# Patient Record
Sex: MALE | Race: WHITE | Employment: OTHER | URBAN - METROPOLITAN AREA
[De-identification: names, ages, dates, MRNs, and addresses within clinical notes are randomized per-mention and may not be internally consistent; named-entity substitution may affect disease eponyms.]

---

## 2018-04-03 ENCOUNTER — OFFICE VISIT (OUTPATIENT)
Dept: FAMILY MEDICINE CLINIC | Age: 63
End: 2018-04-03
Payer: MEDICARE

## 2018-04-03 VITALS
SYSTOLIC BLOOD PRESSURE: 130 MMHG | TEMPERATURE: 98.6 F | HEIGHT: 69 IN | BODY MASS INDEX: 35.98 KG/M2 | DIASTOLIC BLOOD PRESSURE: 80 MMHG | RESPIRATION RATE: 12 BRPM | HEART RATE: 76 BPM | WEIGHT: 242.9 LBS | OXYGEN SATURATION: 94 %

## 2018-04-03 DIAGNOSIS — E03.8 OTHER SPECIFIED HYPOTHYROIDISM: Primary | ICD-10-CM

## 2018-04-03 DIAGNOSIS — R97.20 ELEVATED PSA: ICD-10-CM

## 2018-04-03 DIAGNOSIS — E78.00 PURE HYPERCHOLESTEROLEMIA: ICD-10-CM

## 2018-04-03 PROCEDURE — G0444 DEPRESSION SCREEN ANNUAL: HCPCS | Performed by: FAMILY MEDICINE

## 2018-04-03 PROCEDURE — 99202 OFFICE O/P NEW SF 15 MIN: CPT | Performed by: FAMILY MEDICINE

## 2018-04-03 ASSESSMENT — PATIENT HEALTH QUESTIONNAIRE - PHQ9
2. FEELING DOWN, DEPRESSED OR HOPELESS: 0
1. LITTLE INTEREST OR PLEASURE IN DOING THINGS: 2
5. POOR APPETITE OR OVEREATING: 1
8. MOVING OR SPEAKING SO SLOWLY THAT OTHER PEOPLE COULD HAVE NOTICED. OR THE OPPOSITE, BEING SO FIGETY OR RESTLESS THAT YOU HAVE BEEN MOVING AROUND A LOT MORE THAN USUAL: 0
10. IF YOU CHECKED OFF ANY PROBLEMS, HOW DIFFICULT HAVE THESE PROBLEMS MADE IT FOR YOU TO DO YOUR WORK, TAKE CARE OF THINGS AT HOME, OR GET ALONG WITH OTHER PEOPLE: 0
7. TROUBLE CONCENTRATING ON THINGS, SUCH AS READING THE NEWSPAPER OR WATCHING TELEVISION: 0
9. THOUGHTS THAT YOU WOULD BE BETTER OFF DEAD, OR OF HURTING YOURSELF: 0
3. TROUBLE FALLING OR STAYING ASLEEP: 0
6. FEELING BAD ABOUT YOURSELF - OR THAT YOU ARE A FAILURE OR HAVE LET YOURSELF OR YOUR FAMILY DOWN: 0
SUM OF ALL RESPONSES TO PHQ QUESTIONS 1-9: 5
SUM OF ALL RESPONSES TO PHQ9 QUESTIONS 1 & 2: 2
4. FEELING TIRED OR HAVING LITTLE ENERGY: 2

## 2018-04-03 ASSESSMENT — ENCOUNTER SYMPTOMS
ABDOMINAL PAIN: 0
CHOKING: 1

## 2018-04-24 DIAGNOSIS — E03.8 OTHER SPECIFIED HYPOTHYROIDISM: ICD-10-CM

## 2018-04-24 DIAGNOSIS — E78.00 PURE HYPERCHOLESTEROLEMIA: ICD-10-CM

## 2018-04-24 LAB
ALBUMIN SERPL-MCNC: 4.2 G/DL (ref 3.9–4.9)
ALP BLD-CCNC: 100 U/L (ref 35–104)
ALT SERPL-CCNC: 20 U/L (ref 0–41)
AST SERPL-CCNC: 20 U/L (ref 0–40)
BILIRUB SERPL-MCNC: 0.4 MG/DL (ref 0–1.2)
BILIRUBIN DIRECT: 0.2 MG/DL (ref 0–0.3)
BILIRUBIN, INDIRECT: 0.2 MG/DL (ref 0–0.6)
CHOLESTEROL, TOTAL: 193 MG/DL (ref 0–199)
HDLC SERPL-MCNC: 40 MG/DL (ref 40–59)
LDL CHOLESTEROL CALCULATED: 125 MG/DL (ref 0–129)
TOTAL PROTEIN: 6.7 G/DL (ref 6.4–8.1)
TRIGL SERPL-MCNC: 140 MG/DL (ref 0–200)
TSH SERPL DL<=0.05 MIU/L-ACNC: 3.96 UIU/ML (ref 0.27–4.2)

## 2018-05-01 ENCOUNTER — OFFICE VISIT (OUTPATIENT)
Dept: FAMILY MEDICINE CLINIC | Age: 63
End: 2018-05-01
Payer: MEDICARE

## 2018-05-01 VITALS
BODY MASS INDEX: 35.1 KG/M2 | RESPIRATION RATE: 16 BRPM | HEART RATE: 80 BPM | SYSTOLIC BLOOD PRESSURE: 118 MMHG | TEMPERATURE: 97.6 F | WEIGHT: 237 LBS | DIASTOLIC BLOOD PRESSURE: 80 MMHG | HEIGHT: 69 IN

## 2018-05-01 DIAGNOSIS — E78.00 PURE HYPERCHOLESTEROLEMIA: Primary | ICD-10-CM

## 2018-05-01 DIAGNOSIS — E03.8 OTHER SPECIFIED HYPOTHYROIDISM: ICD-10-CM

## 2018-05-01 DIAGNOSIS — Z13.31 POSITIVE DEPRESSION SCREENING: ICD-10-CM

## 2018-05-01 PROCEDURE — G8431 POS CLIN DEPRES SCRN F/U DOC: HCPCS | Performed by: FAMILY MEDICINE

## 2018-05-01 PROCEDURE — 99213 OFFICE O/P EST LOW 20 MIN: CPT | Performed by: FAMILY MEDICINE

## 2018-05-01 RX ORDER — L.ACIDOPH,PLANT/B.ANIMAL,LONG 2B CELL
CAPSULE ORAL
COMMUNITY
End: 2018-08-01

## 2018-05-01 RX ORDER — ASCORBIC ACID 500 MG
1000 TABLET ORAL DAILY
COMMUNITY
End: 2018-08-01

## 2018-05-01 ASSESSMENT — ENCOUNTER SYMPTOMS
ABDOMINAL PAIN: 0
SHORTNESS OF BREATH: 0

## 2018-05-07 ENCOUNTER — OFFICE VISIT (OUTPATIENT)
Dept: UROLOGY | Age: 63
End: 2018-05-07
Payer: MEDICARE

## 2018-05-07 VITALS
DIASTOLIC BLOOD PRESSURE: 80 MMHG | SYSTOLIC BLOOD PRESSURE: 122 MMHG | HEART RATE: 80 BPM | WEIGHT: 237 LBS | BODY MASS INDEX: 35.1 KG/M2 | HEIGHT: 69 IN

## 2018-05-07 DIAGNOSIS — R97.20 ELEVATED PSA: Primary | ICD-10-CM

## 2018-05-07 DIAGNOSIS — R31.29 MICROHEMATURIA: ICD-10-CM

## 2018-05-07 DIAGNOSIS — N13.8 BPH WITH OBSTRUCTION/LOWER URINARY TRACT SYMPTOMS: ICD-10-CM

## 2018-05-07 DIAGNOSIS — N40.1 BPH WITH OBSTRUCTION/LOWER URINARY TRACT SYMPTOMS: ICD-10-CM

## 2018-05-07 LAB
BILIRUBIN, POC: ABNORMAL
BLOOD URINE, POC: ABNORMAL
CLARITY, POC: CLEAR
COLOR, POC: YELLOW
GLUCOSE URINE, POC: ABNORMAL
KETONES, POC: ABNORMAL
LEUKOCYTE EST, POC: ABNORMAL
NITRITE, POC: ABNORMAL
PH, POC: 5.5
PROTEIN, POC: ABNORMAL
SPECIFIC GRAVITY, POC: >1.03
UROBILINOGEN, POC: 0.2

## 2018-05-07 PROCEDURE — 99204 OFFICE O/P NEW MOD 45 MIN: CPT | Performed by: UROLOGY

## 2018-05-07 PROCEDURE — 81003 URINALYSIS AUTO W/O SCOPE: CPT | Performed by: UROLOGY

## 2018-05-07 ASSESSMENT — ENCOUNTER SYMPTOMS
BACK PAIN: 1
ABDOMINAL DISTENTION: 0
VOMITING: 0
GASTROINTESTINAL NEGATIVE: 1
SHORTNESS OF BREATH: 0
NAUSEA: 0
RESPIRATORY NEGATIVE: 1
ABDOMINAL PAIN: 0
ALLERGIC/IMMUNOLOGIC NEGATIVE: 1

## 2018-05-08 LAB
BILIRUBIN URINE: NEGATIVE
BLOOD, URINE: ABNORMAL
CLARITY: ABNORMAL
COLOR: ABNORMAL
GLUCOSE URINE: NEGATIVE MG/DL
KETONES, URINE: NEGATIVE MG/DL
LEUKOCYTE ESTERASE, URINE: ABNORMAL
NITRITE, URINE: NEGATIVE
PH UA: 5.5 (ref 5–9)
PROTEIN UA: NEGATIVE MG/DL
SPECIFIC GRAVITY UA: 1.03 (ref 1–1.03)
UROBILINOGEN, URINE: 0.2 E.U./DL

## 2018-05-09 LAB
AMORPHOUS: ABNORMAL
CRYSTALS, UA: ABNORMAL
RBC UA: ABNORMAL /HPF (ref 0–2)
WBC UA: ABNORMAL /HPF (ref 0–5)

## 2018-05-24 DIAGNOSIS — R97.20 ELEVATED PSA: ICD-10-CM

## 2018-05-24 LAB — PROSTATE SPECIFIC ANTIGEN: 4.34 NG/ML (ref 0–5.4)

## 2018-05-29 ENCOUNTER — TELEPHONE (OUTPATIENT)
Dept: FAMILY MEDICINE CLINIC | Age: 63
End: 2018-05-29

## 2018-05-29 ENCOUNTER — OFFICE VISIT (OUTPATIENT)
Dept: UROLOGY | Age: 63
End: 2018-05-29
Payer: MEDICARE

## 2018-05-29 VITALS
DIASTOLIC BLOOD PRESSURE: 70 MMHG | HEIGHT: 70 IN | WEIGHT: 237 LBS | HEART RATE: 80 BPM | SYSTOLIC BLOOD PRESSURE: 114 MMHG | BODY MASS INDEX: 33.93 KG/M2

## 2018-05-29 DIAGNOSIS — R97.20 ELEVATED PSA: Primary | ICD-10-CM

## 2018-05-29 DIAGNOSIS — R31.29 MICROHEMATURIA: ICD-10-CM

## 2018-05-29 DIAGNOSIS — F41.9 ANXIETY: Primary | ICD-10-CM

## 2018-05-29 LAB
BILIRUBIN, POC: ABNORMAL
BLOOD URINE, POC: ABNORMAL
CLARITY, POC: CLEAR
COLOR, POC: YELLOW
GLUCOSE URINE, POC: ABNORMAL
KETONES, POC: ABNORMAL
LEUKOCYTE EST, POC: ABNORMAL
NITRITE, POC: ABNORMAL
PH, POC: 6
PROTEIN, POC: ABNORMAL
SPECIFIC GRAVITY, POC: 1.02
UROBILINOGEN, POC: 0.2

## 2018-05-29 PROCEDURE — 99214 OFFICE O/P EST MOD 30 MIN: CPT | Performed by: UROLOGY

## 2018-05-29 PROCEDURE — 81003 URINALYSIS AUTO W/O SCOPE: CPT | Performed by: UROLOGY

## 2018-05-29 ASSESSMENT — ENCOUNTER SYMPTOMS
SHORTNESS OF BREATH: 0
ABDOMINAL PAIN: 0
ABDOMINAL DISTENTION: 0

## 2018-05-31 LAB — URINE CULTURE, ROUTINE: NORMAL

## 2018-06-05 RX ORDER — LORAZEPAM 1 MG/1
TABLET ORAL
Qty: 4 TABLET | Refills: 0 | Status: SHIPPED | OUTPATIENT
Start: 2018-06-05 | End: 2018-07-03

## 2018-06-08 ENCOUNTER — HOSPITAL ENCOUNTER (OUTPATIENT)
Dept: GENERAL RADIOLOGY | Age: 63
Discharge: HOME | End: 2018-06-10
Payer: MEDICARE

## 2018-06-08 ENCOUNTER — HOSPITAL ENCOUNTER (OUTPATIENT)
Dept: CT IMAGING | Age: 63
Discharge: HOME | End: 2018-06-10
Payer: MEDICARE

## 2018-06-08 VITALS
HEIGHT: 70 IN | RESPIRATION RATE: 16 BRPM | WEIGHT: 237 LBS | DIASTOLIC BLOOD PRESSURE: 78 MMHG | SYSTOLIC BLOOD PRESSURE: 118 MMHG | BODY MASS INDEX: 33.93 KG/M2 | HEART RATE: 74 BPM

## 2018-06-08 DIAGNOSIS — R31.29 MICROHEMATURIA: ICD-10-CM

## 2018-06-08 DIAGNOSIS — R31.29 MICROSCOPIC HEMATURIA: ICD-10-CM

## 2018-06-08 LAB
GFR AFRICAN AMERICAN: >60
GFR NON-AFRICAN AMERICAN: >60
PERFORMED ON: NORMAL
POC CREATININE: 0.9 MG/DL (ref 0.8–1.3)
POC SAMPLE TYPE: NORMAL

## 2018-06-08 PROCEDURE — 6360000004 HC RX CONTRAST MEDICATION: Performed by: UROLOGY

## 2018-06-08 PROCEDURE — 2580000003 HC RX 258: Performed by: UROLOGY

## 2018-06-08 PROCEDURE — 74178 CT ABD&PLV WO CNTR FLWD CNTR: CPT

## 2018-06-08 PROCEDURE — 74018 RADEX ABDOMEN 1 VIEW: CPT

## 2018-06-08 RX ORDER — SODIUM CHLORIDE 0.9 % (FLUSH) 0.9 %
10 SYRINGE (ML) INJECTION PRN
Status: DISCONTINUED | OUTPATIENT
Start: 2018-06-08 | End: 2018-06-11 | Stop reason: HOSPADM

## 2018-06-08 RX ADMIN — IOPAMIDOL 100 ML: 755 INJECTION, SOLUTION INTRAVENOUS at 10:48

## 2018-06-08 RX ADMIN — Medication 10 ML: at 10:48

## 2018-06-14 DIAGNOSIS — R97.20 ELEVATED PSA: ICD-10-CM

## 2018-06-14 LAB — PROSTATE SPECIFIC ANTIGEN: 4.42 NG/ML (ref 0–5.4)

## 2018-06-19 ENCOUNTER — OFFICE VISIT (OUTPATIENT)
Dept: UROLOGY | Age: 63
End: 2018-06-19
Payer: MEDICARE

## 2018-06-19 VITALS
BODY MASS INDEX: 34.96 KG/M2 | HEIGHT: 69 IN | WEIGHT: 236 LBS | HEART RATE: 74 BPM | SYSTOLIC BLOOD PRESSURE: 132 MMHG | DIASTOLIC BLOOD PRESSURE: 80 MMHG

## 2018-06-19 DIAGNOSIS — R97.20 ELEVATED PSA: Primary | ICD-10-CM

## 2018-06-19 DIAGNOSIS — R31.29 MICROHEMATURIA: ICD-10-CM

## 2018-06-19 DIAGNOSIS — N20.0 KIDNEY STONE: ICD-10-CM

## 2018-06-19 PROCEDURE — 99214 OFFICE O/P EST MOD 30 MIN: CPT | Performed by: UROLOGY

## 2018-06-19 RX ORDER — CIPROFLOXACIN 500 MG/1
500 TABLET, FILM COATED ORAL 2 TIMES DAILY
Qty: 6 TABLET | Refills: 0 | Status: SHIPPED | OUTPATIENT
Start: 2018-06-19 | End: 2018-07-31 | Stop reason: ALTCHOICE

## 2018-06-19 ASSESSMENT — ENCOUNTER SYMPTOMS
ABDOMINAL PAIN: 0
SHORTNESS OF BREATH: 0
ABDOMINAL DISTENTION: 0

## 2018-07-11 ENCOUNTER — TELEPHONE (OUTPATIENT)
Dept: FAMILY MEDICINE CLINIC | Age: 63
End: 2018-07-11

## 2018-07-12 NOTE — TELEPHONE ENCOUNTER
Patient returned call, states that he was told he could take the ativan the night before to help him relax  and one the morning of  the procedure as well please advise

## 2018-07-16 ENCOUNTER — TELEPHONE (OUTPATIENT)
Dept: UROLOGY | Age: 63
End: 2018-07-16

## 2018-07-31 ENCOUNTER — OFFICE VISIT (OUTPATIENT)
Dept: UROLOGY | Age: 63
End: 2018-07-31
Payer: MEDICARE

## 2018-07-31 VITALS
DIASTOLIC BLOOD PRESSURE: 72 MMHG | HEIGHT: 70 IN | WEIGHT: 235 LBS | SYSTOLIC BLOOD PRESSURE: 132 MMHG | BODY MASS INDEX: 33.64 KG/M2 | HEART RATE: 81 BPM

## 2018-07-31 DIAGNOSIS — C61 PROSTATE CANCER (HCC): Primary | ICD-10-CM

## 2018-07-31 PROCEDURE — 99214 OFFICE O/P EST MOD 30 MIN: CPT | Performed by: UROLOGY

## 2018-07-31 ASSESSMENT — ENCOUNTER SYMPTOMS
ABDOMINAL PAIN: 0
ABDOMINAL DISTENTION: 0

## 2018-07-31 NOTE — PROGRESS NOTES
PERINEURAL INVASION. C.  LEFT LATERAL APEX-  FOCAL AREA OF HIGH-GRADE PROSTATIC INTRAEPITHELIAL NEOPLASIA, SUPPORTED  BY PIN 4 IMMUNOSTAINING.  (CONTROL IS SATISFACTORY) . D.  LEFT BASE-  BENIGN PROSTATE TISSUE WITH FOCAL CHRONIC INFLAMMATION . E.  LEFT MID-  BENIGN PROSTATE TISSUE WITH FOCAL CHRONIC INFLAMMATION. F.  LEFT APEX-  BENIGN PROSTATE TISSUE WITH FOCAL CHRONIC INFLAMMATION. G.  RIGHT BASE-  BENIGN PROSTATE TISSUE. Javed Galloway MID-  BENIGN PROSTATE TISSUE. I.  RIGHT APEX-  BENIGN PROSTATE TISSUE WITH AREAS OF ATROPHIC CHANGES . J.   RIGHT LATERAL BASE-  FOCAL HIGH-GRADE PIN.  (PIN 4 STAINING SUPPORTS THE DIAGNOSIS)    K.  RIGHT LATERAL MID-  HIGH GRADE PIN, FOCAL. PIN 4 IMMUNOSTAINING SUPPORTIVE OF ABOVE DIAGNOSIS. L.  RIGHT LATERAL APEX-  BENIGN PROSTATE TISSUE WITH FOCAL CHRONIC INFLAMMATION. ANALYTE SPECIFIC REAGENT (ASR) DISCLAIMER    THE USE OF ONE OR MORE REAGENTS IN THE ABOVE MENTIONED TESTS IS REGULATED  AS AN ANALYTE SPECIFIC REAGENT (ASR). THESE TESTS WERE DEVELOPED AND  THEIR PERFORMANCE CHARACTERISTICS DETERMINED BY THE CLINICAL LABORATORIES  OF [facility]. THEY HAVE NOT BEEN CLEARED OR APPROVED BY THE  [place] FOOD AND DRUG ADMINISTRATION (FDA). THE FDA HAS DETERMINED THAT SUCH  CLEARANCE OR APPROVAL IS NOT NECESSARY.   MEENAES/MEENAES      CLINICAL INFORMATION:  Elevated PSA. SPECIMEN:  A.  LLB Left Lateral Base  B.  LLM Left Lateral Mid  C.  LLA Left Lateral [place]  D.  LB Left Base  E.  LM Left Mid  F.  LA Left [place]  G.  RB Right Base  H.  RM Right Mid  I.  RA Right [place]  J.  RLB Right Lateral Base  K.  RLM Right Lateral Mid  L.  RLA Right Lateral [place]    GROSS DESCRIPTION:  A.  Specimen container is labeled with the patient's name and designated  \"LLB\". In Prefer is a needle biopsy fragment measuring 1.6 cm in length. Submitted in toto, one cassette. Shelli Conquest container is labeled with the patient's name and designated  \"LLM\".  In Prefer is a by: Dr. Shawn Holland M.D., who concurs with the above diagnosis. Manuelito Casas M.D.  07/27/2018   Electronically signed out by                                                                     Page 1 of 1         PSA   Date Value Ref Range Status   06/14/2018 4.42 0.00 - 5.40 ng/mL Final   05/24/2018 4.34 0.00 - 5.40 ng/mL Final   03/05/2018 4.95 0.00 - 5.40 ng/mL Final     Comment:     When the Total PSA is between 3.00 and 10.00 ng/mL, consider  requesting a Free PSA to aid in diagnosis. 02/17/2016 3.96 0.00 - 5.40 ng/mL Final     Comment:     When the Total PSA is between 3.00 and 10.00 ng/mL, consider  requesting a Free PSA to aid in diagnosis. Assessment: This is a 57 yo male with h/o DDD, Anxiety, Kidney stones and with BPH by exam and mild LUTs and negative microhematuria evaluation and new diagnosis of [place] 7 prostate cancer (moderate risk). I reviewed the results in detail. The risks and benefits of the available treatment options for his likely clinically localized prostate cancer including active surveillance, ADT, cryotherapy, HIFU, radical prostatectomy (open/lap/robotic), seed implant, IMRT + ADT were discussed and the pt is most interested in RALP. Given patient's age and health with bothersome BPH/ LUTs this is a reasonable choice. I spent 25 minutes of which > 50% of the visit was spent counseling and coordinating care wrt the treatment options for prostate cancer. Plan:      1. Refer to Dr Pb Acuña for possible RALP (Urologic Oncology)  2.  F/U 6 mo for PSA

## 2018-08-01 ENCOUNTER — OFFICE VISIT (OUTPATIENT)
Dept: FAMILY MEDICINE CLINIC | Age: 63
End: 2018-08-01
Payer: MEDICARE

## 2018-08-01 ENCOUNTER — TELEPHONE (OUTPATIENT)
Dept: FAMILY MEDICINE CLINIC | Age: 63
End: 2018-08-01

## 2018-08-01 ENCOUNTER — TELEPHONE (OUTPATIENT)
Dept: UROLOGY | Age: 63
End: 2018-08-01

## 2018-08-01 VITALS
HEART RATE: 84 BPM | WEIGHT: 230.6 LBS | RESPIRATION RATE: 14 BRPM | OXYGEN SATURATION: 99 % | HEIGHT: 69 IN | SYSTOLIC BLOOD PRESSURE: 136 MMHG | DIASTOLIC BLOOD PRESSURE: 86 MMHG | BODY MASS INDEX: 34.16 KG/M2 | TEMPERATURE: 97.6 F

## 2018-08-01 DIAGNOSIS — C61 PROSTATE CANCER (HCC): ICD-10-CM

## 2018-08-01 DIAGNOSIS — F41.8 SITUATIONAL ANXIETY: Primary | ICD-10-CM

## 2018-08-01 DIAGNOSIS — C61 PROSTATE CANCER (HCC): Primary | ICD-10-CM

## 2018-08-01 PROCEDURE — 99213 OFFICE O/P EST LOW 20 MIN: CPT | Performed by: FAMILY MEDICINE

## 2018-08-01 RX ORDER — LEVOTHYROXINE SODIUM 0.05 MG/1
50 TABLET ORAL DAILY
COMMUNITY
End: 2018-10-01 | Stop reason: SDUPTHER

## 2018-08-01 RX ORDER — LOVASTATIN 40 MG/1
40 TABLET ORAL NIGHTLY
COMMUNITY
End: 2018-10-01 | Stop reason: SDUPTHER

## 2018-08-01 RX ORDER — LORAZEPAM 1 MG/1
TABLET ORAL
Qty: 20 TABLET | Refills: 0 | Status: SHIPPED | OUTPATIENT
Start: 2018-08-01 | End: 2018-08-22 | Stop reason: SDUPTHER

## 2018-08-22 ENCOUNTER — OFFICE VISIT (OUTPATIENT)
Dept: FAMILY MEDICINE CLINIC | Age: 63
End: 2018-08-22
Payer: MEDICARE

## 2018-08-22 VITALS
HEART RATE: 69 BPM | OXYGEN SATURATION: 95 % | WEIGHT: 227 LBS | SYSTOLIC BLOOD PRESSURE: 116 MMHG | BODY MASS INDEX: 33.62 KG/M2 | DIASTOLIC BLOOD PRESSURE: 72 MMHG | RESPIRATION RATE: 16 BRPM | HEIGHT: 69 IN | TEMPERATURE: 97 F

## 2018-08-22 DIAGNOSIS — F41.8 SITUATIONAL ANXIETY: Primary | ICD-10-CM

## 2018-08-22 DIAGNOSIS — C61 PROSTATE CANCER (HCC): ICD-10-CM

## 2018-08-22 PROCEDURE — 99213 OFFICE O/P EST LOW 20 MIN: CPT | Performed by: FAMILY MEDICINE

## 2018-08-22 RX ORDER — LORAZEPAM 1 MG/1
1 TABLET ORAL DAILY PRN
Qty: 30 TABLET | Refills: 1 | Status: SHIPPED | OUTPATIENT
Start: 2018-08-22 | End: 2018-11-08 | Stop reason: SDUPTHER

## 2018-10-01 RX ORDER — LEVOTHYROXINE SODIUM 0.05 MG/1
50 TABLET ORAL DAILY
Qty: 30 TABLET | Refills: 0 | Status: SHIPPED | OUTPATIENT
Start: 2018-10-01 | End: 2019-01-22

## 2018-10-01 RX ORDER — LOVASTATIN 40 MG/1
40 TABLET ORAL NIGHTLY
Qty: 30 TABLET | Refills: 0 | Status: SHIPPED | OUTPATIENT
Start: 2018-10-01 | End: 2018-11-08 | Stop reason: SDUPTHER

## 2018-11-01 DIAGNOSIS — E03.8 OTHER SPECIFIED HYPOTHYROIDISM: ICD-10-CM

## 2018-11-01 LAB — TSH SERPL DL<=0.05 MIU/L-ACNC: 5.18 UIU/ML (ref 0.27–4.2)

## 2018-11-08 ENCOUNTER — OFFICE VISIT (OUTPATIENT)
Dept: FAMILY MEDICINE CLINIC | Age: 63
End: 2018-11-08
Payer: MEDICARE

## 2018-11-08 VITALS
TEMPERATURE: 97.4 F | HEIGHT: 69 IN | RESPIRATION RATE: 14 BRPM | BODY MASS INDEX: 34.8 KG/M2 | DIASTOLIC BLOOD PRESSURE: 88 MMHG | SYSTOLIC BLOOD PRESSURE: 118 MMHG | OXYGEN SATURATION: 98 % | WEIGHT: 235 LBS | HEART RATE: 84 BPM

## 2018-11-08 DIAGNOSIS — E03.8 OTHER SPECIFIED HYPOTHYROIDISM: Primary | ICD-10-CM

## 2018-11-08 DIAGNOSIS — F41.8 SITUATIONAL ANXIETY: ICD-10-CM

## 2018-11-08 DIAGNOSIS — C61 PROSTATE CANCER (HCC): ICD-10-CM

## 2018-11-08 PROCEDURE — 99213 OFFICE O/P EST LOW 20 MIN: CPT | Performed by: FAMILY MEDICINE

## 2018-11-08 RX ORDER — LEVOTHYROXINE SODIUM 0.07 MG/1
75 TABLET ORAL DAILY
Qty: 90 TABLET | Refills: 0 | Status: SHIPPED | OUTPATIENT
Start: 2018-11-08 | End: 2019-01-22 | Stop reason: SDUPTHER

## 2018-11-08 RX ORDER — LOVASTATIN 40 MG/1
40 TABLET ORAL NIGHTLY
Qty: 90 TABLET | Refills: 1 | Status: SHIPPED | OUTPATIENT
Start: 2018-11-08 | End: 2019-04-26 | Stop reason: SDUPTHER

## 2018-11-08 RX ORDER — LORAZEPAM 1 MG/1
1 TABLET ORAL DAILY PRN
Qty: 20 TABLET | Refills: 0 | Status: SHIPPED | OUTPATIENT
Start: 2018-11-08 | End: 2019-02-28 | Stop reason: SDUPTHER

## 2018-11-08 RX ORDER — LORAZEPAM 1 MG/1
1 TABLET ORAL DAILY PRN
COMMUNITY
Start: 2018-10-02 | End: 2019-04-05 | Stop reason: SDUPTHER

## 2018-11-08 ASSESSMENT — ENCOUNTER SYMPTOMS: ABDOMINAL PAIN: 0

## 2018-11-08 NOTE — PROGRESS NOTES
Subjective:      Patient ID: José Gorman is a 61 y.o. male    HPI  Here in follow up for anxiety and hypothyroidism. Using ativan daily for most part since last time. No missed doses of synthroid. Getting prostate surgery later this month   Review of Systems   Constitutional: Negative for unexpected weight change. Gastrointestinal: Negative for abdominal pain. Skin: Negative for rash. Neurological: Negative for dizziness and weakness. Reviewed allergy, medical, social, surgical, family and med list changes and updated   Files--reviewed blood work with slightly elevated tsh  Social History     Social History    Marital status:      Spouse name: N/A    Number of children: N/A    Years of education: N/A     Social History Main Topics    Smoking status: Current Every Day Smoker     Packs/day: 1.50     Years: 40.00    Smokeless tobacco: Never Used    Alcohol use Yes    Drug use: No    Sexual activity: Not Asked     Other Topics Concern    None     Social History Narrative    None     Current Outpatient Prescriptions   Medication Sig Dispense Refill    LORazepam (ATIVAN) 1 MG tablet 1 mg daily as needed. Shimon Johnson levothyroxine (SYNTHROID) 50 MCG tablet Take 1 tablet by mouth Daily 30 tablet 0    lovastatin (MEVACOR) 40 MG tablet Take 1 tablet by mouth nightly 30 tablet 0    HYDROcodone-acetaminophen (NORCO) 5-325 MG per tablet Take 1 tablet by mouth       No current facility-administered medications for this visit. Family History   Problem Relation Age of Onset    Other Mother     Other Father      Past Medical History:   Diagnosis Date    Chicken pox     Chronic back pain     Hyperlipidemia     Measles     Mumps     Pneumonia    Controlled substances monitoring: no signs of potential drug abuse or diversion identified and [place] report reviewed today- activity consistent with treatment plan.   Objective:   /88   Pulse 84   Temp 97.4 °F (36.3 °C)   Resp 14   Ht 5' 9\"

## 2019-01-16 DIAGNOSIS — E03.8 OTHER SPECIFIED HYPOTHYROIDISM: ICD-10-CM

## 2019-01-16 LAB — TSH SERPL DL<=0.05 MIU/L-ACNC: 3.62 UIU/ML (ref 0.27–4.2)

## 2019-01-22 ENCOUNTER — OFFICE VISIT (OUTPATIENT)
Dept: FAMILY MEDICINE CLINIC | Age: 64
End: 2019-01-22
Payer: MEDICARE

## 2019-01-22 VITALS
HEIGHT: 69 IN | RESPIRATION RATE: 14 BRPM | TEMPERATURE: 98 F | HEART RATE: 68 BPM | WEIGHT: 244.2 LBS | BODY MASS INDEX: 36.17 KG/M2 | DIASTOLIC BLOOD PRESSURE: 84 MMHG | OXYGEN SATURATION: 99 % | SYSTOLIC BLOOD PRESSURE: 122 MMHG

## 2019-01-22 DIAGNOSIS — E78.00 PURE HYPERCHOLESTEROLEMIA: ICD-10-CM

## 2019-01-22 DIAGNOSIS — E03.8 OTHER SPECIFIED HYPOTHYROIDISM: Primary | ICD-10-CM

## 2019-01-22 DIAGNOSIS — F41.8 SITUATIONAL ANXIETY: ICD-10-CM

## 2019-01-22 DIAGNOSIS — C61 PROSTATE CANCER (HCC): ICD-10-CM

## 2019-01-22 LAB — PROSTATE SPECIFIC ANTIGEN: <0.01 NG/ML (ref 0–5.4)

## 2019-01-22 PROCEDURE — 99213 OFFICE O/P EST LOW 20 MIN: CPT | Performed by: FAMILY MEDICINE

## 2019-01-22 RX ORDER — LEVOTHYROXINE SODIUM 0.07 MG/1
75 TABLET ORAL DAILY
Qty: 90 TABLET | Refills: 1 | Status: SHIPPED | OUTPATIENT
Start: 2019-01-22 | End: 2019-07-23 | Stop reason: SDUPTHER

## 2019-01-22 ASSESSMENT — ENCOUNTER SYMPTOMS: ABDOMINAL DISTENTION: 0

## 2019-01-31 ENCOUNTER — OFFICE VISIT (OUTPATIENT)
Dept: UROLOGY | Age: 64
End: 2019-01-31
Payer: MEDICARE

## 2019-01-31 VITALS
WEIGHT: 244 LBS | SYSTOLIC BLOOD PRESSURE: 120 MMHG | BODY MASS INDEX: 34.93 KG/M2 | HEART RATE: 82 BPM | HEIGHT: 70 IN | DIASTOLIC BLOOD PRESSURE: 70 MMHG

## 2019-01-31 DIAGNOSIS — C61 PROSTATE CANCER (HCC): Primary | ICD-10-CM

## 2019-01-31 PROCEDURE — 99214 OFFICE O/P EST MOD 30 MIN: CPT | Performed by: UROLOGY

## 2019-01-31 ASSESSMENT — ENCOUNTER SYMPTOMS: ABDOMINAL PAIN: 0

## 2019-02-28 DIAGNOSIS — F41.8 SITUATIONAL ANXIETY: ICD-10-CM

## 2019-02-28 RX ORDER — LORAZEPAM 1 MG/1
1 TABLET ORAL DAILY PRN
Status: CANCELLED | OUTPATIENT
Start: 2019-02-28

## 2019-02-28 RX ORDER — LORAZEPAM 1 MG/1
1 TABLET ORAL DAILY PRN
Qty: 30 TABLET | Refills: 0 | Status: SHIPPED | OUTPATIENT
Start: 2019-02-28 | End: 2019-03-30

## 2019-04-05 DIAGNOSIS — F41.9 ANXIETY: Primary | ICD-10-CM

## 2019-04-05 RX ORDER — LORAZEPAM 1 MG/1
1 TABLET ORAL DAILY PRN
OUTPATIENT
Start: 2019-04-05

## 2019-04-05 RX ORDER — LORAZEPAM 1 MG/1
1 TABLET ORAL DAILY PRN
Qty: 30 TABLET | Refills: 0 | Status: SHIPPED | OUTPATIENT
Start: 2019-04-05 | End: 2019-05-06 | Stop reason: SDUPTHER

## 2019-04-22 DIAGNOSIS — E03.8 OTHER SPECIFIED HYPOTHYROIDISM: ICD-10-CM

## 2019-04-22 DIAGNOSIS — E78.00 PURE HYPERCHOLESTEROLEMIA: ICD-10-CM

## 2019-04-22 LAB
ALBUMIN SERPL-MCNC: 4.1 G/DL (ref 3.5–4.6)
ALP BLD-CCNC: 91 U/L (ref 35–104)
ALT SERPL-CCNC: 26 U/L (ref 0–41)
ANION GAP SERPL CALCULATED.3IONS-SCNC: 14 MEQ/L (ref 9–15)
AST SERPL-CCNC: 21 U/L (ref 0–40)
BASOPHILS ABSOLUTE: 0.1 K/UL (ref 0–0.2)
BASOPHILS RELATIVE PERCENT: 1 %
BILIRUB SERPL-MCNC: <0.2 MG/DL (ref 0.2–0.7)
BUN BLDV-MCNC: 13 MG/DL (ref 8–23)
CALCIUM SERPL-MCNC: 8.9 MG/DL (ref 8.5–9.9)
CHLORIDE BLD-SCNC: 103 MEQ/L (ref 95–107)
CHOLESTEROL, TOTAL: 186 MG/DL (ref 0–199)
CO2: 26 MEQ/L (ref 20–31)
CREAT SERPL-MCNC: 0.81 MG/DL (ref 0.7–1.2)
EOSINOPHILS ABSOLUTE: 0.4 K/UL (ref 0–0.7)
EOSINOPHILS RELATIVE PERCENT: 5.6 %
GFR AFRICAN AMERICAN: >60
GFR NON-AFRICAN AMERICAN: >60
GLOBULIN: 2.9 G/DL (ref 2.3–3.5)
GLUCOSE BLD-MCNC: 121 MG/DL (ref 70–99)
HCT VFR BLD CALC: 40.6 % (ref 42–52)
HDLC SERPL-MCNC: 43 MG/DL (ref 40–59)
HEMOGLOBIN: 14 G/DL (ref 14–18)
LDL CHOLESTEROL CALCULATED: 115 MG/DL (ref 0–129)
LYMPHOCYTES ABSOLUTE: 2.6 K/UL (ref 1–4.8)
LYMPHOCYTES RELATIVE PERCENT: 34.1 %
MCH RBC QN AUTO: 31.2 PG (ref 27–31.3)
MCHC RBC AUTO-ENTMCNC: 34.4 % (ref 33–37)
MCV RBC AUTO: 90.7 FL (ref 80–100)
MONOCYTES ABSOLUTE: 1 K/UL (ref 0.2–0.8)
MONOCYTES RELATIVE PERCENT: 12.4 %
NEUTROPHILS ABSOLUTE: 3.6 K/UL (ref 1.4–6.5)
NEUTROPHILS RELATIVE PERCENT: 46.9 %
PDW BLD-RTO: 13.8 % (ref 11.5–14.5)
PLATELET # BLD: 239 K/UL (ref 130–400)
POTASSIUM SERPL-SCNC: 4.6 MEQ/L (ref 3.4–4.9)
RBC # BLD: 4.48 M/UL (ref 4.7–6.1)
SODIUM BLD-SCNC: 143 MEQ/L (ref 135–144)
TOTAL PROTEIN: 7 G/DL (ref 6.3–8)
TRIGL SERPL-MCNC: 138 MG/DL (ref 0–150)
TSH SERPL DL<=0.05 MIU/L-ACNC: 3.49 UIU/ML (ref 0.44–3.86)
WBC # BLD: 7.7 K/UL (ref 4.8–10.8)

## 2019-04-26 ENCOUNTER — OFFICE VISIT (OUTPATIENT)
Dept: FAMILY MEDICINE CLINIC | Age: 64
End: 2019-04-26
Payer: MEDICARE

## 2019-04-26 VITALS
SYSTOLIC BLOOD PRESSURE: 100 MMHG | TEMPERATURE: 96.7 F | HEART RATE: 69 BPM | DIASTOLIC BLOOD PRESSURE: 62 MMHG | RESPIRATION RATE: 10 BRPM | BODY MASS INDEX: 35.9 KG/M2 | OXYGEN SATURATION: 98 % | WEIGHT: 250.8 LBS | HEIGHT: 70 IN

## 2019-04-26 DIAGNOSIS — F41.9 ANXIETY: ICD-10-CM

## 2019-04-26 DIAGNOSIS — C61 PROSTATE CANCER (HCC): ICD-10-CM

## 2019-04-26 DIAGNOSIS — E78.00 PURE HYPERCHOLESTEROLEMIA: Primary | ICD-10-CM

## 2019-04-26 DIAGNOSIS — E03.8 OTHER SPECIFIED HYPOTHYROIDISM: ICD-10-CM

## 2019-04-26 DIAGNOSIS — R06.09 EXERTIONAL DYSPNEA: ICD-10-CM

## 2019-04-26 DIAGNOSIS — R73.03 PRE-DIABETES: ICD-10-CM

## 2019-04-26 LAB — PROSTATE SPECIFIC ANTIGEN: <0.01 NG/ML (ref 0–5.4)

## 2019-04-26 PROCEDURE — 93000 ELECTROCARDIOGRAM COMPLETE: CPT | Performed by: FAMILY MEDICINE

## 2019-04-26 PROCEDURE — 99214 OFFICE O/P EST MOD 30 MIN: CPT | Performed by: FAMILY MEDICINE

## 2019-04-26 RX ORDER — ALBUTEROL SULFATE 90 UG/1
2 AEROSOL, METERED RESPIRATORY (INHALATION) EVERY 6 HOURS PRN
Qty: 1 INHALER | Refills: 0 | Status: SHIPPED | OUTPATIENT
Start: 2019-04-26 | End: 2019-05-02 | Stop reason: ALTCHOICE

## 2019-04-26 RX ORDER — LOVASTATIN 40 MG/1
40 TABLET ORAL NIGHTLY
Qty: 90 TABLET | Refills: 1 | Status: SHIPPED | OUTPATIENT
Start: 2019-04-26 | End: 2019-10-24 | Stop reason: ALTCHOICE

## 2019-04-26 ASSESSMENT — PATIENT HEALTH QUESTIONNAIRE - PHQ9
1. LITTLE INTEREST OR PLEASURE IN DOING THINGS: 0
SUM OF ALL RESPONSES TO PHQ QUESTIONS 1-9: 0
SUM OF ALL RESPONSES TO PHQ QUESTIONS 1-9: 0
2. FEELING DOWN, DEPRESSED OR HOPELESS: 0
SUM OF ALL RESPONSES TO PHQ9 QUESTIONS 1 & 2: 0

## 2019-04-26 ASSESSMENT — ENCOUNTER SYMPTOMS
ABDOMINAL PAIN: 0
SHORTNESS OF BREATH: 1

## 2019-04-26 NOTE — PROGRESS NOTES
Subjective:      Patient ID: Reggie Velasquez is a 61 y.o. male    Hyperlipidemia   This is a chronic problem. The current episode started more than 1 year ago. The problem is controlled. Exacerbating diseases include obesity. Associated symptoms include shortness of breath. Current antihyperlipidemic treatment includes statins. The current treatment provides mild improvement of lipids. Compliance problems include adherence to exercise, adherence to diet and psychosocial issues. Risk factors for coronary artery disease include stress, male sex, obesity and dyslipidemia. Other   Pertinent negatives include no abdominal pain, rash or weakness. Shortness of Breath   Associated symptoms include leg swelling. Pertinent negatives include no abdominal pain or rash. Here in follow up for anxiety and lipids and hypothyroidism. Using ativan daily as still going thru stresses of prostate cancer tx  Has noted some exertional sob over the last few months. No chest pain. Review of Systems   Constitutional: Positive for appetite change. Respiratory: Positive for shortness of breath. Cardiovascular: Positive for leg swelling. Negative for palpitations. Gastrointestinal: Negative for abdominal pain. Skin: Negative for rash. Neurological: Negative for dizziness and weakness.      Reviewed allergy, medical, social, surgical, family and med list changes and updated   Files-reviewed blood work with high normal glucose      Social History     Socioeconomic History    Marital status:      Spouse name: None    Number of children: None    Years of education: None    Highest education level: None   Occupational History    None   Social Needs    Financial resource strain: None    Food insecurity:     Worry: None     Inability: None    Transportation needs:     Medical: None     Non-medical: None   Tobacco Use    Smoking status: Former Smoker     Packs/day: 1.50     Years: 40.00     Pack years: 60.00 carotid bruits. No masses. No adenopathy. No thyroid asymmetry. Lungs:clear and equal breath sounds. No wheezes or rales. Heart:rate reg. No murmur. No gallops   Pulses:Radials 2+ equal               Poster tib 1+ equal  Extremities:no edema in either leg  Gen: In no acute distress  Abdomen; B.S present. Soft  Non tender. No hepatosplenomegaly. No masses   Patient with appropriate affect. Alert    Thought content appropriate  Good eye contact      Assessment:       Diagnosis Orders   1. Pure hypercholesterolemia     2. Anxiety     3. Exertional dyspnea  EKG 12 Lead    XR CHEST STANDARD (2 VW)   4. Other specified hypothyroidism     5.  Pre-diabetes           Plan:    continue current medications   Will work on diet and weight loss   Orders Placed This Encounter   Medications    albuterol sulfate HFA (PROVENTIL HFA) 108 (90 Base) MCG/ACT inhaler     Sig: Inhale 2 puffs into the lungs every 6 hours as needed for Wheezing     Dispense:  1 Inhaler     Refill:  0    lovastatin (MEVACOR) 40 MG tablet     Sig: Take 1 tablet by mouth nightly     Dispense:  90 tablet     Refill:  1     Orders Placed This Encounter   Procedures    XR CHEST STANDARD (2 VW)     Standing Status:   Future     Number of Occurrences:   1     Standing Expiration Date:   4/26/2020    EKG 12 Lead     Order Specific Question:   Reason for Exam?     Answer:   Shortness of Breath    ECHO Complete 2D W Doppler W Color     Standing Status:   Future     Standing Expiration Date:   4/26/2020     Order Specific Question:   Reason for exam:     Answer:   exertional dyspnea    ECHO Pharmacological Stress Test     Standing Status:   Future     Standing Expiration Date:   4/26/2020     Order Specific Question:   Reason for exam:     Answer:   9   f/u after above

## 2019-05-02 ENCOUNTER — OFFICE VISIT (OUTPATIENT)
Dept: UROLOGY | Age: 64
End: 2019-05-02
Payer: MEDICARE

## 2019-05-02 VITALS
DIASTOLIC BLOOD PRESSURE: 78 MMHG | BODY MASS INDEX: 35.4 KG/M2 | WEIGHT: 239 LBS | SYSTOLIC BLOOD PRESSURE: 118 MMHG | HEIGHT: 69 IN | HEART RATE: 84 BPM

## 2019-05-02 DIAGNOSIS — N39.3 SUI (STRESS URINARY INCONTINENCE), MALE: ICD-10-CM

## 2019-05-02 DIAGNOSIS — Z85.46 H/O PROSTATE CANCER: Primary | ICD-10-CM

## 2019-05-02 PROCEDURE — 99213 OFFICE O/P EST LOW 20 MIN: CPT | Performed by: UROLOGY

## 2019-05-02 ASSESSMENT — ENCOUNTER SYMPTOMS
ABDOMINAL PAIN: 0
SHORTNESS OF BREATH: 1
ABDOMINAL DISTENTION: 0

## 2019-05-02 NOTE — PROGRESS NOTES
Subjective:      Patient ID: Charmaine Oliver is a 61 y.o. male. HPI  This is a 62 yo male with h/o DDD, Anxiety, Kidney stones and s/p RALP on 18 with positive margin back in follow-up. Since last seen on 19, he did not see Dr Jong Dickinson for unclear reasons. He is still having ABDIAS and requires a pad daily and at night but he feels this has continued to improve with Kegel's. He has a good flow and feels the bladder empties. He has no hematuria or dysuria or pain. He has no wt loss or abnl bone pains. He is being evaluated for SOB by his PCP and is to get a stress test soon. I reviewed the interval PSA today.      PATH: Aaliy[place] 4 + 3 = 7 adenocarcinoma of prostate, 11 Ln neg, EPE neg, Margin pos (Rt anterior), SV neg. [id number].        Past Medical History:   Diagnosis Date    Chicken pox     Chronic back pain     Hyperlipidemia     Measles     Mumps     Pneumonia      Past Surgical History:   Procedure Laterality Date    CYST REMOVAL      back of neck    CYST REMOVAL Left 3/4/16    Dr Logan Flanagan  2018    PROSTATE SURGERY  2018    Biopsy    PROSTATECTOMY      UMBILICAL HERNIA REPAIR  3/4/16    Dr Mcdowell Butt History     Socioeconomic History    Marital status:      Spouse name: None    Number of children: None    Years of education: None    Highest education level: None   Occupational History    None   Social Needs    Financial resource strain: None    Food insecurity:     Worry: None     Inability: None    Transportation needs:     Medical: None     Non-medical: None   Tobacco Use    Smoking status: Former Smoker     Packs/day: 1.50     Years: 40.00     Pack years: 60.00     Last attempt to quit: 2018     Years since quittin.4    Smokeless tobacco: Never Used   Substance and Sexual Activity    Alcohol use:  Yes    Drug use: No    Sexual activity: None   Lifestyle    Physical activity:     Days per week: None     Minutes per session: None    Stress: None   Relationships    Social connections:     Talks on phone: None     Gets together: None     Attends Jainism service: None     Active member of club or organization: None     Attends meetings of clubs or organizations: None     Relationship status: None    Intimate partner violence:     Fear of current or ex partner: None     Emotionally abused: None     Physically abused: None     Forced sexual activity: None   Other Topics Concern    None   Social History Narrative    None     Family History   Problem Relation Age of Onset    Other Mother     Other Father      Current Outpatient Medications   Medication Sig Dispense Refill    lovastatin (MEVACOR) 40 MG tablet Take 1 tablet by mouth nightly 90 tablet 1    LORazepam (ATIVAN) 1 MG tablet Take 1 tablet by mouth daily as needed for Anxiety for up to 30 days. 30 tablet 0    levothyroxine (SYNTHROID) 75 MCG tablet Take 1 tablet by mouth daily 90 tablet 1    HYDROcodone-acetaminophen (NORCO) 5-325 MG per tablet Take 1 tablet by mouth       No current facility-administered medications for this visit. Demerol hcl [meperidine] and Valium [diazepam]  reviewed    Review of Systems   Constitutional: Negative for fever and unexpected weight change. Respiratory: Positive for shortness of breath. Gastrointestinal: Negative for abdominal distention and abdominal pain. Genitourinary: Positive for enuresis. Negative for dysuria, flank pain and hematuria. Objective:   Physical Exam   Constitutional: He appears well-developed and well-nourished. Abdominal: Soft. He exhibits distension. There is no tenderness. There is no guarding. Neurological: He is alert. Psychiatric: He has a normal mood and affect.  His behavior is normal.         PSA   Date Value Ref Range Status   04/26/2019 <0.01 0.00 - 5.40 ng/mL Final   01/22/2019 <0.01 0.00 - 5.40 ng/mL Final   06/14/2018 4.42 0.00 - 5.40 ng/mL Final   05/24/2018 4.34 0.00 - 5.40 ng/mL Final   03/05/2018 4.95 0.00 - 5.40 ng/mL Final     Comment:     When the Total PSA is between 3.00 and 10.00 ng/mL, consider  requesting a Free PSA to aid in diagnosis. Assessment: This is a 62 yo male with h/o DDD, Anxiety, Kidney stones and with [place]yah 7 prostate cancer s/p RALP in 11/18 with a positive margin. He has an undetectable PSA currently and has improved but still significant ABDIAS. I again discussed the options of adjuvant vs salvage IMRT given positive margin but he would likely continue to wait given ABDIAS and excellent current PSA. He is also undergoing cardiac evaluation for SOB. Will refer to Dr Jane Calvin to discuss these options and continue with closer PSA follow-up. I spent 15 minutes of which > 50% of the visit was spent counseling and coordinating care wrt the pathology findings and ABDIAS. Plan:      1. Refer to Dr Jane Calvin  2.  F/U 3 mo for PSA        Viktoriya Kaplan MD

## 2019-05-06 DIAGNOSIS — F41.9 ANXIETY: ICD-10-CM

## 2019-05-06 RX ORDER — LORAZEPAM 1 MG/1
1 TABLET ORAL DAILY PRN
Qty: 30 TABLET | Refills: 0 | Status: SHIPPED | OUTPATIENT
Start: 2019-05-06 | End: 2019-06-07 | Stop reason: SDUPTHER

## 2019-05-06 NOTE — TELEPHONE ENCOUNTER
Olvin Harvey is requesting medication refill. Rx requested:  Requested Prescriptions     Pending Prescriptions Disp Refills    LORazepam (ATIVAN) 1 MG tablet 30 tablet 0     Sig: Take 1 tablet by mouth daily as needed for Anxiety for up to 30 days.        Last Office Visit:   4/26/2019    Last Tox screen:    Needed    Last Medication contract:    Needed    Next Visit Date:  Future Appointments   Date Time Provider Grey Stewart   5/13/2019 10:00 AM SCHEDULE, [facility] None   8/14/2019 10:00 AM Terrell Ray MD [facility]

## 2019-05-13 ENCOUNTER — HOSPITAL ENCOUNTER (OUTPATIENT)
Dept: RADIATION ONCOLOGY | Age: 64
Discharge: HOME | End: 2019-05-13
Payer: MEDICARE

## 2019-05-13 VITALS
HEART RATE: 89 BPM | SYSTOLIC BLOOD PRESSURE: 153 MMHG | OXYGEN SATURATION: 93 % | BODY MASS INDEX: 36.06 KG/M2 | TEMPERATURE: 97 F | WEIGHT: 244.2 LBS | RESPIRATION RATE: 14 BRPM | DIASTOLIC BLOOD PRESSURE: 89 MMHG

## 2019-05-13 DIAGNOSIS — C61 PROSTATE CANCER (HCC): ICD-10-CM

## 2019-05-13 PROCEDURE — 99212 OFFICE O/P EST SF 10 MIN: CPT | Performed by: RADIOLOGY

## 2019-05-13 RX ORDER — ASCORBIC ACID 500 MG
500 TABLET ORAL DAILY
COMMUNITY

## 2019-05-13 RX ORDER — THIAMINE MONONITRATE (VIT B1) 100 MG
100 TABLET ORAL DAILY
COMMUNITY
End: 2019-08-19

## 2019-05-13 NOTE — H&P
Consult Note      Requesting Physician:  Jameson Garcia MD              [facility]    CURRENT COMPLAINT: Intermediate risk prostate cancer status post prostatectomy, positive margin    HPI: I was asked by Dr. Nayeli Vincent to see this 61 y.o. male who initially presented with a rising PSA in the absence of other symptoms. An ultrasound-guided prostate biopsy took place on 7/27/18, yielding [place] 4+3=7 adenocarcinoma involving 75% of the material from the left lateral mid gland, one of 12 cores positive, without perineural invasion. The patient was not seen by radiation oncology prior treatment. He was insistent on surgical resection and saw Dr. Pollie Prader, and went to the operating room in November 2018. This yielded a 1.5 cm primary consisting of [place] 4+3=7 adenocarcinoma without perineural invasion. There was no seminal vesicle invasion or extraprostatic extension. Margins were positive over a 2 mm section. There was no bladder neck invasion. The patient was node negative, with a total of 11 nodes taken. Pathological stage was T2N0. Since that time the patient has not recovered sexual function, with no difficulty with erections prior to surgery. He also has now recovered urinary continence, and is dissatisfied with his urinary symptoms. His IPSS score is 9 with nocturia ×1, and severe daytime frequency. He experiences incontinence if he waits too long to get to the restroom, and these leakage events typically result in a complete emptying of the bladder at that time. To prevent this he tends to urinate on a schedule of every 2-3 hours. Because of the patient's positive margin I am seeing him today for consideration of adjuvant radiotherapy. PSA has been undetectable since surgery.       Past Medical History:   Diagnosis Date    Cancer ([facility]     Chicken pox     Chronic back pain     Emphysema of lung ([facility]     Hyperlipidemia     Hypothyroidism     Measles     Mumps     Negative  RESPIRATORY:  Cough, dyspnea on exertion, no hemoptysis. Patient quit smoking with his cancer diagnosis. COPD. CARDIOVASCULAR: Pedal edema right worse in left, cardiac workup underway. Recent diagnosis of COPD. GASTROINTESTINAL: Negative  GENITOURINARY: Negative  MUSCULOSKELETAL: Urinary dissatisfaction score of 6/6. Nocturia ×1, daytime frequency. Minimal improvement in urinary incontinence since surgery. The patient is doing Kegel exercises. INTEGUMENT: Negative  HEMATOLOGIC/LYMPHATIC: Negative  NEUROLOGICAL: Negative  A 10-point review of systems has been conducted and pertinent positives have been   recorded. All other review of systems are negative. Physical Exam:  Vitals:    05/13/19 1305   BP: (!) 153/89   Pulse: 89   Resp: 14   Temp: 97 °F (36.1 °C)   SpO2: 93%   Weight: 244 lb 3.2 oz (110.8 kg)     ECOG PS: 1  GENERAL: NAD, appears stated age. Alert and oriented. Seen with wife. HEENT: PERRLA, EOMI. Oral cavity WNL, no visible lesion, normal palate elevation. NECK: No palpable cervical or supraclavicular adenopathy. RESPIRATORY/LUNGS: Clear to auscultation. No rib or spine tenderness. No CVA tenderness  CARDIOVASCULAR/HEART: Mostly regular rate and rhythm, occasional ectopy. No audible murmur. ABDOMEN/GASTROINTESTINAL: Soft, nontender, nondistended, normal bowel sounds. No organomegaly. Periumbilical hernia, small robotic prostatectomy scars. EXTREMETIES: No cyanosis, clubbing, with +1 ankle edema bilaterally and trace pretibial edema. NEUROLOGICAL: No focal deficit. RECTAL: Deferred    Assessment/Plan:  22-year-old male with intermediate risk prostate cancer status post robotic prostatectomy, with a positive margin. Pathological stage was T2N0M0 with a [place] score of 4+3=7 without perineural invasion. Presurgical PSA was 4.42, and the PSA has been undetectable at less than 0.01 since surgery.   The patient has not made substantial recovery of urinary continence since surgery, and he continues to work on Kegel exercises and use diapers. I encouraged the Kegel exercises. With a positive margin, we would recommend salvage irradiation, and based upon recent published data would recommend the inclusion of elective pelvic olivier irradiation and 6 months of concurrent androgen ablation. Prior to initiating this I would prefer the patient recovered urinary continence to the maximum degree possible. I encouraged him to work with his Kegel exercises. He is having his PSA checked every 3 months. I will see him back in 4 months. Risks and benefits of radiotherapy were discussed in detail and informed consent was signed. The patient may be a candidate for ongoing RTOG study, randomizing between conventional fractionation and a hypo-fractionated course in the prostatectomy setting. The patient expressed imaged in this study, and in the future I will have him meet with our protocol nurse if he decides to proceed with radiotherapy. Thank you for this consult and allowing us to participate in the care of this patient.      Electronically signed by Anatoly Grant MD on 5/13/19 at 2:29 PM

## 2019-06-07 DIAGNOSIS — F41.9 ANXIETY: ICD-10-CM

## 2019-06-07 RX ORDER — LORAZEPAM 1 MG/1
1 TABLET ORAL DAILY PRN
Qty: 30 TABLET | Refills: 0 | Status: SHIPPED | OUTPATIENT
Start: 2019-06-07 | End: 2019-07-08 | Stop reason: SDUPTHER

## 2019-07-08 ENCOUNTER — TELEPHONE (OUTPATIENT)
Dept: FAMILY MEDICINE CLINIC | Age: 64
End: 2019-07-08

## 2019-07-08 DIAGNOSIS — F41.9 ANXIETY: ICD-10-CM

## 2019-07-08 DIAGNOSIS — R73.9 HYPERGLYCEMIA: Primary | ICD-10-CM

## 2019-07-08 RX ORDER — LORAZEPAM 1 MG/1
1 TABLET ORAL DAILY PRN
Qty: 30 TABLET | Refills: 0 | Status: SHIPPED | OUTPATIENT
Start: 2019-07-08 | End: 2019-08-05 | Stop reason: SDUPTHER

## 2019-07-09 ENCOUNTER — TELEPHONE (OUTPATIENT)
Dept: FAMILY MEDICINE CLINIC | Age: 64
End: 2019-07-09

## 2019-07-16 ENCOUNTER — HOSPITAL ENCOUNTER (OUTPATIENT)
Dept: NON INVASIVE DIAGNOSTICS | Age: 64
Discharge: HOME | End: 2019-07-16
Payer: MEDICARE

## 2019-07-16 VITALS — BODY MASS INDEX: 36.03 KG/M2 | WEIGHT: 244 LBS

## 2019-07-16 DIAGNOSIS — R06.09 EXERTIONAL DYSPNEA: ICD-10-CM

## 2019-07-16 LAB
LEFT VENTRICULAR EJECTION FRACTION MODE: NORMAL
LV EF: 50 %
LV EF: 60 %
LV EF: 60 %
LVEF MODALITY: NORMAL
LVEF MODALITY: NORMAL

## 2019-07-16 PROCEDURE — 93017 CV STRESS TEST TRACING ONLY: CPT

## 2019-07-16 PROCEDURE — 93306 TTE W/DOPPLER COMPLETE: CPT

## 2019-07-16 PROCEDURE — 2580000003 HC RX 258: Performed by: FAMILY MEDICINE

## 2019-07-16 PROCEDURE — 96374 THER/PROPH/DIAG INJ IV PUSH: CPT

## 2019-07-16 PROCEDURE — 6360000002 HC RX W HCPCS: Performed by: FAMILY MEDICINE

## 2019-07-16 PROCEDURE — 93350 STRESS TTE ONLY: CPT

## 2019-07-16 RX ORDER — ATROPINE SULFATE 0.1 MG/ML
1 INJECTION INTRAVENOUS ONCE
Status: COMPLETED | OUTPATIENT
Start: 2019-07-16 | End: 2019-07-16

## 2019-07-16 RX ORDER — SODIUM CHLORIDE 9 MG/ML
INJECTION, SOLUTION INTRAVENOUS CONTINUOUS
Status: DISCONTINUED | OUTPATIENT
Start: 2019-07-16 | End: 2019-07-17 | Stop reason: HOSPADM

## 2019-07-16 RX ORDER — DOBUTAMINE HYDROCHLORIDE 200 MG/100ML
5 INJECTION INTRAVENOUS CONTINUOUS
Status: DISCONTINUED | OUTPATIENT
Start: 2019-07-16 | End: 2019-07-17 | Stop reason: HOSPADM

## 2019-07-16 RX ADMIN — ATROPINE SULFATE 1 MG: 0.1 INJECTION PARENTERAL at 08:27

## 2019-07-16 RX ADMIN — SODIUM CHLORIDE: 9 INJECTION, SOLUTION INTRAVENOUS at 08:05

## 2019-07-16 RX ADMIN — DOBUTAMINE HYDROCHLORIDE 5 MCG/KG/MIN: 200 INJECTION INTRAVENOUS at 08:05

## 2019-07-18 DIAGNOSIS — R73.9 HYPERGLYCEMIA: ICD-10-CM

## 2019-07-18 LAB
GLUCOSE BLD-MCNC: 129 MG/DL (ref 70–99)
HBA1C MFR BLD: 6 % (ref 4.8–5.9)

## 2019-07-23 ENCOUNTER — OFFICE VISIT (OUTPATIENT)
Dept: FAMILY MEDICINE CLINIC | Age: 64
End: 2019-07-23
Payer: MEDICARE

## 2019-07-23 VITALS
HEART RATE: 71 BPM | BODY MASS INDEX: 35.7 KG/M2 | OXYGEN SATURATION: 96 % | HEIGHT: 69 IN | TEMPERATURE: 96.6 F | RESPIRATION RATE: 10 BRPM | SYSTOLIC BLOOD PRESSURE: 130 MMHG | WEIGHT: 241 LBS | DIASTOLIC BLOOD PRESSURE: 72 MMHG

## 2019-07-23 DIAGNOSIS — R73.03 PRE-DIABETES: ICD-10-CM

## 2019-07-23 DIAGNOSIS — R06.09 EXERTIONAL DYSPNEA: Primary | ICD-10-CM

## 2019-07-23 DIAGNOSIS — F41.9 ANXIETY: ICD-10-CM

## 2019-07-23 DIAGNOSIS — E03.8 OTHER SPECIFIED HYPOTHYROIDISM: ICD-10-CM

## 2019-07-23 DIAGNOSIS — K42.9 PERIUMBILICAL HERNIA: ICD-10-CM

## 2019-07-23 PROCEDURE — 99214 OFFICE O/P EST MOD 30 MIN: CPT | Performed by: FAMILY MEDICINE

## 2019-07-23 RX ORDER — LEVOTHYROXINE SODIUM 0.07 MG/1
75 TABLET ORAL DAILY
Qty: 90 TABLET | Refills: 0 | Status: SHIPPED | OUTPATIENT
Start: 2019-07-23 | End: 2019-10-22 | Stop reason: SDUPTHER

## 2019-07-23 ASSESSMENT — ENCOUNTER SYMPTOMS
VOMITING: 0
SHORTNESS OF BREATH: 1
NAUSEA: 0
ABDOMINAL PAIN: 0

## 2019-07-29 ENCOUNTER — OFFICE VISIT (OUTPATIENT)
Dept: SURGERY | Age: 64
End: 2019-07-29
Payer: MEDICARE

## 2019-07-29 VITALS
DIASTOLIC BLOOD PRESSURE: 74 MMHG | TEMPERATURE: 96 F | SYSTOLIC BLOOD PRESSURE: 128 MMHG | HEIGHT: 69 IN | BODY MASS INDEX: 35.71 KG/M2 | WEIGHT: 241.1 LBS

## 2019-07-29 DIAGNOSIS — K43.2 RECURRENT VENTRAL HERNIA: Primary | ICD-10-CM

## 2019-07-29 PROCEDURE — 99214 OFFICE O/P EST MOD 30 MIN: CPT | Performed by: SURGERY

## 2019-07-29 ASSESSMENT — ENCOUNTER SYMPTOMS
ALLERGIC/IMMUNOLOGIC NEGATIVE: 1
ABDOMINAL DISTENTION: 0
CHEST TIGHTNESS: 0
EYES NEGATIVE: 1
ABDOMINAL PAIN: 0
BLOOD IN STOOL: 0
RHINORRHEA: 0
SHORTNESS OF BREATH: 0
CONSTIPATION: 0
COLOR CHANGE: 0

## 2019-07-29 NOTE — PROGRESS NOTES
deviation present. No thyromegaly present. Cardiovascular: Normal rate, regular rhythm and normal heart sounds. Pulmonary/Chest: Effort normal and breath sounds normal. No respiratory distress. Abdominal: There is no hepatosplenomegaly. There is tenderness in the periumbilical area. A hernia is present. Musculoskeletal:   Normal gait   Neurological: He is alert and oriented to person, place, and time. Skin: No bruising, no lesion and no rash noted. Psychiatric: He has a normal mood and affect. Judgment normal.     /74   Temp 96 °F (35.6 °C) (Temporal)   Ht 5' 9\" (1.753 m)   Wt 241 lb 1.6 oz (109.4 kg)   BMI 35.60 kg/m²   Assessment:      Recurrent ventral hernia      Plan:      Recurrent ventral hernia repair with umbilectomy    The options of therapy were discussed with the patient. The procedure of the repair with the probable use of mesh was discussed. The potential risks and complications including but not exclusive to infection, blood loss, damage to surrounding structures and chronic pain. If any of these occur it could require another surgery. The expected recovery was discussed. The patient's questions were answered and has decided to proceed with hernia repair. It will be scheduled at their convenience. The above procedure will be performed after cardiac and pulmonary clearance  He will return to see me after the above clearances.           Jony Temple MD

## 2019-08-05 DIAGNOSIS — F41.9 ANXIETY: ICD-10-CM

## 2019-08-05 RX ORDER — LORAZEPAM 1 MG/1
1 TABLET ORAL DAILY PRN
Qty: 30 TABLET | Refills: 0 | Status: SHIPPED | OUTPATIENT
Start: 2019-08-05 | End: 2019-09-04 | Stop reason: SDUPTHER

## 2019-08-06 LAB — PROSTATE SPECIFIC ANTIGEN: <0.01 NG/ML (ref 0–5.4)

## 2019-08-12 ENCOUNTER — OFFICE VISIT (OUTPATIENT)
Dept: CARDIOLOGY CLINIC | Age: 64
End: 2019-08-12
Payer: MEDICARE

## 2019-08-12 ENCOUNTER — PREP FOR PROCEDURE (OUTPATIENT)
Dept: CARDIOLOGY CLINIC | Age: 64
End: 2019-08-12

## 2019-08-12 VITALS
BODY MASS INDEX: 36.5 KG/M2 | HEART RATE: 67 BPM | RESPIRATION RATE: 14 BRPM | OXYGEN SATURATION: 95 % | HEIGHT: 69 IN | DIASTOLIC BLOOD PRESSURE: 72 MMHG | WEIGHT: 246.4 LBS | SYSTOLIC BLOOD PRESSURE: 112 MMHG

## 2019-08-12 DIAGNOSIS — R06.02 SHORTNESS OF BREATH: ICD-10-CM

## 2019-08-12 DIAGNOSIS — I50.33 ACUTE ON CHRONIC DIASTOLIC HEART FAILURE (HCC): ICD-10-CM

## 2019-08-12 DIAGNOSIS — E78.5 DYSLIPIDEMIA: Primary | ICD-10-CM

## 2019-08-12 DIAGNOSIS — I10 ESSENTIAL HYPERTENSION: ICD-10-CM

## 2019-08-12 LAB
ANION GAP SERPL CALCULATED.3IONS-SCNC: 11 MEQ/L (ref 9–15)
BUN BLDV-MCNC: 13 MG/DL (ref 8–23)
CALCIUM SERPL-MCNC: 9.5 MG/DL (ref 8.5–9.9)
CHLORIDE BLD-SCNC: 102 MEQ/L (ref 95–107)
CO2: 28 MEQ/L (ref 20–31)
CREAT SERPL-MCNC: 0.84 MG/DL (ref 0.7–1.2)
GFR AFRICAN AMERICAN: >60
GFR NON-AFRICAN AMERICAN: >60
GLUCOSE BLD-MCNC: 88 MG/DL (ref 70–99)
HCT VFR BLD CALC: 40.5 % (ref 42–52)
HEMOGLOBIN: 13.8 G/DL (ref 14–18)
MCH RBC QN AUTO: 29.9 PG (ref 27–31.3)
MCHC RBC AUTO-ENTMCNC: 34 % (ref 33–37)
MCV RBC AUTO: 87.9 FL (ref 80–100)
PDW BLD-RTO: 15.1 % (ref 11.5–14.5)
PLATELET # BLD: 250 K/UL (ref 130–400)
POTASSIUM SERPL-SCNC: 4.4 MEQ/L (ref 3.4–4.9)
RBC # BLD: 4.6 M/UL (ref 4.7–6.1)
SODIUM BLD-SCNC: 141 MEQ/L (ref 135–144)
WBC # BLD: 10 K/UL (ref 4.8–10.8)

## 2019-08-12 PROCEDURE — 99205 OFFICE O/P NEW HI 60 MIN: CPT | Performed by: INTERNAL MEDICINE

## 2019-08-12 RX ORDER — NITROGLYCERIN 0.4 MG/1
0.4 TABLET SUBLINGUAL EVERY 5 MIN PRN
Status: CANCELLED | OUTPATIENT
Start: 2019-08-12

## 2019-08-12 RX ORDER — SODIUM CHLORIDE 0.9 % (FLUSH) 0.9 %
10 SYRINGE (ML) INJECTION PRN
Status: CANCELLED | OUTPATIENT
Start: 2019-08-12

## 2019-08-12 RX ORDER — SODIUM CHLORIDE 0.9 % (FLUSH) 0.9 %
10 SYRINGE (ML) INJECTION EVERY 12 HOURS SCHEDULED
Status: CANCELLED | OUTPATIENT
Start: 2019-08-12

## 2019-08-12 RX ORDER — SODIUM CHLORIDE 450 MG/100ML
75 INJECTION, SOLUTION INTRAVENOUS CONTINUOUS
Status: CANCELLED | OUTPATIENT
Start: 2019-08-12

## 2019-08-12 RX ORDER — DIPHENHYDRAMINE HYDROCHLORIDE 50 MG/ML
50 INJECTION INTRAMUSCULAR; INTRAVENOUS ONCE
Status: CANCELLED | OUTPATIENT
Start: 2019-08-12 | End: 2019-08-12

## 2019-08-12 RX ORDER — ONDANSETRON 2 MG/ML
4 INJECTION INTRAMUSCULAR; INTRAVENOUS EVERY 6 HOURS PRN
Status: CANCELLED | OUTPATIENT
Start: 2019-08-12

## 2019-08-12 ASSESSMENT — ENCOUNTER SYMPTOMS
BACK PAIN: 1
CHEST TIGHTNESS: 0
WHEEZING: 0
STRIDOR: 0
NAUSEA: 0
EYES NEGATIVE: 1
SHORTNESS OF BREATH: 1
COUGH: 0
GASTROINTESTINAL NEGATIVE: 1
BLOOD IN STOOL: 0

## 2019-08-12 NOTE — PROGRESS NOTES
Substance and Sexual Activity    Alcohol use: Yes    Drug use: No    Sexual activity: None   Lifestyle    Physical activity:     Days per week: None     Minutes per session: None    Stress: None   Relationships    Social connections:     Talks on phone: None     Gets together: None     Attends Sabianism service: None     Active member of club or organization: None     Attends meetings of clubs or organizations: None     Relationship status: None    Intimate partner violence:     Fear of current or ex partner: None     Emotionally abused: None     Physically abused: None     Forced sexual activity: None   Other Topics Concern    None   Social History Narrative    None       Allergies   Allergen Reactions    Demerol Hcl [Meperidine] Nausea Only    Valium [Diazepam] Other (See Comments)     MIGRAINES       Current Outpatient Medications   Medication Sig Dispense Refill    aspirin 81 MG tablet Take 1 tablet by mouth daily With Food 30 tablet 3    LORazepam (ATIVAN) 1 MG tablet Take 1 tablet by mouth daily as needed for Anxiety for up to 30 days. 30 tablet 0    levothyroxine (SYNTHROID) 75 MCG tablet Take 1 tablet by mouth daily 90 tablet 0    vitamin B-1 (THIAMINE) 100 MG tablet Take 100 mg by mouth daily      vitamin C (ASCORBIC ACID) 500 MG tablet Take 500 mg by mouth daily      lovastatin (MEVACOR) 40 MG tablet Take 1 tablet by mouth nightly 90 tablet 1    HYDROcodone-acetaminophen (NORCO) 5-325 MG per tablet Take 1 tablet by mouth       No current facility-administered medications for this visit. Review of Systems:   Review of Systems   Constitutional: Negative. Negative for diaphoresis and fatigue. HENT: Negative. Eyes: Negative. Respiratory: Positive for shortness of breath. Negative for cough, chest tightness, wheezing and stridor. Cardiovascular: Positive for leg swelling. Negative for chest pain and palpitations. Gastrointestinal: Negative.   Negative for blood in stool

## 2019-08-14 ENCOUNTER — OFFICE VISIT (OUTPATIENT)
Dept: UROLOGY | Age: 64
End: 2019-08-14
Payer: MEDICARE

## 2019-08-14 VITALS
WEIGHT: 246 LBS | SYSTOLIC BLOOD PRESSURE: 122 MMHG | DIASTOLIC BLOOD PRESSURE: 84 MMHG | HEART RATE: 76 BPM | BODY MASS INDEX: 35.22 KG/M2 | HEIGHT: 70 IN

## 2019-08-14 DIAGNOSIS — Z85.46 H/O PROSTATE CANCER: Primary | ICD-10-CM

## 2019-08-14 PROCEDURE — 99213 OFFICE O/P EST LOW 20 MIN: CPT | Performed by: UROLOGY

## 2019-08-14 ASSESSMENT — ENCOUNTER SYMPTOMS
SHORTNESS OF BREATH: 1
ABDOMINAL PAIN: 0

## 2019-08-14 NOTE — PROGRESS NOTES
2018     Years since quittin.7    Smokeless tobacco: Never Used   Substance and Sexual Activity    Alcohol use: Yes    Drug use: No    Sexual activity: None   Lifestyle    Physical activity:     Days per week: None     Minutes per session: None    Stress: None   Relationships    Social connections:     Talks on phone: None     Gets together: None     Attends Mormonism service: None     Active member of club or organization: None     Attends meetings of clubs or organizations: None     Relationship status: None    Intimate partner violence:     Fear of current or ex partner: None     Emotionally abused: None     Physically abused: None     Forced sexual activity: None   Other Topics Concern    None   Social History Narrative    None     Family History   Problem Relation Age of Onset    Other Mother     Other Father     Cancer Father     Cancer Sister     Cancer Brother     Lung Cancer Brother     Cancer Brother      Current Outpatient Medications   Medication Sig Dispense Refill    aspirin 81 MG tablet Take 1 tablet by mouth daily With Food 30 tablet 3    LORazepam (ATIVAN) 1 MG tablet Take 1 tablet by mouth daily as needed for Anxiety for up to 30 days. 30 tablet 0    levothyroxine (SYNTHROID) 75 MCG tablet Take 1 tablet by mouth daily 90 tablet 0    vitamin B-1 (THIAMINE) 100 MG tablet Take 100 mg by mouth daily      vitamin C (ASCORBIC ACID) 500 MG tablet Take 500 mg by mouth daily      lovastatin (MEVACOR) 40 MG tablet Take 1 tablet by mouth nightly 90 tablet 1    HYDROcodone-acetaminophen (NORCO) 5-325 MG per tablet Take 1 tablet by mouth       No current facility-administered medications for this visit. Demerol hcl [meperidine] and Valium [diazepam]  reviewed    Review of Systems   Constitutional: Negative for unexpected weight change. Respiratory: Positive for shortness of breath. Gastrointestinal: Negative for abdominal pain.    Genitourinary: Negative for dysuria, enuresis, flank pain and hematuria. Objective:   Physical Exam   Constitutional: He appears well-developed and well-nourished. Abdominal: Soft. He exhibits distension. There is no tenderness. There is no guarding. Neurological: He is alert. PSA   Date Value Ref Range Status   08/06/2019 <0.01 0.00 - 5.40 ng/mL Final   04/26/2019 <0.01 0.00 - 5.40 ng/mL Final   01/22/2019 <0.01 0.00 - 5.40 ng/mL Final   06/14/2018 4.42 0.00 - 5.40 ng/mL Final   05/24/2018 4.34 0.00 - 5.40 ng/mL Final       Assessment: This is a 59 yo male with h/o DDD, Anxiety, Kidney stones and with [place] 7 prostate cancer s/p RALP in 11/18 with a positive margin. He has still has an undetectable PSA and has continued improvement in the ABDIAS. I again discussed the options of adjuvant vs salvage IMRT given positive margin but he wants to continue to wait given his other medical concerns. This is reasonable given the current PSA. I spent 15 minutes of which > 50% of the visit was spent counseling and coordinating care wrt the pathology findings and ADBIAS.          Plan:      1.  F/U 3-4 mo for PSA        David Ferrell MD

## 2019-08-19 ENCOUNTER — OFFICE VISIT (OUTPATIENT)
Dept: PULMONOLOGY | Age: 64
End: 2019-08-19
Payer: MEDICARE

## 2019-08-19 VITALS
RESPIRATION RATE: 17 BRPM | BODY MASS INDEX: 35.73 KG/M2 | HEART RATE: 68 BPM | DIASTOLIC BLOOD PRESSURE: 76 MMHG | SYSTOLIC BLOOD PRESSURE: 128 MMHG | HEIGHT: 69 IN | OXYGEN SATURATION: 98 % | TEMPERATURE: 97.4 F | WEIGHT: 241.2 LBS

## 2019-08-19 DIAGNOSIS — E66.09 CLASS 2 OBESITY DUE TO EXCESS CALORIES WITHOUT SERIOUS COMORBIDITY WITH BODY MASS INDEX (BMI) OF 35.0 TO 35.9 IN ADULT: ICD-10-CM

## 2019-08-19 DIAGNOSIS — G47.30 SLEEP APNEA, UNSPECIFIED TYPE: ICD-10-CM

## 2019-08-19 DIAGNOSIS — Z87.891 PERSONAL HISTORY OF TOBACCO USE: ICD-10-CM

## 2019-08-19 DIAGNOSIS — R09.81 NASAL CONGESTION: ICD-10-CM

## 2019-08-19 DIAGNOSIS — R06.09 DOE (DYSPNEA ON EXERTION): Primary | ICD-10-CM

## 2019-08-19 DIAGNOSIS — K21.9 GASTROESOPHAGEAL REFLUX DISEASE WITHOUT ESOPHAGITIS: ICD-10-CM

## 2019-08-19 PROCEDURE — G0296 VISIT TO DETERM LDCT ELIG: HCPCS | Performed by: INTERNAL MEDICINE

## 2019-08-19 PROCEDURE — 99204 OFFICE O/P NEW MOD 45 MIN: CPT | Performed by: INTERNAL MEDICINE

## 2019-08-19 RX ORDER — FLUTICASONE PROPIONATE 50 MCG
1 SPRAY, SUSPENSION (ML) NASAL DAILY
Qty: 1 BOTTLE | Refills: 3 | Status: SHIPPED | OUTPATIENT
Start: 2019-08-19 | End: 2019-10-16

## 2019-08-19 NOTE — PROGRESS NOTES
Subjective:     Jonathan Martinez is a 61 y.o. male who complains today of:     Chief Complaint   Patient presents with   Jerrica Kind Patient     [place]- Exertional Dypnea       HPI  Patient presents for SOB MmRC 2-3   Quit smoking 9 months ago, since then gradually got SOB and worse last 2 months with RAMOS , dry cough, no chest pain,  Had  lower ext swelling and improved with salt free diet did not use direct   no fever ,   lost 7 lb recently with salt free diet (in 2 days )   No hemoptysis   + snoring and + witnessed apnea, + day time sleepiness   + nasal congestion and post nasal drip   Rare GERD   Smoked 2 PPD x 40 years   Sister and brother had lung ca   Has a ventral hernia and he supposed to have surgery done when pulmonary and cardiology issues clarified and cleared       Allergies:  Demerol hcl [meperidine] and Valium [diazepam]  Past Medical History:   Diagnosis Date    [facility] [address]     Chicken pox     Chronic back pain     Emphysema of lung ([facility] [address]     Hyperlipidemia     Hypothyroidism     Measles     Mumps     Osteoarthritis     Pneumonia     Type 2 diabetes mellitus without complication ([facility] [address]     prediabetes     Past Surgical History:   Procedure Laterality Date    CYST REMOVAL      back of neck    CYST REMOVAL Left 3/4/16    Dr Isaac  07/2018    PROSTATE SURGERY  07/2018    Biopsy    PROSTATECTOMY  55/21/2225    UMBILICAL HERNIA REPAIR  3/4/16    Dr Stuart Amaral       Family History   Problem Relation Age of Onset    Other Mother     Other Father     Cancer Father     Cancer Sister     Cancer Brother     Lung Cancer Brother     Cancer Brother      Social History     Socioeconomic History    Marital status:      Spouse name: Not on file    Number of children: Not on file    Years of education: Not on file    Highest education level: Not on file   Occupational History    Not on file   Social Needs    Financial resource strain: Not on file   [place] (NORCO) 5-325 MG per tablet Take 1 tablet by mouth       No current facility-administered medications for this visit. Objective:     Vitals:    08/19/19 1318   BP: 128/76   Site: Right Upper Arm   Position: Sitting   Pulse: 68   Resp: 17   Temp: 97.4 °F (36.3 °C)   TempSrc: Tympanic   SpO2: 98%   Weight: 241 lb 3.2 oz (109.4 kg)   Height: 5' 9\" (1.753 m)         Physical Exam   Constitutional: He is oriented to person, place, and time. He appears well-developed and well-nourished. HENT:   Head: Normocephalic and atraumatic. Eyes: Pupils are equal, round, and reactive to light. Conjunctivae are normal. Left eye exhibits no discharge. Neck: Normal range of motion. Neck supple. No JVD present. Cardiovascular: Normal rate, regular rhythm and normal heart sounds. Exam reveals no gallop and no friction rub. No murmur heard. Pulmonary/Chest: Effort normal and breath sounds normal. No respiratory distress. He has no wheezes. He has no rales. He exhibits no tenderness. Abdominal: Soft. Bowel sounds are normal.   Ventral hernia      Musculoskeletal: He exhibits edema (1+ ). He exhibits no deformity. Neurological: He is alert and oriented to person, place, and time. Skin: Skin is warm and dry. Psychiatric: He has a normal mood and affect. Judgment normal.     Imaging studies reviewed by me chest x-ray clear, no infiltrate  Lab results reviewed in chart  PFT done  ECHO: Ejection fraction 60%    Assessment and Plan       Diagnosis Orders   1. RAMOS (dyspnea on exertion)  Full PFT Study With Bronchodilator   2. Nasal congestion  fluticasone (FLONASE) 50 MCG/ACT nasal spray   3. Gastroesophageal reflux disease without esophagitis     4. Sleep apnea, unspecified type  Sleep Study with PAP Titration   5. Class 2 obesity due to excess calories without serious comorbidity with body mass index (BMI) of 35.0 to 35.9 in adult     6.  Personal history of tobacco use  MA VISIT TO DISCUSS LUNG CA SCREEN W LDCT    CT associated with radiation from annual LDCT- Radiation exposure is about the same as for a mammogram, which is about 1/3 of the annual background radiation exposure from everyday life. Starting screening at age 54 is not likely to increase cancer risk from radiation exposure. Patients with comorbidities resulting in life expectancy of < 10 years, or that would preclude treatment of an abnormality identified on CT, should not be screened due to lack of benefit.     To obtain maximal benefit from this screening, smoking cessation and long-term abstinence from smoking is critical

## 2019-08-28 ENCOUNTER — TELEPHONE (OUTPATIENT)
Dept: CASE MANAGEMENT | Age: 64
End: 2019-08-28

## 2019-09-03 ENCOUNTER — HOSPITAL ENCOUNTER (OUTPATIENT)
Dept: SLEEP CENTER | Age: 64
Discharge: HOME | End: 2019-09-05
Payer: MEDICARE

## 2019-09-03 PROCEDURE — 95810 POLYSOM 6/> YRS 4/> PARAM: CPT

## 2019-09-04 DIAGNOSIS — G47.30 SLEEP APNEA, UNSPECIFIED TYPE: Primary | ICD-10-CM

## 2019-09-04 DIAGNOSIS — F41.9 ANXIETY: ICD-10-CM

## 2019-09-04 DIAGNOSIS — G47.30 SLEEP APNEA, UNSPECIFIED TYPE: ICD-10-CM

## 2019-09-04 PROCEDURE — 95810 POLYSOM 6/> YRS 4/> PARAM: CPT | Performed by: INTERNAL MEDICINE

## 2019-09-04 RX ORDER — LORAZEPAM 1 MG/1
1 TABLET ORAL DAILY PRN
Qty: 30 TABLET | Refills: 0 | Status: SHIPPED | OUTPATIENT
Start: 2019-09-04 | End: 2019-10-03 | Stop reason: SDUPTHER

## 2019-09-09 ENCOUNTER — HOSPITAL ENCOUNTER (OUTPATIENT)
Dept: PULMONOLOGY | Age: 64
Discharge: HOME | End: 2019-09-09
Payer: MEDICARE

## 2019-09-09 ENCOUNTER — HOSPITAL ENCOUNTER (OUTPATIENT)
Dept: CT IMAGING | Age: 64
Discharge: HOME | End: 2019-09-11
Payer: MEDICARE

## 2019-09-09 DIAGNOSIS — R06.09 DOE (DYSPNEA ON EXERTION): ICD-10-CM

## 2019-09-09 DIAGNOSIS — Z87.891 PERSONAL HISTORY OF TOBACCO USE: ICD-10-CM

## 2019-09-09 PROCEDURE — 94060 EVALUATION OF WHEEZING: CPT

## 2019-09-09 PROCEDURE — 94729 DIFFUSING CAPACITY: CPT

## 2019-09-09 PROCEDURE — G0297 LDCT FOR LUNG CA SCREEN: HCPCS

## 2019-09-09 PROCEDURE — 94726 PLETHYSMOGRAPHY LUNG VOLUMES: CPT | Performed by: INTERNAL MEDICINE

## 2019-09-09 PROCEDURE — 94729 DIFFUSING CAPACITY: CPT | Performed by: INTERNAL MEDICINE

## 2019-09-09 PROCEDURE — 94060 EVALUATION OF WHEEZING: CPT | Performed by: INTERNAL MEDICINE

## 2019-09-09 PROCEDURE — 94726 PLETHYSMOGRAPHY LUNG VOLUMES: CPT

## 2019-09-09 PROCEDURE — 6360000002 HC RX W HCPCS: Performed by: INTERNAL MEDICINE

## 2019-09-09 RX ORDER — ALBUTEROL SULFATE 2.5 MG/3ML
2.5 SOLUTION RESPIRATORY (INHALATION) ONCE
Status: COMPLETED | OUTPATIENT
Start: 2019-09-09 | End: 2019-09-09

## 2019-09-09 RX ADMIN — ALBUTEROL SULFATE 2.5 MG: 2.5 SOLUTION RESPIRATORY (INHALATION) at 10:28

## 2019-09-23 ENCOUNTER — HOSPITAL ENCOUNTER (OUTPATIENT)
Dept: CARDIAC CATH/INVASIVE PROCEDURES | Age: 64
Discharge: HOME | End: 2019-09-23
Attending: INTERNAL MEDICINE | Admitting: INTERNAL MEDICINE
Payer: MEDICARE

## 2019-09-23 VITALS
DIASTOLIC BLOOD PRESSURE: 92 MMHG | SYSTOLIC BLOOD PRESSURE: 149 MMHG | OXYGEN SATURATION: 99 % | TEMPERATURE: 97.5 F | BODY MASS INDEX: 35.55 KG/M2 | HEIGHT: 69 IN | WEIGHT: 240 LBS | RESPIRATION RATE: 16 BRPM | HEART RATE: 84 BPM

## 2019-09-23 LAB
ANION GAP SERPL CALCULATED.3IONS-SCNC: 9 MEQ/L (ref 9–15)
BUN BLDV-MCNC: 14 MG/DL (ref 8–23)
CALCIUM SERPL-MCNC: 9.3 MG/DL (ref 8.5–9.9)
CHLORIDE BLD-SCNC: 102 MEQ/L (ref 95–107)
CO2: 27 MEQ/L (ref 20–31)
CREAT SERPL-MCNC: 0.85 MG/DL (ref 0.7–1.2)
GFR AFRICAN AMERICAN: >60
GFR NON-AFRICAN AMERICAN: >60
GLUCOSE BLD-MCNC: 123 MG/DL (ref 70–99)
HCT VFR BLD CALC: 40.4 % (ref 42–52)
HEMOGLOBIN: 13.4 G/DL (ref 14–18)
MCH RBC QN AUTO: 29.6 PG (ref 27–31.3)
MCHC RBC AUTO-ENTMCNC: 33.2 % (ref 33–37)
MCV RBC AUTO: 88.9 FL (ref 80–100)
PDW BLD-RTO: 14.5 % (ref 11.5–14.5)
PLATELET # BLD: 254 K/UL (ref 130–400)
POTASSIUM SERPL-SCNC: 4.6 MEQ/L (ref 3.4–4.9)
RBC # BLD: 4.55 M/UL (ref 4.7–6.1)
SODIUM BLD-SCNC: 138 MEQ/L (ref 135–144)
WBC # BLD: 7.8 K/UL (ref 4.8–10.8)

## 2019-09-23 PROCEDURE — 85027 COMPLETE CBC AUTOMATED: CPT

## 2019-09-23 PROCEDURE — 80048 BASIC METABOLIC PNL TOTAL CA: CPT

## 2019-09-23 PROCEDURE — 2580000003 HC RX 258: Performed by: INTERNAL MEDICINE

## 2019-09-23 RX ORDER — SODIUM CHLORIDE 450 MG/100ML
75 INJECTION, SOLUTION INTRAVENOUS CONTINUOUS
Status: DISCONTINUED | OUTPATIENT
Start: 2019-09-23 | End: 2019-09-23 | Stop reason: HOSPADM

## 2019-09-23 RX ORDER — ONDANSETRON 2 MG/ML
4 INJECTION INTRAMUSCULAR; INTRAVENOUS EVERY 6 HOURS PRN
Status: DISCONTINUED | OUTPATIENT
Start: 2019-09-23 | End: 2019-09-23 | Stop reason: HOSPADM

## 2019-09-23 RX ORDER — DIPHENHYDRAMINE HYDROCHLORIDE 50 MG/ML
50 INJECTION INTRAMUSCULAR; INTRAVENOUS ONCE
Status: DISCONTINUED | OUTPATIENT
Start: 2019-09-23 | End: 2019-09-23 | Stop reason: HOSPADM

## 2019-09-23 RX ORDER — SODIUM CHLORIDE 0.9 % (FLUSH) 0.9 %
10 SYRINGE (ML) INJECTION EVERY 12 HOURS SCHEDULED
Status: DISCONTINUED | OUTPATIENT
Start: 2019-09-23 | End: 2019-09-23 | Stop reason: HOSPADM

## 2019-09-23 RX ORDER — SODIUM CHLORIDE 0.9 % (FLUSH) 0.9 %
10 SYRINGE (ML) INJECTION PRN
Status: DISCONTINUED | OUTPATIENT
Start: 2019-09-23 | End: 2019-09-23 | Stop reason: HOSPADM

## 2019-09-23 RX ORDER — NITROGLYCERIN 0.4 MG/1
0.4 TABLET SUBLINGUAL EVERY 5 MIN PRN
Status: DISCONTINUED | OUTPATIENT
Start: 2019-09-23 | End: 2019-09-23 | Stop reason: HOSPADM

## 2019-09-23 RX ADMIN — SODIUM CHLORIDE 75 ML/HR: 4.5 INJECTION, SOLUTION INTRAVENOUS at 10:29

## 2019-09-23 NOTE — PROGRESS NOTES
Dr. Juliocesar Syed at bedside talking with patient and family. Procedure for today will be cancelled and rescheduled for next Tuesday October 1st at 8 am.   [facility] in scheduling called and notified.   Discharge instructions given

## 2019-09-30 ENCOUNTER — OFFICE VISIT (OUTPATIENT)
Dept: PULMONOLOGY | Age: 64
End: 2019-09-30
Payer: MEDICARE

## 2019-09-30 VITALS
DIASTOLIC BLOOD PRESSURE: 82 MMHG | HEART RATE: 75 BPM | BODY MASS INDEX: 37.03 KG/M2 | TEMPERATURE: 97 F | OXYGEN SATURATION: 95 % | RESPIRATION RATE: 16 BRPM | HEIGHT: 69 IN | WEIGHT: 250 LBS | SYSTOLIC BLOOD PRESSURE: 134 MMHG

## 2019-09-30 DIAGNOSIS — R09.81 NASAL CONGESTION: ICD-10-CM

## 2019-09-30 DIAGNOSIS — K21.9 GASTROESOPHAGEAL REFLUX DISEASE WITHOUT ESOPHAGITIS: ICD-10-CM

## 2019-09-30 DIAGNOSIS — E66.09 CLASS 2 OBESITY DUE TO EXCESS CALORIES WITHOUT SERIOUS COMORBIDITY WITH BODY MASS INDEX (BMI) OF 35.0 TO 35.9 IN ADULT: ICD-10-CM

## 2019-09-30 DIAGNOSIS — R06.09 DOE (DYSPNEA ON EXERTION): ICD-10-CM

## 2019-09-30 DIAGNOSIS — G47.30 SLEEP APNEA, UNSPECIFIED TYPE: Primary | ICD-10-CM

## 2019-09-30 PROCEDURE — 99214 OFFICE O/P EST MOD 30 MIN: CPT | Performed by: INTERNAL MEDICINE

## 2019-09-30 NOTE — PROGRESS NOTES
left heart catheterization tomorrow if no coronary artery disease or if patient symptoms persisted after reperfusion treatment then will consider CPAP. Also PFT shows some obstructive physiology, will hold on treatment for now until left heart cath is done and evaluate symptomatic response. · Nasal congestion currently controlled with Flonase  · GERD symptoms are minimal  · Lose weight      No orders of the defined types were placed in this encounter. No orders of the defined types were placed in this encounter. Discussed with patient the importance of exercise and weight control and  overall health and well-being.     Reviewed with the patient: current clinical status, medications, activities and diet.      Side effects, adverse effects of the medication prescribed today, as well as treatment plan and result expectations have been discussed with the patient who expresses understanding and desires to proceed.          Return in about 3 weeks (around 10/21/2019).       Lamont Hess MD

## 2019-10-01 ENCOUNTER — HOSPITAL ENCOUNTER (OUTPATIENT)
Dept: CARDIAC CATH/INVASIVE PROCEDURES | Age: 64
Discharge: HOME | End: 2019-10-01
Attending: INTERNAL MEDICINE | Admitting: INTERNAL MEDICINE
Payer: MEDICARE

## 2019-10-01 ENCOUNTER — APPOINTMENT (OUTPATIENT)
Dept: SLEEP CENTER | Age: 64
End: 2019-10-01
Payer: MEDICARE

## 2019-10-01 VITALS
HEIGHT: 69 IN | OXYGEN SATURATION: 96 % | HEART RATE: 72 BPM | DIASTOLIC BLOOD PRESSURE: 84 MMHG | RESPIRATION RATE: 10 BRPM | SYSTOLIC BLOOD PRESSURE: 126 MMHG | BODY MASS INDEX: 37.03 KG/M2 | WEIGHT: 250 LBS

## 2019-10-01 LAB
EKG ATRIAL RATE: 83 BPM
EKG P AXIS: 17 DEGREES
EKG P-R INTERVAL: 130 MS
EKG Q-T INTERVAL: 386 MS
EKG QRS DURATION: 96 MS
EKG QTC CALCULATION (BAZETT): 453 MS
EKG R AXIS: -3 DEGREES
EKG T AXIS: 14 DEGREES
EKG VENTRICULAR RATE: 83 BPM

## 2019-10-01 PROCEDURE — 2709999900 HC NON-CHARGEABLE SUPPLY

## 2019-10-01 PROCEDURE — 93010 ELECTROCARDIOGRAM REPORT: CPT | Performed by: INTERNAL MEDICINE

## 2019-10-01 PROCEDURE — 93460 R&L HRT ART/VENTRICLE ANGIO: CPT | Performed by: INTERNAL MEDICINE

## 2019-10-01 PROCEDURE — 6360000004 HC RX CONTRAST MEDICATION: Performed by: INTERNAL MEDICINE

## 2019-10-01 PROCEDURE — C1751 CATH, INF, PER/CENT/MIDLINE: HCPCS

## 2019-10-01 PROCEDURE — 2500000003 HC RX 250 WO HCPCS

## 2019-10-01 PROCEDURE — 2580000003 HC RX 258

## 2019-10-01 PROCEDURE — 6360000002 HC RX W HCPCS

## 2019-10-01 PROCEDURE — 2580000003 HC RX 258: Performed by: INTERNAL MEDICINE

## 2019-10-01 PROCEDURE — C1894 INTRO/SHEATH, NON-LASER: HCPCS

## 2019-10-01 PROCEDURE — C1769 GUIDE WIRE: HCPCS

## 2019-10-01 PROCEDURE — 93005 ELECTROCARDIOGRAM TRACING: CPT | Performed by: INTERNAL MEDICINE

## 2019-10-01 RX ORDER — NITROGLYCERIN 0.4 MG/1
0.4 TABLET SUBLINGUAL 3 TIMES DAILY PRN
Qty: 25 TABLET | Refills: 1 | Status: SHIPPED | OUTPATIENT
Start: 2019-10-01 | End: 2020-10-20

## 2019-10-01 RX ORDER — COVID-19 ANTIGEN TEST
1 KIT MISCELLANEOUS DAILY
COMMUNITY

## 2019-10-01 RX ORDER — CARVEDILOL 6.25 MG/1
6.25 TABLET ORAL 2 TIMES DAILY
Qty: 60 TABLET | Refills: 3 | Status: SHIPPED | OUTPATIENT
Start: 2019-10-01 | End: 2019-12-03 | Stop reason: SDUPTHER

## 2019-10-01 RX ORDER — ISOSORBIDE MONONITRATE 30 MG/1
30 TABLET, EXTENDED RELEASE ORAL DAILY
Qty: 30 TABLET | Refills: 3 | Status: SHIPPED | OUTPATIENT
Start: 2019-10-01 | End: 2020-01-17 | Stop reason: SDUPTHER

## 2019-10-01 RX ORDER — SODIUM CHLORIDE 0.9 % (FLUSH) 0.9 %
10 SYRINGE (ML) INJECTION PRN
Status: DISCONTINUED | OUTPATIENT
Start: 2019-10-01 | End: 2019-10-01 | Stop reason: HOSPADM

## 2019-10-01 RX ORDER — ACETAMINOPHEN 325 MG/1
650 TABLET ORAL EVERY 4 HOURS PRN
Status: CANCELLED | OUTPATIENT
Start: 2019-10-01

## 2019-10-01 RX ORDER — ONDANSETRON 2 MG/ML
4 INJECTION INTRAMUSCULAR; INTRAVENOUS EVERY 6 HOURS PRN
Status: CANCELLED | OUTPATIENT
Start: 2019-10-01

## 2019-10-01 RX ORDER — SODIUM CHLORIDE 0.9 % (FLUSH) 0.9 %
10 SYRINGE (ML) INJECTION EVERY 12 HOURS SCHEDULED
Status: DISCONTINUED | OUTPATIENT
Start: 2019-10-01 | End: 2019-10-01 | Stop reason: HOSPADM

## 2019-10-01 RX ORDER — SODIUM CHLORIDE 450 MG/100ML
INJECTION, SOLUTION INTRAVENOUS CONTINUOUS
Status: DISCONTINUED | OUTPATIENT
Start: 2019-10-01 | End: 2019-10-01 | Stop reason: HOSPADM

## 2019-10-01 RX ORDER — NITROGLYCERIN 0.4 MG/1
0.4 TABLET SUBLINGUAL 3 TIMES DAILY PRN
COMMUNITY
Start: 2019-09-23 | End: 2019-10-01 | Stop reason: SDUPTHER

## 2019-10-01 RX ORDER — SODIUM CHLORIDE 9 MG/ML
INJECTION, SOLUTION INTRAVENOUS CONTINUOUS
Status: DISCONTINUED | OUTPATIENT
Start: 2019-10-01 | End: 2019-10-01 | Stop reason: HOSPADM

## 2019-10-01 RX ORDER — ASPIRIN 81 MG/1
81 TABLET, CHEWABLE ORAL ONCE
Status: DISCONTINUED | OUTPATIENT
Start: 2019-10-01 | End: 2019-10-01 | Stop reason: HOSPADM

## 2019-10-01 RX ORDER — ACETAMINOPHEN 500 MG
1000 TABLET ORAL DAILY
COMMUNITY

## 2019-10-01 RX ADMIN — SODIUM CHLORIDE: 9 INJECTION, SOLUTION INTRAVENOUS at 06:58

## 2019-10-01 RX ADMIN — IOVERSOL 70 ML: 678 INJECTION INTRA-ARTERIAL; INTRAVENOUS at 09:51

## 2019-10-03 DIAGNOSIS — F41.9 ANXIETY: ICD-10-CM

## 2019-10-04 RX ORDER — LORAZEPAM 1 MG/1
1 TABLET ORAL DAILY PRN
Qty: 30 TABLET | Refills: 0 | Status: SHIPPED | OUTPATIENT
Start: 2019-10-04 | End: 2019-11-04 | Stop reason: SDUPTHER

## 2019-10-07 ENCOUNTER — TELEPHONE (OUTPATIENT)
Dept: FAMILY MEDICINE CLINIC | Age: 64
End: 2019-10-07

## 2019-10-16 ENCOUNTER — OFFICE VISIT (OUTPATIENT)
Dept: CARDIOLOGY CLINIC | Age: 64
End: 2019-10-16
Payer: MEDICARE

## 2019-10-16 VITALS
OXYGEN SATURATION: 96 % | DIASTOLIC BLOOD PRESSURE: 71 MMHG | HEART RATE: 68 BPM | WEIGHT: 246.2 LBS | BODY MASS INDEX: 36.36 KG/M2 | SYSTOLIC BLOOD PRESSURE: 117 MMHG | RESPIRATION RATE: 18 BRPM

## 2019-10-16 DIAGNOSIS — I10 ESSENTIAL HYPERTENSION: Primary | ICD-10-CM

## 2019-10-16 DIAGNOSIS — I25.10 CORONARY ARTERY DISEASE INVOLVING NATIVE CORONARY ARTERY OF NATIVE HEART WITHOUT ANGINA PECTORIS: ICD-10-CM

## 2019-10-16 DIAGNOSIS — R06.09 DOE (DYSPNEA ON EXERTION): ICD-10-CM

## 2019-10-16 DIAGNOSIS — R73.03 PRE-DIABETES: ICD-10-CM

## 2019-10-16 DIAGNOSIS — E78.5 DYSLIPIDEMIA: ICD-10-CM

## 2019-10-16 DIAGNOSIS — E03.8 OTHER SPECIFIED HYPOTHYROIDISM: ICD-10-CM

## 2019-10-16 DIAGNOSIS — R09.89 BILATERAL CAROTID BRUITS: ICD-10-CM

## 2019-10-16 DIAGNOSIS — I50.33 ACUTE ON CHRONIC DIASTOLIC HEART FAILURE (HCC): ICD-10-CM

## 2019-10-16 LAB
GLUCOSE BLD-MCNC: 106 MG/DL (ref 70–99)
HBA1C MFR BLD: 6 % (ref 4.8–5.9)
TSH SERPL DL<=0.05 MIU/L-ACNC: 2.71 UIU/ML (ref 0.44–3.86)

## 2019-10-16 PROCEDURE — 99214 OFFICE O/P EST MOD 30 MIN: CPT | Performed by: INTERNAL MEDICINE

## 2019-10-16 ASSESSMENT — ENCOUNTER SYMPTOMS
GASTROINTESTINAL NEGATIVE: 1
SHORTNESS OF BREATH: 1
BACK PAIN: 1
STRIDOR: 0
BLOOD IN STOOL: 0
NAUSEA: 0
WHEEZING: 0
COUGH: 0
EYES NEGATIVE: 1
CHEST TIGHTNESS: 0

## 2019-10-17 DIAGNOSIS — I10 ESSENTIAL HYPERTENSION: Primary | ICD-10-CM

## 2019-10-17 DIAGNOSIS — R06.09 DOE (DYSPNEA ON EXERTION): ICD-10-CM

## 2019-10-17 DIAGNOSIS — I50.33 ACUTE ON CHRONIC DIASTOLIC HEART FAILURE (HCC): ICD-10-CM

## 2019-10-17 DIAGNOSIS — I25.10 CORONARY ARTERY DISEASE INVOLVING NATIVE CORONARY ARTERY OF NATIVE HEART WITHOUT ANGINA PECTORIS: ICD-10-CM

## 2019-10-22 ENCOUNTER — OFFICE VISIT (OUTPATIENT)
Dept: FAMILY MEDICINE CLINIC | Age: 64
End: 2019-10-22
Payer: MEDICARE

## 2019-10-22 VITALS
BODY MASS INDEX: 34.79 KG/M2 | HEART RATE: 64 BPM | WEIGHT: 243 LBS | SYSTOLIC BLOOD PRESSURE: 110 MMHG | OXYGEN SATURATION: 96 % | RESPIRATION RATE: 14 BRPM | DIASTOLIC BLOOD PRESSURE: 70 MMHG | TEMPERATURE: 97.6 F | HEIGHT: 70 IN

## 2019-10-22 DIAGNOSIS — E78.00 PURE HYPERCHOLESTEROLEMIA: Primary | ICD-10-CM

## 2019-10-22 DIAGNOSIS — R73.03 PRE-DIABETES: ICD-10-CM

## 2019-10-22 DIAGNOSIS — E78.00 PURE HYPERCHOLESTEROLEMIA: ICD-10-CM

## 2019-10-22 DIAGNOSIS — E03.8 OTHER SPECIFIED HYPOTHYROIDISM: ICD-10-CM

## 2019-10-22 DIAGNOSIS — F41.9 ANXIETY: ICD-10-CM

## 2019-10-22 LAB
CHOLESTEROL, TOTAL: 172 MG/DL (ref 0–199)
HDLC SERPL-MCNC: 38 MG/DL (ref 40–59)
LDL CHOLESTEROL CALCULATED: 104 MG/DL (ref 0–129)
TRIGL SERPL-MCNC: 152 MG/DL (ref 0–150)

## 2019-10-22 PROCEDURE — 99214 OFFICE O/P EST MOD 30 MIN: CPT | Performed by: FAMILY MEDICINE

## 2019-10-22 RX ORDER — ISOSORBIDE MONONITRATE 30 MG/1
30 TABLET, EXTENDED RELEASE ORAL DAILY
Qty: 30 TABLET | Refills: 3 | Status: CANCELLED | OUTPATIENT
Start: 2019-10-22

## 2019-10-22 RX ORDER — LOVASTATIN 40 MG/1
40 TABLET ORAL NIGHTLY
Qty: 90 TABLET | Refills: 1 | Status: CANCELLED | OUTPATIENT
Start: 2019-10-22

## 2019-10-22 RX ORDER — LEVOTHYROXINE SODIUM 0.07 MG/1
75 TABLET ORAL DAILY
Qty: 90 TABLET | Refills: 1 | Status: SHIPPED | OUTPATIENT
Start: 2019-10-22 | End: 2020-03-03 | Stop reason: SDUPTHER

## 2019-10-22 RX ORDER — CARVEDILOL 6.25 MG/1
6.25 TABLET ORAL 2 TIMES DAILY
Qty: 60 TABLET | Refills: 3 | Status: CANCELLED | OUTPATIENT
Start: 2019-10-22

## 2019-10-22 ASSESSMENT — ENCOUNTER SYMPTOMS: ABDOMINAL PAIN: 0

## 2019-10-23 DIAGNOSIS — E78.2 MIXED HYPERLIPIDEMIA: Primary | ICD-10-CM

## 2019-10-24 ENCOUNTER — TELEPHONE (OUTPATIENT)
Dept: FAMILY MEDICINE CLINIC | Age: 64
End: 2019-10-24

## 2019-10-24 RX ORDER — ATORVASTATIN CALCIUM 10 MG/1
10 TABLET, FILM COATED ORAL DAILY
Qty: 30 TABLET | Refills: 1 | Status: SHIPPED | OUTPATIENT
Start: 2019-10-24 | End: 2019-12-03

## 2019-10-29 ENCOUNTER — OFFICE VISIT (OUTPATIENT)
Dept: PULMONOLOGY | Age: 64
End: 2019-10-29
Payer: MEDICARE

## 2019-10-29 VITALS
HEART RATE: 64 BPM | OXYGEN SATURATION: 95 % | SYSTOLIC BLOOD PRESSURE: 120 MMHG | RESPIRATION RATE: 16 BRPM | HEIGHT: 70 IN | DIASTOLIC BLOOD PRESSURE: 72 MMHG | BODY MASS INDEX: 34.62 KG/M2 | WEIGHT: 241.8 LBS | TEMPERATURE: 97 F

## 2019-10-29 DIAGNOSIS — E66.09 CLASS 2 OBESITY DUE TO EXCESS CALORIES WITHOUT SERIOUS COMORBIDITY WITH BODY MASS INDEX (BMI) OF 35.0 TO 35.9 IN ADULT: ICD-10-CM

## 2019-10-29 DIAGNOSIS — J44.9 CHRONIC OBSTRUCTIVE PULMONARY DISEASE, UNSPECIFIED COPD TYPE (HCC): Primary | ICD-10-CM

## 2019-10-29 DIAGNOSIS — G47.30 SLEEP APNEA, UNSPECIFIED TYPE: ICD-10-CM

## 2019-10-29 PROCEDURE — 99214 OFFICE O/P EST MOD 30 MIN: CPT | Performed by: INTERNAL MEDICINE

## 2019-10-30 ENCOUNTER — HOSPITAL ENCOUNTER (OUTPATIENT)
Dept: ULTRASOUND IMAGING | Age: 64
Discharge: HOME | End: 2019-11-01
Payer: MEDICARE

## 2019-10-30 ENCOUNTER — TELEPHONE (OUTPATIENT)
Dept: PULMONOLOGY | Age: 64
End: 2019-10-30

## 2019-10-30 DIAGNOSIS — R09.89 BILATERAL CAROTID BRUITS: ICD-10-CM

## 2019-10-30 DIAGNOSIS — I10 ESSENTIAL HYPERTENSION: ICD-10-CM

## 2019-10-30 PROCEDURE — 93880 EXTRACRANIAL BILAT STUDY: CPT | Performed by: INTERNAL MEDICINE

## 2019-10-30 PROCEDURE — 93978 VASCULAR STUDY: CPT | Performed by: INTERNAL MEDICINE

## 2019-10-30 PROCEDURE — 93978 VASCULAR STUDY: CPT

## 2019-10-30 PROCEDURE — 93880 EXTRACRANIAL BILAT STUDY: CPT

## 2019-11-04 ENCOUNTER — OFFICE VISIT (OUTPATIENT)
Dept: SURGERY | Age: 64
End: 2019-11-04
Payer: MEDICARE

## 2019-11-04 VITALS
WEIGHT: 242.8 LBS | BODY MASS INDEX: 35.96 KG/M2 | TEMPERATURE: 97.9 F | HEIGHT: 69 IN | DIASTOLIC BLOOD PRESSURE: 84 MMHG | SYSTOLIC BLOOD PRESSURE: 120 MMHG

## 2019-11-04 DIAGNOSIS — K43.2 RECURRENT VENTRAL HERNIA: Primary | ICD-10-CM

## 2019-11-04 DIAGNOSIS — F41.9 ANXIETY: Primary | ICD-10-CM

## 2019-11-04 DIAGNOSIS — F41.9 ANXIETY: ICD-10-CM

## 2019-11-04 PROCEDURE — 99213 OFFICE O/P EST LOW 20 MIN: CPT | Performed by: SURGERY

## 2019-11-04 RX ORDER — LORAZEPAM 1 MG/1
1 TABLET ORAL EVERY 8 HOURS PRN
Qty: 30 TABLET | Refills: 0 | Status: SHIPPED | OUTPATIENT
Start: 2019-11-04 | End: 2019-12-03 | Stop reason: SDUPTHER

## 2019-11-04 RX ORDER — LORAZEPAM 1 MG/1
1 TABLET ORAL DAILY PRN
Qty: 30 TABLET | Refills: 0 | Status: SHIPPED | OUTPATIENT
Start: 2019-11-04 | End: 2019-11-14 | Stop reason: SDUPTHER

## 2019-11-04 ASSESSMENT — ENCOUNTER SYMPTOMS
ALLERGIC/IMMUNOLOGIC NEGATIVE: 1
ABDOMINAL DISTENTION: 0
SHORTNESS OF BREATH: 0
ABDOMINAL PAIN: 0
BLOOD IN STOOL: 0
CHEST TIGHTNESS: 0
CONSTIPATION: 0
RHINORRHEA: 0
EYES NEGATIVE: 1
COLOR CHANGE: 0

## 2019-11-05 ENCOUNTER — PREP FOR PROCEDURE (OUTPATIENT)
Dept: SURGERY | Age: 64
End: 2019-11-05

## 2019-11-05 ENCOUNTER — TELEPHONE (OUTPATIENT)
Dept: FAMILY MEDICINE CLINIC | Age: 64
End: 2019-11-05

## 2019-11-05 DIAGNOSIS — Z85.46 H/O PROSTATE CANCER: ICD-10-CM

## 2019-11-05 DIAGNOSIS — Z79.899 LONG-TERM USE OF HIGH-RISK MEDICATION: Primary | ICD-10-CM

## 2019-11-05 DIAGNOSIS — Z79.899 LONG-TERM USE OF HIGH-RISK MEDICATION: ICD-10-CM

## 2019-11-05 LAB — PROSTATE SPECIFIC ANTIGEN: <0.01 NG/ML (ref 0–5.4)

## 2019-11-08 LAB
6-ACETYLMORPHINE: NOT DETECTED
7-AMINOCLONAZEPAM: NOT DETECTED
ALPHA-OH-ALPRAZOLAM: NOT DETECTED
ALPRAZOLAM: NOT DETECTED
AMPHETAMINE: NOT DETECTED
BARBITURATES: NOT DETECTED
BENZOYLECGONINE: NOT DETECTED
BUPRENORPHINE: NOT DETECTED
CARISOPRODOL: NOT DETECTED
CLONAZEPAM: NOT DETECTED
CODEINE: NOT DETECTED
CREATININE URINE: 236.9 MG/DL (ref 20–400)
DIAZEPAM: NOT DETECTED
EER PAIN MGT DRUG PANEL, HIGH RES/EMIT U: NORMAL
ETHYL GLUCURONIDE: NOT DETECTED
FENTANYL: NOT DETECTED
HYDROCODONE: NOT DETECTED
HYDROMORPHONE: NOT DETECTED
LORAZEPAM: PRESENT
MARIJUANA METABOLITE: NOT DETECTED
MDA: NOT DETECTED
MDEA: NOT DETECTED
MDMA URINE: NOT DETECTED
MEPERIDINE: NOT DETECTED
METHADONE: NOT DETECTED
METHAMPHETAMINE: NOT DETECTED
METHYLPHENIDATE: NOT DETECTED
MIDAZOLAM: NOT DETECTED
MORPHINE: NOT DETECTED
NORBUPRENORPHINE, FREE: NOT DETECTED
NORDIAZEPAM: NOT DETECTED
NORFENTANYL: NOT DETECTED
NORHYDROCODONE, URINE: NOT DETECTED
NOROXYCODONE: NOT DETECTED
NOROXYMORPHONE, URINE: NOT DETECTED
OXAZEPAM: NOT DETECTED
OXYCODONE: NOT DETECTED
OXYMORPHONE: NOT DETECTED
PAIN MANAGEMENT DRUG PANEL: NORMAL
PCP: NOT DETECTED
PHENTERMINE: NOT DETECTED
PROPOXYPHENE: NOT DETECTED
TAPENTADOL, URINE: NOT DETECTED
TAPENTADOL-O-SULFATE, URINE: NOT DETECTED
TEMAZEPAM: NOT DETECTED
TRAMADOL: NOT DETECTED
ZOLPIDEM: NOT DETECTED

## 2019-11-12 ENCOUNTER — HOSPITAL ENCOUNTER (OUTPATIENT)
Dept: PREADMISSION TESTING | Age: 64
Discharge: HOME | End: 2019-11-16
Payer: MEDICARE

## 2019-11-12 VITALS
OXYGEN SATURATION: 98 % | RESPIRATION RATE: 16 BRPM | TEMPERATURE: 96.8 F | BODY MASS INDEX: 35.76 KG/M2 | HEIGHT: 69 IN | DIASTOLIC BLOOD PRESSURE: 71 MMHG | WEIGHT: 241.4 LBS | SYSTOLIC BLOOD PRESSURE: 107 MMHG | HEART RATE: 72 BPM

## 2019-11-12 LAB
ANION GAP SERPL CALCULATED.3IONS-SCNC: 10 MEQ/L (ref 9–15)
BUN BLDV-MCNC: 20 MG/DL (ref 8–23)
CALCIUM SERPL-MCNC: 9.1 MG/DL (ref 8.5–9.9)
CHLORIDE BLD-SCNC: 104 MEQ/L (ref 95–107)
CO2: 25 MEQ/L (ref 20–31)
CREAT SERPL-MCNC: 1.04 MG/DL (ref 0.7–1.2)
GFR AFRICAN AMERICAN: >60
GFR NON-AFRICAN AMERICAN: >60
GLUCOSE BLD-MCNC: 136 MG/DL (ref 70–99)
HCT VFR BLD CALC: 41.4 % (ref 42–52)
HEMOGLOBIN: 13.6 G/DL (ref 14–18)
MCH RBC QN AUTO: 29.1 PG (ref 27–31.3)
MCHC RBC AUTO-ENTMCNC: 32.9 % (ref 33–37)
MCV RBC AUTO: 88.3 FL (ref 80–100)
PDW BLD-RTO: 13.9 % (ref 11.5–14.5)
PLATELET # BLD: 233 K/UL (ref 130–400)
POTASSIUM SERPL-SCNC: 3.8 MEQ/L (ref 3.4–4.9)
RBC # BLD: 4.68 M/UL (ref 4.7–6.1)
SODIUM BLD-SCNC: 139 MEQ/L (ref 135–144)
WBC # BLD: 8.1 K/UL (ref 4.8–10.8)

## 2019-11-12 PROCEDURE — 80048 BASIC METABOLIC PNL TOTAL CA: CPT

## 2019-11-12 PROCEDURE — 85027 COMPLETE CBC AUTOMATED: CPT

## 2019-11-12 RX ORDER — SODIUM CHLORIDE 0.9 % (FLUSH) 0.9 %
10 SYRINGE (ML) INJECTION PRN
Status: CANCELLED | OUTPATIENT
Start: 2019-11-18

## 2019-11-12 RX ORDER — SODIUM CHLORIDE, SODIUM LACTATE, POTASSIUM CHLORIDE, CALCIUM CHLORIDE 600; 310; 30; 20 MG/100ML; MG/100ML; MG/100ML; MG/100ML
INJECTION, SOLUTION INTRAVENOUS CONTINUOUS
Status: CANCELLED | OUTPATIENT
Start: 2019-11-18

## 2019-11-12 RX ORDER — KETOROLAC TROMETHAMINE 30 MG/ML
30 INJECTION, SOLUTION INTRAMUSCULAR; INTRAVENOUS ONCE
Status: CANCELLED | OUTPATIENT
Start: 2019-11-18 | End: 2019-11-22

## 2019-11-12 RX ORDER — LIDOCAINE HYDROCHLORIDE 10 MG/ML
1 INJECTION, SOLUTION EPIDURAL; INFILTRATION; INTRACAUDAL; PERINEURAL
Status: CANCELLED | OUTPATIENT
Start: 2019-11-18 | End: 2019-11-18

## 2019-11-12 RX ORDER — SODIUM CHLORIDE 0.9 % (FLUSH) 0.9 %
10 SYRINGE (ML) INJECTION EVERY 12 HOURS SCHEDULED
Status: CANCELLED | OUTPATIENT
Start: 2019-11-18

## 2019-11-14 ENCOUNTER — OFFICE VISIT (OUTPATIENT)
Dept: UROLOGY | Age: 64
End: 2019-11-14
Payer: MEDICARE

## 2019-11-14 VITALS
WEIGHT: 238 LBS | BODY MASS INDEX: 35.25 KG/M2 | SYSTOLIC BLOOD PRESSURE: 124 MMHG | HEIGHT: 69 IN | HEART RATE: 67 BPM | DIASTOLIC BLOOD PRESSURE: 80 MMHG

## 2019-11-14 DIAGNOSIS — C61 PROSTATE CANCER (HCC): Primary | ICD-10-CM

## 2019-11-14 PROCEDURE — 99213 OFFICE O/P EST LOW 20 MIN: CPT | Performed by: UROLOGY

## 2019-11-14 ASSESSMENT — ENCOUNTER SYMPTOMS
SHORTNESS OF BREATH: 0
ABDOMINAL PAIN: 0

## 2019-11-18 ENCOUNTER — HOSPITAL ENCOUNTER (OUTPATIENT)
Age: 64
Setting detail: OUTPATIENT SURGERY
Discharge: HOME | End: 2019-11-18
Attending: SURGERY | Admitting: SURGERY
Payer: MEDICARE

## 2019-11-18 ENCOUNTER — ANESTHESIA EVENT (OUTPATIENT)
Dept: OPERATING ROOM | Age: 64
End: 2019-11-18
Payer: MEDICARE

## 2019-11-18 ENCOUNTER — ANESTHESIA (OUTPATIENT)
Dept: OPERATING ROOM | Age: 64
End: 2019-11-18
Payer: MEDICARE

## 2019-11-18 VITALS — OXYGEN SATURATION: 100 % | DIASTOLIC BLOOD PRESSURE: 53 MMHG | SYSTOLIC BLOOD PRESSURE: 96 MMHG | TEMPERATURE: 96.4 F

## 2019-11-18 VITALS
TEMPERATURE: 97.4 F | WEIGHT: 240 LBS | RESPIRATION RATE: 16 BRPM | BODY MASS INDEX: 36.37 KG/M2 | OXYGEN SATURATION: 94 % | HEIGHT: 68 IN | HEART RATE: 70 BPM | SYSTOLIC BLOOD PRESSURE: 123 MMHG | DIASTOLIC BLOOD PRESSURE: 70 MMHG

## 2019-11-18 DIAGNOSIS — K43.2 RECURRENT VENTRAL HERNIA: Primary | ICD-10-CM

## 2019-11-18 LAB
GLUCOSE BLD-MCNC: 116 MG/DL (ref 60–115)
PERFORMED ON: ABNORMAL

## 2019-11-18 PROCEDURE — 2709999900 HC NON-CHARGEABLE SUPPLY: Performed by: SURGERY

## 2019-11-18 PROCEDURE — 49568 PR IMPLANT MESH HERNIA REPAIR/DEBRIDEMENT CLOSURE: CPT | Performed by: SURGERY

## 2019-11-18 PROCEDURE — 6360000002 HC RX W HCPCS: Performed by: NURSE ANESTHETIST, CERTIFIED REGISTERED

## 2019-11-18 PROCEDURE — 7100000010 HC PHASE II RECOVERY - FIRST 15 MIN: Performed by: SURGERY

## 2019-11-18 PROCEDURE — 3600000012 HC SURGERY LEVEL 2 ADDTL 15MIN: Performed by: SURGERY

## 2019-11-18 PROCEDURE — 6360000002 HC RX W HCPCS: Performed by: SURGERY

## 2019-11-18 PROCEDURE — 3700000000 HC ANESTHESIA ATTENDED CARE: Performed by: SURGERY

## 2019-11-18 PROCEDURE — 3700000001 HC ADD 15 MINUTES (ANESTHESIA): Performed by: SURGERY

## 2019-11-18 PROCEDURE — 2580000003 HC RX 258: Performed by: NURSE PRACTITIONER

## 2019-11-18 PROCEDURE — 2580000003 HC RX 258: Performed by: SURGERY

## 2019-11-18 PROCEDURE — 6370000000 HC RX 637 (ALT 250 FOR IP): Performed by: SURGERY

## 2019-11-18 PROCEDURE — 2500000003 HC RX 250 WO HCPCS: Performed by: NURSE ANESTHETIST, CERTIFIED REGISTERED

## 2019-11-18 PROCEDURE — 7100000011 HC PHASE II RECOVERY - ADDTL 15 MIN: Performed by: SURGERY

## 2019-11-18 PROCEDURE — 2500000003 HC RX 250 WO HCPCS: Performed by: NURSE PRACTITIONER

## 2019-11-18 PROCEDURE — 7100000000 HC PACU RECOVERY - FIRST 15 MIN: Performed by: SURGERY

## 2019-11-18 PROCEDURE — 3600000002 HC SURGERY LEVEL 2 BASE: Performed by: SURGERY

## 2019-11-18 PROCEDURE — 64488 TAP BLOCK BI INJECTION: CPT | Performed by: ANESTHESIOLOGY

## 2019-11-18 PROCEDURE — 7100000001 HC PACU RECOVERY - ADDTL 15 MIN: Performed by: SURGERY

## 2019-11-18 PROCEDURE — 49565 PR REPAIR RECURR INCIS HERNIA,REDUC: CPT | Performed by: SURGERY

## 2019-11-18 PROCEDURE — 6370000000 HC RX 637 (ALT 250 FOR IP): Performed by: NURSE ANESTHETIST, CERTIFIED REGISTERED

## 2019-11-18 PROCEDURE — C1781 MESH (IMPLANTABLE): HCPCS | Performed by: SURGERY

## 2019-11-18 PROCEDURE — 6360000002 HC RX W HCPCS: Performed by: ANESTHESIOLOGY

## 2019-11-18 DEVICE — PATCH HERN M DIA2.5IN CIR W/ STRP SEPRA TECHNOLOGY ABSRB: Type: IMPLANTABLE DEVICE | Site: ABDOMEN | Status: FUNCTIONAL

## 2019-11-18 RX ORDER — DEXAMETHASONE SODIUM PHOSPHATE 10 MG/ML
INJECTION INTRAMUSCULAR; INTRAVENOUS PRN
Status: DISCONTINUED | OUTPATIENT
Start: 2019-11-18 | End: 2019-11-18 | Stop reason: SDUPTHER

## 2019-11-18 RX ORDER — LIDOCAINE HYDROCHLORIDE 10 MG/ML
1 INJECTION, SOLUTION EPIDURAL; INFILTRATION; INTRACAUDAL; PERINEURAL
Status: COMPLETED | OUTPATIENT
Start: 2019-11-18 | End: 2019-11-18

## 2019-11-18 RX ORDER — HYDROCODONE BITARTRATE AND ACETAMINOPHEN 5; 325 MG/1; MG/1
1 TABLET ORAL EVERY 6 HOURS PRN
Qty: 25 TABLET | Refills: 0 | Status: SHIPPED | OUTPATIENT
Start: 2019-11-18 | End: 2019-11-25

## 2019-11-18 RX ORDER — LIDOCAINE HYDROCHLORIDE 20 MG/ML
JELLY TOPICAL PRN
Status: DISCONTINUED | OUTPATIENT
Start: 2019-11-18 | End: 2019-11-18 | Stop reason: SDUPTHER

## 2019-11-18 RX ORDER — FENTANYL CITRATE 50 UG/ML
INJECTION, SOLUTION INTRAMUSCULAR; INTRAVENOUS PRN
Status: DISCONTINUED | OUTPATIENT
Start: 2019-11-18 | End: 2019-11-18 | Stop reason: SDUPTHER

## 2019-11-18 RX ORDER — ROCURONIUM BROMIDE 10 MG/ML
INJECTION, SOLUTION INTRAVENOUS PRN
Status: DISCONTINUED | OUTPATIENT
Start: 2019-11-18 | End: 2019-11-18 | Stop reason: SDUPTHER

## 2019-11-18 RX ORDER — PROPOFOL 10 MG/ML
INJECTION, EMULSION INTRAVENOUS PRN
Status: DISCONTINUED | OUTPATIENT
Start: 2019-11-18 | End: 2019-11-18 | Stop reason: SDUPTHER

## 2019-11-18 RX ORDER — MIDAZOLAM HYDROCHLORIDE 1 MG/ML
INJECTION INTRAMUSCULAR; INTRAVENOUS PRN
Status: DISCONTINUED | OUTPATIENT
Start: 2019-11-18 | End: 2019-11-18 | Stop reason: SDUPTHER

## 2019-11-18 RX ORDER — ONDANSETRON 2 MG/ML
INJECTION INTRAMUSCULAR; INTRAVENOUS PRN
Status: DISCONTINUED | OUTPATIENT
Start: 2019-11-18 | End: 2019-11-18 | Stop reason: SDUPTHER

## 2019-11-18 RX ORDER — SODIUM CHLORIDE 0.9 % (FLUSH) 0.9 %
10 SYRINGE (ML) INJECTION PRN
Status: DISCONTINUED | OUTPATIENT
Start: 2019-11-18 | End: 2019-11-18 | Stop reason: HOSPADM

## 2019-11-18 RX ORDER — SODIUM CHLORIDE, SODIUM LACTATE, POTASSIUM CHLORIDE, CALCIUM CHLORIDE 600; 310; 30; 20 MG/100ML; MG/100ML; MG/100ML; MG/100ML
INJECTION, SOLUTION INTRAVENOUS CONTINUOUS
Status: DISCONTINUED | OUTPATIENT
Start: 2019-11-18 | End: 2019-11-18 | Stop reason: HOSPADM

## 2019-11-18 RX ORDER — SODIUM CHLORIDE 0.9 % (FLUSH) 0.9 %
10 SYRINGE (ML) INJECTION EVERY 12 HOURS SCHEDULED
Status: DISCONTINUED | OUTPATIENT
Start: 2019-11-18 | End: 2019-11-18 | Stop reason: HOSPADM

## 2019-11-18 RX ORDER — FENTANYL CITRATE 50 UG/ML
50 INJECTION, SOLUTION INTRAMUSCULAR; INTRAVENOUS EVERY 5 MIN PRN
Status: DISCONTINUED | OUTPATIENT
Start: 2019-11-18 | End: 2019-11-18 | Stop reason: HOSPADM

## 2019-11-18 RX ORDER — ONDANSETRON 2 MG/ML
4 INJECTION INTRAMUSCULAR; INTRAVENOUS EVERY 6 HOURS PRN
Status: DISCONTINUED | OUTPATIENT
Start: 2019-11-18 | End: 2019-11-18 | Stop reason: HOSPADM

## 2019-11-18 RX ORDER — ONDANSETRON 2 MG/ML
4 INJECTION INTRAMUSCULAR; INTRAVENOUS
Status: DISCONTINUED | OUTPATIENT
Start: 2019-11-18 | End: 2019-11-18 | Stop reason: HOSPADM

## 2019-11-18 RX ORDER — MORPHINE SULFATE 2 MG/ML
1 INJECTION, SOLUTION INTRAMUSCULAR; INTRAVENOUS
Status: DISCONTINUED | OUTPATIENT
Start: 2019-11-18 | End: 2019-11-18 | Stop reason: HOSPADM

## 2019-11-18 RX ORDER — TRAMADOL HYDROCHLORIDE 50 MG/1
50 TABLET ORAL EVERY 6 HOURS PRN
Status: DISCONTINUED | OUTPATIENT
Start: 2019-11-18 | End: 2019-11-18 | Stop reason: HOSPADM

## 2019-11-18 RX ORDER — MAGNESIUM HYDROXIDE 1200 MG/15ML
LIQUID ORAL CONTINUOUS PRN
Status: COMPLETED | OUTPATIENT
Start: 2019-11-18 | End: 2019-11-18

## 2019-11-18 RX ORDER — KETOROLAC TROMETHAMINE 30 MG/ML
30 INJECTION, SOLUTION INTRAMUSCULAR; INTRAVENOUS ONCE
Status: COMPLETED | OUTPATIENT
Start: 2019-11-18 | End: 2019-11-18

## 2019-11-18 RX ADMIN — DEXTROSE MONOHYDRATE 2 G: 50 INJECTION, SOLUTION INTRAVENOUS at 07:45

## 2019-11-18 RX ADMIN — MIDAZOLAM HYDROCHLORIDE 2 MG: 2 INJECTION, SOLUTION INTRAMUSCULAR; INTRAVENOUS at 07:31

## 2019-11-18 RX ADMIN — SODIUM CHLORIDE, POTASSIUM CHLORIDE, SODIUM LACTATE AND CALCIUM CHLORIDE: 600; 310; 30; 20 INJECTION, SOLUTION INTRAVENOUS at 07:01

## 2019-11-18 RX ADMIN — ROCURONIUM BROMIDE 80 MG: 10 INJECTION INTRAVENOUS at 07:40

## 2019-11-18 RX ADMIN — LIDOCAINE HYDROCHLORIDE 80 MG: 20 JELLY TOPICAL at 07:40

## 2019-11-18 RX ADMIN — FENTANYL CITRATE 50 MCG: 50 INJECTION, SOLUTION INTRAMUSCULAR; INTRAVENOUS at 09:10

## 2019-11-18 RX ADMIN — FENTANYL CITRATE 50 MCG: 50 INJECTION, SOLUTION INTRAMUSCULAR; INTRAVENOUS at 09:20

## 2019-11-18 RX ADMIN — FENTANYL CITRATE 50 MCG: 50 INJECTION, SOLUTION INTRAMUSCULAR; INTRAVENOUS at 09:39

## 2019-11-18 RX ADMIN — FENTANYL CITRATE 50 MCG: 50 INJECTION, SOLUTION INTRAMUSCULAR; INTRAVENOUS at 07:39

## 2019-11-18 RX ADMIN — PROPOFOL 200 MG: 10 INJECTION, EMULSION INTRAVENOUS at 07:40

## 2019-11-18 RX ADMIN — LIDOCAINE HYDROCHLORIDE 1 ML: 10 INJECTION, SOLUTION EPIDURAL; INFILTRATION; INTRACAUDAL; PERINEURAL at 07:01

## 2019-11-18 RX ADMIN — SUGAMMADEX 200 MG: 100 INJECTION, SOLUTION INTRAVENOUS at 08:52

## 2019-11-18 RX ADMIN — KETOROLAC TROMETHAMINE 30 MG: 30 INJECTION, SOLUTION INTRAMUSCULAR; INTRAVENOUS at 07:02

## 2019-11-18 RX ADMIN — ONDANSETRON 4 MG: 2 INJECTION INTRAMUSCULAR; INTRAVENOUS at 08:21

## 2019-11-18 RX ADMIN — FENTANYL CITRATE 50 MCG: 50 INJECTION, SOLUTION INTRAMUSCULAR; INTRAVENOUS at 07:58

## 2019-11-18 RX ADMIN — DEXAMETHASONE SODIUM PHOSPHATE 10 MG: 10 INJECTION INTRAMUSCULAR; INTRAVENOUS at 07:49

## 2019-11-18 RX ADMIN — TRAMADOL HYDROCHLORIDE 50 MG: 50 TABLET ORAL at 10:15

## 2019-11-18 ASSESSMENT — PULMONARY FUNCTION TESTS
PIF_VALUE: 20
PIF_VALUE: 20
PIF_VALUE: 15
PIF_VALUE: 20
PIF_VALUE: 19
PIF_VALUE: 1
PIF_VALUE: 18
PIF_VALUE: 19
PIF_VALUE: 19
PIF_VALUE: 20
PIF_VALUE: 26
PIF_VALUE: 20
PIF_VALUE: 1
PIF_VALUE: 20
PIF_VALUE: 1
PIF_VALUE: 19
PIF_VALUE: 20
PIF_VALUE: 19
PIF_VALUE: 19
PIF_VALUE: 38
PIF_VALUE: 7
PIF_VALUE: 19
PIF_VALUE: 20
PIF_VALUE: 1
PIF_VALUE: 19
PIF_VALUE: 17
PIF_VALUE: 20
PIF_VALUE: 19
PIF_VALUE: 20
PIF_VALUE: 19
PIF_VALUE: 0
PIF_VALUE: 20
PIF_VALUE: 20
PIF_VALUE: 19
PIF_VALUE: 0
PIF_VALUE: 20
PIF_VALUE: 18
PIF_VALUE: 20
PIF_VALUE: 22
PIF_VALUE: 20
PIF_VALUE: 19
PIF_VALUE: 19
PIF_VALUE: 13
PIF_VALUE: 2
PIF_VALUE: 1
PIF_VALUE: 24
PIF_VALUE: 20
PIF_VALUE: 20
PIF_VALUE: 19
PIF_VALUE: 20
PIF_VALUE: 19
PIF_VALUE: 21
PIF_VALUE: 19
PIF_VALUE: 19
PIF_VALUE: 20
PIF_VALUE: 19
PIF_VALUE: 19
PIF_VALUE: 20
PIF_VALUE: 19
PIF_VALUE: 19
PIF_VALUE: 0
PIF_VALUE: 20
PIF_VALUE: 18
PIF_VALUE: 19
PIF_VALUE: 20
PIF_VALUE: 18
PIF_VALUE: 19
PIF_VALUE: 19

## 2019-11-18 ASSESSMENT — PAIN SCALES - GENERAL
PAINLEVEL_OUTOF10: 0
PAINLEVEL_OUTOF10: 7
PAINLEVEL_OUTOF10: 0
PAINLEVEL_OUTOF10: 5
PAINLEVEL_OUTOF10: 7
PAINLEVEL_OUTOF10: 5

## 2019-11-18 ASSESSMENT — PAIN - FUNCTIONAL ASSESSMENT: PAIN_FUNCTIONAL_ASSESSMENT: 0-10

## 2019-11-18 ASSESSMENT — ENCOUNTER SYMPTOMS: SHORTNESS OF BREATH: 1

## 2019-11-25 ENCOUNTER — OFFICE VISIT (OUTPATIENT)
Dept: SURGERY | Age: 64
End: 2019-11-25

## 2019-11-25 VITALS
DIASTOLIC BLOOD PRESSURE: 74 MMHG | WEIGHT: 237.6 LBS | HEIGHT: 69 IN | SYSTOLIC BLOOD PRESSURE: 112 MMHG | TEMPERATURE: 98.2 F | BODY MASS INDEX: 35.19 KG/M2

## 2019-11-25 DIAGNOSIS — K43.2 RECURRENT VENTRAL HERNIA: Primary | ICD-10-CM

## 2019-11-25 PROCEDURE — 99024 POSTOP FOLLOW-UP VISIT: CPT | Performed by: SURGERY

## 2019-11-27 ENCOUNTER — OFFICE VISIT (OUTPATIENT)
Dept: PULMONOLOGY | Age: 64
End: 2019-11-27
Payer: MEDICARE

## 2019-11-27 VITALS
SYSTOLIC BLOOD PRESSURE: 118 MMHG | HEART RATE: 60 BPM | DIASTOLIC BLOOD PRESSURE: 70 MMHG | BODY MASS INDEX: 35.52 KG/M2 | OXYGEN SATURATION: 97 % | HEIGHT: 69 IN | WEIGHT: 239.8 LBS | RESPIRATION RATE: 15 BRPM | TEMPERATURE: 97.2 F

## 2019-11-27 DIAGNOSIS — J44.9 CHRONIC OBSTRUCTIVE PULMONARY DISEASE, UNSPECIFIED COPD TYPE (HCC): ICD-10-CM

## 2019-11-27 DIAGNOSIS — E78.2 MIXED HYPERLIPIDEMIA: ICD-10-CM

## 2019-11-27 DIAGNOSIS — Z87.891 PERSONAL HISTORY OF TOBACCO USE: Primary | ICD-10-CM

## 2019-11-27 DIAGNOSIS — G47.30 SLEEP APNEA, UNSPECIFIED TYPE: ICD-10-CM

## 2019-11-27 DIAGNOSIS — E66.09 CLASS 2 OBESITY DUE TO EXCESS CALORIES WITHOUT SERIOUS COMORBIDITY WITH BODY MASS INDEX (BMI) OF 35.0 TO 35.9 IN ADULT: ICD-10-CM

## 2019-11-27 LAB
ALBUMIN SERPL-MCNC: 4.1 G/DL (ref 3.5–4.6)
ALP BLD-CCNC: 104 U/L (ref 35–104)
ALT SERPL-CCNC: 20 U/L (ref 0–41)
AST SERPL-CCNC: 17 U/L (ref 0–40)
BILIRUB SERPL-MCNC: 0.3 MG/DL (ref 0.2–0.7)
BILIRUBIN DIRECT: <0.2 MG/DL (ref 0–0.4)
BILIRUBIN, INDIRECT: NORMAL MG/DL (ref 0–0.6)
CHOLESTEROL, TOTAL: 155 MG/DL (ref 0–199)
HDLC SERPL-MCNC: 37 MG/DL (ref 40–59)
LDL CHOLESTEROL CALCULATED: 101 MG/DL (ref 0–129)
TOTAL PROTEIN: 7.4 G/DL (ref 6.3–8)
TRIGL SERPL-MCNC: 85 MG/DL (ref 0–150)

## 2019-11-27 PROCEDURE — G0296 VISIT TO DETERM LDCT ELIG: HCPCS | Performed by: INTERNAL MEDICINE

## 2019-11-27 PROCEDURE — 99213 OFFICE O/P EST LOW 20 MIN: CPT | Performed by: INTERNAL MEDICINE

## 2019-12-03 ENCOUNTER — OFFICE VISIT (OUTPATIENT)
Dept: FAMILY MEDICINE CLINIC | Age: 64
End: 2019-12-03
Payer: MEDICARE

## 2019-12-03 VITALS
WEIGHT: 238 LBS | HEIGHT: 70 IN | BODY MASS INDEX: 34.07 KG/M2 | SYSTOLIC BLOOD PRESSURE: 116 MMHG | RESPIRATION RATE: 13 BRPM | OXYGEN SATURATION: 94 % | DIASTOLIC BLOOD PRESSURE: 70 MMHG | TEMPERATURE: 98.1 F | HEART RATE: 63 BPM

## 2019-12-03 DIAGNOSIS — E78.2 MIXED HYPERLIPIDEMIA: ICD-10-CM

## 2019-12-03 DIAGNOSIS — E03.8 OTHER SPECIFIED HYPOTHYROIDISM: ICD-10-CM

## 2019-12-03 DIAGNOSIS — F41.9 ANXIETY: Primary | ICD-10-CM

## 2019-12-03 PROCEDURE — 99213 OFFICE O/P EST LOW 20 MIN: CPT | Performed by: FAMILY MEDICINE

## 2019-12-03 RX ORDER — CARVEDILOL 6.25 MG/1
6.25 TABLET ORAL 2 TIMES DAILY
Qty: 60 TABLET | Refills: 5 | Status: SHIPPED | OUTPATIENT
Start: 2019-12-03 | End: 2020-01-17 | Stop reason: SDUPTHER

## 2019-12-03 RX ORDER — ATORVASTATIN CALCIUM 20 MG/1
20 TABLET, FILM COATED ORAL DAILY
Qty: 30 TABLET | Refills: 2 | Status: SHIPPED | OUTPATIENT
Start: 2019-12-03 | End: 2020-02-14

## 2019-12-03 RX ORDER — LORAZEPAM 1 MG/1
1 TABLET ORAL NIGHTLY PRN
Qty: 30 TABLET | Refills: 2 | Status: SHIPPED | OUTPATIENT
Start: 2019-12-03 | End: 2020-03-03 | Stop reason: SDUPTHER

## 2019-12-03 RX ORDER — ATORVASTATIN CALCIUM 10 MG/1
10 TABLET, FILM COATED ORAL DAILY
Qty: 30 TABLET | Refills: 5 | Status: CANCELLED | OUTPATIENT
Start: 2019-12-03

## 2019-12-23 ENCOUNTER — OFFICE VISIT (OUTPATIENT)
Dept: SURGERY | Age: 64
End: 2019-12-23

## 2019-12-23 VITALS
WEIGHT: 241.1 LBS | SYSTOLIC BLOOD PRESSURE: 122 MMHG | DIASTOLIC BLOOD PRESSURE: 80 MMHG | HEIGHT: 69 IN | TEMPERATURE: 97 F | BODY MASS INDEX: 35.71 KG/M2

## 2019-12-23 DIAGNOSIS — K43.2 RECURRENT VENTRAL HERNIA: Primary | ICD-10-CM

## 2019-12-23 PROCEDURE — 99024 POSTOP FOLLOW-UP VISIT: CPT | Performed by: SURGERY

## 2020-01-17 ENCOUNTER — OFFICE VISIT (OUTPATIENT)
Dept: CARDIOLOGY CLINIC | Age: 65
End: 2020-01-17
Payer: MEDICARE

## 2020-01-17 VITALS
BODY MASS INDEX: 35 KG/M2 | SYSTOLIC BLOOD PRESSURE: 110 MMHG | HEART RATE: 65 BPM | OXYGEN SATURATION: 98 % | WEIGHT: 237 LBS | DIASTOLIC BLOOD PRESSURE: 74 MMHG | RESPIRATION RATE: 18 BRPM

## 2020-01-17 PROCEDURE — 99214 OFFICE O/P EST MOD 30 MIN: CPT | Performed by: INTERNAL MEDICINE

## 2020-01-17 RX ORDER — CARVEDILOL 6.25 MG/1
6.25 TABLET ORAL 2 TIMES DAILY
Qty: 60 TABLET | Refills: 11 | Status: SHIPPED | OUTPATIENT
Start: 2020-01-17 | End: 2020-10-28 | Stop reason: SDUPTHER

## 2020-01-17 RX ORDER — ISOSORBIDE MONONITRATE 30 MG/1
30 TABLET, EXTENDED RELEASE ORAL DAILY
Qty: 30 TABLET | Refills: 11 | Status: SHIPPED | OUTPATIENT
Start: 2020-01-17 | End: 2020-10-28 | Stop reason: SDUPTHER

## 2020-01-17 ASSESSMENT — ENCOUNTER SYMPTOMS
CHEST TIGHTNESS: 0
COUGH: 0
STRIDOR: 0
GASTROINTESTINAL NEGATIVE: 1
SHORTNESS OF BREATH: 1
EYES NEGATIVE: 1
WHEEZING: 0
BLOOD IN STOOL: 0
BACK PAIN: 1
NAUSEA: 0

## 2020-01-17 NOTE — PROGRESS NOTES
Occupational History    None   Social Needs    Financial resource strain: None    Food insecurity:     Worry: None     Inability: None    Transportation needs:     Medical: None     Non-medical: None   Tobacco Use    Smoking status: Former Smoker     Packs/day: 1.50     Years: 40.00     Pack years: 60.00     Types: Cigarettes     Last attempt to quit: 2018     Years since quittin.1    Smokeless tobacco: Never Used   Substance and Sexual Activity    Alcohol use:  Yes    Drug use: No    Sexual activity: None   Lifestyle    Physical activity:     Days per week: None     Minutes per session: None    Stress: None   Relationships    Social connections:     Talks on phone: None     Gets together: None     Attends [employer] service: None     Active member of club or organization: None     Attends meetings of clubs or organizations: None     Relationship status: None    Intimate partner violence:     Fear of current or ex partner: None     Emotionally abused: None     Physically abused: None     Forced sexual activity: None   Other Topics Concern    None   Social History Narrative    None       Allergies   Allergen Reactions    Demerol Hcl [Meperidine] Nausea Only     migraines    Valium [Diazepam] Other (See Comments)     MIGRAINES       Current Outpatient Medications   Medication Sig Dispense Refill    carvedilol (COREG) 6.25 MG tablet Take 1 tablet by mouth 2 times daily 60 tablet 5    atorvastatin (LIPITOR) 20 MG tablet Take 1 tablet by mouth daily 30 tablet 2    tiotropium (SPIRIVA RESPIMAT) 2.5 MCG/ACT AERS inhaler Inhale 2 puffs into the lungs daily 1 Inhaler 5    Glucosamine-Chondroitin--300-250 MG TABS Take 1 tablet by mouth Daily      levothyroxine (SYNTHROID) 75 MCG tablet Take 1 tablet by mouth daily 90 tablet 1    acetaminophen (TYLENOL) 500 MG tablet Take 1,000 mg by mouth daily At night      Naproxen Sodium (ALEVE) 220 MG CAPS Take 1 capsule by mouth daily      General: Edema (1+) present. No tenderness. Neurological: He is alert and oriented to person, place, and time. Skin: Skin is warm. No cyanosis. Nails show no clubbing.        LABS:  CBC:   Lab Results   Component Value Date    WBC 8.1 11/12/2019    RBC 4.68 11/12/2019    HGB 13.6 11/12/2019    HCT 41.4 11/12/2019    MCV 88.3 11/12/2019    MCH 29.1 11/12/2019    MCHC 32.9 11/12/2019    RDW 13.9 11/12/2019     11/12/2019     Lipids:  Lab Results   Component Value Date    CHOL 155 11/27/2019    CHOL 172 10/22/2019    CHOL 186 04/22/2019     Lab Results   Component Value Date    TRIG 85 11/27/2019    TRIG 152 (H) 10/22/2019    TRIG 138 04/22/2019     Lab Results   Component Value Date    HDL 37 (L) 11/27/2019    HDL 38 (L) 10/22/2019    HDL 43 04/22/2019     Lab Results   Component Value Date    LDLCALC 101 11/27/2019    LDLCALC 104 10/22/2019    LDLCALC 115 04/22/2019     No results found for: LABVLDL, VLDL  No results found for: CHOLHDLRATIO  CMP:    Lab Results   Component Value Date     11/12/2019    K 3.8 11/12/2019     11/12/2019    CO2 25 11/12/2019    BUN 20 11/12/2019    CREATININE 1.04 11/12/2019    GFRAA >60.0 11/12/2019    LABGLOM >60.0 11/12/2019    GLUCOSE 136 11/12/2019    PROT 7.4 11/27/2019    LABALBU 4.1 11/27/2019    CALCIUM 9.1 11/12/2019    BILITOT 0.3 11/27/2019    ALKPHOS 104 11/27/2019    AST 17 11/27/2019    ALT 20 11/27/2019     BMP:    Lab Results   Component Value Date     11/12/2019    K 3.8 11/12/2019     11/12/2019    CO2 25 11/12/2019    BUN 20 11/12/2019    LABALBU 4.1 11/27/2019    CREATININE 1.04 11/12/2019    CALCIUM 9.1 11/12/2019    GFRAA >60.0 11/12/2019    LABGLOM >60.0 11/12/2019    GLUCOSE 136 11/12/2019     Magnesium:  No results found for: MG  TSH:  Lab Results   Component Value Date    TSH 2.710 10/16/2019             Patient Active Problem List   Diagnosis    Lumbosacral spondylosis without myelopathy    Umbilical hernia without obstruction and without gangrene    Diastasis recti    Other bursal cyst of elbow    Prostate cancer ([facility] [address]    Recurrent ventral hernia    Dyslipidemia    Essential hypertension    Acute on chronic diastolic heart failure (HCC)    Shortness of breath    DEO (obstructive sleep apnea)    RAMOS (dyspnea on exertion)    Coronary artery disease involving native coronary artery of native heart without angina pectoris       There are no discontinued medications. Modified Medications    No medications on file       No orders of the defined types were placed in this encounter. Assessment/Plan:    1. Dyslipidemia   LDL Elevated- recently changed to Atorvastatin     2. Essential hypertension  Stable     3. Shortness of breath - stable     4. Acute on chronic diastolic heart failure ([facility] [address]    5. Did well w Hernia surgery    Counseling:  Heart Healthy Lifestyle, Improve BMI, Low Salt Diet, Volume Restriction 1500cc per day, Take Precautions to Prevent Falls and Walk Daily    Return in about 3 months (around 4/17/2020) for Cardiovascular care. .    Electronically signed by Scottie Patel MD on 1/17/2020 at 2:43 PM

## 2020-01-29 ENCOUNTER — HOSPITAL ENCOUNTER (OUTPATIENT)
Dept: NUTRITION | Age: 65
Discharge: HOME | End: 2020-01-29
Payer: MEDICARE

## 2020-01-29 PROCEDURE — 97802 MEDICAL NUTRITION INDIV IN: CPT

## 2020-01-29 NOTE — PROGRESS NOTES
OUTPATIENT NUTRITION COUNSELING       Anderson Aponte is a 59 y.o.  male     Reason for Counseling: heart failure, dyslipidemia    Subjective/Current Data:  Pt here today wanting to lower cholesterol and lose weight. He also has heart failure. Pt reports recent weight fluctuations. He also recently stopped exercising, but plans to re start. He is drinking only water, c/o dry mouth. Pt typically eats 1-2 meals per day, sometimes snacks on pork rinds. Meals are typically unbalanced, ie: salad and hamburger sunny. He drinks only decaf coffee, but adds 3-4 Tbsp sugar as well as cream. He is using some heart healthy fats, ie: olive oil, as well as stick butter. Pt also states he has greatly reduced his salt intake. Objective Data:    Past Medical History:  Past Medical History:   Diagnosis Date    Cancer ([facility] [address]     Chicken pox     Chronic back pain     Emphysema of lung ([facility] [address]     Hyperlipidemia     Hypothyroidism     Measles     Mumps     Osteoarthritis     Pneumonia     Type 2 diabetes mellitus without complication (HCC)     prediabetes     Past Surgical History:   Procedure Laterality Date    CARDIAC CATHETERIZATION      2019    CYST REMOVAL      back of neck    CYST REMOVAL Left 3/4/16    Dr Erick Bangura  07/2018    PROSTATE SURGERY  07/2018    Biopsy    PROSTATECTOMY  94/77/9679    UMBILICAL HERNIA REPAIR  3/4/16    Dr Raehl Leiva N/A 11/18/2019    REPAIR OF RECURRENT VENTRAL HERNIA performed by Jessica Francis MD at [address] [place] [address] St:    Chemistry CBC/Coags Misc. No results for input(s): NA, K, CL, CO2, BUN, CREATININE, GLUCOSE in the last 72 hours. Invalid input(s): CA  No results for input(s): PHOS in the last 72 hours. No results for input(s): WBC, RBC, HGB, HCT, MCV, MCH, MCHC, RDW, PLT, MPV in the last 72 hours. No results for input(s): LABALBU in the last 72 hours.     Invalid input(s):  PREALBUMIN  Lab Results   Component Value Date

## 2020-02-10 DIAGNOSIS — C61 PROSTATE CANCER (HCC): ICD-10-CM

## 2020-02-10 LAB — PROSTATE SPECIFIC ANTIGEN: <0.01 NG/ML (ref 0–5.4)

## 2020-02-17 ENCOUNTER — OFFICE VISIT (OUTPATIENT)
Dept: UROLOGY | Age: 65
End: 2020-02-17
Payer: MEDICARE

## 2020-02-17 VITALS
SYSTOLIC BLOOD PRESSURE: 126 MMHG | HEIGHT: 69 IN | HEART RATE: 67 BPM | DIASTOLIC BLOOD PRESSURE: 84 MMHG | BODY MASS INDEX: 34.66 KG/M2 | WEIGHT: 234 LBS

## 2020-02-17 PROCEDURE — 99213 OFFICE O/P EST LOW 20 MIN: CPT | Performed by: UROLOGY

## 2020-02-17 ASSESSMENT — ENCOUNTER SYMPTOMS
ABDOMINAL DISTENTION: 0
ABDOMINAL PAIN: 0

## 2020-02-17 NOTE — PROGRESS NOTES
Tobacco Use    Smoking status: Former Smoker     Packs/day: 1.50     Years: 40.00     Pack years: 60.00     Types: Cigarettes     Last attempt to quit: 2018     Years since quittin.2    Smokeless tobacco: Never Used   Substance and Sexual Activity    Alcohol use:  Yes    Drug use: No    Sexual activity: None   Lifestyle    Physical activity:     Days per week: None     Minutes per session: None    Stress: None   Relationships    Social connections:     Talks on phone: None     Gets together: None     Attends Jew service: None     Active member of club or organization: None     Attends meetings of clubs or organizations: None     Relationship status: None    Intimate partner violence:     Fear of current or ex partner: None     Emotionally abused: None     Physically abused: None     Forced sexual activity: None   Other Topics Concern    None   Social History Narrative    None     Family History   Problem Relation Age of Onset    Other Mother     Other Father     Cancer Father     Cancer Sister     Cancer Brother     Lung Cancer Brother     Cancer Brother      Current Outpatient Medications   Medication Sig Dispense Refill    atorvastatin (LIPITOR) 20 MG tablet TAKE ONE TABLET BY MOUTH EVERY DAY 30 tablet 2    carvedilol (COREG) 6.25 MG tablet Take 1 tablet by mouth 2 times daily 60 tablet 11    isosorbide mononitrate (IMDUR) 30 MG extended release tablet Take 1 tablet by mouth daily 30 tablet 11    tiotropium (SPIRIVA RESPIMAT) 2.5 MCG/ACT AERS inhaler Inhale 2 puffs into the lungs daily 1 Inhaler 5    Glucosamine-Chondroitin--300-250 MG TABS Take 1 tablet by mouth Daily      levothyroxine (SYNTHROID) 75 MCG tablet Take 1 tablet by mouth daily 90 tablet 1    acetaminophen (TYLENOL) 500 MG tablet Take 1,000 mg by mouth daily At night      Naproxen Sodium (ALEVE) 220 MG CAPS Take 1 capsule by mouth daily      nitroGLYCERIN (NITROSTAT) 0.4 MG SL tablet Place 1 tablet under the tongue 3 times daily as needed for Chest pain 25 tablet 1    aspirin 81 MG tablet Take 1 tablet by mouth daily With Food 30 tablet 3    vitamin C (ASCORBIC ACID) 500 MG tablet Take 500 mg by mouth daily       No current facility-administered medications for this visit. Demerol hcl [meperidine] and Valium [diazepam]  reviewed    Review of Systems   Constitutional: Negative for unexpected weight change. Gastrointestinal: Negative for abdominal distention and abdominal pain. Genitourinary: Negative for dysuria, flank pain and hematuria. Objective:   Physical Exam  Abdominal:      Palpations: Abdomen is soft. There is no mass. Tenderness: There is no abdominal tenderness. There is no guarding or rebound. Neurological:      Mental Status: He is alert. Psychiatric:         Mood and Affect: Mood normal.         Behavior: Behavior normal.           PSA   Date Value Ref Range Status   02/10/2020 <0.01 0.00 - 5.40 ng/mL Final   11/05/2019 <0.01 0.00 - 5.40 ng/mL Final   08/06/2019 <0.01 0.00 - 5.40 ng/mL Final   04/26/2019 <0.01 0.00 - 5.40 ng/mL Final   01/22/2019 <0.01 0.00 - 5.40 ng/mL Final       Assessment: This is a 55 yo male with h/o DDD, Anxiety, Kidney stones and with [place] 7 prostate cancer s/p RALP in 11/18 with a positive margin. He has still has an undetectable PSA and continues to have improved and mild ABDIAS. I again discussed the options of adjuvant vs salvage IMRT given positive margin but he wants to continue to wait given his other medical concerns. I explained given the daily use of Imdur he is not a candidate for ED medication and discussed EVA, Injections and IPP options. This is reasonable given the current PSA. I spent 15 minutes of which > 50% of the visit was spent counseling and coordinating care wrt the prostate cancer and ED.       Plan:      1. F/U 3-4 mo for PSA  2.  Info on EVA given        Marek Thorpe MD

## 2020-02-25 DIAGNOSIS — E78.2 MIXED HYPERLIPIDEMIA: ICD-10-CM

## 2020-02-25 DIAGNOSIS — E03.8 OTHER SPECIFIED HYPOTHYROIDISM: ICD-10-CM

## 2020-02-25 LAB
ALBUMIN SERPL-MCNC: 4.1 G/DL (ref 3.5–4.6)
ALP BLD-CCNC: 101 U/L (ref 35–104)
ALT SERPL-CCNC: 24 U/L (ref 0–41)
ANION GAP SERPL CALCULATED.3IONS-SCNC: 11 MEQ/L (ref 9–15)
AST SERPL-CCNC: 21 U/L (ref 0–40)
BASOPHILS ABSOLUTE: 0.1 K/UL (ref 0–0.2)
BASOPHILS RELATIVE PERCENT: 1 %
BILIRUB SERPL-MCNC: 0.5 MG/DL (ref 0.2–0.7)
BUN BLDV-MCNC: 18 MG/DL (ref 8–23)
CALCIUM SERPL-MCNC: 9.5 MG/DL (ref 8.5–9.9)
CHLORIDE BLD-SCNC: 102 MEQ/L (ref 95–107)
CHOLESTEROL, TOTAL: 151 MG/DL (ref 0–199)
CO2: 27 MEQ/L (ref 20–31)
CREAT SERPL-MCNC: 1 MG/DL (ref 0.7–1.2)
EOSINOPHILS ABSOLUTE: 0.5 K/UL (ref 0–0.7)
EOSINOPHILS RELATIVE PERCENT: 5.9 %
GFR AFRICAN AMERICAN: >60
GFR NON-AFRICAN AMERICAN: >60
GLOBULIN: 3.4 G/DL (ref 2.3–3.5)
GLUCOSE BLD-MCNC: 114 MG/DL (ref 70–99)
HCT VFR BLD CALC: 43.6 % (ref 42–52)
HDLC SERPL-MCNC: 37 MG/DL (ref 40–59)
HEMOGLOBIN: 14.2 G/DL (ref 14–18)
LDL CHOLESTEROL CALCULATED: 89 MG/DL (ref 0–129)
LYMPHOCYTES ABSOLUTE: 2.4 K/UL (ref 1–4.8)
LYMPHOCYTES RELATIVE PERCENT: 30.9 %
MCH RBC QN AUTO: 28.8 PG (ref 27–31.3)
MCHC RBC AUTO-ENTMCNC: 32.6 % (ref 33–37)
MCV RBC AUTO: 88.1 FL (ref 80–100)
MONOCYTES ABSOLUTE: 1 K/UL (ref 0.2–0.8)
MONOCYTES RELATIVE PERCENT: 12.3 %
NEUTROPHILS ABSOLUTE: 3.9 K/UL (ref 1.4–6.5)
NEUTROPHILS RELATIVE PERCENT: 49.9 %
PDW BLD-RTO: 15.6 % (ref 11.5–14.5)
PLATELET # BLD: 255 K/UL (ref 130–400)
POTASSIUM SERPL-SCNC: 5.1 MEQ/L (ref 3.4–4.9)
RBC # BLD: 4.95 M/UL (ref 4.7–6.1)
SODIUM BLD-SCNC: 140 MEQ/L (ref 135–144)
TOTAL PROTEIN: 7.5 G/DL (ref 6.3–8)
TRIGL SERPL-MCNC: 123 MG/DL (ref 0–150)
TSH SERPL DL<=0.05 MIU/L-ACNC: 2.94 UIU/ML (ref 0.44–3.86)
WBC # BLD: 7.8 K/UL (ref 4.8–10.8)

## 2020-03-03 ENCOUNTER — OFFICE VISIT (OUTPATIENT)
Dept: FAMILY MEDICINE CLINIC | Age: 65
End: 2020-03-03
Payer: MEDICARE

## 2020-03-03 VITALS
DIASTOLIC BLOOD PRESSURE: 70 MMHG | WEIGHT: 234 LBS | HEART RATE: 70 BPM | OXYGEN SATURATION: 95 % | TEMPERATURE: 97.8 F | BODY MASS INDEX: 34.66 KG/M2 | HEIGHT: 69 IN | SYSTOLIC BLOOD PRESSURE: 110 MMHG

## 2020-03-03 VITALS
TEMPERATURE: 97.8 F | DIASTOLIC BLOOD PRESSURE: 70 MMHG | BODY MASS INDEX: 34.96 KG/M2 | SYSTOLIC BLOOD PRESSURE: 110 MMHG | WEIGHT: 236 LBS | OXYGEN SATURATION: 95 % | HEIGHT: 69 IN | RESPIRATION RATE: 16 BRPM | HEART RATE: 70 BPM

## 2020-03-03 PROBLEM — J44.9 CHRONIC OBSTRUCTIVE PULMONARY DISEASE (HCC): Status: ACTIVE | Noted: 2020-03-03

## 2020-03-03 PROCEDURE — G8510 SCR DEP NEG, NO PLAN REQD: HCPCS | Performed by: FAMILY MEDICINE

## 2020-03-03 PROCEDURE — G0438 PPPS, INITIAL VISIT: HCPCS | Performed by: NURSE PRACTITIONER

## 2020-03-03 PROCEDURE — 99214 OFFICE O/P EST MOD 30 MIN: CPT | Performed by: FAMILY MEDICINE

## 2020-03-03 RX ORDER — ATORVASTATIN CALCIUM 20 MG/1
TABLET, FILM COATED ORAL
Qty: 90 TABLET | Refills: 1 | Status: SHIPPED | OUTPATIENT
Start: 2020-03-03 | End: 2020-06-03 | Stop reason: SDUPTHER

## 2020-03-03 RX ORDER — LEVOTHYROXINE SODIUM 0.07 MG/1
75 TABLET ORAL DAILY
Qty: 90 TABLET | Refills: 1 | Status: SHIPPED | OUTPATIENT
Start: 2020-03-03 | End: 2020-06-03 | Stop reason: SDUPTHER

## 2020-03-03 RX ORDER — LORAZEPAM 1 MG/1
1 TABLET ORAL NIGHTLY PRN
Qty: 30 TABLET | Refills: 2 | Status: SHIPPED | OUTPATIENT
Start: 2020-03-03 | End: 2020-06-03 | Stop reason: SDUPTHER

## 2020-03-03 ASSESSMENT — PATIENT HEALTH QUESTIONNAIRE - PHQ9
SUM OF ALL RESPONSES TO PHQ9 QUESTIONS 1 & 2: 0
1. LITTLE INTEREST OR PLEASURE IN DOING THINGS: 0
2. FEELING DOWN, DEPRESSED OR HOPELESS: 0
SUM OF ALL RESPONSES TO PHQ QUESTIONS 1-9: 0
SUM OF ALL RESPONSES TO PHQ QUESTIONS 1-9: 0

## 2020-03-03 ASSESSMENT — ENCOUNTER SYMPTOMS: ABDOMINAL PAIN: 0

## 2020-03-03 ASSESSMENT — LIFESTYLE VARIABLES: HOW OFTEN DO YOU HAVE A DRINK CONTAINING ALCOHOL: 0

## 2020-03-03 NOTE — PROGRESS NOTES
Highest education level: None   Occupational History    None   Social Needs    Financial resource strain: None    Food insecurity:     Worry: None     Inability: None    Transportation needs:     Medical: None     Non-medical: None   Tobacco Use    Smoking status: Former Smoker     Packs/day: 1.50     Years: 40.00     Pack years: 60.00     Types: Cigarettes     Last attempt to quit: 2018     Years since quittin.2    Smokeless tobacco: Never Used   Substance and Sexual Activity    Alcohol use:  Yes    Drug use: No    Sexual activity: None   Lifestyle    Physical activity:     Days per week: None     Minutes per session: None    Stress: None   Relationships    Social connections:     Talks on phone: None     Gets together: None     Attends Orthodox service: None     Active member of club or organization: None     Attends meetings of clubs or organizations: None     Relationship status: None    Intimate partner violence:     Fear of current or ex partner: None     Emotionally abused: None     Physically abused: None     Forced sexual activity: None   Other Topics Concern    None   Social History Narrative    None     Current Outpatient Medications   Medication Sig Dispense Refill    atorvastatin (LIPITOR) 20 MG tablet TAKE ONE TABLET BY MOUTH EVERY DAY 30 tablet 2    carvedilol (COREG) 6.25 MG tablet Take 1 tablet by mouth 2 times daily 60 tablet 11    isosorbide mononitrate (IMDUR) 30 MG extended release tablet Take 1 tablet by mouth daily 30 tablet 11    tiotropium (SPIRIVA RESPIMAT) 2.5 MCG/ACT AERS inhaler Inhale 2 puffs into the lungs daily 1 Inhaler 5    Glucosamine-Chondroitin--300-250 MG TABS Take 1 tablet by mouth Daily      levothyroxine (SYNTHROID) 75 MCG tablet Take 1 tablet by mouth daily 90 tablet 1    acetaminophen (TYLENOL) 500 MG tablet Take 1,000 mg by mouth daily At night      Naproxen Sodium (ALEVE) 220 MG CAPS Take 1 capsule by mouth daily      nitroGLYCERIN (NITROSTAT) 0.4 MG SL tablet Place 1 tablet under the tongue 3 times daily as needed for Chest pain 25 tablet 1    aspirin 81 MG tablet Take 1 tablet by mouth daily With Food 30 tablet 3    vitamin C (ASCORBIC ACID) 500 MG tablet Take 500 mg by mouth daily       No current facility-administered medications for this visit. Family History   Problem Relation Age of Onset    Other Mother     Other Father     Cancer Father     Cancer Sister     Cancer Brother     Lung Cancer Brother     Cancer Brother      Past Medical History:   Diagnosis Date    Cancer ([place] [address]     Chicken pox     Chronic back pain     Em[facility]ma [facility] lung ([facility]     Hyperlipidemia     Hypothyroidism     Measles     Mumps     Osteoarthritis     Pneumonia     Type 2 diabetes mellitus without complication ([place] [address]     prediabetes     Controlled substances monitoring: no signs of potential drug abuse or diversion identified and OARRS report reviewed today- activity consistent with treatment plan. Objective:   Physical Exam  Neck:no carotid bruits. No masses. No adenopathy. No thyroid asymmetry. Lungs:clear and equal breath sounds. No wheezes or rales. Heart:rate reg. No murmur. No gallops   Pulses:Radials 2+ equal              D.P 1+ equal  Extremities:no edema in either leg  Gen: In no acute distress  Abdomen; B.S present. Soft  Non tender. No hepatosplenomegaly. No masses   Patient with appropriate affect. Thought content appropriate  Good eye contact    Assessment / Plan:       Diagnosis Orders   1. Anxiety  LORazepam (ATIVAN) 1 MG tablet   2. Other specified hypothyroidism     3. Pure hypercholesterolemia     4. Mixed hyperlipidemia     5. Pre-diabetes     6.  Chronic obstructive pulmonary disease, unspecified COPD type ([facility]        Copd stable and followed by pulmonary-continue current tx   Orders Placed This Encounter   Medications    LORazepam (ATIVAN) 1 MG tablet     Sig: Take 1 tablet by mouth nightly as needed for Anxiety for up to 30 days.      Dispense:  30 tablet     Refill:  2    atorvastatin (LIPITOR) 20 MG tablet     Sig: TAKE ONE TABLET BY MOUTH EVERY DAY     Dispense:  90 tablet     Refill:  1    levothyroxine (SYNTHROID) 75 MCG tablet     Sig: Take 1 tablet by mouth daily     Dispense:  90 tablet     Refill:  1   patient would like to work on diet and exercise before increasing lipitor dose for now   As he just started exercising  F/u after fasting blood work in 3 months

## 2020-03-03 NOTE — PROGRESS NOTES
Preventive Plan   Current Health Maintenance Status    There is no immunization history on file for this patient. Health Maintenance   Topic Date Due    Hepatitis C screen  1955    Pneumococcal 0-64 years Vaccine (1 of 1 - PPSV23) 10/31/1961    DTaP/Tdap/Td vaccine (1 - Tdap) 10/31/1966    HIV screen  10/31/1970    Shingles Vaccine (1 of 2) 10/31/2005    Annual Wellness Visit (AWV)  05/29/2019    Flu vaccine (1) 09/01/2019    Low dose CT lung screening  09/09/2020    A1C test (Diabetic or Prediabetic)  10/16/2020    PSA counseling  02/10/2021    Lipid screen  02/25/2021    Potassium monitoring  02/25/2021    Creatinine monitoring  02/25/2021    Colon cancer screen colonoscopy  03/29/2026    Hepatitis A vaccine  Aged Out    Hepatitis B vaccine  Aged Out    Hib vaccine  Aged Out    Meningococcal (ACWY) vaccine  Aged Out     Recommendations for [employer] Due: see orders and patient instructions/AVS.  . Recommended screening schedule for the next 5-10 years is provided to the patient in written form: see Patient Instructions/AVS.    There are no diagnoses linked to this encounter. Return for Medicare Annual Wellness Visit in 1 year. Reviewed with the patient: current clinicalstatus, medications, activities and diet. Side effects, adverse effects of the medication prescribedtoday, as well as treatment plan/ rationale and result expectations have been discussedwith the patient who expresses understanding and desires to proceed. Close follow upto evaluate treatment results and for coordination of care. I have reviewedthe patient's medical history in detail and updated the computerized patient record.     Bethany Ann, APRN - CNP

## 2020-04-28 ENCOUNTER — TELEPHONE (OUTPATIENT)
Dept: OTHER | Facility: CLINIC | Age: 65
End: 2020-04-28

## 2020-04-28 NOTE — TELEPHONE ENCOUNTER
Deshaun Aquino was contacted to set up a video visit with Shanae Easley MD.     Camryn Foy with: Family Member    Patient encouraged to sign up for MyChart in order to complete Virtual Visits, if MyChart status was not already active. Patient agreeableto sign up for a Virtual Visit with PCP within the next week.        Ke Sotelo

## 2020-05-27 DIAGNOSIS — E78.00 PURE HYPERCHOLESTEROLEMIA: ICD-10-CM

## 2020-05-27 DIAGNOSIS — R73.03 PRE-DIABETES: ICD-10-CM

## 2020-05-27 LAB
CHOLESTEROL, TOTAL: 127 MG/DL (ref 0–199)
GLUCOSE BLD-MCNC: 113 MG/DL (ref 70–99)
HBA1C MFR BLD: 5.8 % (ref 4.8–5.9)
HDLC SERPL-MCNC: 47 MG/DL (ref 40–59)
LDL CHOLESTEROL CALCULATED: 65 MG/DL (ref 0–129)
TRIGL SERPL-MCNC: 73 MG/DL (ref 0–150)

## 2020-06-02 ENCOUNTER — TELEPHONE (OUTPATIENT)
Dept: FAMILY MEDICINE CLINIC | Age: 65
End: 2020-06-02

## 2020-06-03 ENCOUNTER — OFFICE VISIT (OUTPATIENT)
Dept: FAMILY MEDICINE CLINIC | Age: 65
End: 2020-06-03
Payer: MEDICARE

## 2020-06-03 VITALS
WEIGHT: 216 LBS | OXYGEN SATURATION: 97 % | BODY MASS INDEX: 31.99 KG/M2 | HEIGHT: 69 IN | SYSTOLIC BLOOD PRESSURE: 110 MMHG | RESPIRATION RATE: 14 BRPM | HEART RATE: 64 BPM | DIASTOLIC BLOOD PRESSURE: 70 MMHG

## 2020-06-03 PROCEDURE — 99213 OFFICE O/P EST LOW 20 MIN: CPT | Performed by: FAMILY MEDICINE

## 2020-06-03 RX ORDER — LEVOTHYROXINE SODIUM 0.07 MG/1
75 TABLET ORAL DAILY
Qty: 90 TABLET | Refills: 0 | Status: SHIPPED | OUTPATIENT
Start: 2020-06-03 | End: 2020-09-15 | Stop reason: SDUPTHER

## 2020-06-03 RX ORDER — LORAZEPAM 1 MG/1
TABLET ORAL
Status: CANCELLED | OUTPATIENT
Start: 2020-06-03

## 2020-06-03 RX ORDER — ATORVASTATIN CALCIUM 20 MG/1
TABLET, FILM COATED ORAL
Qty: 90 TABLET | Refills: 1 | Status: SHIPPED | OUTPATIENT
Start: 2020-06-03 | End: 2020-09-15 | Stop reason: SDUPTHER

## 2020-06-03 RX ORDER — LORAZEPAM 1 MG/1
TABLET ORAL
COMMUNITY
Start: 2020-04-30 | End: 2020-09-10 | Stop reason: SDUPTHER

## 2020-06-03 RX ORDER — LORAZEPAM 1 MG/1
1 TABLET ORAL NIGHTLY PRN
Qty: 30 TABLET | Refills: 2 | Status: SHIPPED | OUTPATIENT
Start: 2020-06-03 | End: 2020-09-15 | Stop reason: SDUPTHER

## 2020-06-03 ASSESSMENT — ENCOUNTER SYMPTOMS
DIARRHEA: 0
VOMITING: 0
COUGH: 0

## 2020-06-03 NOTE — PROGRESS NOTES
Subjective:      Patient ID: Pita Jeronimo is a 59 y.o. male    HPI  Here in follow up for lipids and pre diabetes and anxiety. Using ativan nightly which has been working well. Brother passed away recently from cancer. Lost about 20 pounds since last time with diet and exercise    Review of Systems   Constitutional: Negative for chills and fever. Respiratory: Negative for cough. Gastrointestinal: Negative for diarrhea and vomiting. Skin: Negative for rash. Neurological: Negative for weakness. Psychiatric/Behavioral: Negative for sleep disturbance. Reviewed allergy, medical, social, surgical, family and med list changes and updated   Files-reviewed blood work with improved lipids and glucose    Controlled substances monitoring: no signs of potential drug abuse or diversion identified and OARRS report reviewed today- activity consistent with treatment plan. Social History     Socioeconomic History    Marital status:      Spouse name: None    Number of children: None    Years of education: None    Highest education level: None   Occupational History    None   Social Needs    Financial resource strain: None    Food insecurity     Worry: None     Inability: None    Transportation needs     Medical: None     Non-medical: None   Tobacco Use    Smoking status: Former Smoker     Packs/day: 1.50     Years: 40.00     Pack years: 60.00     Types: Cigarettes     Last attempt to quit: 2018     Years since quittin.5    Smokeless tobacco: Never Used   Substance and Sexual Activity    Alcohol use:  Yes    Drug use: No    Sexual activity: None   Lifestyle    Physical activity     Days per week: None     Minutes per session: None    Stress: None   Relationships    Social connections     Talks on phone: None     Gets together: None     Attends Church service: None     Active member of club or organization: None     Attends meetings of clubs or organizations: None     Relationship status: None    Intimate partner violence     Fear of current or ex partner: None     Emotionally abused: None     Physically abused: None     Forced sexual activity: None   Other Topics Concern    None   Social History Narrative    None     Current Outpatient Medications   Medication Sig Dispense Refill    LORazepam (ATIVAN) 1 MG tablet       atorvastatin (LIPITOR) 20 MG tablet TAKE ONE TABLET BY MOUTH EVERY DAY 90 tablet 1    levothyroxine (SYNTHROID) 75 MCG tablet Take 1 tablet by mouth daily 90 tablet 1    carvedilol (COREG) 6.25 MG tablet Take 1 tablet by mouth 2 times daily 60 tablet 11    isosorbide mononitrate (IMDUR) 30 MG extended release tablet Take 1 tablet by mouth daily 30 tablet 11    tiotropium (SPIRIVA RESPIMAT) 2.5 MCG/ACT AERS inhaler Inhale 2 puffs into the lungs daily 1 Inhaler 5    Glucosamine-Chondroitin--300-250 MG TABS Take 1 tablet by mouth Daily      acetaminophen (TYLENOL) 500 MG tablet Take 1,000 mg by mouth daily At night      Naproxen Sodium (ALEVE) 220 MG CAPS Take 1 capsule by mouth daily      nitroGLYCERIN (NITROSTAT) 0.4 MG SL tablet Place 1 tablet under the tongue 3 times daily as needed for Chest pain 25 tablet 1    aspirin 81 MG tablet Take 1 tablet by mouth daily With Food 30 tablet 3    vitamin C (ASCORBIC ACID) 500 MG tablet Take 500 mg by mouth daily       No current facility-administered medications for this visit.       Family History   Problem Relation Age of Onset    Other Mother     Other Father     Cancer Father     Cancer Sister     Cancer Brother     Lung Cancer Brother     Cancer Brother      Past Medical History:   Diagnosis Date    Cancer [facility])     Chicken pox     Chronic back pain     Emphysema of lung ([address]     Hyperlipidemia     Hypothyroidism     Measles     Mumps     Osteoarthritis     Pneumonia     Type 2 diabetes mellitus without complication (HCC)     prediabetes     Objective:   /70   Pulse 64   Resp 14   Ht

## 2020-06-09 DIAGNOSIS — C61 PROSTATE CANCER (HCC): ICD-10-CM

## 2020-06-09 LAB — PROSTATE SPECIFIC ANTIGEN: <0.01 NG/ML (ref 0–5.4)

## 2020-06-18 ENCOUNTER — OFFICE VISIT (OUTPATIENT)
Dept: UROLOGY | Age: 65
End: 2020-06-18
Payer: MEDICARE

## 2020-06-18 VITALS
SYSTOLIC BLOOD PRESSURE: 104 MMHG | HEIGHT: 70 IN | WEIGHT: 216 LBS | HEART RATE: 62 BPM | BODY MASS INDEX: 30.92 KG/M2 | DIASTOLIC BLOOD PRESSURE: 68 MMHG

## 2020-06-18 PROCEDURE — 99213 OFFICE O/P EST LOW 20 MIN: CPT | Performed by: UROLOGY

## 2020-06-18 ASSESSMENT — ENCOUNTER SYMPTOMS
ABDOMINAL DISTENTION: 0
ABDOMINAL PAIN: 0
SHORTNESS OF BREATH: 0

## 2020-06-19 ENCOUNTER — VIRTUAL VISIT (OUTPATIENT)
Dept: CARDIOLOGY CLINIC | Age: 65
End: 2020-06-19
Payer: MEDICARE

## 2020-06-19 PROBLEM — R09.89 BILATERAL CAROTID BRUITS: Status: ACTIVE | Noted: 2020-06-19

## 2020-06-19 PROCEDURE — 99214 OFFICE O/P EST MOD 30 MIN: CPT | Performed by: INTERNAL MEDICINE

## 2020-06-19 ASSESSMENT — ENCOUNTER SYMPTOMS
BLOOD IN STOOL: 0
BACK PAIN: 1
NAUSEA: 0
CHEST TIGHTNESS: 0
GASTROINTESTINAL NEGATIVE: 1
STRIDOR: 0
EYES NEGATIVE: 1
COUGH: 0
WHEEZING: 0
SHORTNESS OF BREATH: 1

## 2020-06-19 NOTE — PROGRESS NOTES
Father     Cancer Sister     Cancer Brother     Lung Cancer Brother     Cancer Brother        Social History     Socioeconomic History    Marital status:      Spouse name: Not on file    Number of children: Not on file    Years of education: Not on file    Highest education level: Not on file   Occupational History    Not on file   Social Needs    Financial resource strain: Not on file    Food insecurity     Worry: Not on file     Inability: Not on file   [employer] needs     Medical: Not on file     Non-medical: Not on file   Tobacco Use    Smoking status: Former Smoker     Packs/day: 1.50     Years: 40.00     Pack years: 60.00     Types: Cigarettes     Last attempt to quit: 2018     Years since quittin.5    Smokeless tobacco: Never Used   Substance and Sexual Activity    Alcohol use:  Yes    Drug use: No    Sexual activity: Not on file   Lifestyle    Physical activity     Days per week: Not on file     Minutes per session: Not on file    Stress: Not on file   Relationships    Social connections     Talks on phone: Not on file     Gets together: Not on file     Attends Mu-ism service: Not on file     Active member of club or organization: Not on file     Attends meetings of clubs or organizations: Not on file     Relationship status: Not on file    Intimate partner violence     Fear of current or ex partner: Not on file     Emotionally abused: Not on file     Physically abused: Not on file     Forced sexual activity: Not on file   Other Topics Concern    Not on file   Social History Narrative    Not on file       Allergies   Allergen Reactions    Demerol Hcl [Meperidine] Nausea Only     migraines    Valium [Diazepam] Other (See Comments)     MIGRAINES       Current Outpatient Medications   Medication Sig Dispense Refill    LORazepam (ATIVAN) 1 MG tablet       atorvastatin (LIPITOR) 20 MG tablet TAKE ONE TABLET BY MOUTH EVERY DAY 90 tablet 1    levothyroxine

## 2020-07-27 ENCOUNTER — OFFICE VISIT (OUTPATIENT)
Dept: CARDIOLOGY CLINIC | Age: 65
End: 2020-07-27
Payer: MEDICARE

## 2020-07-27 VITALS
OXYGEN SATURATION: 96 % | WEIGHT: 217 LBS | BODY MASS INDEX: 31.59 KG/M2 | DIASTOLIC BLOOD PRESSURE: 64 MMHG | HEART RATE: 60 BPM | RESPIRATION RATE: 16 BRPM | SYSTOLIC BLOOD PRESSURE: 114 MMHG

## 2020-07-27 PROCEDURE — 99214 OFFICE O/P EST MOD 30 MIN: CPT | Performed by: INTERNAL MEDICINE

## 2020-07-27 ASSESSMENT — ENCOUNTER SYMPTOMS
CHEST TIGHTNESS: 0
SHORTNESS OF BREATH: 1
BLOOD IN STOOL: 0
GASTROINTESTINAL NEGATIVE: 1
COUGH: 0
WHEEZING: 0
EYES NEGATIVE: 1
STRIDOR: 0
BACK PAIN: 1
NAUSEA: 0

## 2020-07-27 NOTE — PROGRESS NOTES
Office Visit. Patient: Lissette Mondragon  YOB: 1955  MRN: [id number]    Chief Complaint: COLE LE edema Orthopnea  Chief Complaint   Patient presents with    1 Month Follow-Up    Coronary Artery Disease    Hypertension    Congestive Heart Failure       CV Data:  7/2019 echo EF 60  7/2019 Dobut stress echo negative   LAD 40-50% sequential, RCA  mid. LVEF 55, EDP 14, PCWP 14 CO 7.2 L/min  10/2019 CUS- mild  10/2010 Abd US negative. Subjective/HPI COLE is getting worse. Can't do much due to knee and back pain. Can't lie flat due to sob    10/16/2019: still sob but better since BB and Imdur. No cp. No bleed no falls. 1/17/2020 no cp occ cole no falls occ nose bleed no falls     6/19/2020 Patient and/or health care decision maker is aware that that he/she may receive a bill for this telephone service, depending on his insurance coverage, and has provided verbal consent to proceed. This visit was completed via telephone. Total time 13 minutes. Still has some cole but better. No cp no falls no bleed little edema. 7/27/2020 still COLE. Stopped smoking 11/2018 with prior 2 ppd  Disabled  from knee and back.      EKG: SR    Past Medical History:   Diagnosis Date    Cancer [facility])     CHF (congestive heart failure) (HCC)     Chicken pox     Chronic back pain     [facility]     Hyperlipidemia     Hypertension     Hypothyroidism     Measles     Mumps     Osteoarthritis     Pneumonia     Type 2 diabetes mellitus without complication ([facility]     prediabetes       Past Surgical History:   Procedure Laterality Date    CARDIAC CATHETERIZATION      2019    CYST REMOVAL      back of neck    CYST REMOVAL Left 3/4/16    Dr Billie Forman  07/2018    PROSTATE SURGERY  07/2018    Biopsy    PROSTATECTOMY  25/57/3620    UMBILICAL HERNIA REPAIR  3/4/16    Dr Jose L Martinez N/A 11/18/2019    REPAIR OF RECURRENT VENTRAL HERNIA performed by Blas De Santiago MD at [address] [place] [address] History   Problem Relation Age of Onset    Other Mother     Other Father     Cancer Father     Cancer Sister     Cancer Brother     Lung Cancer Brother     Cancer Brother        Social History     Socioeconomic History    Marital status:      Spouse name: None    Number of children: None    Years of education: None    Highest education level: None   Occupational History    None   Social Needs    Financial resource strain: None    Food insecurity     Worry: None     Inability: None    Transportation needs     Medical: None     Non-medical: None   Tobacco Use    Smoking status: Former Smoker     Packs/day: 1.50     Years: 40.00     Pack years: 60.00     Types: Cigarettes     Last attempt to quit: 2018     Years since quittin.6    Smokeless tobacco: Never Used   Substance and Sexual Activity    Alcohol use:  Yes    Drug use: No    Sexual activity: None   Lifestyle    Physical activity     Days per week: None     Minutes per session: None    Stress: None   Relationships    Social connections     Talks on phone: None     Gets together: None     Attends Druze service: None     Active member of club or organization: None     Attends meetings of clubs or organizations: None     Relationship status: None    Intimate partner violence     Fear of current or ex partner: None     Emotionally abused: None     Physically abused: None     Forced sexual activity: None   Other Topics Concern    None   Social History Narrative    None       Allergies   Allergen Reactions    Demerol Hcl [Meperidine] Nausea Only     migraines    Valium [Diazepam] Other (See Comments)     MIGRAINES       Current Outpatient Medications   Medication Sig Dispense Refill    LORazepam (ATIVAN) 1 MG tablet       atorvastatin (LIPITOR) 20 MG tablet TAKE ONE TABLET BY MOUTH EVERY DAY 90 tablet 1    levothyroxine (SYNTHROID) 75 MCG tablet Take 1 tablet by mouth daily 90 tablet 0    carvedilol (COREG) 6.25 MG tablet Take 1 tablet by mouth 2 times daily 60 tablet 11    isosorbide mononitrate (IMDUR) 30 MG extended release tablet Take 1 tablet by mouth daily 30 tablet 11    Glucosamine-Chondroitin--300-250 MG TABS Take 1 tablet by mouth Daily      acetaminophen (TYLENOL) 500 MG tablet Take 1,000 mg by mouth daily At night      Naproxen Sodium (ALEVE) 220 MG CAPS Take 1 capsule by mouth daily      nitroGLYCERIN (NITROSTAT) 0.4 MG SL tablet Place 1 tablet under the tongue 3 times daily as needed for Chest pain 25 tablet 1    aspirin 81 MG tablet Take 1 tablet by mouth daily With Food 30 tablet 3    vitamin C (ASCORBIC ACID) 500 MG tablet Take 500 mg by mouth daily      tiotropium (SPIRIVA RESPIMAT) 2.5 MCG/ACT AERS inhaler Inhale 2 puffs into the lungs daily (Patient not taking: Reported on 7/27/2020) 1 Inhaler 5     No current facility-administered medications for this visit. Review of Systems:   Review of Systems   Constitutional: Negative. Negative for diaphoresis and fatigue. HENT: Negative. Eyes: Negative. Respiratory: Positive for shortness of breath. Negative for cough, chest tightness, wheezing and stridor. Cardiovascular: Positive for leg swelling. Negative for chest pain and palpitations. Gastrointestinal: Negative. Negative for blood in stool and nausea. Genitourinary: Negative. Musculoskeletal: Positive for arthralgias, back pain and gait problem. Skin: Negative. Neurological: Negative for dizziness, syncope, weakness and light-headedness. Hematological: Negative. Psychiatric/Behavioral: Negative. Physical Examination:    /64 (Site: Left Upper Arm, Position: Sitting, Cuff Size: Medium Adult)   Pulse 60   Resp 16   Wt 217 lb (98.4 kg)   SpO2 96%   BMI 31.59 kg/m²    Physical Exam   Constitutional: He appears healthy. No distress.    HENT:   Normal cephalic and Atraumatic   Eyes:  02/25/2020    K 5.1 02/25/2020     02/25/2020    CO2 27 02/25/2020    BUN 18 02/25/2020    LABALBU 4.1 02/25/2020    CREATININE 1.00 02/25/2020    CALCIUM 9.5 02/25/2020    GFRAA >60.0 02/25/2020    LABGLOM >60.0 02/25/2020    GLUCOSE 113 05/27/2020     Magnesium:  No results found for: MG  TSH:  Lab Results   Component Value Date    TSH 2.940 02/25/2020             Patient Active Problem List   Diagnosis    Lumbosacral spondylosis without myelopathy    Umbilical hernia without obstruction and without gangrene    Diastasis recti    Other bursal cyst of elbow    [facility]    Recurrent ventral hernia    Dyslipidemia    Essential hypertension    Acute on chronic diastolic heart failure (HCC)    Shortness of breath    DEO (obstructive sleep apnea)    RAMOS (dyspnea on exertion)    Coronary artery disease involving native coronary artery of native heart without angina pectoris    Chronic obstructive pulmonary disease (HCC)    Bilateral carotid bruits       There are no discontinued medications. Modified Medications    No medications on file       No orders of the defined types were placed in this encounter. Assessment/Plan:    1. Dyslipidemia   LDL Elevated- on Atorva. Future recheck - much better LDL 65 HDL 47     2. Essential hypertension  Stable     3. Shortness of breath - stable     4. Acute on chronic diastolic heart failure ([facility]    5. Did well w Hernia surgery    Counseling:  Heart Healthy Lifestyle, Improve BMI, Low Salt Diet, Volume Restriction 1500cc per day, Take Precautions to Prevent Falls and Walk Daily    Return in about 3 months (around 10/27/2020) for Cardiovascular care. .    Electronically signed by Aron Contreras MD on 7/27/2020 at 3:55 PM

## 2020-08-31 ENCOUNTER — TELEPHONE (OUTPATIENT)
Dept: CASE MANAGEMENT | Age: 65
End: 2020-08-31

## 2020-08-31 NOTE — TELEPHONE ENCOUNTER
Physician documentation on smoking history and CT Lung Screening reviewed. All required documentation complete. Patient is a former smoker (quit 2018- 2 yrs) with a 60 pack year history (1.5 ppd x 40 years) per physician documentation. Carolynn Larson left voicemail requesting return call if she has any questions regarding her upcoming CT Lung Screening exam and informed patient of entrance to facility as well as requirement to wear a mask due to COVID precautions at this time.

## 2020-09-01 RX ORDER — LORAZEPAM 1 MG/1
TABLET ORAL
OUTPATIENT
Start: 2020-09-01

## 2020-09-01 NOTE — TELEPHONE ENCOUNTER
Patient requesting medication refill.  Please approve or deny this request.    Rx requested:  Requested Prescriptions     Pending Prescriptions Disp Refills    LORazepam (ATIVAN) 1 MG tablet            Last Office Visit:   6/3/2020      Next Visit Date:  Future Appointments   Date Time Provider Grey Stewart   9/10/2020 10:30 AM [facility] ROOM 1 [facility] [address] [place]   9/15/2020 10:30 AM Nestor Ryder MD [facility]   10/14/2020 10:00 AM Nelda Patel MD  [address]   10/20/2020 11:00 AM Diogo Miramontes MD [facility]   10/28/2020  1:15 PM Ajay Tipton MD [facility]

## 2020-09-03 NOTE — TELEPHONE ENCOUNTER
Radha Diamond is calling in requesting a refill on medication(s):    Requested Prescriptions     Refused Prescriptions Disp Refills    LORazepam (ATIVAN) 1 MG tablet       Refused By: Robert York     Reason for Refusal: Patient needs an appointment          Patient's Last Office Visit:  6/3/2020     Patient's Next Visit:  9/15/2020     Patient's Medication Contract:  Medication Contract and Consent for Opioid Use Documents Filed     Patient Documents       Type of Document Status Date Received Received By Description     Medication Contract Received 10/28/2019  9:49 AM Kaye MARIN MEDICATION AGREEMENT 10/22/19                 Tox Screen:  Urine Drug Screenings (1 yr)     Pain Management Drug Screen  Collected: 11/5/2019  1:28 PM (Final result)    Complete Results                 Pharmacy:  Please send the medication to the pharmacy listed. Other Comments:  Patient has an appointment coming up on 9/15. Patient is asking if the medication can be sent. He stated that he usually is seen every 3 months but somehow this appointment has ran over. Please advise.

## 2020-09-09 DIAGNOSIS — R73.03 PRE-DIABETES: ICD-10-CM

## 2020-09-09 LAB
GLUCOSE BLD-MCNC: 94 MG/DL (ref 70–99)
HBA1C MFR BLD: 5.8 % (ref 4.8–5.9)

## 2020-09-10 ENCOUNTER — HOSPITAL ENCOUNTER (OUTPATIENT)
Dept: CT IMAGING | Age: 65
Discharge: HOME | End: 2020-09-12
Payer: MEDICARE

## 2020-09-10 ENCOUNTER — TELEPHONE (OUTPATIENT)
Dept: PULMONOLOGY | Age: 65
End: 2020-09-10

## 2020-09-10 PROCEDURE — G0297 LDCT FOR LUNG CA SCREEN: HCPCS

## 2020-09-10 RX ORDER — LORAZEPAM 1 MG/1
1 TABLET ORAL NIGHTLY PRN
Qty: 7 TABLET | Refills: 0 | Status: SHIPPED | OUTPATIENT
Start: 2020-09-10 | End: 2020-09-17

## 2020-09-10 NOTE — TELEPHONE ENCOUNTER
Anesthesia Evaluation     Patient summary reviewed and Nursing notes reviewed   NPO Solid Status: > 8 hours  NPO Liquid Status: > 2 hours           Airway   Mallampati: II  no difficulty expected  Dental - normal exam   (+) implants    Pulmonary     breath sounds clear to auscultation  Cardiovascular     ECG reviewed  Rhythm: regular  Rate: normal    (+) hypertension, valvular problems/murmurs MR, dysrhythmias Atrial Fib, CHF, PVD, hyperlipidemia,  carotid artery disease      Neuro/Psych  (+) psychiatric history,     GI/Hepatic/Renal/Endo    (+)  GERD,      Musculoskeletal     Abdominal    Substance History      OB/GYN          Other   (+) arthritis                     Anesthesia Plan    ASA 3     general     inhalational and intravenous induction   Anesthetic plan and risks discussed with patient.       Pt returned call to office, changed ov to vv and tried to r/s vv to a sooner appt than 9/15/2020. At time of call, no sooner vv appt left. Pt states he is out of Ativan for a week and has gained about 10-15 lbs. Rx requested:  Requested Prescriptions     Pending Prescriptions Disp Refills    LORazepam (ATIVAN) 1 MG tablet       Sig: Take 1 tablet by mouth.        Last Office Visit:   6/3/2020    Last Tox screen:  11/5/2019    Last Medication contract:  10/28/2019    Next Visit Date:  Future Appointments   Date Time Provider Grey Stewart   9/10/2020 10:30 AM [facility] ROOM 1 [facility]   9/15/2020 10:30 AM Gloria Raya MD [facility]   10/14/2020 10:00 AM Jayson Hollis MD [facility]   10/20/2020 11:00 AM Issa De Leon MD [facility]   10/28/2020  1:15 PM José Miguel Pelayo MD [facility]

## 2020-09-15 ENCOUNTER — VIRTUAL VISIT (OUTPATIENT)
Dept: FAMILY MEDICINE CLINIC | Age: 65
End: 2020-09-15
Payer: MEDICARE

## 2020-09-15 PROCEDURE — 99443 PR PHYS/QHP TELEPHONE EVALUATION 21-30 MIN: CPT | Performed by: FAMILY MEDICINE

## 2020-09-15 RX ORDER — LORAZEPAM 1 MG/1
1 TABLET ORAL NIGHTLY PRN
Qty: 30 TABLET | Refills: 2 | Status: SHIPPED | OUTPATIENT
Start: 2020-09-15 | End: 2020-11-16 | Stop reason: SDUPTHER

## 2020-09-15 RX ORDER — LEVOTHYROXINE SODIUM 0.07 MG/1
75 TABLET ORAL DAILY
Qty: 90 TABLET | Refills: 0 | Status: SHIPPED | OUTPATIENT
Start: 2020-09-15 | End: 2020-09-21

## 2020-09-15 RX ORDER — ATORVASTATIN CALCIUM 20 MG/1
TABLET, FILM COATED ORAL
Qty: 90 TABLET | Refills: 1 | Status: SHIPPED | OUTPATIENT
Start: 2020-09-15 | End: 2021-03-24 | Stop reason: SDUPTHER

## 2020-09-15 ASSESSMENT — PATIENT HEALTH QUESTIONNAIRE - PHQ9
2. FEELING DOWN, DEPRESSED OR HOPELESS: 0
SUM OF ALL RESPONSES TO PHQ QUESTIONS 1-9: 0
1. LITTLE INTEREST OR PLEASURE IN DOING THINGS: 0
SUM OF ALL RESPONSES TO PHQ QUESTIONS 1-9: 0
SUM OF ALL RESPONSES TO PHQ9 QUESTIONS 1 & 2: 0

## 2020-09-15 ASSESSMENT — ENCOUNTER SYMPTOMS
DIARRHEA: 0
VOMITING: 0

## 2020-09-15 NOTE — PROGRESS NOTES
Subjective:      Patient ID: Lorraine Galvan is a 59 y.o. male    Other   This is a new problem. The current episode started more than 1 month ago. The problem has been gradually improving. Pertinent negatives include no chest pain, chills, fever, rash, vomiting or weakness. Mental Health Problem   The primary symptoms do not include dysphoric mood. Here in follow up for chronic insomnia and pre diabetes and hypothyroidism. Weight down few pounds since last time thru diet. Using ativan most every night. Review of Systems   Constitutional: Negative for chills and fever. Cardiovascular: Negative for chest pain. Gastrointestinal: Negative for diarrhea and vomiting. Skin: Negative for rash. Neurological: Negative for weakness. Psychiatric/Behavioral: Negative for decreased concentration, dysphoric mood and self-injury. Controlled substances monitoring: no signs of potential drug abuse or diversion identified and OARRS report reviewed today- activity consistent with treatment plan. Reviewed allergy, medical, social, surgical, family and med list changes and updated   Files   --reviewed blood work which is acceptable   Social History     Socioeconomic History    Marital status:      Spouse name: Not on file    Number of children: Not on file    Years of education: Not on file    Highest education level: Not on file   Occupational History    Not on file   Social Needs    Financial resource strain: Not on file    Food insecurity     Worry: Not on file     Inability: Not on file    Transportation needs     Medical: Not on file     Non-medical: Not on file   Tobacco Use    Smoking status: Former Smoker     Packs/day: 1.50     Years: 40.00     Pack years: 60.00     Types: Cigarettes     Last attempt to quit: 2018     Years since quittin.8    Smokeless tobacco: Never Used   Substance and Sexual Activity    Alcohol use:  Yes    Drug use: No    Sexual activity: Not on file Lifestyle    Physical activity     Days per week: Not on file     Minutes per session: Not on file    Stress: Not on file   Relationships    Social connections     Talks on phone: Not on file     Gets together: Not on file     Attends Restorationism service: Not on file     Active member of club or organization: Not on file     Attends meetings of clubs or organizations: Not on file     Relationship status: Not on file    Intimate partner violence     Fear of current or ex partner: Not on file     Emotionally abused: Not on file     Physically abused: Not on file     Forced sexual activity: Not on file   Other Topics Concern    Not on file   Social History Narrative    Not on file     Current Outpatient Medications   Medication Sig Dispense Refill    LORazepam (ATIVAN) 1 MG tablet Take 1 tablet by mouth nightly as needed for Anxiety for up to 7 days.  7 tablet 0    atorvastatin (LIPITOR) 20 MG tablet TAKE ONE TABLET BY MOUTH EVERY DAY 90 tablet 1    levothyroxine (SYNTHROID) 75 MCG tablet Take 1 tablet by mouth daily 90 tablet 0    carvedilol (COREG) 6.25 MG tablet Take 1 tablet by mouth 2 times daily 60 tablet 11    isosorbide mononitrate (IMDUR) 30 MG extended release tablet Take 1 tablet by mouth daily 30 tablet 11    tiotropium (SPIRIVA RESPIMAT) 2.5 MCG/ACT AERS inhaler Inhale 2 puffs into the lungs daily (Patient not taking: Reported on 7/27/2020) 1 Inhaler 5    Glucosamine-Chondroitin--300-250 MG TABS Take 1 tablet by mouth Daily      acetaminophen (TYLENOL) 500 MG tablet Take 1,000 mg by mouth daily At night      Naproxen Sodium (ALEVE) 220 MG CAPS Take 1 capsule by mouth daily      nitroGLYCERIN (NITROSTAT) 0.4 MG SL tablet Place 1 tablet under the tongue 3 times daily as needed for Chest pain 25 tablet 1    aspirin 81 MG tablet Take 1 tablet by mouth daily With Food 30 tablet 3    vitamin C (ASCORBIC ACID) 500 MG tablet Take 500 mg by mouth daily       No current facility-administered medications for this visit. Family History   Problem Relation Age of Onset    Other Mother     Other Father     Cancer Father     Cancer Sister     Cancer Brother     Lung Cancer Brother     Cancer Brother      Past Medical History:   Diagnosis Date    Cancer [facility])     [facility]     Chicken pox     Chronic back pain     Emphysema of lung [facility]     Hyperlipidemia     Hypertension     Hypothyroidism     Measles     Mumps     Osteoarthritis     Pneumonia     Type 2 diabetes mellitus without complication ([facility] [address]     prediabetes   Caroline Barney is a 59 y.o. male evaluated via telephone on 9/15/2020. Consent:  He and/or health care decision maker is aware that that he may receive a bill for this telephone service, depending on his insurance coverage, and has provided verbal consent to proceed: Yes      Documentation:  I communicated with the patient and/or health care decision maker. Details of this discussion including any medical advice provided: This visit was a telephone encounter. Patient was located at their home. Provider was located at their _x__ home or        __ office. Patient Accepts tele health phone visit and associated charges   Visit about 25 mkin    I affirm this is a Patient Initiated Episode with an Established Patient who has not had a related appointment within my department in the past 7 days or scheduled within the next 24 hours. Total Time: 22 min    Note: not billable if this call serves to triage the patient into an appointment for the relevant concern      Anjel Olivares   Objective: There were no vitals taken for this visit. Physical Exam  No exam done  Assessment:       Diagnosis Orders   1. Anxiety     2. Pre-diabetes     3.  Other specified hypothyroidism           Plan:      Orders Placed This Encounter   Medications    LORazepam (ATIVAN) 1 MG tablet     Sig: Take 1 tablet by mouth nightly as needed for Anxiety for up to 30 days.      Dispense:  30 tablet     Refill:  2    atorvastatin (LIPITOR) 20 MG tablet     Sig: TAKE ONE TABLET BY MOUTH EVERY DAY     Dispense:  90 tablet     Refill:  1    levothyroxine (SYNTHROID) 75 MCG tablet     Sig: Take 1 tablet by mouth daily     Dispense:  90 tablet     Refill:  0     Continue current medications  Non fasting blood work in 3 months and f/u after done

## 2020-10-02 ENCOUNTER — HOSPITAL ENCOUNTER (OUTPATIENT)
Dept: CT IMAGING | Age: 65
Discharge: HOME | End: 2020-10-04
Payer: MEDICARE

## 2020-10-02 PROCEDURE — 3430000000 HC RX DIAGNOSTIC RADIOPHARMACEUTICAL: Performed by: INTERNAL MEDICINE

## 2020-10-02 PROCEDURE — 78815 PET IMAGE W/CT SKULL-THIGH: CPT

## 2020-10-02 PROCEDURE — A9552 F18 FDG: HCPCS | Performed by: INTERNAL MEDICINE

## 2020-10-02 RX ORDER — FLUDEOXYGLUCOSE F 18 200 MCI/ML
17.87 INJECTION, SOLUTION INTRAVENOUS
Status: COMPLETED | OUTPATIENT
Start: 2020-10-02 | End: 2020-10-02

## 2020-10-02 RX ADMIN — FLUDEOXYGLUCOSE F 18 17.87 MILLICURIE: 200 INJECTION, SOLUTION INTRAVENOUS at 11:13

## 2020-10-12 DIAGNOSIS — Z85.46 H/O PROSTATE CANCER: ICD-10-CM

## 2020-10-12 LAB — PROSTATE SPECIFIC ANTIGEN: <0.01 NG/ML (ref 0–5.4)

## 2020-10-14 ENCOUNTER — OFFICE VISIT (OUTPATIENT)
Dept: PULMONOLOGY | Age: 65
End: 2020-10-14
Payer: MEDICARE

## 2020-10-14 VITALS
BODY MASS INDEX: 31.75 KG/M2 | WEIGHT: 221.8 LBS | TEMPERATURE: 97.1 F | DIASTOLIC BLOOD PRESSURE: 62 MMHG | HEIGHT: 70 IN | SYSTOLIC BLOOD PRESSURE: 118 MMHG | HEART RATE: 72 BPM | RESPIRATION RATE: 16 BRPM | OXYGEN SATURATION: 99 %

## 2020-10-14 PROCEDURE — 99213 OFFICE O/P EST LOW 20 MIN: CPT | Performed by: INTERNAL MEDICINE

## 2020-10-14 NOTE — PROGRESS NOTES
Subjective:     Pancho Hernández is a 59 y.o. male who complains today of:     Chief Complaint   Patient presents with    1 Year Follow Up     CT scan completed. HPI  Patient presents for patient presents for follow-up    CT lung cancer screening in September showed new lung nodule right middle lobe, PET scan was done which was negative. Patient is relieved. He denies chest pain, no shortness of breath, no lower extremity edema, he walks every day 1-1/2 to 2 miles, no fever or chills, sleeps well, no heartburn, no nasal congestion postnasal drip, and he does not smoke currently.   His weight is stable            Allergies:  Demerol hcl [meperidine] and Valium [diazepam]  Past Medical History:   Diagnosis Date    Cancer [facility] [address] [facility]     CHF (congestive heart failure) (HCC)     Chicken pox     Chronic back pain     Emphysema of lung [facility] [facility]     Hyperlipidemia     Hypertension     Hypothyroidism     Measles     Mumps     Osteoarthritis     Pneumonia     [facility]     prediabetes     Past Surgical History:   Procedure Laterality Date    CARDIAC CATHETERIZATION      2019    CYST REMOVAL      back of neck    CYST REMOVAL Left 3/4/16    Dr Greg Eric  07/2018    PROSTATE SURGERY  07/2018    Biopsy    PROSTATECTOMY  71/03/6347    UMBILICAL HERNIA REPAIR  3/4/16    Dr Jair Rose N/A 11/18/2019    REPAIR OF RECURRENT VENTRAL HERNIA performed by Dennis Garcia MD at [address] History   Problem Relation Age of Onset    Other Mother     Other Father     Cancer Father     Cancer Sister     Cancer Brother     Lung Cancer Brother     Cancer Brother      Social History     Socioeconomic History    Marital status:      Spouse name: Not on file    Number of children: Not on file    Years of education: Not on file    Highest education level: Not on file   Occupational History    Not on file Social Needs    Financial resource strain: Not on file    Food insecurity     Worry: Not on file     Inability: Not on file    Transportation needs     Medical: Not on file     Non-medical: Not on file   Tobacco Use    Smoking status: Former Smoker     Packs/day: 1.50     Years: 40.00     Pack years: 60.00     Types: Cigarettes     Last attempt to quit: 2018     Years since quittin.8    Smokeless tobacco: Never Used   Substance and Sexual Activity    Alcohol use: Yes    Drug use: No    Sexual activity: Not on file   Lifestyle    Physical activity     Days per week: Not on file     Minutes per session: Not on file    Stress: Not on file   Relationships    Social connections     Talks on phone: Not on file     Gets together: Not on file     Attends Yarsanism service: Not on file     Active member of club or organization: Not on file     Attends meetings of clubs or organizations: Not on file     Relationship status: Not on file    Intimate partner violence     Fear of current or ex partner: Not on file     Emotionally abused: Not on file     Physically abused: Not on file     Forced sexual activity: Not on file   Other Topics Concern    Not on file   Social History Narrative    Not on file         Review of Systems      ROS: 10 organs review of system is done including general, psychological, ENT, hematological, endocrine, respiratory, cardiovascular, gastrointestinal,musculoskeletal, neurological,  allergy and Immunology is done and is otherwise negative. Current Outpatient Medications   Medication Sig Dispense Refill    tiotropium (SPIRIVA RESPIMAT) 2.5 MCG/ACT AERS inhaler Inhale 2 puffs into the lungs daily 1 Inhaler 5    levothyroxine (SYNTHROID) 75 MCG tablet TAKE ONE TABLET BY MOUTH EVERY DAY 90 tablet 0    LORazepam (ATIVAN) 1 MG tablet Take 1 tablet by mouth nightly as needed for Anxiety for up to 30 days.  30 tablet 2    atorvastatin (LIPITOR) 20 MG tablet TAKE ONE TABLET BY MOUTH EVERY DAY 90 tablet 1    carvedilol (COREG) 6.25 MG tablet Take 1 tablet by mouth 2 times daily 60 tablet 11    isosorbide mononitrate (IMDUR) 30 MG extended release tablet Take 1 tablet by mouth daily 30 tablet 11    Glucosamine-Chondroitin--300-250 MG TABS Take 1 tablet by mouth Daily      acetaminophen (TYLENOL) 500 MG tablet Take 1,000 mg by mouth daily At night      Naproxen Sodium (ALEVE) 220 MG CAPS Take 1 capsule by mouth daily      nitroGLYCERIN (NITROSTAT) 0.4 MG SL tablet Place 1 tablet under the tongue 3 times daily as needed for Chest pain 25 tablet 1    aspirin 81 MG tablet Take 1 tablet by mouth daily With Food 30 tablet 3    vitamin C (ASCORBIC ACID) 500 MG tablet Take 500 mg by mouth daily       No current facility-administered medications for this visit. Objective:     Vitals:    10/14/20 0956   BP: 118/62   Site: Left Upper Arm   Position: Sitting   Cuff Size: Large Adult   Pulse: 72   Resp: 16   Temp: 97.1 °F (36.2 °C)   TempSrc: Infrared   SpO2: 99%   Weight: 221 lb 12.8 oz (100.6 kg)   Height: 5' 9.5\" (1.765 m)         Physical Exam  Constitutional:       Appearance: He is well-developed. HENT:      Head: Normocephalic and atraumatic. Eyes:      General:         Left eye: No discharge. Conjunctiva/sclera: Conjunctivae normal.      Pupils: Pupils are equal, round, and reactive to light. Neck:      Musculoskeletal: Normal range of motion and neck supple. Vascular: No JVD. Cardiovascular:      Rate and Rhythm: Normal rate and regular rhythm. Heart sounds: Normal heart sounds. No murmur. No friction rub. No gallop. Pulmonary:      Effort: Pulmonary effort is normal. No respiratory distress. Breath sounds: Normal breath sounds. No wheezing or rales. Chest:      Chest wall: No tenderness. Abdominal:      General: Bowel sounds are normal.      Palpations: Abdomen is soft. Musculoskeletal:         General: No tenderness or deformity. Right lower leg: No edema. Left lower leg: No edema. Skin:     General: Skin is warm and dry. Neurological:      Mental Status: He is alert and oriented to person, place, and time. Psychiatric:         Judgment: Judgment normal.         Imaging studies reviewed by me PET/CT shows no PET active nodule, the nodule itself looks to be smaller and getting more hazy  Lab results reviewed in chart  PFT 2019, FEV1 76%, FEV1/FVC 0.70  ECHO: 2019, normal, EF 60%  Sleep study 2019 [facility] 5  Assessment and Plan       Diagnosis Orders   1. Lung nodule  CT CHEST WO CONTRAST   2. Chronic obstructive pulmonary disease, unspecified COPD type (HCC)  tiotropium (SPIRIVA RESPIMAT) 2.5 MCG/ACT AERS inhaler   3. Class 2 obesity due to excess calories without serious comorbidity with body mass index (BMI) of 35.0 to 35.9 in adult       · Lung nodule is PET negative, slightly decreased in size based on PET however technique is different  · We will repeat CT chest in December, confirm resolution,  · Follow-up with me after CT chest  · Continue Spiriva, symptoms currently controlled  · Lose weight  · Yearly flu shot      Orders Placed This Encounter   Procedures    CT CHEST WO CONTRAST     Standing Status:   Future     Standing Expiration Date:   10/14/2021     Order Specific Question:   Reason for exam:     Answer:   lung nodule follwo up     Orders Placed This Encounter   Medications    tiotropium (SPIRIVA RESPIMAT) 2.5 MCG/ACT AERS inhaler     Sig: Inhale 2 puffs into the lungs daily     Dispense:  1 Inhaler     Refill:  5            Discussed with patient the importance of exercise and weight control and  overall health and well-being. Reviewed with the patient: current clinical status, medications, activities and diet.       Side effects, adverse effects of the medication prescribed today, as well as treatment plan and result expectations have been discussed with the patient who expresses understanding and desires to proceed. Return in about 2 months (around 12/14/2020).       Adrianna Lees MD

## 2020-10-20 ENCOUNTER — OFFICE VISIT (OUTPATIENT)
Dept: UROLOGY | Age: 65
End: 2020-10-20
Payer: MEDICARE

## 2020-10-20 VITALS
SYSTOLIC BLOOD PRESSURE: 124 MMHG | BODY MASS INDEX: 31.84 KG/M2 | HEIGHT: 69 IN | WEIGHT: 215 LBS | HEART RATE: 68 BPM | DIASTOLIC BLOOD PRESSURE: 88 MMHG

## 2020-10-20 PROCEDURE — 99213 OFFICE O/P EST LOW 20 MIN: CPT | Performed by: UROLOGY

## 2020-10-20 RX ORDER — NITROGLYCERIN 0.4 MG/1
TABLET SUBLINGUAL
Qty: 25 TABLET | Refills: 5 | Status: SHIPPED | OUTPATIENT
Start: 2020-10-20 | End: 2020-10-28 | Stop reason: SDUPTHER

## 2020-10-20 ASSESSMENT — ENCOUNTER SYMPTOMS
ABDOMINAL DISTENTION: 0
ABDOMINAL PAIN: 0

## 2020-10-20 NOTE — PROGRESS NOTES
tongue 3 times daily as needed for Chest pain 25 tablet 1    aspirin 81 MG tablet Take 1 tablet by mouth daily With Food 30 tablet 3    vitamin C (ASCORBIC ACID) 500 MG tablet Take 500 mg by mouth daily       No current facility-administered medications for this visit. Demerol hcl [meperidine] and Valium [diazepam]  reviewed      Review of Systems   Constitutional: Negative for unexpected weight change. Gastrointestinal: Negative for abdominal distention and abdominal pain. Genitourinary: Negative for dysuria, flank pain and hematuria. Objective:   Physical Exam  Constitutional:       Appearance: Normal appearance. Abdominal:      General: There is no distension. Neurological:      Mental Status: He is alert. Psychiatric:         Mood and Affect: Mood normal.         Behavior: Behavior normal.           PSA   Date Value Ref Range Status   10/12/2020 <0.01 0.00 - 5.40 ng/mL Final   06/09/2020 <0.01 0.00 - 5.40 ng/mL Final   02/10/2020 <0.01 0.00 - 5.40 ng/mL Final   11/05/2019 <0.01 0.00 - 5.40 ng/mL Final   08/06/2019 <0.01 0.00 - 5.40 ng/mL Final       Assessment: This is a 57 yo male with h/o DDD, Anxiety, Kidney stones and with [place] 7 prostate cancer s/p RALP in 11/18 with a positive margin. He has still has an undetectable PSA and continues to have mild but stable ABDIAS. I again discussed the options of adjuvant vs salvage IMRT given positive margin but he wants to continue to wait and monitor PSA's. Plan:      1.  F/U 3-4 mo for PSA        Beatriz Sood MD

## 2020-10-28 ENCOUNTER — OFFICE VISIT (OUTPATIENT)
Dept: CARDIOLOGY CLINIC | Age: 65
End: 2020-10-28
Payer: MEDICARE

## 2020-10-28 VITALS
BODY MASS INDEX: 32.93 KG/M2 | RESPIRATION RATE: 16 BRPM | OXYGEN SATURATION: 97 % | DIASTOLIC BLOOD PRESSURE: 62 MMHG | WEIGHT: 223 LBS | SYSTOLIC BLOOD PRESSURE: 118 MMHG | HEART RATE: 60 BPM

## 2020-10-28 PROCEDURE — 93000 ELECTROCARDIOGRAM COMPLETE: CPT | Performed by: INTERNAL MEDICINE

## 2020-10-28 PROCEDURE — 99214 OFFICE O/P EST MOD 30 MIN: CPT | Performed by: INTERNAL MEDICINE

## 2020-10-28 RX ORDER — ISOSORBIDE MONONITRATE 30 MG/1
30 TABLET, EXTENDED RELEASE ORAL DAILY
Qty: 90 TABLET | Refills: 3 | Status: SHIPPED | OUTPATIENT
Start: 2020-10-28 | End: 2021-08-31 | Stop reason: SDUPTHER

## 2020-10-28 RX ORDER — CARVEDILOL 6.25 MG/1
6.25 TABLET ORAL 2 TIMES DAILY
Qty: 60 TABLET | Refills: 11 | Status: SHIPPED | OUTPATIENT
Start: 2020-10-28 | End: 2020-10-28

## 2020-10-28 RX ORDER — CARVEDILOL 6.25 MG/1
6.25 TABLET ORAL 2 TIMES DAILY
Qty: 180 TABLET | Refills: 3 | Status: SHIPPED | OUTPATIENT
Start: 2020-10-28 | End: 2021-08-31 | Stop reason: SDUPTHER

## 2020-10-28 RX ORDER — ISOSORBIDE MONONITRATE 30 MG/1
30 TABLET, EXTENDED RELEASE ORAL DAILY
Qty: 30 TABLET | Refills: 11 | Status: SHIPPED | OUTPATIENT
Start: 2020-10-28 | End: 2020-10-28 | Stop reason: SDUPTHER

## 2020-10-28 RX ORDER — NITROGLYCERIN 0.4 MG/1
TABLET SUBLINGUAL
Qty: 25 TABLET | Refills: 5 | Status: SHIPPED | OUTPATIENT
Start: 2020-10-28 | End: 2021-08-09

## 2020-10-28 ASSESSMENT — ENCOUNTER SYMPTOMS
NAUSEA: 0
WHEEZING: 0
BACK PAIN: 1
CHEST TIGHTNESS: 0
COUGH: 0
BLOOD IN STOOL: 0
GASTROINTESTINAL NEGATIVE: 1
STRIDOR: 0
EYES NEGATIVE: 1
SHORTNESS OF BREATH: 1

## 2020-10-28 NOTE — PROGRESS NOTES
Office Visit. Patient: Emory Durham  YOB: 1955  MRN: [id number]    Chief Complaint: COLE LE edema Orthopnea  Chief Complaint   Patient presents with    3 Month Follow-Up    Coronary Artery Disease    Hypertension    Congestive Heart Failure       CV Data:  7/2019 echo EF 60  7/2019 Dobut stress echo negative   LAD 40-50% sequential, RCA  mid. LVEF 55, EDP 14, PCWP 14 CO 7.2 L/min  10/2019 CUS- mild  10/2010 Abd US negative. Subjective/HPI COLE is getting worse. Can't do much due to knee and back pain. Can't lie flat due to sob    10/16/2019: still sob but better since BB and Imdur. No cp. No bleed no falls. 1/17/2020 no cp occ cole no falls occ nose bleed no falls     6/19/2020 Patient and/or health care decision maker is aware that that he/she may receive a bill for this telephone service, depending on his insurance coverage, and has provided verbal consent to proceed. This visit was completed via telephone. Total time 13 minutes. Still has some cole but better. No cp no falls no bleed little edema. 7/27/2020 still COLE. 10/28/2020 no COLE No CP active at home. No falls no bleed. Stopped smoking 11/2018 with prior 2 ppd  Disabled  from knee and back.      EKG: SR 61    Past Medical History:   Diagnosis Date    Cancer [facility])     CHF (congestive heart failure) (HCC)     Chicken pox     Chronic back pain     [facility]     Hyperlipidemia     Hypertension     Hypothyroidism     Measles     Mumps     Osteoarthritis     Pneumonia     Type 2 diabetes mellitus without complication ([facility]     prediabetes       Past Surgical History:   Procedure Laterality Date    CARDIAC CATHETERIZATION      2019    CYST REMOVAL      back of neck    CYST REMOVAL Left 3/4/16    Dr Rhonda Holley  07/2018    PROSTATE SURGERY  07/2018    Biopsy    PROSTATECTOMY  01/40/5484    UMBILICAL HERNIA REPAIR  3/4/16    Dr Arben Stewart VENTRAL HERNIA REPAIR N/A 2019    REPAIR OF RECURRENT VENTRAL HERNIA performed by Judson Valdez MD at [address] [place] [address] History   Problem Relation Age of Onset    Other Mother     Other Father     Cancer Father     Cancer Sister     Cancer Brother     Lung Cancer Brother     Cancer Brother        Social History     Socioeconomic History    Marital status:      Spouse name: None    Number of children: None    Years of education: None    Highest education level: None   Occupational History    None   Social Needs    Financial resource strain: None    Food insecurity     Worry: None     Inability: None    Transportation needs     Medical: None     Non-medical: None   Tobacco Use    Smoking status: Former Smoker     Packs/day: 1.50     Years: 40.00     Pack years: 60.00     Types: Cigarettes     Last attempt to quit: 2018     Years since quittin.9    Smokeless tobacco: Never Used   Substance and Sexual Activity    Alcohol use:  Yes    Drug use: No    Sexual activity: None   Lifestyle    Physical activity     Days per week: None     Minutes per session: None    Stress: None   Relationships    Social connections     Talks on phone: None     Gets together: None     Attends Latter-day service: None     Active member of club or organization: None     Attends meetings of clubs or organizations: None     Relationship status: None    Intimate partner violence     Fear of current or ex partner: None     Emotionally abused: None     Physically abused: None     Forced sexual activity: None   Other Topics Concern    None   Social History Narrative    None       Allergies   Allergen Reactions    Demerol Hcl [Meperidine] Nausea Only     migraines    Valium [Diazepam] Other (See Comments)     MIGRAINES       Current Outpatient Medications   Medication Sig Dispense Refill    Potassium 99 MG TABS Take by mouth OTC potassium      isosorbide mononitrate (IMDUR) 30 MG extended release distress. HENT:   Normal cephalic and Atraumatic   Eyes: Pupils are equal, round, and reactive to light. Neck: Normal range of motion and thyroid normal. Neck supple. No JVD present. No neck adenopathy. No thyromegaly present. Cardiovascular: Normal rate, regular rhythm, intact distal pulses and normal pulses. Murmur heard. Pulmonary/Chest: Effort normal and breath sounds normal. He has no wheezes. He has no rales. He exhibits no tenderness. Abdominal: Soft. Bowel sounds are normal. There is no abdominal tenderness. Musculoskeletal: Normal range of motion. General: No tenderness or edema. Neurological: He is alert and oriented to person, place, and time. Skin: Skin is warm. No cyanosis. Nails show no clubbing.        LABS:  CBC:   Lab Results   Component Value Date    WBC 7.8 02/25/2020    RBC 4.95 02/25/2020    HGB 14.2 02/25/2020    HCT 43.6 02/25/2020    MCV 88.1 02/25/2020    MCH 28.8 02/25/2020    MCHC 32.6 02/25/2020    RDW 15.6 02/25/2020     02/25/2020     Lipids:  Lab Results   Component Value Date    CHOL 127 05/27/2020    CHOL 151 02/25/2020    CHOL 155 11/27/2019     Lab Results   Component Value Date    TRIG 73 05/27/2020    TRIG 123 02/25/2020    TRIG 85 11/27/2019     Lab Results   Component Value Date    HDL 47 05/27/2020    HDL 37 (L) 02/25/2020    HDL 37 (L) 11/27/2019     Lab Results   Component Value Date    LDLCALC 65 05/27/2020    LDLCALC 89 02/25/2020    LDLCALC 101 11/27/2019     No results found for: LABVLDL, VLDL  No results found for: CHOLHDLRATIO  CMP:    Lab Results   Component Value Date     02/25/2020    K 5.1 02/25/2020     02/25/2020    CO2 27 02/25/2020    BUN 18 02/25/2020    CREATININE 1.00 02/25/2020    GFRAA >60.0 02/25/2020    LABGLOM >60.0 02/25/2020    GLUCOSE 94 09/09/2020    PROT 7.5 02/25/2020    LABALBU 4.1 02/25/2020    CALCIUM 9.5 02/25/2020    BILITOT 0.5 02/25/2020    ALKPHOS 101 02/25/2020    AST 21 02/25/2020    ALT 24 02/25/2020     BMP:    Lab Results   Component Value Date     02/25/2020    K 5.1 02/25/2020     02/25/2020    CO2 27 02/25/2020    BUN 18 02/25/2020    LABALBU 4.1 02/25/2020    CREATININE 1.00 02/25/2020    CALCIUM 9.5 02/25/2020    GFRAA >60.0 02/25/2020    LABGLOM >60.0 02/25/2020    GLUCOSE 94 09/09/2020     Magnesium:  No results found for: MG  TSH:  Lab Results   Component Value Date    TSH 2.940 02/25/2020             Patient Active Problem List   Diagnosis    Lumbosacral spondylosis without myelopathy    Umbilical hernia without obstruction and without gangrene    Diastasis recti    Other bursal cyst of elbow    [facility]    Recurrent ventral hernia    Dyslipidemia    Essential hypertension    Acute on chronic diastolic heart failure (HCC)    Shortness of breath    RAMOS (dyspnea on exertion)    Coronary artery disease involving native coronary artery of native heart without angina pectoris    Chronic obstructive pulmonary disease (HCC)    Bilateral carotid bruits       Medications Discontinued During This Encounter   Medication Reason    isosorbide mononitrate (IMDUR) 30 MG extended release tablet REORDER    carvedilol (COREG) 6.25 MG tablet REORDER       Modified Medications    Modified Medication Previous Medication    CARVEDILOL (COREG) 6.25 MG TABLET carvedilol (COREG) 6.25 MG tablet       Take 1 tablet by mouth 2 times daily    Take 1 tablet by mouth 2 times daily    ISOSORBIDE MONONITRATE (IMDUR) 30 MG EXTENDED RELEASE TABLET isosorbide mononitrate (IMDUR) 30 MG extended release tablet       Take 1 tablet by mouth daily    Take 1 tablet by mouth daily       Orders Placed This Encounter   Medications    isosorbide mononitrate (IMDUR) 30 MG extended release tablet     Sig: Take 1 tablet by mouth daily     Dispense:  30 tablet     Refill:  11    carvedilol (COREG) 6.25 MG tablet     Sig: Take 1 tablet by mouth 2 times daily     Dispense:  60 tablet     Refill:  11 Assessment/Plan:    1. Dyslipidemia   LDL Elevated- on Atorva. Future recheck - much better LDL 65 HDL 47     2. Essential hypertension  Stable     3. Shortness of breath - stable     4. Acute on chronic diastolic heart failure (HCC)    5. Carotid Bruits- CUS     6. Lung Nodule- f/u CT ordered. F/u Dr. Ryan Elise.     Counseling:  Heart Healthy Lifestyle, Improve BMI, Low Salt Diet, Volume Restriction 1500cc per day, Take Precautions to Prevent Falls and Walk Daily    Return in about 3 months (around 1/28/2021).     Electronically signed by Kim Sigala MD on 10/28/2020 at 1:28 PM

## 2020-11-07 ENCOUNTER — HOSPITAL ENCOUNTER (OUTPATIENT)
Dept: ULTRASOUND IMAGING | Age: 65
Discharge: HOME | End: 2020-11-09
Payer: MEDICARE

## 2020-11-07 PROCEDURE — 93880 EXTRACRANIAL BILAT STUDY: CPT | Performed by: INTERNAL MEDICINE

## 2020-11-07 PROCEDURE — 93880 EXTRACRANIAL BILAT STUDY: CPT

## 2020-12-08 DIAGNOSIS — E03.8 OTHER SPECIFIED HYPOTHYROIDISM: ICD-10-CM

## 2020-12-08 LAB — TSH SERPL DL<=0.05 MIU/L-ACNC: 0.76 UIU/ML (ref 0.44–3.86)

## 2020-12-09 ENCOUNTER — TELEPHONE (OUTPATIENT)
Dept: PULMONOLOGY | Age: 65
End: 2020-12-09

## 2020-12-09 NOTE — TELEPHONE ENCOUNTER
PATIENT STATES THAT RADIOLOGY CALLED HIM AND HIS CT SCAN WAS NOT APPROVED DUE TO HIM HAVING A RECENT PET SCAN. HE WILL BE ABLE TO HAVE IT AFTER MARCH 2021. DO YOU WANT HIM TO KEEP APPT IN January? PLEASE ADVISE.

## 2020-12-16 ENCOUNTER — OFFICE VISIT (OUTPATIENT)
Dept: FAMILY MEDICINE CLINIC | Age: 65
End: 2020-12-16
Payer: MEDICARE

## 2020-12-16 VITALS
DIASTOLIC BLOOD PRESSURE: 70 MMHG | WEIGHT: 219 LBS | HEART RATE: 72 BPM | OXYGEN SATURATION: 95 % | BODY MASS INDEX: 32.44 KG/M2 | TEMPERATURE: 97.9 F | RESPIRATION RATE: 16 BRPM | SYSTOLIC BLOOD PRESSURE: 120 MMHG | HEIGHT: 69 IN

## 2020-12-16 DIAGNOSIS — F41.9 ANXIETY: ICD-10-CM

## 2020-12-16 PROCEDURE — 99213 OFFICE O/P EST LOW 20 MIN: CPT | Performed by: FAMILY MEDICINE

## 2020-12-16 RX ORDER — LEVOTHYROXINE SODIUM 0.07 MG/1
TABLET ORAL
Qty: 90 TABLET | Refills: 1 | Status: SHIPPED | OUTPATIENT
Start: 2020-12-16 | End: 2021-01-14

## 2020-12-16 RX ORDER — LORAZEPAM 1 MG/1
1 TABLET ORAL NIGHTLY PRN
Qty: 30 TABLET | Refills: 2 | Status: SHIPPED | OUTPATIENT
Start: 2020-12-16 | End: 2021-03-24 | Stop reason: SDUPTHER

## 2020-12-16 RX ORDER — LORAZEPAM 1 MG/1
1 TABLET ORAL DAILY
Qty: 4 TABLET | Refills: 0 | Status: CANCELLED | OUTPATIENT
Start: 2020-12-16 | End: 2020-12-20

## 2020-12-16 ASSESSMENT — ENCOUNTER SYMPTOMS
BACK PAIN: 1
VOMITING: 0
COUGH: 0
DIARRHEA: 0

## 2020-12-16 NOTE — PROGRESS NOTES
Subjective:      Patient ID: Nikky Leger is a 72 y.o. male    HPI  Here in follow up for hypothyroidism and anxiety. Still using ativan nightly for most part. No missed dose of medications. Review of Systems   Constitutional: Negative for chills and fever. Respiratory: Negative for cough. Gastrointestinal: Negative for diarrhea and vomiting. Musculoskeletal: Positive for back pain. Skin: Negative for rash. Reviewed allergy, medical, social, surgical, family and med list changes and updated   Files--reviewed blood work which was acceptable      Social History     Socioeconomic History    Marital status:      Spouse name: None    Number of children: None    Years of education: None    Highest education level: None   Occupational History    None   Social Needs    Financial resource strain: None    Food insecurity     Worry: None     Inability: None    Transportation needs     Medical: None     Non-medical: None   Tobacco Use    Smoking status: Former Smoker     Packs/day: 1.50     Years: 40.00     Pack years: 60.00     Types: Cigarettes     Quit date: 2018     Years since quittin.0    Smokeless tobacco: Never Used   Substance and Sexual Activity    Alcohol use:  Yes    Drug use: No    Sexual activity: None   Lifestyle    Physical activity     Days per week: None     Minutes per session: None    Stress: None   Relationships    Social connections     Talks on phone: None     Gets together: None     Attends Congregation service: None     Active member of club or organization: None     Attends meetings of clubs or organizations: None     Relationship status: None    Intimate partner violence     Fear of current or ex partner: None     Emotionally abused: None     Physically abused: None     Forced sexual activity: None   Other Topics Concern    None   Social History Narrative    None     Current Outpatient Medications   Medication Sig Dispense Refill    Potassium 99 MG TABS Take by mouth OTC potassium      isosorbide mononitrate (IMDUR) 30 MG extended release tablet Take 1 tablet by mouth daily 90 tablet 3    carvedilol (COREG) 6.25 MG tablet Take 1 tablet by mouth 2 times daily 180 tablet 3    nitroGLYCERIN (NITROSTAT) 0.4 MG SL tablet DISSOLVE 1 TABLET UNDER THE TONGUE EVERY 5 MINUTES AS NEED FOR CHEST PAIN UP TO 3 DOSES 25 tablet 5    tiotropium (SPIRIVA RESPIMAT) 2.5 MCG/ACT AERS inhaler Inhale 2 puffs into the lungs daily 1 Inhaler 5    levothyroxine (SYNTHROID) 75 MCG tablet TAKE ONE TABLET BY MOUTH EVERY DAY 90 tablet 0    atorvastatin (LIPITOR) 20 MG tablet TAKE ONE TABLET BY MOUTH EVERY DAY 90 tablet 1    Glucosamine-Chondroitin--300-250 MG TABS Take 1 tablet by mouth Daily      acetaminophen (TYLENOL) 500 MG tablet Take 1,000 mg by mouth daily At night      Naproxen Sodium (ALEVE) 220 MG CAPS Take 1 capsule by mouth daily      aspirin 81 MG tablet Take 1 tablet by mouth daily With Food 30 tablet 3    vitamin C (ASCORBIC ACID) 500 MG tablet Take 500 mg by mouth daily       No current facility-administered medications for this visit. Family History   Problem Relation Age of Onset    Other Mother     Other Father     Cancer Father     Cancer Sister     Cancer Brother     Lung Cancer Brother     Cancer Brother      Past Medical History:   Diagnosis Date    Cancer [facility])     [facility]) [facility]     Chicken pox     Chronic back pain     Emphysema of lung ([facility]     Hyperlipidemia     Hypertension     Hypothyroidism     Measles     Mumps     Osteoarthritis     Pneumonia     Type [facility]     prediabetes   Controlled substances monitoring: no signs of potential drug abuse or diversion identified, [place]AR[place] report reviewed today- activity consistent with treatment plan and medication contract signed today.   Objective:   /70   Pulse 72   Temp 97.9 °F (36.6 °C)   Resp 16   Ht 5' 9\" (1.753 m)   Wt 219 lb (99.3 kg)   SpO2 95%   BMI 32.34 kg/m²     Physical Exam    Lungs:clear and equal breath sounds. No wheezes or rales. Heart:rate reg. No murmur. No gallops     Extremities:no edema in either leg  Gen: In no acute distress  Patient with appropriate affect. Alert   Thought content appropriate  Good eye contact    Assessment:       Diagnosis Orders   1. Anxiety     2. Other specified hypothyroidism     3. Pre-diabetes         Plan:     Orders Placed This Encounter   Procedures    Pain Management Drug Screen     Standing Status:   Future     Standing Expiration Date:   12/16/2021     Orders Placed This Encounter   Medications    levothyroxine (SYNTHROID) 75 MCG tablet     Sig: TAKE ONE TABLET BY MOUTH EVERY DAY     Dispense:  90 tablet     Refill:  1    LORazepam (ATIVAN) 1 MG tablet     Sig: Take 1 tablet by mouth nightly as needed for Anxiety for up to 30 days.      Dispense:  30 tablet     Refill:  2      Continue current meds  Fasting blood work in 3 months and f/u after done

## 2020-12-18 LAB
6-ACETYLMORPHINE: NOT DETECTED
7-AMINOCLONAZEPAM: NOT DETECTED
ALPHA-OH-ALPRAZOLAM: NOT DETECTED
ALPRAZOLAM: NOT DETECTED
AMPHETAMINE: NOT DETECTED
BARBITURATES: NOT DETECTED
BENZOYLECGONINE: NOT DETECTED
BUPRENORPHINE: NOT DETECTED
CARISOPRODOL: NOT DETECTED
CLONAZEPAM: NOT DETECTED
CODEINE: NOT DETECTED
CREATININE URINE: 201.7 MG/DL (ref 20–400)
DIAZEPAM: NOT DETECTED
EER PAIN MGT DRUG PANEL, HIGH RES/EMIT U: NORMAL
ETHYL GLUCURONIDE: NOT DETECTED
FENTANYL: NOT DETECTED
HYDROCODONE: NOT DETECTED
HYDROMORPHONE: NOT DETECTED
LORAZEPAM: PRESENT
MARIJUANA METABOLITE: NOT DETECTED
MDA: NOT DETECTED
MDEA: NOT DETECTED
MDMA URINE: NOT DETECTED
MEPERIDINE: NOT DETECTED
METHADONE: NOT DETECTED
METHAMPHETAMINE: NOT DETECTED
METHYLPHENIDATE: NOT DETECTED
MIDAZOLAM: NOT DETECTED
MORPHINE: NOT DETECTED
NORBUPRENORPHINE, FREE: NOT DETECTED
NORDIAZEPAM: NOT DETECTED
NORFENTANYL: NOT DETECTED
NORHYDROCODONE, URINE: NOT DETECTED
NOROXYCODONE: NOT DETECTED
NOROXYMORPHONE, URINE: NOT DETECTED
OXAZEPAM: NOT DETECTED
OXYCODONE: NOT DETECTED
OXYMORPHONE: NOT DETECTED
PAIN MANAGEMENT DRUG PANEL: NORMAL
PCP: NOT DETECTED
PHENTERMINE: NOT DETECTED
PROPOXYPHENE: NOT DETECTED
TAPENTADOL, URINE: NOT DETECTED
TAPENTADOL-O-SULFATE, URINE: NOT DETECTED
TEMAZEPAM: NOT DETECTED
TRAMADOL: NOT DETECTED
ZOLPIDEM: NOT DETECTED

## 2021-01-14 RX ORDER — LEVOTHYROXINE SODIUM 0.07 MG/1
TABLET ORAL
Qty: 90 TABLET | Refills: 1 | Status: SHIPPED | OUTPATIENT
Start: 2021-01-14 | End: 2021-06-24 | Stop reason: SDUPTHER

## 2021-01-19 DIAGNOSIS — Z85.46 H/O PROSTATE CANCER: ICD-10-CM

## 2021-01-19 LAB — PROSTATE SPECIFIC ANTIGEN: <0.01 NG/ML (ref 0–5.4)

## 2021-01-26 ENCOUNTER — OFFICE VISIT (OUTPATIENT)
Dept: UROLOGY | Age: 66
End: 2021-01-26
Payer: MEDICARE

## 2021-01-26 VITALS
DIASTOLIC BLOOD PRESSURE: 82 MMHG | BODY MASS INDEX: 32.58 KG/M2 | SYSTOLIC BLOOD PRESSURE: 120 MMHG | HEIGHT: 69 IN | WEIGHT: 220 LBS | HEART RATE: 76 BPM

## 2021-01-26 DIAGNOSIS — Z85.46 H/O PROSTATE CANCER: Primary | ICD-10-CM

## 2021-01-26 PROCEDURE — 99213 OFFICE O/P EST LOW 20 MIN: CPT | Performed by: UROLOGY

## 2021-01-26 ASSESSMENT — ENCOUNTER SYMPTOMS
ABDOMINAL PAIN: 0
ABDOMINAL DISTENTION: 0

## 2021-01-26 NOTE — PROGRESS NOTES
Subjective:      Patient ID: Sivakumar Soto is a 72 y.o. male. HPI This is a 57 yo male with h/o DDD, Anxiety, CAD on Imdur, Kidney stones and s/p RALP on 11/16/18 with positive margin back in follow-up. Since last seen on 10/20/20, he has no new  complaints. He still has mild and stable ABDIAS that happens with a cough or sneeze. He did see Dr Katy Fink he recommended IMRT with 6 mo of ADT once his ABDIAS resolves but the patient and wife have been reluctant to comply with this option given stable undetectable PSA's and due to his other medical concerns. I reviewed the interval PSA today. He takes Imdur. He has no new medical or surgical problems.      PATH: [place] 4 + 3 = 7 adenocarcinoma of prostate, 11 L[address] neg, EPE neg, Margin pos (Rt anterior), SV neg.  [id number].     Past Medical History:   Diagnosis Date    Cancer [facility])     CHF (congestive heart failure) (HCC)     Chicken pox     Chronic back pain     [facility]     Hyperlipidemia     Hypertension     Hypothyroidism     Measles     Mumps     Osteoarthritis     Pneumonia     Type 2 diabetes mellitus without complication ([facility]     prediabetes     Past Surgical History:   Procedure Laterality Date    CARDIAC CATHETERIZATION      2019    CYST REMOVAL      back of neck    CYST REMOVAL Left 3/4/16    Dr Rehan Alvarado  07/2018    PROSTATE SURGERY  07/2018    Biopsy    PROSTATECTOMY  47/81/6964    UMBILICAL HERNIA REPAIR  3/4/16    Dr Stephany Cotton N/A 11/18/2019    REPAIR OF RECURRENT VENTRAL HERNIA performed by Griselda Oswald MD at [address] [place] [address] History     Socioeconomic History    Marital status:      Spouse name: None    Number of children: None    Years of education: None    Highest education level: None   Occupational History    None   Social Needs    Financial resource strain: None    Food insecurity     Worry: None     Inability: None    Transportation needs Medical: None     Non-medical: None   Tobacco Use    Smoking status: Former Smoker     Packs/day: 1.50     Years: 40.00     Pack years: 60.00     Types: Cigarettes     Quit date: 2018     Years since quittin.1    Smokeless tobacco: Never Used   Substance and Sexual Activity    Alcohol use:  Yes    Drug use: No    Sexual activity: None   Lifestyle    Physical activity     Days per week: None     Minutes per session: None    Stress: None   Relationships    Social connections     Talks on phone: None     Gets together: None     Attends Yazidi service: None     Active member of club or organization: None     Attends meetings of clubs or organizations: None     Relationship status: None    Intimate partner violence     Fear of current or ex partner: None     Emotionally abused: None     Physically abused: None     Forced sexual activity: None   Other Topics Concern    None   Social History Narrative    None     Family History   Problem Relation Age of Onset    Other Mother     Other Father     Cancer Father     Cancer Sister     Cancer Brother     Lung Cancer Brother     Cancer Brother      Current Outpatient Medications   Medication Sig Dispense Refill    levothyroxine (SYNTHROID) 75 MCG tablet TAKE ONE TABLET BY MOUTH EVERY DAY 90 tablet 1    Potassium 99 MG TABS Take by mouth OTC potassium      isosorbide mononitrate (IMDUR) 30 MG extended release tablet Take 1 tablet by mouth daily 90 tablet 3    carvedilol (COREG) 6.25 MG tablet Take 1 tablet by mouth 2 times daily 180 tablet 3    nitroGLYCERIN (NITROSTAT) 0.4 MG SL tablet DISSOLVE 1 TABLET UNDER THE TONGUE EVERY 5 MINUTES AS NEED FOR CHEST PAIN UP TO 3 DOSES 25 tablet 5    tiotropium (SPIRIVA RESPIMAT) 2.5 MCG/ACT AERS inhaler Inhale 2 puffs into the lungs daily 1 Inhaler 5    atorvastatin (LIPITOR) 20 MG tablet TAKE ONE TABLET BY MOUTH EVERY DAY 90 tablet 1    Glucosamine-Chondroitin--300-250 MG TABS Take 1 tablet by mouth Daily      acetaminophen (TYLENOL) 500 MG tablet Take 1,000 mg by mouth daily At night      Naproxen Sodium (ALEVE) 220 MG CAPS Take 1 capsule by mouth daily      aspirin 81 MG tablet Take 1 tablet by mouth daily With Food 30 tablet 3    vitamin C (ASCORBIC ACID) 500 MG tablet Take 500 mg by mouth daily       No current facility-administered medications for this visit. Demerol hcl [meperidine] and Valium [diazepam]  reviewed      Review of Systems   Constitutional: Negative for unexpected weight change. Gastrointestinal: Negative for abdominal distention and abdominal pain. Genitourinary: Negative for dysuria, flank pain and hematuria. Objective:   Physical Exam  Constitutional:       Appearance: Normal appearance. Abdominal:      General: There is no distension. Neurological:      Mental Status: He is alert. Psychiatric:         Mood and Affect: Mood normal.           PSA   Date Value Ref Range Status   01/19/2021 <0.01 0.00 - 5.40 ng/mL Final   10/12/2020 <0.01 0.00 - 5.40 ng/mL Final   06/09/2020 <0.01 0.00 - 5.40 ng/mL Final   02/10/2020 <0.01 0.00 - 5.40 ng/mL Final   11/05/2019 <0.01 0.00 - 5.40 ng/mL Final       Assessment: This is a 74 yo male with h/o DDD, Anxiety, Kidney stones and with Aaliyah 7 prostate cancer s/p RALP in 11/18 with a positive margin. He has still has an undetectable PSA and continues to have mild but stable ABDIAS. I again discussed the options of adjuvant vs salvage IMRT given positive margin but he wants to continue to closely monitor PSA's. Plan:      1.  F/U 3-4 mo for PSA        Jose Ramirez MD

## 2021-01-28 ENCOUNTER — OFFICE VISIT (OUTPATIENT)
Dept: CARDIOLOGY CLINIC | Age: 66
End: 2021-01-28
Payer: MEDICARE

## 2021-01-28 VITALS
HEIGHT: 69 IN | SYSTOLIC BLOOD PRESSURE: 115 MMHG | WEIGHT: 230 LBS | OXYGEN SATURATION: 99 % | HEART RATE: 59 BPM | RESPIRATION RATE: 18 BRPM | BODY MASS INDEX: 34.07 KG/M2 | DIASTOLIC BLOOD PRESSURE: 69 MMHG

## 2021-01-28 DIAGNOSIS — I10 ESSENTIAL HYPERTENSION: ICD-10-CM

## 2021-01-28 DIAGNOSIS — I50.33 ACUTE ON CHRONIC DIASTOLIC HEART FAILURE (HCC): ICD-10-CM

## 2021-01-28 DIAGNOSIS — I25.10 CORONARY ARTERY DISEASE INVOLVING NATIVE CORONARY ARTERY OF NATIVE HEART WITHOUT ANGINA PECTORIS: Primary | ICD-10-CM

## 2021-01-28 DIAGNOSIS — E78.5 DYSLIPIDEMIA: ICD-10-CM

## 2021-01-28 PROCEDURE — 99214 OFFICE O/P EST MOD 30 MIN: CPT | Performed by: INTERNAL MEDICINE

## 2021-01-28 ASSESSMENT — ENCOUNTER SYMPTOMS
NAUSEA: 0
WHEEZING: 0
BACK PAIN: 1
STRIDOR: 0
BLOOD IN STOOL: 0
GASTROINTESTINAL NEGATIVE: 1
CHEST TIGHTNESS: 0
COUGH: 0
EYES NEGATIVE: 1
SHORTNESS OF BREATH: 1

## 2021-01-28 NOTE — PROGRESS NOTES
Office Visit. Patient: Hugh Sharp  YOB: 1955  MRN: [id number]    Chief Complaint: RAMOS LE edema Orthopnea  Chief Complaint   Patient presents with    3 Month Follow-Up    Coronary Artery Disease    Congestive Heart Failure    Hypertension    Shortness of Breath       CV Data:  7/2019 echo EF 60  7/2019 Dobut stress echo negative   LAD 40-50% sequential, RCA  mid. LVEF 55, EDP 14, PCWP 14 CO 7.2 L/min  10/2019 CUS- mild  10/2010 Abd US negative. 11/2020 CUS mild     Subjective/HPI RAMOS is getting worse. Can't do much due to knee and back pain. Can't lie flat due to sob    10/16/2019: still sob but better since BB and Imdur. No cp. No bleed no falls. 1/17/2020 no cp occ ramos no falls occ nose bleed no falls     6/19/2020 Patient and/or health care decision maker is aware that that he/she may receive a bill for this telephone service, depending on his insurance coverage, and has provided verbal consent to proceed. This visit was completed via telephone. Total time 13 minutes. Still has some ramos but better. No cp no falls no bleed little edema. 7/27/2020 still RAMOS. 10/28/2020 no RAMOS No CP active at home. No falls no bleed. 1/28/21 still sob occ cp but not bad. No NTG needed. No bleed no falls     Stopped smoking 11/2018 with prior 2 ppd  Disabled  from knee and back.      EKG: SR 61    Past Medical History:   Diagnosis Date    Cancer [facility])     CHF (congestive heart failure) (HCC)     Chicken pox     Chronic back pain     Emphysema of [facility]     Hyperlipidemia     Hypertension     Hypothyroidism     Measles     Mumps     Osteoarthritis     Pneumonia     Type 2 diabetes mellitus without complication ([facility]     prediabetes       Past Surgical History:   Procedure Laterality Date    CARDIAC CATHETERIZATION      2019    CYST REMOVAL      back of neck    CYST REMOVAL Left 3/4/16    Dr Veronica Garcia  07/2018    PROSTATE SURGERY 2018    Biopsy    PROSTATECTOMY      UMBILICAL HERNIA REPAIR  3/4/16    Dr Karlos Byrd N/A 2019    REPAIR OF RECURRENT VENTRAL HERNIA performed by Sugar Haynes MD at [address] [place] [address] History   Problem Relation Age of Onset    Other Mother     Other Father     Cancer Father     Cancer Sister     Cancer Brother     Lung Cancer Brother     Cancer Brother        Social History     Socioeconomic History    Marital status:      Spouse name: None    Number of children: None    Years of education: None    Highest education level: None   Occupational History    None   Social Needs    Financial resource strain: None    Food insecurity     Worry: None     Inability: None    Transportation needs     Medical: None     Non-medical: None   Tobacco Use    Smoking status: Former Smoker     Packs/day: 1.50     Years: 40.00     Pack years: 60.00     Types: Cigarettes     Quit date: 2018     Years since quittin.1    Smokeless tobacco: Never Used   Substance and Sexual Activity    Alcohol use:  Yes    Drug use: No    Sexual activity: None   Lifestyle    Physical activity     Days per week: None     Minutes per session: None    Stress: None   Relationships    Social connections     Talks on phone: None     Gets together: None     Attends Islam service: None     Active member of club or organization: None     Attends meetings of clubs or organizations: None     Relationship status: None    Intimate partner violence     Fear of current or ex partner: None     Emotionally abused: None     Physically abused: None     Forced sexual activity: None   Other Topics Concern    None   Social History Narrative    None       Allergies   Allergen Reactions    Demerol Hcl [Meperidine] Nausea Only     migraines    Valium [Diazepam] Other (See Comments)     MIGRAINES       Current Outpatient Medications   Medication Sig Dispense Refill    5' 9\" (1.753 m)   Wt 230 lb (104.3 kg)   SpO2 99%   BMI 33.97 kg/m²    Physical Exam   Constitutional: He appears healthy. No distress. HENT:   Normal cephalic and Atraumatic   Eyes: Pupils are equal, round, and reactive to light. Neck: Normal range of motion and thyroid normal. Neck supple. No JVD present. No neck adenopathy. No thyromegaly present. Cardiovascular: Normal rate, regular rhythm, intact distal pulses and normal pulses. Murmur heard. Pulmonary/Chest: Effort normal and breath sounds normal. He has no wheezes. He has no rales. He exhibits no tenderness. Abdominal: Soft. Bowel sounds are normal. There is no abdominal tenderness. Musculoskeletal: Normal range of motion. General: No tenderness or edema. Neurological: He is alert and oriented to person, place, and time. Skin: Skin is warm. No cyanosis. Nails show no clubbing.        LABS:  CBC:   Lab Results   Component Value Date    WBC 7.8 02/25/2020    RBC 4.95 02/25/2020    HGB 14.2 02/25/2020    HCT 43.6 02/25/2020    MCV 88.1 02/25/2020    MCH 28.8 02/25/2020    MCHC 32.6 02/25/2020    RDW 15.6 02/25/2020     02/25/2020     Lipids:  Lab Results   Component Value Date    CHOL 127 05/27/2020    CHOL 151 02/25/2020    CHOL 155 11/27/2019     Lab Results   Component Value Date    TRIG 73 05/27/2020    TRIG 123 02/25/2020    TRIG 85 11/27/2019     Lab Results   Component Value Date    HDL 47 05/27/2020    HDL 37 (L) 02/25/2020    HDL 37 (L) 11/27/2019     Lab Results   Component Value Date    LDLCALC 65 05/27/2020    LDLCALC 89 02/25/2020    LDLCALC 101 11/27/2019     No results found for: LABVLDL, VLDL  No results found for: CHOLHDLRATIO  CMP:    Lab Results   Component Value Date     02/25/2020    K 5.1 02/25/2020     02/25/2020    CO2 27 02/25/2020    BUN 18 02/25/2020    CREATININE 1.00 02/25/2020    GFRAA >60.0 02/25/2020    LABGLOM >60.0 02/25/2020    GLUCOSE 94 09/09/2020    PROT 7.5 02/25/2020    LABALBU 4.1 02/25/2020    CALCIUM 9.5 02/25/2020    BILITOT 0.5 02/25/2020    ALKPHOS 101 02/25/2020    AST 21 02/25/2020    ALT 24 02/25/2020     BMP:    Lab Results   Component Value Date     02/25/2020    K 5.1 02/25/2020     02/25/2020    CO2 27 02/25/2020    BUN 18 02/25/2020    LABALBU 4.1 02/25/2020    CREATININE 1.00 02/25/2020    CALCIUM 9.5 02/25/2020    GFRAA >60.0 02/25/2020    LABGLOM >60.0 02/25/2020    GLUCOSE 94 09/09/2020     Magnesium:  No results found for: MG  TSH:  Lab Results   Component Value Date    TSH 0.761 12/08/2020             Patient Active Problem List   Diagnosis    Lumbosacral spondylosis without myelopathy    Umbilical hernia without obstruction and without gangrene    Diastasis recti    Other bursal cyst of elbow    Prostate cancer ([facility] [address]    Recurrent ventral hernia    Dyslipidemia    Essential hypertension    Acute on chronic diastolic heart failure (HCC)    Shortness of breath    RAMOS (dyspnea on exertion)    Coronary artery disease involving native coronary artery of native heart without angina pectoris    Chronic obstructive pulmonary disease (HCC)    Bilateral carotid bruits       There are no discontinued medications. Modified Medications    No medications on file       No orders of the defined types were placed in this encounter. Assessment/Plan:    1. Dyslipidemia   LDL Elevated- on Atorva. Future recheck - much better LDL 65 HDL 47     2. Essential hypertension  Stable     3. Shortness of breath - stable     4. Acute on chronic diastolic heart failure (HCC) - stable     5. Carotid Bruits- CUS     6. Lung Nodule- f/u CT ordered. F/u Dr. Rolando Colunga.     7. ED- wants to try Viagra- He currently is on Imdur and can not take Viagra. Counseling:  Heart Healthy Lifestyle, Improve BMI, Low Salt Diet, Volume Restriction 1500cc per day, Take Precautions to Prevent Falls and Walk Daily    Return in about 3 months (around 4/28/2021).     Electronically signed by Ayo Cote MD on 1/28/2021 at 12:20 PM

## 2021-03-09 ENCOUNTER — HOSPITAL ENCOUNTER (OUTPATIENT)
Dept: CT IMAGING | Age: 66
Discharge: HOME | End: 2021-03-11
Payer: MEDICARE

## 2021-03-09 DIAGNOSIS — R91.1 LUNG NODULE: ICD-10-CM

## 2021-03-09 PROCEDURE — 71250 CT THORAX DX C-: CPT

## 2021-03-15 DIAGNOSIS — R73.03 PRE-DIABETES: ICD-10-CM

## 2021-03-15 DIAGNOSIS — E03.8 OTHER SPECIFIED HYPOTHYROIDISM: ICD-10-CM

## 2021-03-15 LAB
ALBUMIN SERPL-MCNC: 3.8 G/DL (ref 3.5–4.6)
ALP BLD-CCNC: 89 U/L (ref 35–104)
ALT SERPL-CCNC: 17 U/L (ref 0–41)
ANION GAP SERPL CALCULATED.3IONS-SCNC: 11 MEQ/L (ref 9–15)
AST SERPL-CCNC: 22 U/L (ref 0–40)
BASOPHILS ABSOLUTE: 0.1 K/UL (ref 0–0.2)
BASOPHILS RELATIVE PERCENT: 0.7 %
BILIRUB SERPL-MCNC: 0.5 MG/DL (ref 0.2–0.7)
BUN BLDV-MCNC: 14 MG/DL (ref 8–23)
CALCIUM SERPL-MCNC: 9.2 MG/DL (ref 8.5–9.9)
CHLORIDE BLD-SCNC: 104 MEQ/L (ref 95–107)
CHOLESTEROL, TOTAL: 154 MG/DL (ref 0–199)
CO2: 27 MEQ/L (ref 20–31)
CREAT SERPL-MCNC: 0.9 MG/DL (ref 0.7–1.2)
EOSINOPHILS ABSOLUTE: 0.5 K/UL (ref 0–0.7)
EOSINOPHILS RELATIVE PERCENT: 6.6 %
GFR AFRICAN AMERICAN: >60
GFR NON-AFRICAN AMERICAN: >60
GLOBULIN: 3 G/DL (ref 2.3–3.5)
GLUCOSE BLD-MCNC: 108 MG/DL (ref 70–99)
HBA1C MFR BLD: 6 % (ref 4.8–5.9)
HCT VFR BLD CALC: 42.7 % (ref 42–52)
HDLC SERPL-MCNC: 43 MG/DL (ref 40–59)
HEMOGLOBIN: 14.2 G/DL (ref 14–18)
LDL CHOLESTEROL CALCULATED: 85 MG/DL (ref 0–129)
LYMPHOCYTES ABSOLUTE: 2.3 K/UL (ref 1–4.8)
LYMPHOCYTES RELATIVE PERCENT: 31.3 %
MCH RBC QN AUTO: 29.7 PG (ref 27–31.3)
MCHC RBC AUTO-ENTMCNC: 33.2 % (ref 33–37)
MCV RBC AUTO: 89.5 FL (ref 80–100)
MONOCYTES ABSOLUTE: 0.9 K/UL (ref 0.2–0.8)
MONOCYTES RELATIVE PERCENT: 12.1 %
NEUTROPHILS ABSOLUTE: 3.7 K/UL (ref 1.4–6.5)
NEUTROPHILS RELATIVE PERCENT: 49.3 %
PDW BLD-RTO: 13.8 % (ref 11.5–14.5)
PLATELET # BLD: 242 K/UL (ref 130–400)
POTASSIUM SERPL-SCNC: 4.3 MEQ/L (ref 3.4–4.9)
RBC # BLD: 4.77 M/UL (ref 4.7–6.1)
SODIUM BLD-SCNC: 142 MEQ/L (ref 135–144)
TOTAL PROTEIN: 6.8 G/DL (ref 6.3–8)
TRIGL SERPL-MCNC: 129 MG/DL (ref 0–150)
WBC # BLD: 7.4 K/UL (ref 4.8–10.8)

## 2021-03-22 ENCOUNTER — OFFICE VISIT (OUTPATIENT)
Dept: PULMONOLOGY | Age: 66
End: 2021-03-22
Payer: MEDICARE

## 2021-03-22 VITALS
BODY MASS INDEX: 34.8 KG/M2 | SYSTOLIC BLOOD PRESSURE: 120 MMHG | OXYGEN SATURATION: 98 % | HEART RATE: 65 BPM | TEMPERATURE: 97.5 F | HEIGHT: 69 IN | DIASTOLIC BLOOD PRESSURE: 80 MMHG | WEIGHT: 235 LBS

## 2021-03-22 DIAGNOSIS — J44.9 CHRONIC OBSTRUCTIVE PULMONARY DISEASE, UNSPECIFIED COPD TYPE (HCC): ICD-10-CM

## 2021-03-22 DIAGNOSIS — E66.09 CLASS 2 OBESITY DUE TO EXCESS CALORIES WITHOUT SERIOUS COMORBIDITY WITH BODY MASS INDEX (BMI) OF 35.0 TO 35.9 IN ADULT: ICD-10-CM

## 2021-03-22 DIAGNOSIS — R91.8 LUNG NODULES: Primary | ICD-10-CM

## 2021-03-22 PROCEDURE — 99213 OFFICE O/P EST LOW 20 MIN: CPT | Performed by: INTERNAL MEDICINE

## 2021-03-22 NOTE — PROGRESS NOTES
Subjective:     Tom Santillan is a 72 y.o. male who complains today of:     Chief Complaint   Patient presents with    Follow-up     2 month f/u and relts for ct scan  uses inhaler in the morning       HPI  Patient presents for lung nodule and COPD follow-up    He is doing good, mild dyspnea exertion, he does have occasional thick phlegm during the day, he has chronic chest pain, and he follows up with cardiology. Minimal lower extremity edema, no fever no chills, he did gain weight recently due to decreased activity during wintertime, no heartburn.             Allergies:  Demerol hcl [meperidine] and Valium [diazepam]  Past Medical History:   Diagnosis Date    Cancer ([facility] [address]     CHF (congestive heart failure) (HCC)     Chicken pox     Chronic back pain     Emphysema of lung ([facility] [address]     Hyperlipidemia     Hypertension     Hypothyroidism     Measles     Mumps     Osteoarthritis     Pneumonia     Type 2 diabetes mellitus without complication ([facility] [address]     prediabetes     Past Surgical History:   Procedure Laterality Date    CARDIAC CATHETERIZATION      2019    CYST REMOVAL      back of neck    CYST REMOVAL Left 3/4/16    Dr Miguel Angel Mace  07/2018    PROSTATE SURGERY  07/2018    Biopsy    PROSTATECTOMY  80/95/2103    UMBILICAL HERNIA REPAIR  3/4/16    Dr Karlos Byrd N/A 11/18/2019    REPAIR OF RECURRENT VENTRAL HERNIA performed by Sugar Haynes MD at [address] History   Problem Relation Age of Onset    Other Mother     Other Father     Cancer Father     Cancer Sister     Cancer Brother     Lung Cancer Brother     Cancer Brother      Social History     Socioeconomic History    Marital status:      Spouse name: Not on file    Number of children: Not on file    Years of education: Not on file    Highest education level: Not on file   Occupational History    Not on file   Social Needs    Financial resource strain: Not on file   Monika Collado Food insecurity     Worry: Not on file     Inability: Not on file    Transportation needs     Medical: Not on file     Non-medical: Not on file   Tobacco Use    Smoking status: Former Smoker     Packs/day: 1.50     Years: 40.00     Pack years: 60.00     Types: Cigarettes     Quit date: 2018     Years since quittin.3    Smokeless tobacco: Never Used   Substance and Sexual Activity    Alcohol use: Yes    Drug use: No    Sexual activity: Not on file   Lifestyle    Physical activity     Days per week: Not on file     Minutes per session: Not on file    Stress: Not on file   Relationships    Social connections     Talks on phone: Not on file     Gets together: Not on file     Attends Denominational service: Not on file     Active member of club or organization: Not on file     Attends meetings of clubs or organizations: Not on file     Relationship status: Not on file    Intimate partner violence     Fear of current or ex partner: Not on file     Emotionally abused: Not on file     Physically abused: Not on file     Forced sexual activity: Not on file   Other Topics Concern    Not on file   Social History Narrative    Not on file         Review of Systems      ROS: 10 organs review of system is done including general, psychological, ENT, hematological, endocrine, respiratory, cardiovascular, gastrointestinal,musculoskeletal, neurological,  allergy and Immunology is done and is otherwise negative.     Current Outpatient Medications   Medication Sig Dispense Refill    levothyroxine (SYNTHROID) 75 MCG tablet TAKE ONE TABLET BY MOUTH EVERY DAY 90 tablet 1    Potassium 99 MG TABS Take by mouth OTC potassium      isosorbide mononitrate (IMDUR) 30 MG extended release tablet Take 1 tablet by mouth daily 90 tablet 3    carvedilol (COREG) 6.25 MG tablet Take 1 tablet by mouth 2 times daily 180 tablet 3    nitroGLYCERIN (NITROSTAT) 0.4 MG SL tablet DISSOLVE 1 TABLET UNDER THE TONGUE EVERY 5 MINUTES AS NEED FOR CHEST PAIN UP TO 3 DOSES 25 tablet 5    tiotropium (SPIRIVA RESPIMAT) 2.5 MCG/ACT AERS inhaler Inhale 2 puffs into the lungs daily 1 Inhaler 5    atorvastatin (LIPITOR) 20 MG tablet TAKE ONE TABLET BY MOUTH EVERY DAY 90 tablet 1    Glucosamine-Chondroitin--300-250 MG TABS Take 1 tablet by mouth Daily      acetaminophen (TYLENOL) 500 MG tablet Take 1,000 mg by mouth daily At night      Naproxen Sodium (ALEVE) 220 MG CAPS Take 1 capsule by mouth daily      aspirin 81 MG tablet Take 1 tablet by mouth daily With Food 30 tablet 3    vitamin C (ASCORBIC ACID) 500 MG tablet Take 500 mg by mouth daily       No current facility-administered medications for this visit. Objective:     Vitals:    03/22/21 1321   BP: 120/80   Pulse: 65   Temp: 97.5 °F (36.4 °C)   SpO2: 98%   Weight: 235 lb (106.6 kg)   Height: 5' 9\" (1.753 m)         Physical Exam  Constitutional:       Appearance: He is well-developed. HENT:      Head: Normocephalic and atraumatic. Eyes:      General:         Left eye: No discharge. Conjunctiva/sclera: Conjunctivae normal.      Pupils: Pupils are equal, round, and reactive to light. Neck:      Musculoskeletal: Normal range of motion and neck supple. Vascular: No JVD. Cardiovascular:      Rate and Rhythm: Normal rate and regular rhythm. Heart sounds: Normal heart sounds. No murmur. No friction rub. No gallop. Pulmonary:      Effort: Pulmonary effort is normal. No respiratory distress. Breath sounds: Normal breath sounds. No wheezing or rales. Chest:      Chest wall: No tenderness. Abdominal:      General: Bowel sounds are normal.      Palpations: Abdomen is soft. Musculoskeletal:         General: No tenderness or deformity. Right lower leg: No edema. Left lower leg: No edema. Skin:     General: Skin is warm and dry. Neurological:      Mental Status: He is alert and oriented to person, place, and time.    Psychiatric:         Judgment: Judgment normal.         Imaging studies reviewed by me CT chest reviewed, prior right middle lobe lung nodule has resolved, otherwise multiple small nodules are stable  Lab results reviewed in chart  PFT FEV1 76%  ECHO: EF 60%    Assessment and Plan       Diagnosis Orders   1. Lung nodules  CT CHEST WO CONTRAST   2. Chronic obstructive pulmonary disease, unspecified COPD type ([facility] [address]     3. Class 2 obesity due to excess calories without serious comorbidity with body mass index (BMI) of 35.0 to 35.9 in adult       · Multiple small lung nodules, stable, will continue monitoring repeat CT scan in 1 year. Prior larger lung nodule PET negative and resolved. · COPD, symptoms relatively controlled, continue Spiriva and will reevaluate on follow-up. He has thick phlegm could be due to Spiriva I asked him to use it at night hopefully symptoms can improve during daytime. · Weight loss is recommended      Orders Placed This Encounter   Procedures    CT CHEST WO CONTRAST     Standing Status:   Future     Standing Expiration Date:   3/22/2022     Order Specific Question:   Reason for exam:     Answer:   lung nodules follow up     No orders of the defined types were placed in this encounter. Discussed with patient the importance of exercise and weight control and  overall health and well-being. Reviewed with the patient: current clinical status, medications, activities and diet. Side effects, adverse effects of the medication prescribed today, as well as treatment plan and result expectations have been discussed with the patient who expresses understanding and desires to proceed. Return in about 6 months (around 9/22/2021).       [facility] MD [place]

## 2021-03-24 ENCOUNTER — OFFICE VISIT (OUTPATIENT)
Dept: FAMILY MEDICINE CLINIC | Age: 66
End: 2021-03-24
Payer: MEDICARE

## 2021-03-24 VITALS
HEIGHT: 66 IN | RESPIRATION RATE: 14 BRPM | DIASTOLIC BLOOD PRESSURE: 78 MMHG | BODY MASS INDEX: 37.45 KG/M2 | HEART RATE: 74 BPM | OXYGEN SATURATION: 96 % | TEMPERATURE: 98 F | SYSTOLIC BLOOD PRESSURE: 120 MMHG | WEIGHT: 233 LBS

## 2021-03-24 DIAGNOSIS — E78.00 PURE HYPERCHOLESTEROLEMIA: ICD-10-CM

## 2021-03-24 DIAGNOSIS — R73.03 PRE-DIABETES: ICD-10-CM

## 2021-03-24 DIAGNOSIS — R13.19 ESOPHAGEAL DYSPHAGIA: ICD-10-CM

## 2021-03-24 DIAGNOSIS — C61 PROSTATE CANCER (HCC): ICD-10-CM

## 2021-03-24 DIAGNOSIS — F41.9 ANXIETY: Primary | ICD-10-CM

## 2021-03-24 DIAGNOSIS — E03.8 OTHER SPECIFIED HYPOTHYROIDISM: ICD-10-CM

## 2021-03-24 PROCEDURE — 99214 OFFICE O/P EST MOD 30 MIN: CPT | Performed by: FAMILY MEDICINE

## 2021-03-24 RX ORDER — ATORVASTATIN CALCIUM 40 MG/1
TABLET, FILM COATED ORAL
Qty: 90 TABLET | Refills: 0 | Status: SHIPPED | OUTPATIENT
Start: 2021-03-24 | End: 2021-06-24 | Stop reason: SDUPTHER

## 2021-03-24 RX ORDER — LORAZEPAM 1 MG/1
1 TABLET ORAL NIGHTLY PRN
Qty: 30 TABLET | Refills: 2 | Status: SHIPPED | OUTPATIENT
Start: 2021-03-24 | End: 2021-06-24 | Stop reason: SDUPTHER

## 2021-03-24 ASSESSMENT — ENCOUNTER SYMPTOMS
COUGH: 0
DIARRHEA: 0

## 2021-03-24 NOTE — PROGRESS NOTES
Subjective:      Patient ID: Ashlye Mata is a 72 y.o. male. HPI  Here in follow up for anxiety and hypothyroidism and lipids and pre diabetes. Anxiety well controlled with ativan . Has had some dysphagia over the last few months with feeling like food get stuck in esophagus on occasion with occasional emesis. Weight up since last time. Review of Systems   Constitutional: Negative for chills and fever. Respiratory: Negative for cough. Gastrointestinal: Negative for diarrhea. Skin: Negative for rash. Neurological: Negative for weakness. Treatment Adherence:   Medication compliance:  compliant most of the time  Diet compliance:  compliant most of the time  Weight trend: increasing  Current exercise: no regular exercise  Barriers: none    Hypertension:  Home blood pressure monitoring: No.  He is adherent to a low sodium diet. Patient denies headache. Antihypertensive medication side effects: no medication side effects noted. Use of agents associated with hypertension: none. Sodium (mEq/L)   Date Value   03/15/2021 142    BUN (mg/dL)   Date Value   03/15/2021 14    Glucose (mg/dL)   Date Value   03/15/2021 108 (H)      Potassium (mEq/L)   Date Value   03/15/2021 4.3    CREATININE (mg/dL)   Date Value   03/15/2021 0.90         Hyperlipidemia:  No new myalgias or GI upset on atorvastatin (Lipitor).      Lab Results   Component Value Date    CHOL 154 03/15/2021    TRIG 129 03/15/2021    HDL 43 03/15/2021    LDLCALC 85 03/15/2021     Lab Results   Component Value Date    ALT 17 03/15/2021    AST 22 03/15/2021      Reviewed allergy, medical, social, surgical, family and med list changes and updated   Files  -reviewed blood work with pre diabetes and elevated lipids   Social History     Socioeconomic History    Marital status:      Spouse name: None    Number of children: None    Years of education: None    Highest education level: None   Occupational History    None   Social Needs    Financial resource strain: None    Food insecurity     Worry: None     Inability: None    Transportation needs     Medical: None     Non-medical: None   Tobacco Use    Smoking status: Former Smoker     Packs/day: 1.50     Years: 40.00     Pack years: 60.00     Types: Cigarettes     Quit date: 2018     Years since quittin.3    Smokeless tobacco: Never Used   Substance and Sexual Activity    Alcohol use:  Yes    Drug use: No    Sexual activity: None   Lifestyle    Physical activity     Days per week: None     Minutes per session: None    Stress: None   Relationships    Social connections     Talks on phone: None     Gets together: None     Attends Denominational service: None     Active member of club or organization: None     Attends meetings of clubs or organizations: None     Relationship status: None    Intimate partner violence     Fear of current or ex partner: None     Emotionally abused: None     Physically abused: None     Forced sexual activity: None   Other Topics Concern    None   Social History Narrative    None     Current Outpatient Medications   Medication Sig Dispense Refill    levothyroxine (SYNTHROID) 75 MCG tablet TAKE ONE TABLET BY MOUTH EVERY DAY 90 tablet 1    Potassium 99 MG TABS Take by mouth OTC potassium      isosorbide mononitrate (IMDUR) 30 MG extended release tablet Take 1 tablet by mouth daily 90 tablet 3    carvedilol (COREG) 6.25 MG tablet Take 1 tablet by mouth 2 times daily 180 tablet 3    nitroGLYCERIN (NITROSTAT) 0.4 MG SL tablet DISSOLVE 1 TABLET UNDER THE TONGUE EVERY 5 MINUTES AS NEED FOR CHEST PAIN UP TO 3 DOSES 25 tablet 5    tiotropium (SPIRIVA RESPIMAT) 2.5 MCG/ACT AERS inhaler Inhale 2 puffs into the lungs daily 1 Inhaler 5    atorvastatin (LIPITOR) 20 MG tablet TAKE ONE TABLET BY MOUTH EVERY DAY 90 tablet 1    Glucosamine-Chondroitin--300-250 MG TABS Take 1 tablet by mouth Daily      acetaminophen (TYLENOL) 500 MG tablet Take 1,000 mg by mouth daily At night      Naproxen Sodium (ALEVE) 220 MG CAPS Take 1 capsule by mouth daily      aspirin 81 MG tablet Take 1 tablet by mouth daily With Food 30 tablet 3    vitamin C (ASCORBIC ACID) 500 MG tablet Take 500 mg by mouth daily       No current facility-administered medications for this visit. Family History   Problem Relation Age of Onset    Other Mother     Other Father     Cancer Father     Cancer Sister     Cancer Brother     Lung Cancer Brother     Cancer Brother      Past Medical History:   Diagnosis Date    Cancer [facility])     [facility]     Chicken pox     Chronic back pain     Emphysema of lung ([facility]     Hyperlipidemia     Hypertension     Hypothyroidism     Measles     Mumps     Osteoarthritis     Pneumonia     Type 2 diabetes mellitus without complication ([facility]     prediabetes   Controlled substances monitoring: no signs of potential drug abuse or diversion identified and [place] report reviewed today- activity consistent with treatment plan. Objective:   Physical Exam  Neck:no carotid bruits. No masses. No adenopathy. No thyroid asymmetry. Lungs:clear and equal breath sounds. No wheezes or rales. Heart:rate reg. No murmur. No gallops   Pulses:Radials 2+ equal               Poster tib 1+ equal  Extremities:no edema in either leg  Gen: In no acute distress  Abdomen; B.S present. Soft  Non tender. No hepatosplenomegaly. No masses   Patient with appropriate affect. Alert     Thought content appropriate  Good eye contact    Assessment / Plan:       Diagnosis Orders   1. Anxiety  LORazepam (ATIVAN) 1 MG tablet   2. Other specified hypothyroidism     3. Pure hypercholesterolemia     4. Pre-diabetes     5. Esophageal dysphagia     6.  Prostate cancer ([facility]         Orders Placed This Encounter   Medications    atorvastatin (LIPITOR) 40 MG tablet     Sig: TAKE ONE TABLET BY MOUTH EVERY DAY

## 2021-03-25 ENCOUNTER — OFFICE VISIT (OUTPATIENT)
Dept: GASTROENTEROLOGY | Age: 66
End: 2021-03-25
Payer: MEDICARE

## 2021-03-25 VITALS
HEIGHT: 69 IN | OXYGEN SATURATION: 98 % | SYSTOLIC BLOOD PRESSURE: 102 MMHG | HEART RATE: 68 BPM | WEIGHT: 233 LBS | DIASTOLIC BLOOD PRESSURE: 62 MMHG | RESPIRATION RATE: 16 BRPM | BODY MASS INDEX: 34.51 KG/M2

## 2021-03-25 DIAGNOSIS — Z01.818 PRE-OP TESTING: ICD-10-CM

## 2021-03-25 DIAGNOSIS — R13.19 ESOPHAGEAL DYSPHAGIA: Primary | ICD-10-CM

## 2021-03-25 PROCEDURE — 99203 OFFICE O/P NEW LOW 30 MIN: CPT | Performed by: SPECIALIST

## 2021-03-25 RX ORDER — OMEPRAZOLE 20 MG/1
20 CAPSULE, DELAYED RELEASE ORAL DAILY
Qty: 30 CAPSULE | Refills: 2 | Status: SHIPPED | OUTPATIENT
Start: 2021-03-25

## 2021-03-25 ASSESSMENT — ENCOUNTER SYMPTOMS
RESPIRATORY NEGATIVE: 1
ANAL BLEEDING: 0
BLOOD IN STOOL: 0
RECTAL PAIN: 0
DIARRHEA: 0
ABDOMINAL DISTENTION: 0
CONSTIPATION: 0
GASTROINTESTINAL NEGATIVE: 1
ABDOMINAL PAIN: 0
NAUSEA: 0
VOMITING: 0
EYES NEGATIVE: 1

## 2021-03-25 NOTE — PROGRESS NOTES
Socioeconomic History    Marital status:      Spouse name: None    Number of children: None    Years of education: None    Highest education level: None   Occupational History    None   Social Needs    Financial resource strain: None    Food insecurity     Worry: None     Inability: None    Transportation needs     Medical: None     Non-medical: None   Tobacco Use    Smoking status: Former Smoker     Packs/day: 1.50     Years: 40.00     Pack years: 60.00     Types: Cigarettes     Quit date: 2018     Years since quittin.3    Smokeless tobacco: Never Used   Substance and Sexual Activity    Alcohol use: Yes    Drug use: No    Sexual activity: None   Lifestyle    Physical activity     Days per week: None     Minutes per session: None    Stress: None   Relationships    Social connections     Talks on phone: None     Gets together: None     Attends Latter day service: None     Active member of club or organization: None     Attends meetings of clubs or organizations: None     Relationship status: None    Intimate partner violence     Fear of current or ex partner: None     Emotionally abused: None     Physically abused: None     Forced sexual activity: None   Other Topics Concern    None   Social History Narrative    None       Blood pressure 102/62, pulse 68, resp. rate 16, height 5' 9\" (1.753 m), weight 233 lb (105.7 kg), SpO2 98 %. Physical Exam  Constitutional:       Appearance: He is well-developed. HENT:      Head: Normocephalic. Eyes:      Pupils: Pupils are equal, round, and reactive to light. Cardiovascular:      Rate and Rhythm: Normal rate and regular rhythm. Heart sounds: Normal heart sounds. Pulmonary:      Effort: Pulmonary effort is normal.      Breath sounds: Normal breath sounds. Abdominal:      General: Bowel sounds are normal.      Palpations: Abdomen is soft.       Comments: Soft nontender surgical scar seen no palpable mass   Skin:     General: small anterior osteophytes lower thoracic spine. Emphysema. Nodule described in the right minor fissure is no longer identified. The remainder of the bilateral nodules, measuring up to 2.6 mm, remain unchanged. No new nodules identified. Follow-up screening CT of the chest may be obtained 12 months if clinical concern warrants. All CT scans at this facility use dose modulation, iterative reconstruction, and/or weight based dosing when appropriate to reduce radiation dose to as low as reasonably achievable. Endoscopic investigations:     Assessmentand Plan:  72 y.o. male with history of intermittent solid foods dysphagia and had an episode of hematemesis. Rule out esophageal stricture/esophagitis or possible eosinophilic . Will schedule EGD, will treat with PPI, omeprazole 20 g once a day. Diagnosis Orders   1. Esophageal dysphagia  EGD     Return in about 4 weeks (around 4/22/2021). Ronda Rubalcava MD   Staff Gastroenterologist  [facility]    Please note this report has been partially produced using speech recognition software and may cause contain errors related to thatsystem including grammar, punctuation and spelling as well as words and phrases that may seem inappropriate. If there are questions or concerns please feel free to contact me to clarify.

## 2021-03-30 ENCOUNTER — NURSE ONLY (OUTPATIENT)
Dept: PRIMARY CARE CLINIC | Age: 66
End: 2021-03-30

## 2021-03-30 DIAGNOSIS — Z01.818 PRE-OP TESTING: ICD-10-CM

## 2021-03-31 LAB
SARS-COV-2: NOT DETECTED
SOURCE: NORMAL

## 2021-04-05 ENCOUNTER — ANCILLARY PROCEDURE (OUTPATIENT)
Dept: ENDOSCOPY | Age: 66
End: 2021-04-05
Attending: SPECIALIST
Payer: MEDICARE

## 2021-04-05 ENCOUNTER — ANESTHESIA (OUTPATIENT)
Dept: ENDOSCOPY | Age: 66
End: 2021-04-05
Payer: MEDICARE

## 2021-04-05 ENCOUNTER — ANESTHESIA EVENT (OUTPATIENT)
Dept: ENDOSCOPY | Age: 66
End: 2021-04-05
Payer: MEDICARE

## 2021-04-05 ENCOUNTER — HOSPITAL ENCOUNTER (OUTPATIENT)
Age: 66
Setting detail: OUTPATIENT SURGERY
Discharge: HOME | End: 2021-04-05
Attending: SPECIALIST | Admitting: SPECIALIST
Payer: MEDICARE

## 2021-04-05 VITALS
DIASTOLIC BLOOD PRESSURE: 76 MMHG | WEIGHT: 230 LBS | SYSTOLIC BLOOD PRESSURE: 132 MMHG | RESPIRATION RATE: 20 BRPM | TEMPERATURE: 97.9 F | HEIGHT: 69 IN | BODY MASS INDEX: 34.07 KG/M2 | HEART RATE: 74 BPM | OXYGEN SATURATION: 95 %

## 2021-04-05 VITALS
OXYGEN SATURATION: 95 % | RESPIRATION RATE: 13 BRPM | DIASTOLIC BLOOD PRESSURE: 65 MMHG | SYSTOLIC BLOOD PRESSURE: 109 MMHG

## 2021-04-05 PROCEDURE — 43239 EGD BIOPSY SINGLE/MULTIPLE: CPT | Performed by: SPECIALIST

## 2021-04-05 PROCEDURE — 2709999900 HC NON-CHARGEABLE SUPPLY: Performed by: SPECIALIST

## 2021-04-05 PROCEDURE — 2580000003 HC RX 258: Performed by: SPECIALIST

## 2021-04-05 PROCEDURE — 7100000011 HC PHASE II RECOVERY - ADDTL 15 MIN: Performed by: SPECIALIST

## 2021-04-05 PROCEDURE — 6370000000 HC RX 637 (ALT 250 FOR IP): Performed by: SPECIALIST

## 2021-04-05 PROCEDURE — 2500000003 HC RX 250 WO HCPCS: Performed by: NURSE ANESTHETIST, CERTIFIED REGISTERED

## 2021-04-05 PROCEDURE — C1769 GUIDE WIRE: HCPCS | Performed by: SPECIALIST

## 2021-04-05 PROCEDURE — 3609017100 HC EGD: Performed by: SPECIALIST

## 2021-04-05 PROCEDURE — 6360000002 HC RX W HCPCS: Performed by: NURSE ANESTHETIST, CERTIFIED REGISTERED

## 2021-04-05 PROCEDURE — 7100000010 HC PHASE II RECOVERY - FIRST 15 MIN: Performed by: SPECIALIST

## 2021-04-05 PROCEDURE — 88342 IMHCHEM/IMCYTCHM 1ST ANTB: CPT

## 2021-04-05 PROCEDURE — 3700000000 HC ANESTHESIA ATTENDED CARE: Performed by: SPECIALIST

## 2021-04-05 PROCEDURE — 88305 TISSUE EXAM BY PATHOLOGIST: CPT

## 2021-04-05 PROCEDURE — 43248 EGD GUIDE WIRE INSERTION: CPT | Performed by: SPECIALIST

## 2021-04-05 RX ORDER — LIDOCAINE HYDROCHLORIDE 20 MG/ML
INJECTION, SOLUTION INFILTRATION; PERINEURAL PRN
Status: DISCONTINUED | OUTPATIENT
Start: 2021-04-05 | End: 2021-04-05 | Stop reason: SDUPTHER

## 2021-04-05 RX ORDER — PROPOFOL 10 MG/ML
INJECTION, EMULSION INTRAVENOUS PRN
Status: DISCONTINUED | OUTPATIENT
Start: 2021-04-05 | End: 2021-04-05 | Stop reason: SDUPTHER

## 2021-04-05 RX ORDER — MAGNESIUM HYDROXIDE 1200 MG/15ML
LIQUID ORAL PRN
Status: DISCONTINUED | OUTPATIENT
Start: 2021-04-05 | End: 2021-04-05 | Stop reason: ALTCHOICE

## 2021-04-05 RX ORDER — SIMETHICONE 20 MG/.3ML
EMULSION ORAL PRN
Status: DISCONTINUED | OUTPATIENT
Start: 2021-04-05 | End: 2021-04-05 | Stop reason: ALTCHOICE

## 2021-04-05 RX ORDER — 0.9 % SODIUM CHLORIDE 0.9 %
500 INTRAVENOUS SOLUTION INTRAVENOUS ONCE
Status: COMPLETED | OUTPATIENT
Start: 2021-04-05 | End: 2021-04-05

## 2021-04-05 RX ADMIN — LIDOCAINE HYDROCHLORIDE 60 MG: 20 INJECTION, SOLUTION INFILTRATION; PERINEURAL at 08:16

## 2021-04-05 RX ADMIN — SODIUM CHLORIDE 500 ML: 9 INJECTION, SOLUTION INTRAVENOUS at 07:58

## 2021-04-05 RX ADMIN — PROPOFOL 300 MG: 10 INJECTION, EMULSION INTRAVENOUS at 08:16

## 2021-04-05 ASSESSMENT — ENCOUNTER SYMPTOMS: SHORTNESS OF BREATH: 1

## 2021-04-05 NOTE — ANESTHESIA PRE PROCEDURE
Department of Anesthesiology  Preprocedure Note       Name:  Leslie Aaron   Age:  72 y.o.  :  1955                                          MRN:  [id number]         Date:  2021      Surgeon: Tashi Barnard):  Ade Baca MD    Procedure: Procedure(s):  EGD ESOPHAGOGASTRODUODENOSCOPY    Medications prior to admission:   Prior to Admission medications    Medication Sig Start Date End Date Taking? Authorizing Provider   omeprazole (PRILOSEC) 20 MG delayed release capsule Take 1 capsule by mouth Daily 3/25/21   Ade Baac MD   atorvastatin (LIPITOR) 40 MG tablet TAKE ONE TABLET BY MOUTH EVERY DAY 3/24/21   Yun Oswald MD   LORazepam (ATIVAN) 1 MG tablet Take 1 tablet by mouth nightly as needed for Anxiety for up to 30 days.  3/24/21 4/23/21  Yun Oswald MD   levothyroxine (SYNTHROID) 75 MCG tablet TAKE ONE TABLET BY MOUTH EVERY DAY 21   Yun Oswald MD   Potassium 99 MG TABS Take by mouth OTC potassium    Historical Provider, MD   isosorbide mononitrate (IMDUR) 30 MG extended release tablet Take 1 tablet by mouth daily 10/28/20   Navya Starks MD   carvedilol (COREG) 6.25 MG tablet Take 1 tablet by mouth 2 times daily 10/28/20   Navya Starks MD   nitroGLYCERIN (NITROSTAT) 0.4 MG SL tablet DISSOLVE 1 TABLET UNDER THE TONGUE EVERY 5 MINUTES AS NEED FOR CHEST PAIN UP TO 3 DOSES 10/28/20   Navya Starks MD   tiotropium (SPIRIVA RESPIMAT) 2.5 MCG/ACT AERS inhaler Inhale 2 puffs into the lungs daily 10/14/20   Lindsay Raya MD   Glucosamine-Chondroitin--300-250 MG TABS Take 1 tablet by mouth Daily    Historical Provider, MD   acetaminophen (TYLENOL) 500 MG tablet Take 1,000 mg by mouth daily At night    Historical Provider, MD   Naproxen Sodium (ALEVE) 220 MG CAPS Take 1 capsule by mouth daily    Historical Provider, MD   aspirin 81 MG tablet Take 1 tablet by mouth daily With Food 19   Navya Starks MD   vitamin C (ASCORBIC ACID) 500 MG tablet Take 500 mg by mouth daily Historical Provider, MD       Current medications:    No current facility-administered medications for this encounter. Current Outpatient Medications   Medication Sig Dispense Refill    omeprazole (PRILOSEC) 20 MG delayed release capsule Take 1 capsule by mouth Daily 30 capsule 2    atorvastatin (LIPITOR) 40 MG tablet TAKE ONE TABLET BY MOUTH EVERY DAY 90 tablet 0    LORazepam (ATIVAN) 1 MG tablet Take 1 tablet by mouth nightly as needed for Anxiety for up to 30 days. 30 tablet 2    levothyroxine (SYNTHROID) 75 MCG tablet TAKE ONE TABLET BY MOUTH EVERY DAY 90 tablet 1    Potassium 99 MG TABS Take by mouth OTC potassium      isosorbide mononitrate (IMDUR) 30 MG extended release tablet Take 1 tablet by mouth daily 90 tablet 3    carvedilol (COREG) 6.25 MG tablet Take 1 tablet by mouth 2 times daily 180 tablet 3    nitroGLYCERIN (NITROSTAT) 0.4 MG SL tablet DISSOLVE 1 TABLET UNDER THE TONGUE EVERY 5 MINUTES AS NEED FOR CHEST PAIN UP TO 3 DOSES 25 tablet 5    tiotropium (SPIRIVA RESPIMAT) 2.5 MCG/ACT AERS inhaler Inhale 2 puffs into the lungs daily 1 Inhaler 5    Glucosamine-Chondroitin--300-250 MG TABS Take 1 tablet by mouth Daily      acetaminophen (TYLENOL) 500 MG tablet Take 1,000 mg by mouth daily At night      Naproxen Sodium (ALEVE) 220 MG CAPS Take 1 capsule by mouth daily      aspirin 81 MG tablet Take 1 tablet by mouth daily With Food 30 tablet 3    vitamin C (ASCORBIC ACID) 500 MG tablet Take 500 mg by mouth daily         Allergies:     Allergies   Allergen Reactions    Demerol Hcl [Meperidine] Nausea Only     migraines    Valium [Diazepam] Other (See Comments)     MIGRAINES       Problem List:    Patient Active Problem List   Diagnosis Code    Lumbosacral spondylosis without myelopathy S14.542    Umbilical hernia without obstruction and without gangrene K42.9    Diastasis recti M62.08    Other bursal cyst of elbow M71.329    Prostate cancer ([facility] [address] C61    Recurrent ventral (105.7 kg)   03/24/21 233 lb (105.7 kg)   03/22/21 235 lb (106.6 kg)     There is no height or weight on file to calculate BMI.    CBC:   Lab Results   Component Value Date    WBC 7.4 03/15/2021    RBC 4.77 03/15/2021    HGB 14.2 03/15/2021    HCT 42.7 03/15/2021    MCV 89.5 03/15/2021    RDW 13.8 03/15/2021     03/15/2021       CMP:   Lab Results   Component Value Date     03/15/2021    K 4.3 03/15/2021     03/15/2021    CO2 27 03/15/2021    BUN 14 03/15/2021    CREATININE 0.90 03/15/2021    GFRAA >60.0 03/15/2021    LABGLOM >60.0 03/15/2021    GLUCOSE 108 03/15/2021    PROT 6.8 03/15/2021    CALCIUM 9.2 03/15/2021    BILITOT 0.5 03/15/2021    ALKPHOS 89 03/15/2021    AST 22 03/15/2021    ALT 17 03/15/2021       POC Tests: No results for input(s): POCGLU, POCNA, POCK, POCCL, POCBUN, POCHEMO, POCHCT in the last 72 hours.     Coags: No results found for: PROTIME, INR, APTT    HCG (If Applicable): No results found for: PREGTESTUR, PREGSERUM, HCG, HCGQUANT     ABGs: No results found for: PHART, PO2ART, NKV8MPK, PIA4WPA, BEART, G7HQFPNU     Type & Screen (If Applicable):  No results found for: LABABO, LABRH    Drug/Infectious Status (If Applicable):  No results found for: HIV, HEPCAB    COVID-19 Screening (If Applicable):   Lab Results   Component Value Date    COVID19 Not Detected 03/30/2021           Anesthesia Evaluation    Airway: Mallampati: II  TM distance: >3 FB   Neck ROM: full  Mouth opening: > = 3 FB Dental:      Comment: Multiple missing teeth, extremely poor dentition    Pulmonary:normal exam    (+) COPD:  shortness of breath:                             Cardiovascular:    (+) hypertension:, CAD:, CHF:, RAMOS:, hyperlipidemia      ECG reviewed  Rhythm: regular  Rate: normal    Stress test reviewed                Neuro/Psych:   Negative Neuro/Psych ROS              GI/Hepatic/Renal: Neg GI/Hepatic/Renal ROS            Endo/Other:    (+) Diabetes, hypothyroidism: arthritis: OA., .                 Abdominal:           Vascular: negative vascular ROS. Anesthesia Plan      MAC     ASA 3       Induction: intravenous. Anesthetic plan and risks discussed with patient. Plan discussed with attending.                   YANDEL Shankar - CRNA   4/5/2021

## 2021-04-29 ENCOUNTER — OFFICE VISIT (OUTPATIENT)
Dept: CARDIOLOGY CLINIC | Age: 66
End: 2021-04-29
Payer: MEDICARE

## 2021-04-29 VITALS
HEIGHT: 69 IN | SYSTOLIC BLOOD PRESSURE: 109 MMHG | BODY MASS INDEX: 35.1 KG/M2 | WEIGHT: 237 LBS | DIASTOLIC BLOOD PRESSURE: 70 MMHG | RESPIRATION RATE: 18 BRPM | OXYGEN SATURATION: 96 % | HEART RATE: 69 BPM

## 2021-04-29 DIAGNOSIS — I50.33 ACUTE ON CHRONIC DIASTOLIC HEART FAILURE (HCC): ICD-10-CM

## 2021-04-29 DIAGNOSIS — I10 ESSENTIAL HYPERTENSION: ICD-10-CM

## 2021-04-29 DIAGNOSIS — E78.5 DYSLIPIDEMIA: ICD-10-CM

## 2021-04-29 DIAGNOSIS — I25.10 CORONARY ARTERY DISEASE INVOLVING NATIVE CORONARY ARTERY OF NATIVE HEART WITHOUT ANGINA PECTORIS: Primary | ICD-10-CM

## 2021-04-29 PROCEDURE — 99214 OFFICE O/P EST MOD 30 MIN: CPT | Performed by: INTERNAL MEDICINE

## 2021-04-29 ASSESSMENT — ENCOUNTER SYMPTOMS
COUGH: 0
BACK PAIN: 1
GASTROINTESTINAL NEGATIVE: 1
WHEEZING: 0
STRIDOR: 0
CHEST TIGHTNESS: 0
BLOOD IN STOOL: 0
NAUSEA: 0
EYES NEGATIVE: 1
SHORTNESS OF BREATH: 1

## 2021-04-29 NOTE — PROGRESS NOTES
Office Visit. Patient: Lindsay Lake  YOB: 1955  MRN: [id number]    Chief Complaint: RAMOS LE edema Orthopnea  Chief Complaint   Patient presents with    3 Month Follow-Up    Coronary Artery Disease    Hypertension    Congestive Heart Failure    Shortness of Breath     slightly worse-RAMOS       CV Data:  7/2019 echo EF 60  7/2019 Dobut stress echo negative   LAD 40-50% sequential, RCA  mid. LVEF 55, EDP 14, PCWP 14 CO 7.2 L/min  10/2019 CUS- mild  10/2010 Abd US negative. 11/2020 CUS mild     Subjective/HPI RAMOS is getting worse. Can't do much due to knee and back pain. Can't lie flat due to sob    10/16/2019: still sob but better since BB and Imdur. No cp. No bleed no falls. 1/17/2020 no cp occ ramos no falls occ nose bleed no falls     6/19/2020 Patient and/or health care decision maker is aware that that he/she may receive a bill for this telephone service, depending on his insurance coverage, and has provided verbal consent to proceed. This visit was completed via telephone. Total time 13 minutes. Still has some ramos but better. No cp no falls no bleed little edema. 7/27/2020 still RAMOS. 10/28/2020 no RAMOS No CP active at home. No falls no bleed. 1/28/21 still sob occ cp but not bad. No NTG needed. No bleed no falls     4/29/21 lately occ LE edema no cp no sob no bleed. Takes mes. Recent EGD     Stopped smoking 11/2018 with prior 2 ppd  Disabled  from knee and back.      EKG: SR 61    Past Medical History:   Diagnosis Date    Cancer [facility])     CHF (congestive heart failure) (HCC)     Chicken pox     Chronic back pain     Emphysema of lung ([facility] [address]     Hyperlipidemia     Hypertension     Hypothyroidism     Measles     Mumps     Osteoarthritis     Pneumonia     Type 2 diabetes mellitus without complication ([facility] [address]     prediabetes       Past Surgical History:   Procedure Laterality Date    CARDIAC CATHETERIZATION      2019    CYST REMOVAL      back of neck    CYST REMOVAL Left 3/4/16    Dr Salas Half  2018    HERNIA REPAIR      PROSTATE SURGERY  2018    Biopsy    PROSTATECTOMY      UMBILICAL HERNIA REPAIR  3/4/16    Dr Cha Santiago GASTROINTESTINAL ENDOSCOPY N/A 2021    EGD ESOPHAGOGASTRODUODENOSCOPY WITH BIOPSY AND DILATION performed by Johnny Mckenna MD at [facility] N/A 2019    REPAIR OF RECURRENT VENTRAL HERNIA performed by Monica French MD at [address] [place] [address] History   Problem Relation Age of Onset    Other Mother     Other Father     Cancer Father     Cancer Sister     Cancer Brother     Lung Cancer Brother     Cancer Brother     Colon Cancer Neg Hx        Social History     Socioeconomic History    Marital status:      Spouse name: None    Number of children: None    Years of education: None    Highest education level: None   Occupational History    None   Social Needs    Financial resource strain: None    Food insecurity     Worry: None     Inability: None    Transportation needs     Medical: None     Non-medical: None   Tobacco Use    Smoking status: Former Smoker     Packs/day: 1.50     Years: 40.00     Pack years: 60.00     Types: Cigarettes     Quit date: 2018     Years since quittin.4    Smokeless tobacco: Never Used   Substance and Sexual Activity    Alcohol use: Not Currently    Drug use: No    Sexual activity: None   Lifestyle    Physical activity     Days per week: None     Minutes per session: None    Stress: None   Relationships    Social connections     Talks on phone: None     Gets together: None     Attends Adventism service: None     Active member of club or organization: None     Attends meetings of clubs or organizations: None     Relationship status: None    Intimate partner violence     Fear of current or ex partner: None     Emotionally abused: None     Physically abused: None Forced sexual activity: None   Other Topics Concern    None   Social History Narrative    None       Allergies   Allergen Reactions    Demerol Hcl [Meperidine] Nausea Only     migraines    Valium [Diazepam] Other (See Comments)     MIGRAINES       Current Outpatient Medications   Medication Sig Dispense Refill    omeprazole (PRILOSEC) 20 MG delayed release capsule Take 1 capsule by mouth Daily 30 capsule 2    atorvastatin (LIPITOR) 40 MG tablet TAKE ONE TABLET BY MOUTH EVERY DAY 90 tablet 0    levothyroxine (SYNTHROID) 75 MCG tablet TAKE ONE TABLET BY MOUTH EVERY DAY 90 tablet 1    Potassium 99 MG TABS Take by mouth OTC potassium      isosorbide mononitrate (IMDUR) 30 MG extended release tablet Take 1 tablet by mouth daily 90 tablet 3    carvedilol (COREG) 6.25 MG tablet Take 1 tablet by mouth 2 times daily 180 tablet 3    nitroGLYCERIN (NITROSTAT) 0.4 MG SL tablet DISSOLVE 1 TABLET UNDER THE TONGUE EVERY 5 MINUTES AS NEED FOR CHEST PAIN UP TO 3 DOSES 25 tablet 5    tiotropium (SPIRIVA RESPIMAT) 2.5 MCG/ACT AERS inhaler Inhale 2 puffs into the lungs daily 1 Inhaler 5    Glucosamine-Chondroitin--300-250 MG TABS Take 1 tablet by mouth Daily      acetaminophen (TYLENOL) 500 MG tablet Take 1,000 mg by mouth daily At night      Naproxen Sodium (ALEVE) 220 MG CAPS Take 1 capsule by mouth daily      aspirin 81 MG tablet Take 1 tablet by mouth daily With Food 30 tablet 3    vitamin C (ASCORBIC ACID) 500 MG tablet Take 500 mg by mouth daily       No current facility-administered medications for this visit. Review of Systems:   Review of Systems   Constitutional: Negative. Negative for diaphoresis and fatigue. HENT: Negative. Eyes: Negative. Respiratory: Positive for shortness of breath. Negative for cough, chest tightness, wheezing and stridor. Cardiovascular: Positive for leg swelling. Negative for chest pain and palpitations. Gastrointestinal: Negative.   Negative for blood in stool and nausea. Genitourinary: Negative. Musculoskeletal: Positive for arthralgias, back pain and gait problem. Skin: Negative. Neurological: Negative for dizziness, syncope, weakness and light-headedness. Hematological: Negative. Psychiatric/Behavioral: Negative. Physical Examination:    /70 (Site: Left Upper Arm, Position: Sitting, Cuff Size: Large Adult)   Pulse 69   Resp 18   Ht 5' 9\" (1.753 m)   Wt 237 lb (107.5 kg)   SpO2 96%   BMI 35.00 kg/m²    Physical Exam   Constitutional: He appears healthy. No distress. HENT:   Normal cephalic and Atraumatic   Eyes: Pupils are equal, round, and reactive to light. Neck: Normal range of motion and thyroid normal. Neck supple. No JVD present. No neck adenopathy. No thyromegaly present. Cardiovascular: Normal rate, regular rhythm, intact distal pulses and normal pulses. Murmur heard. Pulmonary/Chest: Effort normal and breath sounds normal. He has no wheezes. He has no rales. He exhibits no tenderness. Abdominal: Soft. Bowel sounds are normal. There is no abdominal tenderness. Musculoskeletal: Normal range of motion. General: No tenderness or edema. Neurological: He is alert and oriented to person, place, and time. Skin: Skin is warm. No cyanosis. Nails show no clubbing.        LABS:  CBC:   Lab Results   Component Value Date    WBC 7.4 03/15/2021    RBC 4.77 03/15/2021    HGB 14.2 03/15/2021    HCT 42.7 03/15/2021    MCV 89.5 03/15/2021    MCH 29.7 03/15/2021    MCHC 33.2 03/15/2021    RDW 13.8 03/15/2021     03/15/2021     Lipids:  Lab Results   Component Value Date    CHOL 154 03/15/2021    CHOL 127 05/27/2020    CHOL 151 02/25/2020     Lab Results   Component Value Date    TRIG 129 03/15/2021    TRIG 73 05/27/2020    TRIG 123 02/25/2020     Lab Results   Component Value Date    HDL 43 03/15/2021    HDL 47 05/27/2020    HDL 37 (L) 02/25/2020     Lab Results   Component Value Date    LDLCALC 85 03/15/2021 [address] 65 05/27/2020    LDLCALC 89 02/25/2020     No results found for: LABVLDL, VLDL  No results found for: CHOLHDLRATIO  CMP:    Lab Results   Component Value Date     03/15/2021    K 4.3 03/15/2021     03/15/2021    CO2 27 03/15/2021    BUN 14 03/15/2021    CREATININE 0.90 03/15/2021    GFRAA >60.0 03/15/2021    LABGLOM >60.0 03/15/2021    GLUCOSE 108 03/15/2021    PROT 6.8 03/15/2021    LABALBU 3.8 03/15/2021    CALCIUM 9.2 03/15/2021    BILITOT 0.5 03/15/2021    ALKPHOS 89 03/15/2021    AST 22 03/15/2021    ALT 17 03/15/2021     BMP:    Lab Results   Component Value Date     03/15/2021    K 4.3 03/15/2021     03/15/2021    CO2 27 03/15/2021    BUN 14 03/15/2021    LABALBU 3.8 03/15/2021    CREATININE 0.90 03/15/2021    CALCIUM 9.2 03/15/2021    GFRAA >60.0 03/15/2021    LABGLOM >60.0 03/15/2021    GLUCOSE 108 03/15/2021     Magnesium:  No results found for: MG  TSH:  Lab Results   Component Value Date    TSH 0.761 12/08/2020             Patient Active Problem List   Diagnosis    Lumbosacral spondylosis without myelopathy    Umbilical hernia without obstruction and without gangrene    Diastasis recti    Other bursal cyst of elbow    Prostate cancer ([facility] [address]    Recurrent ventral hernia    Dyslipidemia    Essential hypertension    Acute on chronic diastolic heart failure (HCC)    Shortness of breath    RAMOS (dyspnea on exertion)    Coronary artery disease involving native coronary artery of native heart without angina pectoris    Chronic obstructive pulmonary disease (HCC)    Bilateral carotid bruits    Esophageal dysphagia    Esophageal stricture    Esophagitis    Chronic gastritis without bleeding       There are no discontinued medications. Modified Medications    No medications on file       No orders of the defined types were placed in this encounter. Assessment/Plan:    1. Dyslipidemia   LDL Elevated- on Atorva.   Future recheck - much better LDL 65 HDL 47 2. Essential hypertension  Stable     3. Shortness of breath - stable     4. Acute on chronic diastolic heart failure (HCC) - stable     5. Carotid Bruits- CUS     6. Lung Nodule- f/u CT ordered. F/u Dr. Ruthie Ponce.     7. ED- wants to try Viagra- He currently is on Imdur and can not take Viagra. Counseling:  Heart Healthy Lifestyle, Improve BMI, Low Salt Diet, Volume Restriction 1500cc per day, Take Precautions to Prevent Falls and Walk Daily    Return in about 4 months (around 8/29/2021).     Electronically signed by Johnny Kehr, MD on 4/29/2021 at 12:57 PM

## 2021-05-04 ENCOUNTER — OFFICE VISIT (OUTPATIENT)
Dept: GASTROENTEROLOGY | Age: 66
End: 2021-05-04
Payer: MEDICARE

## 2021-05-04 VITALS
SYSTOLIC BLOOD PRESSURE: 108 MMHG | DIASTOLIC BLOOD PRESSURE: 70 MMHG | WEIGHT: 238 LBS | HEART RATE: 66 BPM | RESPIRATION RATE: 16 BRPM | HEIGHT: 69 IN | OXYGEN SATURATION: 96 % | BODY MASS INDEX: 35.25 KG/M2

## 2021-05-04 DIAGNOSIS — K20.90 ESOPHAGITIS: Primary | ICD-10-CM

## 2021-05-04 PROCEDURE — 99213 OFFICE O/P EST LOW 20 MIN: CPT | Performed by: SPECIALIST

## 2021-05-04 RX ORDER — OMEPRAZOLE 20 MG/1
20 CAPSULE, DELAYED RELEASE ORAL DAILY
Qty: 90 CAPSULE | Refills: 3 | Status: SHIPPED | OUTPATIENT
Start: 2021-05-04 | End: 2022-05-04 | Stop reason: SDUPTHER

## 2021-05-04 ASSESSMENT — ENCOUNTER SYMPTOMS
EYES NEGATIVE: 1
ABDOMINAL PAIN: 0
DIARRHEA: 0
CONSTIPATION: 0
NAUSEA: 0
ANAL BLEEDING: 0
VOMITING: 0
RECTAL PAIN: 0
ABDOMINAL DISTENTION: 0
GASTROINTESTINAL NEGATIVE: 1
RESPIRATORY NEGATIVE: 1
BLOOD IN STOOL: 0

## 2021-05-04 NOTE — PROGRESS NOTES
Gastroenterology Clinic Follow up Visit    Jonathan Turner  [id number]  Chief Complaint   Patient presents with    Follow-up       HPI and A/P at last visit summarized below:  Patient is here for follow-up, EGD showed esophageal erosions with mild stricture which was dilated and also had antral gastritis biopsies from this area showed inflammation with no H. Pylori, been on omeprazole 20 mg once a day with improvement in his symptoms, no heartburn or dysphagia    Review of Systems   Constitutional: Negative. HENT: Negative. Eyes: Negative. Respiratory: Negative. Gastrointestinal: Negative. Negative for abdominal distention, abdominal pain, anal bleeding, blood in stool, constipation, diarrhea, nausea, rectal pain and vomiting. GERD symptoms has improved   Genitourinary: Negative. Musculoskeletal: Negative. Neurological: Negative. Psychiatric/Behavioral: Negative. Past medical history, past surgical history, medication list, social and familyhistory reviewed    Blood pressure 108/70, pulse 66, resp. rate 16, height 5' 9\" (1.753 m), weight 238 lb (108 kg), SpO2 96 %. Physical Exam    Laboratory, Pathology, Radiology reviewed in detail with relevantimportant investigations summarized below:    No results for input(s): WBC, HGB, HCT, MCV, PLT in the last 720 hours. Lab Results   Component Value Date    ALT 17 03/15/2021    AST 22 03/15/2021    ALKPHOS 89 03/15/2021    BILITOT 0.5 03/15/2021     No results found. Endoscopic investigations:     Assessment and Plan:  Jonathan Turner 72 y.o. male for follow up. Erosive esophagitis with mild stricture status post dilation and antral gastritis. ,  Continue omeprazole 20 g once a day. Return after 4 months   Diagnosis Orders   1. Esophagitis         Return in about 4 months (around 9/4/2021).     Meet Mendoza MD   StaffGastroenterologist  [facility]    Please note this report has been partially produced using speech recognitionsoftware  and may cause contain errors related to that system including grammar, punctuation and spelling as well as words andphrases that may seem inappropriate. If there are questions or concerns please feel free to contact me to clarify.

## 2021-05-17 DIAGNOSIS — Z85.46 H/O PROSTATE CANCER: ICD-10-CM

## 2021-05-17 LAB — PROSTATE SPECIFIC ANTIGEN: <0.01 NG/ML (ref 0–5.4)

## 2021-05-20 ENCOUNTER — TELEPHONE (OUTPATIENT)
Dept: PRIMARY CARE CLINIC | Age: 66
End: 2021-05-20

## 2021-05-26 ENCOUNTER — OFFICE VISIT (OUTPATIENT)
Dept: UROLOGY | Age: 66
End: 2021-05-26
Payer: MEDICARE

## 2021-05-26 VITALS
SYSTOLIC BLOOD PRESSURE: 138 MMHG | DIASTOLIC BLOOD PRESSURE: 86 MMHG | HEART RATE: 77 BPM | HEIGHT: 69 IN | BODY MASS INDEX: 34.21 KG/M2 | WEIGHT: 231 LBS

## 2021-05-26 DIAGNOSIS — C61 PROSTATE CANCER (HCC): Primary | ICD-10-CM

## 2021-05-26 PROCEDURE — 99213 OFFICE O/P EST LOW 20 MIN: CPT | Performed by: UROLOGY

## 2021-05-26 ASSESSMENT — ENCOUNTER SYMPTOMS
ABDOMINAL DISTENTION: 0
ABDOMINAL PAIN: 0

## 2021-05-26 NOTE — PROGRESS NOTES
Subjective:      Patient ID: Waynetta Romberg is a 72 y.o. male. HPI This is a 74 yo male with h/o DDD, Anxiety, CAD on Imdur, Kidney stones and s/p RALP on 11/16/18 with positive margin back in follow-up. Since last seen on 1/26/21, he has no new  complaints. He still has mild and stable ABDIAS that happens with a cough or sneeze but is not bothersome. He did see Dr Rosie Parada he recommended IMRT with 6 mo of ADT once his ABDIAS resolves but the patient and wife have been reluctant to comply with this option given stable undetectable PSA's and due to his other medical concerns. I reviewed the interval PSA today. He takes Imdur. He has no new medical or surgical problems.      PATH: [place] 4 + 3 = 7 adenocarcinoma of prostate, 11 Ln neg, EPE neg, Margin pos (Rt anterior), SV neg.  [id number].     Past Medical History:   Diagnosis Date    Cancer [facility])     CHF (congestive heart failure) (HCC)     Chicken pox     Chronic back pain     [facility]     Hyperlipidemia     Hypertension     Hypothyroidism     Measles     Mumps     Osteoarthritis     Pneumonia     Type 2 diabetes mellitus without complication ([facility]     prediabetes     Past Surgical History:   Procedure Laterality Date    CARDIAC CATHETERIZATION      2019    CYST REMOVAL      back of neck    CYST REMOVAL Left 3/4/16    Dr Noelle Caraballo  07/2018    HERNIA REPAIR      PROSTATE SURGERY  07/2018    Biopsy    PROSTATECTOMY  15/17/6560    UMBILICAL HERNIA REPAIR  3/4/16    Dr Ivonne Hamman ENDOSCOPY N/A 4/5/2021    EGD ESOPHAGOGASTRODUODENOSCOPY WITH BIOPSY AND DILATION performed by Sid Cantrell MD at [facility] N/A 11/18/2019    REPAIR OF RECURRENT VENTRAL HERNIA performed by Michele Muir MD at [address] [place] [address] History     Socioeconomic History    Marital status:      Spouse name: None    Number of children: None    Years of education: None  Highest education level: None   Occupational History    None   Tobacco Use    Smoking status: Former Smoker     Packs/day: 1.50     Years: 40.00     Pack years: 60.00     Types: Cigarettes     Quit date: 2018     Years since quittin.5    Smokeless tobacco: Never Used   Vaping Use    Vaping Use: Never used   Substance and Sexual Activity    Alcohol use: Not Currently    Drug use: No    Sexual activity: None   Other Topics Concern    None   Social History Narrative    None     Social Determinants of Health     Financial Resource Strain:     Difficulty of Paying Living Expenses:    Food Insecurity:     Worried About Running Out of Food in the Last Year:     Ran Out of Food in the Last Year:    Transportation Needs:     Lack of Transportation (Medical):      Lack of Transportation (Non-Medical):    Physical Activity:     Days of Exercise per Week:     Minutes of Exercise per Session:    Stress:     Feeling of Stress :    Social Connections:     Frequency of Communication with Friends and Family:     Frequency of Social Gatherings with Friends and Family:     Attends Bahai Services:     Active Member of Clubs or Organizations:     Attends Club or Organization Meetings:     Marital Status:    Intimate Partner Violence:     Fear of Current or Ex-Partner:     Emotionally Abused:     Physically Abused:     Sexually Abused:      Family History   Problem Relation Age of Onset    Other Mother     Other Father     Cancer Father     Cancer Sister     Cancer Brother     Lung Cancer Brother     Cancer Brother     Colon Cancer Neg Hx      Current Outpatient Medications   Medication Sig Dispense Refill    omeprazole (PRILOSEC) 20 MG delayed release capsule Take 1 capsule by mouth Daily 90 capsule 3    omeprazole (PRILOSEC) 20 MG delayed release capsule Take 1 capsule by mouth Daily 30 capsule 2    atorvastatin (LIPITOR) 40 MG tablet TAKE ONE TABLET BY MOUTH EVERY DAY 90 tablet 0 Assessment: This is a 72 yo male with h/o DDD, Anxiety, Kidney stones and with [place] 7 prostate cancer s/p RALP in 11/18 with a positive margin. He has still has an undetectable PSA and continues to have mild but stable ABDIAS. I again discussed the options of adjuvant vs salvage IMRT given positive margin but he wants to continue to closely monitor PSA's.       Plan:      1.  F/U 4-6 mo for PSA        Dior Pradhan MD

## 2021-06-15 DIAGNOSIS — E03.8 OTHER SPECIFIED HYPOTHYROIDISM: ICD-10-CM

## 2021-06-15 DIAGNOSIS — R73.03 PRE-DIABETES: ICD-10-CM

## 2021-06-15 DIAGNOSIS — E78.00 PURE HYPERCHOLESTEROLEMIA: ICD-10-CM

## 2021-06-15 LAB
ALBUMIN SERPL-MCNC: 4.2 G/DL (ref 3.5–4.6)
ALP BLD-CCNC: 100 U/L (ref 35–104)
ALT SERPL-CCNC: 20 U/L (ref 0–41)
AST SERPL-CCNC: 27 U/L (ref 0–40)
BILIRUB SERPL-MCNC: 0.6 MG/DL (ref 0.2–0.7)
BILIRUBIN DIRECT: <0.2 MG/DL (ref 0–0.4)
BILIRUBIN, INDIRECT: NORMAL MG/DL (ref 0–0.6)
CHOLESTEROL, TOTAL: 130 MG/DL (ref 0–199)
GLUCOSE BLD-MCNC: 113 MG/DL (ref 70–99)
HBA1C MFR BLD: 6 % (ref 4.8–5.9)
HDLC SERPL-MCNC: 43 MG/DL (ref 40–59)
LDL CHOLESTEROL CALCULATED: 66 MG/DL (ref 0–129)
TOTAL PROTEIN: 6.8 G/DL (ref 6.3–8)
TRIGL SERPL-MCNC: 105 MG/DL (ref 0–150)
TSH SERPL DL<=0.05 MIU/L-ACNC: 2.08 UIU/ML (ref 0.44–3.86)

## 2021-06-24 ENCOUNTER — HOSPITAL ENCOUNTER (OUTPATIENT)
Dept: CT IMAGING | Age: 66
Discharge: HOME | End: 2021-06-26
Payer: MEDICARE

## 2021-06-24 ENCOUNTER — OFFICE VISIT (OUTPATIENT)
Dept: FAMILY MEDICINE CLINIC | Age: 66
End: 2021-06-24
Payer: MEDICARE

## 2021-06-24 ENCOUNTER — HOSPITAL ENCOUNTER (OUTPATIENT)
Dept: LAB | Age: 66
Discharge: HOME | End: 2021-06-24
Payer: MEDICARE

## 2021-06-24 VITALS
BODY MASS INDEX: 33.18 KG/M2 | HEART RATE: 71 BPM | OXYGEN SATURATION: 97 % | WEIGHT: 224 LBS | DIASTOLIC BLOOD PRESSURE: 70 MMHG | HEIGHT: 69 IN | SYSTOLIC BLOOD PRESSURE: 120 MMHG | RESPIRATION RATE: 14 BRPM | TEMPERATURE: 98.2 F

## 2021-06-24 DIAGNOSIS — J02.9 ACUTE PHARYNGITIS, UNSPECIFIED ETIOLOGY: ICD-10-CM

## 2021-06-24 DIAGNOSIS — R22.1 NECK SWELLING: ICD-10-CM

## 2021-06-24 DIAGNOSIS — M25.561 CHRONIC PAIN OF RIGHT KNEE: ICD-10-CM

## 2021-06-24 DIAGNOSIS — J02.9 ACUTE PHARYNGITIS, UNSPECIFIED ETIOLOGY: Primary | ICD-10-CM

## 2021-06-24 DIAGNOSIS — E03.8 OTHER SPECIFIED HYPOTHYROIDISM: ICD-10-CM

## 2021-06-24 DIAGNOSIS — F41.9 ANXIETY: ICD-10-CM

## 2021-06-24 DIAGNOSIS — R73.03 PRE-DIABETES: ICD-10-CM

## 2021-06-24 DIAGNOSIS — K08.9 POOR DENTITION: ICD-10-CM

## 2021-06-24 DIAGNOSIS — R22.1 NECK SWELLING: Primary | ICD-10-CM

## 2021-06-24 DIAGNOSIS — G89.29 CHRONIC PAIN OF RIGHT KNEE: ICD-10-CM

## 2021-06-24 PROBLEM — F13.20 SEDATIVE, HYPNOTIC OR ANXIOLYTIC DEPENDENCE, UNCOMPLICATED (HCC): Status: ACTIVE | Noted: 2021-06-24

## 2021-06-24 PROBLEM — F13.29 SEDATIVE, HYPNOTIC OR ANXIOLYTIC DEPENDENCE WITH UNSPECIFIED SEDATIVE, HYPNOTIC OR ANXIOLYTIC-INDUCED DISORDER (HCC): Status: ACTIVE | Noted: 2021-06-24

## 2021-06-24 PROBLEM — F13.99 SEDATIVE, HYPNOTIC OR ANXIOLYTIC USE, UNSPECIFIED WITH UNSPECIFIED SEDATIVE, HYPNOTIC OR ANXIOLYTIC-INDUCED DISORDER (HCC): Status: ACTIVE | Noted: 2021-06-24

## 2021-06-24 LAB
BUN BLDV-MCNC: 13 MG/DL (ref 8–23)
CREAT SERPL-MCNC: 1.06 MG/DL (ref 0.7–1.2)
GFR AFRICAN AMERICAN: >60
GFR NON-AFRICAN AMERICAN: >60
S PYO AG THROAT QL: NORMAL

## 2021-06-24 PROCEDURE — 82565 ASSAY OF CREATININE: CPT

## 2021-06-24 PROCEDURE — 6360000004 HC RX CONTRAST MEDICATION: Performed by: FAMILY MEDICINE

## 2021-06-24 PROCEDURE — 84520 ASSAY OF UREA NITROGEN: CPT

## 2021-06-24 PROCEDURE — 99214 OFFICE O/P EST MOD 30 MIN: CPT | Performed by: FAMILY MEDICINE

## 2021-06-24 PROCEDURE — 36415 COLL VENOUS BLD VENIPUNCTURE: CPT

## 2021-06-24 PROCEDURE — 3288F FALL RISK ASSESSMENT DOCD: CPT | Performed by: FAMILY MEDICINE

## 2021-06-24 PROCEDURE — 70491 CT SOFT TISSUE NECK W/DYE: CPT

## 2021-06-24 PROCEDURE — 87880 STREP A ASSAY W/OPTIC: CPT | Performed by: FAMILY MEDICINE

## 2021-06-24 RX ORDER — LEVOTHYROXINE SODIUM 0.07 MG/1
TABLET ORAL
Qty: 90 TABLET | Refills: 1 | Status: SHIPPED | OUTPATIENT
Start: 2021-06-24 | End: 2021-10-01 | Stop reason: SDUPTHER

## 2021-06-24 RX ORDER — AMOXICILLIN AND CLAVULANATE POTASSIUM 500; 125 MG/1; MG/1
1 TABLET, FILM COATED ORAL 3 TIMES DAILY
Qty: 30 TABLET | Refills: 0 | Status: SHIPPED | OUTPATIENT
Start: 2021-06-24 | End: 2021-07-01 | Stop reason: SDUPTHER

## 2021-06-24 RX ORDER — ATORVASTATIN CALCIUM 40 MG/1
TABLET, FILM COATED ORAL
Qty: 90 TABLET | Refills: 0 | Status: SHIPPED | OUTPATIENT
Start: 2021-06-24 | End: 2021-10-01 | Stop reason: SDUPTHER

## 2021-06-24 RX ORDER — LORAZEPAM 1 MG/1
1 TABLET ORAL NIGHTLY PRN
Qty: 30 TABLET | Refills: 2 | Status: SHIPPED | OUTPATIENT
Start: 2021-06-24 | End: 2021-10-01 | Stop reason: SDUPTHER

## 2021-06-24 RX ADMIN — IOPAMIDOL 100 ML: 755 INJECTION, SOLUTION INTRAVENOUS at 15:18

## 2021-06-24 SDOH — ECONOMIC STABILITY: FOOD INSECURITY: WITHIN THE PAST 12 MONTHS, THE FOOD YOU BOUGHT JUST DIDN'T LAST AND YOU DIDN'T HAVE MONEY TO GET MORE.: NEVER TRUE

## 2021-06-24 SDOH — ECONOMIC STABILITY: FOOD INSECURITY: WITHIN THE PAST 12 MONTHS, YOU WORRIED THAT YOUR FOOD WOULD RUN OUT BEFORE YOU GOT MONEY TO BUY MORE.: NEVER TRUE

## 2021-06-24 ASSESSMENT — ENCOUNTER SYMPTOMS: ABDOMINAL PAIN: 0

## 2021-06-24 ASSESSMENT — SOCIAL DETERMINANTS OF HEALTH (SDOH): HOW HARD IS IT FOR YOU TO PAY FOR THE VERY BASICS LIKE FOOD, HOUSING, MEDICAL CARE, AND HEATING?: NOT HARD AT ALL

## 2021-06-24 ASSESSMENT — PATIENT HEALTH QUESTIONNAIRE - PHQ9
SUM OF ALL RESPONSES TO PHQ QUESTIONS 1-9: 0
2. FEELING DOWN, DEPRESSED OR HOPELESS: 0
SUM OF ALL RESPONSES TO PHQ QUESTIONS 1-9: 0
SUM OF ALL RESPONSES TO PHQ9 QUESTIONS 1 & 2: 0
SUM OF ALL RESPONSES TO PHQ QUESTIONS 1-9: 0
1. LITTLE INTEREST OR PLEASURE IN DOING THINGS: 0

## 2021-06-24 NOTE — PROGRESS NOTES
RESPIMAT) 2.5 MCG/ACT AERS inhaler Inhale 2 puffs into the lungs daily 1 Inhaler 5    Glucosamine-Chondroitin--300-250 MG TABS Take 1 tablet by mouth Daily      acetaminophen (TYLENOL) 500 MG tablet Take 1,000 mg by mouth daily At night      Naproxen Sodium (ALEVE) 220 MG CAPS Take 1 capsule by mouth daily      aspirin 81 MG tablet Take 1 tablet by mouth daily With Food 30 tablet 3    vitamin C (ASCORBIC ACID) 500 MG tablet Take 500 mg by mouth daily       No current facility-administered medications for this visit. Family History   Problem Relation Age of Onset    Other Mother     Other Father     Cancer Father     Cancer Sister     Cancer Brother     Lung Cancer Brother     Cancer Brother     Colon Cancer Neg Hx      Past Medical History:   Diagnosis Date    Cancer [facility])     [facility]) [facility]     Chicken pox     Chronic back pain     Emphy[facility]ma [facility] lung ([facility]     Hyperlipidemia     Hypertension     Hypothyroidism     Measles     Mumps     Osteoarthritis     Pneumonia     Type 2 diabetes mellitus without complication (HCC)     prediabetes     Objective:   /70   Pulse 71   Temp 98.2 °F (36.8 °C)   Resp 14   Ht 5' 9\" (1.753 m)   Wt 224 lb (101.6 kg)   SpO2 97%   BMI 33.08 kg/m²     Physical Exam  Heent: T.M normal bilat Mild pharyngeal injection. No exudate. Dentition poor. No obvious abscess                 Nares patent but swollen mucosa noted  Neck: moderate anter cervical adenopathy on right side. .  No masses. No thyroid asymmetry. Lungs: Clear equal breath sounds. No wheezes or rales. Heart: Rate reg No murmur  Gen: In no acute distress  Patient with appropriate affect. Alert   Thought content appropriate  Good eye contact    Assessment:       Diagnosis Orders   1. Acute pharyngitis, unspecified etiology  Culture, Throat    POCT rapid strep A    CT SOFT TISSUE NECK W WO CONTRAST   2.  Neck swelling  Culture, Throat    POCT rapid

## 2021-06-27 LAB — THROAT CULTURE: NORMAL

## 2021-07-01 ENCOUNTER — OFFICE VISIT (OUTPATIENT)
Dept: FAMILY MEDICINE CLINIC | Age: 66
End: 2021-07-01
Payer: MEDICARE

## 2021-07-01 VITALS
WEIGHT: 221 LBS | OXYGEN SATURATION: 96 % | TEMPERATURE: 97.9 F | RESPIRATION RATE: 14 BRPM | HEART RATE: 63 BPM | DIASTOLIC BLOOD PRESSURE: 70 MMHG | SYSTOLIC BLOOD PRESSURE: 120 MMHG | BODY MASS INDEX: 32.73 KG/M2 | HEIGHT: 69 IN

## 2021-07-01 DIAGNOSIS — J36 TONSILLAR ABSCESS: Primary | ICD-10-CM

## 2021-07-01 DIAGNOSIS — F13.99 SEDATIVE, HYPNOTIC OR ANXIOLYTIC USE, UNSPECIFIED WITH UNSPECIFIED SEDATIVE, HYPNOTIC OR ANXIOLYTIC-INDUCED DISORDER (HCC): ICD-10-CM

## 2021-07-01 PROCEDURE — 99213 OFFICE O/P EST LOW 20 MIN: CPT | Performed by: FAMILY MEDICINE

## 2021-07-01 RX ORDER — AMOXICILLIN AND CLAVULANATE POTASSIUM 500; 125 MG/1; MG/1
1 TABLET, FILM COATED ORAL 3 TIMES DAILY
Qty: 12 TABLET | Refills: 0 | Status: SHIPPED | OUTPATIENT
Start: 2021-07-01 | End: 2021-07-07 | Stop reason: SDUPTHER

## 2021-07-01 ASSESSMENT — ENCOUNTER SYMPTOMS
VOMITING: 0
ABDOMINAL PAIN: 0

## 2021-07-01 NOTE — PROGRESS NOTES
Subjective:      Patient ID: Efe Lucio is a 72 y.o. male    HPI  Here in follow up for throat infection. Feeling about 60 % better overall since last visit. No fever last few days. No emesis. Still taking antibiotics    Review of Systems   Constitutional: Positive for appetite change. Negative for chills. Gastrointestinal: Negative for abdominal pain and vomiting. Skin: Negative for rash. Reviewed allergy, medical, social, surgical, family and med list changes and updated   Files--reviewed throat culture neg and ct neck showing tonsillar abscess      Social History     Socioeconomic History    Marital status:      Spouse name: None    Number of children: None    Years of education: None    Highest education level: None   Occupational History    None   Tobacco Use    Smoking status: Former Smoker     Packs/day: 1.50     Years: 40.00     Pack years: 60.00     Types: Cigarettes     Quit date: 2018     Years since quittin.6    Smokeless tobacco: Never Used   Vaping Use    Vaping Use: Never used   Substance and Sexual Activity    Alcohol use: Not Currently    Drug use: No    Sexual activity: None   Other Topics Concern    None   Social History Narrative    None     Social Determinants of Health     Financial Resource Strain: Low Risk     Difficulty of Paying Living Expenses: Not hard at all   Food Insecurity: No Food Insecurity    Worried About Running Out of Food in the Last Year: Never true    Ervin of Food in the Last Year: Never true   Transportation Needs:     Lack of Transportation (Medical):      Lack of Transportation (Non-Medical):    Physical Activity:     Days of Exercise per Week:     Minutes of Exercise per Session:    Stress:     Feeling of Stress :    Social Connections:     Frequency of Communication with Friends and Family:     Frequency of Social Gatherings with Friends and Family:     Attends Sabianist Services:     Active Member of Clubs or Organizations:     Attends Club or Organization Meetings:     Marital Status:    Intimate Partner Violence:     Fear of Current or Ex-Partner:     Emotionally Abused:     Physically Abused:     Sexually Abused:      Current Outpatient Medications   Medication Sig Dispense Refill    LORazepam (ATIVAN) 1 MG tablet Take 1 tablet by mouth nightly as needed for Anxiety for up to 30 days. 30 tablet 2    atorvastatin (LIPITOR) 40 MG tablet TAKE ONE TABLET BY MOUTH EVERY DAY 90 tablet 0    levothyroxine (SYNTHROID) 75 MCG tablet 1 po q day 90 tablet 1    amoxicillin-clavulanate (AUGMENTIN) 500-125 MG per tablet Take 1 tablet by mouth 3 times daily for 10 days 30 tablet 0    omeprazole (PRILOSEC) 20 MG delayed release capsule Take 1 capsule by mouth Daily 90 capsule 3    omeprazole (PRILOSEC) 20 MG delayed release capsule Take 1 capsule by mouth Daily 30 capsule 2    Potassium 99 MG TABS Take by mouth OTC potassium      isosorbide mononitrate (IMDUR) 30 MG extended release tablet Take 1 tablet by mouth daily 90 tablet 3    carvedilol (COREG) 6.25 MG tablet Take 1 tablet by mouth 2 times daily 180 tablet 3    nitroGLYCERIN (NITROSTAT) 0.4 MG SL tablet DISSOLVE 1 TABLET UNDER THE TONGUE EVERY 5 MINUTES AS NEED FOR CHEST PAIN UP TO 3 DOSES 25 tablet 5    tiotropium (SPIRIVA RESPIMAT) 2.5 MCG/ACT AERS inhaler Inhale 2 puffs into the lungs daily 1 Inhaler 5    Glucosamine-Chondroitin--300-250 MG TABS Take 1 tablet by mouth Daily      acetaminophen (TYLENOL) 500 MG tablet Take 1,000 mg by mouth daily At night      Naproxen Sodium (ALEVE) 220 MG CAPS Take 1 capsule by mouth daily      aspirin 81 MG tablet Take 1 tablet by mouth daily With Food 30 tablet 3    vitamin C (ASCORBIC ACID) 500 MG tablet Take 500 mg by mouth daily       No current facility-administered medications for this visit.      Family History   Problem Relation Age of Onset    Other Mother     Other Father     Cancer Father    Tequila Donaldson Cancer Sister     Cancer Brother     Lung Cancer Brother     Cancer Brother     Colon Cancer Neg Hx      Past Medical History:   Diagnosis Date    Cancer [facility])     [facility]     Chicken pox     Chronic back pain     [facility]     Hyperlipidemia     Hypertension     Hypothyroidism     Measles     Mumps     Osteoarthritis     Pneumonia     Type 2 diabetes mellitus without complication (HCC)     prediabetes     Objective:   /70   Pulse 63   Temp 97.9 °F (36.6 °C)   Resp 14   Ht 5' 9\" (1.753 m)   Wt 221 lb (100.2 kg)   SpO2 96%   BMI 32.64 kg/m²     Physical Exam    Assessment:       Diagnosis Orders   1. Tonsillar abscess  Vale Santana MD, OtolaryngologyMaria Del Rosario   2.  Sedative, hypnotic or anxiolytic use, unspecified with unspecified sedative, hypnotic or anxiolytic-induced disorder ([facility]           Plan:   Anxiety stable -continue current meds and tx   Orders Placed This Encounter   Medications    amoxicillin-clavulanate (AUGMENTIN) 500-125 MG per tablet     Sig: Take 1 tablet by mouth 3 times daily for 4 days     Dispense:  12 tablet     Refill:  0     Orders Placed This Encounter   Procedures   Vale Santana MD, Otolaryngology, [place]     Referral Priority:   Urgent     Referral Type:   Eval and Treat     Referral Reason:   Specialty Services Required     Referred to Provider:   Mahogany Martinez MD     Requested Specialty:   Otolaryngology     Number of Visits Requested:   1     Continue with current antibiotics   F/u with ent  To er for any worsening   F/u as needed or in 3 months

## 2021-07-07 RX ORDER — AMOXICILLIN AND CLAVULANATE POTASSIUM 500; 125 MG/1; MG/1
1 TABLET, FILM COATED ORAL 3 TIMES DAILY
Qty: 12 TABLET | Refills: 0 | Status: SHIPPED | OUTPATIENT
Start: 2021-07-07 | End: 2021-07-11

## 2021-07-07 NOTE — TELEPHONE ENCOUNTER
Patient has not received a call from Dr. Willow Babb office yet to schedule. He said the antibiotic is helping. He doesn't want to run out before he sees the specialist. He is asking if you can please fill this script.     Please approve or deny this request.    Rx requested:  Requested Prescriptions     Pending Prescriptions Disp Refills    amoxicillin-clavulanate (AUGMENTIN) 500-125 MG per tablet 12 tablet 0     Sig: Take 1 tablet by mouth 3 times daily for 4 days         Last Office Visit:   7/1/2021      Next Visit Date:  Future Appointments   Date Time Provider Grey Stewart   8/31/2021  2:30 PM Tony Ramos  [facility]   9/1/2021  8:45 AM Iveth Navarro MD [facility] [facility]   9/10/2021 11:00 AM Derrick Decker MD [address]   9/21/2021  1:30 PM Darrell Gordon MD [address]   10/1/2021 10:00 AM Aaron Guajardo MD [facility]   11/30/2021  [address], MD [facility]

## 2021-07-12 ENCOUNTER — TELEPHONE (OUTPATIENT)
Dept: FAMILY MEDICINE CLINIC | Age: 66
End: 2021-07-12

## 2021-07-12 NOTE — TELEPHONE ENCOUNTER
Incoming Call    Caller:  Dylan Hylton    Communication (HIPPA):  Thuan Sensor not applicable    Notes:  Patient is scheduled on 7/30/21 to see the specialist for his tonsils. Ken Carrel is asking if Dr. Betina Arellano wanted to/would send more antibiotics to hold him over until the 30th.      Please Advise

## 2021-07-12 NOTE — TELEPHONE ENCOUNTER
Pt has appt on 7-20-21 with specialist for tonsils. Would you be willing to send in more antibiotics until then.

## 2021-07-14 NOTE — TELEPHONE ENCOUNTER
Elia Fisher saw you on 6/24/21 and 7/1/21 regarding this. He is scheduled to see Dr Warden Lugo on 7/30/21. His throat is swelling again and making it hard to swallow. Can you please send anther antibiotic to hold him until he is see by ENT.

## 2021-07-30 ENCOUNTER — TELEPHONE (OUTPATIENT)
Dept: CARDIOLOGY CLINIC | Age: 66
End: 2021-07-30

## 2021-07-30 ENCOUNTER — INITIAL CONSULT (OUTPATIENT)
Dept: ENT CLINIC | Age: 66
End: 2021-07-30
Payer: MEDICARE

## 2021-07-30 VITALS
RESPIRATION RATE: 16 BRPM | HEIGHT: 69 IN | DIASTOLIC BLOOD PRESSURE: 78 MMHG | SYSTOLIC BLOOD PRESSURE: 131 MMHG | WEIGHT: 215 LBS | HEART RATE: 61 BPM | TEMPERATURE: 96.9 F | BODY MASS INDEX: 31.84 KG/M2

## 2021-07-30 DIAGNOSIS — J36 ABSCESS OF TONSIL: ICD-10-CM

## 2021-07-30 DIAGNOSIS — J34.2 ACQUIRED DEVIATED NASAL SEPTUM: ICD-10-CM

## 2021-07-30 DIAGNOSIS — J35.01 TONSILLITIS, CHRONIC: Primary | ICD-10-CM

## 2021-07-30 PROCEDURE — 99203 OFFICE O/P NEW LOW 30 MIN: CPT | Performed by: OTOLARYNGOLOGY

## 2021-07-30 RX ORDER — LORAZEPAM 1 MG/1
TABLET ORAL NIGHTLY
COMMUNITY
Start: 2021-07-24

## 2021-07-30 ASSESSMENT — ENCOUNTER SYMPTOMS
RHINORRHEA: 0
VOICE CHANGE: 0
SINUS PAIN: 0
FACIAL SWELLING: 0
TROUBLE SWALLOWING: 1
SORE THROAT: 1
SINUS PRESSURE: 0
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
## 2021-07-30 NOTE — TELEPHONE ENCOUNTER
PATIENT REQUESTING CARDIAC CLEARANCE FOR SURGERY BEING PERFORMED BY DR. Dari Camilo ON AUGUST 12, 2021. PLEASE FAX CLEARANCE TO DR. Valle Range OFFICE. THANK YOU.

## 2021-08-02 RX ORDER — CLINDAMYCIN HYDROCHLORIDE 300 MG/1
300 CAPSULE ORAL 3 TIMES DAILY
Qty: 21 CAPSULE | Refills: 0 | Status: SHIPPED | OUTPATIENT
Start: 2021-08-02 | End: 2021-08-09 | Stop reason: ALTCHOICE

## 2021-08-09 ENCOUNTER — OFFICE VISIT (OUTPATIENT)
Dept: FAMILY MEDICINE CLINIC | Age: 66
End: 2021-08-09
Payer: MEDICARE

## 2021-08-09 VITALS
WEIGHT: 215.8 LBS | BODY MASS INDEX: 31.87 KG/M2 | DIASTOLIC BLOOD PRESSURE: 64 MMHG | HEART RATE: 60 BPM | OXYGEN SATURATION: 98 % | TEMPERATURE: 96.6 F | RESPIRATION RATE: 18 BRPM | SYSTOLIC BLOOD PRESSURE: 118 MMHG

## 2021-08-09 DIAGNOSIS — Z01.818 PRE-OP EXAMINATION: Primary | ICD-10-CM

## 2021-08-09 PROCEDURE — 99203 OFFICE O/P NEW LOW 30 MIN: CPT | Performed by: PHYSICIAN ASSISTANT

## 2021-08-09 PROCEDURE — 93000 ELECTROCARDIOGRAM COMPLETE: CPT | Performed by: PHYSICIAN ASSISTANT

## 2021-08-09 NOTE — PROGRESS NOTES
Preoperative Consultation      Lucero Deras  YOB: 1955    Date of Service:  8/9/2021    Vitals:    08/09/21 1459   BP: 118/64   Site: Right Upper Arm   Position: Sitting   Cuff Size: Medium Adult   Pulse: 60   Resp: 18   Temp: 96.6 °F (35.9 °C)   TempSrc: Temporal   SpO2: 98%   Weight: 215 lb 12.8 oz (97.9 kg)      Wt Readings from Last 2 Encounters:   08/09/21 215 lb 12.8 oz (97.9 kg)   07/30/21 215 lb (97.5 kg)     BP Readings from Last 3 Encounters:   08/09/21 118/64   07/30/21 131/78   07/01/21 120/70        Chief Complaint   Patient presents with    Pre-op Exam     Tonsilectomy on 8/12/2021 w/Dr. Kaylie May     Allergies   Allergen Reactions    Demerol Hcl [Meperidine] Nausea Only     migraines    Valium [Diazepam] Other (See Comments)     MIGRAINES     Outpatient Medications Marked as Taking for the 8/9/21 encounter (Office Visit) with ANDRIA Christian   Medication Sig Dispense Refill    LORazepam (ATIVAN) 1 MG tablet       atorvastatin (LIPITOR) 40 MG tablet TAKE ONE TABLET BY MOUTH EVERY DAY 90 tablet 0    levothyroxine (SYNTHROID) 75 MCG tablet 1 po q day 90 tablet 1    omeprazole (PRILOSEC) 20 MG delayed release capsule Take 1 capsule by mouth Daily 30 capsule 2    Potassium 99 MG TABS Take by mouth OTC potassium      isosorbide mononitrate (IMDUR) 30 MG extended release tablet Take 1 tablet by mouth daily 90 tablet 3    carvedilol (COREG) 6.25 MG tablet Take 1 tablet by mouth 2 times daily 180 tablet 3    tiotropium (SPIRIVA RESPIMAT) 2.5 MCG/ACT AERS inhaler Inhale 2 puffs into the lungs daily 1 Inhaler 5    Glucosamine-Chondroitin--300-250 MG TABS Take 1 tablet by mouth Daily      acetaminophen (TYLENOL) 500 MG tablet Take 1,000 mg by mouth daily At night      Naproxen Sodium (ALEVE) 220 MG CAPS Take 1 capsule by mouth daily      vitamin C (ASCORBIC ACID) 500 MG tablet Take 500 mg by mouth daily         This patient presents to the office today for a preoperative consultation at the request of surgeon, Dr. Molly Steele, who plans on performing tonsillectomy on August 12 at [facility]. The current problem began 6 weeks ago, and symptoms have been unchanged with time. Conservative therapy: N/A.     Planned anesthesia: General   Known anesthesia problems: None   Bleeding risk: No recent or remote history of abnormal bleeding  Personal or FH of DVT/PE: No    Patient objection to receiving blood products: No    Patient Active Problem List   Diagnosis    Lumbosacral spondylosis without myelopathy    Umbilical hernia without obstruction and without gangrene    Diastasis recti    Other bursal cyst of elbow    Prostate cancer ([address]    Recurrent ventral hernia    Dyslipidemia    Essential hypertension    Acute on chronic diastolic heart failure (HCC)    Shortness of breath    RAMOS (dyspnea on exertion)    Coronary artery disease involving native coronary artery of native heart without angina pectoris    Chronic obstructive pulmonary disease (HCC)    Bilateral carotid bruits    Esophageal dysphagia    Esophageal stricture    Esophagitis    Chronic gastritis without bleeding    Sedative, hypnotic or anxiolytic use, unspecified with unspecified sedative, hypnotic or anxiolytic-induced disorder    Sedative, hypnotic or anxiolytic dependence with unspecified sedative, hypnotic or anxiolytic-induced disorder    Sedative, hypnotic or anxiolytic dependence, uncomplicated    Tonsillitis, chronic    Abscess of tonsil    Acquired deviated nasal septum       Past Medical History:   Diagnosis Date    Cancer ([address]     CHF (congestive heart failure) ([address]     Chicken pox     Chronic back pain     Emphysema of lung ([address]     Hyperlipidemia     Hypertension     Hypothyroidism     Measles     Mumps     Osteoarthritis     Pneumonia     Type 2 diabetes mellitus without complication ([address]     prediabetes     Past Surgical History:   Procedure Laterality Date    CARDIAC CATHETERIZATION      2019    CYST REMOVAL      back of neck    CYST REMOVAL Left 3/4/16    Dr Platt People  2018    HERNIA REPAIR      PROSTATE SURGERY  2018    Biopsy    PROSTATECTOMY      UMBILICAL HERNIA REPAIR  3/4/16    Dr Ana Luisa Askew GASTROINTESTINAL ENDOSCOPY N/A 2021    EGD ESOPHAGOGASTRODUODENOSCOPY WITH BIOPSY AND DILATION performed by [facility] N/A 2019    REPAIR OF RECURRENT VENTRAL HERNIA performed by Dolly Gtz MD at [address] History   Problem Relation Age of Onset    Other Mother     Other Father     Cancer Father     Cancer Sister     Cancer Brother     Lung Cancer Brother     Cancer Brother     Colon Cancer Neg Hx      Social History     Socioeconomic History    Marital status:      Spouse name: Not on file    Number of children: Not on file    Years of education: Not on file    Highest education level: Not on file   Occupational History    Not on file   Tobacco Use    Smoking status: Former Smoker     Packs/day: 1.50     Years: 40.00     Pack years: 60.00     Types: Cigarettes     Quit date: 2018     Years since quittin.7    Smokeless tobacco: Never Used   Vaping Use    Vaping Use: Never used   Substance and Sexual Activity    Alcohol use: Not Currently    Drug use: No    Sexual activity: Not on file   Other Topics Concern    Not on file   Social History Narrative    Not on file     Social Determinants of Health     Financial Resource Strain: Low Risk     Difficulty of Paying Living Expenses: Not hard at all   Food Insecurity: No Food Insecurity    Worried About [address] [employer] in the Last Year: Never true    [facility] in the Last Year: Never true   Transportation Needs:     Lack of Transportation (Medical):      Lack of Transportation (Non-Medical):    Physical Activity:     Days of Exercise per Week:     Minutes of Exercise per Session:    Stress:     Feeling of Stress :    Social Connections:     Frequency of Communication with Friends and Family:     Frequency of Social Gatherings with Friends and Family:     Attends Evangelical Services:     Active Member of Clubs or Organizations:     Attends Club or Organization Meetings:     Marital Status:    Intimate Partner Violence:     Fear of Current or Ex-Partner:     Emotionally Abused:     Physically Abused:     Sexually Abused:        Review of Systems  A comprehensive review of systems was negative except for what was noted in the HPI. Physical Exam   Constitutional: He is oriented to person, place, and time. He appears well-developed and well-nourished. No distress. HENT:   Head: Normocephalic and atraumatic. Mouth/Throat: Uvula is midline, oropharynx is clear and moist and mucous membranes are normal.   Eyes: Conjunctivae and EOM are normal. Pupils are equal, round, and reactive to light. Neck: Trachea normal and normal range of motion. Neck supple. No JVD present. Carotid bruit is not present. No mass and no thyromegaly present. Cardiovascular: Normal rate, regular rhythm, normal heart sounds and intact distal pulses. Exam reveals no gallop and no friction rub. No murmur heard. Pulmonary/Chest: Effort normal and breath sounds normal. No respiratory distress. He has no wheezes. He has no rales. Abdominal: Soft. Normal aorta and bowel sounds are normal. He exhibits no distension and no mass. There is no hepatosplenomegaly. No tenderness. Musculoskeletal: He exhibits no edema and no tenderness. Neurological: He is alert and oriented to person, place, and time. He has normal strength. No cranial nerve deficit or sensory deficit. Coordination and gait normal.   Skin: Skin is warm and dry. No rash noted. No erythema. Psychiatric: He has a normal mood and affect.  His behavior is normal.     EKG Interpretation: Cardiac clearance approved by Dr. Yola Westfall on 08/2/21    Lab [facility] Outpatient Visit on 06/24/2021   Component Date Value    BUN 06/24/2021 13     CREATININE 06/24/2021 1.06     GFR Non- 06/24/2021 >60.0     GFR  06/24/2021 >60.0    Orders Only on 06/24/2021   Component Date Value    Throat Culture 06/24/2021                      Value:Usual respiratory alis in 48 hours  No Beta Streptococcus isolated     Orders Only on 06/15/2021   Component Date Value    TSH 06/15/2021 2.080     Hemoglobin A1C 06/15/2021 6.0*    Glucose 06/15/2021 113*    Total Protein 06/15/2021 6.8     Albumin 06/15/2021 4.2     Alkaline Phosphatase 06/15/2021 100     ALT 06/15/2021 20     AST 06/15/2021 27     Total Bilirubin 06/15/2021 0.6     Bilirubin, Direct 06/15/2021 <0.2     Bilirubin, Indirect 06/15/2021 see below     Cholesterol, Total 06/15/2021 130     Triglycerides 06/15/2021 105     HDL 06/15/2021 43     LDL Calculated 06/15/2021 66    Orders Only on 05/17/2021   Component Date Value    PSA 05/17/2021 <0.01    Orders Only on 03/30/2021   Component Date Value    SARS-CoV-2 03/30/2021 Not Detected     Source 03/30/2021 Anterior nares    Orders Only on 03/15/2021   Component Date Value    Hemoglobin A1C 03/15/2021 6.0*    WBC 03/15/2021 7.4     RBC 03/15/2021 4.77     Hemoglobin 03/15/2021 14.2     Hematocrit 03/15/2021 42.7     MCV 03/15/2021 89.5     MCH 03/15/2021 29.7     MCHC 03/15/2021 33.2     RDW 03/15/2021 13.8     Platelets 75/81/9765 242     Neutrophils % 03/15/2021 49.3     Lymphocytes % 03/15/2021 31.3     Monocytes % 03/15/2021 12.1     Eosinophils % 03/15/2021 6.6     Basophils % 03/15/2021 0.7     Neutrophils Absolute 03/15/2021 3.7     Lymphocytes Absolute 03/15/2021 2.3     Monocytes Absolute 03/15/2021 0.9*    Eosinophils Absolute 03/15/2021 0.5     Basophils Absolute 03/15/2021 0.1     Sodium 03/15/2021 142     Potassium 03/15/2021 4.3     Chloride complications: Coronary artery disease, COPD, Hypertension  Current medications which may produce withdrawal symptoms if withheld perioperatively: none      Plan:     1. Preoperative workup as follows: hemoglobin, hematocrit, electrolytes, creatinine  2. Change in medication regimen before surgery: Take carvedilol on morning of surgery with sip of water, and hold all other medications until after surgery  3. Prophylaxis for cardiac events with perioperative beta-blockers: Currently taking  carvedilol  ACC/AHA indications for pre-operative beta-blocker use:    · Vascular surgery with history of postitive stress test  · Intermediate or high risk surgery with history of CAD   · Intermediate or high risk surgery with multiple clinical predictors of CAD- 2 of the following: history of compensated or prior heart failure, history of cerebrovascular disease, DM, or renal insufficiency    Routine administration of higher-dose, long-acting metoprolol in beta-blocker-naïve patients on the day of surgery, and in the absence of dose titration is associated with an overall increase in mortality. Beta-blockers should be started days to weeks prior to surgery and titrated to pulse < 70.  4. Deep vein thrombosis prophylaxis: regimen to be chosen by surgical team  5.  No contraindications to planned surgery

## 2021-08-12 ENCOUNTER — ANESTHESIA (OUTPATIENT)
Dept: OPERATING ROOM | Age: 66
End: 2021-08-12
Payer: MEDICARE

## 2021-08-12 ENCOUNTER — HOSPITAL ENCOUNTER (OUTPATIENT)
Age: 66
Setting detail: OUTPATIENT SURGERY
Discharge: HOME | End: 2021-08-12
Attending: OTOLARYNGOLOGY | Admitting: OTOLARYNGOLOGY
Payer: MEDICARE

## 2021-08-12 ENCOUNTER — ANESTHESIA EVENT (OUTPATIENT)
Dept: OPERATING ROOM | Age: 66
End: 2021-08-12
Payer: MEDICARE

## 2021-08-12 VITALS — DIASTOLIC BLOOD PRESSURE: 71 MMHG | TEMPERATURE: 97.7 F | OXYGEN SATURATION: 96 % | SYSTOLIC BLOOD PRESSURE: 112 MMHG

## 2021-08-12 VITALS
SYSTOLIC BLOOD PRESSURE: 140 MMHG | TEMPERATURE: 97 F | WEIGHT: 215 LBS | HEART RATE: 60 BPM | HEIGHT: 69 IN | RESPIRATION RATE: 16 BRPM | DIASTOLIC BLOOD PRESSURE: 70 MMHG | BODY MASS INDEX: 31.84 KG/M2 | OXYGEN SATURATION: 100 %

## 2021-08-12 DIAGNOSIS — J36 ABSCESS OF TONSIL: ICD-10-CM

## 2021-08-12 DIAGNOSIS — J35.01 TONSILLITIS, CHRONIC: Primary | ICD-10-CM

## 2021-08-12 PROCEDURE — 88300 SURGICAL PATH GROSS: CPT

## 2021-08-12 PROCEDURE — 3700000000 HC ANESTHESIA ATTENDED CARE: Performed by: OTOLARYNGOLOGY

## 2021-08-12 PROCEDURE — 7100000011 HC PHASE II RECOVERY - ADDTL 15 MIN: Performed by: OTOLARYNGOLOGY

## 2021-08-12 PROCEDURE — 2580000003 HC RX 258: Performed by: ANESTHESIOLOGY

## 2021-08-12 PROCEDURE — 6360000002 HC RX W HCPCS: Performed by: OTOLARYNGOLOGY

## 2021-08-12 PROCEDURE — 3600000012 HC SURGERY LEVEL 2 ADDTL 15MIN: Performed by: OTOLARYNGOLOGY

## 2021-08-12 PROCEDURE — 3700000001 HC ADD 15 MINUTES (ANESTHESIA): Performed by: OTOLARYNGOLOGY

## 2021-08-12 PROCEDURE — 7100000010 HC PHASE II RECOVERY - FIRST 15 MIN: Performed by: OTOLARYNGOLOGY

## 2021-08-12 PROCEDURE — 2500000003 HC RX 250 WO HCPCS: Performed by: NURSE ANESTHETIST, CERTIFIED REGISTERED

## 2021-08-12 PROCEDURE — 3600000002 HC SURGERY LEVEL 2 BASE: Performed by: OTOLARYNGOLOGY

## 2021-08-12 PROCEDURE — 7100000000 HC PACU RECOVERY - FIRST 15 MIN: Performed by: OTOLARYNGOLOGY

## 2021-08-12 PROCEDURE — 6360000002 HC RX W HCPCS: Performed by: NURSE ANESTHETIST, CERTIFIED REGISTERED

## 2021-08-12 PROCEDURE — 88304 TISSUE EXAM BY PATHOLOGIST: CPT

## 2021-08-12 PROCEDURE — 2580000003 HC RX 258: Performed by: OTOLARYNGOLOGY

## 2021-08-12 PROCEDURE — 6360000002 HC RX W HCPCS: Performed by: ANESTHESIOLOGY

## 2021-08-12 PROCEDURE — 2709999900 HC NON-CHARGEABLE SUPPLY: Performed by: OTOLARYNGOLOGY

## 2021-08-12 PROCEDURE — 7100000001 HC PACU RECOVERY - ADDTL 15 MIN: Performed by: OTOLARYNGOLOGY

## 2021-08-12 RX ORDER — HYDROCODONE BITARTRATE AND ACETAMINOPHEN 5; 325 MG/1; MG/1
1 TABLET ORAL PRN
Status: DISCONTINUED | OUTPATIENT
Start: 2021-08-12 | End: 2021-08-12 | Stop reason: HOSPADM

## 2021-08-12 RX ORDER — DIPHENHYDRAMINE HYDROCHLORIDE 50 MG/ML
12.5 INJECTION INTRAMUSCULAR; INTRAVENOUS
Status: DISCONTINUED | OUTPATIENT
Start: 2021-08-12 | End: 2021-08-12 | Stop reason: HOSPADM

## 2021-08-12 RX ORDER — MAGNESIUM HYDROXIDE 1200 MG/15ML
LIQUID ORAL CONTINUOUS PRN
Status: COMPLETED | OUTPATIENT
Start: 2021-08-12 | End: 2021-08-12

## 2021-08-12 RX ORDER — LABETALOL HYDROCHLORIDE 5 MG/ML
5 INJECTION, SOLUTION INTRAVENOUS EVERY 10 MIN PRN
Status: DISCONTINUED | OUTPATIENT
Start: 2021-08-12 | End: 2021-08-12 | Stop reason: HOSPADM

## 2021-08-12 RX ORDER — DEXAMETHASONE SODIUM PHOSPHATE 10 MG/ML
INJECTION INTRAMUSCULAR; INTRAVENOUS PRN
Status: DISCONTINUED | OUTPATIENT
Start: 2021-08-12 | End: 2021-08-12 | Stop reason: SDUPTHER

## 2021-08-12 RX ORDER — LIDOCAINE HYDROCHLORIDE 20 MG/ML
INJECTION, SOLUTION INFILTRATION; PERINEURAL PRN
Status: DISCONTINUED | OUTPATIENT
Start: 2021-08-12 | End: 2021-08-12 | Stop reason: SDUPTHER

## 2021-08-12 RX ORDER — OXYCODONE HCL 5 MG/5 ML
5 SOLUTION, ORAL ORAL EVERY 4 HOURS PRN
Qty: 200 ML | Refills: 0 | Status: SHIPPED | OUTPATIENT
Start: 2021-08-12 | End: 2021-08-19

## 2021-08-12 RX ORDER — 0.9 % SODIUM CHLORIDE 0.9 %
500 INTRAVENOUS SOLUTION INTRAVENOUS
Status: DISCONTINUED | OUTPATIENT
Start: 2021-08-12 | End: 2021-08-12 | Stop reason: HOSPADM

## 2021-08-12 RX ORDER — DIPHENHYDRAMINE HYDROCHLORIDE 50 MG/ML
INJECTION INTRAMUSCULAR; INTRAVENOUS PRN
Status: DISCONTINUED | OUTPATIENT
Start: 2021-08-12 | End: 2021-08-12 | Stop reason: SDUPTHER

## 2021-08-12 RX ORDER — CEPHALEXIN 250 MG/5ML
250 POWDER, FOR SUSPENSION ORAL 4 TIMES DAILY
Qty: 200 ML | Refills: 0 | Status: SHIPPED | OUTPATIENT
Start: 2021-08-12 | End: 2021-08-22

## 2021-08-12 RX ORDER — FENTANYL CITRATE 50 UG/ML
INJECTION, SOLUTION INTRAMUSCULAR; INTRAVENOUS PRN
Status: DISCONTINUED | OUTPATIENT
Start: 2021-08-12 | End: 2021-08-12 | Stop reason: SDUPTHER

## 2021-08-12 RX ORDER — ONDANSETRON 2 MG/ML
4 INJECTION INTRAMUSCULAR; INTRAVENOUS
Status: DISCONTINUED | OUTPATIENT
Start: 2021-08-12 | End: 2021-08-12 | Stop reason: HOSPADM

## 2021-08-12 RX ORDER — FENTANYL CITRATE 50 UG/ML
25 INJECTION, SOLUTION INTRAMUSCULAR; INTRAVENOUS EVERY 5 MIN PRN
Status: DISCONTINUED | OUTPATIENT
Start: 2021-08-12 | End: 2021-08-12 | Stop reason: HOSPADM

## 2021-08-12 RX ORDER — ASPIRIN 81 MG/1
81 TABLET ORAL DAILY
COMMUNITY

## 2021-08-12 RX ORDER — METOCLOPRAMIDE HYDROCHLORIDE 5 MG/ML
10 INJECTION INTRAMUSCULAR; INTRAVENOUS
Status: DISCONTINUED | OUTPATIENT
Start: 2021-08-12 | End: 2021-08-12 | Stop reason: HOSPADM

## 2021-08-12 RX ORDER — SODIUM CHLORIDE 9 MG/ML
25 INJECTION, SOLUTION INTRAVENOUS PRN
Status: DISCONTINUED | OUTPATIENT
Start: 2021-08-12 | End: 2021-08-12 | Stop reason: HOSPADM

## 2021-08-12 RX ORDER — PROPOFOL 10 MG/ML
INJECTION, EMULSION INTRAVENOUS PRN
Status: DISCONTINUED | OUTPATIENT
Start: 2021-08-12 | End: 2021-08-12 | Stop reason: SDUPTHER

## 2021-08-12 RX ORDER — MIDAZOLAM HYDROCHLORIDE 1 MG/ML
INJECTION INTRAMUSCULAR; INTRAVENOUS PRN
Status: DISCONTINUED | OUTPATIENT
Start: 2021-08-12 | End: 2021-08-12 | Stop reason: SDUPTHER

## 2021-08-12 RX ORDER — MEPERIDINE HYDROCHLORIDE 25 MG/ML
12.5 INJECTION INTRAMUSCULAR; INTRAVENOUS; SUBCUTANEOUS EVERY 5 MIN PRN
Status: DISCONTINUED | OUTPATIENT
Start: 2021-08-12 | End: 2021-08-12 | Stop reason: HOSPADM

## 2021-08-12 RX ORDER — HYDROCODONE BITARTRATE AND ACETAMINOPHEN 5; 325 MG/1; MG/1
2 TABLET ORAL PRN
Status: DISCONTINUED | OUTPATIENT
Start: 2021-08-12 | End: 2021-08-12 | Stop reason: HOSPADM

## 2021-08-12 RX ORDER — SODIUM CHLORIDE 0.9 % (FLUSH) 0.9 %
5-40 SYRINGE (ML) INJECTION PRN
Status: DISCONTINUED | OUTPATIENT
Start: 2021-08-12 | End: 2021-08-12 | Stop reason: HOSPADM

## 2021-08-12 RX ORDER — ROCURONIUM BROMIDE 10 MG/ML
INJECTION, SOLUTION INTRAVENOUS PRN
Status: DISCONTINUED | OUTPATIENT
Start: 2021-08-12 | End: 2021-08-12 | Stop reason: SDUPTHER

## 2021-08-12 RX ORDER — SODIUM CHLORIDE 0.9 % (FLUSH) 0.9 %
5-40 SYRINGE (ML) INJECTION EVERY 12 HOURS SCHEDULED
Status: DISCONTINUED | OUTPATIENT
Start: 2021-08-12 | End: 2021-08-12 | Stop reason: HOSPADM

## 2021-08-12 RX ORDER — SODIUM CHLORIDE, SODIUM LACTATE, POTASSIUM CHLORIDE, CALCIUM CHLORIDE 600; 310; 30; 20 MG/100ML; MG/100ML; MG/100ML; MG/100ML
INJECTION, SOLUTION INTRAVENOUS CONTINUOUS
Status: DISCONTINUED | OUTPATIENT
Start: 2021-08-12 | End: 2021-08-12 | Stop reason: HOSPADM

## 2021-08-12 RX ORDER — ONDANSETRON 2 MG/ML
INJECTION INTRAMUSCULAR; INTRAVENOUS PRN
Status: DISCONTINUED | OUTPATIENT
Start: 2021-08-12 | End: 2021-08-12 | Stop reason: SDUPTHER

## 2021-08-12 RX ADMIN — ROCURONIUM BROMIDE 40 MG: 10 INJECTION INTRAVENOUS at 11:59

## 2021-08-12 RX ADMIN — DIPHENHYDRAMINE HYDROCHLORIDE 12.5 MG: 50 INJECTION INTRAMUSCULAR; INTRAVENOUS at 12:16

## 2021-08-12 RX ADMIN — PROPOFOL 200 MG: 10 INJECTION, EMULSION INTRAVENOUS at 11:58

## 2021-08-12 RX ADMIN — LIDOCAINE HYDROCHLORIDE 100 MG: 20 INJECTION, SOLUTION INFILTRATION; PERINEURAL at 11:58

## 2021-08-12 RX ADMIN — FENTANYL CITRATE 25 MCG: 50 INJECTION, SOLUTION INTRAMUSCULAR; INTRAVENOUS at 13:51

## 2021-08-12 RX ADMIN — ROCURONIUM BROMIDE 30 MG: 10 INJECTION INTRAVENOUS at 12:11

## 2021-08-12 RX ADMIN — CEFAZOLIN 2000 MG: 10 INJECTION, POWDER, FOR SOLUTION INTRAVENOUS at 12:03

## 2021-08-12 RX ADMIN — FENTANYL CITRATE 50 MCG: 50 INJECTION, SOLUTION INTRAMUSCULAR; INTRAVENOUS at 11:56

## 2021-08-12 RX ADMIN — DEXAMETHASONE SODIUM PHOSPHATE 10 MG: 10 INJECTION INTRAMUSCULAR; INTRAVENOUS at 12:04

## 2021-08-12 RX ADMIN — SODIUM CHLORIDE, POTASSIUM CHLORIDE, SODIUM LACTATE AND CALCIUM CHLORIDE: 600; 310; 30; 20 INJECTION, SOLUTION INTRAVENOUS at 10:31

## 2021-08-12 RX ADMIN — SUGAMMADEX 200 MG: 100 INJECTION, SOLUTION INTRAVENOUS at 12:38

## 2021-08-12 RX ADMIN — ONDANSETRON 4 MG: 2 INJECTION INTRAMUSCULAR; INTRAVENOUS at 12:31

## 2021-08-12 RX ADMIN — FENTANYL CITRATE 25 MCG: 50 INJECTION, SOLUTION INTRAMUSCULAR; INTRAVENOUS at 14:04

## 2021-08-12 RX ADMIN — MIDAZOLAM HYDROCHLORIDE 2 MG: 2 INJECTION, SOLUTION INTRAMUSCULAR; INTRAVENOUS at 11:51

## 2021-08-12 RX ADMIN — FENTANYL CITRATE 50 MCG: 50 INJECTION, SOLUTION INTRAMUSCULAR; INTRAVENOUS at 11:53

## 2021-08-12 ASSESSMENT — PULMONARY FUNCTION TESTS
PIF_VALUE: 0
PIF_VALUE: 22
PIF_VALUE: 23
PIF_VALUE: 14
PIF_VALUE: 0
PIF_VALUE: 22
PIF_VALUE: 21
PIF_VALUE: 22
PIF_VALUE: 13
PIF_VALUE: 14
PIF_VALUE: 22
PIF_VALUE: 21
PIF_VALUE: 13
PIF_VALUE: 23
PIF_VALUE: 1
PIF_VALUE: 22
PIF_VALUE: 13
PIF_VALUE: 22
PIF_VALUE: 2
PIF_VALUE: 21
PIF_VALUE: 2
PIF_VALUE: 13
PIF_VALUE: 13
PIF_VALUE: 21
PIF_VALUE: 0
PIF_VALUE: 20
PIF_VALUE: 21
PIF_VALUE: 22
PIF_VALUE: 2
PIF_VALUE: 21
PIF_VALUE: 23
PIF_VALUE: 22
PIF_VALUE: 4
PIF_VALUE: 26
PIF_VALUE: 21
PIF_VALUE: 21
PIF_VALUE: 0
PIF_VALUE: 21
PIF_VALUE: 21
PIF_VALUE: 22
PIF_VALUE: 21
PIF_VALUE: 14
PIF_VALUE: 13
PIF_VALUE: 21
PIF_VALUE: 13
PIF_VALUE: 2
PIF_VALUE: 13
PIF_VALUE: 21
PIF_VALUE: 22
PIF_VALUE: 22
PIF_VALUE: 42
PIF_VALUE: 12
PIF_VALUE: 13
PIF_VALUE: 0

## 2021-08-12 ASSESSMENT — PAIN SCALES - GENERAL
PAINLEVEL_OUTOF10: 6
PAINLEVEL_OUTOF10: 6
PAINLEVEL_OUTOF10: 4
PAINLEVEL_OUTOF10: 3

## 2021-08-12 ASSESSMENT — PAIN - FUNCTIONAL ASSESSMENT: PAIN_FUNCTIONAL_ASSESSMENT: 0-10

## 2021-08-12 ASSESSMENT — PAIN DESCRIPTION - PAIN TYPE: TYPE: SURGICAL PAIN

## 2021-08-12 ASSESSMENT — PAIN DESCRIPTION - LOCATION
LOCATION: THROAT
LOCATION: THROAT

## 2021-08-12 ASSESSMENT — ENCOUNTER SYMPTOMS: SHORTNESS OF BREATH: 1

## 2021-08-12 NOTE — ANESTHESIA PRE PROCEDURE
No current outpatient medications on file. Facility-Administered Medications Ordered in Other Visits   Medication Dose Route Frequency Provider Last Rate Last Admin    ceFAZolin (ANCEF) 2000 mg in dextrose 5 % 100 mL IVPB  2,000 mg Intravenous On Call to [address] Linnea Hernandez MD        lactated ringers infusion   Intravenous Continuous Jayde Alfonso MD           Allergies:     Allergies   Allergen Reactions    Demerol Hcl [Meperidine] Nausea Only     migraines    Valium [Diazepam] Other (See Comments)     MIGRAINES       Problem List:    Patient Active Problem List   Diagnosis Code    Lumbosacral spondylosis without myelopathy B77.542    Umbilical hernia without obstruction and without gangrene K42.9    Diastasis recti M62.08    Other bursal cyst of elbow M71.329    Prostate cancer ([facility] C61    Recurrent ventral hernia K43.2    Dyslipidemia E78.5    Essential hypertension I10    Acute on chronic diastolic heart failure (HCC) I50.33    Shortness of breath R06.02    RAMOS (dyspnea on exertion) R06.00    Coronary artery disease involving native coronary artery of native heart without angina pectoris I25.10    Chronic obstructive pulmonary disease (HCC) J44.9    Bilateral carotid bruits R09.89    Esophageal dysphagia R13.10    Esophageal stricture K22.2    Esophagitis K20.90    Chronic gastritis without bleeding K29.50    Sedative, hypnotic or anxiolytic use, unspecified with unspecified sedative, hypnotic or anxiolytic-induced disorder F13.99    Sedative, hypnotic or anxiolytic dependence with unspecified sedative, hypnotic or anxiolytic-induced disorder F13.29    Sedative, hypnotic or anxiolytic dependence, uncomplicated Q85.04    Tonsillitis, chronic J35.01    Abscess of tonsil J36    Acquired deviated nasal septum J34.2       Past Medical History:        Diagnosis Date    Cancer ([facility]     CHF (congestive heart failure) (HCC)     Chicken pox     Chronic back pain     [facility]     Hyperlipidemia     Hypertension     Hypothyroidism     Measles     Mumps     Osteoarthritis     Pneumonia     Type 2 diabetes mellitus without complication (HCC)     prediabetes       Past Surgical History:        Procedure Laterality Date    CARDIAC CATHETERIZATION          CYST REMOVAL      back of neck    CYST REMOVAL Left 3/4/16    Dr Rafaela Linares  2018    HERNIA REPAIR      PROSTATE SURGERY  2018    Biopsy    PROSTATECTOMY      UMBILICAL HERNIA REPAIR  3/4/16    Dr Alin Morocho GASTROINTESTINAL ENDOSCOPY N/A 2021    EGD ESOPHAGOGASTRODUODENOSCOPY WITH BIOPSY AND DILATION performed by Danyel Dawson MD at [facility] N/A 2019    REPAIR OF RECURRENT VENTRAL HERNIA performed by Lisa Pacheco MD at [place] [address] History:    Social History     Tobacco Use    Smoking status: Former Smoker     Packs/day: 1.50     Years: 40.00     Pack years: 60.00     Types: Cigarettes     Quit date: 2018     Years since quittin.7    Smokeless tobacco: Never Used   Substance Use Topics    Alcohol use: Not Currently                                Counseling given: Not Answered      Vital Signs (Current): There were no vitals filed for this visit.                                            BP Readings from Last 3 Encounters:   21 127/71   21 118/64   21 131/78       NPO Status:                                                                                 BMI:   Wt Readings from Last 3 Encounters:   21 215 lb (97.5 kg)   21 215 lb 12.8 oz (97.9 kg)   21 215 lb (97.5 kg)     There is no height or weight on file to calculate BMI.    CBC:   Lab Results   Component Value Date    WBC 6.6 2021    RBC 4.55 2021    HGB 12.8 2021    HCT 39.7 2021    MCV 87.3 2021    RDW 14.5 2021     2021       CMP:   Lab Results   Component Value Date     08/09/2021    K 4.4 08/09/2021     08/09/2021    CO2 25 08/09/2021    BUN 20 08/09/2021    CREATININE 0.91 08/09/2021    GFRAA >60.0 08/09/2021    LABGLOM >60.0 08/09/2021    GLUCOSE 81 08/09/2021    PROT 6.8 06/15/2021    CALCIUM 9.6 08/09/2021    BILITOT 0.6 06/15/2021    ALKPHOS 100 06/15/2021    AST 27 06/15/2021    ALT 20 06/15/2021       POC Tests: No results for input(s): POCGLU, POCNA, POCK, POCCL, POCBUN, POCHEMO, POCHCT in the last 72 hours. Coags: No results found for: PROTIME, INR, APTT    HCG (If Applicable): No results found for: PREGTESTUR, PREGSERUM, HCG, HCGQUANT     ABGs: No results found for: PHART, PO2ART, UJR0CLG, OKY6SKS, BEART, R0VRCEAR     Type & Screen (If Applicable):  No results found for: LABABO, LABRH    Anesthesia Evaluation    Airway: Mallampati: II  TM distance: >3 FB   Neck ROM: full  Mouth opening: > = 3 FB Dental:          Pulmonary:   (+) COPD:  shortness of breath:  sleep apnea:                             Cardiovascular:    (+) hypertension:, CAD:, RAMOS:,       ECG reviewed      Echocardiogram reviewed  Stress test reviewed       Beta Blocker:  Dose within 24 Hrs         Neuro/Psych:               GI/Hepatic/Renal:             Endo/Other:    (+) hypothyroidism::., .                 Abdominal:             Vascular: Other Findings:               Anesthesia Plan      general     ASA 3       Induction: intravenous. MIPS: Postoperative opioids intended and Prophylactic antiemetics administered. Anesthetic plan and risks discussed with patient. Plan discussed with CRNA.     Attending anesthesiologist reviewed and agrees with Pre Eval content              Juan Berger MD   8/12/2021

## 2021-08-12 NOTE — BRIEF OP NOTE
Brief Postoperative Note      Patient: Efe Lucio  YOB: 1955  MRN: [id number]    Date of Procedure: 8/12/2021    Pre-Op Diagnosis: ABSCESS OF TONSILS, TONSILLITIS    Post-Op Diagnosis: same       Procedure(s):  TONSILLECTOMY    Surgeon(s):  Roman Cogan, MD    Assistant:  First Assistant: Prema Pedraza    Anesthesia: General    Estimated Blood Loss (mL): 10 cc}    Complications: tooth chips ( patient has multiple cariuos teeth    Specimens:   ID Type Source Tests Collected by Time Destination   A : right tonsil Tissue Tonsil SURGICAL PATHOLOGY Roman Cogan, MD 8/12/2021 1216    B : LEFT TONSIL Tissue Tonsil SURGICAL PATHOLOGY Roman Cogan, MD 8/12/2021 1217    C : [address] Tissue Tooth SURGICAL PATHOLOGY Roman Cogan, MD 8/12/2021 1237        Implants:  none    Drains: none    Findings: small tosils very adherent difficult to dissect; piece of chipeed tooth came off    Electronically signed by Roman Cogan, MD on 8/12/2021 at 12:40 PM

## 2021-08-12 NOTE — ANESTHESIA POSTPROCEDURE EVALUATION
Department of Anesthesiology  Postprocedure Note    Patient: Arlene Smallwood  MRN: [id number]  YOB: 1955  Date of evaluation: 8/12/2021  Time:  12:56 PM     Procedure Summary     Date: 08/12/21 Room / Location: [facility] OR 09 / [facility]    Anesthesia Start: 7220 Anesthesia Stop: 2054    Procedure: TONSILLECTOMY (N/A ) Diagnosis: (ABSCESS OF TONSILS, TONSILLITIS)    Surgeons: Temi Santos MD Responsible Provider: Tisha Georges MD    Anesthesia Type: general ASA Status: 3          Anesthesia Type: general    Joann Phase I:      Joann Phase II:      Last vitals: Reviewed and per EMR flowsheets.        Anesthesia Post Evaluation    Patient location during evaluation: PACU  Patient participation: waiting for patient participation  Level of consciousness: awake and confused  Pain score: 0  Airway patency: patent  Nausea & Vomiting: no vomiting and no nausea  Complications: no  Cardiovascular status: hemodynamically stable  Respiratory status: face mask and acceptable  Hydration status: stable

## 2021-08-12 NOTE — PROGRESS NOTES
DURING PROCEDURE, DR. Марина An KNOCKED OUT A BICUSPID TOOTH ON THE LEFT. DR. Марина An ORDERED FOR TOOTH  TO BE SENT FOR IDENTIFICATION.

## 2021-08-12 NOTE — OP NOTE
[address]                      [address], [address] [facility]                                OPERATIVE REPORT    PATIENT NAME: Rancho Wong                     :        1955  MED REC NO:   [id number]                            ROOM:  ACCOUNT NO:   [de-identified]                           ADMIT DATE: 2021  PROVIDER:     Mario Carvalho MD    DATE OF PROCEDURE:  2021    PREOPERATIVE DIAGNOSES:  Status post peritonsillar abscess with chronic  tonsillitis and history of chronic odynophagia. POSTOPERATIVE DIAGNOSES:  Status post peritonsillar abscess with chronic  tonsillitis and history of chronic odynophagia. PROCEDURE:  Tonsillectomy. SURGEON:  Mario Carvalho MD    SCRUB NURSE:  Shelia Pike. ANESTHESIA:  General.    ANESTHESIOLOGIST:  Dr. Srikanth Lyman. CLINICAL INDICATION:  This is a 80-year-old white male who was initially  referred by Dr. Aaron Guajardo due to longstanding history of  odynophagia and frequent sore throat. Apparently, he had been diagnosed  to have peritonsillar abscess lately and was treated and hence then had  been having difficulty in swallowing because of the severe pain around  the tonsillar area. He was referred to me for definitive diagnosis and  treatment. During the initial visit, the tonsils were really enlarged  on initial exam, but they were cryptic with few tonsilliths noted and it  was very firm. Due to the chronic symptom of odynophagia and frequent  tonsillar pain and tenderness and plus the fact that he had a past  history of peritonsillar abscess, it was decided that the tonsils were  removed. It was further informed to the patient that the tonsillectomy  in this area due to the previous infection will be a little bit more  difficult than usual and the patient understood that.     OPERATIVE PROCEDURE:  The patient was placed in supine position and  general endotracheal intubation anesthesia was satisfactorily inducted. Then the McIvor mouth gag was put in place with subsequent visualization  of the oropharyngeal cavity. The tonsils on the right side was noted to  be very small, but very cryptic and few tonsilliths noted in the crypts  and was firm by palpation. The superior portion was then held with the  tenaculum forceps and this was dissected with the use of a Bovie cautery  dissection and being careful in not going down deeper. The tonsillar  tissue was removed in toto. Hemostasis was obtained with Bovie  cauterization. The left tonsil was removed in similar fashion. During  the process of evaluation, it was noted that the chip of his tooth was  noted in the _____ bulbar area and this was removed with the use of a  forceps. Further evaluation did not show any further finding of any  chip in the mouth. The surgery was terminated. The patient tolerated  the procedure well and he was wheeled to the postanesthesia care unit in  satisfactory condition.         Kamari Mayorga MD    D: 08/12/2021 12:56:59       T: 08/12/2021 13:02:13     [id number]  Job#: [id number]     Doc#: [id number]    CC:  Roya Astudillo MD

## 2021-08-17 ENCOUNTER — OFFICE VISIT (OUTPATIENT)
Dept: ENT CLINIC | Age: 66
End: 2021-08-17

## 2021-08-17 VITALS
OXYGEN SATURATION: 97 % | HEART RATE: 77 BPM | DIASTOLIC BLOOD PRESSURE: 80 MMHG | WEIGHT: 201 LBS | SYSTOLIC BLOOD PRESSURE: 130 MMHG | BODY MASS INDEX: 29.68 KG/M2 | RESPIRATION RATE: 16 BRPM | TEMPERATURE: 96.5 F

## 2021-08-17 DIAGNOSIS — J36 ABSCESS OF TONSIL: Primary | ICD-10-CM

## 2021-08-17 PROCEDURE — 99024 POSTOP FOLLOW-UP VISIT: CPT | Performed by: OTOLARYNGOLOGY

## 2021-08-17 ASSESSMENT — ENCOUNTER SYMPTOMS
SINUS PRESSURE: 0
VOICE CHANGE: 0
FACIAL SWELLING: 0
RHINORRHEA: 0
TROUBLE SWALLOWING: 1
SINUS PAIN: 0
SORE THROAT: 1

## 2021-08-17 NOTE — PROGRESS NOTES
Subjective:      Patient ID: Gennaro Mckeon is a 72 y.o. male who presents today for:  Chief Complaint   Patient presents with    Post-Op Check     PERITONSILLAR ABCESS 8/12/21    Dysphagia       HPI: Status post tonsillectomy intraoperatively patient has multiple carious teeth that needs to be taken cared of intraoperatively in the process of putting the McIvor mouth retractor a carious tooth was chipped the chipped tooth part was sent to the lab for identification purposes    Past Medical History:   Diagnosis Date    Cancer [facility])     [facility]est[facility]     Chicken pox     Chronic back pain     Emphysema of lung ([facility]     Hyperlipidemia     Hypertension     Hypothyroidism     Measles     Mumps     Osteoarthritis     Pneumonia     Type 2 diabetes mellitus without complication ([facility]     prediabetes     Past Surgical History:   Procedure Laterality Date    CARDIAC CATHETERIZATION      2019    CYST REMOVAL      back of neck    CYST REMOVAL Left 3/4/16    Dr Carol Hernandez  07/2018    HERNIA REPAIR      PROSTATE SURGERY  07/2018    Biopsy    PROSTATECTOMY  11/16/2018    TONSILLECTOMY N/A 8/12/2021    TONSILLECTOMY performed by Johan Márquez MD at [address] [place] [address]  3/4/16    Dr Albertina Lundy N/A 4/5/2021    EGD ESOPHAGOGASTRODUODENOSCOPY WITH BIOPSY AND DILATION performed by Eunice Holbrook MD at [facility] N/A 11/18/2019    REPAIR OF RECURRENT VENTRAL HERNIA performed by Vinod Hutton MD at [address] [place] [address] History     Socioeconomic History    Marital status:      Spouse name: Not on file    Number of children: Not on file    Years of education: Not on file    Highest education level: Not on file   Occupational History    Not on file   Tobacco Use    Smoking status: Former Smoker     Packs/day: 1.50     Years: 40.00     Pack years: 60.00     Types: rhinorrhea, sinus pressure, sinus pain, sneezing, tinnitus and voice change. All other systems reviewed and are negative. Objective:   /80 (Site: Left Upper Arm, Position: Sitting, Cuff Size: Medium Adult)   Pulse 77   Temp 96.5 °F (35.8 °C) (Temporal)   Resp 16   Wt 201 lb (91.2 kg)   SpO2 97%   BMI 29.68 kg/m²     Physical Exam     Head and neck: Normocephalic no nuchal rigidity no lymphadenopathy no venous engorgement    Assessment:       Diagnosis Orders   1.  Abscess of tonsil       Post tonsillectomy   Plan:        Continue taking all medications prescribed  Advised to see a dentist to pull his carious teeth  Swish but not gargle normal saline solution  Return to office in 1 week    Navya Branch MD

## 2021-08-31 ENCOUNTER — OFFICE VISIT (OUTPATIENT)
Dept: CARDIOLOGY CLINIC | Age: 66
End: 2021-08-31
Payer: MEDICARE

## 2021-08-31 VITALS
HEIGHT: 69 IN | WEIGHT: 207 LBS | SYSTOLIC BLOOD PRESSURE: 113 MMHG | DIASTOLIC BLOOD PRESSURE: 65 MMHG | OXYGEN SATURATION: 97 % | HEART RATE: 62 BPM | BODY MASS INDEX: 30.66 KG/M2 | RESPIRATION RATE: 16 BRPM

## 2021-08-31 DIAGNOSIS — I20.9 ANGINA PECTORIS, UNSPECIFIED (HCC): ICD-10-CM

## 2021-08-31 DIAGNOSIS — I25.119 ATHEROSCLEROSIS OF NATIVE CORONARY ARTERY OF NATIVE HEART WITH ANGINA PECTORIS (HCC): ICD-10-CM

## 2021-08-31 DIAGNOSIS — I25.10 CORONARY ARTERY DISEASE INVOLVING NATIVE CORONARY ARTERY OF NATIVE HEART WITHOUT ANGINA PECTORIS: ICD-10-CM

## 2021-08-31 DIAGNOSIS — E78.5 DYSLIPIDEMIA: ICD-10-CM

## 2021-08-31 DIAGNOSIS — I10 ESSENTIAL HYPERTENSION: ICD-10-CM

## 2021-08-31 DIAGNOSIS — I50.33 ACUTE ON CHRONIC DIASTOLIC HEART FAILURE (HCC): Primary | ICD-10-CM

## 2021-08-31 PROCEDURE — 99214 OFFICE O/P EST MOD 30 MIN: CPT | Performed by: INTERNAL MEDICINE

## 2021-08-31 RX ORDER — CARVEDILOL 6.25 MG/1
6.25 TABLET ORAL 2 TIMES DAILY
Qty: 180 TABLET | Refills: 3 | Status: SHIPPED | OUTPATIENT
Start: 2021-08-31 | End: 2021-11-16

## 2021-08-31 RX ORDER — SILDENAFIL 50 MG/1
50 TABLET, FILM COATED ORAL PRN
Qty: 10 TABLET | Refills: 5 | Status: SHIPPED | OUTPATIENT
Start: 2021-08-31 | End: 2021-12-20

## 2021-08-31 RX ORDER — ISOSORBIDE MONONITRATE 30 MG/1
30 TABLET, EXTENDED RELEASE ORAL DAILY
Qty: 90 TABLET | Refills: 3 | Status: SHIPPED | OUTPATIENT
Start: 2021-08-31 | End: 2021-08-31

## 2021-08-31 ASSESSMENT — ENCOUNTER SYMPTOMS
CHEST TIGHTNESS: 0
BACK PAIN: 1
BLOOD IN STOOL: 0
WHEEZING: 0
EYES NEGATIVE: 1
SHORTNESS OF BREATH: 1
COUGH: 0
GASTROINTESTINAL NEGATIVE: 1
NAUSEA: 0
STRIDOR: 0

## 2021-08-31 NOTE — PROGRESS NOTES
Office Visit. Patient: Armaan Jason  YOB: 1955  MRN: [id number]    Chief Complaint: RAMOS LE edema Orthopnea  Chief Complaint   Patient presents with    Follow-up     4 month    Coronary Artery Disease    Hypertension    Congestive Heart Failure       CV Data:  7/2019 echo EF 60  7/2019 Dobut stress echo negative   LAD 40-50% sequential, RCA  mid. LVEF 55, EDP 14, PCWP 14 CO 7.2 L/min  10/2019 CUS- mild  10/2010 Abd US negative. 11/2020 CUS mild     Subjective/HPI RAMOS is getting worse. Can't do much due to knee and back pain. Can't lie flat due to sob    10/16/2019: still sob but better since BB and Imdur. No cp. No bleed no falls. 1/17/2020 no cp occ ramos no falls occ nose bleed no falls     6/19/2020 Patient and/or health care decision maker is aware that that he/she may receive a bill for this telephone service, depending on his insurance coverage, and has provided verbal consent to proceed. This visit was completed via telephone. Total time 13 minutes. Still has some ramos but better. No cp no falls no bleed little edema. 7/27/2020 still RAMOS. 10/28/2020 no RAMOS No CP active at home. No falls no bleed. 1/28/21 still sob occ cp but not bad. No NTG needed. No bleed no falls     4/29/21 lately occ LE edema no cp no sob no bleed. Takes mes. Recent EGD     8/31/21 No cp no sob no falls no bleed. Lost near 30LBS by deiting. Takes med    Stopped smoking 11/2018 with prior 2 ppd  Disabled  from knee and back.      EKG: SR 61    Past Medical History:   Diagnosis Date    Cancer ([address]     CHF (congestive heart failure) (HCC)     Chicken pox     Chronic back pain     Emphysema of lung ([address]     Hyperlipidemia     Hypertension     Hypothyroidism     Measles     Mumps     Osteoarthritis     Pneumonia     Type 2 diabetes mellitus without complication ([facility]     prediabetes       Past Surgical History:   Procedure Laterality Date    CARDIAC  Days of Exercise per Week:     Minutes of Exercise per Session:    Stress:     Feeling of Stress :    Social Connections:     Frequency of Communication with Friends and Family:     Frequency of Social Gatherings with Friends and Family:     Attends Yazidism Services:     Active Member of Clubs or Organizations:     Attends Club or Organization Meetings:     Marital Status:    Intimate Partner Violence:     Fear of Current or Ex-Partner:     Emotionally Abused:     Physically Abused:     Sexually Abused: Allergies   Allergen Reactions    Demerol Hcl [Meperidine] Nausea Only     migraines    Valium [Diazepam] Other (See Comments)     MIGRAINES       Current Outpatient Medications   Medication Sig Dispense Refill    carvedilol (COREG) 6.25 MG tablet Take 1 tablet by mouth 2 times daily 180 tablet 3    sildenafil (VIAGRA) 50 MG tablet Take 1 tablet by mouth as needed for Erectile Dysfunction 10 tablet 5    aspirin 81 MG EC tablet Take 81 mg by mouth daily      LORazepam (ATIVAN) 1 MG tablet       atorvastatin (LIPITOR) 40 MG tablet TAKE ONE TABLET BY MOUTH EVERY DAY 90 tablet 0    levothyroxine (SYNTHROID) 75 MCG tablet 1 po q day 90 tablet 1    omeprazole (PRILOSEC) 20 MG delayed release capsule Take 1 capsule by mouth Daily 90 capsule 3    omeprazole (PRILOSEC) 20 MG delayed release capsule Take 1 capsule by mouth Daily 30 capsule 2    Potassium 99 MG TABS Take by mouth OTC potassium      tiotropium (SPIRIVA RESPIMAT) 2.5 MCG/ACT AERS inhaler Inhale 2 puffs into the lungs daily 1 Inhaler 5    Glucosamine-Chondroitin--300-250 MG TABS Take 1 tablet by mouth Daily      acetaminophen (TYLENOL) 500 MG tablet Take 1,000 mg by mouth daily At night      Naproxen Sodium (ALEVE) 220 MG CAPS Take 1 capsule by mouth daily      vitamin C (ASCORBIC ACID) 500 MG tablet Take 500 mg by mouth daily       No current facility-administered medications for this visit.        Review of Systems:   Review of Systems   Constitutional: Negative. Negative for diaphoresis and fatigue. HENT: Negative. Eyes: Negative. Respiratory: Positive for shortness of breath. Negative for cough, chest tightness, wheezing and stridor. Cardiovascular: Positive for leg swelling. Negative for chest pain and palpitations. Gastrointestinal: Negative. Negative for blood in stool and nausea. Genitourinary: Negative. Musculoskeletal: Positive for arthralgias, back pain and gait problem. Skin: Negative. Neurological: Negative for dizziness, syncope, weakness and light-headedness. Hematological: Negative. Psychiatric/Behavioral: Negative. Physical Examination:    /65 (Site: Right Upper Arm, Position: Sitting, Cuff Size: Medium Adult)   Pulse 62   Resp 16   Ht 5' 9\" (1.753 m)   Wt 207 lb (93.9 kg)   SpO2 97%   BMI 30.57 kg/m²    Physical Exam   Constitutional: He appears healthy. No distress. HENT:   Normal cephalic and Atraumatic   Eyes: Pupils are equal, round, and reactive to light. Neck: Thyroid normal. No JVD present. No neck adenopathy. No thyromegaly present. Cardiovascular: Normal rate, regular rhythm, intact distal pulses and normal pulses. Murmur heard. Pulmonary/Chest: Effort normal and breath sounds normal. He has no wheezes. He has no rales. He exhibits no tenderness. Abdominal: Soft. Bowel sounds are normal. There is no abdominal tenderness. Musculoskeletal:         General: No tenderness or edema. Normal range of motion. Cervical back: Normal range of motion and neck supple. Neurological: He is alert and oriented to person, place, and time. Skin: Skin is warm. No cyanosis. Nails show no clubbing.        LABS:  CBC:   Lab Results   Component Value Date    WBC 6.6 08/09/2021    RBC 4.55 08/09/2021    HGB 12.8 08/09/2021    HCT 39.7 08/09/2021    MCV 87.3 08/09/2021    MCH 28.2 08/09/2021    MCHC 32.3 08/09/2021    RDW 14.5 08/09/2021     08/09/2021     Lipids:  Lab Results   Component Value Date    CHOL 130 06/15/2021    CHOL 154 03/15/2021    CHOL 127 05/27/2020     Lab Results   Component Value Date    TRIG 105 06/15/2021    TRIG 129 03/15/2021    TRIG 73 05/27/2020     Lab Results   Component Value Date    HDL 43 06/15/2021    HDL 43 03/15/2021    HDL 47 05/27/2020     Lab Results   Component Value Date    LDLCALC 66 06/15/2021    [address] 03/15/2021    LDLCALC 65 05/27/2020     No results found for: LABVLDL, VLDL  No results found for: CHOLHDLRATIO  CMP:    Lab Results   Component Value Date     08/09/2021    K 4.4 08/09/2021     08/09/2021    CO2 25 08/09/2021    BUN 20 08/09/2021    CREATININE 0.91 08/09/2021    GFRAA >60.0 08/09/2021    LABGLOM >60.0 08/09/2021    GLUCOSE 81 08/09/2021    PROT 6.8 06/15/2021    LABALBU 4.2 06/15/2021    CALCIUM 9.6 08/09/2021    BILITOT 0.6 06/15/2021    ALKPHOS 100 06/15/2021    AST 27 06/15/2021    ALT 20 06/15/2021     BMP:    Lab Results   Component Value Date     08/09/2021    K 4.4 08/09/2021     08/09/2021    CO2 25 08/09/2021    BUN 20 08/09/2021    LABALBU 4.2 06/15/2021    CREATININE 0.91 08/09/2021    CALCIUM 9.6 08/09/2021    GFRAA >60.0 08/09/2021    LABGLOM >60.0 08/09/2021    GLUCOSE 81 08/09/2021     Magnesium:  No results found for: MG  TSH:  Lab Results   Component Value Date    TSH 2.080 06/15/2021             Patient Active Problem List   Diagnosis    Lumbosacral spondylosis without myelopathy    Umbilical hernia without obstruction and without gangrene    Diastasis recti    Other bursal cyst of elbow    Prostate cancer ([facility] [address] [facility]    Recurrent ventral hernia    Dyslipidemia    Essential hypertension    Acute on chronic diastolic heart failure (HCC)    Shortness of breath    RAMOS (dyspnea on exertion)    Coronary artery disease involving native coronary artery of native heart without angina pectoris    Chronic obstructive pulmonary disease ([facility] [address] [facility]    Bilateral carotid bruits    Esophageal dysphagia    Esophageal stricture    Esophagitis    Chronic gastritis without bleeding    Sedative, hypnotic or anxiolytic use, unspecified with unspecified sedative, hypnotic or anxiolytic-induced disorder    Sedative, hypnotic or anxiolytic dependence with unspecified sedative, hypnotic or anxiolytic-induced disorder    Sedative, hypnotic or anxiolytic dependence, uncomplicated    Tonsillitis, chronic    Abscess of tonsil    Acquired deviated nasal septum    Angina pectoris, unspecified       Medications Discontinued During This Encounter   Medication Reason    isosorbide mononitrate (IMDUR) 30 MG extended release tablet REORDER    carvedilol (COREG) 6.25 MG tablet REORDER    isosorbide mononitrate (IMDUR) 30 MG extended release tablet        Modified Medications    Modified Medication Previous Medication    CARVEDILOL (COREG) 6.25 MG TABLET carvedilol (COREG) 6.25 MG tablet       Take 1 tablet by mouth 2 times daily    Take 1 tablet by mouth 2 times daily       Orders Placed This Encounter   Medications    DISCONTD: isosorbide mononitrate (IMDUR) 30 MG extended release tablet     Sig: Take 1 tablet by mouth daily     Dispense:  90 tablet     Refill:  3    carvedilol (COREG) 6.25 MG tablet     Sig: Take 1 tablet by mouth 2 times daily     Dispense:  180 tablet     Refill:  3    sildenafil (VIAGRA) 50 MG tablet     Sig: Take 1 tablet by mouth as needed for Erectile Dysfunction     Dispense:  10 tablet     Refill:  5       Assessment/Plan:    1. Dyslipidemia   LDL Elevated- on Atorva. Repeat labs reviewed. Much better   2. Essential hypertension  Stable     3. Shortness of breath - stable     4. Acute on chronic diastolic heart failure (HCC) - stable     5. Carotid Bruits- CUS - will need conontued surveillance. 6. Lung Nodule- f/u CT ordered. F/u Dr. Mayank Lazaro.     7. ED- wants to try Viagra- no angina. BP on low side. Will dc Imdur and Rx Viagra. Counseling:  Heart Healthy Lifestyle, Improve BMI, Low Salt Diet, Volume Restriction 1500cc per day, Take Precautions to Prevent Falls and Walk Daily    Return in about 4 months (around 12/31/2021).     Electronically signed by Matti Rodriguez MD on 8/31/2021 at 2:46 PM

## 2021-09-02 ENCOUNTER — TELEPHONE (OUTPATIENT)
Dept: CARDIOLOGY CLINIC | Age: 66
End: 2021-09-02

## 2021-09-02 NOTE — TELEPHONE ENCOUNTER
Patient calling. He has viagra and also has a supplement that contains nitric oxide and he is wondering if he can take that if he takes a viagra? It appears that this supplement dilates and constricts like nitrates.

## 2021-09-03 ENCOUNTER — OFFICE VISIT (OUTPATIENT)
Dept: ENT CLINIC | Age: 66
End: 2021-09-03

## 2021-09-03 VITALS
WEIGHT: 207 LBS | TEMPERATURE: 96.4 F | HEIGHT: 69 IN | DIASTOLIC BLOOD PRESSURE: 71 MMHG | OXYGEN SATURATION: 98 % | SYSTOLIC BLOOD PRESSURE: 115 MMHG | BODY MASS INDEX: 30.66 KG/M2 | HEART RATE: 67 BPM

## 2021-09-03 DIAGNOSIS — Z09 POSTOPERATIVE FOLLOW-UP: Primary | ICD-10-CM

## 2021-09-03 PROCEDURE — 99024 POSTOP FOLLOW-UP VISIT: CPT | Performed by: OTOLARYNGOLOGY

## 2021-09-03 ASSESSMENT — ENCOUNTER SYMPTOMS
SINUS PAIN: 0
SINUS PRESSURE: 0
TROUBLE SWALLOWING: 0
FACIAL SWELLING: 0
SORE THROAT: 1
VOICE CHANGE: 0
RHINORRHEA: 0

## 2021-09-03 NOTE — PROGRESS NOTES
Subjective:      Patient ID: Aditya Alonzo is a 72 y.o. male who presents today for:  Chief Complaint   Patient presents with    Other       HPI: Status post tonsillectomy healing very well patient continue to have the discomfort in his ears and his throat he claims that this happened even before the surgery allegedly had another procedure prior to to seeing me.   Past Medical History:   Diagnosis Date    Cancer [facility])     CHF (congestive heart failure) (HCC)     Chicken pox     Chronic back pain     Emphysema of lung ([address]     Hyperlipidemia     Hypertension     Hypothyroidism     Measles     Mumps     Osteoarthritis     Pneumonia     Type 2 diabetes mellitus without complication (HCC)     prediabetes     Past Surgical History:   Procedure Laterality Date    CARDIAC CATHETERIZATION          CYST REMOVAL      back of neck    CYST REMOVAL Left 3/4/16    Dr Joe Juárez  2018    HERNIA REPAIR      PROSTATE SURGERY  2018    Biopsy    PROSTATECTOMY  2018    TONSILLECTOMY N/A 2021    TONSILLECTOMY performed by Subha Wood MD at [address]  3/4/16    Dr Anastasia Key N/A 2021    EGD ESOPHAGOGASTRODUODENOSCOPY WITH BIOPSY AND DILATION performed by Porsche Corrales MD at [facility] N/A 2019    REPAIR OF RECURRENT VENTRAL HERNIA performed by Basilio Hooks MD at [address] [place] [address] History     Socioeconomic History    Marital status:      Spouse name: Not on file    Number of children: Not on file    Years of education: Not on file    Highest education level: Not on file   Occupational History    Not on file   Tobacco Use    Smoking status: Former Smoker     Packs/day: 1.50     Years: 40.00     Pack years: 60.00     Types: Cigarettes     Quit date: 2018     Years since quittin.8    Smokeless tobacco: Never Used   Vaping Use    pressure, sinus pain, sneezing, tinnitus, trouble swallowing and voice change. All other systems reviewed and are negative.       Objective:   /71 (Site: Right Upper Arm, Position: Sitting, Cuff Size: Large Adult)   Pulse 67   Temp 96.4 °F (35.8 °C) (Infrared)   Ht 5' 9\" (1.753 m)   Wt 207 lb (93.9 kg)   SpO2 98%   BMI 30.57 kg/m²     Physical Exam    Assessment:      Tonsillectomy follow-up  Multiple carious teeth   Plan:      Advised to see dentist to take care of his multiple carious teeth  Charge from post tonsillectomy services return to office as needed          Emili Ann MD

## 2021-09-10 ENCOUNTER — OFFICE VISIT (OUTPATIENT)
Dept: GASTROENTEROLOGY | Age: 66
End: 2021-09-10
Payer: MEDICARE

## 2021-09-10 VITALS
RESPIRATION RATE: 13 BRPM | HEART RATE: 51 BPM | BODY MASS INDEX: 30.35 KG/M2 | WEIGHT: 212 LBS | DIASTOLIC BLOOD PRESSURE: 72 MMHG | TEMPERATURE: 97.5 F | SYSTOLIC BLOOD PRESSURE: 114 MMHG | OXYGEN SATURATION: 98 % | HEIGHT: 70 IN

## 2021-09-10 DIAGNOSIS — R13.11 ORAL PHASE DYSPHAGIA: Primary | ICD-10-CM

## 2021-09-10 PROCEDURE — 99213 OFFICE O/P EST LOW 20 MIN: CPT | Performed by: SPECIALIST

## 2021-09-10 ASSESSMENT — ENCOUNTER SYMPTOMS
VOMITING: 0
NAUSEA: 0
ANAL BLEEDING: 0
EYES NEGATIVE: 1
RECTAL PAIN: 0
DIARRHEA: 0
ABDOMINAL DISTENTION: 0
CONSTIPATION: 0
GASTROINTESTINAL NEGATIVE: 1
RESPIRATORY NEGATIVE: 1
BLOOD IN STOOL: 0
ABDOMINAL PAIN: 0

## 2021-09-10 NOTE — PROGRESS NOTES
Gastroenterology Clinic Follow up Visit    Sanjeev Rodriguez  [id number]  Chief Complaint   Patient presents with    Gastroesophageal Reflux       HPI and A/P at last visit summarized below:  Patient is here for follow-up.,  Patient had tonsillectomy recently. He still feels excess amount of secretions in the throat that makes him difficult to swallow, he has no esophageal symptoms and once the food bolus passes throat it is going down without any issues. No heartburn. Patient had esophageal stricture dilated in the past, has been on Protonix. Review of Systems   Constitutional: Negative. HENT: Negative. Eyes: Negative. Respiratory: Negative. Gastrointestinal: Negative. Negative for abdominal distention, abdominal pain, anal bleeding, blood in stool, constipation, diarrhea, nausea, rectal pain and vomiting. No symptoms of esophageal dysphagia   Genitourinary: Negative. Musculoskeletal: Negative. Neurological: Negative. Psychiatric/Behavioral: Negative. Past medical history, past surgical history, medication list, social and familyhistory reviewed    Blood pressure 114/72, pulse 51, temperature 97.5 °F (36.4 °C), temperature source Temporal, resp. rate 13, height 5' 10\" (1.778 m), weight 212 lb (96.2 kg), SpO2 98 %. Physical Exam  Constitutional:       Appearance: He is well-developed. HENT:      Head: Normocephalic. Eyes:      Pupils: Pupils are equal, round, and reactive to light. Cardiovascular:      Rate and Rhythm: Normal rate and regular rhythm. Heart sounds: Normal heart sounds. Pulmonary:      Effort: Pulmonary effort is normal.      Breath sounds: Normal breath sounds. Abdominal:      General: Bowel sounds are normal.      Palpations: Abdomen is soft. Skin:     General: Skin is warm and dry. Neurological:      Mental Status: He is alert.          Laboratory, Pathology, Radiology reviewed in detail with relevantimportant investigations summarized below:    No results for input(s): WBC, HGB, HCT, MCV, PLT in the last 720 hours. Lab Results   Component Value Date    ALT 20 06/15/2021    AST 27 06/15/2021    ALKPHOS 100 06/15/2021    BILITOT 0.6 06/15/2021     No results found. Endoscopic investigations:     Assessment and Plan:  Dylan Hylton 72 y.o. male for follow up. Symptoms are suggestive of oropharyngeal issue rather than esophageal dysphagia. Patient had recent tonsillectomy. Would recommend follow-up with ENT, continue Protonix   Diagnosis Orders   1. Oral phase dysphagia         Return in about 3 months (around 12/10/2021). Shahram Holley MD   StaffGastroenterologist  [facility]    Please note this report has been partially produced using speech recognitionsoftware  and may cause contain errors related to that system including grammar, punctuation and spelling as well as words andphrases that may seem inappropriate. If there are questions or concerns please feel free to contact me to clarify.

## 2021-09-21 ENCOUNTER — OFFICE VISIT (OUTPATIENT)
Dept: PULMONOLOGY | Age: 66
End: 2021-09-21
Payer: MEDICARE

## 2021-09-21 VITALS
SYSTOLIC BLOOD PRESSURE: 118 MMHG | RESPIRATION RATE: 16 BRPM | TEMPERATURE: 96.5 F | HEIGHT: 69 IN | WEIGHT: 213.2 LBS | DIASTOLIC BLOOD PRESSURE: 72 MMHG | BODY MASS INDEX: 31.58 KG/M2 | OXYGEN SATURATION: 98 % | HEART RATE: 57 BPM

## 2021-09-21 DIAGNOSIS — E66.09 CLASS 2 OBESITY DUE TO EXCESS CALORIES WITHOUT SERIOUS COMORBIDITY WITH BODY MASS INDEX (BMI) OF 35.0 TO 35.9 IN ADULT: ICD-10-CM

## 2021-09-21 DIAGNOSIS — C61 PROSTATE CANCER (HCC): Primary | ICD-10-CM

## 2021-09-21 DIAGNOSIS — E78.00 PURE HYPERCHOLESTEROLEMIA: ICD-10-CM

## 2021-09-21 DIAGNOSIS — I10 ESSENTIAL HYPERTENSION: ICD-10-CM

## 2021-09-21 DIAGNOSIS — R73.03 PRE-DIABETES: ICD-10-CM

## 2021-09-21 DIAGNOSIS — J44.9 CHRONIC OBSTRUCTIVE PULMONARY DISEASE, UNSPECIFIED COPD TYPE (HCC): Primary | ICD-10-CM

## 2021-09-21 DIAGNOSIS — R91.1 LUNG NODULE: ICD-10-CM

## 2021-09-21 PROCEDURE — 99213 OFFICE O/P EST LOW 20 MIN: CPT | Performed by: INTERNAL MEDICINE

## 2021-09-21 NOTE — PROGRESS NOTES
Subjective:     Charlette Smiley is a 72 y.o. male who complains today of:     Chief Complaint   Patient presents with    Follow-up     6 Month F/U for Lung Nodules       HPI  Patient presents for COPD      Patient is doing good, is currently on Spiriva, he is compliant with treatment, no shortness of breath, no chest pain, he is active, no coughing, no nasal congestion or postnasal drip and no lower extremity edema. His weight is stable, no heartburn, no skin rash.   He is doing well in general.            Allergies:  Demerol hcl [meperidine] and Valium [diazepam]  Past Medical History:   Diagnosis Date    [facility]     CHF (congestive heart failure) (HCC)     Chicken pox     Chronic back pain     [facility]phy[facility] lung [facility]     Hyperlipidemia     Hypertension     Hypothyroidism     Measles     Mumps     Osteoarthritis     Pneumonia     [facility]     prediabetes     Past Surgical History:   Procedure Laterality Date    CARDIAC CATHETERIZATION      2019    CYST REMOVAL      back of neck    CYST REMOVAL Left 3/4/16    Dr Nikky Clark  07/2018    HERNIA REPAIR      PROSTATE SURGERY  07/2018    Biopsy    PROSTATECTOMY  11/16/2018    TONSILLECTOMY N/A 8/12/2021    TONSILLECTOMY performed by Emil Ogden MD at [address]  3/4/16    Dr Cagle Shows N/A 4/5/2021    EGD ESOPHAGOGASTRODUODENOSCOPY WITH BIOPSY AND DILATION performed by Matteo Singh MD at [facility] N/A 11/18/2019    REPAIR OF RECURRENT VENTRAL HERNIA performed by Laz Kim MD at [address] History   Problem Relation Age of Onset    Other Mother     Other Father     Cancer Father     Cancer Sister     Cancer Brother     Lung Cancer Brother     Cancer Brother     Colon Cancer Neg Hx      Social History     Socioeconomic History    Marital status:  Spouse name: Not on file    Number of children: Not on file    Years of education: Not on file    Highest education level: Not on file   Occupational History    Not on file   Tobacco Use    Smoking status: Former Smoker     Packs/day: 1.50     Years: 40.00     Pack years: 60.00     Types: Cigarettes     Quit date: 2018     Years since quittin.8    Smokeless tobacco: Never Used   Vaping Use    Vaping Use: Never used   Substance and Sexual Activity    Alcohol use: Not Currently    Drug use: No    Sexual activity: Not on file   Other Topics Concern    Not on file   Social History Narrative    Not on file     Social Determinants of Health     Financial Resource Strain: Low Risk     Difficulty of Paying Living Expenses: Not hard at all   Food Insecurity: No Food Insecurity    Worried About [employer] [employer] in the Last Year: Never true    [employer] in the Last Year: Never true   Transportation Needs:     Lack of Transportation (Medical):  Lack of Transportation (Non-Medical):    Physical Activity:     Days of Exercise per Week:     Minutes of Exercise per Session:    Stress:     Feeling of Stress :    Social Connections:     Frequency of Communication with Friends and Family:     Frequency of Social Gatherings with Friends and Family:     Attends Sabianism Services:     Active Member of Clubs or Organizations:     Attends Club or Organization Meetings:     Marital Status:    Intimate Partner Violence:     Fear of Current or Ex-Partner:     Emotionally Abused:     Physically Abused:     Sexually Abused:          Review of Systems      ROS: 10 organs review of system is done including general, psychological, ENT, hematological, endocrine, respiratory, cardiovascular, gastrointestinal,musculoskeletal, neurological,  allergy and Immunology is done and is otherwise negative.     Current Outpatient Medications   Medication Sig Dispense Refill    tiotropium (SPIRIVA RESPIMAT) 2.5 MCG/ACT AERS inhaler Inhale 2 puffs into the lungs daily 1 each 5    carvedilol (COREG) 6.25 MG tablet Take 1 tablet by mouth 2 times daily 180 tablet 3    sildenafil (VIAGRA) 50 MG tablet Take 1 tablet by mouth as needed for Erectile Dysfunction 10 tablet 5    aspirin 81 MG EC tablet Take 81 mg by mouth daily      LORazepam (ATIVAN) 1 MG tablet       atorvastatin (LIPITOR) 40 MG tablet TAKE ONE TABLET BY MOUTH EVERY DAY 90 tablet 0    levothyroxine (SYNTHROID) 75 MCG tablet 1 po q day 90 tablet 1    omeprazole (PRILOSEC) 20 MG delayed release capsule Take 1 capsule by mouth Daily 90 capsule 3    omeprazole (PRILOSEC) 20 MG delayed release capsule Take 1 capsule by mouth Daily 30 capsule 2    Potassium 99 MG TABS Take by mouth OTC potassium      Glucosamine-Chondroitin--300-250 MG TABS Take 1 tablet by mouth Daily      acetaminophen (TYLENOL) 500 MG tablet Take 1,000 mg by mouth daily At night      Naproxen Sodium (ALEVE) 220 MG CAPS Take 1 capsule by mouth daily      vitamin C (ASCORBIC ACID) 500 MG tablet Take 500 mg by mouth daily       No current facility-administered medications for this visit. Objective:     Vitals:    09/21/21 1339   BP: 118/72   Site: Right Upper Arm   Position: Sitting   Cuff Size: Large Adult   Pulse: 57   Resp: 16   Temp: 96.5 °F (35.8 °C)   TempSrc: Tympanic   SpO2: 98%   Weight: 213 lb 3.2 oz (96.7 kg)   Height: 5' 9\" (1.753 m)         Physical Exam  Constitutional:       Appearance: He is well-developed. HENT:      Head: Normocephalic and atraumatic. Eyes:      General:         Left eye: No discharge. Conjunctiva/sclera: Conjunctivae normal.      Pupils: Pupils are equal, round, and reactive to light. Neck:      Vascular: No JVD. Cardiovascular:      Rate and Rhythm: Normal rate and regular rhythm. Heart sounds: Normal heart sounds. No murmur heard. No friction rub. No gallop.     Pulmonary:      Effort: Pulmonary effort is normal. No respiratory distress. Breath sounds: Normal breath sounds. No wheezing or rales. Chest:      Chest wall: No tenderness. Abdominal:      General: Bowel sounds are normal.      Palpations: Abdomen is soft. Musculoskeletal:         General: No tenderness or deformity. Cervical back: Normal range of motion and neck supple. Right lower leg: No edema. Left lower leg: No edema. Skin:     General: Skin is warm and dry. Neurological:      Mental Status: He is alert and oriented to person, place, and time. Psychiatric:         Judgment: Judgment normal.         Imaging studies reviewed by me CT chest March 2021, shows small lung nodules, up to 3 mm, and emphysema. Lab results reviewed in chart  PFT 2019 FEV1 76%, indicating mild obstructive airway disease  ECHO: EF 60%    Assessment and Plan       Diagnosis Orders   1. Chronic obstructive pulmonary disease, unspecified COPD type (HCC)  tiotropium (SPIRIVA RESPIMAT) 2.5 MCG/ACT AERS inhaler   2. Lung nodule     3. Class 2 obesity due to excess calories without serious comorbidity with body mass index (BMI) of 35.0 to 35.9 in adult       · Continue Spiriva  · Follow-up CT scan is ordered for March of next year  · Weight loss is recommended  · Yearly flu shot      No orders of the defined types were placed in this encounter. Orders Placed This Encounter   Medications    tiotropium (SPIRIVA RESPIMAT) 2.5 MCG/ACT AERS inhaler     Sig: Inhale 2 puffs into the lungs daily     Dispense:  1 each     Refill:  5            Discussed with patient the importance of exercise and weight control and  overall health and well-being. Reviewed with the patient: current clinical status, medications, activities and diet. Side effects, adverse effects of the medication prescribed today, as well as treatment plan and result expectations have been discussed with the patient who expresses understanding and desires to proceed.        Return in

## 2021-10-01 ENCOUNTER — OFFICE VISIT (OUTPATIENT)
Dept: FAMILY MEDICINE CLINIC | Age: 66
End: 2021-10-01
Payer: MEDICARE

## 2021-10-01 VITALS
DIASTOLIC BLOOD PRESSURE: 70 MMHG | TEMPERATURE: 98.1 F | SYSTOLIC BLOOD PRESSURE: 120 MMHG | WEIGHT: 205 LBS | BODY MASS INDEX: 30.36 KG/M2 | HEIGHT: 69 IN | RESPIRATION RATE: 16 BRPM | HEART RATE: 59 BPM | OXYGEN SATURATION: 95 %

## 2021-10-01 DIAGNOSIS — I25.10 CORONARY ARTERY DISEASE INVOLVING NATIVE CORONARY ARTERY OF NATIVE HEART WITHOUT ANGINA PECTORIS: ICD-10-CM

## 2021-10-01 DIAGNOSIS — F41.9 ANXIETY: ICD-10-CM

## 2021-10-01 DIAGNOSIS — E78.00 PURE HYPERCHOLESTEROLEMIA: ICD-10-CM

## 2021-10-01 DIAGNOSIS — R73.03 PRE-DIABETES: Primary | ICD-10-CM

## 2021-10-01 PROCEDURE — 99214 OFFICE O/P EST MOD 30 MIN: CPT | Performed by: FAMILY MEDICINE

## 2021-10-01 RX ORDER — LEVOTHYROXINE SODIUM 0.07 MG/1
TABLET ORAL
Qty: 90 TABLET | Refills: 1 | Status: SHIPPED | OUTPATIENT
Start: 2021-10-01 | End: 2021-12-21

## 2021-10-01 RX ORDER — LORAZEPAM 1 MG/1
1 TABLET ORAL NIGHTLY PRN
Qty: 30 TABLET | Refills: 2 | Status: SHIPPED | OUTPATIENT
Start: 2021-10-01 | End: 2022-01-03 | Stop reason: SDUPTHER

## 2021-10-01 RX ORDER — ATORVASTATIN CALCIUM 80 MG/1
TABLET, FILM COATED ORAL
Qty: 90 TABLET | Refills: 0 | Status: SHIPPED | OUTPATIENT
Start: 2021-10-01 | End: 2021-12-21

## 2021-10-01 RX ORDER — LORAZEPAM 1 MG/1
TABLET ORAL
Status: CANCELLED | OUTPATIENT
Start: 2021-10-01

## 2021-10-01 ASSESSMENT — ENCOUNTER SYMPTOMS
DIARRHEA: 0
VOMITING: 0

## 2021-10-01 NOTE — PROGRESS NOTES
Subjective:      Patient ID: Yamil Fuentes is a 72 y.o. male. HPI    Review of Systems  Treatment Adherence:   Medication compliance:  {Desc; compliance:5303::\"compliant most of the time\"}  Diet compliance:  {Desc; compliance:5303::\"compliant most of the time\"}  Weight trend: {INCREASING/DECREASING/STABLE:16909}  Current exercise: {EXERCISE YRXZ:646263421}  Barriers: {Barriers to success:96350}    Hypertension:  Home blood pressure monitoring: {NO/YES:4449805397::\"No\"}. He {is/is not:9024} adherent to a low sodium diet. Patient {denies/complains:64507} {Symptoms of Hypertension, Denies:28503}. Antihypertensive medication side effects: {Hypertension med side effects:5728::\"no medication side effects noted\"}. Use of agents associated with hypertension: {bp agents assoc with hypertension:511::\"none\"}. Sodium (mEq/L)   Date Value   09/21/2021 141    BUN (mg/dL)   Date Value   09/21/2021 21    Glucose (mg/dL)   Date Value   09/21/2021 90      Potassium (mEq/L)   Date Value   09/21/2021 4.0    CREATININE (mg/dL)   Date Value   09/21/2021 0.82         Hyperlipidemia:  No new myalgias or GI upset on {RP HYPERLIPIDEMIA MEDS:91520}.      Lab Results   Component Value Date    CHOL 157 09/21/2021    TRIG 151 (H) 09/21/2021    HDL 48 09/21/2021    LDLCALC 79 09/21/2021     Lab Results   Component Value Date    ALT 14 09/21/2021    AST 16 09/21/2021          Objective:   Physical Exam    Assessment / Plan:

## 2021-10-01 NOTE — PROGRESS NOTES
Subjective:      Patient ID: Krista Sanchez is a 72 y.o. male    Hyperlipidemia  This is a chronic problem. The current episode started more than 1 year ago. The problem is controlled. Current antihyperlipidemic treatment includes statins. Mental Health Problem  This is a chronic problem. The onset of the illness is precipitated by emotional stress. Here in follow up for anxiety and pre diabetes and lipids. Anxiety under good control with ativan which is used daily for most part    Review of Systems   Constitutional: Negative for chills and fever. Gastrointestinal: Negative for diarrhea and vomiting. Neurological: Negative for weakness. Reviewed allergy, medical, social, surgical, family and med list changes and updated   Files   Controlled substances monitoring: no signs of potential drug abuse or diversion identified, [place]AR[place] report reviewed today- activity consistent with treatment plan and medication contract signed today.   Social History     Socioeconomic History    Marital status:      Spouse name: None    Number of children: None    Years of education: None    Highest education level: None   Occupational History    None   Tobacco Use    Smoking status: Former Smoker     Packs/day: 1.50     Years: 40.00     Pack years: 60.00     Types: Cigarettes     Quit date: 2018     Years since quittin.8    Smokeless tobacco: Never Used   Vaping Use    Vaping Use: Never used   Substance and Sexual Activity    Alcohol use: Not Currently    Drug use: No    Sexual activity: None   Other Topics Concern    None   Social History Narrative    None     Social Determinants of Health     Financial Resource Strain: Low Risk     Difficulty of Paying Living Expenses: Not hard at all   Food Insecurity: No Food Insecurity    Worried About Running Out of Food in the Last Year: Never true    Ervin of Food in the Last Year: Never true   Transportation Needs:     Lack of Transportation (Medical):  Lack of Transportation (Non-Medical):    Physical Activity:     Days of Exercise per Week:     Minutes of Exercise per Session:    Stress:     Feeling of Stress :    Social Connections:     Frequency of Communication with Friends and Family:     Frequency of Social Gatherings with Friends and Family:     Attends Baptism Services:     Active Member of Clubs or Organizations:     Attends Club or Organization Meetings:     Marital Status:    Intimate Partner Violence:     Fear of Current or Ex-Partner:     Emotionally Abused:     Physically Abused:     Sexually Abused:      Current Outpatient Medications   Medication Sig Dispense Refill    tiotropium (SPIRIVA RESPIMAT) 2.5 MCG/ACT AERS inhaler Inhale 2 puffs into the lungs daily 1 each 5    carvedilol (COREG) 6.25 MG tablet Take 1 tablet by mouth 2 times daily 180 tablet 3    sildenafil (VIAGRA) 50 MG tablet Take 1 tablet by mouth as needed for Erectile Dysfunction 10 tablet 5    aspirin 81 MG EC tablet Take 81 mg by mouth daily      LORazepam (ATIVAN) 1 MG tablet       atorvastatin (LIPITOR) 40 MG tablet TAKE ONE TABLET BY MOUTH EVERY DAY 90 tablet 0    levothyroxine (SYNTHROID) 75 MCG tablet 1 po q day 90 tablet 1    omeprazole (PRILOSEC) 20 MG delayed release capsule Take 1 capsule by mouth Daily 90 capsule 3    omeprazole (PRILOSEC) 20 MG delayed release capsule Take 1 capsule by mouth Daily 30 capsule 2    Potassium 99 MG TABS Take by mouth OTC potassium      Glucosamine-Chondroitin--300-250 MG TABS Take 1 tablet by mouth Daily      acetaminophen (TYLENOL) 500 MG tablet Take 1,000 mg by mouth daily At night      Naproxen Sodium (ALEVE) 220 MG CAPS Take 1 capsule by mouth daily      vitamin C (ASCORBIC ACID) 500 MG tablet Take 500 mg by mouth daily       No current facility-administered medications for this visit.      Family History   Problem Relation Age of Onset    Other Mother     Other Father     Cancer Father     Cancer Sister     Cancer Brother     Lung Cancer Brother     Cancer Brother     Colon Cancer Neg Hx      Past Medical History:   Diagnosis Date    Cancer [facility])     [facility]     Chicken pox     Chronic back pain     [facility]     Hyperlipidemia     Hypertension     Hypothyroidism     Measles     Mumps     Osteoarthritis     Pneumonia     Type 2 diabetes mellitus without complication (HCC)     prediabetes     Objective:   /70   Pulse 59   Temp 98.1 °F (36.7 °C)   Resp 16   Ht 5' 9\" (1.753 m)   Wt 205 lb (93 kg)   SpO2 95%   BMI 30.27 kg/m²     Physical Exam  Patient with appropriate affect. Alert   Thought content appropriate  Good eye contact  Heent; oral cavity normal.    Gen:   NAD  Lungs: clear and equal breath sounds  Heart:   Rate reg. No murmur  Assessment:       Diagnosis Orders   1. Pre-diabetes     2. Anxiety     3. Pure hypercholesterolemia     4. Coronary artery disease involving native coronary artery of native heart without angina pectoris           Plan:    continue current medications and increase lipitor   Orders Placed This Encounter   Medications    atorvastatin (LIPITOR) 80 MG tablet     Sig: TAKE ONE TABLET BY MOUTH EVERY DAY     Dispense:  90 tablet     Refill:  0    levothyroxine (SYNTHROID) 75 MCG tablet     Si po q day     Dispense:  90 tablet     Refill:  1    LORazepam (ATIVAN) 1 MG tablet     Sig: Take 1 tablet by mouth nightly as needed for Anxiety for up to 30 days.      Dispense:  30 tablet     Refill:  2     Orders Placed This Encounter   Procedures    Lipid Panel     Standing Status:   Future     Standing Expiration Date:   10/1/2022     Order Specific Question:   Is Patient Fasting?/# of Hours     Answer:   9    TSH without Reflex     Standing Status:   Future     Standing Expiration Date:   10/1/2022    Comprehensive Metabolic Panel     Standing Status:   Future     Standing Expiration Date: 10/1/2022     Fasting blood work in [address] [place] [address] and f/u after above

## 2021-10-06 ENCOUNTER — HOSPITAL ENCOUNTER (OUTPATIENT)
Dept: ORTHOPEDIC SURGERY | Age: 66
Discharge: HOME | End: 2021-10-08
Payer: MEDICARE

## 2021-10-06 ENCOUNTER — OFFICE VISIT (OUTPATIENT)
Dept: ORTHOPEDIC SURGERY | Age: 66
End: 2021-10-06
Payer: MEDICARE

## 2021-10-06 VITALS
BODY MASS INDEX: 30.36 KG/M2 | HEIGHT: 69 IN | HEART RATE: 59 BPM | WEIGHT: 205 LBS | OXYGEN SATURATION: 96 % | TEMPERATURE: 96 F

## 2021-10-06 DIAGNOSIS — R52 PAIN: ICD-10-CM

## 2021-10-06 DIAGNOSIS — M17.10 ARTHRITIS OF KNEE: ICD-10-CM

## 2021-10-06 DIAGNOSIS — R52 PAIN: Primary | ICD-10-CM

## 2021-10-06 PROCEDURE — 73562 X-RAY EXAM OF KNEE 3: CPT

## 2021-10-06 PROCEDURE — 99204 OFFICE O/P NEW MOD 45 MIN: CPT | Performed by: ORTHOPAEDIC SURGERY

## 2021-10-06 PROCEDURE — 73562 X-RAY EXAM OF KNEE 3: CPT | Performed by: ORTHOPAEDIC SURGERY

## 2021-10-06 NOTE — PROGRESS NOTES
Subjective:      Patient ID: Yamil Fuentes is a 72 y.o. male who presents today for:  Chief Complaint   Patient presents with    Knee Pain     right knee pain. He states he injured it in 2003. No recent xray. Pain is 1-2 now. If he is on it a lot it will swell. HPI  Comes in for evaluation of right knee pain. States is been going on for some time now. Pain increases with activity. Occasionally swells up form. Had original injury back in 2003 and now it flares up every now and then. Has history of back issues.     Past Medical History:   Diagnosis Date    Cancer [facility])     CHF (congestive heart failure) (HCC)     Chicken pox     Chronic back pain     Emphysema of lung ([address]     Hyperlipidemia     Hypertension     Hypothyroidism     Measles     Mumps     Osteoarthritis     Pneumonia     Type 2 diabetes mellitus without complication (HCC)     prediabetes      Past Surgical History:   Procedure Laterality Date    CARDIAC CATHETERIZATION      2019    CYST REMOVAL      back of neck    CYST REMOVAL Left 3/4/16    Dr Chasity Bolivar  07/2018    HERNIA REPAIR      PROSTATE SURGERY  07/2018    Biopsy    PROSTATECTOMY  11/16/2018    TONSILLECTOMY N/A 8/12/2021    TONSILLECTOMY performed by Evette Andersen MD at [facility]  3/4/16    Dr Alessandro Estevez N/A 4/5/2021    EGD ESOPHAGOGASTRODUODENOSCOPY WITH BIOPSY AND DILATION performed by David Nunez MD at [facility] N/A 11/18/2019    REPAIR OF RECURRENT VENTRAL HERNIA performed by Shayna Steven MD at [address] [place] [address] Marital status:      Spouse name: Not on file    Number of children: Not on file    Years of education: Not on file    Highest education level: Not on file   Occupational History    Not on file   Tobacco Use    Smoking status: Former Smoker     Packs/day: 1.50 Years: 40.00     Pack years: 60.00     Types: Cigarettes     Quit date: 2018     Years since quittin.8    Smokeless tobacco: Never Used   Vaping Use    Vaping Use: Never used   Substance and Sexual Activity    Alcohol use: Not Currently    Drug use: No    Sexual activity: Not on file   Other Topics Concern    Not on file   Social History Narrative    Not on file     Social Determinants of Health     Financial Resource Strain: Low Risk     Difficulty of Paying Living Expenses: Not hard at all   Food Insecurity: No Food Insecurity    Worried About Running Out of Food in the Last Year: Never true    Ervin of Food in the Last Year: Never true   Transportation Needs:     Lack of Transportation (Medical):      Lack of Transportation (Non-Medical):    Physical Activity:     Days of Exercise per Week:     Minutes of Exercise per Session:    Stress:     Feeling of Stress :    Social Connections:     Frequency of Communication with Friends and Family:     Frequency of Social Gatherings with Friends and Family:     Attends [employer] Services:     Active Member of Clubs or Organizations:     Attends Club or Organization Meetings:     Marital Status:    Intimate Partner Violence:     Fear of Current or Ex-Partner:     Emotionally Abused:     Physically Abused:     Sexually Abused:      Family History   Problem Relation Age of Onset    Other Mother     Other Father     Cancer Father     Cancer Sister     Cancer Brother     Lung Cancer Brother     Cancer Brother     Colon Cancer Neg Hx      Allergies   Allergen Reactions    Demerol Hcl [Meperidine] Nausea Only     migraines    Valium [Diazepam] Other (See Comments)     MIGRAINES     Current Outpatient Medications on File Prior to Visit   Medication Sig Dispense Refill    atorvastatin (LIPITOR) 80 MG tablet TAKE ONE TABLET BY MOUTH EVERY DAY 90 tablet 0    levothyroxine (SYNTHROID) 75 MCG tablet 1 po q day 90 tablet 1    LORazepam (ATIVAN) 1 MG tablet Take 1 tablet by mouth nightly as needed for Anxiety for up to 30 days. 30 tablet 2    tiotropium (SPIRIVA RESPIMAT) 2.5 MCG/ACT AERS inhaler Inhale 2 puffs into the lungs daily 1 each 5    carvedilol (COREG) 6.25 MG tablet Take 1 tablet by mouth 2 times daily 180 tablet 3    sildenafil (VIAGRA) 50 MG tablet Take 1 tablet by mouth as needed for Erectile Dysfunction 10 tablet 5    aspirin 81 MG EC tablet Take 81 mg by mouth daily      LORazepam (ATIVAN) 1 MG tablet       omeprazole (PRILOSEC) 20 MG delayed release capsule Take 1 capsule by mouth Daily 90 capsule 3    omeprazole (PRILOSEC) 20 MG delayed release capsule Take 1 capsule by mouth Daily 30 capsule 2    Potassium 99 MG TABS Take by mouth OTC potassium      Glucosamine-Chondroitin--300-250 MG TABS Take 1 tablet by mouth Daily      acetaminophen (TYLENOL) 500 MG tablet Take 1,000 mg by mouth daily At night      Naproxen Sodium (ALEVE) 220 MG CAPS Take 1 capsule by mouth daily      vitamin C (ASCORBIC ACID) 500 MG tablet Take 500 mg by mouth daily       No current facility-administered medications on file prior to visit. Review of Systems  Planes of swelling difficulty with pain in the right knee. Denies any current locking although occasionally feels like it is going to lock denies any giving way. Denies any groin pain. Denies numbness and tingling the right lower extremity. Objective:   Pulse 59   Temp 96 °F (35.6 °C)   Ht 5' 9\" (1.753 m)   Wt 205 lb (93 kg)   SpO2 96%   BMI 30.27 kg/m²     Ortho Exam  Pleasant 24-year-old male ambulating without any assistive devices. Right knee shows slight varus deformity. No ligament instability. Minimal pain on the medial joint line no pain on lateral joint line. No pain to patellar compression. No palpable cords posteriorly. Range of motion from 0 degrees to 120 degrees of flexion. Negative roll test right hip.  +2 posterior tibialis pulse.   Sensation light touch is intact in right lower extremity. Radiographs and Laboratory Studies:     Diagnostic Imaging Studies:    XR KNEE RIGHT (3 VIEWS)    Result Date: 10/6/2021  Is consisting AP weightbearing lateral and sunrise view of the right knee that we obtained to evaluate the area of pain shows significant degenerative joint disease of the right knee and also some arthritis noted on the left knee on the AP weightbearing view. Spurs noted in all 3 compartments on the right knee. Loss of joint space medially in the right knee. No acute fractures or dislocations. Findings compatible with DJD of the right knee with slight varus deformity. Laboratory Studies:   Lab Results   Component Value Date    WBC 7.8 09/21/2021    HGB 13.1 (L) 09/21/2021    HCT 39.4 (L) 09/21/2021    MCV 86.9 09/21/2021     09/21/2021     No results found for: SEDRATE  No results found for: CRP    Assessment:      Diagnosis Orders   1. Pain  XR KNEE RIGHT (3 VIEWS)   2. Arthritis of knee            Plan: At this time I discussed the problem with the patient options would be anti-inflammatory medications either p.o. or topical cream versus steroid injection. The problem we have is that he does have heart disease my concern is to put him on any anti-inflammatory may not be beneficial and he would have to have that okay with his medical doctor. He is currently taking Aleve. We discussed also steroid injection to see if we can alleviate some of discomfort and the swelling that he occasionally gets. Ultimately he is going to require total knee arthroplasty. At this point after discussing pros and cons he wants to try topical cream so we will place him on Voltaren cream to apply to the knee twice daily. We will see him back 1 month recheck. Any problems or difficulties prior to that he is notify us.     Orders Placed This Encounter   Procedures    XR KNEE RIGHT (3 VIEWS)     Standing Status:   Future     Number of Occurrences:   1     Standing Expiration Date:   10/6/2022     Scheduling Instructions:      Weightbearing AP both knees, right lateral, right merchant     Order Specific Question:   Reason for exam:     Answer:   knee pain     No orders of the defined types were placed in this encounter. Return in about 4 weeks (around 11/3/2021).       Kike Gorman MD

## 2021-10-19 ENCOUNTER — OFFICE VISIT (OUTPATIENT)
Dept: PAIN MANAGEMENT | Age: 66
End: 2021-10-19
Payer: COMMERCIAL

## 2021-10-19 VITALS
DIASTOLIC BLOOD PRESSURE: 72 MMHG | TEMPERATURE: 97.9 F | SYSTOLIC BLOOD PRESSURE: 110 MMHG | WEIGHT: 205 LBS | HEIGHT: 69 IN | BODY MASS INDEX: 30.36 KG/M2

## 2021-10-19 DIAGNOSIS — M47.817 LUMBOSACRAL SPONDYLOSIS WITHOUT MYELOPATHY: Primary | ICD-10-CM

## 2021-10-19 PROCEDURE — 99213 OFFICE O/P EST LOW 20 MIN: CPT | Performed by: PAIN MEDICINE

## 2021-10-19 ASSESSMENT — ENCOUNTER SYMPTOMS
BACK PAIN: 0
DIARRHEA: 0
CONSTIPATION: 0
NAUSEA: 0
SHORTNESS OF BREATH: 0

## 2021-10-19 NOTE — PROGRESS NOTES
[facility] ([facility]) Physicians  Neurosurgery and Pain [facility]  [address] [facility] Alem Navamaadam Jase Glass [address]: [phone]  F: [phone]        Rosalind Casas  ([id number])    10/19/2021    Subjective:     Rosalind Casas is 72 y.o. male who complains today of:     Chief Complaint   Patient presents with    Back Pain     Patient is here today for follow-up. Over the last several months his low back pain has returned on both sides worse on the right. Worse with prolonged activity and bending. Sitting is the best.  Has tried Tylenol and anti-inflammatories and had therapy in the past.  He has a work-related injury. Previous radiofrequency ablation done over 6 months ago helped over 70%. Gets spasms as achy pain. Plan: We discussed options. We will put a C9 in for right and left L3-4-5 radiofrequency ablation. He has failed conservative treatment. Questions answered, chart reviewed.     Allergies:  Demerol hcl [meperidine] and Valium [diazepam]    Past Medical History:   Diagnosis Date    [facility]     CHF (congestive heart failure) (HCC)     Chicken pox     Chronic back pain     [facility]ma of lung ([facility]     Hyperlipidemia     Hypertension     Hypothyroidism     Measles     Mumps     Osteoarthritis     Pneumonia     Type 2 diabetes mellitus without complication (HCC)     prediabetes     Past Surgical History:   Procedure Laterality Date    CARDIAC CATHETERIZATION      2019    CYST REMOVAL      back of neck    CYST REMOVAL Left 3/4/16    Dr Lexa Sharpe  07/2018    HERNIA REPAIR      PROSTATE SURGERY  07/2018    Biopsy    PROSTATECTOMY  11/16/2018    TONSILLECTOMY N/A 8/12/2021    TONSILLECTOMY performed by Manuel Valles MD at [address] [facility]  3/4/16    Dr Rhonda Fernandez N/A 4/5/2021    EGD ESOPHAGOGASTRODUODENOSCOPY WITH BIOPSY AND DILATION performed by Amparo Pierson MD at [facility]    VASECTOMY      VENTRAL HERNIA REPAIR N/A 2019    REPAIR OF RECURRENT VENTRAL HERNIA performed by Celina Cruz MD at [address] History   Problem Relation Age of Onset    Other Mother     Other Father     Cancer Father     Cancer Sister     Cancer Brother     Lung Cancer Brother     Cancer Brother     Colon Cancer Neg Hx      Social History     Socioeconomic History    Marital status:      Spouse name: Not on file    Number of children: Not on file    Years of education: Not on file    Highest education level: Not on file   Occupational History    Not on file   Tobacco Use    Smoking status: Former Smoker     Packs/day: 1.50     Years: 40.00     Pack years: 60.00     Types: Cigarettes     Quit date: 2018     Years since quittin.9    Smokeless tobacco: Never Used   Vaping Use    Vaping Use: Never used   Substance and Sexual Activity    Alcohol use: Not Currently    Drug use: No    Sexual activity: Not on file   Other Topics Concern    Not on file   Social History Narrative    Not on file     Social Determinants of Health     Financial Resource Strain: Low Risk     Difficulty of Paying Living Expenses: Not hard at all   Food Insecurity: No Food Insecurity    Worried About [facility] in the Last Year: Never true    [facility] in the Last Year: Never true   Transportation Needs:     Lack of Transportation (Medical):      Lack of Transportation (Non-Medical):    Physical Activity:     Days of Exercise per Week:     Minutes of Exercise per Session:    Stress:     Feeling of Stress :    Social Connections:     Frequency of Communication with Friends and Family:     Frequency of Social Gatherings with Friends and Family:     Attends Voodoo Services:     Active Member of Clubs or Organizations:     Attends Club or Organization Meetings:     Marital Status:    Intimate Partner Violence:     Fear of Current or Ex-Partner:     Emotionally Abused:     Physically Abused:     Sexually Abused:        Current Outpatient Medications on File Prior to Visit   Medication Sig Dispense Refill    atorvastatin (LIPITOR) 80 MG tablet TAKE ONE TABLET BY MOUTH EVERY DAY 90 tablet 0    levothyroxine (SYNTHROID) 75 MCG tablet 1 po q day 90 tablet 1    LORazepam (ATIVAN) 1 MG tablet Take 1 tablet by mouth nightly as needed for Anxiety for up to 30 days. 30 tablet 2    tiotropium (SPIRIVA RESPIMAT) 2.5 MCG/ACT AERS inhaler Inhale 2 puffs into the lungs daily 1 each 5    carvedilol (COREG) 6.25 MG tablet Take 1 tablet by mouth 2 times daily 180 tablet 3    sildenafil (VIAGRA) 50 MG tablet Take 1 tablet by mouth as needed for Erectile Dysfunction 10 tablet 5    aspirin 81 MG EC tablet Take 81 mg by mouth daily      LORazepam (ATIVAN) 1 MG tablet       omeprazole (PRILOSEC) 20 MG delayed release capsule Take 1 capsule by mouth Daily 90 capsule 3    omeprazole (PRILOSEC) 20 MG delayed release capsule Take 1 capsule by mouth Daily 30 capsule 2    Potassium 99 MG TABS Take by mouth OTC potassium      Glucosamine-Chondroitin--300-250 MG TABS Take 1 tablet by mouth Daily      acetaminophen (TYLENOL) 500 MG tablet Take 1,000 mg by mouth daily At night      Naproxen Sodium (ALEVE) 220 MG CAPS Take 1 capsule by mouth daily      vitamin C (ASCORBIC ACID) 500 MG tablet Take 500 mg by mouth daily       No current facility-administered medications on file prior to visit. HPI    Review of Systems   Constitutional: Negative for fever. HENT: Negative for hearing loss. Respiratory: Negative for shortness of breath. Gastrointestinal: Negative for constipation, diarrhea and nausea. Genitourinary: Negative for difficulty urinating. Musculoskeletal: Negative for back pain and neck pain. Skin: Negative for rash. Neurological: Negative for headaches. Hematological: Does not bruise/bleed easily.    Psychiatric/Behavioral: Negative for sleep disturbance. Objective:     Vitals:  /72   Temp 97.9 °F (36.6 °C)   Ht 5' 9\" (1.753 m)   Wt 205 lb (93 kg)   BMI 30.27 kg/m² Pain Score:   4      Physical Exam  Patient alert and oriented times three, recent and remote memory intact, mood and affect normal, judgement and insight normal. Strength functional for ambulation. Balance and coordinational functional. Visualized skin intact. No visualized cyanosis, pulses intact. Cranial nerves 2-12 grossly intact. No obvious upper motor neuron signs seen. Sensation intact to light touch. SLR negative. Tender along lumber facet joints with pain and decreased ROM. Extension and rotation with muscle spasms. Assessment:      Diagnosis Orders   1.  Lumbosacral spondylosis without myelopathy  MN RADIOFREQUENCY NEUROTOMY LUMBAR OR SACRAL, W IMAGE GUIDANCE, SINGLE    MN RADIOFREQ NEUROTOMY LUMBAR OR SACRAL, W IMAGE GUIDE,EA ADDL LEVEL       Plan:

## 2021-10-20 ENCOUNTER — TELEPHONE (OUTPATIENT)
Dept: PAIN MANAGEMENT | Age: 66
End: 2021-10-20

## 2021-10-20 NOTE — TELEPHONE ENCOUNTER
ORDER PLACED:    Date: 10/19/21  Description: RIGHT THEN LEFT L3,4,5 RFA  Order Number: [id number]  Ordering Provider: MARY  Performing Provider: [facility]  CPT Codes: 69141, 94571  ICD10 Codes: M47.817    [facility], GIVEN TO NISHANT

## 2021-10-22 NOTE — TELEPHONE ENCOUNTER
RIGHT THEN LEFT L3,4,5 RFA      C-9 REQ: BL L3, L4, L5 RFA-DENIED BY [facility]     WILL REVIEW FOR POSSIBLE ADDITIONAL ALLOWANCE

## 2021-10-25 NOTE — TELEPHONE ENCOUNTER
RIGHT THEN LEFT L3,4,5 RFA-    C-9 for RIGHT THEN LEFT L3,4,5 RFA-denied by [facility],     Called Hyacinth HERMAN at [phone] and left VM about denial and needing clarification of the denial.  Denial# [id number]

## 2021-10-27 NOTE — TELEPHONE ENCOUNTER
RIGHT THEN LEFT L3,4, RFA    C-9 CORRECTED TO SHOW REQUESTING ONLY 2 LEVELS AT L3, L4 RFA- RESUBMITTED TO [facility] FOR REVIEW     FAXED [phone]

## 2021-10-27 NOTE — TELEPHONE ENCOUNTER
RIGHT THEN LEFT L3,4,5 RFA    C-9 DENIAL FOR RIGHT THEN LEFT L3,4,5 RFA    KEITH HERMAN RN FROM [place] RETURNED MY CALL. SHE INDICATED THAT THE DENIAL IS BASED ON 3 LEVELS ARE BEING REQUESTED WHEN [facility] WILL ONLY ALLOW 2 LEVELS DONE AT ONE TIME. RESUBMITTING C-9 FOR RT THEN LEFT RFA AT LEVELS L3, L4.

## 2021-11-01 NOTE — TELEPHONE ENCOUNTER
RIGHT THEN LEFT L3,4,5 RFA-DENIED     Phone call from . I have resubmitted C-9 for 2 levels as the previous C-9 was denied for it being 3 levels and O will not pay for all 3 levels.  said they have an appeal from [address] [place] [address]  for the C-9 req for all 3 levels. C-9 req I sent for 2 levels is dismissed and the C-9 appeal filed by 's  will be processed. C-9 req DATED 10/20/21 for RIGHT THEN LEFT L3,4,5 RFA and DENIED by [facility] is appealed by [facility] - JHONATAN CRUZ .      REQ'D COPY OF APPEAL-NOT  [facility]  # [phone]  [facility]

## 2021-11-05 NOTE — TELEPHONE ENCOUNTER
RIGHT THEN LEFT L3,4,5 RFA      FILED AN APPEAL TO THE C-9 DENIAL BY THE [facility].      SCANNED APPEAL FROM [facility]

## 2021-11-16 DIAGNOSIS — I10 ESSENTIAL HYPERTENSION: ICD-10-CM

## 2021-11-16 DIAGNOSIS — I50.33 ACUTE ON CHRONIC DIASTOLIC HEART FAILURE (HCC): ICD-10-CM

## 2021-11-16 RX ORDER — CARVEDILOL 6.25 MG/1
TABLET ORAL
Qty: 180 TABLET | Refills: 3 | Status: SHIPPED | OUTPATIENT
Start: 2021-11-16

## 2021-11-16 NOTE — TELEPHONE ENCOUNTER
Please approve or deny this refill request. The order is pended. Thank you.     LOV 8/31/2021    Next Visit Date:  Future Appointments   Date Time Provider Grey Stewart   11/30/2021  9:15 AM Juan Manuel Bui MD [facility]   12/10/2021  8:45 AM Williams Wallace MD [address]   12/20/2021 12:45 PM Morgan Segura  [facility]   1/3/2022 10:00 AM Winnie Duff MD [facility] [place]   2/11/2022  1:00 PM Misbah Bolivar PA-C [address] [facility]   3/22/2022  1:15 PM Kim Obrien MD [facility]

## 2021-11-30 ENCOUNTER — OFFICE VISIT (OUTPATIENT)
Dept: UROLOGY | Age: 66
End: 2021-11-30
Payer: MEDICARE

## 2021-11-30 VITALS
WEIGHT: 209 LBS | SYSTOLIC BLOOD PRESSURE: 116 MMHG | OXYGEN SATURATION: 99 % | BODY MASS INDEX: 30.96 KG/M2 | HEIGHT: 69 IN | DIASTOLIC BLOOD PRESSURE: 72 MMHG

## 2021-11-30 DIAGNOSIS — C61 PROSTATE CANCER (HCC): Primary | ICD-10-CM

## 2021-11-30 PROCEDURE — 99213 OFFICE O/P EST LOW 20 MIN: CPT | Performed by: UROLOGY

## 2021-11-30 ASSESSMENT — ENCOUNTER SYMPTOMS
ABDOMINAL DISTENTION: 0
ABDOMINAL PAIN: 0

## 2021-11-30 NOTE — PROGRESS NOTES
Subjective:      Patient ID: Selena Estrada is a 77 y.o. male    HPI  This is a 68 yo male with h/o DDD, Anxiety, CAD on Imdur, Kidney stones and s/p RALP on 11/16/18 with positive margin back in follow-up. Since last seen on 5/26/21, he has no new  complaints. He still has Kevin Jeong did see Dr Evans Servant he recommended IMRT with 6 mo of ADT once his ABDIAS resolves but the patient and wife have been reluctant to comply with this option given stable undetectable PSA's and due to his other medical concerns. I reviewed the interval PSA today. He no longer takes Imdur and was given Viagra by his Cardiologist. He has no new medical or surgical problems.      PATH: Aaliyah 4 + 3 = 7 adenocarcinoma of prostate, [address] neg, EPE neg, Margin pos (Rt anterior), SV neg.  [id number].     Past Medical History:   Diagnosis Date    Cancer [facility])     CHF (congestive heart failure) (HCC)     Chicken pox     Chronic back pain     [facility] ([facility]     Hyperlipidemia     Hypertension     Hypothyroidism     Measles     Mumps     Osteoarthritis     Pneumonia     Type 2 diabetes mellitus without complication (HCC)     prediabetes     Past Surgical History:   Procedure Laterality Date    CARDIAC CATHETERIZATION      2019    CYST REMOVAL      back of neck    CYST REMOVAL Left 3/4/16    Dr Mykel Jarrett  07/2018    HERNIA REPAIR      PROSTATE SURGERY  07/2018    Biopsy    PROSTATECTOMY  11/16/2018    TONSILLECTOMY N/A 8/12/2021    TONSILLECTOMY performed by Padma Casey MD at [address] [facility]  3/4/16    Dr Yvonne Elder N/A 4/5/2021    EGD ESOPHAGOGASTRODUODENOSCOPY WITH BIOPSY AND DILATION performed by Liliane Isaacs MD at [facility] N/A 11/18/2019    REPAIR OF RECURRENT VENTRAL HERNIA performed by Ayanna Burciaga MD at [address] [facility] Marital status:      Spouse name: Not on file    Number of children: Not on file    Years of education: Not on file    Highest education level: Not on file   Occupational History    Not on file   Tobacco Use    Smoking status: Former Smoker     Packs/day: 1.50     Years: 40.00     Pack years: 60.00     Types: Cigarettes     Quit date: 11/16/2018     Years since quitting: 3.0    Smokeless tobacco: Never Used   Vaping Use    Vaping Use: Never used   Substance and Sexual Activity    Alcohol use: Not Currently    Drug use: No    Sexual activity: Not on file   Other Topics Concern    Not on file   Social History Narrative    Not on file     Social Determinants of Health     Financial Resource Strain: Low Risk     Difficulty of Paying Living Expenses: Not hard at all   Food Insecurity: No Food Insecurity    Worried About [address] [employer] in the Last Year: Never true    [facility] in the Last Year: Never true   Transportation Needs:     Lack of Transportation (Medical): Not on file    Lack of Transportation (Non-Medical):  Not on file   Physical Activity:     Days of Exercise per Week: Not on file    Minutes of Exercise per Session: Not on file   Stress:     Feeling of Stress : Not on file   Social Connections:     Frequency of Communication with Friends and Family: Not on file    Frequency of Social Gatherings with Friends and Family: Not on file    Attends Jehovah's witness Services: Not on file    Active Member of [address] [place] [address] or Organizations: Not on file    Attends Club or Organization Meetings: Not on file    Marital Status: Not on file   Intimate Partner Violence:     Fear of Current or Ex-Partner: Not on file    Emotionally Abused: Not on file    Physically Abused: Not on file    Sexually Abused: Not on file   Housing Stability:     Unable to Pay for Housing in the Last Year: Not on file    Number of [place] in the Last Year: Not on file    Unstable Housing in the Last Year: Not on file Family History   Problem Relation Age of Onset    Other Mother     Other Father     Cancer Father     Cancer Sister     Cancer Brother     Lung Cancer Brother     Cancer Brother     Colon Cancer Neg Hx      Current Outpatient Medications   Medication Sig Dispense Refill    carvedilol (COREG) 6.25 MG tablet TAKE ONE TABLET BY MOUTH TWICE A  tablet 3    atorvastatin (LIPITOR) 80 MG tablet TAKE ONE TABLET BY MOUTH EVERY DAY 90 tablet 0    levothyroxine (SYNTHROID) 75 MCG tablet 1 po q day 90 tablet 1    tiotropium (SPIRIVA RESPIMAT) 2.5 MCG/ACT AERS inhaler Inhale 2 puffs into the lungs daily 1 each 5    sildenafil (VIAGRA) 50 MG tablet Take 1 tablet by mouth as needed for Erectile Dysfunction 10 tablet 5    aspirin 81 MG EC tablet Take 81 mg by mouth daily      LORazepam (ATIVAN) 1 MG tablet       omeprazole (PRILOSEC) 20 MG delayed release capsule Take 1 capsule by mouth Daily 90 capsule 3    omeprazole (PRILOSEC) 20 MG delayed release capsule Take 1 capsule by mouth Daily 30 capsule 2    Potassium 99 MG TABS Take by mouth OTC potassium      Glucosamine-Chondroitin--300-250 MG TABS Take 1 tablet by mouth Daily      acetaminophen (TYLENOL) 500 MG tablet Take 1,000 mg by mouth daily At night      Naproxen Sodium (ALEVE) 220 MG CAPS Take 1 capsule by mouth daily      vitamin C (ASCORBIC ACID) 500 MG tablet Take 500 mg by mouth daily       No current facility-administered medications for this visit. Demerol hcl [meperidine] and Valium [diazepam]  reviewed      Review of Systems   Constitutional: Negative for unexpected weight change. Gastrointestinal: Negative for abdominal distention and abdominal pain. Genitourinary: Negative for dysuria, flank pain and hematuria. Objective:   Physical Exam  Constitutional:       Appearance: Normal appearance. Abdominal:      General: There is no distension. Neurological:      Mental Status: He is alert.    Psychiatric: Mood and Affect: Mood normal.            PSA   Date Value Ref Range Status   09/21/2021 0.01 0.00 - 4.00 ng/mL Corrected     Comment:     When the Total PSA is between 3.00 and 10.00 ng/mL, consider  requesting a Free PSA to aid in diagnosis. Corrected result; previously reported as <0.01 on 09/22/2021 at 01:32 by [facility]     05/17/2021 <0.01 0.00 - 5.40 ng/mL Final   01/19/2021 <0.01 0.00 - 5.40 ng/mL Final   10/12/2020 <0.01 0.00 - 5.40 ng/mL Final   06/09/2020 <0.01 0.00 - 5.40 ng/mL Final       Assessment: This is a 66 yo male with h/o DDD, Anxiety, Kidney stones and with Aaliyah 7 prostate cancer s/p RALP in 11/18 with a positive margin. He has still has a very low PSA but no longer is undetectable. This may be of concern given positive margin and for now he wants to continue to follow the PSA's and if rises again he will then consider IMRT and 6 mo ADT as was suggested by prior consult with Radiation Oncology at [facility]. Plan:      1.  F/U 6 mo for PSA (pt choice)        Kaylyn Guerra MD

## 2021-12-07 NOTE — TELEPHONE ENCOUNTER
RIGHT THEN LEFT L3,4,5 RFA-DENIED RIGHT THEN LEFT L3,4,5 RFA-DENIED     PATIENT'S  HAS FILED APPEAL AND IS REQUESTING A REBUTTAL TO MEDICAL REPORT OF DR MOREIRA DATED 11/8/21. SCANNED IN SYSTEM    PATIENT'S  RESENT REQUEST. NEEDS REPORT BY HEARING. HEARING DATE IS 12/16/21. SENT EMAIL TO VICKI CEDILLO.

## 2021-12-10 ENCOUNTER — OFFICE VISIT (OUTPATIENT)
Dept: GASTROENTEROLOGY | Age: 66
End: 2021-12-10
Payer: MEDICARE

## 2021-12-10 VITALS — OXYGEN SATURATION: 97 % | BODY MASS INDEX: 32.58 KG/M2 | HEART RATE: 67 BPM | WEIGHT: 220 LBS | HEIGHT: 69 IN

## 2021-12-10 DIAGNOSIS — K20.90 ESOPHAGITIS: Primary | ICD-10-CM

## 2021-12-10 DIAGNOSIS — K22.2 ESOPHAGEAL STRICTURE: ICD-10-CM

## 2021-12-10 PROCEDURE — 99213 OFFICE O/P EST LOW 20 MIN: CPT | Performed by: SPECIALIST

## 2021-12-10 ASSESSMENT — ENCOUNTER SYMPTOMS
RESPIRATORY NEGATIVE: 1
ABDOMINAL PAIN: 0
ANAL BLEEDING: 0
VOMITING: 0
GASTROINTESTINAL NEGATIVE: 1
RECTAL PAIN: 0
CONSTIPATION: 0
ABDOMINAL DISTENTION: 0
BLOOD IN STOOL: 0
NAUSEA: 0
DIARRHEA: 0
EYES NEGATIVE: 1

## 2021-12-20 ENCOUNTER — OFFICE VISIT (OUTPATIENT)
Dept: CARDIOLOGY CLINIC | Age: 66
End: 2021-12-20
Payer: MEDICARE

## 2021-12-20 VITALS
OXYGEN SATURATION: 95 % | RESPIRATION RATE: 16 BRPM | WEIGHT: 220 LBS | HEART RATE: 52 BPM | BODY MASS INDEX: 32.58 KG/M2 | SYSTOLIC BLOOD PRESSURE: 118 MMHG | DIASTOLIC BLOOD PRESSURE: 62 MMHG | HEIGHT: 69 IN

## 2021-12-20 DIAGNOSIS — I50.33 ACUTE ON CHRONIC DIASTOLIC HEART FAILURE (HCC): Primary | ICD-10-CM

## 2021-12-20 DIAGNOSIS — E78.00 PURE HYPERCHOLESTEROLEMIA: ICD-10-CM

## 2021-12-20 DIAGNOSIS — E78.5 DYSLIPIDEMIA: ICD-10-CM

## 2021-12-20 DIAGNOSIS — I25.10 CORONARY ARTERY DISEASE INVOLVING NATIVE CORONARY ARTERY OF NATIVE HEART WITHOUT ANGINA PECTORIS: ICD-10-CM

## 2021-12-20 DIAGNOSIS — I10 ESSENTIAL HYPERTENSION: ICD-10-CM

## 2021-12-20 DIAGNOSIS — N52.9 VASCULOGENIC ERECTILE DYSFUNCTION, UNSPECIFIED VASCULOGENIC ERECTILE DYSFUNCTION TYPE: ICD-10-CM

## 2021-12-20 DIAGNOSIS — R73.03 PRE-DIABETES: ICD-10-CM

## 2021-12-20 LAB
ALBUMIN SERPL-MCNC: 4.1 G/DL (ref 3.5–4.6)
ALP BLD-CCNC: 95 U/L (ref 35–104)
ALT SERPL-CCNC: 17 U/L (ref 0–41)
ANION GAP SERPL CALCULATED.3IONS-SCNC: 8 MEQ/L (ref 9–15)
AST SERPL-CCNC: 27 U/L (ref 0–40)
BILIRUB SERPL-MCNC: 0.5 MG/DL (ref 0.2–0.7)
BUN BLDV-MCNC: 22 MG/DL (ref 8–23)
CALCIUM SERPL-MCNC: 9.2 MG/DL (ref 8.5–9.9)
CHLORIDE BLD-SCNC: 102 MEQ/L (ref 95–107)
CHOLESTEROL, TOTAL: 115 MG/DL (ref 0–199)
CO2: 27 MEQ/L (ref 20–31)
CREAT SERPL-MCNC: 0.92 MG/DL (ref 0.7–1.2)
GFR AFRICAN AMERICAN: >60
GFR NON-AFRICAN AMERICAN: >60
GLOBULIN: 2.9 G/DL (ref 2.3–3.5)
GLUCOSE BLD-MCNC: 100 MG/DL (ref 70–99)
HDLC SERPL-MCNC: 44 MG/DL (ref 40–59)
LDL CHOLESTEROL CALCULATED: 59 MG/DL (ref 0–129)
POTASSIUM SERPL-SCNC: 4.3 MEQ/L (ref 3.4–4.9)
SODIUM BLD-SCNC: 137 MEQ/L (ref 135–144)
TOTAL PROTEIN: 7 G/DL (ref 6.3–8)
TRIGL SERPL-MCNC: 58 MG/DL (ref 0–150)
TSH SERPL DL<=0.05 MIU/L-ACNC: 1.74 UIU/ML (ref 0.44–3.86)

## 2021-12-20 PROCEDURE — 99214 OFFICE O/P EST MOD 30 MIN: CPT | Performed by: INTERNAL MEDICINE

## 2021-12-20 RX ORDER — SILDENAFIL 100 MG/1
100 TABLET, FILM COATED ORAL DAILY PRN
Qty: 10 TABLET | Refills: 5 | Status: SHIPPED | OUTPATIENT
Start: 2021-12-20

## 2021-12-20 ASSESSMENT — ENCOUNTER SYMPTOMS
BLOOD IN STOOL: 0
NAUSEA: 0
CHEST TIGHTNESS: 0
BACK PAIN: 1
GASTROINTESTINAL NEGATIVE: 1
COUGH: 0
EYES NEGATIVE: 1
STRIDOR: 0
SHORTNESS OF BREATH: 1
WHEEZING: 0

## 2021-12-20 NOTE — PROGRESS NOTES
Office Visit. Patient: Blanca Lugo  YOB: 1955  MRN: [id number]    Chief Complaint: RAMOS LE edema Orthopnea  Chief Complaint   Patient presents with    Follow-up     4 month    Congestive Heart Failure    Coronary Artery Disease    Hypertension       CV Data:  7/2019 echo EF 60  7/2019 Dobut stress echo negative   LAD 40-50% sequential, RCA  mid. LVEF 55, EDP 14, PCWP 14 CO 7.2 L/min  10/2019 CUS- mild  10/2010 Abd US negative. 11/2020 CUS mild     Subjective/HPI RAMOS is getting worse. Can't do much due to knee and back pain. Can't lie flat due to sob    10/16/2019: still sob but better since BB and Imdur. No cp. No bleed no falls. 1/17/2020 no cp occ ramos no falls occ nose bleed no falls     6/19/2020 Patient and/or health care decision maker is aware that that he/she may receive a bill for this telephone service, depending on his insurance coverage, and has provided verbal consent to proceed. This visit was completed via telephone. Total time 13 minutes. Still has some ramos but better. No cp no falls no bleed little edema. 7/27/2020 still RAMOS. 10/28/2020 no RAMOS No CP active at home. No falls no bleed. 1/28/21 still sob occ cp but not bad. No NTG needed. No bleed no falls     4/29/21 lately occ LE edema no cp no sob no bleed. Takes mes. Recent EGD     8/31/21 No cp no sob no falls no bleed. Lost near 30LBS by deiting. Takes med    Stopped smoking 11/2018 with prior 2 ppd  Disabled  from knee and back.      EKG: SR 61    Past Medical History:   Diagnosis Date    Cancer ([address]     CHF (congestive heart failure) (HCC)     Chicken pox     Chronic back pain     Emphysema of lung ([address]     Hyperlipidemia     Hypertension     Hypothyroidism     Measles     Mumps     Osteoarthritis     Pneumonia     Type 2 diabetes mellitus without complication ([facility]     prediabetes       Past Surgical History:   Procedure Laterality Date    CARDIAC CATHETERIZATION      2019    CYST REMOVAL      back of neck    CYST REMOVAL Left 3/4/16    Dr Braden Gamble  07/2018    HERNIA REPAIR      PROSTATE SURGERY  07/2018    Biopsy    PROSTATECTOMY  11/16/2018    TONSILLECTOMY N/A 8/12/2021    TONSILLECTOMY performed by Clive Gonzales MD at [address]  3/4/16    Dr Gina Manning N/A 4/5/2021    EGD ESOPHAGOGASTRODUODENOSCOPY WITH BIOPSY AND DILATION performed by Ria Hinton MD at [facility] N/A 11/18/2019    REPAIR OF RECURRENT VENTRAL HERNIA performed by Brodie Lopez MD at [address] [place] [address] History   Problem Relation Age of Onset    Other Mother     Other Father     Cancer Father     Cancer Sister     Cancer Brother     Lung Cancer Brother     Cancer Brother     Colon Cancer Neg Hx        Social History     Socioeconomic History    Marital status:      Spouse name: None    Number of children: None    Years of education: None    Highest education level: None   Occupational History    None   Tobacco Use    Smoking status: Former Smoker     Packs/day: 1.50     Years: 40.00     Pack years: 60.00     Types: Cigarettes     Quit date: 11/16/2018     Years since quitting: 3.0    Smokeless tobacco: Never Used   Vaping Use    Vaping Use: Never used   Substance and Sexual Activity    Alcohol use: Not Currently    Drug use: No    Sexual activity: None   Other Topics Concern    None   Social History Narrative    None     Social Determinants of Health     Financial Resource Strain: Low Risk     Difficulty of Paying Living Expenses: Not hard at all   Food Insecurity: No Food Insecurity    Worried About Running Out of Food in the Last Year: Never true    Ervin of Food in the Last Year: Never true   Transportation Needs:     Lack of Transportation (Medical): Not on file    Lack of Transportation (Non-Medical):  Not on file   Physical Activity:     Days of Exercise per Week: Not on file    Minutes of Exercise per Session: Not on file   Stress:     Feeling of Stress : Not on file   Social Connections:     Frequency of Communication with Friends and Family: Not on file    Frequency of Social Gatherings with Friends and Family: Not on file    Attends [employer] Services: Not on file    Active Member of [address] [place] [address] or Organizations: Not on file    Attends Club or Organization Meetings: Not on file    Marital Status: Not on file   Intimate Partner Violence:     Fear of Current or Ex-Partner: Not on file    Emotionally Abused: Not on file    Physically Abused: Not on file    Sexually Abused: Not on file   Housing Stability:     Unable to Pay for Housing in the Last Year: Not on file    Number of [place] in the Last Year: Not on file    Unstable Housing in the Last Year: Not on file       Allergies   Allergen Reactions    Demerol Hcl [Meperidine] Nausea Only     migraines    Valium [Diazepam] Other (See Comments)     MIGRAINES       Current Outpatient Medications   Medication Sig Dispense Refill    sildenafil (VIAGRA) 100 MG tablet Take 1 tablet by mouth daily as needed for Erectile Dysfunction 10 tablet 5    carvedilol (COREG) 6.25 MG tablet TAKE ONE TABLET BY MOUTH TWICE A  tablet 3    atorvastatin (LIPITOR) 80 MG tablet TAKE ONE TABLET BY MOUTH EVERY DAY 90 tablet 0    levothyroxine (SYNTHROID) 75 MCG tablet 1 po q day 90 tablet 1    tiotropium (SPIRIVA RESPIMAT) 2.5 MCG/ACT AERS inhaler Inhale 2 puffs into the lungs daily 1 each 5    aspirin 81 MG EC tablet Take 81 mg by mouth daily      LORazepam (ATIVAN) 1 MG tablet       omeprazole (PRILOSEC) 20 MG delayed release capsule Take 1 capsule by mouth Daily 90 capsule 3    omeprazole (PRILOSEC) 20 MG delayed release capsule Take 1 capsule by mouth Daily 30 capsule 2    Potassium 99 MG TABS Take by mouth OTC potassium      Glucosamine-Chondroitin-MSM 375-300-250 MG TABS Take 1 tablet by mouth Daily      acetaminophen (TYLENOL) 500 MG tablet Take 1,000 mg by mouth daily At night      Naproxen Sodium (ALEVE) 220 MG CAPS Take 1 capsule by mouth daily      vitamin C (ASCORBIC ACID) 500 MG tablet Take 500 mg by mouth daily       No current facility-administered medications for this visit. Review of Systems:   Review of Systems   Constitutional: Negative. Negative for diaphoresis and fatigue. HENT: Negative. Eyes: Negative. Respiratory: Positive for shortness of breath. Negative for cough, chest tightness, wheezing and stridor. Cardiovascular: Positive for leg swelling. Negative for chest pain and palpitations. Gastrointestinal: Negative. Negative for blood in stool and nausea. Genitourinary: Negative. Musculoskeletal: Positive for arthralgias, back pain and gait problem. Skin: Negative. Neurological: Negative for dizziness, syncope, weakness and light-headedness. Hematological: Negative. Psychiatric/Behavioral: Negative. Physical Examination:    /62 (Site: Right Upper Arm, Position: Sitting, Cuff Size: Large Adult)   Pulse 52   Resp 16   Ht 5' 9\" (1.753 m)   Wt 220 lb (99.8 kg)   SpO2 95%   BMI 32.49 kg/m²    Physical Exam   Constitutional: He appears healthy. No distress. HENT:   Normal cephalic and Atraumatic   Eyes: Pupils are equal, round, and reactive to light. Neck: Thyroid normal. No JVD present. No neck adenopathy. No thyromegaly present. Cardiovascular: Normal rate, regular rhythm, intact distal pulses and normal pulses. Murmur heard. Pulmonary/Chest: Effort normal and breath sounds normal. He has no wheezes. He has no rales. He exhibits no tenderness. Abdominal: Soft. Bowel sounds are normal. There is no abdominal tenderness. Musculoskeletal:         General: No tenderness or edema. Normal range of motion. Cervical back: Normal range of motion and neck supple.    Neurological: He is alert and oriented to person, place, and time. Skin: Skin is warm. No cyanosis. Nails show no clubbing.        LABS:  CBC:   Lab Results   Component Value Date    WBC 7.8 09/21/2021    RBC 4.53 09/21/2021    HGB 13.1 09/21/2021    HCT 39.4 09/21/2021    MCV 86.9 09/21/2021    MCH 28.9 09/21/2021    MCHC 33.3 09/21/2021    RDW 15.2 09/21/2021     09/21/2021     Lipids:  Lab Results   Component Value Date    CHOL 157 09/21/2021    CHOL 130 06/15/2021    CHOL 154 03/15/2021     Lab Results   Component Value Date    TRIG 151 (H) 09/21/2021    TRIG 105 06/15/2021    TRIG 129 03/15/2021     Lab Results   Component Value Date    HDL 48 09/21/2021    HDL 43 06/15/2021    HDL 43 03/15/2021     Lab Results   Component Value Date    LDLCALC 79 09/21/2021    LDLCALC 66 06/15/2021    LDLCALC 85 03/15/2021     No results found for: LABVLDL, VLDL  No results found for: CHOLHDLRATIO  CMP:    Lab Results   Component Value Date     09/21/2021    K 4.0 09/21/2021     09/21/2021    CO2 27 09/21/2021    BUN 21 09/21/2021    CREATININE 0.82 09/21/2021    GFRAA >60.0 09/21/2021    LABGLOM >60.0 09/21/2021    GLUCOSE 90 09/21/2021    PROT 6.3 09/21/2021    LABALBU 4.0 09/21/2021    CALCIUM 9.3 09/21/2021    BILITOT 0.4 09/21/2021    ALKPHOS 100 09/21/2021    AST 16 09/21/2021    ALT 14 09/21/2021     BMP:    Lab Results   Component Value Date     09/21/2021    K 4.0 09/21/2021     09/21/2021    CO2 27 09/21/2021    BUN 21 09/21/2021    LABALBU 4.0 09/21/2021    CREATININE 0.82 09/21/2021    CALCIUM 9.3 09/21/2021    GFRAA >60.0 09/21/2021    LABGLOM >60.0 09/21/2021    GLUCOSE 90 09/21/2021     Magnesium:  No results found for: MG  TSH:  Lab Results   Component Value Date    TSH 2.080 06/15/2021             Patient Active Problem List   Diagnosis    Lumbosacral spondylosis without myelopathy    Umbilical hernia without obstruction and without gangrene    Diastasis recti    Other bursal cyst of elbow  Prostate cancer [facility]    Recurrent ventral hernia    Dyslipidemia    Essential hypertension    Acute on chronic diastolic heart failure (HCC)    Shortness of breath    RAMOS (dyspnea on exertion)    Coronary artery disease involving native coronary artery of native heart without angina pectoris    Chronic obstructive pulmonary disease (HCC)    Bilateral carotid bruits    Esophageal dysphagia    Esophageal stricture    Esophagitis    Chronic gastritis without bleeding    Sedative, hypnotic or anxiolytic use, unspecified with unspecified sedative, hypnotic or anxiolytic-induced disorder    Sedative, hypnotic or anxiolytic dependence with unspecified sedative, hypnotic or anxiolytic-induced disorder    Sedative, hypnotic or anxiolytic dependence, uncomplicated    Tonsillitis, chronic    Abscess of tonsil    Acquired deviated nasal septum    Angina pectoris, unspecified       Medications Discontinued During This Encounter   Medication Reason    sildenafil (VIAGRA) 50 MG tablet        Modified Medications    No medications on file       Orders Placed This Encounter   Medications    sildenafil (VIAGRA) 100 MG tablet     Sig: Take 1 tablet by mouth daily as needed for Erectile Dysfunction     Dispense:  10 tablet     Refill:  5       Assessment/Plan:    1. Dyslipidemia   LDL Elevated- on Atorva. Repeat labs reviewed. Much better   2. Essential hypertension  Stable     3. Shortness of breath - stable     4. Acute on chronic diastolic heart failure (HCC) - stable     5. Carotid Bruits- CUS - will need conontued surveillance. 6. Lung Nodule- f/u CT ordered. F/u Dr. Lv Fuchs.     7. ED- wants to try Viagra- no angina. BP on low side. Advance Viagra to 100 mg PRN. No nitrates. Counseling:  Heart Healthy Lifestyle, Improve BMI, Low Salt Diet, Volume Restriction 1500cc per day, Take Precautions to Prevent Falls and Walk Daily    Return in about 4 months (around 4/20/2022).     Electronically signed by Rex Dixon MD on 12/20/2021 at 12:56 PM

## 2021-12-21 RX ORDER — ATORVASTATIN CALCIUM 80 MG/1
TABLET, FILM COATED ORAL
Qty: 90 TABLET | Refills: 0 | Status: SHIPPED | OUTPATIENT
Start: 2021-12-21 | End: 2022-01-18 | Stop reason: SDUPTHER

## 2021-12-21 RX ORDER — LEVOTHYROXINE SODIUM 0.07 MG/1
TABLET ORAL
Qty: 90 TABLET | Refills: 1 | Status: SHIPPED | OUTPATIENT
Start: 2021-12-21 | End: 2022-01-18 | Stop reason: SDUPTHER

## 2021-12-21 NOTE — TELEPHONE ENCOUNTER
Requesting medication refill.  Please approve or deny this request.    Rx requested:  Requested Prescriptions     Pending Prescriptions Disp Refills    levothyroxine (SYNTHROID) 75 MCG tablet [Pharmacy Med Name: LEVOTHYROXINE SODIUM 75MCG TABS] 90 tablet 1     Sig: TAKE ONE TABLET BY MOUTH EVERY DAY    atorvastatin (LIPITOR) 80 MG tablet [Pharmacy Med Name: ATORVASTATIN CALCIUM 80MG TABS] 90 tablet 0     Sig: TAKE ONE TABLET BY MOUTH EVERY DAY       Last Office Visit:   10/1/2021    Last Filled:      Last Labs:      Next Visit Date:  Future Appointments   Date Time Provider Grey Stewart   1/3/2022 10:00 AM Felecia Rehman  [place], [place] [address]   2/11/2022  1:00 PM Nolan Maynard PA-C [address] [place] [address]   3/22/2022  1:15 PM Lia Whitehead MD [facility]   4/18/2022 12:30 PM Candis Gould MD [address] [place] [address]   6/1/2022 10:15 AM Sirisha Goodwin MD [facility]   6/10/2022  9:30 AM Lin Romero MD [facility]

## 2022-01-01 ENCOUNTER — TELEPHONE (OUTPATIENT)
Dept: FAMILY MEDICINE CLINIC | Age: 67
End: 2022-01-01

## 2022-01-01 NOTE — TELEPHONE ENCOUNTER
----- Message from Kiara Andrade sent at 12/30/2021  3:38 PM EST -----  Subject: Message to Provider    QUESTIONS  Information for Provider? Patient stating that office called him to   reschedule his 01/03 appt due to  being out. He is trying to reach [facility]   to reschedule. Please call to advise. Would also like info about how to be   tested for COVID.  ---------------------------------------------------------------------------  --------------  CALL BACK INFO  What is the best way for the office to contact you? OK to leave message on   voicemail  Preferred Call Back Phone Number?  [phone]  ---------------------------------------------------------------------------  --------------  SCRIPT ANSWERS  undefined

## 2022-01-03 DIAGNOSIS — F41.9 ANXIETY: ICD-10-CM

## 2022-01-03 RX ORDER — LORAZEPAM 1 MG/1
1 TABLET ORAL NIGHTLY PRN
Qty: 30 TABLET | Refills: 0 | Status: SHIPPED | OUTPATIENT
Start: 2022-01-03 | End: 2022-01-31 | Stop reason: SDUPTHER

## 2022-01-04 NOTE — TELEPHONE ENCOUNTER
Right then Left L3, L4. L5 RFA     Called and spoke with patient on the phone, let him know that the approval is not on the [facility] website and requested that he either fax in a copy of the approval or have his  fax us a copy of it.   Gave him the fax#, he said he will call his 's office and have them fax over the authorization for the RFA approval.

## 2022-01-04 NOTE — TELEPHONE ENCOUNTER
Lvm pt call back to schedule appointment with . Also informed him he can come to our walkin to be seen and covid tested.

## 2022-01-04 NOTE — TELEPHONE ENCOUNTER
Patient tested through [facility] on Friday. He is Covid positive.

## 2022-01-05 DIAGNOSIS — F41.9 ANXIETY: ICD-10-CM

## 2022-01-05 NOTE — TELEPHONE ENCOUNTER
Requested Prescriptions     Pending Prescriptions Disp Refills    LORazepam (ATIVAN) 1 MG tablet 4 tablet 0     Sig: Take 1 tablet by mouth daily for 4 days.  1 hour prior procedure       Patient last seen on: 10/19  Date of last surgery:  n/a  Date of last refill:  11/16  Pain level:  n/a  Patient complaining of:  PT is asking for prescription for RFA on 1/10 and 1/24  Future appts: 1/10, 1//24

## 2022-01-05 NOTE — TELEPHONE ENCOUNTER
-RIGHT THEN LEFT L3,4,5 RFA    AUTHORIZATION:    INSURANCE: [employer] Orlando Lees HEARING   AUTH [address] VIA: Dorcas Pedraza #: NO NUMBER, Orlando Lees HEARING 12/16/21 ON [place]    DATE RANGE: NO DATE RANGE-ALLOWED    TELEPHONE CALL ROUTED TO MA TO SCHEDULE.     TO BE SCHEDULED WITH DR Edgar Rodriguez    SCANNED

## 2022-01-05 NOTE — TELEPHONE ENCOUNTER
Left voicemail for the patient to schedule procedure    SK-RIGHT THEN LEFT L3,4,5 RFA-APPROVED AT HEARING 12/16/21)

## 2022-01-06 RX ORDER — LORAZEPAM 1 MG/1
1 TABLET ORAL DAILY
Qty: 4 TABLET | Refills: 0 | Status: SHIPPED | OUTPATIENT
Start: 2022-01-06 | End: 2022-01-10

## 2022-01-10 ENCOUNTER — OFFICE VISIT (OUTPATIENT)
Dept: PAIN MANAGEMENT | Age: 67
End: 2022-01-10
Payer: COMMERCIAL

## 2022-01-10 DIAGNOSIS — M47.817 LUMBOSACRAL SPONDYLOSIS WITHOUT MYELOPATHY: ICD-10-CM

## 2022-01-10 PROCEDURE — 64636 DESTROY L/S FACET JNT ADDL: CPT | Performed by: PAIN MEDICINE

## 2022-01-10 PROCEDURE — 64635 DESTROY LUMB/SAC FACET JNT: CPT | Performed by: PAIN MEDICINE

## 2022-01-10 RX ORDER — LIDOCAINE HYDROCHLORIDE 10 MG/ML
10 INJECTION, SOLUTION EPIDURAL; INFILTRATION; INTRACAUDAL; PERINEURAL ONCE
Status: COMPLETED | OUTPATIENT
Start: 2022-01-10 | End: 2022-01-10

## 2022-01-10 RX ORDER — BETAMETHASONE SODIUM PHOSPHATE AND BETAMETHASONE ACETATE 3; 3 MG/ML; MG/ML
6 INJECTION, SUSPENSION INTRA-ARTICULAR; INTRALESIONAL; INTRAMUSCULAR; SOFT TISSUE ONCE
Status: COMPLETED | OUTPATIENT
Start: 2022-01-10 | End: 2022-01-10

## 2022-01-10 RX ADMIN — BETAMETHASONE SODIUM PHOSPHATE AND BETAMETHASONE ACETATE 6 MG: 3; 3 INJECTION, SUSPENSION INTRA-ARTICULAR; INTRALESIONAL; INTRAMUSCULAR; SOFT TISSUE at 14:39

## 2022-01-10 RX ADMIN — LIDOCAINE HYDROCHLORIDE 10 MG: 10 INJECTION, SOLUTION EPIDURAL; INFILTRATION; INTRACAUDAL; PERINEURAL at 14:39

## 2022-01-10 NOTE — PROGRESS NOTES
[facility]) Physicians  Neurosurgery and Pain [address] [facility] [address]: [phone]  F: [phone]      Lumbar Radio Frequency Ablation     Provider: Andrea Luu DO          Patient Name: Matthew Steele : 1955        Date: 1/10/2022      Arbutus Balloon is here today for interventional pain management. Standard ASIPP guidelines were followed and sterile technique used. Area was cleaned with Betadine x3. Informed consent was obtained. Fluoroscopic guidance was used for this procedure. Multiple views of fluoroscopy were used during procedure to assist with needle placement. Appropriate sized RF 10mm active tip needle was used and advance to appropriate anatomic location. There was appropriate multifidus contraction noted with motor stimulation at 2 Hz between 0.5-1.5 volts. No limb or gluteal contraction was noted taking it up to 3.5 volts. Prior to lesioning at 80 degrees Celsius for 90 seconds, approximately 0.75mg/1mg of Celestone and ½ cc of 1% preservative free Lidocaine was injected. Impedance was between 200-500 ohms during the procedure. Patient tolerated the procedure well, no obvious complications occurred during the procedure. Patient was appropriately monitored and discharged home in stable condition with their usual motor strength. Post Op instructions were given to patient.           [] Bilateral [] T11 [] L1 [] S1     [] T12 [] L2 [] S2    [x] Right  [x] L3 [] S3      [x] L4 [] S4    [] Left  [x] L5                              Andrea Luu DO

## 2022-01-12 ENCOUNTER — TELEPHONE (OUTPATIENT)
Dept: PULMONOLOGY | Age: 67
End: 2022-01-12

## 2022-01-12 NOTE — TELEPHONE ENCOUNTER
Pt called stating he has new insurance, therefore, he needs a new order, for his CT scan, to be faxed to [facility]. I gave him scheduling's number to update it with them.  Thanks

## 2022-01-13 NOTE — TELEPHONE ENCOUNTER
Patient needs to call his DME company and give them his new insurance and they will take care of the rest

## 2022-01-18 ENCOUNTER — OFFICE VISIT (OUTPATIENT)
Dept: FAMILY MEDICINE CLINIC | Age: 67
End: 2022-01-18
Payer: COMMERCIAL

## 2022-01-18 VITALS
SYSTOLIC BLOOD PRESSURE: 130 MMHG | HEART RATE: 57 BPM | HEIGHT: 69 IN | DIASTOLIC BLOOD PRESSURE: 70 MMHG | OXYGEN SATURATION: 95 % | RESPIRATION RATE: 16 BRPM | BODY MASS INDEX: 32.85 KG/M2 | WEIGHT: 221.8 LBS | TEMPERATURE: 97.3 F

## 2022-01-18 DIAGNOSIS — L85.3 XEROSIS OF SKIN: ICD-10-CM

## 2022-01-18 DIAGNOSIS — E78.00 PURE HYPERCHOLESTEROLEMIA: ICD-10-CM

## 2022-01-18 DIAGNOSIS — R73.03 PRE-DIABETES: ICD-10-CM

## 2022-01-18 DIAGNOSIS — F41.9 ANXIETY: Primary | ICD-10-CM

## 2022-01-18 DIAGNOSIS — D64.9 ANEMIA, UNSPECIFIED TYPE: ICD-10-CM

## 2022-01-18 DIAGNOSIS — E03.8 OTHER SPECIFIED HYPOTHYROIDISM: ICD-10-CM

## 2022-01-18 DIAGNOSIS — I10 ESSENTIAL HYPERTENSION: ICD-10-CM

## 2022-01-18 PROCEDURE — 99214 OFFICE O/P EST MOD 30 MIN: CPT | Performed by: FAMILY MEDICINE

## 2022-01-18 RX ORDER — LORAZEPAM 1 MG/1
1 TABLET ORAL NIGHTLY PRN
Qty: 30 TABLET | Refills: 0 | Status: CANCELLED | OUTPATIENT
Start: 2022-01-18 | End: 2022-02-17

## 2022-01-18 RX ORDER — LEVOTHYROXINE SODIUM 0.07 MG/1
TABLET ORAL
Qty: 90 TABLET | Refills: 1 | Status: SHIPPED | OUTPATIENT
Start: 2022-01-18 | End: 2022-06-22

## 2022-01-18 RX ORDER — ATORVASTATIN CALCIUM 80 MG/1
TABLET, FILM COATED ORAL
Qty: 90 TABLET | Refills: 1 | Status: SHIPPED | OUTPATIENT
Start: 2022-01-18 | End: 2022-07-20 | Stop reason: SDUPTHER

## 2022-01-18 RX ORDER — CARVEDILOL 6.25 MG/1
TABLET ORAL
Qty: 180 TABLET | Refills: 3 | Status: CANCELLED | OUTPATIENT
Start: 2022-01-18

## 2022-01-18 RX ORDER — SILDENAFIL 100 MG/1
100 TABLET, FILM COATED ORAL DAILY PRN
Qty: 10 TABLET | Refills: 5 | Status: CANCELLED | OUTPATIENT
Start: 2022-01-18

## 2022-01-18 ASSESSMENT — ENCOUNTER SYMPTOMS
VOMITING: 0
DIARRHEA: 0

## 2022-01-18 ASSESSMENT — PATIENT HEALTH QUESTIONNAIRE - PHQ9
SUM OF ALL RESPONSES TO PHQ QUESTIONS 1-9: 0
2. FEELING DOWN, DEPRESSED OR HOPELESS: 0
SUM OF ALL RESPONSES TO PHQ9 QUESTIONS 1 & 2: 0
1. LITTLE INTEREST OR PLEASURE IN DOING THINGS: 0

## 2022-01-18 NOTE — PROGRESS NOTES
Subjective:      Patient ID: Jose Rafael Gramajo is a 77 y.o. male    Hypertension  This is a chronic problem. The current episode started more than 1 year ago. Pertinent negatives include no chest pain. Hyperlipidemia  Pertinent negatives include no chest pain. Mental Health Problem  The primary symptoms do not include dysphoric mood. Here in follow up for lipids and anxiety and htn. Using ativan nightly for most part. Tolerating increase dose of lipitor from last time. Did have covid end of dec. Feeling well now     Review of Systems   Constitutional: Negative for chills and fever. Cardiovascular: Negative for chest pain. Gastrointestinal: Negative for diarrhea and vomiting. Psychiatric/Behavioral: Negative for dysphoric mood. Reviewed allergy, medical, social, surgical, family and med list changes and updated   Files--reviewed blood work which is acceptable    Controlled substances monitoring: no signs of potential drug abuse or diversion identified and [place] report reviewed today- activity consistent with treatment plan.   Social History     Socioeconomic History    Marital status:      Spouse name: None    Number of children: None    Years of education: None    Highest education level: None   Occupational History    None   Tobacco Use    Smoking status: Former Smoker     Packs/day: 1.50     Years: 40.00     Pack years: 60.00     Types: Cigarettes     Quit date: 11/16/2018     Years since quitting: 3.1    Smokeless tobacco: Never Used   Vaping Use    Vaping Use: Never used   Substance and Sexual Activity    Alcohol use: Not Currently    Drug use: No    Sexual activity: None   Other Topics Concern    None   Social History Narrative    None     Social Determinants of Health     Financial Resource Strain: Low Risk     Difficulty of Paying Living Expenses: Not hard at all   Food Insecurity: No Food Insecurity    Worried About Running Out of Food in the Last Year: Never true   [facility] Ran Out of Food in the Last Year: Never true   Transportation Needs:     Lack of Transportation (Medical): Not on file    Lack of Transportation (Non-Medical): Not on file   Physical Activity:     Days of Exercise per Week: Not on file    Minutes of Exercise per Session: Not on file   Stress:     Feeling of Stress : Not on file   Social Connections:     Frequency of Communication with Friends and Family: Not on file    Frequency of Social Gatherings with Friends and Family: Not on file    Attends [employer] Services: Not on file    Active Member of [address] [place] [address] or Organizations: Not on file    Attends Club or Organization Meetings: Not on file    Marital Status: Not on file   Intimate Partner Violence:     Fear of Current or Ex-Partner: Not on file    Emotionally Abused: Not on file    Physically Abused: Not on file    Sexually Abused: Not on file   Housing Stability:     Unable to Pay for Housing in the Last Year: Not on file    Number of [place] in the Last Year: Not on file    Unstable Housing in the Last Year: Not on file     Current Outpatient Medications   Medication Sig Dispense Refill    LORazepam (ATIVAN) 1 MG tablet Take 1 tablet by mouth nightly as needed for Anxiety for up to 30 days.  30 tablet 0    levothyroxine (SYNTHROID) 75 MCG tablet TAKE ONE TABLET BY MOUTH EVERY DAY 90 tablet 1    atorvastatin (LIPITOR) 80 MG tablet TAKE ONE TABLET BY MOUTH EVERY DAY 90 tablet 0    sildenafil (VIAGRA) 100 MG tablet Take 1 tablet by mouth daily as needed for Erectile Dysfunction 10 tablet 5    carvedilol (COREG) 6.25 MG tablet TAKE ONE TABLET BY MOUTH TWICE A  tablet 3    tiotropium (SPIRIVA RESPIMAT) 2.5 MCG/ACT AERS inhaler Inhale 2 puffs into the lungs daily 1 each 5    aspirin 81 MG EC tablet Take 81 mg by mouth daily      LORazepam (ATIVAN) 1 MG tablet       omeprazole (PRILOSEC) 20 MG delayed release capsule Take 1 capsule by mouth Daily 90 capsule 3    omeprazole (PRILOSEC) 20 MG delayed release capsule Take 1 capsule by mouth Daily 30 capsule 2    Potassium 99 MG TABS Take by mouth OTC potassium      Glucosamine-Chondroitin--300-250 MG TABS Take 1 tablet by mouth Daily      acetaminophen (TYLENOL) 500 MG tablet Take 1,000 mg by mouth daily At night      Naproxen Sodium (ALEVE) 220 MG CAPS Take 1 capsule by mouth daily      vitamin C (ASCORBIC ACID) 500 MG tablet Take 500 mg by mouth daily       No current facility-administered medications for this visit. Family History   Problem Relation Age of Onset    Other Mother     Other Father     Cancer Father     Cancer Sister     Cancer Brother     Lung Cancer Brother     Cancer Brother     Colon Cancer Neg Hx      Past Medical History:   Diagnosis Date    Cancer [facility])     [facility]     Chicken pox     Chronic back pain     Emphysema of lung ([facility]     Hyperlipidemia     Hypertension     Hypothyroidism     Measles     Mumps     Osteoarthritis     Pneumonia     Type 2 diabetes mellitus without complication (HCC)     prediabetes     Objective:   /70   Pulse 57   Temp 97.3 °F (36.3 °C)   Resp 16   Ht 5' 9\" (1.753 m)   Wt 221 lb 12.8 oz (100.6 kg)   SpO2 95%   BMI 32.75 kg/m²     Physical Exam  Neck:no carotid bruits. No masses. No adenopathy. No thyroid asymmetry. Lungs:clear and equal breath sounds. No wheezes or rales. Heart:rate reg. No murmur. No gallops   Pulses:Radials 2+ equal               Poster tib 1+ equal  Extremities:no edema in either leg-diffuse xerosis with faint areas of erythema along lateral aspect of right lower leg   Gen: In no acute distress  Abdomen; B.S present. Soft  Non tender. No hepatosplenomegaly. No masses   Patient with appropriate affect. Alert    Thought content appropriate  Good eye contact    Assessment:       Diagnosis Orders   1. Anxiety     2. Essential hypertension     3. Pure hypercholesterolemia     4.  Other specified hypothyroidism     5. Pre-diabetes     6. Xerosis of skin     7.  Anemia, unspecified type           Plan:      Can start eucerin lotion daily for next few months and use hydrocortisone cream 1% bid x 2 weeks   Orders Placed This Encounter   Medications    levothyroxine (SYNTHROID) 75 MCG tablet     Sig: TAKE ONE TABLET BY MOUTH EVERY DAY     Dispense:  90 tablet     Refill:  1    atorvastatin (LIPITOR) 80 MG tablet     Sig: TAKE ONE TABLET BY MOUTH EVERY DAY     Dispense:  90 tablet     Refill:  1   continue ativan  F/u in 3 months after non fasting blood work

## 2022-01-24 ENCOUNTER — OFFICE VISIT (OUTPATIENT)
Dept: PAIN MANAGEMENT | Age: 67
End: 2022-01-24
Payer: COMMERCIAL

## 2022-01-24 DIAGNOSIS — M47.817 LUMBOSACRAL SPONDYLOSIS WITHOUT MYELOPATHY: Primary | ICD-10-CM

## 2022-01-24 PROCEDURE — 64636 DESTROY L/S FACET JNT ADDL: CPT | Performed by: PAIN MEDICINE

## 2022-01-24 PROCEDURE — 64635 DESTROY LUMB/SAC FACET JNT: CPT | Performed by: PAIN MEDICINE

## 2022-01-24 RX ORDER — BETAMETHASONE SODIUM PHOSPHATE AND BETAMETHASONE ACETATE 3; 3 MG/ML; MG/ML
6 INJECTION, SUSPENSION INTRA-ARTICULAR; INTRALESIONAL; INTRAMUSCULAR; SOFT TISSUE ONCE
Status: COMPLETED | OUTPATIENT
Start: 2022-01-24 | End: 2022-01-24

## 2022-01-24 RX ORDER — LIDOCAINE HYDROCHLORIDE 10 MG/ML
10 INJECTION, SOLUTION EPIDURAL; INFILTRATION; INTRACAUDAL; PERINEURAL ONCE
Status: COMPLETED | OUTPATIENT
Start: 2022-01-24 | End: 2022-01-24

## 2022-01-24 RX ADMIN — BETAMETHASONE SODIUM PHOSPHATE AND BETAMETHASONE ACETATE 6 MG: 3; 3 INJECTION, SUSPENSION INTRA-ARTICULAR; INTRALESIONAL; INTRAMUSCULAR; SOFT TISSUE at 13:16

## 2022-01-24 RX ADMIN — LIDOCAINE HYDROCHLORIDE 10 MG: 10 INJECTION, SOLUTION EPIDURAL; INFILTRATION; INTRACAUDAL; PERINEURAL at 13:16

## 2022-01-24 NOTE — PROGRESS NOTES
[facility]) Physicians  Neurosurgery and Pain [address] [facility] [address]: [phone]  F: [phone]      Lumbar Radio Frequency Ablation     Provider: Amanda Lundy DO          Patient Name: Lynne Ibrahim : 1955        Date: 2022      Lynne Ibrahim is here today for interventional pain management. Standard ASIPP guidelines were followed and sterile technique used. Area was cleaned with Betadine x3. Informed consent was obtained. Fluoroscopic guidance was used for this procedure. Multiple views of fluoroscopy were used during procedure to assist with needle placement. Appropriate sized RF 10mm active tip needle was used and advance to appropriate anatomic location. There was appropriate multifidus contraction noted with motor stimulation at 2 Hz between 0.5-1.5 volts. No limb or gluteal contraction was noted taking it up to 3.5 volts. Prior to lesioning at 80 degrees Celsius for 90 seconds, approximately 0.75mg/1mg of Celestone and ½ cc of 1% preservative free Lidocaine was injected. Impedance was between 200-500 ohms during the procedure. Patient tolerated the procedure well, no obvious complications occurred during the procedure. Patient was appropriately monitored and discharged home in stable condition with their usual motor strength. Post Op instructions were given to patient.           [] Bilateral [] T11 [] L1 [] S1     [] T12 [] L2 [] S2    [] Right  [x] L3 [] S3      [x] L4 [] S4    [x] Left  [x] L5                              Amanda Lundy DO

## 2022-01-31 DIAGNOSIS — F41.9 ANXIETY: ICD-10-CM

## 2022-01-31 RX ORDER — LORAZEPAM 1 MG/1
1 TABLET ORAL NIGHTLY PRN
Qty: 30 TABLET | Refills: 0 | Status: SHIPPED | OUTPATIENT
Start: 2022-01-31 | End: 2022-03-01 | Stop reason: SDUPTHER

## 2022-01-31 NOTE — TELEPHONE ENCOUNTER
Call back for additional refill when needed
Delmis Patel he needs 2 refills to make it until April at next appt
 used

## 2022-02-28 ENCOUNTER — HOSPITAL ENCOUNTER (OUTPATIENT)
Dept: CT IMAGING | Age: 67
Discharge: HOME | End: 2022-03-02
Payer: COMMERCIAL

## 2022-02-28 DIAGNOSIS — R91.8 LUNG NODULES: ICD-10-CM

## 2022-02-28 PROCEDURE — 71250 CT THORAX DX C-: CPT

## 2022-03-01 DIAGNOSIS — F41.9 ANXIETY: ICD-10-CM

## 2022-03-01 RX ORDER — LORAZEPAM 1 MG/1
1 TABLET ORAL NIGHTLY PRN
Qty: 30 TABLET | Refills: 0 | Status: SHIPPED | OUTPATIENT
Start: 2022-03-01 | End: 2022-03-31

## 2022-03-01 NOTE — TELEPHONE ENCOUNTER
Patient requesting medication refill. Rx requested:  Requested Prescriptions     Pending Prescriptions Disp Refills    LORazepam (ATIVAN) 1 MG tablet 30 tablet 0     Sig: Take 1 tablet by mouth nightly as needed for Anxiety for up to 30 days.        Last Office Visit:   1/18/2022        Next Visit Date:  Future Appointments   Date Time Provider Grey Stewart   3/22/2022  1:15 PM Jessica Hu MD  [address]   4/18/2022 12:30 PM Yaya Leger MD [facility]   4/20/2022  9:45 AM Leela Griggs MD [facility]   6/1/2022 10:15 AM Martin Mendoza MD [facility]   6/10/2022  9:30 AM Irma Willis MD [facility]

## 2022-03-08 RX ORDER — LORAZEPAM 1 MG/1
TABLET ORAL
Qty: 30 TABLET | Refills: 0 | OUTPATIENT
Start: 2022-03-08

## 2022-03-22 ENCOUNTER — TELEPHONE (OUTPATIENT)
Dept: PULMONOLOGY | Age: 67
End: 2022-03-22

## 2022-03-22 ENCOUNTER — OFFICE VISIT (OUTPATIENT)
Dept: PULMONOLOGY | Age: 67
End: 2022-03-22
Payer: COMMERCIAL

## 2022-03-22 VITALS
WEIGHT: 223.4 LBS | TEMPERATURE: 97.5 F | OXYGEN SATURATION: 96 % | HEIGHT: 69 IN | RESPIRATION RATE: 16 BRPM | DIASTOLIC BLOOD PRESSURE: 70 MMHG | BODY MASS INDEX: 33.09 KG/M2 | SYSTOLIC BLOOD PRESSURE: 112 MMHG | HEART RATE: 82 BPM

## 2022-03-22 DIAGNOSIS — J44.9 CHRONIC OBSTRUCTIVE PULMONARY DISEASE, UNSPECIFIED COPD TYPE (HCC): ICD-10-CM

## 2022-03-22 DIAGNOSIS — E66.09 CLASS 2 OBESITY DUE TO EXCESS CALORIES WITHOUT SERIOUS COMORBIDITY WITH BODY MASS INDEX (BMI) OF 35.0 TO 35.9 IN ADULT: ICD-10-CM

## 2022-03-22 DIAGNOSIS — R91.8 LUNG NODULES: ICD-10-CM

## 2022-03-22 DIAGNOSIS — Z87.891 PERSONAL HISTORY OF TOBACCO USE: Primary | ICD-10-CM

## 2022-03-22 PROCEDURE — G0296 VISIT TO DETERM LDCT ELIG: HCPCS | Performed by: INTERNAL MEDICINE

## 2022-03-22 PROCEDURE — 99213 OFFICE O/P EST LOW 20 MIN: CPT | Performed by: INTERNAL MEDICINE

## 2022-03-22 NOTE — PROGRESS NOTES
Subjective:     Alicia Duran is a 77 y.o. male who complains today of:     Chief Complaint   Patient presents with    Follow-up     6 Month F/U for COPD and Lung Nodule    Results     CT scan of Chest       HPI  Patient presents for COPD      Doing good, has no complaint, no coughing, no chest pain, no shortness of breath, weight is stable, no nasal congestion or postnasal drip, no lower extremity edema and no heartburn. He is doing good and active currently on Spiriva and compliant with treatment.             Allergies:  Demerol hcl [meperidine] and Valium [diazepam]  Past Medical History:   Diagnosis Date    Cancer ([facility]     CHF (congestive heart failure) (HCC)     Chicken pox     Chronic back pain     Emphysema of lung ([facility]     Hyperlipidemia     Hypertension     Hypothyroidism     Measles     Mumps     Osteoarthritis     Pneumonia     Type [facility] mellitus without [facility]     prediabetes     Past Surgical History:   Procedure Laterality Date    CARDIAC CATHETERIZATION      2019    CYST REMOVAL      back of neck    CYST REMOVAL Left 3/4/16    Dr Jess Roe  07/2018    HERNIA REPAIR      PROSTATE SURGERY  07/2018    Biopsy    PROSTATECTOMY  11/16/2018    TONSILLECTOMY N/A 8/12/2021    TONSILLECTOMY performed by Shirlene Zepeda MD at [address]  3/4/16    Dr Brandon Nava N/A 4/5/2021    EGD ESOPHAGOGASTRODUODENOSCOPY WITH BIOPSY AND DILATION performed by Mario Singh MD at [facility] N/A 11/18/2019    REPAIR OF RECURRENT VENTRAL HERNIA performed by Dennie Capes, MD at [address] History   Problem Relation Age of Onset    Other Mother     Other Father     Cancer Father     Cancer Sister     Cancer Brother     Lung Cancer Brother     Cancer Brother     Colon Cancer Neg Hx      Social History     Socioeconomic History    Marital status:      Spouse name: Not on file    Number of children: Not on file    Years of education: Not on file    Highest education level: Not on file   Occupational History    Not on file   Tobacco Use    Smoking status: Former Smoker     Packs/day: 1.50     Years: 40.00     Pack years: 60.00     Types: Cigarettes     Quit date: 11/16/2018     Years since quitting: 3.3    Smokeless tobacco: Never Used   Vaping Use    Vaping Use: Never used   Substance and Sexual Activity    Alcohol use: Not Currently    Drug use: No    Sexual activity: Not on file   Other Topics Concern    Not on file   Social History Narrative    Not on file     Social Determinants of Health     Financial Resource Strain: Low Risk     Difficulty of Paying Living Expenses: Not hard at all   Food Insecurity: No Food Insecurity    Worried About [address] in the Last Year: Never true    [facility] [employer] in the Last Year: Never true   Transportation Needs:     Lack of Transportation (Medical): Not on file    Lack of Transportation (Non-Medical):  Not on file   Physical Activity:     Days of Exercise per Week: Not on file    Minutes of Exercise per Session: Not on file   Stress:     Feeling of Stress : Not on file   Social Connections:     Frequency of Communication with Friends and Family: Not on file    Frequency of Social Gatherings with Friends and Family: Not on file    Attends [employer] Services: Not on file    Active Member of [address] [place] [address] or Organizations: Not on file    Attends Club or Organization Meetings: Not on file    Marital Status: Not on file   Intimate Partner Violence:     Fear of Current or Ex-Partner: Not on file    Emotionally Abused: Not on file    Physically Abused: Not on file    Sexually Abused: Not on file   Housing Stability:     Unable to Pay for Housing in the Last Year: Not on file    Number of [place] in the Last Year: Not on file    Unstable Housing in the Last Year: Not on file Review of Systems      ROS: 10 organs review of system is done including general, psychological, ENT, hematological, endocrine, respiratory, cardiovascular, gastrointestinal,musculoskeletal, neurological,  allergy and Immunology is done and is otherwise negative. Current Outpatient Medications   Medication Sig Dispense Refill    levothyroxine (SYNTHROID) 75 MCG tablet TAKE ONE TABLET BY MOUTH EVERY DAY 90 tablet 1    atorvastatin (LIPITOR) 80 MG tablet TAKE ONE TABLET BY MOUTH EVERY DAY 90 tablet 1    sildenafil (VIAGRA) 100 MG tablet Take 1 tablet by mouth daily as needed for Erectile Dysfunction 10 tablet 5    carvedilol (COREG) 6.25 MG tablet TAKE ONE TABLET BY MOUTH TWICE A  tablet 3    tiotropium (SPIRIVA RESPIMAT) 2.5 MCG/ACT AERS inhaler Inhale 2 puffs into the lungs daily 1 each 5    aspirin 81 MG EC tablet Take 81 mg by mouth daily      LORazepam (ATIVAN) 1 MG tablet       omeprazole (PRILOSEC) 20 MG delayed release capsule Take 1 capsule by mouth Daily 90 capsule 3    omeprazole (PRILOSEC) 20 MG delayed release capsule Take 1 capsule by mouth Daily 30 capsule 2    Potassium 99 MG TABS Take by mouth OTC potassium      Glucosamine-Chondroitin--300-250 MG TABS Take 1 tablet by mouth Daily      acetaminophen (TYLENOL) 500 MG tablet Take 1,000 mg by mouth daily At night      Naproxen Sodium (ALEVE) 220 MG CAPS Take 1 capsule by mouth daily      vitamin C (ASCORBIC ACID) 500 MG tablet Take 500 mg by mouth daily      LORazepam (ATIVAN) 1 MG tablet Take 1 tablet by mouth nightly as needed for Anxiety for up to 30 days. (Patient not taking: Reported on 3/22/2022) 30 tablet 0     No current facility-administered medications for this visit.        Objective:     Vitals:    03/22/22 1308   BP: 112/70   Site: Right Upper Arm   Position: Sitting   Cuff Size: Large Adult   Pulse: 82   Resp: 16   Temp: 97.5 °F (36.4 °C)   TempSrc: Infrared   SpO2: 96%   Weight: 223 lb 6.4 oz (101.3 Lung Screen (Annual)     Age: Patient is 77 y.o. Smoking History: Social History    Tobacco Use      Smoking status: Former Smoker        Packs/day: 1.50        Years: 40.00        Pack years: 61        Types: Cigarettes        Quit date: 2018        Years since quitting: 3.3      Smokeless tobacco: Never Used    Vaping Use      Vaping Use: Never used    Alcohol use: Not Currently    Drug use: No   Pack years: 61    Date of last lung cancer screenin/10/2020     Standing Status:   Future     Standing Expiration Date:   2023     Order Specific Question:   Is there documentation of shared decision making? Answer:   Yes     Order Specific Question:   Is this a low dose CT or a routine CT? Answer:   Low Dose CT [1]     Order Specific Question:   Is this the first (baseline) CT or an annual exam?     Answer: Annual [2]     Order Specific Question:   Does the patient show any signs or symptoms of lung cancer? Answer:   No     Order Specific Question:   Smoking Status? Answer: Former Smoker [4]     Order Specific Question:   Date quit smoking? (must be within 15 years)     Answer:   2018     Order Specific Question:   Smoking packs per day? Answer:   1.5     Order Specific Question:   Years smoking? Answer:   40    DC VISIT TO DISCUSS LUNG CA SCREEN W LDCT     No orders of the defined types were placed in this encounter. Discussed with patient the importance of exercise and weight control and  overall health and well-being. Reviewed with the patient: current clinical status, medications, activities and diet. Side effects, adverse effects of the medication prescribed today, as well as treatment plan and result expectations have been discussed with the patient who expresses understanding and desires to proceed. Return in about 1 year (around 3/22/2023).       Yazmin Jacobo MD    Low Dose CT (LDCT) Lung Screening criteria met:     Age 50-77(Medicare) or 50-80 (USPSTF)   Pack year smoking >20   Still smoking or less than 15 year since quit   No sign or symptoms of lung cancer   > 11 months since last LDCT     Risks and benefits of lung cancer screening with LDCT scans discussed:    Significance of positive screen - False-positive LDCT results often occur. 95% of all positive results do not lead to a diagnosis of cancer. Usually further imaging can resolve most false-positive results; however, some patients may require invasive procedures. Over diagnosis risk - 10% to 12% of screen-detected lung cancer cases are over diagnosed--that is, the cancer would not have been detected in the patient's lifetime without the screening. Need for follow up screens annually to continue lung cancer screening effectiveness     Risks associated with radiation from annual LDCT- Radiation exposure is about the same as for a mammogram, which is about 1/3 of the annual background radiation exposure from everyday life. Starting screening at age 54 is not likely to increase cancer risk from radiation exposure. Patients with comorbidities resulting in life expectancy of < 10 years, or that would preclude treatment of an abnormality identified on CT, should not be screened due to lack of benefit.     To obtain maximal benefit from this screening, smoking cessation and long-term abstinence from smoking is critical

## 2022-03-22 NOTE — TELEPHONE ENCOUNTER
PATIENT WAS SEEN TODAY. HE THOUGHT THAT YOU TOLD HIM LAST YEAR THAT HE ONLY HAD ONE NEW LUNG NODULE. TODAY HE STATES YOU SAID HE HAS TWO BUT IS UNCHANGED. HE IS CONFUSED ABOUT HOW MANY NODULES HE ACTUALLY HAS. CAN YOU PLEASE CALL HIM TO EXPLAIN.

## 2022-03-25 DIAGNOSIS — F41.9 ANXIETY: Primary | ICD-10-CM

## 2022-03-25 RX ORDER — LORAZEPAM 1 MG/1
TABLET ORAL
Qty: 30 TABLET | Refills: 0 | Status: SHIPPED | OUTPATIENT
Start: 2022-03-25 | End: 2022-04-20 | Stop reason: SDUPTHER

## 2022-04-11 DIAGNOSIS — D64.9 ANEMIA, UNSPECIFIED TYPE: ICD-10-CM

## 2022-04-11 LAB
BASOPHILS ABSOLUTE: 0 K/UL (ref 0–0.2)
BASOPHILS RELATIVE PERCENT: 0.6 %
EOSINOPHILS ABSOLUTE: 0.3 K/UL (ref 0–0.7)
EOSINOPHILS RELATIVE PERCENT: 4.3 %
HCT VFR BLD CALC: 39.7 % (ref 42–52)
HEMOGLOBIN: 12.9 G/DL (ref 14–18)
LYMPHOCYTES ABSOLUTE: 2.4 K/UL (ref 1–4.8)
LYMPHOCYTES RELATIVE PERCENT: 32.8 %
MCH RBC QN AUTO: 27.8 PG (ref 27–31.3)
MCHC RBC AUTO-ENTMCNC: 32.5 % (ref 33–37)
MCV RBC AUTO: 85.8 FL (ref 80–100)
MONOCYTES ABSOLUTE: 0.9 K/UL (ref 0.2–0.8)
MONOCYTES RELATIVE PERCENT: 12.5 %
NEUTROPHILS ABSOLUTE: 3.6 K/UL (ref 1.4–6.5)
NEUTROPHILS RELATIVE PERCENT: 49.8 %
PDW BLD-RTO: 14.7 % (ref 11.5–14.5)
PLATELET # BLD: 204 K/UL (ref 130–400)
RBC # BLD: 4.62 M/UL (ref 4.7–6.1)
WBC # BLD: 7.2 K/UL (ref 4.8–10.8)

## 2022-04-18 ENCOUNTER — OFFICE VISIT (OUTPATIENT)
Dept: CARDIOLOGY CLINIC | Age: 67
End: 2022-04-18
Payer: COMMERCIAL

## 2022-04-18 VITALS
OXYGEN SATURATION: 98 % | HEIGHT: 69 IN | BODY MASS INDEX: 33.18 KG/M2 | HEART RATE: 57 BPM | WEIGHT: 224 LBS | RESPIRATION RATE: 16 BRPM | DIASTOLIC BLOOD PRESSURE: 72 MMHG | SYSTOLIC BLOOD PRESSURE: 116 MMHG

## 2022-04-18 DIAGNOSIS — R09.89 BILATERAL CAROTID BRUITS: ICD-10-CM

## 2022-04-18 DIAGNOSIS — I20.9 ANGINA PECTORIS, UNSPECIFIED (HCC): ICD-10-CM

## 2022-04-18 DIAGNOSIS — R06.09 DOE (DYSPNEA ON EXERTION): ICD-10-CM

## 2022-04-18 DIAGNOSIS — I50.33 ACUTE ON CHRONIC DIASTOLIC HEART FAILURE (HCC): Primary | ICD-10-CM

## 2022-04-18 DIAGNOSIS — I25.10 CORONARY ARTERY DISEASE INVOLVING NATIVE CORONARY ARTERY OF NATIVE HEART WITHOUT ANGINA PECTORIS: ICD-10-CM

## 2022-04-18 DIAGNOSIS — I25.119 ATHEROSCLEROSIS OF NATIVE CORONARY ARTERY OF NATIVE HEART WITH ANGINA PECTORIS (HCC): ICD-10-CM

## 2022-04-18 DIAGNOSIS — I10 ESSENTIAL HYPERTENSION: ICD-10-CM

## 2022-04-18 DIAGNOSIS — N52.9 VASCULOGENIC ERECTILE DYSFUNCTION, UNSPECIFIED VASCULOGENIC ERECTILE DYSFUNCTION TYPE: ICD-10-CM

## 2022-04-18 DIAGNOSIS — E78.5 DYSLIPIDEMIA: ICD-10-CM

## 2022-04-18 PROCEDURE — 99214 OFFICE O/P EST MOD 30 MIN: CPT | Performed by: INTERNAL MEDICINE

## 2022-04-18 ASSESSMENT — ENCOUNTER SYMPTOMS
SHORTNESS OF BREATH: 1
COUGH: 0
STRIDOR: 0
WHEEZING: 0
EYES NEGATIVE: 1
BACK PAIN: 1
CHEST TIGHTNESS: 0
GASTROINTESTINAL NEGATIVE: 1
BLOOD IN STOOL: 0
NAUSEA: 0

## 2022-04-18 NOTE — PROGRESS NOTES
Office Visit. Patient: Angel Buitrago  YOB: 1955  MRN: [id number]    Chief Complaint: RAMOS LE edema Orthopnea  Chief Complaint   Patient presents with    Follow-up     4 month    Congestive Heart Failure    Coronary Artery Disease    Hypertension       CV Data:  7/2019 echo EF 60  7/2019 Dobut stress echo negative   LAD 40-50% sequential, RCA  mid. LVEF 55, EDP 14, PCWP 14 CO 7.2 L/min  10/2019 CUS- mild  10/2010 Abd US negative. 11/2020 CUS mild     Subjective/HPI RAMOS is getting worse. Can't do much due to knee and back pain. Can't lie flat due to sob    10/16/2019: still sob but better since BB and Imdur. No cp. No bleed no falls. 1/17/2020 no cp occ ramos no falls occ nose bleed no falls     6/19/2020 Patient and/or health care decision maker is aware that that he/she may receive a bill for this telephone service, depending on his insurance coverage, and has provided verbal consent to proceed. This visit was completed via telephone. Total time 13 minutes. Still has some ramos but better. No cp no falls no bleed little edema. 7/27/2020 still RAMOS. 10/28/2020 no RAMOS No CP active at home. No falls no bleed. 1/28/21 still sob occ cp but not bad. No NTG needed. No bleed no falls     4/29/21 lately occ LE edema no cp no sob no bleed. Takes mes. Recent EGD     8/31/21 No cp no sob no falls no bleed. Lost near 30LBS by deiting. Takes med    4/18/22 doing welll no cp no sob no falls no bleed no edema. Stopped smoking 11/2018 with prior 2 ppd  Disabled  from knee and back.      EKG: SR 61    Past Medical History:   Diagnosis Date    Cancer [facility])     CHF (congestive heart failure) (HCC)     Chicken pox     Chronic back pain     Emphysema of lung (HCC)     Hyperlipidemia     Hypertension     Hypothyroidism     Measles     Mumps     Osteoarthritis     Pneumonia     Type 2 diabetes mellitus without complication (HCC)     prediabetes       Past Surgical History:   Procedure Laterality Date    CARDIAC CATHETERIZATION      2019    CYST REMOVAL      back of neck    CYST REMOVAL Left 3/4/16    Dr Terence Amezquita  07/2018    HERNIA REPAIR      PROSTATE SURGERY  07/2018    Biopsy    PROSTATECTOMY  11/16/2018    TONSILLECTOMY N/A 8/12/2021    TONSILLECTOMY performed by Gen Sanchez MD at [address]  3/4/16    Dr Sonya Michelle N/A 4/5/2021    EGD ESOPHAGOGASTRODUODENOSCOPY WITH BIOPSY AND DILATION performed by Sheryl Doe MD at [facility] N/A 11/18/2019    REPAIR OF RECURRENT VENTRAL HERNIA performed by Jacquelyn Bumpers, MD at [address] [place] [address] History   Problem Relation Age of Onset    Other Mother     Other Father     Cancer Father     Cancer Sister     Cancer Brother     Lung Cancer Brother     Cancer Brother     Colon Cancer Neg Hx        Social History     Socioeconomic History    Marital status:      Spouse name: None    Number of children: None    Years of education: None    Highest education level: None   Occupational History    None   Tobacco Use    Smoking status: Former Smoker     Packs/day: 1.50     Years: 40.00     Pack years: 60.00     Types: Cigarettes     Quit date: 11/16/2018     Years since quitting: 3.4    Smokeless tobacco: Never Used   Vaping Use    Vaping Use: Never used   Substance and Sexual Activity    Alcohol use: Not Currently    Drug use: No    Sexual activity: None   Other Topics Concern    None   Social History Narrative    None     Social Determinants of Health     Financial Resource Strain: Low Risk     Difficulty of Paying Living Expenses: Not hard at all   Food Insecurity: No Food Insecurity    Worried About Running Out of Food in the Last Year: Never true    Ervin of Food in the Last Year: Never true   Transportation Needs:     Lack of Transportation (Medical):  Not on file    Lack of Transportation (Non-Medical):  Not on file   Physical Activity:     Days of Exercise per Week: Not on file    Minutes of Exercise per Session: Not on file   Stress:     Feeling of Stress : Not on file   Social Connections:     Frequency of Communication with Friends and Family: Not on file    Frequency of Social Gatherings with Friends and Family: Not on file    Attends [employer] Services: Not on file    Active Member of Clubs or Organizations: Not on file    Attends Club or Organization Meetings: Not on file    Marital Status: Not on file   Intimate Partner Violence:     Fear of Current or Ex-Partner: Not on file    Emotionally Abused: Not on file    Physically Abused: Not on file    Sexually Abused: Not on file   Housing Stability:     Unable to Pay for Housing in the Last Year: Not on file    Number of Places Lived in the Last Year: Not on file    Unstable Housing in the Last Year: Not on file       Allergies   Allergen Reactions    Demerol Hcl [Meperidine] Nausea Only     migraines    Valium [Diazepam] Other (See Comments)     MIGRAINES       Current Outpatient Medications   Medication Sig Dispense Refill    LORazepam (ATIVAN) 1 MG tablet TAKE ONE TABLET BY MOUTH EVERY DAY NIGHTLY AS NEEDED FOR ANXIETY 30 tablet 0    levothyroxine (SYNTHROID) 75 MCG tablet TAKE ONE TABLET BY MOUTH EVERY DAY 90 tablet 1    atorvastatin (LIPITOR) 80 MG tablet TAKE ONE TABLET BY MOUTH EVERY DAY 90 tablet 1    sildenafil (VIAGRA) 100 MG tablet Take 1 tablet by mouth daily as needed for Erectile Dysfunction 10 tablet 5    carvedilol (COREG) 6.25 MG tablet TAKE ONE TABLET BY MOUTH TWICE A  tablet 3    tiotropium (SPIRIVA RESPIMAT) 2.5 MCG/ACT AERS inhaler Inhale 2 puffs into the lungs daily 1 each 5    aspirin 81 MG EC tablet Take 81 mg by mouth daily      LORazepam (ATIVAN) 1 MG tablet       omeprazole (PRILOSEC) 20 MG delayed release capsule Take 1 capsule by mouth Daily 90 capsule 3 no abdominal tenderness. Musculoskeletal:         General: No tenderness or edema. Normal range of motion. Cervical back: Normal range of motion and neck supple. Neurological: He is alert and oriented to person, place, and time. Skin: Skin is warm. No cyanosis. Nails show no clubbing.        LABS:  CBC:   Lab Results   Component Value Date    WBC 7.2 04/11/2022    RBC 4.62 04/11/2022    HGB 12.9 04/11/2022    HCT 39.7 04/11/2022    MCV 85.8 04/11/2022    MCH 27.8 04/11/2022    MCHC 32.5 04/11/2022    RDW 14.7 04/11/2022     04/11/2022     Lipids:  Lab Results   Component Value Date    CHOL 115 12/20/2021    CHOL 157 09/21/2021    CHOL 130 06/15/2021     Lab Results   Component Value Date    TRIG 58 12/20/2021    TRIG 151 (H) 09/21/2021    TRIG 105 06/15/2021     Lab Results   Component Value Date    HDL 44 12/20/2021    HDL 48 09/21/2021    HDL 43 06/15/2021     Lab Results   Component Value Date    LDLCALC 59 12/20/2021    LDLCALC 79 09/21/2021    LDLCALC 66 06/15/2021     No results found for: LABVLDL, VLDL  No results found for: CHOLHDLRATIO  CMP:    Lab Results   Component Value Date     12/20/2021    K 4.3 12/20/2021     12/20/2021    CO2 27 12/20/2021    BUN 22 12/20/2021    CREATININE 0.92 12/20/2021    GFRAA >60.0 12/20/2021    LABGLOM >60.0 12/20/2021    GLUCOSE 100 12/20/2021    PROT 7.0 12/20/2021    LABALBU 4.1 12/20/2021    CALCIUM 9.2 12/20/2021    BILITOT 0.5 12/20/2021    ALKPHOS 95 12/20/2021    AST 27 12/20/2021    ALT 17 12/20/2021     BMP:    Lab Results   Component Value Date     12/20/2021    K 4.3 12/20/2021     12/20/2021    CO2 27 12/20/2021    BUN 22 12/20/2021    LABALBU 4.1 12/20/2021    CREATININE 0.92 12/20/2021    CALCIUM 9.2 12/20/2021    GFRAA >60.0 12/20/2021    LABGLOM >60.0 12/20/2021    GLUCOSE 100 12/20/2021     Magnesium:  No results found for: MG  TSH:  Lab Results   Component Value Date    TSH 1.740 12/20/2021             Patient Active Problem List   Diagnosis    Lumbosacral spondylosis without myelopathy    Umbilical hernia without obstruction and without gangrene    Diastasis recti    Other bursal cyst of elbow    [facility] ([facility]    Recurrent ventral hernia    Dyslipidemia    Essential hypertension    Acute on chronic diastolic heart failure (HCC)    Shortness of breath    RAMOS (dyspnea on exertion)    Coronary artery disease involving native coronary artery of native heart without angina pectoris    Chronic obstructive pulmonary disease (HCC)    Bilateral carotid bruits    Esophageal dysphagia    Esophageal stricture    Esophagitis    Chronic gastritis without bleeding    Sedative, hypnotic or anxiolytic use, unspecified with unspecified sedative, hypnotic or anxiolytic-induced disorder    Sedative, hypnotic or anxiolytic dependence with unspecified sedative, hypnotic or anxiolytic-induced disorder    Sedative, hypnotic or anxiolytic dependence, uncomplicated    Tonsillitis, chronic    Abscess of tonsil    Acquired deviated nasal septum    Angina pectoris, unspecified       There are no discontinued medications. Modified Medications    No medications on file       No orders of the defined types were placed in this encounter. Assessment/Plan:    1. Dyslipidemia   LDL Elevated- on Atorva. Repeat labs reviewed. Much better   2. Essential hypertension  Stable - continue meds. Low salt diet    3. Shortness of breath - stable     4. Acute on chronic diastolic heart failure (HCC) - stable     5. Carotid Bruits- CUS - will need conontued surveillance. 6. Lung Nodule- f/u CT ordered. F/u Dr. Gilmar Hardin.     7. ED- wants to try Viagra- no angina. BP on low side. Advance Viagra to 100 mg PRN. No nitrates.       Counseling:  Heart Healthy Lifestyle, Improve BMI, Low Salt Diet, Volume Restriction 1500cc per day, Take Precautions to Prevent Falls and Walk Daily    Return in about 4 months (around

## 2022-04-20 ENCOUNTER — OFFICE VISIT (OUTPATIENT)
Dept: FAMILY MEDICINE CLINIC | Age: 67
End: 2022-04-20
Payer: COMMERCIAL

## 2022-04-20 VITALS
SYSTOLIC BLOOD PRESSURE: 120 MMHG | DIASTOLIC BLOOD PRESSURE: 82 MMHG | RESPIRATION RATE: 16 BRPM | WEIGHT: 228 LBS | OXYGEN SATURATION: 96 % | HEIGHT: 69 IN | HEART RATE: 64 BPM | BODY MASS INDEX: 33.77 KG/M2 | TEMPERATURE: 97 F

## 2022-04-20 DIAGNOSIS — F41.9 ANXIETY: ICD-10-CM

## 2022-04-20 DIAGNOSIS — D64.9 ANEMIA, UNSPECIFIED TYPE: ICD-10-CM

## 2022-04-20 DIAGNOSIS — E03.8 OTHER SPECIFIED HYPOTHYROIDISM: ICD-10-CM

## 2022-04-20 DIAGNOSIS — F41.9 ANXIETY: Primary | ICD-10-CM

## 2022-04-20 PROCEDURE — 99213 OFFICE O/P EST LOW 20 MIN: CPT | Performed by: FAMILY MEDICINE

## 2022-04-20 RX ORDER — LORAZEPAM 1 MG/1
TABLET ORAL
Qty: 30 TABLET | Refills: 2 | Status: SHIPPED | OUTPATIENT
Start: 2022-04-20 | End: 2022-07-20 | Stop reason: SDUPTHER

## 2022-04-20 ASSESSMENT — PATIENT HEALTH QUESTIONNAIRE - PHQ9
1. LITTLE INTEREST OR PLEASURE IN DOING THINGS: 0
SUM OF ALL RESPONSES TO PHQ9 QUESTIONS 1 & 2: 0
SUM OF ALL RESPONSES TO PHQ QUESTIONS 1-9: 0
2. FEELING DOWN, DEPRESSED OR HOPELESS: 0
SUM OF ALL RESPONSES TO PHQ QUESTIONS 1-9: 0

## 2022-04-20 ASSESSMENT — ENCOUNTER SYMPTOMS
VOMITING: 0
BLOOD IN STOOL: 0
DIARRHEA: 0

## 2022-04-20 NOTE — PROGRESS NOTES
Subjective:      Patient ID: Pat Wallace is a 77 y.o. male. HPI    Review of Systems  Treatment Adherence:   Medication compliance:  {Desc; compliance:5303::\"compliant most of the time\"}  Diet compliance:  {Desc; compliance:5303::\"compliant most of the time\"}  Weight trend: {INCREASING/DECREASING/STABLE:20462}  Current exercise: {EXERCISE SMLI:363857235}  Barriers: {Barriers to success:61776}    Hypertension:  Home blood pressure monitoring: {NO/YES:8411470935::\"No\"}. He {is/is not:9024} adherent to a low sodium diet. Patient {denies/complains:88448} {Symptoms of Hypertension, Denies:48466}. Antihypertensive medication side effects: {Hypertension med side effects:5728::\"no medication side effects noted\"}. Use of agents associated with hypertension: {bp agents assoc with hypertension:511::\"none\"}. Sodium (mEq/L)   Date Value   12/20/2021 137    BUN (mg/dL)   Date Value   12/20/2021 22    Glucose (mg/dL)   Date Value   12/20/2021 100 (H)      Potassium (mEq/L)   Date Value   12/20/2021 4.3    CREATININE (mg/dL)   Date Value   12/20/2021 0.92         Hyperlipidemia:  No new myalgias or GI upset on {RP HYPERLIPIDEMIA MEDS:60386}.      Lab Results   Component Value Date    CHOL 115 12/20/2021    TRIG 58 12/20/2021    HDL 44 12/20/2021    LDLCALC 59 12/20/2021     Lab Results   Component Value Date    ALT 17 12/20/2021    AST 27 12/20/2021          Objective:   Physical Exam    Assessment / Plan:

## 2022-04-20 NOTE — PROGRESS NOTES
Subjective:      Patient ID: Sonu Yepez is a 77 y.o. male    HPI  Here in follow up for anemia and anxiety and blood work. Still using ativan nightly which has been helpful. Last dose of ativan taken last night around 9 pm    Weight up few pounds since last time    Review of Systems   Constitutional: Negative for chills and fever. Gastrointestinal: Negative for blood in stool, diarrhea and vomiting. Neurological: Negative for weakness. Psychiatric/Behavioral: Negative for sleep disturbance. Reviewed allergy, medical, social, surgical, family and med list changes and updated   Files--reviewed blood work with worsening anemia    Controlled substances monitoring: no signs of potential drug abuse or diversion identified, OARRS report reviewed today- activity consistent with treatment plan and random urine drug screen sent today. Social History     Socioeconomic History    Marital status:      Spouse name: None    Number of children: None    Years of education: None    Highest education level: None   Occupational History    None   Tobacco Use    Smoking status: Former Smoker     Packs/day: 1.50     Years: 40.00     Pack years: 60.00     Types: Cigarettes     Quit date: 11/16/2018     Years since quitting: 3.4    Smokeless tobacco: Never Used   Vaping Use    Vaping Use: Never used   Substance and Sexual Activity    Alcohol use: Not Currently    Drug use: No    Sexual activity: None   Other Topics Concern    None   Social History Narrative    None     Social Determinants of Health     Financial Resource Strain: Low Risk     Difficulty of Paying Living Expenses: Not hard at all   Food Insecurity: No Food Insecurity    Worried About Running Out of Food in the Last Year: Never true    Ervin of Food in the Last Year: Never true   Transportation Needs:     Lack of Transportation (Medical): Not on file    Lack of Transportation (Non-Medical):  Not on file   Physical Activity:     Days of Exercise per Week: Not on file    Minutes of Exercise per Session: Not on file   Stress:     Feeling of Stress : Not on file   Social Connections:     Frequency of Communication with Friends and Family: Not on file    Frequency of Social Gatherings with Friends and Family: Not on file    Attends Holiness Services: Not on file    Active Member of Clubs or Organizations: Not on file    Attends Club or Organization Meetings: Not on file    Marital Status: Not on file   Intimate Partner Violence:     Fear of Current or Ex-Partner: Not on file    Emotionally Abused: Not on file    Physically Abused: Not on file    Sexually Abused: Not on file   Housing Stability:     Unable to Pay for Housing in the Last Year: Not on file    Number of Places Lived in the Last Year: Not on file    Unstable Housing in the Last Year: Not on file     Current Outpatient Medications   Medication Sig Dispense Refill    LORazepam (ATIVAN) 1 MG tablet TAKE ONE TABLET BY MOUTH EVERY DAY NIGHTLY AS NEEDED FOR ANXIETY 30 tablet 0    levothyroxine (SYNTHROID) 75 MCG tablet TAKE ONE TABLET BY MOUTH EVERY DAY 90 tablet 1    atorvastatin (LIPITOR) 80 MG tablet TAKE ONE TABLET BY MOUTH EVERY DAY 90 tablet 1    sildenafil (VIAGRA) 100 MG tablet Take 1 tablet by mouth daily as needed for Erectile Dysfunction 10 tablet 5    carvedilol (COREG) 6.25 MG tablet TAKE ONE TABLET BY MOUTH TWICE A  tablet 3    tiotropium (SPIRIVA RESPIMAT) 2.5 MCG/ACT AERS inhaler Inhale 2 puffs into the lungs daily 1 each 5    aspirin 81 MG EC tablet Take 81 mg by mouth daily      LORazepam (ATIVAN) 1 MG tablet       omeprazole (PRILOSEC) 20 MG delayed release capsule Take 1 capsule by mouth Daily 90 capsule 3    omeprazole (PRILOSEC) 20 MG delayed release capsule Take 1 capsule by mouth Daily 30 capsule 2    Potassium 99 MG TABS Take by mouth OTC potassium      Glucosamine-Chondroitin--300-250 MG TABS Take 1 tablet by mouth Daily      acetaminophen (TYLENOL) 500 MG tablet Take 1,000 mg by mouth daily At night      Naproxen Sodium (ALEVE) 220 MG CAPS Take 1 capsule by mouth daily      vitamin C (ASCORBIC ACID) 500 MG tablet Take 500 mg by mouth daily       No current facility-administered medications for this visit. Family History   Problem Relation Age of Onset    Other Mother     Other Father     Cancer Father     Cancer Sister     Cancer Brother     Lung Cancer Brother     Cancer Brother     Colon Cancer Neg Hx        Objective:   /82   Pulse 64   Temp 97 °F (36.1 °C)   Resp 16   Ht 5' 9\" (1.753 m)   Wt 228 lb (103.4 kg)   SpO2 96%   BMI 33.67 kg/m²     Physical Exam  Lungs:Clear  Equal b.s bilaterally  Heart:  Rate reg     No murmur  Back:       No  CVA tenderness  Abdomen: B.S present   Soft           No  Tenderness         No  Rebound                    No hepatosplenomegaly. No masses. Gen:      No acute distress  Skin:      No rashes   Assessment:       Diagnosis Orders   1. Anxiety  LORazepam (ATIVAN) 1 MG tablet    Pain Management Drug Screen   2. Anemia, unspecified type  Graciela Sage MD, GastroenterologyRafael   3.  Other specified hypothyroidism           Plan:      Continue current meds   Orders Placed This Encounter   Procedures    Pain Management Drug Screen     Standing Status:   Future     Standing Expiration Date:   4/20/2023    TSH     Standing Status:   Future     Standing Expiration Date:   10/20/2022   Graciela Sage MD, [facility]     Referral Priority:   Routine     Referral Type:   Eval and Treat     Referral Reason:   Specialty Services Required     Referred to Provider:   Monisha Leroy MD     Requested Specialty:   Gastroenterology     Number of Visits Requested:   1     Orders Placed This Encounter   Medications    LORazepam (ATIVAN) 1 MG tablet     Sig: TAKE ONE TABLET BY MOUTH EVERY DAY NIGHTLY AS NEEDED FOR ANXIETY     Dispense:  30 tablet Refill:  2   f/u after non fasting blood work in 3 months

## 2022-04-22 LAB
6-ACETYLMORPHINE: NOT DETECTED
7-AMINOCLONAZEPAM: NOT DETECTED
ALPHA-OH-ALPRAZOLAM: NOT DETECTED
ALPHA-OH-MIDAZOLAM, URINE: NOT DETECTED
ALPRAZOLAM: NOT DETECTED
AMPHETAMINE: NOT DETECTED
BARBITURATES: NOT DETECTED
BENZOYLECGONINE: NOT DETECTED
BUPRENORPHINE: NOT DETECTED
CARISOPRODOL: NOT DETECTED
CLONAZEPAM: NOT DETECTED
CODEINE: NOT DETECTED
CREATININE URINE: 83.9 MG/DL (ref 20–400)
DIAZEPAM: NOT DETECTED
EER PAIN MGT DRUG PANEL, HIGH RES/EMIT U: NORMAL
ETHYL GLUCURONIDE: NOT DETECTED
FENTANYL: NOT DETECTED
GABAPENTIN: NOT DETECTED
HYDROCODONE: NOT DETECTED
HYDROMORPHONE: NOT DETECTED
LORAZEPAM: PRESENT
MARIJUANA METABOLITE: NOT DETECTED
MDA: NOT DETECTED
MDEA: NOT DETECTED
MDMA URINE: NOT DETECTED
MEPERIDINE: NOT DETECTED
METHADONE: NOT DETECTED
METHAMPHETAMINE: NOT DETECTED
METHYLPHENIDATE: NOT DETECTED
MIDAZOLAM: NOT DETECTED
MORPHINE: NOT DETECTED
NALOXONE: NOT DETECTED
NORBUPRENORPHINE, FREE: NOT DETECTED
NORDIAZEPAM: NOT DETECTED
NORFENTANYL: NOT DETECTED
NORHYDROCODONE, URINE: NOT DETECTED
NOROXYCODONE: NOT DETECTED
NOROXYMORPHONE, URINE: NOT DETECTED
OXAZEPAM: NOT DETECTED
OXYCODONE: NOT DETECTED
OXYMORPHONE: NOT DETECTED
PAIN MANAGEMENT DRUG PANEL: NORMAL
PCP: NOT DETECTED
PHENTERMINE: NOT DETECTED
PREGABALIN: NOT DETECTED
TAPENTADOL, URINE: NOT DETECTED
TAPENTADOL-O-SULFATE, URINE: NOT DETECTED
TEMAZEPAM: NOT DETECTED
TRAMADOL: NOT DETECTED
ZOLPIDEM: NOT DETECTED

## 2022-05-04 RX ORDER — OMEPRAZOLE 20 MG/1
20 CAPSULE, DELAYED RELEASE ORAL DAILY
Qty: 90 CAPSULE | Refills: 3 | Status: SHIPPED | OUTPATIENT
Start: 2022-05-04

## 2022-05-04 NOTE — TELEPHONE ENCOUNTER
Pharmacy requesting medication refill.  Please approve or deny this request.    Rx requested:  Requested Prescriptions     Pending Prescriptions Disp Refills    omeprazole (PRILOSEC) 20 MG delayed release capsule 90 capsule 3     Sig: Take 1 capsule by mouth Daily         Last Office Visit:   12/10/2021      Next Visit Date:  Future Appointments   Date Time Provider Grey Stewart   6/1/2022  1:15 PM Kameron Gamez MD [facility]   6/10/2022  9:30 AM Allison Mancia MD [address]   7/20/2022 10:00 AM Sukhwinder Salas MD [facility]   8/22/2022 12:00 PM Mingo Jensen  [facility]   3/21/2023  1:15 PM Adrianna Lees MD [address] [place] [address]

## 2022-05-06 DIAGNOSIS — C61 PROSTATE CANCER (HCC): ICD-10-CM

## 2022-05-06 LAB — PROSTATE SPECIFIC ANTIGEN: <0.01 NG/ML (ref 0–4)

## 2022-06-01 ENCOUNTER — OFFICE VISIT (OUTPATIENT)
Dept: UROLOGY | Age: 67
End: 2022-06-01
Payer: COMMERCIAL

## 2022-06-01 VITALS
HEIGHT: 69 IN | HEART RATE: 59 BPM | DIASTOLIC BLOOD PRESSURE: 82 MMHG | WEIGHT: 215 LBS | SYSTOLIC BLOOD PRESSURE: 124 MMHG | BODY MASS INDEX: 31.84 KG/M2

## 2022-06-01 DIAGNOSIS — Z85.46 H/O PROSTATE CANCER: Primary | ICD-10-CM

## 2022-06-01 PROCEDURE — 1123F ACP DISCUSS/DSCN MKR DOCD: CPT | Performed by: UROLOGY

## 2022-06-01 PROCEDURE — 99213 OFFICE O/P EST LOW 20 MIN: CPT | Performed by: UROLOGY

## 2022-06-01 ASSESSMENT — ENCOUNTER SYMPTOMS: ABDOMINAL PAIN: 0

## 2022-06-10 ENCOUNTER — OFFICE VISIT (OUTPATIENT)
Dept: GASTROENTEROLOGY | Age: 67
End: 2022-06-10
Payer: COMMERCIAL

## 2022-06-10 VITALS — HEIGHT: 69 IN | WEIGHT: 222 LBS | BODY MASS INDEX: 32.88 KG/M2 | OXYGEN SATURATION: 97 % | HEART RATE: 75 BPM

## 2022-06-10 DIAGNOSIS — K20.90 ESOPHAGITIS: ICD-10-CM

## 2022-06-10 DIAGNOSIS — D64.9 ANEMIA, UNSPECIFIED TYPE: Primary | ICD-10-CM

## 2022-06-10 DIAGNOSIS — D64.9 ANEMIA, UNSPECIFIED TYPE: ICD-10-CM

## 2022-06-10 LAB
BASOPHILS ABSOLUTE: 0.1 K/UL (ref 0–0.2)
BASOPHILS RELATIVE PERCENT: 1 %
EOSINOPHILS ABSOLUTE: 0.3 K/UL (ref 0–0.7)
EOSINOPHILS RELATIVE PERCENT: 4.5 %
HCT VFR BLD CALC: 41.5 % (ref 42–52)
HEMOGLOBIN: 13.4 G/DL (ref 14–18)
LYMPHOCYTES ABSOLUTE: 1.6 K/UL (ref 1–4.8)
LYMPHOCYTES RELATIVE PERCENT: 25.3 %
MCH RBC QN AUTO: 27.9 PG (ref 27–31.3)
MCHC RBC AUTO-ENTMCNC: 32.2 % (ref 33–37)
MCV RBC AUTO: 86.7 FL (ref 80–100)
MONOCYTES ABSOLUTE: 1.1 K/UL (ref 0.2–0.8)
MONOCYTES RELATIVE PERCENT: 17.1 %
NEUTROPHILS ABSOLUTE: 3.3 K/UL (ref 1.4–6.5)
NEUTROPHILS RELATIVE PERCENT: 52.1 %
PDW BLD-RTO: 14.9 % (ref 11.5–14.5)
PLATELET # BLD: 203 K/UL (ref 130–400)
RBC # BLD: 4.79 M/UL (ref 4.7–6.1)
WBC # BLD: 6.4 K/UL (ref 4.8–10.8)

## 2022-06-10 PROCEDURE — 99213 OFFICE O/P EST LOW 20 MIN: CPT | Performed by: SPECIALIST

## 2022-06-10 PROCEDURE — 1123F ACP DISCUSS/DSCN MKR DOCD: CPT | Performed by: SPECIALIST

## 2022-06-10 ASSESSMENT — ENCOUNTER SYMPTOMS
RESPIRATORY NEGATIVE: 1
ABDOMINAL DISTENTION: 0
CONSTIPATION: 0
ANAL BLEEDING: 0
VOMITING: 0
DIARRHEA: 0
EYES NEGATIVE: 1
ABDOMINAL PAIN: 0
RECTAL PAIN: 0
BLOOD IN STOOL: 1
NAUSEA: 0

## 2022-06-10 NOTE — PROGRESS NOTES
Gastroenterology Clinic Follow up Visit    Dylan Hylton  [id number]  Chief Complaint   Patient presents with    Follow-up     Patient is here today for a 6 month follow up for esophagitis. Patient reports feeling well since his last visit. HPI and A/P at last visit summarized below:  Patient is here for follow-up. Has a history of erosive esophagitis and mild stricture which was dilated. Patient is on omeprazole 20 mg once a day. No heartburn regurgitation or dysphagia, CBC from April 11, 2022 showed hemoglobin of 12.9 and 39.7 and prior to that it was 13.1 and 39.4, patient had a colonoscopy in March 2016 which showed hemorrhoid, patient reports occasional mild rectal bleeding. No change in bowel habits    Review of Systems   Constitutional: Negative. HENT: Negative. Eyes: Negative. Respiratory: Negative. Gastrointestinal: Positive for blood in stool. Negative for abdominal distention, abdominal pain, anal bleeding, constipation, diarrhea, nausea, rectal pain and vomiting. Occasional rectal bleeding secondary to hemorrhoids, no GERD symptoms or dysphagia. Genitourinary: Negative. Musculoskeletal: Negative. Neurological: Negative. Psychiatric/Behavioral: Negative. Past medical history, past surgical history, medication list, social and familyhistory reviewed    Pulse 75, height 5' 9\" (1.753 m), weight 222 lb (100.7 kg), SpO2 97 %. Physical Exam  Constitutional:       Appearance: He is well-developed. HENT:      Head: Normocephalic. Eyes:      Pupils: Pupils are equal, round, and reactive to light. Cardiovascular:      Rate and Rhythm: Normal rate and regular rhythm. Heart sounds: Normal heart sounds. Pulmonary:      Effort: Pulmonary effort is normal.      Breath sounds: Normal breath sounds. Abdominal:      General: Bowel sounds are normal.      Palpations: Abdomen is soft.       Comments: Soft nontender no palpable mass   Skin:     General: Skin is warm and dry. Neurological:      Mental Status: He is alert. Laboratory, Pathology, Radiology reviewed in detail with relevantimportant investigations summarized below:    No results for input(s): WBC, HGB, HCT, MCV, PLT in the last 720 hours. Lab Results   Component Value Date    ALT 17 12/20/2021    AST 27 12/20/2021    ALKPHOS 95 12/20/2021    BILITOT 0.5 12/20/2021     No results found. Endoscopic investigations:     Assessment and Plan:  Moses Means 77 y.o. male for follow up. Erosive esophagitis with mild esophageal stricture status post dilation. Patient has been on omeprazole 20 g once a day. He has no symptoms of active GERD and no dysphagia, mild drop in hemoglobin hematocrit noted. Intermittent mild rectal bleeding secondary to hemorrhoids, will recheck CBC. Continue omeprazole 20 g once a day and follow dietary regulations. Diagnosis Orders   1. Anemia, unspecified type  CBC with Auto Differential   2. Esophagitis         Return in about 6 months (around 12/10/2022). Dario Wynne MD   StaffGastroenterologist  [facility]    Please note this report has been partially produced using speech recognitionsoftware  and may cause contain errors related to that system including grammar, punctuation and spelling as well as words andphrases that may seem inappropriate. If there are questions or concerns please feel free to contact me to clarify.

## 2022-06-22 ENCOUNTER — OFFICE VISIT (OUTPATIENT)
Dept: FAMILY MEDICINE CLINIC | Age: 67
End: 2022-06-22
Payer: COMMERCIAL

## 2022-06-22 VITALS
WEIGHT: 221 LBS | RESPIRATION RATE: 16 BRPM | HEIGHT: 69 IN | OXYGEN SATURATION: 98 % | HEART RATE: 63 BPM | BODY MASS INDEX: 32.73 KG/M2 | TEMPERATURE: 97.5 F | SYSTOLIC BLOOD PRESSURE: 120 MMHG | DIASTOLIC BLOOD PRESSURE: 82 MMHG

## 2022-06-22 DIAGNOSIS — R07.0 THROAT PAIN: Primary | ICD-10-CM

## 2022-06-22 DIAGNOSIS — R07.0 THROAT PAIN: ICD-10-CM

## 2022-06-22 PROCEDURE — 1123F ACP DISCUSS/DSCN MKR DOCD: CPT | Performed by: FAMILY MEDICINE

## 2022-06-22 PROCEDURE — 99213 OFFICE O/P EST LOW 20 MIN: CPT | Performed by: FAMILY MEDICINE

## 2022-06-22 RX ORDER — LEVOTHYROXINE SODIUM 0.07 MG/1
TABLET ORAL
Qty: 90 TABLET | Refills: 1 | Status: CANCELLED | OUTPATIENT
Start: 2022-06-22

## 2022-06-22 RX ORDER — LEVOTHYROXINE SODIUM 0.07 MG/1
TABLET ORAL
Qty: 90 TABLET | Refills: 0 | Status: SHIPPED | OUTPATIENT
Start: 2022-06-22 | End: 2022-07-20 | Stop reason: SDUPTHER

## 2022-06-22 RX ORDER — AMOXICILLIN AND CLAVULANATE POTASSIUM 500; 125 MG/1; MG/1
1 TABLET, FILM COATED ORAL 3 TIMES DAILY
Qty: 30 TABLET | Refills: 0 | Status: SHIPPED | OUTPATIENT
Start: 2022-06-22 | End: 2022-07-02

## 2022-06-22 ASSESSMENT — ENCOUNTER SYMPTOMS: COUGH: 0

## 2022-06-22 ASSESSMENT — PATIENT HEALTH QUESTIONNAIRE - PHQ9
1. LITTLE INTEREST OR PLEASURE IN DOING THINGS: 0
SUM OF ALL RESPONSES TO PHQ9 QUESTIONS 1 & 2: 0
SUM OF ALL RESPONSES TO PHQ QUESTIONS 1-9: 0
2. FEELING DOWN, DEPRESSED OR HOPELESS: 0

## 2022-06-22 NOTE — PROGRESS NOTES
Subjective:      Patient ID: Sanjeev Rodriguez is a 77 y.o. male    HPI  Here with initial sore throat that started about 8 days ago. Also had pain along right ear. Pain has improved last day or so. No fever. No emesis. Feels like same area involved where he had previous surgery of abscess in past    Review of Systems   Constitutional: Negative for unexpected weight change. HENT: Negative for congestion. Respiratory: Negative for cough. Skin: Negative for rash. Reviewed allergy, medical, social, surgical, family and med list changes and updated   Files     Social History     Socioeconomic History    Marital status:      Spouse name: None    Number of children: None    Years of education: None    Highest education level: None   Occupational History    None   Tobacco Use    Smoking status: Former Smoker     Packs/day: 1.50     Years: 40.00     Pack years: 60.00     Types: Cigarettes     Quit date: 11/16/2018     Years since quitting: 3.6    Smokeless tobacco: Never Used   Vaping Use    Vaping Use: Never used   Substance and Sexual Activity    Alcohol use: Not Currently    Drug use: No    Sexual activity: None   Other Topics Concern    None   Social History Narrative    None     Social Determinants of Health     Financial Resource Strain: Low Risk     Difficulty of Paying Living Expenses: Not hard at all   Food Insecurity: No Food Insecurity    Worried About Running Out of Food in the Last Year: Never true    Ervin of Food in the Last Year: Never true   Transportation Needs:     Lack of Transportation (Medical): Not on file    Lack of Transportation (Non-Medical):  Not on file   Physical Activity:     Days of Exercise per Week: Not on file    Minutes of Exercise per Session: Not on file   Stress:     Feeling of Stress : Not on file   Social Connections:     Frequency of Communication with Friends and Family: Not on file    Frequency of Social Gatherings with Friends and Family: Not on file    Attends Faith Services: Not on file    Active Member of Clubs or Organizations: Not on file    Attends Club or Organization Meetings: Not on file    Marital Status: Not on file   Intimate Partner Violence:     Fear of Current or Ex-Partner: Not on file    Emotionally Abused: Not on file    Physically Abused: Not on file    Sexually Abused: Not on file   Housing Stability:     Unable to Pay for Housing in the Last Year: Not on file    Number of [place] in the Last Year: Not on file    Unstable Housing in the Last Year: Not on file     Current Outpatient Medications   Medication Sig Dispense Refill    levothyroxine (SYNTHROID) 75 MCG tablet TAKE ONE TABLET BY MOUTH EVERY DAY 90 tablet 0    omeprazole (PRILOSEC) 20 MG delayed release capsule Take 1 capsule by mouth Daily 90 capsule 3    atorvastatin (LIPITOR) 80 MG tablet TAKE ONE TABLET BY MOUTH EVERY DAY 90 tablet 1    sildenafil (VIAGRA) 100 MG tablet Take 1 tablet by mouth daily as needed for Erectile Dysfunction 10 tablet 5    carvedilol (COREG) 6.25 MG tablet TAKE ONE TABLET BY MOUTH TWICE A  tablet 3    tiotropium (SPIRIVA RESPIMAT) 2.5 MCG/ACT AERS inhaler Inhale 2 puffs into the lungs daily 1 each 5    aspirin 81 MG EC tablet Take 81 mg by mouth daily      LORazepam (ATIVAN) 1 MG tablet       omeprazole (PRILOSEC) 20 MG delayed release capsule Take 1 capsule by mouth Daily 30 capsule 2    Potassium 99 MG TABS Take by mouth OTC potassium      Glucosamine-Chondroitin--300-250 MG TABS Take 1 tablet by mouth Daily      acetaminophen (TYLENOL) 500 MG tablet Take 1,000 mg by mouth daily At night      Naproxen Sodium (ALEVE) 220 MG CAPS Take 1 capsule by mouth daily      vitamin C (ASCORBIC ACID) 500 MG tablet Take 500 mg by mouth daily       No current facility-administered medications for this visit.      Family History   Problem Relation Age of Onset    Other Mother    Maricarmen Celis Other Father    Maricarmen Celis Cancer Father     Cancer Sister     Cancer Brother     Lung Cancer Brother     Cancer Brother     Colon Cancer Neg Hx      Past Medical History:   Diagnosis Date    Cancer [facility])     CHF (congestive heart failure) (HCC)     Chicken pox     Chronic back pain     Emphysema of lung ([address]     Hyperlipidemia     Hypertension     Hypothyroidism     Measles     Mumps     Osteoarthritis     Pneumonia     Type 2 diabetes mellitus without complication (HCC)     prediabetes     Objective:   /82   Pulse 63   Temp 97.5 °F (36.4 °C)   Resp 16   Ht 5' 9\" (1.753 m)   Wt 221 lb (100.2 kg)   SpO2 98%   BMI 32.64 kg/m²     Physical Exam  Heent: T.M normal bilat minimal  pharyngeal injection. No exudate                Nares patent but swollen mucosa noted  Neck: Minimal anter cervical adenopathy. No masses. No thyroid asymmetry. Lungs: Clear equal breath sounds. No wheezes or rales. Heart: Rate reg No murmur  Gen: In no acute distress  Assessment:       Diagnosis Orders   1.  Throat pain  Culture, Throat    ELINOR Edwards MD, Otolaryngology, [address] [place] [address]:      Orders Placed This Encounter   Procedures    Culture, Throat     Standing Status:   Future     Standing Expiration Date:   6/22/2023    ELINOR Edwards MD, Otolaryngology, [facility]     Referral Priority:   Routine     Referral Type:   Eval and Treat     Referral Reason:   Specialty Services Required     Referred to Provider:   Navya Connor MD     Requested Specialty:   Otolaryngology     Number of Visits Requested:   1     Orders Placed This Encounter   Medications    amoxicillin-clavulanate (AUGMENTIN) 500-125 MG per tablet     Sig: Take 1 tablet by mouth 3 times daily for 10 days     Dispense:  30 tablet     Refill:  0   f/u in 10 days if needed

## 2022-06-25 LAB — THROAT CULTURE: NORMAL

## 2022-07-12 DIAGNOSIS — E03.8 OTHER SPECIFIED HYPOTHYROIDISM: ICD-10-CM

## 2022-07-12 LAB — TSH SERPL DL<=0.05 MIU/L-ACNC: 3.45 UIU/ML (ref 0.44–3.86)

## 2022-07-20 ENCOUNTER — OFFICE VISIT (OUTPATIENT)
Dept: FAMILY MEDICINE CLINIC | Age: 67
End: 2022-07-20
Payer: COMMERCIAL

## 2022-07-20 VITALS
WEIGHT: 222 LBS | RESPIRATION RATE: 14 BRPM | HEIGHT: 69 IN | TEMPERATURE: 97 F | OXYGEN SATURATION: 96 % | BODY MASS INDEX: 32.88 KG/M2 | SYSTOLIC BLOOD PRESSURE: 120 MMHG | DIASTOLIC BLOOD PRESSURE: 82 MMHG | HEART RATE: 61 BPM

## 2022-07-20 DIAGNOSIS — E03.8 OTHER SPECIFIED HYPOTHYROIDISM: ICD-10-CM

## 2022-07-20 DIAGNOSIS — R19.7 DIARRHEA, UNSPECIFIED TYPE: ICD-10-CM

## 2022-07-20 DIAGNOSIS — F41.9 ANXIETY: Primary | ICD-10-CM

## 2022-07-20 PROCEDURE — 99214 OFFICE O/P EST MOD 30 MIN: CPT | Performed by: FAMILY MEDICINE

## 2022-07-20 PROCEDURE — 1123F ACP DISCUSS/DSCN MKR DOCD: CPT | Performed by: FAMILY MEDICINE

## 2022-07-20 RX ORDER — LORAZEPAM 1 MG/1
TABLET ORAL
Status: CANCELLED | OUTPATIENT
Start: 2022-07-20

## 2022-07-20 RX ORDER — LORAZEPAM 1 MG/1
TABLET ORAL
Qty: 30 TABLET | Refills: 2 | Status: SHIPPED | OUTPATIENT
Start: 2022-07-20 | End: 2022-08-19

## 2022-07-20 RX ORDER — ATORVASTATIN CALCIUM 80 MG/1
TABLET, FILM COATED ORAL
Qty: 90 TABLET | Refills: 1 | Status: SHIPPED | OUTPATIENT
Start: 2022-07-20 | End: 2022-09-13 | Stop reason: SDUPTHER

## 2022-07-20 RX ORDER — LEVOTHYROXINE SODIUM 0.07 MG/1
TABLET ORAL
Qty: 90 TABLET | Refills: 1 | Status: SHIPPED | OUTPATIENT
Start: 2022-07-20

## 2022-07-20 SDOH — ECONOMIC STABILITY: FOOD INSECURITY: WITHIN THE PAST 12 MONTHS, YOU WORRIED THAT YOUR FOOD WOULD RUN OUT BEFORE YOU GOT MONEY TO BUY MORE.: NEVER TRUE

## 2022-07-20 SDOH — ECONOMIC STABILITY: FOOD INSECURITY: WITHIN THE PAST 12 MONTHS, THE FOOD YOU BOUGHT JUST DIDN'T LAST AND YOU DIDN'T HAVE MONEY TO GET MORE.: NEVER TRUE

## 2022-07-20 ASSESSMENT — ENCOUNTER SYMPTOMS
BLOOD IN STOOL: 0
DIARRHEA: 1

## 2022-07-20 ASSESSMENT — SOCIAL DETERMINANTS OF HEALTH (SDOH): HOW HARD IS IT FOR YOU TO PAY FOR THE VERY BASICS LIKE FOOD, HOUSING, MEDICAL CARE, AND HEATING?: NOT HARD AT ALL

## 2022-07-20 NOTE — PROGRESS NOTES
Subjective:      Patient ID: Leroy Plunkett is a 77 y.o. male    Mental Health Problem  This is a chronic problem. The onset of the illness is precipitated by emotional stress. Diarrhea   This is a chronic problem. Pertinent negatives include no chills or fever. Here in follow up for anxiety and hypothyroidism and blood work. Still having some intermittent diarrhea since finishing antibiotics recently. Seeing ent this week. Review of Systems   Constitutional:  Negative for chills and fever. Gastrointestinal:  Positive for diarrhea. Negative for blood in stool. Neurological:  Negative for weakness. Reviewed allergy, medical, social, surgical, family and med list changes and updated   Files--reviewed blood work which is acceptable    Controlled substances monitoring: no signs of potential drug abuse or diversion identified and OARRS report reviewed today- activity consistent with treatment plan.    Social History     Socioeconomic History    Marital status:    Tobacco Use    Smoking status: Former     Packs/day: 1.50     Years: 40.00     Pack years: 60.00     Types: Cigarettes     Quit date: 11/16/2018     Years since quitting: 3.6    Smokeless tobacco: Never   Vaping Use    Vaping Use: Never used   Substance and Sexual Activity    Alcohol use: Not Currently    Drug use: No     Social Determinants of Health     Financial Resource Strain: Low Risk     Difficulty of Paying Living Expenses: Not hard at all   Food Insecurity: No Food Insecurity    Worried About [facility] in the Last Year: Never true    [facility] in the Last Year: Never true     Current Outpatient Medications   Medication Sig Dispense Refill    MAGNESIUM PO Take by mouth      levothyroxine (SYNTHROID) 75 MCG tablet TAKE ONE TABLET BY MOUTH EVERY DAY 90 tablet 0    omeprazole (PRILOSEC) 20 MG delayed release capsule Take 1 capsule by mouth Daily 90 capsule 3    atorvastatin (LIPITOR) 80 MG tablet TAKE ONE TABLET BY MOUTH EVERY DAY 90 tablet 1    sildenafil (VIAGRA) 100 MG tablet Take 1 tablet by mouth daily as needed for Erectile Dysfunction 10 tablet 5    carvedilol (COREG) 6.25 MG tablet TAKE ONE TABLET BY MOUTH TWICE A  tablet 3    tiotropium (SPIRIVA RESPIMAT) 2.5 MCG/ACT AERS inhaler Inhale 2 puffs into the lungs daily 1 each 5    aspirin 81 MG EC tablet Take 81 mg by mouth daily      LORazepam (ATIVAN) 1 MG tablet       omeprazole (PRILOSEC) 20 MG delayed release capsule Take 1 capsule by mouth Daily 30 capsule 2    Potassium 99 MG TABS Take by mouth OTC potassium      Glucosamine-Chondroitin--300-250 MG TABS Take 1 tablet by mouth Daily      acetaminophen (TYLENOL) 500 MG tablet Take 1,000 mg by mouth daily At night      Naproxen Sodium 220 MG CAPS Take 1 capsule by mouth daily      vitamin C (ASCORBIC ACID) 500 MG tablet Take 500 mg by mouth daily       No current facility-administered medications for this visit. Family History   Problem Relation Age of Onset    Other Mother     Other Father     Cancer Father     Cancer Sister     Cancer Brother     Lung Cancer Brother     Cancer Brother     Colon Cancer Neg Hx      Past Medical History:   Diagnosis Date    [facility] [address]     CHF (congestive heart failure) ([facility]     Chicken pox     Chronic back pain     Emphysema of lung ([facility]     Hyperlipidemia     Hypertension     Hypothyroidism     Measles     Mumps     Osteoarthritis     Pneumonia     Type 2 diabetes mellitus without complication (HCC)     prediabetes     Objective:   /82   Pulse 61   Temp 97 °F (36.1 °C)   Resp 14   Ht 5' 9\" (1.753 m)   Wt 222 lb (100.7 kg)   SpO2 96%   BMI 32.78 kg/m²     Physical Exam  Patient with appropriate affect. Alert     Thought content appropriate  Good eye contact  Abdomen; B.S present. Soft. Non tender. No hepatosplenomegaly. No masses   Gen:   NAD  Lungs: clear and equal breath sounds  Heart:   Rate reg.  No murmur   Assessment:       Diagnosis Orders 1. Anxiety        2. Other specified hypothyroidism        3.  Diarrhea, unspecified type               Plan:      Start probiotic   Orders Placed This Encounter   Medications    levothyroxine (SYNTHROID) 75 MCG tablet     Sig: TAKE ONE TABLET BY MOUTH EVERY DAY     Dispense:  90 tablet     Refill:  1    atorvastatin (LIPITOR) 80 MG tablet     Sig: TAKE ONE TABLET BY MOUTH EVERY DAY     Dispense:  90 tablet     Refill:  1    LORazepam (ATIVAN) 1 MG tablet     Sig: TAKE ONE TABLET BY MOUTH EVERY DAY NIGHTLY AS NEEDED FOR ANXIETY     Dispense:  30 tablet     Refill:  2      Orders Placed This Encounter   Procedures    Clostridium Difficile Toxin/Antigen     Standing Status:   Future     Standing Expiration Date:   7/20/2023    F/u in 3 months after fasting blood work or dependent on above

## 2022-07-21 LAB — C DIFF TOXIN/ANTIGEN: ABNORMAL

## 2022-07-22 ENCOUNTER — TELEPHONE (OUTPATIENT)
Dept: FAMILY MEDICINE CLINIC | Age: 67
End: 2022-07-22

## 2022-07-22 NOTE — TELEPHONE ENCOUNTER
Per , pt is positive for cdiff. Pended  prescription for vancomycin 250mg  QID for 10 days for doctor to sign. Follow up after finishing medication. Pt notified.

## 2022-07-23 DIAGNOSIS — F41.9 ANXIETY: ICD-10-CM

## 2022-07-24 RX ORDER — LORAZEPAM 1 MG/1
TABLET ORAL
Qty: 30 TABLET | Refills: 0 | OUTPATIENT
Start: 2022-07-24

## 2022-07-25 NOTE — TELEPHONE ENCOUNTER
Patient called and states he was supposed to receive script pended on Friday 7/22/22 but when he called pharmacy it was not there. Script is also not found on medication history list. I informed patient that the PCP has 24-72 hours for approval, he states that the called received from PCP office was to let him know that the script was already sent.  Please advise

## 2022-07-27 RX ORDER — VANCOMYCIN HYDROCHLORIDE 250 MG/1
250 CAPSULE ORAL 4 TIMES DAILY
Qty: 40 CAPSULE | Refills: 0 | Status: SHIPPED | OUTPATIENT
Start: 2022-07-27 | End: 2022-08-06

## 2022-08-10 ENCOUNTER — HOSPITAL ENCOUNTER (OUTPATIENT)
Dept: PREADMISSION TESTING | Age: 67
Discharge: HOME | End: 2022-08-14
Payer: COMMERCIAL

## 2022-08-10 VITALS
BODY MASS INDEX: 32.89 KG/M2 | WEIGHT: 217 LBS | TEMPERATURE: 97.9 F | SYSTOLIC BLOOD PRESSURE: 114 MMHG | HEART RATE: 48 BPM | HEIGHT: 68 IN | DIASTOLIC BLOOD PRESSURE: 66 MMHG | RESPIRATION RATE: 16 BRPM | OXYGEN SATURATION: 98 %

## 2022-08-10 DIAGNOSIS — J39.2 OROPHARYNGEAL MASS: ICD-10-CM

## 2022-08-10 DIAGNOSIS — R59.1 LYMPHADENOPATHY: ICD-10-CM

## 2022-08-10 PROCEDURE — 87324 CLOSTRIDIUM AG IA: CPT

## 2022-08-10 PROCEDURE — 87449 NOS EACH ORGANISM AG IA: CPT

## 2022-08-10 RX ORDER — LIDOCAINE HYDROCHLORIDE 10 MG/ML
1 INJECTION, SOLUTION EPIDURAL; INFILTRATION; INTRACAUDAL; PERINEURAL
Status: CANCELLED | OUTPATIENT
Start: 2022-08-16 | End: 2022-08-16

## 2022-08-10 RX ORDER — SODIUM CHLORIDE, SODIUM LACTATE, POTASSIUM CHLORIDE, CALCIUM CHLORIDE 600; 310; 30; 20 MG/100ML; MG/100ML; MG/100ML; MG/100ML
INJECTION, SOLUTION INTRAVENOUS CONTINUOUS
Status: CANCELLED | OUTPATIENT
Start: 2022-08-16

## 2022-08-10 RX ORDER — NITROGLYCERIN 0.3 MG/1
0.3 TABLET SUBLINGUAL EVERY 5 MIN PRN
COMMUNITY

## 2022-08-10 RX ORDER — SODIUM CHLORIDE 0.9 % (FLUSH) 0.9 %
5-40 SYRINGE (ML) INJECTION PRN
Status: CANCELLED | OUTPATIENT
Start: 2022-08-16

## 2022-08-10 RX ORDER — SODIUM CHLORIDE 0.9 % (FLUSH) 0.9 %
5-40 SYRINGE (ML) INJECTION EVERY 12 HOURS SCHEDULED
Status: CANCELLED | OUTPATIENT
Start: 2022-08-16

## 2022-08-10 RX ORDER — UBIDECARENONE 75 MG
50 CAPSULE ORAL DAILY
COMMUNITY

## 2022-08-10 RX ORDER — SODIUM CHLORIDE 9 MG/ML
INJECTION, SOLUTION INTRAVENOUS PRN
Status: CANCELLED | OUTPATIENT
Start: 2022-08-16

## 2022-08-10 RX ORDER — ZINC GLUCONATE 50 MG
50 TABLET ORAL DAILY
COMMUNITY

## 2022-08-10 RX ORDER — OMEGA-3S/DHA/EPA/FISH OIL/D3 300MG-1000
400 CAPSULE ORAL DAILY
COMMUNITY

## 2022-08-10 ASSESSMENT — ENCOUNTER SYMPTOMS
BACK PAIN: 1
TROUBLE SWALLOWING: 1
EYE DISCHARGE: 0
WHEEZING: 0
EYE ITCHING: 0
ABDOMINAL PAIN: 0
VOMITING: 0
ABDOMINAL DISTENTION: 0
CHEST TIGHTNESS: 0
CONSTIPATION: 0
NAUSEA: 0
EYE PAIN: 0
DIARRHEA: 0
SORE THROAT: 0
ALLERGIC/IMMUNOLOGIC NEGATIVE: 1
SINUS PAIN: 0
BLOOD IN STOOL: 0
EYE REDNESS: 0
RHINORRHEA: 0
PHOTOPHOBIA: 0
SINUS PRESSURE: 0
COUGH: 0

## 2022-08-10 NOTE — PROGRESS NOTES
CMP/CBC 7/26/22 on epic  EKG 7/26/22 paper copy on chart    Pt takes betablocker    Reports HR in 50s is his norm (asymptomatic)    Follows with Dr. Shi Alonso (documented low HR's)    Pt with recent cdiff infection. Completed abx on 8/8/22. Pt reports he is still having loose stools. Denies abdominal pain/fevers. Dr. Karlie Cheng office called as pt reported he was told they wanted repeat cdiff sample. Dr. Karlie Cheng office confirmed they would like repeat sample-cdiff ordered and pt given instructions for collection/how [place] [facility]

## 2022-08-10 NOTE — H&P
Preoperative Consultation      Name: Jose Alfredo Spear  YOB: 1955  Date of Service: 8/10/2022      CHIEF COMPLAINT:  Right base of tongue oropharyngeal mass, lymphadenopathy    HISTORY OF PRESENT ILLNESS:      The patient is a 77 y.o. male with significant past medical history of right base of tongue oropharyngeal mass, lymphadenopathy who presents for a preoperative consultation at the request of surgeon, Dr. Cady Quiroga, who plans on performing direct laryngoscopy with biopsy on 8/16/22 at [facility]. Pt reports that last year (8/12/21) he had a tonsillectomy. He reports after his tonsillectomy he felt his throat \"never felt right, I think they should have biopsied what they took out\". He reports he continued over the last year to feel a \"lump in my right side of my tongue and at the top of my mouth\". He notes he also felt an enlargement on the right side of his neck. He reports he has ear pain intermittently due to the mass and trouble swallowing at times. He reports he had a CT done after seeing Dr. Cady Quiroga for evaluation. Former smoker, quit 2018. Family hx of cancer. Pt with recent cdiff infection. Completed abx on 8/8/22. Pt reports he is still having loose stools. Denies abdominal pain/fevers. Dr. Cady Quiroga office called as pt reported he was told they wanted repeat cdiff sample. Dr. Cady Quiroga office confirmed they would like repeat sample-cdiff ordered and pt given instructions for collection/[place] [facility] [place]      Planned Anesthesia: General  Known Anesthesia Problems: None  Bleeding Risk: No recent or remote history of abnormal bleeding  Patient Objection to Receiving Blood Products: No  Personal of FH of DVT/PE: No    Medical/Cardiac Clearance Needed: No    Past Medical History:        Diagnosis Date    CAD (coronary artery disease)     Cancer ([facility] [address]     prostate cancer Nov 16 2018    CHF (congestive heart failure) (HCC)     Chicken pox     Chronic back pain     Emphysema of lung ([facility] [address] Hyperlipidemia     Hypertension     Hypothyroidism     Measles     Mumps     Osteoarthritis     Pneumonia      Past Surgical History:    Past Surgical History:   Procedure Laterality Date    CARDIAC CATHETERIZATION      2019    COLONOSCOPY      2016    CYST REMOVAL      back of neck    CYST REMOVAL Left 03/04/2016    Dr Jayna Stewart  07/2018    DENTAL SURGERY      HERNIA REPAIR      2016, 2019    PROSTATE SURGERY  07/2018    Biopsy    PROSTATECTOMY  11/16/2018    TONSILLECTOMY N/A 08/12/2021    TONSILLECTOMY performed by Gregory Miles MD at [address] [place] [address]  03/04/2016    Dr Mesha Lopez N/A 04/05/2021    EGD ESOPHAGOGASTRODUODENOSCOPY WITH BIOPSY AND DILATION performed by Kevin Colvin MD at [address] [place] [address] N/A 11/18/2019    REPAIR OF RECURRENT VENTRAL HERNIA performed by Joanna Parmar MD at [facility]:    Demerol hcl [meperidine] and Valium [diazepam]    Medications Prior to Admission:    Current Outpatient Medications   Medication Sig Dispense Refill    nitroGLYCERIN (NITROSTAT) 0.3 MG SL tablet Place 0.3 mg under the tongue every 5 minutes as needed for Chest pain up to max of 3 total doses. If no relief after 1 dose, call 911. vitamin B-12 (CYANOCOBALAMIN) 100 MCG tablet Take 50 mcg by mouth in the morning. vitamin D3 (CHOLECALCIFEROL) 10 MCG (400 UNIT) TABS tablet Take 400 Units by mouth in the morning. zinc gluconate 50 MG tablet Take 50 mg by mouth in the morning.       NONFORMULARY Elderberry gummies      NONFORMULARY probiotic      MAGNESIUM PO Take by mouth      levothyroxine (SYNTHROID) 75 MCG tablet TAKE ONE TABLET BY MOUTH EVERY DAY 90 tablet 1    atorvastatin (LIPITOR) 80 MG tablet TAKE ONE TABLET BY MOUTH EVERY DAY 90 tablet 1    LORazepam (ATIVAN) 1 MG tablet TAKE ONE TABLET BY MOUTH EVERY DAY NIGHTLY AS NEEDED FOR ANXIETY 30 tablet 2    omeprazole (PRILOSEC) 20 MG Food Insecurity: No Food Insecurity    Worried About Running Out of Food in the Last Year: Never true    Ran Out of Food in the Last Year: Never true   Transportation Needs: Not on file   Physical Activity: Not on file   Stress: Not on file   Social Connections: Not on file   Intimate Partner Violence: Not on file   Housing Stability: Not on file       Family History:       Problem Relation Age of Onset    Other Mother     Alzheimer's Disease Mother     Cancer Father         prostate    Other Father     Cancer Sister         lung ca    Cancer Brother         lung ca    Lung Cancer Brother     Cancer Brother         prostate ca    No Known Problems Son     No Known Problems Daughter     Colon Cancer Neg Hx        Review of Systems   Constitutional:  Negative for chills, diaphoresis, fatigue, fever and unexpected weight change. HENT:  Positive for ear pain (\"when I swallow sometimes my right ear hurts\") and trouble swallowing. Negative for congestion, ear discharge, hearing loss, mouth sores, nosebleeds, postnasal drip, rhinorrhea, sinus pressure, sinus pain, sneezing, sore throat and tinnitus. Reports a mass on tongue and throat    Eyes:  Negative for photophobia, pain, discharge, redness, itching and visual disturbance. Prescription glasses   Respiratory:  Negative for cough, chest tightness and wheezing. Cardiovascular:  Negative for chest pain, palpitations and leg swelling. Gastrointestinal:  Negative for abdominal distention, abdominal pain, blood in stool, constipation, diarrhea, nausea and vomiting. Finished abx 8/8/22 for cdiff, still having loose stools   Endocrine: Negative for cold intolerance, heat intolerance, polydipsia, polyphagia and polyuria. Genitourinary:  Negative for decreased urine volume, difficulty urinating, dysuria, frequency, hematuria and urgency. Musculoskeletal:  Positive for back pain (chronic).  Negative for arthralgias, gait problem, joint swelling, myalgias, neck pain and neck stiffness. Skin:  Negative for rash and wound. Allergic/Immunologic: Negative. Neurological:  Negative for dizziness, seizures, weakness, light-headedness, numbness and headaches. Hematological: Negative. Psychiatric/Behavioral: Negative. Vitals:  /66   Pulse (!) 48   Temp 97.9 °F (36.6 °C) (Temporal)   Resp 16   Ht 5' 7.5\" (1.715 m)   Wt 217 lb (98.4 kg)   SpO2 98%   BMI 33.49 kg/m²       Physical Exam  Constitutional:       General: He is not in acute distress. Appearance: Normal appearance. He is not toxic-appearing or diaphoretic. HENT:      Head: Normocephalic. Right Ear: Tympanic membrane, ear canal and external ear normal. There is no impacted cerumen. Left Ear: Tympanic membrane, ear canal and external ear normal. There is no impacted cerumen. Nose: Nose normal. No congestion or rhinorrhea. Mouth/Throat:      Mouth: Mucous membranes are moist.      Pharynx: Oropharynx is clear. No oropharyngeal exudate or posterior oropharyngeal erythema. Comments: Right base of tongue enlargement  Eyes:      General:         Right eye: No discharge. Left eye: No discharge. Extraocular Movements: Extraocular movements intact. Conjunctiva/sclera: Conjunctivae normal.      Pupils: Pupils are equal, round, and reactive to light. Neck:      Vascular: No carotid bruit. Comments: Right side of neck near submandibular lymph nodes enlargement noted   Cardiovascular:      Rate and Rhythm: Regular rhythm. Bradycardia present. Pulses: Normal pulses. Heart sounds: Normal heart sounds. No murmur heard. Comments: Pt takes betablocker    Reports HR in 50s is his norm (asymptomatic)    Follows with Dr. Liana Kong (documented low HR's)  Pulmonary:      Effort: Pulmonary effort is normal. No respiratory distress. Breath sounds: Normal breath sounds. No wheezing.    Abdominal:      General: Bowel sounds are normal. There is no distension. Palpations: Abdomen is soft. Tenderness: There is no abdominal tenderness. There is no guarding. Genitourinary:     Comments: Deferred  Musculoskeletal:         General: No swelling. Normal range of motion. Cervical back: Normal range of motion. No rigidity. Right lower leg: No edema. Left lower leg: No edema. Lymphadenopathy:      Cervical: No cervical adenopathy. Skin:     General: Skin is warm and dry. Capillary Refill: Capillary refill takes less than 2 seconds. Coloration: Skin is not jaundiced. Findings: No bruising, erythema or rash. Neurological:      General: No focal deficit present. Mental Status: He is alert and oriented to person, place, and time. Motor: No weakness. Gait: Gait normal.   Psychiatric:         Mood and Affect: Mood normal.         Behavior: Behavior normal.         Thought Content:  Thought content normal.         Judgment: Judgment normal.       Assessment:  77 y.o. patient with   Patient Active Problem List   Diagnosis    Lumbosacral spondylosis without myelopathy    Umbilical hernia without obstruction and without gangrene    Diastasis recti    Other bursal cyst of elbow    Prostate cancer ([facility] [address]    Recurrent ventral hernia    Dyslipidemia    Essential hypertension    Acute on chronic diastolic heart failure (HCC)    Shortness of breath    RAMOS (dyspnea on exertion)    Coronary artery disease involving native coronary artery of native heart without angina pectoris    Chronic obstructive pulmonary disease (HCC)    Bilateral carotid bruits    Esophageal dysphagia    Esophageal stricture    Esophagitis    Chronic gastritis without bleeding    Sedative, hypnotic or anxiolytic use, unspecified with unspecified sedative, hypnotic or anxiolytic-induced disorder    Sedative, hypnotic or anxiolytic dependence with unspecified sedative, hypnotic or anxiolytic-induced disorder    Sedative, hypnotic or anxiolytic dependence, uncomplicated    Tonsillitis, chronic    Abscess of tonsil    Acquired deviated nasal septum    Angina pectoris, unspecified    Oropharyngeal mass, right base of tongue    Lymphadenopathy      with planned surgery as above. Plan:  Preoperative workup as follows: CMP/CBC 7/26/22 on epic, EKG 7/26/22 paper copy on chart  2.    Scheduled for: direct laryngoscopy with biopsy on 8/16/22    YANDEL Russo - CNP  8/10/2022  12:52 PM

## 2022-08-11 ENCOUNTER — COMMUNITY OUTREACH (OUTPATIENT)
Dept: INTERNAL MEDICINE CLINIC | Facility: CLINIC | Age: 67
End: 2022-08-11

## 2022-08-11 LAB — C DIFF TOXIN/ANTIGEN: NORMAL

## 2022-08-11 NOTE — PROGRESS NOTES
Patient's HM shows they are current for Colorectal Screening. [employer] and  files searched with success. Results attached to order and HM updated.

## 2022-08-16 ENCOUNTER — ANESTHESIA (OUTPATIENT)
Dept: OPERATING ROOM | Age: 67
End: 2022-08-16
Payer: COMMERCIAL

## 2022-08-16 ENCOUNTER — ANESTHESIA EVENT (OUTPATIENT)
Dept: OPERATING ROOM | Age: 67
End: 2022-08-16
Payer: COMMERCIAL

## 2022-08-16 ENCOUNTER — HOSPITAL ENCOUNTER (OUTPATIENT)
Age: 67
Setting detail: OUTPATIENT SURGERY
Discharge: HOME | End: 2022-08-16
Attending: OTOLARYNGOLOGY | Admitting: OTOLARYNGOLOGY
Payer: COMMERCIAL

## 2022-08-16 VITALS
TEMPERATURE: 97.4 F | DIASTOLIC BLOOD PRESSURE: 72 MMHG | HEIGHT: 68 IN | OXYGEN SATURATION: 95 % | RESPIRATION RATE: 12 BRPM | BODY MASS INDEX: 32.13 KG/M2 | SYSTOLIC BLOOD PRESSURE: 129 MMHG | HEART RATE: 52 BPM | WEIGHT: 212 LBS

## 2022-08-16 DIAGNOSIS — R59.1 LYMPHADENOPATHY: ICD-10-CM

## 2022-08-16 DIAGNOSIS — J39.2 OROPHARYNGEAL MASS: ICD-10-CM

## 2022-08-16 PROCEDURE — 2580000003 HC RX 258: Performed by: STUDENT IN AN ORGANIZED HEALTH CARE EDUCATION/TRAINING PROGRAM

## 2022-08-16 PROCEDURE — 7100000011 HC PHASE II RECOVERY - ADDTL 15 MIN: Performed by: OTOLARYNGOLOGY

## 2022-08-16 PROCEDURE — 88342 IMHCHEM/IMCYTCHM 1ST ANTB: CPT

## 2022-08-16 PROCEDURE — 6360000002 HC RX W HCPCS: Performed by: STUDENT IN AN ORGANIZED HEALTH CARE EDUCATION/TRAINING PROGRAM

## 2022-08-16 PROCEDURE — 88305 TISSUE EXAM BY PATHOLOGIST: CPT

## 2022-08-16 PROCEDURE — 2500000003 HC RX 250 WO HCPCS: Performed by: STUDENT IN AN ORGANIZED HEALTH CARE EDUCATION/TRAINING PROGRAM

## 2022-08-16 PROCEDURE — 7100000010 HC PHASE II RECOVERY - FIRST 15 MIN: Performed by: OTOLARYNGOLOGY

## 2022-08-16 PROCEDURE — 3600000003 HC SURGERY LEVEL 3 BASE: Performed by: OTOLARYNGOLOGY

## 2022-08-16 PROCEDURE — 2580000003 HC RX 258

## 2022-08-16 PROCEDURE — 3700000001 HC ADD 15 MINUTES (ANESTHESIA): Performed by: OTOLARYNGOLOGY

## 2022-08-16 PROCEDURE — 6370000000 HC RX 637 (ALT 250 FOR IP): Performed by: STUDENT IN AN ORGANIZED HEALTH CARE EDUCATION/TRAINING PROGRAM

## 2022-08-16 PROCEDURE — 7100000000 HC PACU RECOVERY - FIRST 15 MIN: Performed by: OTOLARYNGOLOGY

## 2022-08-16 PROCEDURE — 7100000001 HC PACU RECOVERY - ADDTL 15 MIN: Performed by: OTOLARYNGOLOGY

## 2022-08-16 PROCEDURE — 3600000013 HC SURGERY LEVEL 3 ADDTL 15MIN: Performed by: OTOLARYNGOLOGY

## 2022-08-16 PROCEDURE — 2720000010 HC SURG SUPPLY STERILE: Performed by: OTOLARYNGOLOGY

## 2022-08-16 PROCEDURE — 3700000000 HC ANESTHESIA ATTENDED CARE: Performed by: OTOLARYNGOLOGY

## 2022-08-16 PROCEDURE — 2709999900 HC NON-CHARGEABLE SUPPLY: Performed by: OTOLARYNGOLOGY

## 2022-08-16 PROCEDURE — 88331 PATH CONSLTJ SURG 1 BLK 1SPC: CPT

## 2022-08-16 RX ORDER — FENTANYL CITRATE 50 UG/ML
INJECTION, SOLUTION INTRAMUSCULAR; INTRAVENOUS PRN
Status: DISCONTINUED | OUTPATIENT
Start: 2022-08-16 | End: 2022-08-16 | Stop reason: SDUPTHER

## 2022-08-16 RX ORDER — LIDOCAINE HYDROCHLORIDE 10 MG/ML
1 INJECTION, SOLUTION EPIDURAL; INFILTRATION; INTRACAUDAL; PERINEURAL
Status: DISCONTINUED | OUTPATIENT
Start: 2022-08-16 | End: 2022-08-16 | Stop reason: HOSPADM

## 2022-08-16 RX ORDER — SODIUM CHLORIDE 0.9 % (FLUSH) 0.9 %
5-40 SYRINGE (ML) INJECTION EVERY 12 HOURS SCHEDULED
Status: DISCONTINUED | OUTPATIENT
Start: 2022-08-16 | End: 2022-08-16 | Stop reason: HOSPADM

## 2022-08-16 RX ORDER — SODIUM CHLORIDE 0.9 % (FLUSH) 0.9 %
5-40 SYRINGE (ML) INJECTION PRN
Status: DISCONTINUED | OUTPATIENT
Start: 2022-08-16 | End: 2022-08-16 | Stop reason: HOSPADM

## 2022-08-16 RX ORDER — PROCHLORPERAZINE EDISYLATE 5 MG/ML
5 INJECTION INTRAMUSCULAR; INTRAVENOUS
Status: DISCONTINUED | OUTPATIENT
Start: 2022-08-16 | End: 2022-08-16 | Stop reason: HOSPADM

## 2022-08-16 RX ORDER — OXYCODONE HYDROCHLORIDE 5 MG/1
10 TABLET ORAL PRN
Status: COMPLETED | OUTPATIENT
Start: 2022-08-16 | End: 2022-08-16

## 2022-08-16 RX ORDER — MIDAZOLAM HYDROCHLORIDE 1 MG/ML
INJECTION INTRAMUSCULAR; INTRAVENOUS PRN
Status: DISCONTINUED | OUTPATIENT
Start: 2022-08-16 | End: 2022-08-16 | Stop reason: SDUPTHER

## 2022-08-16 RX ORDER — ONDANSETRON 2 MG/ML
4 INJECTION INTRAMUSCULAR; INTRAVENOUS
Status: DISCONTINUED | OUTPATIENT
Start: 2022-08-16 | End: 2022-08-16 | Stop reason: HOSPADM

## 2022-08-16 RX ORDER — ROCURONIUM BROMIDE 10 MG/ML
INJECTION, SOLUTION INTRAVENOUS PRN
Status: DISCONTINUED | OUTPATIENT
Start: 2022-08-16 | End: 2022-08-16 | Stop reason: SDUPTHER

## 2022-08-16 RX ORDER — OXYCODONE HYDROCHLORIDE 5 MG/1
5 TABLET ORAL PRN
Status: COMPLETED | OUTPATIENT
Start: 2022-08-16 | End: 2022-08-16

## 2022-08-16 RX ORDER — DIPHENHYDRAMINE HYDROCHLORIDE 50 MG/ML
12.5 INJECTION INTRAMUSCULAR; INTRAVENOUS
Status: DISCONTINUED | OUTPATIENT
Start: 2022-08-16 | End: 2022-08-16 | Stop reason: HOSPADM

## 2022-08-16 RX ORDER — SODIUM CHLORIDE 9 MG/ML
25 INJECTION, SOLUTION INTRAVENOUS PRN
Status: DISCONTINUED | OUTPATIENT
Start: 2022-08-16 | End: 2022-08-16 | Stop reason: HOSPADM

## 2022-08-16 RX ORDER — MEPERIDINE HYDROCHLORIDE 25 MG/ML
12.5 INJECTION INTRAMUSCULAR; INTRAVENOUS; SUBCUTANEOUS EVERY 5 MIN PRN
Status: DISCONTINUED | OUTPATIENT
Start: 2022-08-16 | End: 2022-08-16

## 2022-08-16 RX ORDER — SODIUM CHLORIDE, SODIUM LACTATE, POTASSIUM CHLORIDE, CALCIUM CHLORIDE 600; 310; 30; 20 MG/100ML; MG/100ML; MG/100ML; MG/100ML
INJECTION, SOLUTION INTRAVENOUS CONTINUOUS
Status: DISCONTINUED | OUTPATIENT
Start: 2022-08-16 | End: 2022-08-16 | Stop reason: HOSPADM

## 2022-08-16 RX ORDER — FENTANYL CITRATE 50 UG/ML
50 INJECTION, SOLUTION INTRAMUSCULAR; INTRAVENOUS EVERY 5 MIN PRN
Status: DISCONTINUED | OUTPATIENT
Start: 2022-08-16 | End: 2022-08-16 | Stop reason: HOSPADM

## 2022-08-16 RX ORDER — SODIUM CHLORIDE 9 MG/ML
INJECTION, SOLUTION INTRAVENOUS PRN
Status: DISCONTINUED | OUTPATIENT
Start: 2022-08-16 | End: 2022-08-16 | Stop reason: HOSPADM

## 2022-08-16 RX ORDER — SODIUM CHLORIDE, SODIUM LACTATE, POTASSIUM CHLORIDE, CALCIUM CHLORIDE 600; 310; 30; 20 MG/100ML; MG/100ML; MG/100ML; MG/100ML
INJECTION, SOLUTION INTRAVENOUS CONTINUOUS PRN
Status: DISCONTINUED | OUTPATIENT
Start: 2022-08-16 | End: 2022-08-16 | Stop reason: SDUPTHER

## 2022-08-16 RX ORDER — LABETALOL HYDROCHLORIDE 5 MG/ML
10 INJECTION, SOLUTION INTRAVENOUS
Status: DISCONTINUED | OUTPATIENT
Start: 2022-08-16 | End: 2022-08-16 | Stop reason: HOSPADM

## 2022-08-16 RX ORDER — HYDRALAZINE HYDROCHLORIDE 20 MG/ML
10 INJECTION INTRAMUSCULAR; INTRAVENOUS
Status: DISCONTINUED | OUTPATIENT
Start: 2022-08-16 | End: 2022-08-16 | Stop reason: HOSPADM

## 2022-08-16 RX ORDER — FENTANYL CITRATE 50 UG/ML
25 INJECTION, SOLUTION INTRAMUSCULAR; INTRAVENOUS EVERY 5 MIN PRN
Status: DISCONTINUED | OUTPATIENT
Start: 2022-08-16 | End: 2022-08-16 | Stop reason: HOSPADM

## 2022-08-16 RX ORDER — PROPOFOL 10 MG/ML
INJECTION, EMULSION INTRAVENOUS CONTINUOUS PRN
Status: DISCONTINUED | OUTPATIENT
Start: 2022-08-16 | End: 2022-08-16 | Stop reason: SDUPTHER

## 2022-08-16 RX ADMIN — MIDAZOLAM HYDROCHLORIDE 2 MG: 1 INJECTION, SOLUTION INTRAMUSCULAR; INTRAVENOUS at 12:01

## 2022-08-16 RX ADMIN — ROCURONIUM BROMIDE 40 MG: 10 INJECTION, SOLUTION INTRAVENOUS at 12:10

## 2022-08-16 RX ADMIN — OXYCODONE 5 MG: 5 TABLET ORAL at 13:48

## 2022-08-16 RX ADMIN — FENTANYL CITRATE 50 MCG: 50 INJECTION, SOLUTION INTRAMUSCULAR; INTRAVENOUS at 13:07

## 2022-08-16 RX ADMIN — SODIUM CHLORIDE, POTASSIUM CHLORIDE, SODIUM LACTATE AND CALCIUM CHLORIDE: 600; 310; 30; 20 INJECTION, SOLUTION INTRAVENOUS at 10:40

## 2022-08-16 RX ADMIN — PROPOFOL 150 MCG/KG/MIN: 10 INJECTION, EMULSION INTRAVENOUS at 12:01

## 2022-08-16 RX ADMIN — FENTANYL CITRATE 50 MCG: 50 INJECTION, SOLUTION INTRAMUSCULAR; INTRAVENOUS at 12:01

## 2022-08-16 RX ADMIN — SUGAMMADEX 200 MG: 100 INJECTION, SOLUTION INTRAVENOUS at 12:49

## 2022-08-16 RX ADMIN — SODIUM CHLORIDE, POTASSIUM CHLORIDE, SODIUM LACTATE AND CALCIUM CHLORIDE: 600; 310; 30; 20 INJECTION, SOLUTION INTRAVENOUS at 11:56

## 2022-08-16 RX ADMIN — PROPOFOL 80 MG: 10 INJECTION, EMULSION INTRAVENOUS at 12:08

## 2022-08-16 RX ADMIN — FENTANYL CITRATE 50 MCG: 50 INJECTION, SOLUTION INTRAMUSCULAR; INTRAVENOUS at 12:10

## 2022-08-16 ASSESSMENT — PAIN SCALES - GENERAL
PAINLEVEL_OUTOF10: 4
PAINLEVEL_OUTOF10: 4
PAINLEVEL_OUTOF10: 7
PAINLEVEL_OUTOF10: 2

## 2022-08-16 ASSESSMENT — PAIN DESCRIPTION - LOCATION
LOCATION: THROAT

## 2022-08-16 ASSESSMENT — PAIN DESCRIPTION - DESCRIPTORS: DESCRIPTORS: ACHING

## 2022-08-16 ASSESSMENT — PAIN - FUNCTIONAL ASSESSMENT: PAIN_FUNCTIONAL_ASSESSMENT: 0-10

## 2022-08-16 NOTE — FLOWSHEET NOTE
Dr. Bhavik De Dios at bedside to speak to family and patient. Discharge instruction given, verbalizes understanding with no further questions. VSS. Patient aware to call office to make follow up appointment for Friday 8/19. Pain medication given per mar, patient aware when able to take next pain medication. IV out, patient being discharged.

## 2022-08-16 NOTE — BRIEF OP NOTE
Brief Postoperative Note      Patient: Kyle Hendricks  YOB: 1955  MRN: [id number]    Date of Procedure: 8/16/2022    Pre-Op Diagnosis: RIGHT BASE OF TONGUE, OROPHARYNGEAL MASS, LYMPHADENOPATHY    Post-Op Diagnosis:  squamous cell ca right oropharynx       Procedure(s):  DIRECT LARYNGOSCOPY WITH BIOPSY. 30 MIN  TO BE LATEX FREE    Surgeon(s):   Savanah Escalante MD    Assistant:   Assistant: Corie Gonzales    Anesthesia: General    Estimated Blood Loss (mL): Minimal    Complications: None    Specimens:   ID Type Source Tests Collected by Time Destination   A : RIGHT BASE OF TONGUE/OROPHARYNGEAL MASS Tissue Tongue SURGICAL PATHOLOGY Savanah Escalante MD 8/16/2022 1229        Implants:  * No implants in log *      Drains: * No LDAs found *    Findings: T2-3N1Mx right tongue base and tonsil    Electronically signed by Savanah Escalante MD on 8/16/2022 at 2:12 PM

## 2022-08-16 NOTE — ANESTHESIA POSTPROCEDURE EVALUATION
Department of Anesthesiology  Postprocedure Note    Patient: Leslie Currie  MRN: [id number]  YOB: 1955  Date of evaluation: 8/16/2022      Procedure Summary     Date: 08/16/22 [place] [facility] [place] [facility] [place] [facility]    Anesthesia Start: 3723 Anesthesia Stop: 1301    Procedure: DIRECT LARYNGOSCOPY WITH BIOPSY. 27 MIN  TO BE LATEX FREE Diagnosis:       Oropharyngeal mass      Lymphadenopathy      (RIGHT BASE OF TONGUE, OROPHARYNGEAL MASS, LYMPHADENOPATHY)    Surgeons: Edith Hill MD Responsible Provider: Talisha Fairbanks MD    Anesthesia Type: general ASA Status: 3          Anesthesia Type: No value filed.     Joann Phase I: Joann Score: 10    Joann Phase II:        Anesthesia Post Evaluation    Patient location during evaluation: bedside  Patient participation: complete - patient participated  Level of consciousness: awake and awake and alert  Pain score: 2  Airway patency: patent  Nausea & Vomiting: no nausea and no vomiting  Complications: no  Cardiovascular status: blood pressure returned to baseline and hemodynamically stable  Respiratory status: acceptable  Hydration status: euvolemic

## 2022-08-16 NOTE — ANESTHESIA PRE PROCEDURE
Department of Anesthesiology  Preprocedure Note       Name:  Enmanuel Vivar   Age:  77 y.o.  :  1955                                          MRN:  [id number]         Date:  2022      Surgeon: Diana Askew): Yasmine Fields MD    Procedure: Procedure(s):  DIRECT LARYNGOSCOPY WITH BIOPSY. 27 MIN  TO BE LATEX FREE    Medications prior to admission:   Prior to Admission medications    Medication Sig Start Date End Date Taking? Authorizing Provider   nitroGLYCERIN (NITROSTAT) 0.3 MG SL tablet Place 0.3 mg under the tongue every 5 minutes as needed for Chest pain up to max of 3 total doses. If no relief after 1 dose, call 911. Historical Provider, MD   vitamin B-12 (CYANOCOBALAMIN) 100 MCG tablet Take 50 mcg by mouth in the morning. Historical Provider, MD   vitamin D3 (CHOLECALCIFEROL) 10 MCG (400 UNIT) TABS tablet Take 400 Units by mouth in the morning. Historical Provider, MD   zinc gluconate 50 MG tablet Take 50 mg by mouth in the morning.     Historical Provider, MD   NONFORMULARY Facundo Finely gummies    Historical Provider, MD   NONFORMULARY probiotic    Historical Provider, MD   MAGNESIUM PO Take by mouth    Historical Provider, MD   levothyroxine (SYNTHROID) 75 MCG tablet TAKE ONE TABLET BY MOUTH EVERY DAY 22   Olesya Jennings MD   atorvastatin (LIPITOR) 80 MG tablet TAKE ONE TABLET BY MOUTH EVERY DAY 22   Olesya Jennings MD   LORazepam (ATIVAN) 1 MG tablet TAKE ONE TABLET BY MOUTH EVERY DAY NIGHTLY AS NEEDED FOR ANXIETY 22  Olesya Jennings MD   omeprazole (PRILOSEC) 20 MG delayed release capsule Take 1 capsule by mouth Daily 22   Margot Ren MD   sildenafil (VIAGRA) 100 MG tablet Take 1 tablet by mouth daily as needed for Erectile Dysfunction 21   Pamela Knox MD   carvedilol (COREG) 6.25 MG tablet TAKE ONE TABLET BY MOUTH TWICE A DAY 21   Pamela Knox MD   tiotropium (SPIRIVA RESPIMAT) 2.5 MCG/ACT AERS inhaler Inhale 2 puffs into the lungs daily elbow M71.329    Prostate cancer ([facility] [address] C61    Recurrent ventral hernia K43.2    Dyslipidemia E78.5    Essential hypertension I10    Acute on chronic diastolic heart failure (HCC) I50.33    Shortness of breath R06.02    RAMOS (dyspnea on exertion) R06.00    Coronary artery disease involving native coronary artery of native heart without angina pectoris I25.10    Chronic obstructive pulmonary disease (HCC) J44.9    Bilateral carotid bruits R09.89    Esophageal dysphagia R13.19    Esophageal stricture K22.2    Esophagitis K20.90    Chronic gastritis without bleeding K29.50    Sedative, hypnotic or anxiolytic use, unspecified with unspecified sedative, hypnotic or anxiolytic-induced disorder F13.99    Sedative, hypnotic or anxiolytic dependence with unspecified sedative, hypnotic or anxiolytic-induced disorder F13.29    Sedative, hypnotic or anxiolytic dependence, uncomplicated H47.12    Tonsillitis, chronic J35.01    Abscess of tonsil J36    Acquired deviated nasal septum J34.2    Angina pectoris, unspecified I20.9    Oropharyngeal mass, right base of tongue J39.2    Lymphadenopathy R59.1       Past Medical History:        Diagnosis Date    CAD (coronary artery disease)     Cancer ([facility] [address]     prostate cancer Nov 16 2018    [facility]     Chicken pox     Chronic back pain     Emphysema of lung ([facility] [address]     Hyperlipidemia     Hypertension     Hypothyroidism     Measles     Mumps     Osteoarthritis     Pneumonia        Past Surgical History:        Procedure Laterality Date    CARDIAC CATHETERIZATION      2019    COLONOSCOPY      2016    CYST REMOVAL      back of neck    CYST REMOVAL Left 03/04/2016    Dr Alvares Medico  07/2018    Peggy Hensen HERNIA REPAIR      2016, 2019    PROSTATE SURGERY  07/2018    Biopsy    PROSTATECTOMY  11/16/2018    TONSILLECTOMY N/A 08/12/2021    TONSILLECTOMY performed by Inderjit Neves MD at [address] [place] [address] UMBILICAL HERNIA REPAIR  03/04/2016    Dr Edith Lam N/A 04/05/2021    EGD ESOPHAGOGASTRODUODENOSCOPY WITH BIOPSY AND DILATION performed by Leodan Neves MD at [facility] N/A 11/18/2019    REPAIR OF RECURRENT VENTRAL HERNIA performed by Megan Allen MD at [place] [address] History:    Social History     Tobacco Use    Smoking status: Former     Packs/day: 1.50     Years: 40.00     Pack years: 60.00     Types: Cigarettes     Quit date: 11/16/2018     Years since quitting: 3.7    Smokeless tobacco: Never   Substance Use Topics    Alcohol use: Yes     Comment: very rarely (1 beer a year)                                Counseling given: Not Answered      Vital Signs (Current):   Vitals:    08/16/22 1008   BP: 110/66   Pulse: 57   Resp: 20   Temp: 97.9 °F (36.6 °C)   TempSrc: Temporal   SpO2: 95%   Weight: 212 lb (96.2 kg)   Height: 5' 7.5\" (1.715 m)                                              BP Readings from Last 3 Encounters:   08/16/22 110/66   08/10/22 114/66   07/20/22 120/82       NPO Status: Time of last liquid consumption: 2300                        Time of last solid consumption: 2200                        Date of last liquid consumption: 08/15/22                        Date of last solid food consumption: 08/15/22    BMI:   Wt Readings from Last 3 Encounters:   08/16/22 212 lb (96.2 kg)   08/10/22 217 lb (98.4 kg)   07/20/22 222 lb (100.7 kg)     Body mass index is 32.71 kg/m².     CBC:   Lab Results   Component Value Date/Time    WBC 9.7 07/26/2022 10:31 AM    WBC 6.4 06/10/2022 10:13 AM    RBC 5.00 07/26/2022 10:31 AM    HGB 14.2 07/26/2022 10:31 AM    HCT 44.3 07/26/2022 10:31 AM    MCV 89 07/26/2022 10:31 AM    RDW 14.9 06/10/2022 10:13 AM     07/26/2022 10:31 AM       CMP:   Lab Results   Component Value Date/Time     07/26/2022 10:31 AM    K 4.5 07/26/2022 10:31 AM     07/26/2022 10:31 AM CO2 27 12/20/2021 01:05 PM    BUN 22 12/20/2021 01:05 PM    CREATININE 0.83 07/26/2022 10:31 AM    GFRAA >60.0 12/20/2021 01:05 PM    LABGLOM >60.0 12/20/2021 01:05 PM    GLUCOSE 109 07/26/2022 10:31 AM    PROT 7.3 07/26/2022 10:31 AM    CALCIUM 9.6 07/26/2022 10:31 AM    BILITOT 0.7 07/26/2022 10:31 AM    ALKPHOS 72 07/26/2022 10:31 AM    AST 18 07/26/2022 10:31 AM    ALT 20 07/26/2022 10:31 AM       POC Tests: No results for input(s): POCGLU, POCNA, POCK, POCCL, POCBUN, POCHEMO, POCHCT in the last 72 hours. Coags: No results found for: PROTIME, INR, APTT    HCG (If Applicable): No results found for: PREGTESTUR, PREGSERUM, HCG, HCGQUANT     ABGs: No results found for: PHART, PO2ART, IUY4OYL, GJM4CVC, BEART, K2JYDKCJ     Type & Screen (If Applicable):  No results found for: LABABO, LABRH    Drug/Infectious Status (If Applicable):  No results found for: HIV, HEPCAB    COVID-19 Screening (If Applicable):   Lab Results   Component Value Date/Time    COVID19 Not Detected 03/30/2021 02:25 PM           Anesthesia Evaluation  Patient summary reviewed and Nursing notes reviewed no history of anesthetic complications:   Airway: Mallampati: II  TM distance: >3 FB   Neck ROM: full  Mouth opening: > = 3 FB   Dental: normal exam         Pulmonary:normal exam    (+) COPD:                             Cardiovascular:  Exercise tolerance: good (>4 METS),   (+) hypertension:, CAD:, CHF:,       ECG reviewed               Beta Blocker:  Dose within 24 Hrs         Neuro/Psych:   Negative Neuro/Psych ROS  (+) psychiatric history:            GI/Hepatic/Renal: Neg GI/Hepatic/Renal ROS            Endo/Other: Negative Endo/Other ROS   (+) hypothyroidism::., .          Pt had PAT visit. Abdominal:             Vascular: negative vascular ROS. Other Findings:           Anesthesia Plan      general     ASA 3       Induction: intravenous.     MIPS: Postoperative opioids intended and Prophylactic antiemetics administered. Anesthetic plan and risks discussed with patient.         Attending anesthesiologist reviewed and agrees with Pre Eval content                Lelo Faulkner MD   8/16/2022

## 2022-08-17 NOTE — OP NOTE
[address]                      [facility] [address] [facility]                                OPERATIVE REPORT    PATIENT NAME: Silvana Hankins                     :        1955  MED REC NO:   [id number]                            ROOM:  ACCOUNT NO:   [de-identified]                           ADMIT DATE: 2022  PROVIDER:     Arleen Dee MD    DATE OF PROCEDURE:  2022    PREOPERATIVE DIAGNOSIS:  Right oropharyngeal mass concerning for  squamous cell carcinoma. 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 PROCEDURE:  1. Direct laryngoscopy with biopsy of oropharyngeal right-sided tumor. 2.  Flexible bronchoscopy. 3.  Rigid esophagoscopy. SURGEON:  Arleen Dee MD    ANESTHESIA:  General.    INDICATION:  The patient is a 60-year-old male with evident  lymphadenopathy in the right neck with certainly mass concerning for  potential squamous cell carcinoma involving the oropharynx at the level  of tongue base and into the tonsillar region with recommendation for  definitive panendoscopy with biopsy. OPERATIVE PROCEDURE:  The patient was taken to the operating room and  administered general anesthesia. Appropriate prep, drape and time-out  performed in usual manner. Dedo laryngoscope brought into position. Multiple biopsies taken of the tongue base and tonsil mass lesion and  sent for frozen section came back consistent with invasive squamous cell  carcinoma. P16 testing will then be performed. The patient had tumor  staged as a T2-3 based on its size. In review, this is a prominent  tongue base mass which is not across the midline and certainly involves  the right tongue and into the tonsillar region consistent with a  horseshoe shaped configuration tumor.   The patient had thorough  inspection of the remaining laryngeal structures including piriform  sinus as well as intrinsic larynx, etc., all clear of tumor. The  laryngoscope was suspended and the patient had thorough bronchoscopy  performed with the flexible bronchoscope. Thorough examination of the  subglottic space as well as the trachea as well as the mainstem bronchi  bilaterally as well as segmental bronchi bilaterally performed  uneventfully with no gross intrinsic lesion appreciable at the level of  the mucosa endobronchially. The patient had the laryngoscope and the  bronchoscope removed and rigid esophagoscopy was accomplished about 25  cm. Otherwise unremarkable without gross lesion in the esophagus. The  patient had all instrumentation removed as released and taken to  recovery in stable condition. Estimated blood loss minimal.  No  complication. Mitchell Longoria MD    D: 08/16/2022 16:37:29       T: 08/16/2022 16:39:45     [id number]  Job#: [id number]     Doc#: [id number]    CC:   Keron Tucker MD

## 2022-08-22 ENCOUNTER — OFFICE VISIT (OUTPATIENT)
Dept: CARDIOLOGY CLINIC | Age: 67
End: 2022-08-22
Payer: COMMERCIAL

## 2022-08-22 VITALS
WEIGHT: 211 LBS | HEART RATE: 55 BPM | DIASTOLIC BLOOD PRESSURE: 70 MMHG | BODY MASS INDEX: 32.56 KG/M2 | SYSTOLIC BLOOD PRESSURE: 110 MMHG | OXYGEN SATURATION: 97 %

## 2022-08-22 DIAGNOSIS — I50.33 ACUTE ON CHRONIC DIASTOLIC HEART FAILURE (HCC): ICD-10-CM

## 2022-08-22 DIAGNOSIS — I25.119 ATHEROSCLEROSIS OF NATIVE CORONARY ARTERY OF NATIVE HEART WITH ANGINA PECTORIS (HCC): ICD-10-CM

## 2022-08-22 DIAGNOSIS — I25.10 CORONARY ARTERY DISEASE INVOLVING NATIVE CORONARY ARTERY OF NATIVE HEART WITHOUT ANGINA PECTORIS: ICD-10-CM

## 2022-08-22 DIAGNOSIS — I10 ESSENTIAL HYPERTENSION: Primary | ICD-10-CM

## 2022-08-22 DIAGNOSIS — E78.5 DYSLIPIDEMIA: ICD-10-CM

## 2022-08-22 DIAGNOSIS — R09.89 BILATERAL CAROTID BRUITS: ICD-10-CM

## 2022-08-22 DIAGNOSIS — N52.9 VASCULOGENIC ERECTILE DYSFUNCTION, UNSPECIFIED VASCULOGENIC ERECTILE DYSFUNCTION TYPE: ICD-10-CM

## 2022-08-22 PROCEDURE — 99214 OFFICE O/P EST MOD 30 MIN: CPT | Performed by: INTERNAL MEDICINE

## 2022-08-22 PROCEDURE — 1123F ACP DISCUSS/DSCN MKR DOCD: CPT | Performed by: INTERNAL MEDICINE

## 2022-08-22 ASSESSMENT — ENCOUNTER SYMPTOMS
BLOOD IN STOOL: 0
GASTROINTESTINAL NEGATIVE: 1
EYES NEGATIVE: 1
SHORTNESS OF BREATH: 1
WHEEZING: 0
NAUSEA: 0
STRIDOR: 0
BACK PAIN: 1
CHEST TIGHTNESS: 0
COUGH: 0

## 2022-08-22 NOTE — PROGRESS NOTES
Office Visit. Patient: Rosalind Casas  YOB: 1955  MRN: [id number]    Chief Complaint: RAMOS LE edema Orthopnea  Chief Complaint   Patient presents with    Hypertension    Congestive Heart Failure       CV Data:  7/2019 echo EF 60  7/2019 Dobut stress echo negative   LAD 40-50% sequential, RCA  mid. LVEF 55, EDP 14, PCWP 14 CO 7.2 L/min  10/2019 CUS- mild  10/2010 Abd US negative. 11/2020 CUS mild     Subjective/HPI RAMOS is getting worse. Can't do much due to knee and back pain. Can't lie flat due to sob    10/16/2019: still sob but better since BB and Imdur. No cp. No bleed no falls. 1/17/2020 no cp occ ramos no falls occ nose bleed no falls     6/19/2020 Patient and/or health care decision maker is aware that that he/she may receive a bill for this telephone service, depending on his insurance coverage, and has provided verbal consent to proceed. This visit was completed via telephone. Total time 13 minutes. Still has some ramos but better. No cp no falls no bleed little edema. 7/27/2020 still RAMOS. 10/28/2020 no RAMOS No CP active at home. No falls no bleed. 1/28/21 still sob occ cp but not bad. No NTG needed. No bleed no falls     4/29/21 lately occ LE edema no cp no sob no bleed. Takes mes. Recent EGD     8/31/21 No cp no sob no falls no bleed. Lost near 30LBS by deiting. Takes med    4/18/22 doing welll no cp no sob no falls no bleed no edema. 8/22/22 dz with throat and tongue cancer. Getting PE scan. CV stable no pc  no sob no falls nobleed. Takes meds    Stopped smoking 11/2018 with prior 2 ppd  Disabled  from knee and back.      EKG: SR 64    Past Medical History:   Diagnosis Date    CAD (coronary artery disease)     Cancer ([facility] [address]     prostate cancer Nov 16 2018    CHF (congestive heart failure) (HCC)     Chicken pox     Chronic back pain     Emphysema of lung ([facility] [address]     Hyperlipidemia     Hypertension     Hypothyroidism     Measles     Mumps Osteoarthritis     Pneumonia        Past Surgical History:   Procedure Laterality Date    CARDIAC CATHETERIZATION      2019    COLONOSCOPY      2016    CYST REMOVAL      back of neck    CYST REMOVAL Left 03/04/2016    Dr Mal Aschoff  07/2018    DENTAL SURGERY      HERNIA REPAIR      2016, 2019    LARYNGOSCOPY N/A 8/16/2022    DIRECT LARYNGOSCOPY WITH BIOPSY.  27 MIN  TO BE LATEX FREE performed by David Latham MD at [facility].  07/2018    Biopsy    PROSTATECTOMY  11/16/2018    TONSILLECTOMY N/A 08/12/2021    TONSILLECTOMY performed by Inderjit Neves MD at [address] [place] [address]  03/04/2016    Dr Zain Monique N/A 04/05/2021    EGD ESOPHAGOGASTRODUODENOSCOPY WITH BIOPSY AND DILATION performed by Allen Carr MD at [address] [place] [address]. N/A 11/18/2019    REPAIR OF RECURRENT VENTRAL HERNIA performed by Erik Odonnell MD at [address] [place] [address] History   Problem Relation Age of Onset    Other Mother     Alzheimer's Disease Mother     Cancer Father         prostate    Other Father     Cancer Sister         lung ca    Cancer Brother         lung ca    Lung Cancer Brother     Cancer Brother         prostate ca    No Known Problems Son     No Known Problems Daughter     Colon Cancer Neg Hx        Social History     Socioeconomic History    Marital status:    Tobacco Use    Smoking status: Former     Packs/day: 1.50     Years: 40.00     Pack years: 60.00     Types: Cigarettes     Quit date: 11/16/2018     Years since quitting: 3.7    Smokeless tobacco: Never   Vaping Use    Vaping Use: Never used   Substance and Sexual Activity    Alcohol use: Yes     Comment: very rarely (1 beer a year)    Drug use: No     Social Determinants of Health     Financial Resource Strain: Low Risk     Difficulty of Paying Living Expenses: Not hard at all   Food Insecurity: No Food Insecurity    Worried About Running Out of Food in the Last Year: Never true    Ran Out of Food in the Last Year: Never true       Allergies   Allergen Reactions    Demerol Hcl [Meperidine] Nausea Only     migraines    Valium [Diazepam] Other (See Comments)     MIGRAINES       Current Outpatient Medications   Medication Sig Dispense Refill    nitroGLYCERIN (NITROSTAT) 0.3 MG SL tablet Place 0.3 mg under the tongue every 5 minutes as needed for Chest pain up to max of 3 total doses. If no relief after 1 dose, call 911. vitamin B-12 (CYANOCOBALAMIN) 100 MCG tablet Take 50 mcg by mouth in the morning. vitamin D3 (CHOLECALCIFEROL) 10 MCG (400 UNIT) TABS tablet Take 400 Units by mouth in the morning. zinc gluconate 50 MG tablet Take 50 mg by mouth in the morning. NONFORMULARY Elderberry gummies      NONFORMULARY probiotic      MAGNESIUM PO Take by mouth      levothyroxine (SYNTHROID) 75 MCG tablet TAKE ONE TABLET BY MOUTH EVERY DAY 90 tablet 1    atorvastatin (LIPITOR) 80 MG tablet TAKE ONE TABLET BY MOUTH EVERY DAY 90 tablet 1    omeprazole (PRILOSEC) 20 MG delayed release capsule Take 1 capsule by mouth Daily 90 capsule 3    sildenafil (VIAGRA) 100 MG tablet Take 1 tablet by mouth daily as needed for Erectile Dysfunction 10 tablet 5    carvedilol (COREG) 6.25 MG tablet TAKE ONE TABLET BY MOUTH TWICE A  tablet 3    tiotropium (SPIRIVA RESPIMAT) 2.5 MCG/ACT AERS inhaler Inhale 2 puffs into the lungs daily 1 each 5    aspirin 81 MG EC tablet Take 81 mg by mouth daily      LORazepam (ATIVAN) 1 MG tablet at bedtime.       omeprazole (PRILOSEC) 20 MG delayed release capsule Take 1 capsule by mouth Daily 30 capsule 2    Potassium 99 MG TABS Take by mouth OTC potassium      Glucosamine-Chondroitin--300-250 MG TABS Take 1 tablet by mouth Daily      acetaminophen (TYLENOL) 500 MG tablet Take 1,000 mg by mouth daily At night      Naproxen Sodium 220 MG CAPS Take 1 capsule by mouth daily      vitamin C (ASCORBIC ACID) 500 MG tablet Take 500 mg by mouth daily       No current facility-administered medications for this visit. Review of Systems:   Review of Systems   Constitutional: Negative. Negative for diaphoresis and fatigue. HENT: Negative. Eyes: Negative. Respiratory:  Positive for shortness of breath. Negative for cough, chest tightness, wheezing and stridor. Cardiovascular:  Positive for leg swelling. Negative for chest pain and palpitations. Gastrointestinal: Negative. Negative for blood in stool and nausea. Genitourinary: Negative. Musculoskeletal:  Positive for arthralgias, back pain and gait problem. Skin: Negative. Neurological:  Negative for dizziness, syncope, weakness and light-headedness. Hematological: Negative. Psychiatric/Behavioral: Negative. Physical Examination:    /70 (Site: Right Upper Arm, Position: Sitting, Cuff Size: Small Adult)   Pulse 55   Wt 211 lb (95.7 kg)   SpO2 97%   BMI 32.56 kg/m²    Physical Exam   Constitutional: He appears healthy. No distress. HENT:   Normal cephalic and Atraumatic   Eyes: Pupils are equal, round, and reactive to light. Neck: Thyroid normal. No JVD present. No neck adenopathy. No thyromegaly present. Cardiovascular: Normal rate, regular rhythm, intact distal pulses and normal pulses. Murmur heard. Pulmonary/Chest: Effort normal and breath sounds normal. He has no wheezes. He has no rales. He exhibits no tenderness. Abdominal: Soft. Bowel sounds are normal. There is no abdominal tenderness. Musculoskeletal:         General: No tenderness or edema. Normal range of motion. Cervical back: Normal range of motion and neck supple. Neurological: He is alert and oriented to person, place, and time. Skin: Skin is warm. No cyanosis. Nails show no clubbing.      LABS:  CBC:   Lab Results   Component Value Date/Time    WBC 9.7 07/26/2022 10:31 AM    WBC 6.4 06/10/2022 10:13 AM    RBC 5.00 07/26/2022 10:31 AM    HGB 14.2 07/26/2022 10:31 AM    HCT 44.3 07/26/2022 10:31 AM    MCV 89 07/26/2022 10:31 AM    MCH 27.9 06/10/2022 10:13 AM    MCHC 32.1 07/26/2022 10:31 AM    RDW 14.9 06/10/2022 10:13 AM     07/26/2022 10:31 AM     Lipids:  Lab Results   Component Value Date    CHOL 115 12/20/2021    CHOL 157 09/21/2021    CHOL 130 06/15/2021     Lab Results   Component Value Date    TRIG 58 12/20/2021    TRIG 151 (H) 09/21/2021    TRIG 105 06/15/2021     Lab Results   Component Value Date    HDL 44 12/20/2021    HDL 48 09/21/2021    HDL 43 06/15/2021     Lab Results   Component Value Date    LDLCALC 59 12/20/2021    LDLCALC 79 09/21/2021    LDLCALC 66 06/15/2021     No results found for: LABVLDL, VLDL  No results found for: CHOLHDLRATIO  CMP:    Lab Results   Component Value Date/Time     07/26/2022 10:31 AM    K 4.5 07/26/2022 10:31 AM     07/26/2022 10:31 AM    CO2 27 12/20/2021 01:05 PM    BUN 22 12/20/2021 01:05 PM    CREATININE 0.83 07/26/2022 10:31 AM    GFRAA >60.0 12/20/2021 01:05 PM    LABGLOM >60.0 12/20/2021 01:05 PM    GLUCOSE 109 07/26/2022 10:31 AM    PROT 7.3 07/26/2022 10:31 AM    LABALBU 4.2 07/26/2022 10:31 AM    CALCIUM 9.6 07/26/2022 10:31 AM    BILITOT 0.7 07/26/2022 10:31 AM    ALKPHOS 72 07/26/2022 10:31 AM    AST 18 07/26/2022 10:31 AM    ALT 20 07/26/2022 10:31 AM     BMP:    Lab Results   Component Value Date/Time     07/26/2022 10:31 AM    K 4.5 07/26/2022 10:31 AM     07/26/2022 10:31 AM    CO2 27 12/20/2021 01:05 PM    BUN 22 12/20/2021 01:05 PM    LABALBU 4.2 07/26/2022 10:31 AM    CREATININE 0.83 07/26/2022 10:31 AM    CALCIUM 9.6 07/26/2022 10:31 AM    GFRAA >60.0 12/20/2021 01:05 PM    LABGLOM >60.0 12/20/2021 01:05 PM    GLUCOSE 109 07/26/2022 10:31 AM     Magnesium:  No results found for: MG  TSH:  Lab Results   Component Value Date    TSH 3.450 07/12/2022             Patient Active Problem List   Diagnosis    Lumbosacral spondylosis without myelopathy    Umbilical hernia without obstruction and without gangrene    Diastasis recti    Other bursal cyst of elbow    Prostate cancer ([facility] [address]    Recurrent ventral hernia    Dyslipidemia    Essential hypertension    Acute on chronic diastolic heart failure (HCC)    Shortness of breath    RAMOS (dyspnea on exertion)    Coronary artery disease involving native coronary artery of native heart without angina pectoris    Chronic obstructive pulmonary disease (HCC)    Bilateral carotid bruits    Esophageal dysphagia    Esophageal stricture    Esophagitis    Chronic gastritis without bleeding    Sedative, hypnotic or anxiolytic use, unspecified with unspecified sedative, hypnotic or anxiolytic-induced disorder    Sedative, hypnotic or anxiolytic dependence with unspecified sedative, hypnotic or anxiolytic-induced disorder    Sedative, hypnotic or anxiolytic dependence, uncomplicated    Tonsillitis, chronic    Abscess of tonsil    Acquired deviated nasal septum    Angina pectoris, unspecified    Oropharyngeal mass, right base of tongue    Lymphadenopathy       There are no discontinued medications. Modified Medications    No medications on file       No orders of the defined types were placed in this encounter. Assessment/Plan:    1. Dyslipidemia   LDL Elevated- on Atorva. Repeat labs reviewed. Much better   2. Essential hypertension  Stable - continue meds. Low salt diet    3. Shortness of breath - stable     4. Acute on chronic diastolic heart failure (HCC) - stable     5. Carotid Bruits- CUS - will need conontued surveillance. 6. Lung Nodule- f/u CT ordered. F/u Dr. Xiomara Hidalgo.     7. ED- wants to try Viagra- no angina. BP on low side. Advance Viagra to 100 mg PRN. No nitrates. 8. Throat and Tongue - PET scan and f/u for further staging and Rx.       Counseling:  Heart Healthy Lifestyle, Improve BMI, Low Salt Diet, Volume Restriction 1500cc per day, Take Precautions to Prevent Falls and Walk Daily    Return in about 4 months (around 12/22/2022).     Electronically signed by Emiliano Mcduffie MD on 8/22/2022 at 12:10 PM

## 2022-09-13 RX ORDER — ATORVASTATIN CALCIUM 80 MG/1
TABLET, FILM COATED ORAL
Qty: 90 TABLET | Refills: 1 | Status: SHIPPED | OUTPATIENT
Start: 2022-09-13

## 2022-11-02 ENCOUNTER — TELEPHONE (OUTPATIENT)
Dept: FAMILY MEDICINE CLINIC | Age: 67
End: 2022-11-02

## 2022-11-02 DIAGNOSIS — E03.8 OTHER SPECIFIED HYPOTHYROIDISM: ICD-10-CM

## 2022-11-02 DIAGNOSIS — Z85.46 H/O PROSTATE CANCER: ICD-10-CM

## 2022-11-02 LAB
ALBUMIN SERPL-MCNC: 3.2 G/DL (ref 3.5–4.6)
ALP BLD-CCNC: 97 U/L (ref 35–104)
ALT SERPL-CCNC: 29 U/L (ref 0–41)
ANION GAP SERPL CALCULATED.3IONS-SCNC: 14 MEQ/L (ref 9–15)
AST SERPL-CCNC: 21 U/L (ref 0–40)
BASOPHILS ABSOLUTE: 0 K/UL (ref 0–0.2)
BASOPHILS RELATIVE PERCENT: 0.6 %
BILIRUB SERPL-MCNC: 0.3 MG/DL (ref 0.2–0.7)
BUN BLDV-MCNC: 19 MG/DL (ref 8–23)
CALCIUM SERPL-MCNC: 9.4 MG/DL (ref 8.5–9.9)
CHLORIDE BLD-SCNC: 97 MEQ/L (ref 95–107)
CHOLESTEROL, TOTAL: 132 MG/DL (ref 0–199)
CO2: 25 MEQ/L (ref 20–31)
CREAT SERPL-MCNC: 0.86 MG/DL (ref 0.7–1.2)
EOSINOPHILS ABSOLUTE: 0 K/UL (ref 0–0.7)
EOSINOPHILS RELATIVE PERCENT: 0.1 %
GFR SERPL CREATININE-BSD FRML MDRD: >60 ML/MIN/{1.73_M2}
GLOBULIN: 3.7 G/DL (ref 2.3–3.5)
GLUCOSE BLD-MCNC: 101 MG/DL (ref 70–99)
HCT VFR BLD CALC: 33.6 % (ref 42–52)
HDLC SERPL-MCNC: 37 MG/DL (ref 40–59)
HEMOGLOBIN: 11.1 G/DL (ref 14–18)
LDL CHOLESTEROL CALCULATED: 67 MG/DL (ref 0–129)
LYMPHOCYTES ABSOLUTE: 0.4 K/UL (ref 1–4.8)
LYMPHOCYTES RELATIVE PERCENT: 6.2 %
MCH RBC QN AUTO: 28.8 PG (ref 27–31.3)
MCHC RBC AUTO-ENTMCNC: 33 % (ref 33–37)
MCV RBC AUTO: 87.1 FL (ref 79–92.2)
MONOCYTES ABSOLUTE: 1.2 K/UL (ref 0.2–0.8)
MONOCYTES RELATIVE PERCENT: 20.4 %
NEUTROPHILS ABSOLUTE: 4.4 K/UL (ref 1.4–6.5)
NEUTROPHILS RELATIVE PERCENT: 72.7 %
PDW BLD-RTO: 14.5 % (ref 11.5–14.5)
PLATELET # BLD: 321 K/UL (ref 130–400)
POTASSIUM SERPL-SCNC: 4.8 MEQ/L (ref 3.4–4.9)
PROSTATE SPECIFIC ANTIGEN: 0.02 NG/ML (ref 0–4)
RBC # BLD: 3.85 M/UL (ref 4.7–6.1)
SODIUM BLD-SCNC: 136 MEQ/L (ref 135–144)
TOTAL PROTEIN: 6.9 G/DL (ref 6.3–8)
TRIGL SERPL-MCNC: 142 MG/DL (ref 0–150)
WBC # BLD: 6 K/UL (ref 4.8–10.8)

## 2022-11-08 ENCOUNTER — TELEPHONE (OUTPATIENT)
Dept: CARDIOLOGY CLINIC | Age: 67
End: 2022-11-08

## 2022-11-08 ENCOUNTER — OFFICE VISIT (OUTPATIENT)
Dept: FAMILY MEDICINE CLINIC | Age: 67
End: 2022-11-08
Payer: COMMERCIAL

## 2022-11-08 VITALS
HEART RATE: 68 BPM | TEMPERATURE: 97 F | DIASTOLIC BLOOD PRESSURE: 60 MMHG | OXYGEN SATURATION: 97 % | RESPIRATION RATE: 16 BRPM | HEIGHT: 68 IN | BODY MASS INDEX: 32.56 KG/M2 | SYSTOLIC BLOOD PRESSURE: 80 MMHG

## 2022-11-08 DIAGNOSIS — C06.9 ORAL CANCER (HCC): ICD-10-CM

## 2022-11-08 DIAGNOSIS — E03.8 OTHER SPECIFIED HYPOTHYROIDISM: ICD-10-CM

## 2022-11-08 DIAGNOSIS — I10 ESSENTIAL HYPERTENSION: Primary | ICD-10-CM

## 2022-11-08 DIAGNOSIS — E78.00 PURE HYPERCHOLESTEROLEMIA: ICD-10-CM

## 2022-11-08 DIAGNOSIS — I95.9 HYPOTENSION, UNSPECIFIED HYPOTENSION TYPE: ICD-10-CM

## 2022-11-08 PROCEDURE — 3078F DIAST BP <80 MM HG: CPT | Performed by: FAMILY MEDICINE

## 2022-11-08 PROCEDURE — 1123F ACP DISCUSS/DSCN MKR DOCD: CPT | Performed by: FAMILY MEDICINE

## 2022-11-08 PROCEDURE — 3074F SYST BP LT 130 MM HG: CPT | Performed by: FAMILY MEDICINE

## 2022-11-08 PROCEDURE — 99214 OFFICE O/P EST MOD 30 MIN: CPT | Performed by: FAMILY MEDICINE

## 2022-11-08 RX ORDER — ESCITALOPRAM OXALATE 10 MG/1
TABLET ORAL
COMMUNITY
Start: 2022-10-21

## 2022-11-08 RX ORDER — LACTULOSE 10 G/15ML
SOLUTION ORAL
COMMUNITY
Start: 2022-09-19

## 2022-11-08 RX ORDER — HYDROCODONE BITARTRATE AND ACETAMINOPHEN 5; 325 MG/1; MG/1
TABLET ORAL
COMMUNITY
Start: 2022-10-18

## 2022-11-08 RX ORDER — ONDANSETRON 8 MG/1
TABLET, ORALLY DISINTEGRATING ORAL
COMMUNITY
Start: 2022-11-01

## 2022-11-08 RX ORDER — LIDOCAINE HYDROCHLORIDE 20 MG/ML
SOLUTION OROPHARYNGEAL
COMMUNITY
Start: 2022-10-31

## 2022-11-08 ASSESSMENT — PATIENT HEALTH QUESTIONNAIRE - PHQ9
SUM OF ALL RESPONSES TO PHQ QUESTIONS 1-9: 2
SUM OF ALL RESPONSES TO PHQ9 QUESTIONS 1 & 2: 2
SUM OF ALL RESPONSES TO PHQ QUESTIONS 1-9: 2
2. FEELING DOWN, DEPRESSED OR HOPELESS: 1
1. LITTLE INTEREST OR PLEASURE IN DOING THINGS: 1
SUM OF ALL RESPONSES TO PHQ QUESTIONS 1-9: 2
SUM OF ALL RESPONSES TO PHQ QUESTIONS 1-9: 2

## 2022-11-08 ASSESSMENT — ENCOUNTER SYMPTOMS: VOMITING: 0

## 2022-11-08 NOTE — PROGRESS NOTES
Subjective:      Patient ID: Brody Damon is a 79 y.o. male    Other  This is a new problem. Pertinent negatives include no chills, fever, vomiting or weakness. Hypertension  This is a chronic problem. Here in follow up from recent blood work and htn and lipids and hypothyroidism and blood work. Had chemo recently for recent detection of oral cancer. Has feeding tube in place. Being coordinated thru  [facility]. Ativan being given by oncology for now. 30 pounds weight loss since last visit in summer     Review of Systems   Constitutional:  Positive for appetite change. Negative for chills and fever. Gastrointestinal:  Negative for vomiting. Neurological:  Negative for weakness.    Reviewed allergy, medical, social, surgical, family and med list changes and updated   Files--reviewed blood with mild anemia      Social History     Socioeconomic History    Marital status:    Tobacco Use    Smoking status: Former     Packs/day: 1.50     Years: 40.00     Pack years: 60.00     Types: Cigarettes     Quit date: 11/16/2018     Years since quitting: 3.9    Smokeless tobacco: Never   Vaping Use    Vaping Use: Never used   Substance and Sexual Activity    Alcohol use: Yes     Comment: very rarely (1 beer a year)    Drug use: No     Social Determinants of Health     Financial Resource Strain: Low Risk     Difficulty of Paying Living Expenses: Not hard at all   Food Insecurity: No Food Insecurity    Worried About Running Out of Food in the Last Year: Never true    Ran Out of Food in the Last Year: Never true     Current Outpatient Medications   Medication Sig Dispense Refill    escitalopram (LEXAPRO) 10 MG tablet       HYDROcodone-acetaminophen (NORCO) 5-325 MG per tablet       lactulose (CHRONULAC) 10 GM/15ML solution       lidocaine viscous hcl (XYLOCAINE) 2 % SOLN solution Take by mouth      ondansetron (ZOFRAN-ODT) 8 MG TBDP disintegrating tablet       atorvastatin (LIPITOR) 80 MG tablet TAKE ONE TABLET BY MOUTH EVERY DAY 90 tablet 1    nitroGLYCERIN (NITROSTAT) 0.3 MG SL tablet Place 0.3 mg under the tongue every 5 minutes as needed for Chest pain up to max of 3 total doses. If no relief after 1 dose, call 911. vitamin B-12 (CYANOCOBALAMIN) 100 MCG tablet Take 50 mcg by mouth in the morning. vitamin D3 (CHOLECALCIFEROL) 10 MCG (400 UNIT) TABS tablet Take 400 Units by mouth in the morning. zinc gluconate 50 MG tablet Take 50 mg by mouth in the morning. NONFORMULARY Elderberry gummies      NONFORMULARY probiotic      MAGNESIUM PO Take by mouth      levothyroxine (SYNTHROID) 75 MCG tablet TAKE ONE TABLET BY MOUTH EVERY DAY 90 tablet 1    omeprazole (PRILOSEC) 20 MG delayed release capsule Take 1 capsule by mouth Daily 90 capsule 3    sildenafil (VIAGRA) 100 MG tablet Take 1 tablet by mouth daily as needed for Erectile Dysfunction 10 tablet 5    carvedilol (COREG) 6.25 MG tablet TAKE ONE TABLET BY MOUTH TWICE A  tablet 3    tiotropium (SPIRIVA RESPIMAT) 2.5 MCG/ACT AERS inhaler Inhale 2 puffs into the lungs daily 1 each 5    aspirin 81 MG EC tablet Take 81 mg by mouth daily      LORazepam (ATIVAN) 1 MG tablet at bedtime. omeprazole (PRILOSEC) 20 MG delayed release capsule Take 1 capsule by mouth Daily 30 capsule 2    Potassium 99 MG TABS Take by mouth OTC potassium      Glucosamine-Chondroitin--300-250 MG TABS Take 1 tablet by mouth Daily      acetaminophen (TYLENOL) 500 MG tablet Take 1,000 mg by mouth daily At night      Naproxen Sodium 220 MG CAPS Take 1 capsule by mouth daily      vitamin C (ASCORBIC ACID) 500 MG tablet Take 500 mg by mouth daily       No current facility-administered medications for this visit.      Family History   Problem Relation Age of Onset    Other Mother     Alzheimer's Disease Mother     Cancer Father         prostate    Other Father     Cancer Sister         lung ca    Cancer Brother         lung ca    Lung Cancer Brother     Cancer Brother         prostate ca    No Known Problems Son     No Known Problems Daughter     Colon Cancer Neg Hx      Past Medical History:   Diagnosis Date    CAD (coronary artery disease)     Cancer ([facility] [address]     prostate cancer Nov 16 2018    CHF (congestive heart failure) (HCC)     Chicken pox     Chronic back pain     Emphysema of lung (HCC)     Hyperlipidemia     Hypertension     Hypothyroidism     Measles     Mumps     Osteoarthritis     Pneumonia      Objective:   BP 80/60   Pulse 68   Temp 97 °F (36.1 °C)   Resp 16   Ht 5' 7.5\" (1.715 m)   SpO2 97%   BMI 32.56 kg/m²     Physical Exam  Neck:no carotid bruits. No masses. No adenopathy. No thyroid asymmetry. Lungs:clear and equal breath sounds. No wheezes or rales. Heart:rate reg. No murmur. No gallops   Pulses:Radials 2+ equal               Poster tib 1+ equal  Extremities:no edema in either leg  Gen: In no acute distress  Abdomen; B.S present. Soft  Non tender. No hepatosplenomegaly. No masses    Assessment:       Diagnosis Orders   1. Essential hypertension        2. Pure hypercholesterolemia        3. Other specified hypothyroidism        4. Hypotension, unspecified hypotension type        5.  Oral cancer ([facility] [address]                 Plan:      Continue current meds except for cut coreg to once daily x 3 days then stop  F/u in 2 weeks

## 2022-11-08 NOTE — TELEPHONE ENCOUNTER
Patient just finished chemo and radiation-he has lost 38lbs-Dr. Cachorro Ravi wants him to stop Carvedilol-is this okay?  BP 80/60 (running about this every day)-please call and advise

## 2022-11-25 ENCOUNTER — OFFICE VISIT (OUTPATIENT)
Dept: FAMILY MEDICINE CLINIC | Age: 67
End: 2022-11-25
Payer: COMMERCIAL

## 2022-11-25 VITALS
BODY MASS INDEX: 27.43 KG/M2 | WEIGHT: 181 LBS | HEIGHT: 68 IN | OXYGEN SATURATION: 95 % | HEART RATE: 76 BPM | SYSTOLIC BLOOD PRESSURE: 100 MMHG | DIASTOLIC BLOOD PRESSURE: 70 MMHG | TEMPERATURE: 97.8 F | RESPIRATION RATE: 16 BRPM

## 2022-11-25 DIAGNOSIS — U07.1 COVID-19: ICD-10-CM

## 2022-11-25 DIAGNOSIS — I10 ESSENTIAL HYPERTENSION: Primary | ICD-10-CM

## 2022-11-25 LAB
INFLUENZA A ANTIBODY: NORMAL
INFLUENZA B ANTIBODY: NORMAL
Lab: ABNORMAL
PERFORMING INSTRUMENT: ABNORMAL
QC PASS/FAIL: ABNORMAL
SARS-COV-2, POC: DETECTED

## 2022-11-25 PROCEDURE — 3078F DIAST BP <80 MM HG: CPT | Performed by: FAMILY MEDICINE

## 2022-11-25 PROCEDURE — 1123F ACP DISCUSS/DSCN MKR DOCD: CPT | Performed by: FAMILY MEDICINE

## 2022-11-25 PROCEDURE — 3074F SYST BP LT 130 MM HG: CPT | Performed by: FAMILY MEDICINE

## 2022-11-25 PROCEDURE — 87804 INFLUENZA ASSAY W/OPTIC: CPT | Performed by: FAMILY MEDICINE

## 2022-11-25 PROCEDURE — 99213 OFFICE O/P EST LOW 20 MIN: CPT | Performed by: FAMILY MEDICINE

## 2022-11-25 PROCEDURE — 87426 SARSCOV CORONAVIRUS AG IA: CPT | Performed by: FAMILY MEDICINE

## 2022-11-25 ASSESSMENT — ENCOUNTER SYMPTOMS: SHORTNESS OF BREATH: 0

## 2022-11-25 NOTE — PROGRESS NOTES
Subjective:      Patient ID: Maximo Harp is a 79 y.o. male    HPI  Here in follow up for htn. Still taking coreg -1 qod for last few days. Has come off coreg well. Drinking better since last time. Developed slight nasal congestion 2 days ago. No changes with sore throat. Occasional cough. No emesis today. Had fever 102 during this time. Not currently on chemo. Covid vaccinated but no omicron booster. No flu vaccine    Review of Systems   HENT:  Positive for congestion. Respiratory:  Negative for shortness of breath. Cardiovascular:  Negative for chest pain. Skin:  Negative for rash. Neurological:  Negative for weakness.    Reviewed allergy, medical, social, surgical, family and med list changes and updated   Files     Social History     Socioeconomic History    Marital status:    Tobacco Use    Smoking status: Former     Packs/day: 1.50     Years: 40.00     Pack years: 60.00     Types: Cigarettes     Quit date: 2018     Years since quittin.0    Smokeless tobacco: Never   Vaping Use    Vaping Use: Never used   Substance and Sexual Activity    Alcohol use: Yes     Comment: very rarely (1 beer a year)    Drug use: No     Social Determinants of Health     Financial Resource Strain: Low Risk     Difficulty of Paying Living Expenses: Not hard at all   Food Insecurity: No Food Insecurity    Worried About [facility] in the Last Year: Never true    Ran Out of Food in the Last Year: Never true     Current Outpatient Medications   Medication Sig Dispense Refill    escitalopram (LEXAPRO) 10 MG tablet       HYDROcodone-acetaminophen (NORCO) 5-325 MG per tablet       lactulose (CHRONULAC) 10 GM/15ML solution       lidocaine viscous hcl (XYLOCAINE) 2 % SOLN solution Take by mouth      ondansetron (ZOFRAN-ODT) 8 MG TBDP disintegrating tablet       atorvastatin (LIPITOR) 80 MG tablet TAKE ONE TABLET BY MOUTH EVERY DAY 90 tablet 1    nitroGLYCERIN (NITROSTAT) 0.3 MG SL tablet Place 0.3 mg under the tongue every 5 minutes as needed for Chest pain up to max of 3 total doses. If no relief after 1 dose, call 911. vitamin B-12 (CYANOCOBALAMIN) 100 MCG tablet Take 50 mcg by mouth in the morning. vitamin D3 (CHOLECALCIFEROL) 10 MCG (400 UNIT) TABS tablet Take 400 Units by mouth in the morning. zinc gluconate 50 MG tablet Take 50 mg by mouth in the morning. NONFORMULARY Elderberry gummies      NONFORMULARY probiotic      MAGNESIUM PO Take by mouth      levothyroxine (SYNTHROID) 75 MCG tablet TAKE ONE TABLET BY MOUTH EVERY DAY 90 tablet 1    omeprazole (PRILOSEC) 20 MG delayed release capsule Take 1 capsule by mouth Daily 90 capsule 3    sildenafil (VIAGRA) 100 MG tablet Take 1 tablet by mouth daily as needed for Erectile Dysfunction 10 tablet 5    carvedilol (COREG) 6.25 MG tablet TAKE ONE TABLET BY MOUTH TWICE A  tablet 3    tiotropium (SPIRIVA RESPIMAT) 2.5 MCG/ACT AERS inhaler Inhale 2 puffs into the lungs daily 1 each 5    aspirin 81 MG EC tablet Take 81 mg by mouth daily      LORazepam (ATIVAN) 1 MG tablet at bedtime. omeprazole (PRILOSEC) 20 MG delayed release capsule Take 1 capsule by mouth Daily 30 capsule 2    Potassium 99 MG TABS Take by mouth OTC potassium      Glucosamine-Chondroitin--300-250 MG TABS Take 1 tablet by mouth Daily      acetaminophen (TYLENOL) 500 MG tablet Take 1,000 mg by mouth daily At night      Naproxen Sodium 220 MG CAPS Take 1 capsule by mouth daily      vitamin C (ASCORBIC ACID) 500 MG tablet Take 500 mg by mouth daily       No current facility-administered medications for this visit.      Family History   Problem Relation Age of Onset    Other Mother     Alzheimer's Disease Mother     Cancer Father         prostate    Other Father     Cancer Sister         lung ca    Cancer Brother         lung ca    Lung Cancer Brother     Cancer Brother         prostate ca    No Known Problems Son     No Known Problems Daughter     Colon Cancer Neg Hx      Past Medical History:   Diagnosis Date    CAD (coronary artery disease)     Cancer ([facility] [address]     prostate cancer Nov 16 2018    CHF (congestive heart failure) (HCC)     Chicken pox     Chronic back pain     Emphysema of lung ([facility] [address]     Hyperlipidemia     Hypertension     Hypothyroidism     Measles     Mumps     Osteoarthritis     Pneumonia      Objective:   Resp 16   Ht 5' 7.5\" (1.715 m)   BMI 32.56 kg/m²     Physical Exam  Heent: T.M normal bilat no pharyngeal injection. No exudate                Nares patent but swollen mucosa noted  Neck: Minimal anter cervical adenopathy. No masses. No thyroid asymmetry. Lungs: Clear equal breath sounds. No wheezes or rales. Heart: Rate reg No murmur  Gen: In no acute distress   Abdomen; B.S present. Soft. Non tender. No hepatosplenomegaly. No masses. Peg in place mid abdomen   Assessment:       Diagnosis Orders   1. Essential hypertension        2. COVID-19                Plan:    Off coreg   Orders Placed This Encounter   Medications    nirmatrelvir/ritonavir (PAXLOVID) 20 x 150 MG & 10 x 100MG TBPK     Sig: Take 3 tablets (two 150 mg nirmatrelvir and one 100 mg ritonavir tablets) by mouth every 12 hours for 5 days. Dispense:  30 tablet     Refill:  0     Order Specific Question:   Does this patient qualify for COVID-19 antIviral therapy based on criteria for treatment?      Answer:   Yes    F/u in 2 weeks or so-sooner if needed

## 2022-11-25 NOTE — PROGRESS NOTES
Subjective:      Patient ID: Malika Wiggins is a 79 y.o. male. HPI    Review of Systems  Treatment Adherence:   Medication compliance:  {Desc; compliance:5303::\"compliant most of the time\"}  Diet compliance:  {Desc; compliance:5303::\"compliant most of the time\"}  Weight trend: {INCREASING/DECREASING/STABLE:42528}  Current exercise: {EXERCISE VUYS:821740382}  Barriers: {Barriers to success:94790}    Hypertension:  Home blood pressure monitoring: {NO/YES:0773825198}. He {is/is not:9024} adherent to a low sodium diet. Patient {denies/complains:32639} {Symptoms of Hypertension, Denies:31544}. Antihypertensive medication side effects: {Hypertension med side effects:5728::\"no medication side effects noted\"}. Use of agents associated with hypertension: {bp agents assoc with hypertension:511::\"none\"}. Sodium (mEq/L)   Date Value   11/02/2022 136    BUN (mg/dL)   Date Value   11/02/2022 19    Glucose (mg/dL)   Date Value   11/02/2022 101 (H)   09/12/2022 120 (H)      Potassium (mEq/L)   Date Value   11/02/2022 4.8    Creatinine (mg/dL)   Date Value   11/02/2022 0.86         Hyperlipidemia:  No new myalgias or GI upset on {RP HYPERLIPIDEMIA MEDS:63893}.      Lab Results   Component Value Date    CHOL 132 11/02/2022    TRIG 142 11/02/2022    HDL 37 (L) 11/02/2022    LDLCALC 67 11/02/2022     Lab Results   Component Value Date    ALT 29 11/02/2022    AST 21 11/02/2022          Objective:   Physical Exam    Assessment / Plan:

## 2022-12-09 ENCOUNTER — OFFICE VISIT (OUTPATIENT)
Dept: FAMILY MEDICINE CLINIC | Age: 67
End: 2022-12-09
Payer: COMMERCIAL

## 2022-12-09 VITALS
WEIGHT: 180 LBS | HEIGHT: 68 IN | BODY MASS INDEX: 27.28 KG/M2 | TEMPERATURE: 97.5 F | HEART RATE: 76 BPM | RESPIRATION RATE: 16 BRPM | OXYGEN SATURATION: 98 % | SYSTOLIC BLOOD PRESSURE: 120 MMHG | DIASTOLIC BLOOD PRESSURE: 70 MMHG

## 2022-12-09 DIAGNOSIS — I10 ESSENTIAL HYPERTENSION: Primary | ICD-10-CM

## 2022-12-09 DIAGNOSIS — E03.9 HYPOTHYROIDISM, UNSPECIFIED TYPE: ICD-10-CM

## 2022-12-09 LAB — TSH SERPL DL<=0.05 MIU/L-ACNC: 1.06 UIU/ML (ref 0.44–3.86)

## 2022-12-09 PROCEDURE — 3074F SYST BP LT 130 MM HG: CPT | Performed by: FAMILY MEDICINE

## 2022-12-09 PROCEDURE — 99213 OFFICE O/P EST LOW 20 MIN: CPT | Performed by: FAMILY MEDICINE

## 2022-12-09 PROCEDURE — 3078F DIAST BP <80 MM HG: CPT | Performed by: FAMILY MEDICINE

## 2022-12-09 PROCEDURE — 1123F ACP DISCUSS/DSCN MKR DOCD: CPT | Performed by: FAMILY MEDICINE

## 2022-12-09 ASSESSMENT — ENCOUNTER SYMPTOMS: ABDOMINAL PAIN: 0

## 2022-12-09 NOTE — PROGRESS NOTES
Subjective:      Patient ID: Jamel Higginbotham is a 79 y.o. male    HPI  Here in follow up from covid and hypotension. Off coreg for few weeks since last time. Feeling overall better. Does see cardiology later this month  Did well from covid standpoint. Review of Systems   Constitutional:  Negative for chills and fever. Gastrointestinal:  Negative for abdominal pain. Neurological:  Negative for weakness. Reviewed allergy, medical, social, surgical, family and med list changes and updated   Files     Social History     Socioeconomic History    Marital status:    Tobacco Use    Smoking status: Former     Packs/day: 1.50     Years: 40.00     Pack years: 60.00     Types: Cigarettes     Quit date: 2018     Years since quittin.0    Smokeless tobacco: Never   Vaping Use    Vaping Use: Never used   Substance and Sexual Activity    Alcohol use: Yes     Comment: very rarely (1 beer a year)    Drug use: No     Social Determinants of Health     Financial Resource Strain: Low Risk     Difficulty of Paying Living Expenses: Not hard at all   Food Insecurity: No Food Insecurity    Worried About [facility] in the Last Year: Never true    Ran Out of Food in the Last Year: Never true     Current Outpatient Medications   Medication Sig Dispense Refill    escitalopram (LEXAPRO) 10 MG tablet       HYDROcodone-acetaminophen (NORCO) 5-325 MG per tablet       lactulose (CHRONULAC) 10 GM/15ML solution       lidocaine viscous hcl (XYLOCAINE) 2 % SOLN solution Take by mouth      ondansetron (ZOFRAN-ODT) 8 MG TBDP disintegrating tablet       atorvastatin (LIPITOR) 80 MG tablet TAKE ONE TABLET BY MOUTH EVERY DAY 90 tablet 1    nitroGLYCERIN (NITROSTAT) 0.3 MG SL tablet Place 0.3 mg under the tongue every 5 minutes as needed for Chest pain up to max of 3 total doses. If no relief after 1 dose, call 911. vitamin B-12 (CYANOCOBALAMIN) 100 MCG tablet Take 50 mcg by mouth in the morning.       vitamin D3 ([facility] [address]     Hyperlipidemia     Hypertension     Hypothyroidism     Measles     Mumps     Osteoarthritis     Pneumonia      Objective:   /70   Pulse 76   Temp 97.5 °F (36.4 °C)   Resp 16   Ht 5' 7.5\" (1.715 m)   Wt 180 lb (81.6 kg)   SpO2 98%   BMI 27.78 kg/m²     Physical Exam  .    Lungs:clear and equal breath sounds. No wheezes or rales. Heart:rate reg. No murmur. No gallops     Extremities:no edema in either leg  Gen: In no acute distress    Assessment:       Diagnosis Orders   1. Essential hypertension        2.  Hypothyroidism, unspecified type  TSH             Plan:    Continue to remain off coreg until seen by cardiology later this month  Orders Placed This Encounter   Procedures    TSH     Standing Status:   Future     Standing Expiration Date:   6/9/2023    If tsh stable-f/u in may

## 2022-12-19 ENCOUNTER — OFFICE VISIT (OUTPATIENT)
Dept: UROLOGY | Age: 67
End: 2022-12-19
Payer: COMMERCIAL

## 2022-12-19 VITALS
HEART RATE: 59 BPM | HEIGHT: 68 IN | WEIGHT: 180 LBS | DIASTOLIC BLOOD PRESSURE: 78 MMHG | SYSTOLIC BLOOD PRESSURE: 118 MMHG | BODY MASS INDEX: 27.28 KG/M2

## 2022-12-19 DIAGNOSIS — C61 PROSTATE CANCER (HCC): Primary | ICD-10-CM

## 2022-12-19 PROCEDURE — 3074F SYST BP LT 130 MM HG: CPT | Performed by: UROLOGY

## 2022-12-19 PROCEDURE — 99214 OFFICE O/P EST MOD 30 MIN: CPT | Performed by: UROLOGY

## 2022-12-19 PROCEDURE — 1123F ACP DISCUSS/DSCN MKR DOCD: CPT | Performed by: UROLOGY

## 2022-12-19 PROCEDURE — 3078F DIAST BP <80 MM HG: CPT | Performed by: UROLOGY

## 2022-12-19 ASSESSMENT — ENCOUNTER SYMPTOMS
ABDOMINAL DISTENTION: 0
ABDOMINAL PAIN: 0

## 2022-12-19 NOTE — PROGRESS NOTES
Subjective:      Patient ID: Ana Enamorado is a 79 y.o. male    HPI  This is a 78 yo male with h/o DDD, Anxiety, CAD on Imdur, Kidney stones and s/p RALP on 11/16/18 with positive margin back in follow-up. Since last seen on 6/1/22, he has no new  complaints. He still has mild and stable ABDIAS. He did see Dr Citlalli Carson and he recommended IMRT with 6 mo of ADT once his ABDIAS resolves but the patient and wife have been reluctant to comply with this option given stable undetectable PSA's and due to his other medical concerns. I reviewed the interval PSA today. he was found to have an oral cancer and has received radiation and chemotherapy for this by Dr Citlalli Carson and is to have a PET scan soon. He has a G-tube for feeding supplementation. He no longer takes Imdur and was given Viagra by his Cardiologist. I reviewed the interval PSA. PATH: Aaliyah 4 + 3 = 7 adenocarcinoma of prostate, [address] neg, EPE neg, Margin pos (Rt anterior), SV neg. [id number]. Past Medical History:   Diagnosis Date    CAD (coronary artery disease)     Cancer ([facility] [address]     prostate cancer Nov 16 2018    CHF (congestive heart failure) (HCC)     Chicken pox     Chronic back pain     Emphysema of lung ([facility] [address]     Hyperlipidemia     Hypertension     Hypothyroidism     Measles     Mumps     Osteoarthritis     Pneumonia      Past Surgical History:   Procedure Laterality Date    CARDIAC CATHETERIZATION      2019    COLONOSCOPY      2016    CYST REMOVAL      back of neck    CYST REMOVAL Left 03/04/2016    Dr Michelle Albert  07/2018    DENTAL SURGERY      HERNIA REPAIR      2016, 2019    LARYNGOSCOPY N/A 8/16/2022    DIRECT LARYNGOSCOPY WITH BIOPSY.  30 MIN  TO BE LATEX FREE performed by Analisa Garces MD at [facility].  07/2018    Biopsy    PROSTATECTOMY  11/16/2018    TONSILLECTOMY N/A 08/12/2021    TONSILLECTOMY performed by Woo Santiago MD at [address] [place] [address]  03/04/2016    Dr Magaly Curiel 2021    EGD ESOPHAGOGASTRODUODENOSCOPY WITH BIOPSY AND DILATION performed by Lorene Vallejo MD at [address] [facility]. N/A 2019    REPAIR OF RECURRENT VENTRAL HERNIA performed by Gloria Joseph MD at [address] [facility] History     Socioeconomic History    Marital status:      Spouse name: None    Number of children: None    Years of education: None    Highest education level: None   Tobacco Use    Smoking status: Former     Packs/day: 1.50     Years: 40.00     Pack years: 60.00     Types: Cigarettes     Quit date: 2018     Years since quittin.0    Smokeless tobacco: Never   Vaping Use    Vaping Use: Never used   Substance and Sexual Activity    Alcohol use: Yes     Comment: very rarely (1 beer a year)    Drug use: No     Social Determinants of Health     Financial Resource Strain: Low Risk     Difficulty of Paying Living Expenses: Not hard at all   Food Insecurity: No Food Insecurity    Worried About Running Out of Food in the Last Year: Never true    Ran Out of Food in the Last Year: Never true     Family History   Problem Relation Age of Onset    Other Mother     Alzheimer's Disease Mother     Cancer Father         prostate    Other Father     Cancer Sister         lung ca    Cancer Brother         lung ca    Lung Cancer Brother     Cancer Brother         prostate ca    No Known Problems Son     No Known Problems Daughter     Colon Cancer Neg Hx      Current Outpatient Medications   Medication Sig Dispense Refill    diclofenac sodium (VOLTAREN) 1 % GEL Apply topically daily as needed for Pain      escitalopram (LEXAPRO) 10 MG tablet       HYDROcodone-acetaminophen (NORCO) 5-325 MG per tablet       lactulose (CHRONULAC) 10 GM/15ML solution       ondansetron (ZOFRAN-ODT) 8 MG TBDP disintegrating tablet       atorvastatin (LIPITOR) 80 MG tablet TAKE ONE TABLET BY MOUTH EVERY DAY 90 tablet 1    nitroGLYCERIN (NITROSTAT) 0.3 MG SL tablet Place 0.3 mg under the tongue every 5 minutes as needed for Chest pain up to max of 3 total doses. If no relief after 1 dose, call 911. vitamin B-12 (CYANOCOBALAMIN) 100 MCG tablet Take 50 mcg by mouth in the morning. vitamin D3 (CHOLECALCIFEROL) 10 MCG (400 UNIT) TABS tablet Take 400 Units by mouth in the morning. zinc gluconate 50 MG tablet Take 50 mg by mouth in the morning. NONFORMULARY Elderberry gummies      NONFORMULARY probiotic      MAGNESIUM PO Take by mouth      levothyroxine (SYNTHROID) 75 MCG tablet TAKE ONE TABLET BY MOUTH EVERY DAY 90 tablet 1    omeprazole (PRILOSEC) 20 MG delayed release capsule Take 1 capsule by mouth Daily 90 capsule 3    sildenafil (VIAGRA) 100 MG tablet Take 1 tablet by mouth daily as needed for Erectile Dysfunction 10 tablet 5    carvedilol (COREG) 6.25 MG tablet TAKE ONE TABLET BY MOUTH TWICE A  tablet 3    tiotropium (SPIRIVA RESPIMAT) 2.5 MCG/ACT AERS inhaler Inhale 2 puffs into the lungs daily 1 each 5    aspirin 81 MG EC tablet Take 81 mg by mouth daily      LORazepam (ATIVAN) 1 MG tablet at bedtime. omeprazole (PRILOSEC) 20 MG delayed release capsule Take 1 capsule by mouth Daily 30 capsule 2    Potassium 99 MG TABS Take by mouth OTC potassium      Glucosamine-Chondroitin--300-250 MG TABS Take 1 tablet by mouth Daily      acetaminophen (TYLENOL) 500 MG tablet Take 1,000 mg by mouth daily At night      Naproxen Sodium 220 MG CAPS Take 1 capsule by mouth daily      vitamin C (ASCORBIC ACID) 500 MG tablet Take 500 mg by mouth daily       No current facility-administered medications for this visit. Demerol hcl [meperidine] and Valium [diazepam]  reviewed      Review of Systems   Gastrointestinal:  Negative for abdominal distention and abdominal pain. Genitourinary:  Negative for dysuria, flank pain and hematuria. Objective:   Physical Exam  Abdominal:      General: There is no distension. Palpations: Abdomen is soft.    Neurological: Mental Status: He is alert. Psychiatric:         Mood and Affect: Mood normal.          PSA   Date Value Ref Range Status   11/02/2022 0.02 0.00 - 4.00 ng/mL Final   05/06/2022 <0.01 0.00 - 4.00 ng/mL Final   09/21/2021 0.01 0.00 - 4.00 ng/mL Corrected     Comment:     When the Total PSA is between 3.00 and 10.00 ng/mL, consider  requesting a Free PSA to aid in diagnosis. Corrected result; previously reported as <0.01 on 09/22/2021 at 01:32 by Guillermo Pimentel     05/17/2021 <0.01 0.00 - 5.40 ng/mL Final   01/19/2021 <0.01 0.00 - 5.40 ng/mL Final       Assessment: This is a 80 yo male with h/o DDD, Anxiety, Kidney stones and with Aaliyah 7 prostate cancer s/p RALP in 11/18 with a positive margin and the interval PSA has risen slightly again. I discussed continued closer PSA follow-up given his current oral cancer and treatment for this. If the PSA continues to rise he will discuss the management with Dr Brook Hernandez in future. If the PSA rises he will then consider IMRT and 6 mo ADT as was suggested by prior consult with Radiation Oncology at [facility].       Plan:      F/U 6 mo for PSA        Lily Baeza MD

## 2022-12-22 ENCOUNTER — OFFICE VISIT (OUTPATIENT)
Dept: CARDIOLOGY CLINIC | Age: 67
End: 2022-12-22
Payer: COMMERCIAL

## 2022-12-22 VITALS
SYSTOLIC BLOOD PRESSURE: 122 MMHG | OXYGEN SATURATION: 98 % | WEIGHT: 177.8 LBS | HEART RATE: 70 BPM | BODY MASS INDEX: 27.44 KG/M2 | DIASTOLIC BLOOD PRESSURE: 76 MMHG

## 2022-12-22 DIAGNOSIS — I50.33 ACUTE ON CHRONIC DIASTOLIC HEART FAILURE (HCC): ICD-10-CM

## 2022-12-22 DIAGNOSIS — R06.09 DOE (DYSPNEA ON EXERTION): ICD-10-CM

## 2022-12-22 DIAGNOSIS — I25.10 CORONARY ARTERY DISEASE INVOLVING NATIVE CORONARY ARTERY OF NATIVE HEART WITHOUT ANGINA PECTORIS: ICD-10-CM

## 2022-12-22 DIAGNOSIS — I10 ESSENTIAL HYPERTENSION: ICD-10-CM

## 2022-12-22 DIAGNOSIS — R09.89 BILATERAL CAROTID BRUITS: Primary | ICD-10-CM

## 2022-12-22 DIAGNOSIS — E78.5 DYSLIPIDEMIA: ICD-10-CM

## 2022-12-22 PROCEDURE — 99214 OFFICE O/P EST MOD 30 MIN: CPT | Performed by: INTERNAL MEDICINE

## 2022-12-22 PROCEDURE — 1123F ACP DISCUSS/DSCN MKR DOCD: CPT | Performed by: INTERNAL MEDICINE

## 2022-12-22 PROCEDURE — 3074F SYST BP LT 130 MM HG: CPT | Performed by: INTERNAL MEDICINE

## 2022-12-22 PROCEDURE — 3078F DIAST BP <80 MM HG: CPT | Performed by: INTERNAL MEDICINE

## 2022-12-22 ASSESSMENT — ENCOUNTER SYMPTOMS
BACK PAIN: 1
STRIDOR: 0
EYES NEGATIVE: 1
COUGH: 0
NAUSEA: 0
WHEEZING: 0
BLOOD IN STOOL: 0
GASTROINTESTINAL NEGATIVE: 1
CHEST TIGHTNESS: 0
SHORTNESS OF BREATH: 1

## 2022-12-22 NOTE — PROGRESS NOTES
Office Visit. Patient: Zaheer Johnson  YOB: 1955  MRN: [id number]    Chief Complaint: RAMOS LE edema Orthopnea  Chief Complaint   Patient presents with    Follow-up     4 month    Hypertension       CV Data:  7/2019 echo EF 60  7/2019 Dobut stress echo negative   LAD 40-50% sequential, RCA  mid. LVEF 55, EDP 14, PCWP 14 CO 7.2 L/min  10/2019 CUS- mild  10/2010 Abd US negative. 11/2020 CUS mild     Subjective/HPI RAMOS is getting worse. Can't do much due to knee and back pain. Can't lie flat due to sob    10/16/2019: still sob but better since BB and Imdur. No cp. No bleed no falls. 1/17/2020 no cp occ ramos no falls occ nose bleed no falls     6/19/2020 Patient and/or health care decision maker is aware that that he/she may receive a bill for this telephone service, depending on his insurance coverage, and has provided verbal consent to proceed. This visit was completed via telephone. Total time 13 minutes. Still has some ramos but better. No cp no falls no bleed little edema. 7/27/2020 still RAMOS. 10/28/2020 no RAMOS No CP active at home. No falls no bleed. 1/28/21 still sob occ cp but not bad. No NTG needed. No bleed no falls     4/29/21 lately occ LE edema no cp no sob no bleed. Takes mes. Recent EGD     8/31/21 No cp no sob no falls no bleed. Lost near 30LBS by deiting. Takes med    4/18/22 doing welll no cp no sob no falls no bleed no edema. 8/22/22 dz with throat and tongue cancer. Getting PE scan. CV stable no pc  no sob no falls nobleed. Takes meds    12/22/22  =finished chemo for tongue and throat cancer. No cp stlll sob no worse. Not eating. Gets tube feed via PEG. Coreg stopped due to so much weight loss. Stopped smoking 11/2018 with prior 2 ppd  Disabled  from knee and back.   Lives wife     EKG: SR 64    Past Medical History:   Diagnosis Date    CAD (coronary artery disease)     Cancer ([facility] [address]     prostate cancer Nov 16 2018    CHF (congestive heart failure) (HCC)     Chicken pox     Chronic back pain     Emphysema of lung ([address]     Hyperlipidemia     Hypertension     Hypothyroidism     Measles     Mumps     Osteoarthritis     Pneumonia        Past Surgical History:   Procedure Laterality Date    CARDIAC CATHETERIZATION          COLONOSCOPY          CYST REMOVAL      back of neck    CYST REMOVAL Left 2016    Dr Toñito Hill  2018    DENTAL SURGERY      HERNIA REPAIR      ,     LARYNGOSCOPY N/A 2022    DIRECT LARYNGOSCOPY WITH BIOPSY.  27 MIN  TO BE LATEX FREE [facility]  2018    Biopsy    PROSTATECTOMY  2018    TONSILLECTOMY N/A 2021    TONSILLECTOMY performed by Alan Skelton MD at [address] [place] [address]  2016    Dr Stella Greene N/A 2021    EGD ESOPHAGOGASTRODUODENOSCOPY WITH BIOPSY AND DILATION performed by Sandee Mays MD at [address] [place] [address]. N/A 2019    REPAIR OF RECURRENT VENTRAL HERNIA performed by Pao Morales MD at [address] [place] [address] History   Problem Relation Age of Onset    Other Mother     Alzheimer's Disease Mother     Cancer Father         prostate    Other Father     Cancer Sister         lung ca    Cancer Brother         lung ca    Lung Cancer Brother     Cancer Brother         prostate ca    No Known Problems Son     No Known Problems Daughter     Colon Cancer Neg Hx        Social History     Socioeconomic History    Marital status:      Spouse name: None    Number of children: None    Years of education: None    Highest education level: None   Tobacco Use    Smoking status: Former     Packs/day: 1.50     Years: 40.00     Pack years: 60.00     Types: Cigarettes     Quit date: 2018     Years since quittin.1    Smokeless tobacco: Never   Vaping Use    Vaping Use: Never used   Substance and Sexual Activity Alcohol use: Yes     Comment: very rarely (1 beer a year)    Drug use: No     Social Determinants of Health     Financial Resource Strain: Low Risk     Difficulty of Paying Living Expenses: Not hard at all   Food Insecurity: No Food Insecurity    Worried About Running Out of Food in the Last Year: Never true    Ran Out of Food in the Last Year: Never true       Allergies   Allergen Reactions    Demerol Hcl [Meperidine] Nausea Only     migraines    Valium [Diazepam] Other (See Comments)     MIGRAINES       Current Outpatient Medications   Medication Sig Dispense Refill    diclofenac sodium (VOLTAREN) 1 % GEL Apply topically daily as needed for Pain      escitalopram (LEXAPRO) 10 MG tablet       HYDROcodone-acetaminophen (NORCO) 5-325 MG per tablet       lactulose (CHRONULAC) 10 GM/15ML solution       ondansetron (ZOFRAN-ODT) 8 MG TBDP disintegrating tablet       atorvastatin (LIPITOR) 80 MG tablet TAKE ONE TABLET BY MOUTH EVERY DAY 90 tablet 1    nitroGLYCERIN (NITROSTAT) 0.3 MG SL tablet Place 0.3 mg under the tongue every 5 minutes as needed for Chest pain up to max of 3 total doses. If no relief after 1 dose, call 911. vitamin B-12 (CYANOCOBALAMIN) 100 MCG tablet Take 50 mcg by mouth in the morning. vitamin D3 (CHOLECALCIFEROL) 10 MCG (400 UNIT) TABS tablet Take 400 Units by mouth in the morning. zinc gluconate 50 MG tablet Take 50 mg by mouth in the morning.       NONFORMULARY Elderberry gummies      NONFORMULARY probiotic      MAGNESIUM PO Take by mouth      levothyroxine (SYNTHROID) 75 MCG tablet TAKE ONE TABLET BY MOUTH EVERY DAY 90 tablet 1    omeprazole (PRILOSEC) 20 MG delayed release capsule Take 1 capsule by mouth Daily 90 capsule 3    sildenafil (VIAGRA) 100 MG tablet Take 1 tablet by mouth daily as needed for Erectile Dysfunction 10 tablet 5    tiotropium (SPIRIVA RESPIMAT) 2.5 MCG/ACT AERS inhaler Inhale 2 puffs into the lungs daily 1 each 5    aspirin 81 MG EC tablet Take 81 mg by mouth daily      LORazepam (ATIVAN) 1 MG tablet at bedtime. omeprazole (PRILOSEC) 20 MG delayed release capsule Take 1 capsule by mouth Daily 30 capsule 2    Potassium 99 MG TABS Take by mouth OTC potassium      Glucosamine-Chondroitin--300-250 MG TABS Take 1 tablet by mouth Daily      acetaminophen (TYLENOL) 500 MG tablet Take 1,000 mg by mouth daily At night      Naproxen Sodium 220 MG CAPS Take 1 capsule by mouth daily      vitamin C (ASCORBIC ACID) 500 MG tablet Take 500 mg by mouth daily       No current facility-administered medications for this visit. Review of Systems:   Review of Systems   Constitutional: Negative. Negative for diaphoresis and fatigue. HENT: Negative. Eyes: Negative. Respiratory:  Positive for shortness of breath. Negative for cough, chest tightness, wheezing and stridor. Cardiovascular:  Positive for leg swelling. Negative for chest pain and palpitations. Gastrointestinal: Negative. Negative for blood in stool and nausea. Genitourinary: Negative. Musculoskeletal:  Positive for arthralgias, back pain and gait problem. Skin: Negative. Neurological:  Negative for dizziness, syncope, weakness and light-headedness. Hematological: Negative. Psychiatric/Behavioral: Negative. Physical Examination:    /76 (Site: Right Upper Arm, Position: Sitting, Cuff Size: Medium Adult)   Pulse 70   Wt 177 lb 12.8 oz (80.6 kg)   SpO2 98%   BMI 27.44 kg/m²    Physical Exam   Constitutional: He appears healthy. No distress. HENT:   Normal cephalic and Atraumatic   Eyes: Pupils are equal, round, and reactive to light. Neck: Thyroid normal. No JVD present. No neck adenopathy. No thyromegaly present. Cardiovascular: Normal rate, regular rhythm, intact distal pulses and normal pulses. Murmur heard. Pulmonary/Chest: Effort normal and breath sounds normal. He has no wheezes. He has no rales. He exhibits no tenderness. Abdominal: Soft.  Bowel sounds are normal. There is no abdominal tenderness. Musculoskeletal:         General: No tenderness or edema. Normal range of motion. Cervical back: Normal range of motion and neck supple. Neurological: He is alert and oriented to person, place, and time. Skin: Skin is warm. No cyanosis. Nails show no clubbing.      LABS:  CBC:   Lab Results   Component Value Date/Time    WBC 6.0 11/02/2022 09:53 AM    RBC 3.85 11/02/2022 09:53 AM    HGB 11.1 11/02/2022 09:53 AM    HCT 33.6 11/02/2022 09:53 AM    MCV 87.1 11/02/2022 09:53 AM    MCH 28.8 11/02/2022 09:53 AM    MCHC 33.0 11/02/2022 09:53 AM    RDW 14.5 11/02/2022 09:53 AM     11/02/2022 09:53 AM     Lipids:  Lab Results   Component Value Date    CHOL 132 11/02/2022    CHOL 115 12/20/2021    CHOL 157 09/21/2021     Lab Results   Component Value Date    TRIG 142 11/02/2022    TRIG 58 12/20/2021    TRIG 151 (H) 09/21/2021     Lab Results   Component Value Date    HDL 37 (L) 11/02/2022    HDL 44 12/20/2021    HDL 48 09/21/2021     Lab Results   Component Value Date    LDLCALC 67 11/02/2022    LDLCALC 59 12/20/2021    LDLCALC 79 09/21/2021     No results found for: LABVLDL, VLDL  No results found for: CHOLHDLRATIO  CMP:    Lab Results   Component Value Date/Time     11/02/2022 09:53 AM    K 4.8 11/02/2022 09:53 AM    CL 97 11/02/2022 09:53 AM    CO2 25 11/02/2022 09:53 AM    BUN 19 11/02/2022 09:53 AM    CREATININE 0.86 11/02/2022 09:53 AM    GFRAA >60.0 12/20/2021 01:05 PM    LABGLOM >60.0 11/02/2022 09:53 AM    GLUCOSE 101 11/02/2022 09:53 AM    GLUCOSE 120 09/12/2022 09:17 AM    PROT 6.9 11/02/2022 09:53 AM    LABALBU 3.2 11/02/2022 09:53 AM    LABALBU 3.8 09/12/2022 09:17 AM    CALCIUM 9.4 11/02/2022 09:53 AM    BILITOT 0.3 11/02/2022 09:53 AM    ALKPHOS 97 11/02/2022 09:53 AM    AST 21 11/02/2022 09:53 AM    ALT 29 11/02/2022 09:53 AM     BMP:    Lab Results   Component Value Date/Time     11/02/2022 09:53 AM    K 4.8 11/02/2022 09:53 AM    CL 97 11/02/2022 09:53 AM    CO2 25 11/02/2022 09:53 AM    BUN 19 11/02/2022 09:53 AM    LABALBU 3.2 11/02/2022 09:53 AM    LABALBU 3.8 09/12/2022 09:17 AM    CREATININE 0.86 11/02/2022 09:53 AM    CALCIUM 9.4 11/02/2022 09:53 AM    GFRAA >60.0 12/20/2021 01:05 PM    LABGLOM >60.0 11/02/2022 09:53 AM    GLUCOSE 101 11/02/2022 09:53 AM    GLUCOSE 120 09/12/2022 09:17 AM     Magnesium:  No results found for: MG  TSH:  Lab Results   Component Value Date    TSH 1.060 12/09/2022             Patient Active Problem List   Diagnosis    Lumbosacral spondylosis without myelopathy    Umbilical hernia without obstruction and without gangrene    Diastasis recti    Other bursal cyst of elbow    Prostate cancer ([facility] [address]    Recurrent ventral hernia    Dyslipidemia    Essential hypertension    Acute on chronic diastolic heart failure (HCC)    Shortness of breath    RAMOS (dyspnea on exertion)    Coronary artery disease involving native coronary artery of native heart without angina pectoris    Chronic obstructive pulmonary disease (HCC)    Bilateral carotid bruits    Esophageal dysphagia    Esophageal stricture    Esophagitis    Chronic gastritis without bleeding    Sedative, hypnotic or anxiolytic use, unspecified with unspecified sedative, hypnotic or anxiolytic-induced disorder    Sedative, hypnotic or anxiolytic dependence with unspecified sedative, hypnotic or anxiolytic-induced disorder    Sedative, hypnotic or anxiolytic dependence, uncomplicated    Tonsillitis, chronic    Abscess of tonsil    Acquired deviated nasal septum    Angina pectoris, unspecified    Oropharyngeal mass, right base of tongue    Lymphadenopathy       Medications Discontinued During This Encounter   Medication Reason    carvedilol (COREG) 6.25 MG tablet        Modified Medications    No medications on file       No orders of the defined types were placed in this encounter. Assessment/Plan:    1. Dyslipidemia   LDL Elevated- on Atorva. Repeat labs reviewed. Much better   2. Essential hypertension  Stable - continue meds. Low salt diet    3. Shortness of breath - stable     4. Acute on chronic diastolic heart failure (HCC) - stable     5. Carotid Bruits- CUS - will need conontued surveillance. 6. Lung Nodule- f/u CT ordered. F/u Dr. Hitesh Thayer.     7. ED- wants to try Viagra- no angina. BP on low side. Advance Viagra to 100 mg PRN. No nitrates. 8. Throat and Tongue - PET scan and f/u for further staging and Rx. Counseling:  Heart Healthy Lifestyle, Improve BMI, Low Salt Diet, Volume Restriction 1500cc per day, Take Precautions to Prevent Falls and Walk Daily    Return in about 4 months (around 4/22/2023).     Electronically signed by Ada Mercado MD on 12/22/2022 at 12:30 PM

## 2023-01-20 ENCOUNTER — OFFICE VISIT (OUTPATIENT)
Dept: GASTROENTEROLOGY | Age: 68
End: 2023-01-20

## 2023-01-20 VITALS — HEART RATE: 76 BPM | HEIGHT: 68 IN | WEIGHT: 172 LBS | OXYGEN SATURATION: 98 % | BODY MASS INDEX: 26.07 KG/M2

## 2023-01-20 DIAGNOSIS — K20.90 ESOPHAGITIS: Primary | ICD-10-CM

## 2023-01-20 ASSESSMENT — ENCOUNTER SYMPTOMS
CONSTIPATION: 0
EYES NEGATIVE: 1
DIARRHEA: 0
RESPIRATORY NEGATIVE: 1
NAUSEA: 0
ANAL BLEEDING: 0
GASTROINTESTINAL NEGATIVE: 1
VOMITING: 0
ABDOMINAL PAIN: 0
RECTAL PAIN: 0
ABDOMINAL DISTENTION: 0
BLOOD IN STOOL: 0

## 2023-01-20 NOTE — PROGRESS NOTES
Gastroenterology Clinic Follow up Visit    Fazal Skelton  [id number]  Chief Complaint   Patient presents with    Follow-up     Patient is here today for a follow up visit. Patient states that he is feeling ok, though he has had some medical issues since his last visit. HPI and A/P at last visit summarized below:  Patient is here for follow-up. Patient had EGD and dilation for esophageal stricture in April 2021, patient was diagnosed to have throat cancer last year and has been getting chemo and radiation, patient has a G-tube for nourishment. Patient can swallow only liquids and pills. Patient is on omeprazole 20 g once a day. Patient is being followed at [facility] for his cancer care    Review of Systems   Constitutional: Negative. HENT: Negative. Eyes: Negative. Respiratory: Negative. Gastrointestinal: Negative. Negative for abdominal distention, abdominal pain, anal bleeding, blood in stool, constipation, diarrhea, nausea, rectal pain and vomiting. Solid food dysphagia   Genitourinary: Negative. Musculoskeletal: Negative. Neurological: Negative. Psychiatric/Behavioral: Negative. Past medical history, past surgical history, medication list, social and familyhistory reviewed    Pulse 76, height 5' 7.5\" (1.715 m), weight 172 lb (78 kg), SpO2 98 %. Physical Exam  Constitutional:       Appearance: He is well-developed. HENT:      Head: Normocephalic. Eyes:      Pupils: Pupils are equal, round, and reactive to light. Cardiovascular:      Rate and Rhythm: Normal rate and regular rhythm. Heart sounds: Normal heart sounds. Pulmonary:      Effort: Pulmonary effort is normal.      Breath sounds: Normal breath sounds. Abdominal:      General: Bowel sounds are normal.      Palpations: Abdomen is soft. Comments: Nontender G-tube site is okay, signs of any inflammation   Skin:     General: Skin is warm and dry. Neurological:      Mental Status: He is alert. Laboratory, Pathology, Radiology reviewed in detail with relevantimportant investigations summarized below:    No results for input(s): WBC, HGB, HCT, MCV, PLT in the last 720 hours. Lab Results   Component Value Date    ALT 29 11/02/2022    AST 21 11/02/2022    ALKPHOS 97 11/02/2022    BILITOT 0.3 11/02/2022     No results found. Endoscopic investigations:     Assessment and Plan:  Fazal Pretty 79 y.o. male for follow up. History of esophagitis with stricture status post dilation in the past.  Patient was recently diagnosed to have throat cancer and is treated with chemo and radiation and has a G-tube for nutritional support, able to swallow solid food but is able to tolerate liquids, follow-up with oncology and return after 6 months, new omeprazole. No follow-ups on file. Gilmar Briones MD   StaffGastroenterologist  [facility]    Please note this report has been partially produced using speech recognitionsoftware  and may cause contain errors related to that system including grammar, punctuation and spelling as well as words andphrases that may seem inappropriate. If there are questions or concerns please feel free to contact me to clarify.

## 2023-01-23 NOTE — PROGRESS NOTES
Subjective:      Patient ID: Pat Wallace is a 77 y.o. male    HPI This is a 68 yo male with h/o DDD, Anxiety, CAD on Imdur, Kidney stones and s/p RALP on 11/16/18 with positive margin back in follow-up. Since last seen on 11/30/21, he has no new  complaints. He still has mild and stable ABDIAS. He did see Dr Tess Reed he recommended IMRT with 6 mo of ADT once his ABDIAS resolves but the patient and wife have been reluctant to comply with this option given stable undetectable PSA's and due to his other medical concerns. I reviewed the interval PSA today. He no longer takes Imdur and was given Viagra by his Cardiologist. He has no new medical or surgical problems except he has been found to have anemia and is seeing GI soon.      PATH: Aaliyah 4 + 3 = 7 adenocarcinoma of prostate, [address] L[address] neg, EPE neg, Margin pos (Rt anterior), SV neg.  [id number].     Past Medical History:   Diagnosis Date    Cancer [facility])     CHF (congestive heart failure) (HCC)     Chicken pox     Chronic back pain     Emphysema [facility] lung ([facility] [address]     Hyperlipidemia     Hypertension     Hypothyroidism     Measles     Mumps     Osteoarthritis     Pneumonia     Type 2 diabetes mellitus without complication (HCC)     prediabetes     Past Surgical History:   Procedure Laterality Date    CARDIAC CATHETERIZATION      2019    CYST REMOVAL      back of neck    CYST REMOVAL Left 3/4/16    Dr Nori Berrios  07/2018    HERNIA REPAIR      PROSTATE SURGERY  07/2018    Biopsy    PROSTATECTOMY  11/16/2018    TONSILLECTOMY N/A 8/12/2021    TONSILLECTOMY performed by Joe Trimble MD at [address] [place] [address]  3/4/16    Dr Slade Currie N/A 4/5/2021    EGD ESOPHAGOGASTRODUODENOSCOPY WITH BIOPSY AND DILATION performed by Ana Laura Barajas MD at [facility] N/A 11/18/2019    REPAIR OF RECURRENT VENTRAL HERNIA performed by Candee Riedel, MD at [facility] Social History     Socioeconomic History    Marital status:      Spouse name: Not on file    Number of children: Not on file    Years of education: Not on file    Highest education level: Not on file   Occupational History    Not on file   Tobacco Use    Smoking status: Former Smoker     Packs/day: 1.50     Years: 40.00     Pack years: 60.00     Types: Cigarettes     Quit date: 11/16/2018     Years since quitting: 3.5    Smokeless tobacco: Never Used   Vaping Use    Vaping Use: Never used   Substance and Sexual Activity    Alcohol use: Not Currently    Drug use: No    Sexual activity: Not on file   Other Topics Concern    Not on file   Social History Narrative    Not on file     Social Determinants of Health     Financial Resource Strain: Low Risk     Difficulty of Paying Living Expenses: Not hard at all   Food Insecurity: No Food Insecurity    Worried About [address] in the Last Year: Never true    [facility] [employer] in the Last Year: Never true   Transportation Needs:     Lack of Transportation (Medical): Not on file    Lack of Transportation (Non-Medical):  Not on file   Physical Activity:     Days of Exercise per Week: Not on file    Minutes of Exercise per Session: Not on file   Stress:     Feeling of Stress : Not on file   Social Connections:     Frequency of Communication with Friends and Family: Not on file    Frequency of Social Gatherings with Friends and Family: Not on file    Attends Sikhism Services: Not on file    Active Member of Clubs or Organizations: Not on file    Attends Club or Organization Meetings: Not on file    Marital Status: Not on file   Intimate Partner Violence:     Fear of Current or Ex-Partner: Not on file    Emotionally Abused: Not on file    Physically Abused: Not on file    Sexually Abused: Not on file   Housing Stability:     Unable to Pay for Housing in the Last Year: Not on file    Number of [place] in the Last Year: Not on file    Unstable Housing in the Last Year: Not on file     Family History   Problem Relation Age of Onset    Other Mother     Other Father     Cancer Father     Cancer Sister     Cancer Brother     Lung Cancer Brother     Cancer Brother     Colon Cancer Neg Hx      Current Outpatient Medications   Medication Sig Dispense Refill    omeprazole (PRILOSEC) 20 MG delayed release capsule Take 1 capsule by mouth Daily 90 capsule 3    levothyroxine (SYNTHROID) 75 MCG tablet TAKE ONE TABLET BY MOUTH EVERY DAY 90 tablet 1    atorvastatin (LIPITOR) 80 MG tablet TAKE ONE TABLET BY MOUTH EVERY DAY 90 tablet 1    sildenafil (VIAGRA) 100 MG tablet Take 1 tablet by mouth daily as needed for Erectile Dysfunction 10 tablet 5    carvedilol (COREG) 6.25 MG tablet TAKE ONE TABLET BY MOUTH TWICE A  tablet 3    tiotropium (SPIRIVA RESPIMAT) 2.5 MCG/ACT AERS inhaler Inhale 2 puffs into the lungs daily 1 each 5    aspirin 81 MG EC tablet Take 81 mg by mouth daily      LORazepam (ATIVAN) 1 MG tablet       omeprazole (PRILOSEC) 20 MG delayed release capsule Take 1 capsule by mouth Daily 30 capsule 2    Potassium 99 MG TABS Take by mouth OTC potassium      Glucosamine-Chondroitin--300-250 MG TABS Take 1 tablet by mouth Daily      acetaminophen (TYLENOL) 500 MG tablet Take 1,000 mg by mouth daily At night      Naproxen Sodium (ALEVE) 220 MG CAPS Take 1 capsule by mouth daily      vitamin C (ASCORBIC ACID) 500 MG tablet Take 500 mg by mouth daily       No current facility-administered medications for this visit. Demerol hcl [meperidine] and Valium [diazepam]  reviewed      Review of Systems   Constitutional: Negative for unexpected weight change. Gastrointestinal: Negative for abdominal pain. Genitourinary: Negative for difficulty urinating, dysuria, flank pain and hematuria. Objective:   Physical Exam  Constitutional:       Appearance: Normal appearance.    Abdominal:      General: There is no distension. Neurological:      Mental Status: He is alert. Psychiatric:         Mood and Affect: Mood normal.          PSA   Date Value Ref Range Status   05/06/2022 <0.01 0.00 - 4.00 ng/mL Final   09/21/2021 0.01 0.00 - 4.00 ng/mL Corrected     Comment:     When the Total PSA is between 3.00 and 10.00 ng/mL, consider  requesting a Free PSA to aid in diagnosis. Corrected result; previously reported as <0.01 on 09/22/2021 at 01:32 by [facility]     05/17/2021 <0.01 0.00 - 5.40 ng/mL Final   01/19/2021 <0.01 0.00 - 5.40 ng/mL Final   10/12/2020 <0.01 0.00 - 5.40 ng/mL Final       Assessment: This is a 68 yo male with h/o DDD, Anxiety, Kidney stones and with Aaliyah 7 prostate cancer s/p RALP in 11/18 with a positive margin and the interval PSA is again undetectable. If the PSA rises he will then consider IMRT and 6 mo ADT as was suggested by prior consult with Radiation Oncology at [facility]. Plan:      1.  F/U 6 mo for PSA        Haily Gonsalves MD Dupixent Pregnancy And Lactation Text: This medication likely crosses the placenta but the risk for the fetus is uncertain. This medication is excreted in breast milk.

## 2023-03-21 ENCOUNTER — OFFICE VISIT (OUTPATIENT)
Dept: PULMONOLOGY | Age: 68
End: 2023-03-21
Payer: COMMERCIAL

## 2023-03-21 VITALS
OXYGEN SATURATION: 98 % | HEART RATE: 72 BPM | RESPIRATION RATE: 16 BRPM | BODY MASS INDEX: 24.5 KG/M2 | WEIGHT: 165.4 LBS | DIASTOLIC BLOOD PRESSURE: 66 MMHG | HEIGHT: 69 IN | TEMPERATURE: 96.9 F | SYSTOLIC BLOOD PRESSURE: 102 MMHG

## 2023-03-21 DIAGNOSIS — J44.9 CHRONIC OBSTRUCTIVE PULMONARY DISEASE, UNSPECIFIED COPD TYPE (HCC): ICD-10-CM

## 2023-03-21 DIAGNOSIS — R91.1 LUNG NODULE: Primary | ICD-10-CM

## 2023-03-21 DIAGNOSIS — Z87.891 PERSONAL HISTORY OF TOBACCO USE: ICD-10-CM

## 2023-03-21 PROCEDURE — 99214 OFFICE O/P EST MOD 30 MIN: CPT | Performed by: INTERNAL MEDICINE

## 2023-03-21 PROCEDURE — 3074F SYST BP LT 130 MM HG: CPT | Performed by: INTERNAL MEDICINE

## 2023-03-21 PROCEDURE — 3078F DIAST BP <80 MM HG: CPT | Performed by: INTERNAL MEDICINE

## 2023-03-21 PROCEDURE — 1123F ACP DISCUSS/DSCN MKR DOCD: CPT | Performed by: INTERNAL MEDICINE

## 2023-03-21 NOTE — PROGRESS NOTES
Daily (Patient not taking: No sig reported) 30 capsule 2     No current facility-administered medications for this visit. Objective:     Vitals:    03/21/23 1311   BP: 102/66   Site: Right Upper Arm   Position: Sitting   Cuff Size: Medium Adult   Pulse: 72   Resp: 16   Temp: 96.9 °F (36.1 °C)   TempSrc: Infrared   SpO2: 98%   Weight: 165 lb 6.4 oz (75 kg)   Height: 5' 9\" (1.753 m)         Physical Exam  Constitutional:       Appearance: He is well-developed. HENT:      Head: Normocephalic and atraumatic. Eyes:      General:         Left eye: No discharge. Conjunctiva/sclera: Conjunctivae normal.      Pupils: Pupils are equal, round, and reactive to light. Neck:      Vascular: No JVD. Cardiovascular:      Rate and Rhythm: Normal rate and regular rhythm. Heart sounds: Normal heart sounds. No murmur heard. No friction rub. No gallop. Pulmonary:      Effort: Pulmonary effort is normal. No respiratory distress. Breath sounds: Normal breath sounds. No wheezing or rales. Chest:      Chest wall: No tenderness. Abdominal:      General: Bowel sounds are normal.      Palpations: Abdomen is soft. Musculoskeletal:         General: No tenderness or deformity. Cervical back: Normal range of motion and neck supple. Right lower leg: No edema. Left lower leg: No edema. Skin:     General: Skin is warm and dry. Neurological:      Mental Status: He is alert and oriented to person, place, and time. Psychiatric:         Judgment: Judgment normal.       Imaging studies reviewed and interpreted by me CT chest February 23, stable tiny lung nodules, new subsolid nodule like infiltrate in the left apex. Report reviewed and I spoke with radiologist Cuca Olguin over the phone, he agrees it is not a solid nodule.   and a typo in the report regarding to location is located in the left apex not the right    Lab results reviewed in chart  PFT 2019 FEV1 76%, mild obstructive airway

## 2023-03-30 ENCOUNTER — HOSPITAL ENCOUNTER (OUTPATIENT)
Dept: DATA CONVERSION | Facility: HOSPITAL | Age: 68
End: 2023-03-30
Attending: OTOLARYNGOLOGY | Admitting: OTOLARYNGOLOGY
Payer: COMMERCIAL

## 2023-03-30 DIAGNOSIS — J39.2 OTHER DISEASES OF PHARYNX: ICD-10-CM

## 2023-03-30 DIAGNOSIS — I73.9 PERIPHERAL VASCULAR DISEASE, UNSPECIFIED (CMS-HCC): ICD-10-CM

## 2023-03-30 DIAGNOSIS — E03.9 HYPOTHYROIDISM, UNSPECIFIED: ICD-10-CM

## 2023-03-30 DIAGNOSIS — Z85.810 PERSONAL HISTORY OF MALIGNANT NEOPLASM OF TONGUE: ICD-10-CM

## 2023-03-30 DIAGNOSIS — E78.5 HYPERLIPIDEMIA, UNSPECIFIED: ICD-10-CM

## 2023-03-30 DIAGNOSIS — F41.9 ANXIETY DISORDER, UNSPECIFIED: ICD-10-CM

## 2023-03-30 DIAGNOSIS — I25.10 ATHEROSCLEROTIC HEART DISEASE OF NATIVE CORONARY ARTERY WITHOUT ANGINA PECTORIS: ICD-10-CM

## 2023-03-30 DIAGNOSIS — Z72.0 TOBACCO USE: ICD-10-CM

## 2023-03-30 DIAGNOSIS — C10.9 MALIGNANT NEOPLASM OF OROPHARYNX, UNSPECIFIED (MULTI): ICD-10-CM

## 2023-03-30 DIAGNOSIS — D64.9 ANEMIA, UNSPECIFIED: ICD-10-CM

## 2023-03-30 DIAGNOSIS — Z85.819 PERSONAL HISTORY OF MALIGNANT NEOPLASM OF UNSPECIFIED SITE OF LIP, ORAL CAVITY, AND PHARYNX: ICD-10-CM

## 2023-03-30 DIAGNOSIS — Z85.46 PERSONAL HISTORY OF MALIGNANT NEOPLASM OF PROSTATE: ICD-10-CM

## 2023-03-30 DIAGNOSIS — R59.0 LOCALIZED ENLARGED LYMPH NODES: ICD-10-CM

## 2023-03-30 DIAGNOSIS — I10 ESSENTIAL (PRIMARY) HYPERTENSION: ICD-10-CM

## 2023-03-30 LAB — POCT GLUCOSE: 94 MG/DL (ref 74–99)

## 2023-04-03 LAB
COMPLETE PATHOLOGY REPORT: NORMAL
CONVERTED CLINICAL DIAGNOSIS-HISTORY: NORMAL
CONVERTED FINAL DIAGNOSIS: NORMAL
CONVERTED FINAL REPORT PDF LINK TO COPY AND PASTE: NORMAL
CONVERTED GROSS DESCRIPTION: NORMAL

## 2023-04-03 NOTE — TELEPHONE ENCOUNTER
patient requesting medication refill.  Please approve or deny this request.    Rx requested:  Requested Prescriptions     Pending Prescriptions Disp Refills    atorvastatin (LIPITOR) 80 MG tablet 90 tablet 1     Sig: TAKE ONE TABLET BY MOUTH EVERY DAY         Last Office Visit:   12/9/2022      Next Visit Date:  Future Appointments   Date Time Provider Grey Stewart   5/5/2023 12:00 PM Lacey Joaquin MD [facility]   5/9/2023 10:15 AM Clive Katz MD [facility] [place]   5/31/2023 11:15 AM Lorena Saunders MD [address]   6/20/2023 10:00 AM Grant Worley MD [facility]   7/21/2023 10:30 AM Berkley Evans MD [facility]

## 2023-04-05 RX ORDER — ATORVASTATIN CALCIUM 80 MG/1
TABLET, FILM COATED ORAL
Qty: 90 TABLET | Refills: 0 | Status: SHIPPED | OUTPATIENT
Start: 2023-04-05

## 2023-04-10 RX ORDER — LEVOTHYROXINE SODIUM 0.07 MG/1
TABLET ORAL
Qty: 90 TABLET | Refills: 0 | Status: SHIPPED | OUTPATIENT
Start: 2023-04-10 | End: 2023-05-09 | Stop reason: SDUPTHER

## 2023-04-26 DIAGNOSIS — E03.9 HYPOTHYROIDISM, UNSPECIFIED TYPE: ICD-10-CM

## 2023-04-26 LAB — TSH SERPL-MCNC: 3.87 UIU/ML (ref 0.44–3.86)

## 2023-05-05 ENCOUNTER — OFFICE VISIT (OUTPATIENT)
Dept: CARDIOLOGY CLINIC | Age: 68
End: 2023-05-05
Payer: COMMERCIAL

## 2023-05-05 VITALS
HEART RATE: 76 BPM | WEIGHT: 167.2 LBS | BODY MASS INDEX: 24.69 KG/M2 | SYSTOLIC BLOOD PRESSURE: 108 MMHG | OXYGEN SATURATION: 96 % | DIASTOLIC BLOOD PRESSURE: 60 MMHG

## 2023-05-05 DIAGNOSIS — I50.33 ACUTE ON CHRONIC DIASTOLIC HEART FAILURE (HCC): ICD-10-CM

## 2023-05-05 DIAGNOSIS — I25.10 CORONARY ARTERY DISEASE INVOLVING NATIVE CORONARY ARTERY OF NATIVE HEART WITHOUT ANGINA PECTORIS: ICD-10-CM

## 2023-05-05 DIAGNOSIS — N52.9 VASCULOGENIC ERECTILE DYSFUNCTION, UNSPECIFIED VASCULOGENIC ERECTILE DYSFUNCTION TYPE: Primary | ICD-10-CM

## 2023-05-05 DIAGNOSIS — I10 ESSENTIAL HYPERTENSION: ICD-10-CM

## 2023-05-05 DIAGNOSIS — E78.5 DYSLIPIDEMIA: ICD-10-CM

## 2023-05-05 DIAGNOSIS — I25.119 ATHEROSCLEROSIS OF NATIVE CORONARY ARTERY OF NATIVE HEART WITH ANGINA PECTORIS (HCC): ICD-10-CM

## 2023-05-05 DIAGNOSIS — R09.89 BILATERAL CAROTID BRUITS: ICD-10-CM

## 2023-05-05 PROCEDURE — 99214 OFFICE O/P EST MOD 30 MIN: CPT | Performed by: INTERNAL MEDICINE

## 2023-05-05 PROCEDURE — 3074F SYST BP LT 130 MM HG: CPT | Performed by: INTERNAL MEDICINE

## 2023-05-05 PROCEDURE — 3078F DIAST BP <80 MM HG: CPT | Performed by: INTERNAL MEDICINE

## 2023-05-05 PROCEDURE — 1123F ACP DISCUSS/DSCN MKR DOCD: CPT | Performed by: INTERNAL MEDICINE

## 2023-05-05 ASSESSMENT — ENCOUNTER SYMPTOMS
COUGH: 0
EYES NEGATIVE: 1
BACK PAIN: 1
STRIDOR: 0
BLOOD IN STOOL: 0
NAUSEA: 0
GASTROINTESTINAL NEGATIVE: 1
CHEST TIGHTNESS: 0
WHEEZING: 0
SHORTNESS OF BREATH: 1

## 2023-05-09 ENCOUNTER — OFFICE VISIT (OUTPATIENT)
Dept: FAMILY MEDICINE CLINIC | Age: 68
End: 2023-05-09
Payer: COMMERCIAL

## 2023-05-09 VITALS
RESPIRATION RATE: 16 BRPM | HEIGHT: 69 IN | HEART RATE: 71 BPM | WEIGHT: 164 LBS | BODY MASS INDEX: 24.29 KG/M2 | DIASTOLIC BLOOD PRESSURE: 80 MMHG | TEMPERATURE: 97.8 F | OXYGEN SATURATION: 95 % | SYSTOLIC BLOOD PRESSURE: 110 MMHG

## 2023-05-09 DIAGNOSIS — E78.00 PURE HYPERCHOLESTEROLEMIA: ICD-10-CM

## 2023-05-09 DIAGNOSIS — E03.9 HYPOTHYROIDISM, UNSPECIFIED TYPE: ICD-10-CM

## 2023-05-09 DIAGNOSIS — F41.9 ANXIETY: Primary | ICD-10-CM

## 2023-05-09 PROCEDURE — 3074F SYST BP LT 130 MM HG: CPT | Performed by: FAMILY MEDICINE

## 2023-05-09 PROCEDURE — 3079F DIAST BP 80-89 MM HG: CPT | Performed by: FAMILY MEDICINE

## 2023-05-09 PROCEDURE — 1123F ACP DISCUSS/DSCN MKR DOCD: CPT | Performed by: FAMILY MEDICINE

## 2023-05-09 PROCEDURE — 99214 OFFICE O/P EST MOD 30 MIN: CPT | Performed by: FAMILY MEDICINE

## 2023-05-09 RX ORDER — LORAZEPAM 1 MG/1
TABLET ORAL
Qty: 30 TABLET | Refills: 2 | Status: SHIPPED | OUTPATIENT
Start: 2023-05-09 | End: 2023-06-08

## 2023-05-09 RX ORDER — ATORVASTATIN CALCIUM 80 MG/1
TABLET, FILM COATED ORAL
Qty: 90 TABLET | Refills: 0 | Status: CANCELLED | OUTPATIENT
Start: 2023-05-09

## 2023-05-09 RX ORDER — LORAZEPAM 1 MG/1
TABLET ORAL
Status: CANCELLED | OUTPATIENT
Start: 2023-05-09

## 2023-05-09 RX ORDER — LEVOTHYROXINE SODIUM 0.07 MG/1
75 TABLET ORAL DAILY
Qty: 90 TABLET | Refills: 1 | Status: SHIPPED | OUTPATIENT
Start: 2023-05-09

## 2023-05-09 RX ORDER — ATORVASTATIN CALCIUM 80 MG/1
TABLET, FILM COATED ORAL
Qty: 90 TABLET | Refills: 1 | Status: SHIPPED | OUTPATIENT
Start: 2023-05-09

## 2023-05-09 SDOH — ECONOMIC STABILITY: HOUSING INSECURITY
IN THE LAST 12 MONTHS, WAS THERE A TIME WHEN YOU DID NOT HAVE A STEADY PLACE TO SLEEP OR SLEPT IN A SHELTER (INCLUDING NOW)?: NO

## 2023-05-09 SDOH — ECONOMIC STABILITY: INCOME INSECURITY: HOW HARD IS IT FOR YOU TO PAY FOR THE VERY BASICS LIKE FOOD, HOUSING, MEDICAL CARE, AND HEATING?: NOT HARD AT ALL

## 2023-05-09 SDOH — ECONOMIC STABILITY: FOOD INSECURITY: WITHIN THE PAST 12 MONTHS, YOU WORRIED THAT YOUR FOOD WOULD RUN OUT BEFORE YOU GOT MONEY TO BUY MORE.: NEVER TRUE

## 2023-05-09 SDOH — ECONOMIC STABILITY: FOOD INSECURITY: WITHIN THE PAST 12 MONTHS, THE FOOD YOU BOUGHT JUST DIDN'T LAST AND YOU DIDN'T HAVE MONEY TO GET MORE.: NEVER TRUE

## 2023-05-09 ASSESSMENT — PATIENT HEALTH QUESTIONNAIRE - PHQ9
SUM OF ALL RESPONSES TO PHQ QUESTIONS 1-9: 2
SUM OF ALL RESPONSES TO PHQ9 QUESTIONS 1 & 2: 2
SUM OF ALL RESPONSES TO PHQ QUESTIONS 1-9: 2
SUM OF ALL RESPONSES TO PHQ QUESTIONS 1-9: 2
2. FEELING DOWN, DEPRESSED OR HOPELESS: 1
SUM OF ALL RESPONSES TO PHQ QUESTIONS 1-9: 2
1. LITTLE INTEREST OR PLEASURE IN DOING THINGS: 1

## 2023-05-09 ASSESSMENT — ENCOUNTER SYMPTOMS
DIARRHEA: 0
VOMITING: 0

## 2023-05-09 NOTE — PROGRESS NOTES
Father     Cancer Sister         lung ca    Cancer Brother         lung ca    Lung Cancer Brother     Cancer Brother         prostate ca    No Known Problems Son     No Known Problems Daughter     Colon Cancer Neg Hx    Controlled substances monitoring: no signs of potential drug abuse or diversion identified, [place]ARRS report reviewed today- activity consistent with treatment plan, and medication contract signed today. Objective:   /80   Pulse 71   Temp 97.8 °F (36.6 °C)   Resp 16   Ht 5' 9\" (1.753 m)   Wt 164 lb (74.4 kg)   SpO2 95%   BMI 24.22 kg/m²     Physical Exam  Neck:no carotid bruits. No masses. No adenopathy. No thyroid asymmetry. Lungs:clear and equal breath sounds. No wheezes or rales. Heart:rate reg. No murmur. No gallops   Pulses:Radials 2+ equal              D.P 1+ equal  Extremities:no edema in either leg  Gen: In no acute distress  Abdomen; B.S present. Soft  Non tender. No hepatosplenomegaly. No masses. Peg in place   Patient with appropriate affect. Alert    Thought content appropriate  Good eye contact      Assessment:       Diagnosis Orders   1. Anxiety        2. Hypothyroidism, unspecified type        3.  Pure hypercholesterolemia               Plan:    Continue ativan prn for anxiety and synthroid dose of thyroid disorder  And lipitor for lipids  Orders Placed This Encounter   Medications    levothyroxine (SYNTHROID) 75 MCG tablet     Sig: Take 1 tablet by mouth daily     Dispense:  90 tablet     Refill:  1    LORazepam (ATIVAN) 1 MG tablet     Sig: TAKE ONE TABLET BY MOUTH EVERY DAY NIGHTLY AS NEEDED FOR ANXIETY     Dispense:  30 tablet     Refill:  2    atorvastatin (LIPITOR) 80 MG tablet     Sig: TAKE ONE TABLET BY MOUTH EVERY DAY     Dispense:  90 tablet     Refill:  1      Orders Placed This Encounter   Procedures    Lipid Panel     Standing Status:   Future     Standing Expiration Date:   5/9/2024    Comprehensive Metabolic Panel     Standing Status:   Future

## 2023-05-15 RX ORDER — OMEPRAZOLE 20 MG/1
20 CAPSULE, DELAYED RELEASE ORAL
Qty: 90 CAPSULE | Refills: 3 | Status: SHIPPED | OUTPATIENT
Start: 2023-05-15

## 2023-05-15 RX ORDER — OMEPRAZOLE 20 MG/1
CAPSULE, DELAYED RELEASE ORAL
Qty: 90 CAPSULE | Refills: 3 | Status: SHIPPED | OUTPATIENT
Start: 2023-05-15

## 2023-05-31 ENCOUNTER — OFFICE VISIT (OUTPATIENT)
Dept: PULMONOLOGY | Age: 68
End: 2023-05-31
Payer: COMMERCIAL

## 2023-05-31 VITALS
TEMPERATURE: 96.9 F | SYSTOLIC BLOOD PRESSURE: 112 MMHG | RESPIRATION RATE: 16 BRPM | OXYGEN SATURATION: 98 % | WEIGHT: 165.2 LBS | DIASTOLIC BLOOD PRESSURE: 76 MMHG | HEIGHT: 69 IN | HEART RATE: 64 BPM | BODY MASS INDEX: 24.47 KG/M2

## 2023-05-31 DIAGNOSIS — E66.09 CLASS 2 OBESITY DUE TO EXCESS CALORIES WITHOUT SERIOUS COMORBIDITY WITH BODY MASS INDEX (BMI) OF 35.0 TO 35.9 IN ADULT: ICD-10-CM

## 2023-05-31 DIAGNOSIS — C61 PROSTATE CANCER (HCC): ICD-10-CM

## 2023-05-31 DIAGNOSIS — Z87.891 PERSONAL HISTORY OF TOBACCO USE: Primary | ICD-10-CM

## 2023-05-31 DIAGNOSIS — R91.1 LUNG NODULE: ICD-10-CM

## 2023-05-31 DIAGNOSIS — J44.9 CHRONIC OBSTRUCTIVE PULMONARY DISEASE, UNSPECIFIED COPD TYPE (HCC): ICD-10-CM

## 2023-05-31 LAB — PSA SERPL-MCNC: 0.04 NG/ML (ref 0–4)

## 2023-05-31 PROCEDURE — 3078F DIAST BP <80 MM HG: CPT | Performed by: INTERNAL MEDICINE

## 2023-05-31 PROCEDURE — 3074F SYST BP LT 130 MM HG: CPT | Performed by: INTERNAL MEDICINE

## 2023-05-31 PROCEDURE — 1123F ACP DISCUSS/DSCN MKR DOCD: CPT | Performed by: INTERNAL MEDICINE

## 2023-05-31 PROCEDURE — G0296 VISIT TO DETERM LDCT ELIG: HCPCS | Performed by: INTERNAL MEDICINE

## 2023-05-31 PROCEDURE — 99214 OFFICE O/P EST MOD 30 MIN: CPT | Performed by: INTERNAL MEDICINE

## 2023-05-31 NOTE — PROGRESS NOTES
Discussed with the patient the current USPSTF guidelines released March 9, 2021 for screening for lung cancer. For adults aged 48 to [de-identified] years who have a 20 pack-year smoking history and currently smoke or have quit within the past 15 years the grade B recommendation is to:  Screen for lung cancer with low-dose computed tomography (LDCT) every year. Stop screening once a person has not smoked for 15 years or has a health problem that limits life expectancy or the ability to have lung surgery. The patient  reports that he quit smoking about 4 years ago. His smoking use included cigarettes. He has a 60.00 pack-year smoking history. He has never used smokeless tobacco.. Discussed with patient the risks and benefits of screening, including over-diagnosis, false positive rate, and total radiation exposure. The patient currently exhibits no signs or symptoms suggestive of lung cancer. Discussed with patient the importance of compliance with yearly annual lung cancer screenings and willingness to undergo diagnosis and treatment if screening scan is positive. In addition, the patient was counseled regarding the importance of remaining smoke free and/or total smoking cessation.     Also reviewed the following if the patient has Medicare that as of February 10, 2022, Medicare only covers LDCT screening in patients aged 51-72 with at least a 20 pack-year smoking history who currently smoke or have quit in the last 15 years

## 2023-05-31 NOTE — PROGRESS NOTES
Subjective:     Isabel Blue is a 79 y.o. male who complains today of:     Chief Complaint   Patient presents with    Follow-up     2 Month F/U for COPD and Lung Nodule    Results     CT Chest       HPI  Patient presents for lung nodules  5/31/2023  Presents for follow-up, he is doing good, no symptoms of chest pain or shortness of breath, no coughing, he is on Spiriva, no heartburn, weight is stable, no lower extremity edema, he is able to eat, no nasal congestion or postnasal drip and no heartburn. 3/21/2023  Patient presents for follow-up, he was recently diagnosed with throat cancer, currently follows up with radiation oncology, oncology and ENT at [facility]. He has no chest pain, no shortness of breath, no coughing, no fever no chills, no lower extremity edema, weight is stable, and no nose congestion. He had CT chest [facility] at [facility]. And he reports having PET scan done at [facility] earlier this year, I could not find it in [facility] records      Allergies:  Demerol hcl [meperidine] and Valium [diazepam]  Past Medical History:   Diagnosis Date    CAD (coronary artery disease)     Cancer ([facility] [address]     prostate cancer Nov 16 2018    CHF (congestive heart failure) (HCC)     Chicken pox     Chronic back pain     Emphysema of lung ([facility] [address]     Hyperlipidemia     Hypertension     Hypothyroidism     Measles     Mumps     Osteoarthritis     Pneumonia      Past Surgical History:   Procedure Laterality Date    CARDIAC CATHETERIZATION      2019    COLONOSCOPY      2016    CYST REMOVAL      back of neck    CYST REMOVAL Left 03/04/2016    Dr Wilson Border  07/2018    DENTAL SURGERY      HERNIA REPAIR      2016, 2019    LARYNGOSCOPY N/A 8/16/2022    DIRECT LARYNGOSCOPY WITH BIOPSY.  30 MIN  TO BE LATEX FREE performed by Lu Hawkins MD at [facility]  07/2018    Biopsy    PROSTATECTOMY  11/16/2018    TONSILLECTOMY N/A 08/12/2021    TONSILLECTOMY performed by Sy Alvarado MD at [address] [place] [address]

## 2023-06-08 RX ORDER — SILDENAFIL 100 MG/1
TABLET, FILM COATED ORAL
Qty: 10 TABLET | Refills: 5 | Status: SHIPPED | OUTPATIENT
Start: 2023-06-08

## 2023-06-08 NOTE — TELEPHONE ENCOUNTER
Requesting medication refill. Please approve or deny this request.    Rx requested:  Requested Prescriptions     Pending Prescriptions Disp Refills    sildenafil (VIAGRA) 100 MG tablet [Pharmacy Med Name: SILDENAFIL CITRATE 100MG TABS] 10 tablet 5     Sig: TAKE ONE TABLET BY MOUTH EVERY DAY AS NEEDED         Last Office Visit:   5/5/2023      Next Visit Date:  Future Appointments   Date Time Provider Grey Stewart   6/20/2023 10:15 AM MD Jorge Luis Daniel [facility]   8/9/2023  9:45 AM Laurita Roe  [place] [address]   8/11/2023 10:00 AM Brynn Theodore MD [address]   9/18/2023 12:15 PM Surya Jeronimo  [facility]   5/29/2024 10:00 AM Francisco Javier Patel MD [facility]               Please approve or deny.

## 2023-06-20 ENCOUNTER — OFFICE VISIT (OUTPATIENT)
Dept: UROLOGY | Age: 68
End: 2023-06-20
Payer: COMMERCIAL

## 2023-06-20 ENCOUNTER — TELEPHONE (OUTPATIENT)
Dept: UROLOGY | Age: 68
End: 2023-06-20

## 2023-06-20 VITALS
DIASTOLIC BLOOD PRESSURE: 64 MMHG | SYSTOLIC BLOOD PRESSURE: 120 MMHG | OXYGEN SATURATION: 98 % | WEIGHT: 163 LBS | BODY MASS INDEX: 24.14 KG/M2 | HEART RATE: 68 BPM | HEIGHT: 69 IN

## 2023-06-20 DIAGNOSIS — Z85.46 H/O PROSTATE CANCER: ICD-10-CM

## 2023-06-20 DIAGNOSIS — C61 PROSTATE CANCER (HCC): Primary | ICD-10-CM

## 2023-06-20 PROCEDURE — 99214 OFFICE O/P EST MOD 30 MIN: CPT | Performed by: UROLOGY

## 2023-06-20 PROCEDURE — 3078F DIAST BP <80 MM HG: CPT | Performed by: UROLOGY

## 2023-06-20 PROCEDURE — 3074F SYST BP LT 130 MM HG: CPT | Performed by: UROLOGY

## 2023-06-20 PROCEDURE — 1123F ACP DISCUSS/DSCN MKR DOCD: CPT | Performed by: UROLOGY

## 2023-06-20 ASSESSMENT — ENCOUNTER SYMPTOMS: ABDOMINAL PAIN: 0

## 2023-06-20 NOTE — TELEPHONE ENCOUNTER
I switched his referral over to [id number] [place] [address] and spoke with patient. He has an appointment 7-10-23  with him.   Records were faxed to their office
Pt called and stated that he already has a provider with  in [facility] for Radiation treatment and that he would prefer to stay with him instead of switching to the [facility]     Dr. Mady Faust  Ph. [phone]    I told him that corado would call him if you had any further questions
3

## 2023-06-20 NOTE — PROGRESS NOTES
Chaperone for Intimate Exam    1. Was chaperone offered as part of the rooming process? offered, declined   2. If Chaperone is declined by patient, NA: chaperone was available and exam completed  3.  Chaperone is n/a
Dr Vianca Escobedo 2021    EGD ESOPHAGOGASTRODUODENOSCOPY WITH BIOPSY AND DILATION performed by Logan Hernandez MD at [facility]. N/A 2019    REPAIR OF RECURRENT VENTRAL HERNIA performed by Angi Dacosta MD at [facility] History     Socioeconomic History    Marital status:      Spouse name: None    Number of children: None    Years of education: None    Highest education level: None   Tobacco Use    Smoking status: Former     Packs/day: 1.50     Years: 40.00     Pack years: 60.00     Types: Cigarettes     Quit date: 2018     Years since quittin.5    Smokeless tobacco: Never   Vaping Use    Vaping Use: Never used   Substance and Sexual Activity    Alcohol use: Yes     Comment: very rarely (1 beer a year)    Drug use: No     Social Determinants of Health     Financial Resource Strain: Low Risk     Difficulty of Paying Living Expenses: Not hard at all   Food Insecurity: No Food Insecurity    Worried About Running Out of Food in the Last Year: Never true    Ran Out of Food in the Last Year: Never true   Transportation Needs: Unknown    Lack of Transportation (Non-Medical): No   Housing Stability: Unknown    Unstable Housing in the Last Year: No     Family History   Problem Relation Age of Onset    Other Mother     Alzheimer's Disease Mother     Cancer Father         prostate    Other Father     Cancer Sister         lung ca    Cancer Brother         lung ca    Lung Cancer Brother     Cancer Brother         prostate ca    No Known Problems Son     No Known Problems Daughter     Colon Cancer Neg Hx      Current Outpatient Medications   Medication Sig Dispense Refill    sildenafil (VIAGRA) 100 MG tablet TAKE ONE TABLET BY MOUTH EVERY DAY AS NEEDED 10 tablet 5    tiotropium (SPIRIVA RESPIMAT) 2.5 MCG/ACT AERS inhaler Inhale 2 puffs into the lungs daily 2 each 0    omeprazole (PRILOSEC) 20 MG delayed

## 2023-06-27 ENCOUNTER — TELEPHONE (OUTPATIENT)
Dept: FAMILY MEDICINE CLINIC | Age: 68
End: 2023-06-27

## 2023-07-05 ENCOUNTER — TELEPHONE (OUTPATIENT)
Dept: GASTROENTEROLOGY | Age: 68
End: 2023-07-05

## 2023-07-05 NOTE — TELEPHONE ENCOUNTER
Patient wants to know if you can remove feeding tube that was put in by [facility]. Patients states they are booked for months.  He has an appt 8/11/2023

## 2023-07-10 LAB
ALANINE AMINOTRANSFERASE (SGPT) (U/L) IN SER/PLAS: 26 U/L (ref 10–52)
ALBUMIN (G/DL) IN SER/PLAS: 4 G/DL (ref 3.4–5)
ALKALINE PHOSPHATASE (U/L) IN SER/PLAS: 69 U/L (ref 33–136)
ANION GAP IN SER/PLAS: 12 MMOL/L (ref 10–20)
ASPARTATE AMINOTRANSFERASE (SGOT) (U/L) IN SER/PLAS: 39 U/L (ref 9–39)
BASOPHILS (10*3/UL) IN BLOOD BY AUTOMATED COUNT: 0.07 X10E9/L (ref 0–0.1)
BASOPHILS/100 LEUKOCYTES IN BLOOD BY AUTOMATED COUNT: 1.2 % (ref 0–2)
BILIRUBIN TOTAL (MG/DL) IN SER/PLAS: 0.4 MG/DL (ref 0–1.2)
CALCIUM (MG/DL) IN SER/PLAS: 9.5 MG/DL (ref 8.6–10.3)
CARBON DIOXIDE, TOTAL (MMOL/L) IN SER/PLAS: 29 MMOL/L (ref 21–32)
CARCINOEMBRYONIC AG (NG/ML) IN SER/PLAS: 1.2 UG/L
CHLORIDE (MMOL/L) IN SER/PLAS: 103 MMOL/L (ref 98–107)
CREATININE (MG/DL) IN SER/PLAS: 0.95 MG/DL (ref 0.5–1.3)
EOSINOPHILS (10*3/UL) IN BLOOD BY AUTOMATED COUNT: 0.29 X10E9/L (ref 0–0.7)
EOSINOPHILS/100 LEUKOCYTES IN BLOOD BY AUTOMATED COUNT: 4.9 % (ref 0–6)
ERYTHROCYTE DISTRIBUTION WIDTH (RATIO) BY AUTOMATED COUNT: 13.2 % (ref 11.5–14.5)
ERYTHROCYTE MEAN CORPUSCULAR HEMOGLOBIN CONCENTRATION (G/DL) BY AUTOMATED: 30.8 G/DL (ref 32–36)
ERYTHROCYTE MEAN CORPUSCULAR VOLUME (FL) BY AUTOMATED COUNT: 97 FL (ref 80–100)
ERYTHROCYTES (10*6/UL) IN BLOOD BY AUTOMATED COUNT: 3.97 X10E12/L (ref 4.5–5.9)
GFR MALE: 87 ML/MIN/1.73M2
GLUCOSE (MG/DL) IN SER/PLAS: 97 MG/DL (ref 74–99)
HEMATOCRIT (%) IN BLOOD BY AUTOMATED COUNT: 38.6 % (ref 41–52)
HEMOGLOBIN (G/DL) IN BLOOD: 11.9 G/DL (ref 13.5–17.5)
IMMATURE GRANULOCYTES/100 LEUKOCYTES IN BLOOD BY AUTOMATED COUNT: 0.3 % (ref 0–0.9)
LEUKOCYTES (10*3/UL) IN BLOOD BY AUTOMATED COUNT: 6 X10E9/L (ref 4.4–11.3)
LYMPHOCYTES (10*3/UL) IN BLOOD BY AUTOMATED COUNT: 0.97 X10E9/L (ref 1.2–4.8)
LYMPHOCYTES/100 LEUKOCYTES IN BLOOD BY AUTOMATED COUNT: 16.3 % (ref 13–44)
MONOCYTES (10*3/UL) IN BLOOD BY AUTOMATED COUNT: 0.74 X10E9/L (ref 0.1–1)
MONOCYTES/100 LEUKOCYTES IN BLOOD BY AUTOMATED COUNT: 12.4 % (ref 2–10)
NEUTROPHILS (10*3/UL) IN BLOOD BY AUTOMATED COUNT: 3.86 X10E9/L (ref 1.2–7.7)
NEUTROPHILS/100 LEUKOCYTES IN BLOOD BY AUTOMATED COUNT: 64.9 % (ref 40–80)
PLATELETS (10*3/UL) IN BLOOD AUTOMATED COUNT: 263 X10E9/L (ref 150–450)
POTASSIUM (MMOL/L) IN SER/PLAS: 4.6 MMOL/L (ref 3.5–5.3)
PROSTATE SPECIFIC AG (NG/ML) IN SER/PLAS: 0.03 NG/ML (ref 0–4)
PROTEIN TOTAL: 6.8 G/DL (ref 6.4–8.2)
SODIUM (MMOL/L) IN SER/PLAS: 139 MMOL/L (ref 136–145)
TESTOSTERONE (NG/DL) IN SER/PLAS: 395 NG/DL (ref 240–1000)
THYROTROPIN (MIU/L) IN SER/PLAS BY DETECTION LIMIT <= 0.05 MIU/L: 2.28 MIU/L (ref 0.44–3.98)
UREA NITROGEN (MG/DL) IN SER/PLAS: 21 MG/DL (ref 6–23)

## 2023-07-10 RX ORDER — ATORVASTATIN CALCIUM 80 MG/1
TABLET, FILM COATED ORAL
Qty: 90 TABLET | Refills: 1 | Status: SHIPPED | OUTPATIENT
Start: 2023-07-10

## 2023-07-10 RX ORDER — LEVOTHYROXINE SODIUM 0.07 MG/1
TABLET ORAL
Qty: 90 TABLET | Refills: 1 | Status: SHIPPED | OUTPATIENT
Start: 2023-07-10

## 2023-07-10 NOTE — TELEPHONE ENCOUNTER
Future Appointments    Encounter Information    Provider Department Appt Notes   8/9/2023 An Manzano MD [facility] [place] [facility] 3 month follow up   8/11/2023 Shorty Skelton MD [facility] Gastroenterology 6 month follow up   9/18/2023 Bobby Pathak MD [facility] Cardiology 4 MONTHS   12/22/2023 Shawnee Tavares, [facility] Urology 6 mo PSA`   5/29/2024 Abby Steele MD [facility] Pulmonology 1 YEAR F/U     Past Visits    Date Provider Specialty Visit Type Primary Dx   06/20/2023 Shawnee Tavares MD Urology Office Visit Prostate cancer [facility])   05/31/2023 Abby Steele MD Pulmonology Office Visit Personal history of tobacco use   05/09/2023 An Manzano MD Family Medicine Office Visit Anxiety

## 2023-08-01 DIAGNOSIS — E78.00 PURE HYPERCHOLESTEROLEMIA: ICD-10-CM

## 2023-08-01 DIAGNOSIS — E03.9 HYPOTHYROIDISM, UNSPECIFIED TYPE: ICD-10-CM

## 2023-08-01 LAB
ALBUMIN SERPL-MCNC: 4.2 G/DL (ref 3.5–4.6)
ALP SERPL-CCNC: 90 U/L (ref 35–104)
ALT SERPL-CCNC: 18 U/L (ref 0–41)
ANION GAP SERPL CALCULATED.3IONS-SCNC: 11 MEQ/L (ref 9–15)
AST SERPL-CCNC: 22 U/L (ref 0–40)
BASOPHILS # BLD: 0.1 K/UL (ref 0–0.2)
BASOPHILS NFR BLD: 0.6 %
BILIRUB SERPL-MCNC: 0.3 MG/DL (ref 0.2–0.7)
BUN SERPL-MCNC: 19 MG/DL (ref 8–23)
CALCIUM SERPL-MCNC: 9.7 MG/DL (ref 8.5–9.9)
CHLORIDE SERPL-SCNC: 101 MEQ/L (ref 95–107)
CHOLEST SERPL-MCNC: 145 MG/DL (ref 0–199)
CO2 SERPL-SCNC: 28 MEQ/L (ref 20–31)
CREAT SERPL-MCNC: 0.84 MG/DL (ref 0.7–1.2)
EOSINOPHIL # BLD: 0.2 K/UL (ref 0–0.7)
EOSINOPHIL NFR BLD: 2.7 %
ERYTHROCYTE [DISTWIDTH] IN BLOOD BY AUTOMATED COUNT: 14.1 % (ref 11.5–14.5)
GLOBULIN SER CALC-MCNC: 2.7 G/DL (ref 2.3–3.5)
GLUCOSE SERPL-MCNC: 90 MG/DL (ref 70–99)
HCT VFR BLD AUTO: 39.1 % (ref 42–52)
HDLC SERPL-MCNC: 66 MG/DL (ref 40–59)
HGB BLD-MCNC: 12.9 G/DL (ref 14–18)
LDLC SERPL CALC-MCNC: 63 MG/DL (ref 0–129)
LYMPHOCYTES # BLD: 1.7 K/UL (ref 1–4.8)
LYMPHOCYTES NFR BLD: 20.8 %
MCH RBC QN AUTO: 29.8 PG (ref 27–31.3)
MCHC RBC AUTO-ENTMCNC: 32.9 % (ref 33–37)
MCV RBC AUTO: 90.4 FL (ref 79–92.2)
MONOCYTES # BLD: 1 K/UL (ref 0.2–0.8)
MONOCYTES NFR BLD: 12.1 %
NEUTROPHILS # BLD: 5.3 K/UL (ref 1.4–6.5)
NEUTS SEG NFR BLD: 63.8 %
PLATELET # BLD AUTO: 275 K/UL (ref 130–400)
POTASSIUM SERPL-SCNC: 4.5 MEQ/L (ref 3.4–4.9)
PROT SERPL-MCNC: 6.9 G/DL (ref 6.3–8)
RBC # BLD AUTO: 4.32 M/UL (ref 4.7–6.1)
SODIUM SERPL-SCNC: 140 MEQ/L (ref 135–144)
TRIGL SERPL-MCNC: 79 MG/DL (ref 0–150)
TSH SERPL-MCNC: 3.81 UIU/ML (ref 0.44–3.86)
WBC # BLD AUTO: 8.3 K/UL (ref 4.8–10.8)

## 2023-08-09 ENCOUNTER — OFFICE VISIT (OUTPATIENT)
Dept: FAMILY MEDICINE CLINIC | Age: 68
End: 2023-08-09
Payer: COMMERCIAL

## 2023-08-09 VITALS
TEMPERATURE: 98.1 F | HEIGHT: 69 IN | DIASTOLIC BLOOD PRESSURE: 60 MMHG | RESPIRATION RATE: 14 BRPM | SYSTOLIC BLOOD PRESSURE: 100 MMHG | OXYGEN SATURATION: 99 % | HEART RATE: 60 BPM | WEIGHT: 169.4 LBS | BODY MASS INDEX: 25.09 KG/M2

## 2023-08-09 DIAGNOSIS — F41.9 ANXIETY: Primary | ICD-10-CM

## 2023-08-09 DIAGNOSIS — F41.9 ANXIETY: ICD-10-CM

## 2023-08-09 PROCEDURE — 3078F DIAST BP <80 MM HG: CPT | Performed by: FAMILY MEDICINE

## 2023-08-09 PROCEDURE — 99213 OFFICE O/P EST LOW 20 MIN: CPT | Performed by: FAMILY MEDICINE

## 2023-08-09 PROCEDURE — 3074F SYST BP LT 130 MM HG: CPT | Performed by: FAMILY MEDICINE

## 2023-08-09 PROCEDURE — 1123F ACP DISCUSS/DSCN MKR DOCD: CPT | Performed by: FAMILY MEDICINE

## 2023-08-09 RX ORDER — LORAZEPAM 1 MG/1
TABLET ORAL
Qty: 30 TABLET | Refills: 2 | Status: SHIPPED | OUTPATIENT
Start: 2023-08-09 | End: 2023-09-08

## 2023-08-09 ASSESSMENT — PATIENT HEALTH QUESTIONNAIRE - PHQ9
SUM OF ALL RESPONSES TO PHQ QUESTIONS 1-9: 0
2. FEELING DOWN, DEPRESSED OR HOPELESS: 0
1. LITTLE INTEREST OR PLEASURE IN DOING THINGS: 0
SUM OF ALL RESPONSES TO PHQ9 QUESTIONS 1 & 2: 0

## 2023-08-09 ASSESSMENT — ENCOUNTER SYMPTOMS
VOMITING: 0
DIARRHEA: 0

## 2023-08-09 NOTE — PROGRESS NOTES
Subjective:      Patient ID: Matti Lopez is a 79 y.o. male. HPI Treatment Adherence:   Medication compliance:  {Desc; compliance:5303::\"compliant most of the time\"}  Diet compliance:  {Desc; compliance:5303::\"compliant most of the time\"}  Weight trend: {INCREASING/DECREASING/STABLE:90849}  Current exercise: {EXERCISE RETH:958778830}  Barriers: {Barriers to success:63480}    Hypertension:  Home blood pressure monitoring: {NO/YES:3068606482}. He {is/is not:9024} adherent to a low sodium diet. Patient {denies/complains:76930} {Symptoms of Hypertension, Denies:57406}. Antihypertensive medication side effects: {Hypertension med side effects:5728::\"no medication side effects noted\"}. Use of agents associated with hypertension: {bp agents assoc with hypertension:511::\"none\"}. Sodium (mEq/L)   Date Value   08/01/2023 140    BUN (mg/dL)   Date Value   08/01/2023 19    Glucose (mg/dL)   Date Value   08/01/2023 90   03/27/2023 84      Potassium (mEq/L)   Date Value   08/01/2023 4.5    Creatinine (mg/dL)   Date Value   08/01/2023 0.84         Hyperlipidemia:  No new myalgias or GI upset on {RP HYPERLIPIDEMIA MEDS:89323}.      Lab Results   Component Value Date    CHOL 145 08/01/2023    TRIG 79 08/01/2023    HDL 66 (H) 08/01/2023    LDLCALC 63 08/01/2023     Lab Results   Component Value Date    ALT 18 08/01/2023    AST 22 08/01/2023           Review of Systems    Objective:   Physical Exam    Assessment / Plan:

## 2023-08-09 NOTE — PROGRESS NOTES
Subjective:      Patient ID: Bright Salas is a 79 y.o. male    HPI  Here in follow up for anxiety and recent blood work. Still using ativan for most part nightly and this does seem to help quite a bit. Feeding pulled about a month ago. Took last dose of ativan around 8 pm  Does take occasional vicodin thru work-compensation and did take dose last night    Review of Systems   Constitutional:  Negative for fever. Gastrointestinal:  Negative for diarrhea and vomiting. Skin:  Negative for rash. Neurological:  Negative for weakness.    Reviewed allergy, medical, social, surgical, family and med list changes and updated   Files--reviewed blood work with improving anemia      Social History     Socioeconomic History    Marital status:      Spouse name: None    Number of children: None    Years of education: None    Highest education level: None   Tobacco Use    Smoking status: Former     Packs/day: 1.50     Years: 40.00     Pack years: 60.00     Types: Cigarettes     Quit date: 2018     Years since quittin.7    Smokeless tobacco: Never   Vaping Use    Vaping Use: Never used   Substance and Sexual Activity    Alcohol use: Yes     Comment: very rarely (1 beer a year)    Drug use: No     Social Determinants of Health     Financial Resource Strain: Low Risk     Difficulty of Paying Living Expenses: Not hard at all   Food Insecurity: No Food Insecurity    Worried About [place] in the Last Year: Never true    [address] in the Last Year: Never true   Transportation Needs: Unknown    Lack of Transportation (Non-Medical): No   Housing Stability: Unknown    Unstable Housing in the Last Year: No     Current Outpatient Medications   Medication Sig Dispense Refill    levothyroxine (SYNTHROID) 75 MCG tablet TAKE ONE TABLET BY MOUTH EVERY DAY 90 tablet 1    atorvastatin (LIPITOR) 80 MG tablet TAKE ONE TABLET BY MOUTH EVERY DAY 90 tablet 1    sildenafil (VIAGRA) 100 MG tablet TAKE ONE TABLET BY

## 2023-08-14 LAB
6MAM UR QL: NOT DETECTED
7-AMINOCLONAZEPAM: NOT DETECTED
ALPHA-OH-ALPRAZOLAM: NOT DETECTED
ALPHA-OH-MIDAZOLAM, URINE: NOT DETECTED
ALPRAZOLAM: NOT DETECTED
AMPHET UR QL SCN: NOT DETECTED
BARBITURATES: NOT DETECTED
BENZOYLECGONINE: NOT DETECTED
BUPRENORPHINE: NOT DETECTED
CARISOPRODOL UR QL: NOT DETECTED
CLONAZEPAM UR QL: NOT DETECTED
CODEINE: NOT DETECTED
CREAT UR-MCNC: 58.3 MG/DL (ref 20–400)
DIAZEPAM: NOT DETECTED
ETHYL GLUCURONIDE: NOT DETECTED
FENTANYL UR QL: NOT DETECTED
GABAPENTIN: NOT DETECTED
HYDROCODONE UR QL: PRESENT
HYDROMORPHONE: PRESENT
LORAZEPAM UR QL: PRESENT
MARIJUANA METABOLITE: NOT DETECTED
MDA: NOT DETECTED
MDEA: NOT DETECTED
MDMA UR QL: NOT DETECTED
MEPERIDINE: NOT DETECTED
METHADONE: NOT DETECTED
METHAMPHETAMINE: NOT DETECTED
METHYLPHENIDATE: NOT DETECTED
MIDAZOLAM UR QL SCN: NOT DETECTED
MORPHINE: NOT DETECTED
NALOXONE: NOT DETECTED
NORBUPRENORPHINE, FREE: NOT DETECTED
NORDIAZEPAM: NOT DETECTED
NORFENTANYL: NOT DETECTED
NORHYDROCODONE, URINE: PRESENT
NOROXYCODONE: NOT DETECTED
NOROXYMORPHONE, URINE: NOT DETECTED
OXAZEPAM UR QL: NOT DETECTED
OXYCODONE UR QL: NOT DETECTED
OXYMORPHONE UR QL: NOT DETECTED
PAIN MANAGEMENT DRUG PANEL: NORMAL
PATHOLOGY STUDY: NORMAL
PCP: NOT DETECTED
PHENTERMINE: NOT DETECTED
PREGABALIN: NOT DETECTED
TAPENTADOL, URINE: NOT DETECTED
TAPENTADOL-O-SULFATE, URINE: NOT DETECTED
TEMAZEPAM: NOT DETECTED
TRAMADOL: NOT DETECTED
ZOLPIDEM: NOT DETECTED

## 2023-08-24 ENCOUNTER — HOSPITAL ENCOUNTER (OUTPATIENT)
Dept: DATA CONVERSION | Facility: HOSPITAL | Age: 68
End: 2023-08-24
Attending: OTOLARYNGOLOGY | Admitting: OTOLARYNGOLOGY
Payer: COMMERCIAL

## 2023-08-24 DIAGNOSIS — R13.19 OTHER DYSPHAGIA: ICD-10-CM
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
## 2023-08-24 DIAGNOSIS — K22.2 ESOPHAGEAL OBSTRUCTION: ICD-10-CM

## 2023-08-30 ENCOUNTER — OFFICE VISIT (OUTPATIENT)
Dept: GASTROENTEROLOGY | Age: 68
End: 2023-08-30
Payer: COMMERCIAL

## 2023-08-30 VITALS
WEIGHT: 172 LBS | BODY MASS INDEX: 25.48 KG/M2 | HEIGHT: 69 IN | SYSTOLIC BLOOD PRESSURE: 109 MMHG | HEART RATE: 65 BPM | DIASTOLIC BLOOD PRESSURE: 57 MMHG | OXYGEN SATURATION: 97 %

## 2023-08-30 DIAGNOSIS — K20.90 ESOPHAGITIS: Primary | ICD-10-CM

## 2023-08-30 PROCEDURE — 3074F SYST BP LT 130 MM HG: CPT | Performed by: SPECIALIST

## 2023-08-30 PROCEDURE — 99213 OFFICE O/P EST LOW 20 MIN: CPT | Performed by: SPECIALIST

## 2023-08-30 PROCEDURE — 3078F DIAST BP <80 MM HG: CPT | Performed by: SPECIALIST

## 2023-08-30 PROCEDURE — 1123F ACP DISCUSS/DSCN MKR DOCD: CPT | Performed by: SPECIALIST

## 2023-08-30 ASSESSMENT — ENCOUNTER SYMPTOMS
RECTAL PAIN: 0
ABDOMINAL DISTENTION: 0
GASTROINTESTINAL NEGATIVE: 1
ABDOMINAL PAIN: 0
NAUSEA: 0
EYES NEGATIVE: 1
VOMITING: 0
BLOOD IN STOOL: 0
DIARRHEA: 0
RESPIRATORY NEGATIVE: 1
ANAL BLEEDING: 0
CONSTIPATION: 0

## 2023-08-30 NOTE — PROGRESS NOTES
Gastroenterology Clinic Follow up Visit    Melissa Andino  [id number]  Chief Complaint   Patient presents with    Follow-up     6 mos follow up. HPI and A/P at last visit summarized below:  Patient is here for follow-up. He has a history of throat cancer that was treated with chemo and radiation. Patient was on G-tube feeding which was removed few months ago. Patient also has history of esophageal stricture and esophagitis which was dilated in the past.  Patient reports that he has some difficulty in swallowing dry food and had stretching of his throat by Dr. Woo Munroe, some improvement noted. Patient feels that once the food passes beyond his throat he has no difficulty in swallowing. Patient is on omeprazole 20 mg once a day. Weight is stable. Review of Systems   Constitutional: Negative. HENT: Negative. Eyes: Negative. Respiratory: Negative. Gastrointestinal: Negative. Negative for abdominal distention, abdominal pain, anal bleeding, blood in stool, constipation, diarrhea, nausea, rectal pain and vomiting. Oropharyngeal dysphagia probably related to radiation   Genitourinary: Negative. Musculoskeletal: Negative. Neurological: Negative. Psychiatric/Behavioral: Negative. Past medical history, past surgical history, medication list, social and familyhistory reviewed    Blood pressure (!) 109/57, pulse 65, height 5' 9\" (1.753 m), weight 172 lb (78 kg), SpO2 97 %. Physical Exam  Constitutional:       Appearance: He is well-developed. HENT:      Head: Normocephalic. Eyes:      Pupils: Pupils are equal, round, and reactive to light. Cardiovascular:      Rate and Rhythm: Normal rate and regular rhythm. Heart sounds: Normal heart sounds. Pulmonary:      Effort: Pulmonary effort is normal.      Breath sounds: Normal breath sounds. Abdominal:      General: Bowel sounds are normal.      Palpations: Abdomen is soft.       Comments: Soft nontender no palpable mass,

## 2023-09-07 VITALS — BODY MASS INDEX: 24.29 KG/M2 | HEIGHT: 68 IN | WEIGHT: 160.27 LBS

## 2023-09-14 NOTE — H&P
History & Physical Reviewed:   I have reviewed the History and Physical dated:  22-Mar-2023   History and Physical reviewed and relevant findings noted. Patient examined to review pertinent physical  findings.: No significant changes   Home Medications Reviewed: no changes noted   Allergies Reviewed: no changes noted       ERAS (Enhanced Recovery After Surgery):  ·  ERAS Patient: no     Consent:   COVID-19 Consent:  ·  COVID-19 Risk Consent Surgeon has reviewed key risks related to the risk of lorie COVID-19 and if they contract COVID-19 what the risks are.       Electronic Signatures:  Andrews Gamez)  (Signed 30-Mar-2023 10:02)   Authored: History & Physical Reviewed, ERAS, Consent,  Note Completion      Last Updated: 30-Mar-2023 10:02 by Andrews Gamez)

## 2023-09-18 ENCOUNTER — OFFICE VISIT (OUTPATIENT)
Dept: CARDIOLOGY CLINIC | Age: 68
End: 2023-09-18
Payer: COMMERCIAL

## 2023-09-18 VITALS
HEIGHT: 69 IN | HEART RATE: 63 BPM | BODY MASS INDEX: 26.1 KG/M2 | OXYGEN SATURATION: 97 % | WEIGHT: 176.2 LBS | DIASTOLIC BLOOD PRESSURE: 64 MMHG | SYSTOLIC BLOOD PRESSURE: 110 MMHG

## 2023-09-18 DIAGNOSIS — Z00.00 PE (PHYSICAL EXAM), ANNUAL: Primary | ICD-10-CM

## 2023-09-18 DIAGNOSIS — I25.10 CORONARY ARTERY DISEASE INVOLVING NATIVE CORONARY ARTERY OF NATIVE HEART WITHOUT ANGINA PECTORIS: ICD-10-CM

## 2023-09-18 DIAGNOSIS — I25.119 ATHEROSCLEROSIS OF NATIVE CORONARY ARTERY OF NATIVE HEART WITH ANGINA PECTORIS (HCC): ICD-10-CM

## 2023-09-18 DIAGNOSIS — N52.9 VASCULOGENIC ERECTILE DYSFUNCTION, UNSPECIFIED VASCULOGENIC ERECTILE DYSFUNCTION TYPE: ICD-10-CM

## 2023-09-18 DIAGNOSIS — I50.33 ACUTE ON CHRONIC DIASTOLIC HEART FAILURE (HCC): ICD-10-CM

## 2023-09-18 DIAGNOSIS — R09.89 BILATERAL CAROTID BRUITS: ICD-10-CM

## 2023-09-18 DIAGNOSIS — I10 ESSENTIAL HYPERTENSION: ICD-10-CM

## 2023-09-18 DIAGNOSIS — E78.5 DYSLIPIDEMIA: ICD-10-CM

## 2023-09-18 PROCEDURE — 3078F DIAST BP <80 MM HG: CPT | Performed by: INTERNAL MEDICINE

## 2023-09-18 PROCEDURE — 99214 OFFICE O/P EST MOD 30 MIN: CPT | Performed by: INTERNAL MEDICINE

## 2023-09-18 PROCEDURE — 3074F SYST BP LT 130 MM HG: CPT | Performed by: INTERNAL MEDICINE

## 2023-09-18 PROCEDURE — 1123F ACP DISCUSS/DSCN MKR DOCD: CPT | Performed by: INTERNAL MEDICINE

## 2023-09-18 ASSESSMENT — ENCOUNTER SYMPTOMS
CHEST TIGHTNESS: 0
NAUSEA: 0
WHEEZING: 0
COUGH: 0
GASTROINTESTINAL NEGATIVE: 1
STRIDOR: 0
BLOOD IN STOOL: 0
EYES NEGATIVE: 1
SHORTNESS OF BREATH: 1
BACK PAIN: 1

## 2023-09-18 NOTE — PROGRESS NOTES
Office Visit. Patient: Juan Parker  YOB: 1955  MRN: [id number]    Chief Complaint: RAMOS LE edema Orthopnea  Chief Complaint   Patient presents with    Follow-up     4 month    Coronary Artery Disease       CV Data:  7/2019 echo EF 60  7/2019 Dobut stress echo negative   LAD 40-50% sequential, RCA  mid. LVEF 55, EDP 14, PCWP 14 CO 7.2 L/min  10/2019 CUS- mild  10/2010 Abd US negative. 11/2020 CUS mild     Subjective/HPI RAMOS is getting worse. Can't do much due to knee and back pain. Can't lie flat due to sob    10/16/2019: still sob but better since BB and Imdur. No cp. No bleed no falls. 1/17/2020 no cp occ ramos no falls occ nose bleed no falls     6/19/2020 Patient and/or health care decision maker is aware that that he/she may receive a bill for this telephone service, depending on his insurance coverage, and has provided verbal consent to proceed. This visit was completed via telephone. Total time 13 minutes. Still has some ramos but better. No cp no falls no bleed little edema. 7/27/2020 still RAMOS. 10/28/2020 no RAMOS No CP active at home. No falls no bleed. 1/28/21 still sob occ cp but not bad. No NTG needed. No bleed no falls     4/29/21 lately occ LE edema no cp no sob no bleed. Takes mes. Recent EGD     8/31/21 No cp no sob no falls no bleed. Lost near 30LBS by deiting. Takes med    4/18/22 doing welll no cp no sob no falls no bleed no edema. 8/22/22 dz with throat and tongue cancer. Getting PE scan. CV stable no pc  no sob no falls nobleed. Takes meds    12/22/22  finished chemo for tongue and throat cancer. No cp stlll sob no worse. Not eating. Gets tube feed via PEG. Coreg stopped due to so much weight loss. 5/5/23 doing well no cp no sob no falls no bleed actvie sees ENT for f/u throat cancer. 9/18/23 had throat dilated  by ENT. Feels we;ll active no cp no sob no falls no bleed.      Stopped smoking 11/2018 with prior 2 ppd  Disabled truck

## 2023-09-29 ENCOUNTER — HOSPITAL ENCOUNTER (OUTPATIENT)
Dept: ULTRASOUND IMAGING | Age: 68
End: 2023-09-29
Attending: INTERNAL MEDICINE
Payer: COMMERCIAL

## 2023-09-29 DIAGNOSIS — R09.89 BILATERAL CAROTID BRUITS: ICD-10-CM

## 2023-09-29 PROCEDURE — 93880 EXTRACRANIAL BILAT STUDY: CPT | Performed by: INTERNAL MEDICINE

## 2023-09-29 PROCEDURE — 93880 EXTRACRANIAL BILAT STUDY: CPT

## 2023-09-30 LAB
VAS LEFT CCA DIST EDV: 44.1 CM/S
VAS LEFT CCA DIST PSV: 116 CM/S
VAS LEFT CCA MID EDV: 32.9 CM/S
VAS LEFT CCA MID PSV: 102 CM/S
VAS LEFT CCA PROX EDV: 34.3 CM/S
VAS LEFT CCA PROX PSV: 111 CM/S
VAS LEFT ECA EDV: 30.1 CM/S
VAS LEFT ECA PSV: 112 CM/S
VAS LEFT ICA DIST EDV: 29 CM/S
VAS LEFT ICA DIST PSV: 60 CM/S
VAS LEFT ICA MID EDV: 39.9 CM/S
VAS LEFT ICA MID PSV: 83.4 CM/S
VAS LEFT ICA PROX EDV: 32.9 CM/S
VAS LEFT ICA PROX PSV: 98.1 CM/S
VAS LEFT ICA/CCA PSV: 1
VAS LEFT VERTEBRAL EDV: 20.6 CM/S
VAS LEFT VERTEBRAL PSV: 51.6 CM/S
VAS RIGHT CCA DIST EDV: 40.4 CM/S
VAS RIGHT CCA DIST PSV: 97.5 CM/S
VAS RIGHT CCA MID EDV: 37.3 CM/S
VAS RIGHT CCA MID PSV: 104 CM/S
VAS RIGHT CCA PROX EDV: 28 CM/S
VAS RIGHT CCA PROX PSV: 88.8 CM/S
VAS RIGHT ECA EDV: 26.1 CM/S
VAS RIGHT ECA PSV: 109 CM/S
VAS RIGHT ICA DIST EDV: 31.3 CM/S
VAS RIGHT ICA DIST PSV: 54.3 CM/S
VAS RIGHT ICA MID EDV: 26.3 CM/S
VAS RIGHT ICA MID PSV: 64.2 CM/S
VAS RIGHT ICA PROX EDV: 19.3 CM/S
VAS RIGHT ICA PROX PSV: 85.1 CM/S
VAS RIGHT ICA/CCA PSV: 0.8
VAS RIGHT VERTEBRAL EDV: 15.4 CM/S
VAS RIGHT VERTEBRAL PSV: 37.3 CM/S

## 2023-09-30 NOTE — H&P
History & Physical Reviewed:   I have reviewed the History and Physical dated:  11-Aug-2023   History and Physical reviewed and relevant findings noted. Patient examined to review pertinent physical  findings.: No significant changes   Home Medications Reviewed: no changes noted   Allergies Reviewed: no changes noted       ERAS (Enhanced Recovery After Surgery):  ·  ERAS Patient: no     Consent:   COVID-19 Consent:  ·  COVID-19 Risk Consent Surgeon has reviewed key risks related to the risk of lorie COVID-19 and if they contract COVID-19 what the risks are.       Electronic Signatures:  Andrews Gamez)  (Signed 24-Aug-2023 08:38)   Authored: History & Physical Reviewed, ERAS, Consent,  Note Completion      Last Updated: 24-Aug-2023 08:38 by Andrews Gamez)

## 2023-10-01 NOTE — OP NOTE
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 DIRECT LARYNGOSCOPY ESOPHAGSCOPY W/ DILATION    Anesthesia: Michael Gomez  Estimated Blood Loss: 0  Findings: No unusual findings  Specimens(s) Collected: no,     Complications: None        Operative Report:   Patient was taken to the operating room and administered general anesthesia.  Appropriate prep drape and timeout performed in usual manner.  The patient had direct laryngoscopy performed  to fully examine the laryngeal structures without evidence of mass or tumor or recurrent nature.  The patient had change over to the rigid esophagoscope and subsequent to same, the patient underwent dilation up to #46 bougie dilation.  Once that was accomplished  reinspection of the mucosa looks fine.  There is no evidence of any intrinsic laceration or tear.  Therefore, the operation was terminated.  Patient released and taken recovery stable condition.                        Attestation:   Note Completion:  Attending Attestation I performed the procedure without a resident         Electronic Signatures:  Andrews Gamez)  (Signed 24-Aug-2023 11:58)   Authored: Post-Operative Note, Chart Review, Note Completion      Last Updated: 24-Aug-2023 11:58 by Andrews Gamez)

## 2023-10-02 NOTE — OP NOTE
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  DIRECT LARYNGOSCOPY WITH BIOPSY     Anesthesia: No anesthesiologist associated with this case  Estimated Blood Loss: Minimal  Findings: Multiple biopsies taken from concerning area right residual tonsil/oropharyngeal region.  Specimens(s) Collected: yes,  Multiple biopsies from right residual tonsil/oropharyngeal region   Complications: None        Operative Report:   The patient was taken to the operating room and administered general anesthesia.  Appropriate prep drape and timeout performed in usual manner.  Patient had the laryngoscope brought  into position.  Multiple biopsies taken of residual raw surface and eschar formation residual right tumor site.  Biopsies all sent for permanent section.  Induration is noted.  Hard to tell for sure there is mio malignancy present.  We will await permanent  pathology.  Any bleeding controlled pledgets with Afrin.  All pledgets removed and laryngoscope removed.  Patient released and taken recovery stable condition.                        Attestation:   Note Completion:  Attending Attestation I performed the procedure without a resident         Electronic Signatures:  Andrews Gamez)  (Signed 30-Mar-2023 12:17)   Authored: Post-Operative Note, Chart Review, Note Completion      Last Updated: 30-Mar-2023 12:17 by Andrews Gamez)

## 2023-10-26 ENCOUNTER — OFFICE VISIT (OUTPATIENT)
Dept: PAIN MANAGEMENT | Age: 68
End: 2023-10-26

## 2023-10-26 VITALS
BODY MASS INDEX: 25.92 KG/M2 | WEIGHT: 175 LBS | HEIGHT: 69 IN | SYSTOLIC BLOOD PRESSURE: 116 MMHG | DIASTOLIC BLOOD PRESSURE: 64 MMHG | TEMPERATURE: 98.3 F

## 2023-10-26 DIAGNOSIS — M47.817 LUMBOSACRAL SPONDYLOSIS WITHOUT MYELOPATHY: Primary | ICD-10-CM

## 2023-10-26 RX ORDER — LORAZEPAM 0.5 MG/1
0.5 TABLET ORAL EVERY 6 HOURS PRN
COMMUNITY

## 2023-10-26 RX ORDER — LOSARTAN POTASSIUM 25 MG/1
25 TABLET ORAL DAILY
COMMUNITY

## 2023-10-26 RX ORDER — HYDROCODONE BITARTRATE AND ACETAMINOPHEN 5; 325 MG/1; MG/1
1 TABLET ORAL EVERY 6 HOURS PRN
COMMUNITY

## 2023-10-26 NOTE — PROGRESS NOTES
[address] [facility]  [facility]  [address] , [address] [facility], [address] [place] [address] [place]: [phone]  F: [phone]        Jaqueline Gray  ([id number])    10/26/2023    Subjective:     Jaqueline Gray is 79 y.o. male who complains today of:     Chief Complaint   Patient presents with    Follow-up    Back Pain    Leg Pain     Patient here today for follow-up. His back pain is flared up. He had radiofrequency ablation done January 2022 helped over 50%. He has a lot of medical issues with throat cancer that he is being treated for. Back spasm when he bends with activity as well. Achy sharp pain it varies. He has a lot of medical issues in the interim. Pain affects his quality life. Plan: Discussed options in detail. Like to put in for C9 for bilateral L3-4-5 RF ablation as it helped over 50% in the past.  Patient has failed conservative. Continue home excise program as tolerated. Questions answered chart was reviewed. Allergies:  Demerol hcl [meperidine] and Valium [diazepam]    Past Medical History:   Diagnosis Date    CAD (coronary artery disease)     Cancer ([address])     prostate cancer Nov 16 2018    CHF (congestive heart failure) (HCC)     Chicken pox     Chronic back pain     Emphysema of lung ([address])     Hyperlipidemia     Hypertension     Hypothyroidism     Measles     Mumps     Osteoarthritis     Pneumonia      Past Surgical History:   Procedure Laterality Date    CARDIAC CATHETERIZATION      2019    COLONOSCOPY      2016    CYST REMOVAL      back of neck    CYST REMOVAL Left 03/04/2016    Dr Yessica Pandey  07/2018    DENTAL SURGERY      HERNIA REPAIR      2016, 2019    LARYNGOSCOPY N/A 8/16/2022    DIRECT LARYNGOSCOPY WITH BIOPSY.  30 MIN  TO BE LATEX FREE performed by Mary Daly MD at [address]  07/2018    Biopsy    PROSTATECTOMY  11/16/2018    TONSILLECTOMY N/A 08/12/2021    TONSILLECTOMY performed by Belinda Ennis

## 2023-11-09 ENCOUNTER — OFFICE VISIT (OUTPATIENT)
Dept: FAMILY MEDICINE CLINIC | Age: 68
End: 2023-11-09
Payer: COMMERCIAL

## 2023-11-09 VITALS — RESPIRATION RATE: 16 BRPM | HEIGHT: 69 IN | BODY MASS INDEX: 25.84 KG/M2

## 2023-11-09 DIAGNOSIS — E03.9 HYPOTHYROIDISM, UNSPECIFIED TYPE: ICD-10-CM

## 2023-11-09 DIAGNOSIS — E78.00 PURE HYPERCHOLESTEROLEMIA: ICD-10-CM

## 2023-11-09 DIAGNOSIS — F41.9 ANXIETY: Primary | ICD-10-CM

## 2023-11-09 DIAGNOSIS — I10 ESSENTIAL HYPERTENSION: ICD-10-CM

## 2023-11-09 PROCEDURE — 1123F ACP DISCUSS/DSCN MKR DOCD: CPT | Performed by: FAMILY MEDICINE

## 2023-11-09 PROCEDURE — 99214 OFFICE O/P EST MOD 30 MIN: CPT | Performed by: FAMILY MEDICINE

## 2023-11-09 RX ORDER — LEVOTHYROXINE SODIUM 0.07 MG/1
75 TABLET ORAL DAILY
Qty: 90 TABLET | Refills: 0 | Status: SHIPPED | OUTPATIENT
Start: 2023-11-09

## 2023-11-09 RX ORDER — ATORVASTATIN CALCIUM 80 MG/1
80 TABLET, FILM COATED ORAL DAILY
Qty: 90 TABLET | Refills: 0 | Status: SHIPPED | OUTPATIENT
Start: 2023-11-09

## 2023-11-09 RX ORDER — LORAZEPAM 1 MG/1
TABLET ORAL
Qty: 30 TABLET | Refills: 2 | Status: SHIPPED | OUTPATIENT
Start: 2023-11-09 | End: 2023-12-09

## 2023-11-09 ASSESSMENT — ENCOUNTER SYMPTOMS
VOMITING: 0
DIARRHEA: 0

## 2023-11-09 NOTE — PROGRESS NOTES
HYDROcodone-acetaminophen (NORCO) 5-325 MG per tablet Take 1 tablet by mouth every 6 hours as needed for Pain. Max Daily Amount: 4 tablets      LORazepam (ATIVAN) 0.5 MG tablet Take 1 tablet by mouth every 6 hours as needed for Anxiety. Max Daily Amount: 2 mg      levothyroxine (SYNTHROID) 75 MCG tablet TAKE ONE TABLET BY MOUTH EVERY DAY 90 tablet 1    atorvastatin (LIPITOR) 80 MG tablet TAKE ONE TABLET BY MOUTH EVERY DAY 90 tablet 1    sildenafil (VIAGRA) 100 MG tablet TAKE ONE TABLET BY MOUTH EVERY DAY AS NEEDED 10 tablet 5    omeprazole (PRILOSEC) 20 MG delayed release capsule Take 1 capsule by mouth every morning (before breakfast) 90 capsule 3    diclofenac sodium (VOLTAREN) 1 % GEL Apply topically daily as needed for Pain      vitamin B-12 (CYANOCOBALAMIN) 100 MCG tablet Take 0.5 tablets by mouth daily      vitamin D3 (CHOLECALCIFEROL) 10 MCG (400 UNIT) TABS tablet Take 1 tablet by mouth daily      aspirin 81 MG EC tablet Take 1 tablet by mouth daily      acetaminophen (TYLENOL) 500 MG tablet Take 2 tablets by mouth daily At night      vitamin C (ASCORBIC ACID) 500 MG tablet Take 1 tablet by mouth daily       No current facility-administered medications for this visit.      Family History   Problem Relation Age of Onset    Other Mother     Alzheimer's Disease Mother     Cancer Father         prostate    Other Father     Cancer Sister         lung ca    Cancer Brother         lung ca    Lung Cancer Brother     Cancer Brother         prostate ca    No Known Problems Son     No Known Problems Daughter     Colon Cancer Neg Hx      Past Medical History:   Diagnosis Date    CAD (coronary artery disease)     Cancer ([address])     prostate cancer Nov 16 2018    CHF (congestive heart failure) (HCC)     Chicken pox     Chronic back pain     Emphysema of lung ([address])     Hyperlipidemia     Hypertension     Hypothyroidism     Measles     Mumps     Osteoarthritis     Pneumonia    Controlled substances monitoring: [place]

## 2023-11-14 ENCOUNTER — TELEPHONE (OUTPATIENT)
Dept: PAIN MANAGEMENT | Age: 68
End: 2023-11-14

## 2023-11-14 NOTE — TELEPHONE ENCOUNTER
[facility] denied authorization for Right and Left L3,4,5 RFA. Please advise patient. If he wants to proceed with treatment using medical insurance, please print order and forward to [employer] Dept to check auth requirements. If he does not want to proceed, he can discuss other treatment options in a follow up appt with his provider.

## 2023-11-14 NOTE — TELEPHONE ENCOUNTER
Informed patient and he said his  is appealing it. So I told him to let us know if they approve it. He said we should get a letter.

## 2023-11-17 ENCOUNTER — LAB (OUTPATIENT)
Dept: LAB | Facility: LAB | Age: 68
End: 2023-11-17
Payer: COMMERCIAL

## 2023-11-17 DIAGNOSIS — C61 MALIGNANT NEOPLASM OF PROSTATE (MULTI): Primary | ICD-10-CM

## 2023-11-17 LAB — PSA SERPL-MCNC: 0.03 NG/ML

## 2023-11-17 PROCEDURE — 84153 ASSAY OF PSA TOTAL: CPT

## 2023-11-17 PROCEDURE — 36415 COLL VENOUS BLD VENIPUNCTURE: CPT

## 2023-11-24 ENCOUNTER — APPOINTMENT (OUTPATIENT)
Dept: RADIATION ONCOLOGY | Facility: CLINIC | Age: 68
End: 2023-11-24

## 2023-11-29 ENCOUNTER — HOSPITAL ENCOUNTER (OUTPATIENT)
Dept: RADIATION ONCOLOGY | Facility: CLINIC | Age: 68
Setting detail: RADIATION/ONCOLOGY SERIES
Discharge: HOME | End: 2023-11-29
Payer: COMMERCIAL

## 2023-11-29 VITALS
RESPIRATION RATE: 18 BRPM | BODY MASS INDEX: 25.99 KG/M2 | TEMPERATURE: 97 F | WEIGHT: 176 LBS | SYSTOLIC BLOOD PRESSURE: 120 MMHG | HEART RATE: 62 BPM | DIASTOLIC BLOOD PRESSURE: 72 MMHG | OXYGEN SATURATION: 98 %

## 2023-11-29 DIAGNOSIS — F41.9 ANXIETY: ICD-10-CM

## 2023-11-29 DIAGNOSIS — C61 PROSTATE CANCER (MULTI): Primary | ICD-10-CM

## 2023-11-29 DIAGNOSIS — R13.10 DYSPHAGIA, UNSPECIFIED TYPE: ICD-10-CM

## 2023-11-29 PROCEDURE — 99215 OFFICE O/P EST HI 40 MIN: CPT | Performed by: RADIOLOGY

## 2023-11-29 RX ORDER — LANOLIN ALCOHOL/MO/W.PET/CERES
1 CREAM (GRAM) TOPICAL DAILY
COMMUNITY

## 2023-11-29 RX ORDER — CHOLECALCIFEROL (VITAMIN D3) 25 MCG
1 TABLET ORAL DAILY
COMMUNITY

## 2023-11-29 RX ORDER — LORAZEPAM 1 MG/1
TABLET ORAL
COMMUNITY
Start: 2023-11-09 | End: 2023-12-09

## 2023-11-29 RX ORDER — LEVOTHYROXINE SODIUM 75 UG/1
1 TABLET ORAL DAILY
COMMUNITY
Start: 2022-09-20

## 2023-11-29 RX ORDER — SILDENAFIL 100 MG/1
1 TABLET, FILM COATED ORAL DAILY PRN
COMMUNITY
Start: 2023-06-08

## 2023-11-29 RX ORDER — ACETAMINOPHEN 500 MG
1000 TABLET ORAL
COMMUNITY

## 2023-11-29 RX ORDER — DICLOFENAC SODIUM 10 MG/G
GEL TOPICAL
COMMUNITY

## 2023-11-29 RX ORDER — HYDROCODONE BITARTRATE AND ACETAMINOPHEN 5; 325 MG/1; MG/1
1 TABLET ORAL EVERY 6 HOURS PRN
COMMUNITY
Start: 2022-10-18

## 2023-11-29 RX ORDER — ASCORBIC ACID 500 MG
1 TABLET ORAL DAILY
COMMUNITY

## 2023-11-29 RX ORDER — SUCRALFATE 1 G/10ML
1 SUSPENSION ORAL 4 TIMES DAILY
Qty: 1200 ML | Refills: 0 | Status: SHIPPED | OUTPATIENT
Start: 2023-11-29 | End: 2023-12-29

## 2023-11-29 RX ORDER — LORAZEPAM 1 MG/1
1 TABLET ORAL 2 TIMES DAILY PRN
Qty: 40 TABLET | Refills: 0 | Status: SHIPPED | OUTPATIENT
Start: 2023-11-29

## 2023-11-29 RX ORDER — OMEPRAZOLE 20 MG/1
20 CAPSULE, DELAYED RELEASE ORAL
COMMUNITY
Start: 2022-08-05

## 2023-11-29 RX ORDER — TIOTROPIUM BROMIDE 18 UG/1
CAPSULE ORAL; RESPIRATORY (INHALATION)
COMMUNITY

## 2023-11-29 RX ORDER — ATORVASTATIN CALCIUM 80 MG/1
1 TABLET, FILM COATED ORAL DAILY
COMMUNITY
Start: 2022-09-13

## 2023-11-29 RX ORDER — ASPIRIN 81 MG/1
1 TABLET ORAL DAILY
COMMUNITY

## 2023-11-29 ASSESSMENT — PATIENT HEALTH QUESTIONNAIRE - PHQ9
1. LITTLE INTEREST OR PLEASURE IN DOING THINGS: NOT AT ALL
SUM OF ALL RESPONSES TO PHQ9 QUESTIONS 1 AND 2: 0
2. FEELING DOWN, DEPRESSED OR HOPELESS: NOT AT ALL

## 2023-11-29 ASSESSMENT — PAIN SCALES - GENERAL: PAINLEVEL: 0-NO PAIN

## 2023-11-29 ASSESSMENT — ENCOUNTER SYMPTOMS
OCCASIONAL FEELINGS OF UNSTEADINESS: 0
DEPRESSION: 0
LOSS OF SENSATION IN FEET: 1

## 2023-11-29 ASSESSMENT — COLUMBIA-SUICIDE SEVERITY RATING SCALE - C-SSRS
6. HAVE YOU EVER DONE ANYTHING, STARTED TO DO ANYTHING, OR PREPARED TO DO ANYTHING TO END YOUR LIFE?: NO
2. HAVE YOU ACTUALLY HAD ANY THOUGHTS OF KILLING YOURSELF?: NO
1. IN THE PAST MONTH, HAVE YOU WISHED YOU WERE DEAD OR WISHED YOU COULD GO TO SLEEP AND NOT WAKE UP?: NO

## 2023-11-29 NOTE — PROGRESS NOTES
Radiation Oncology Follow-Up    Patient Name:  Miguel Lofton  MRN:  [id number]  :  1955    Referring Provider: No ref. provider found  Primary Care Provider: Terrence Graham MD  Care Team: Patient Care Team:  Terrence Graham MD as PCP - General    Date of Service: 2023     SUMMARY:   1) Unfavorable intermediate risk prostate cancer, s/p RALP 2018; [id number], [place] 4+3=7, -EPE, -SVI, right anterior margin focally positive. Post op PSA undetectable until 22 with PSA 0.02, 23 PSA 0.04, Most recent PSA 0.03. Pending PSMA PET/CT. 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  Bilateral small nodes on PET/CT, palpable on right. Former smoker s/p Moderately accelerated  definitive radiotherapy, 6 MV photons utilizing VMAT SIB technique, 70Gy in 33 fractions completing on 10/26/2022.  The patient received concurrent weekly cisplatin. No evidence of disease thus far         SUBJECTIVE  History of Present Illness:   Miguel Lofton is a 68 y.o. male who is presenting today for follow-up. Prior radiation treatment as follows:    Radiation Treatments       No radiation treatments to show. (Treatments may have been administered in another system.)            INTERIM HISTORY:    Most recent PSA remains elevated at 0.03    SUBJECTIVE:    Today, the patient reports feeling well overall. They have continued dysphagia especially with dry foods. They report some continued urinary incontinence but that has been stable for the patient      Review of Systems:   Review of Systems   Constitutional:  Positive for fatigue. Negative for appetite change, chills, diaphoresis, fever and unexpected weight change.   HENT:   Positive for trouble swallowing and voice change. Negative for hearing loss, lump/mass, mouth sores, nosebleeds, sore throat and  tinnitus.    Eyes: Negative.    Respiratory: Negative.     Cardiovascular: Negative.    Gastrointestinal: Negative.    Endocrine: Negative.    Genitourinary:  Positive for frequency. Negative for bladder incontinence, difficulty urinating, dyspareunia, dysuria, hematuria, nocturia, pelvic pain and penile discharge.    Musculoskeletal:  Positive for arthralgias and back pain. Negative for flank pain, gait problem, myalgias, neck pain and neck stiffness.   Skin: Negative.    Neurological:  Positive for numbness. Negative for dizziness, extremity weakness, gait problem, headaches, light-headedness, seizures and speech difficulty.   Hematological: Negative.    Psychiatric/Behavioral: Negative.       The patient's current pain level was assessed.  They report currently having a pain of 0 out of 10.  They feel their pain is under control with the use of pain medications.    Performance Status:   The Karnofsky performance scale today is 100, Fully active, able to carry on all pre-disease performed without restriction (ECOG equivalent 0).     OBJECTIVE  Vital Signs:  /72 (BP Location: Right arm, Patient Position: Sitting, BP Cuff Size: Large adult)   Pulse 62   Temp 36.1 °C (97 °F) (Tympanic)   Resp 18   Wt 79.8 kg (176 lb)   SpO2 98%   BMI 25.99 kg/m²    Physical Exam:  Constitutional: Awake, alert and oriented. +anxious. Appears stated age.  Eyes: Conjunctivae are clear without exudates or hemorrhage. Eyelids are normal in appearance without swelling or lesions.  ENMT: mucous membranes moist, no apparent injury, no lesions seen  Neck: Neck supple, no apparent injury, trachea midline  Resp/Thorax: Patent airways,  good chest expansion. Thorax symmetric  Cardiovascular: The external chest is normal without lifts, heaves, or thrills. PMI is not visible.  GI: Nondistended, soft, non-tender.  /Gyn: Deferred  MSK: No tenderness noted on palpation of the spine. No ROM limitations.  Extremities: normal extremities,  no cyanosis, erythema or edema.  Neurological: alert and oriented x3. Sensory and motor function appear intact. No gait abnormality observed.  Psych: Appropriate mood and behavior.  Skin: Warm and dry, no new lesions or rashes.         Labs:  Lab Results   Component Value Date    WBC 3.8 (L) 08/17/2023    HGB 12.3 (L) 08/17/2023    HCT 38.2 (L) 08/17/2023     08/17/2023    ALT 17 08/17/2023    AST 23 08/17/2023     08/17/2023    K 4.7 08/17/2023     08/17/2023    CREATININE 0.83 08/17/2023    BUN 22 08/17/2023    CO2 31 08/17/2023    TSH 2.28 07/10/2023    PSA 0.03 11/17/2023    INR 0.9 11/07/2018    HGBA1C 6.0 (H) 09/21/2021     PSA   Date Value Ref Range Status   08/17/2023 0.03 0.00 - 4.00 ng/mL Final     Comment:     The FDA requires that the method used for PSA assay be   reported to the physician. Values obtained with different   assay methods must not be used interchangeably. This test  was performed at [facility] using the Access   Hybritech PSA assay is a two-site immunoenzymatic sandwich   assay. The assay is approved for measurement of   prostate-specific antigen (PSA)in serum and may be used   in conjunction with a digital rectal examination in men   50 years and older as an aid in detection of prostate   cancer.  5-Alpha-reductase inhibitors (e.g. Proscar, Finasteride,   Avodart, Dutasteride and Marycruz) for the treatment of BPH   have been shown to lower PSA levels by an average of 50%   after 6 months of treatment.     07/12/2023 CANCELED       Comment:     The FDA requires that the method used for PSA assay be   reported to the physician. Values obtained with different   assay methods must not be used interchangeably. This test   was performed at [facility] using the Siemens [employer]" PSA method, which is a sandwich immunoassay using   chemiluminescence for quantitation. The assay is approved  for measurement of prostate-specific antigen (PSA) in    serum and may be used in conjunction with a digital rectal  examination in men 50 years and older as an aid in   detection of prostate cancer.   5-Alpha-reductase inhibitors (e.g. Proscar, Finasteride,   Avodart, Dutasteride and Marycruz) for the treatment of BPH   have been shown to lower PSA levels by an average of 50%   after 6 months of treatment.    Result canceled by the ancillary.     07/10/2023 0.03 0.00 - 4.00 ng/mL Final     Comment:     The FDA requires that the method used for PSA assay be   reported to the physician. Values obtained with different   assay methods must not be used interchangeably. This test  was performed at [facility] using the Access   Hybritech PSA assay is a two-site immunoenzymatic sandwich   assay. The assay is approved for measurement of   prostate-specific antigen (PSA)in serum and may be used   in conjunction with a digital rectal examination in men   50 years and older as an aid in detection of prostate   cancer.  5-Alpha-reductase inhibitors (e.g. Proscar, Finasteride,   Avodart, Dutasteride and Marycruz) for the treatment of BPH   have been shown to lower PSA levels by an average of 50%   after 6 months of treatment.       Prostate Specific AG   Date Value Ref Range Status   11/17/2023 0.03 <=4.00 ng/mL Final     FINAL DIAGNOSIS  A.  LYMPH NODES, RIGHT PELVIC, LYMPH NODE DISSECTION:    -- FIVE LYMPH NODES, NEGATIVE FOR CARCINOMA (0/5)    B.  LYMPH NODES, LEFT PELVIC, LYMPH NODE DISSECTION:    -- SIX LYMPH NODES, NEGATIVE FOR CARCINOMA (0/6)    C.  PROSTATE, RADICAL PROSTATECTOMY:    -- PROSTATIC ADENOCARCINOMA, KADI SCORE 4+3=7  -- CARCINOMA IS CONFINED TO THE PROSTATE  -- CARCINOMA IS FOCALLY PRESENT AT THE MARGIN OF RESECTION  -- SEE CANCER SUMMARY REPORT    The gross and/or microscopic findings were reviewed in conjunction with  pathology resident, Char Mack M.D.                                                                                                                                                CASE SUMMARY REPORT       Procedure:      Radical prostatectomy     Prostate Size     Prostate Weight (g): 23 g     Prostate Greatest Dimension in Centimeters (cm): 4.5 Centimeters (cm)     Additional Dimension in Centimeters (cm): 5.0 Centimeters (cm)     Additional Dimension in Centimeters (cm): 4.0 Centimeters (cm)  TUMOR  Histologic Type:      Acinar adenocarcinoma     Histologic Grade     [place] Pattern:      Primary [place] Pattern:        Pattern 4      Secondary [place] Pattern:        Pattern 3      Tertiary [place]rick Pattern:        Not applicable      Total [place] Score:        7      Grade Group:        3  Intraductal Carcinoma (IDC):      Present     Tumor Extent     Tumor Quantitation:      Estimated percentage of prostate involved by tumor:  10 %     Tumor size:     Greatest Dimension in Millimeters (mm): 15 Millimeters (mm)     Extraprostatic Extension (EPE):      Not identified     Urinary Bladder Neck Invasion:      Not identified     Seminal Vesicle Invasion:      Not identified     Accessory Findings     Treatment Effect:      No known presurgical therapy  MARGINS  Margins:      Involved by invasive carcinoma (presence of carcinoma is  confirmed with immunohistochemical stains for p63 and [id number] which  demonstrate loss of basal cells in the malignant glands present at the margin)         Limited (< 3 mm)        Linear Length of Positive Margin(s) in Millimeters (mm): 2 Millimeters  (mm)   Focality:        Unifocal   Location of Positive Margin(s):        Right anterior  LYMPH NODES  Regional Lymph Nodes:   Number of Lymph Nodes Involved:           0   Number of Lymph Nodes Examined:         11  PATHOLOGIC STAGE CLASSIFICATION (pTNM, AJCC 8th Edition)  Primary Tumor (pT):      pT2  Regional Lymph Nodes (pN):      pNX  ADDITIONAL FINDINGS  Additional Pathologic Findings:      Inflammation: Chronic       Nodular prostatic hyperplasia  SPECIAL  STUDIES  Prostate Gland: Radical Prostatectomy-Additional Testing:       Tumor Block: C7       Normal Block: C2     ASSESSMENT:  Miguel Lofton is a 68 y.o. male with Prostate cancer (CMS/HCC), Pathologic: Stage IIC (rpT2, pN0, cM0, PSA: 0, Grade Group: 3).  He also has a history of head and neck radiation    For the had and neck cancer, the patient continues to experience radiation side effects and has no evidence of recurrence on most recent scope exam    The patient is agreeable to proceed with early salvage radiation therapy     COUNSELING AND COORDINATION OF CARE:  The natural history of prostate cancer was reviewed with the patient. Prognostic factors including the following were discussed: Surgical pathology, Houston score, post-prostatectomy PSA and the patient's performance status/comorbidities.    We discussed with the patient treatment options including postmastectomy radiation with or without androgen deprivation therapy.  Potential benefit, side effects and complications of each treatment was discussed.    The indications, benefits, side effects and complications of radiotherapy, which include both acute and late side effects were highlighted. Acute: Fatigue, dysuria, frequency, urine retention, rectal urgency, diarrhea. Late: Stricture, cystitis, proctitis, sexual dysfunction, second malignancy have all been discussed in detail with the patient.  Emphasized to the patient that his urinary function is unlikely to improve past the point it is at currently due to combined modality treatment.  All questions were answered to the patient´s satisfaction.     PLAN:    Ordered PSMA PET/CT  [facility] and anxiety/backpain for radiation  Pt would like to initiate treatment for his prostate cancer after jorge   CT sim 10 days before  Gave Carafate to attempt to help with swallowing   Recommended ingesting food with lubrication   Will alternate ENT follow-ups with Dr Gamez's office with  appointments >1 month apart to ensure patient doesn't have to pay out of pocket       NCCN Guidelines were applicable to guide this patients treatment plan.  Britney Rangel MD       Future Appointments   Date Time Provider Department Center   12/6/2023  1:30 PM [facility]   12/6/2023  2:30 PM [facility] SCC PET CT 1 [facility]   12/20/2023  9:00 AM Andrews Gamez MD [id number] [place]   1/23/2024 11:00 AM [facility] [place]   1/24/2024 10:00 AM [facility] [address]

## 2023-12-01 NOTE — ADDENDUM NOTE
Encounter addended by: Britney Rangel MD on: 12/1/2023 11:56 AM   Actions taken: SmartForm saved, Level of Service modified, Clinical Note Signed, Pend clinical note, Delete clinical note

## 2023-12-06 ENCOUNTER — HOSPITAL ENCOUNTER (OUTPATIENT)
Dept: RADIOLOGY | Facility: HOSPITAL | Age: 68
Discharge: HOME | End: 2023-12-06
Payer: COMMERCIAL

## 2023-12-06 ENCOUNTER — APPOINTMENT (OUTPATIENT)
Dept: RADIOLOGY | Facility: HOSPITAL | Age: 68
End: 2023-12-06
Payer: COMMERCIAL

## 2023-12-06 DIAGNOSIS — C61 PROSTATE CANCER (MULTI): ICD-10-CM

## 2023-12-06 PROCEDURE — 3430000001 HC RX 343 DIAGNOSTIC RADIOPHARMACEUTICALS: Performed by: RADIOLOGY

## 2023-12-06 PROCEDURE — 78815 PET IMAGE W/CT SKULL-THIGH: CPT | Mod: PS

## 2023-12-06 PROCEDURE — A9800 HC RX 343 DIAGNOSTIC RADIOPHARMACEUTICALS: HCPCS | Performed by: RADIOLOGY

## 2023-12-06 PROCEDURE — 78815 PET IMAGE W/CT SKULL-THIGH: CPT | Mod: PET TUMOR SUBSQ TX STRATEGY | Performed by: NUCLEAR MEDICINE

## 2023-12-06 RX ADMIN — KIT FOR THE PREPARATION OF GALLIUM GA 68 GOZETOTIDE 5.84 MILLICURIE: 25 INJECTION, POWDER, LYOPHILIZED, FOR SOLUTION INTRAVENOUS at 13:30

## 2023-12-12 ENCOUNTER — HOSPITAL ENCOUNTER (OUTPATIENT)
Dept: PULMONOLOGY | Age: 68
Discharge: HOME | End: 2023-12-12
Payer: COMMERCIAL

## 2023-12-12 DIAGNOSIS — J44.9 CHRONIC OBSTRUCTIVE PULMONARY DISEASE, UNSPECIFIED COPD TYPE (HCC): ICD-10-CM

## 2023-12-12 PROCEDURE — 6360000002 HC RX W HCPCS: Performed by: INTERNAL MEDICINE

## 2023-12-12 PROCEDURE — 94729 DIFFUSING CAPACITY: CPT

## 2023-12-12 PROCEDURE — 94060 EVALUATION OF WHEEZING: CPT

## 2023-12-12 PROCEDURE — 94726 PLETHYSMOGRAPHY LUNG VOLUMES: CPT

## 2023-12-12 RX ORDER — ALBUTEROL SULFATE 2.5 MG/3ML
2.5 SOLUTION RESPIRATORY (INHALATION) EVERY 6 HOURS PRN
Status: DISCONTINUED | OUTPATIENT
Start: 2023-12-12 | End: 2023-12-13 | Stop reason: HOSPADM

## 2023-12-12 RX ADMIN — ALBUTEROL SULFATE 2.5 MG: 2.5 SOLUTION RESPIRATORY (INHALATION) at 10:07

## 2023-12-14 ENCOUNTER — OFFICE VISIT (OUTPATIENT)
Dept: PAIN MANAGEMENT | Age: 68
End: 2023-12-14

## 2023-12-14 VITALS
BODY MASS INDEX: 26.52 KG/M2 | SYSTOLIC BLOOD PRESSURE: 110 MMHG | HEIGHT: 68 IN | DIASTOLIC BLOOD PRESSURE: 68 MMHG | WEIGHT: 175 LBS

## 2023-12-14 DIAGNOSIS — M47.817 LUMBOSACRAL SPONDYLOSIS WITHOUT MYELOPATHY: Primary | ICD-10-CM

## 2023-12-14 DIAGNOSIS — G89.4 CHRONIC PAIN SYNDROME: ICD-10-CM

## 2023-12-14 RX ORDER — HYDROCODONE BITARTRATE AND ACETAMINOPHEN 5; 325 MG/1; MG/1
1 TABLET ORAL DAILY PRN
Qty: 30 TABLET | Refills: 0 | Status: SHIPPED | OUTPATIENT
Start: 2023-12-14 | End: 2024-01-13

## 2023-12-14 NOTE — PROGRESS NOTES
Expenses: Not hard at all   Food Insecurity: Not on file (5/9/2023)   Transportation Needs: Unknown (5/9/2023)    PRAPARE - Transportation     Lack of Transportation (Medical): Not on file     Lack of Transportation (Non-Medical): No   Physical Activity: Not on file   Stress: Not on file   Social Connections: Not on file   Intimate Partner Violence: Not on file   Housing Stability: Unknown (5/9/2023)    Housing Stability Vital Sign     Unable to Pay for Housing in the Last Year: Not on file     Number of [address] in the Last Year: Not on file     Unstable Housing in the Last Year: No       Current Outpatient Medications on File Prior to Visit   Medication Sig Dispense Refill    levothyroxine (SYNTHROID) 75 MCG tablet Take 1 tablet by mouth daily 90 tablet 0    atorvastatin (LIPITOR) 80 MG tablet Take 1 tablet by mouth daily 90 tablet 0    losartan (COZAAR) 25 MG tablet Take 1 tablet by mouth daily      HYDROcodone-acetaminophen (NORCO) 5-325 MG per tablet Take 1 tablet by mouth every 6 hours as needed for Pain. Max Daily Amount: 4 tablets      LORazepam (ATIVAN) 0.5 MG tablet Take 1 tablet by mouth every 6 hours as needed for Anxiety. Max Daily Amount: 2 mg      sildenafil (VIAGRA) 100 MG tablet TAKE ONE TABLET BY MOUTH EVERY DAY AS NEEDED 10 tablet 5    omeprazole (PRILOSEC) 20 MG delayed release capsule Take 1 capsule by mouth every morning (before breakfast) 90 capsule 3    diclofenac sodium (VOLTAREN) 1 % GEL Apply topically daily as needed for Pain      vitamin B-12 (CYANOCOBALAMIN) 100 MCG tablet Take 0.5 tablets by mouth daily      vitamin D3 (CHOLECALCIFEROL) 10 MCG (400 UNIT) TABS tablet Take 1 tablet by mouth daily      aspirin 81 MG EC tablet Take 1 tablet by mouth daily      acetaminophen (TYLENOL) 500 MG tablet Take 2 tablets by mouth daily At night      vitamin C (ASCORBIC ACID) 500 MG tablet Take 1 tablet by mouth daily       No current facility-administered medications on file prior to visit.

## 2023-12-15 ENCOUNTER — TRANSCRIBE ORDERS (OUTPATIENT)
Dept: RADIATION ONCOLOGY | Facility: CLINIC | Age: 68
End: 2023-12-15
Payer: COMMERCIAL

## 2023-12-15 ENCOUNTER — HOSPITAL ENCOUNTER (OUTPATIENT)
Dept: RADIOLOGY | Facility: EXTERNAL LOCATION | Age: 68
Discharge: HOME | End: 2023-12-15

## 2023-12-15 ENCOUNTER — HOSPITAL ENCOUNTER (OUTPATIENT)
Dept: RADIATION ONCOLOGY | Facility: CLINIC | Age: 68
Setting detail: RADIATION/ONCOLOGY SERIES
Discharge: HOME | End: 2023-12-15
Payer: COMMERCIAL

## 2023-12-15 DIAGNOSIS — C61 MALIGNANT NEOPLASM OF PROSTATE (MULTI): ICD-10-CM

## 2023-12-15 DIAGNOSIS — C61 MALIGNANT NEOPLASM OF PROSTATE (MULTI): Primary | ICD-10-CM

## 2023-12-15 PROCEDURE — 77334 RADIATION TREATMENT AID(S): CPT | Performed by: RADIOLOGY

## 2023-12-15 NOTE — PROCEDURES
[place] [address] [address] [facility]                    PULMONARY FUNCTION  Darroll Morris   76 y.o.   male  Height 69 in  Weight 175 lb      Referring provider   Paty Woods MD    Reading provider   Faye Jackson MD              Test interpretation:    Spirometry suggests mild obstructive ventilatory defect with no significant response to bronchodilators. Normal lung volumes and diffusion capacity. Clinical and radiographic correlation is recommended.     Faye Jackson MD , 12/15/2023 2:07 PM
no

## 2023-12-20 ENCOUNTER — OFFICE VISIT (OUTPATIENT)
Dept: OTOLARYNGOLOGY | Facility: CLINIC | Age: 68
End: 2023-12-20
Payer: COMMERCIAL

## 2023-12-20 VITALS — WEIGHT: 178 LBS | BODY MASS INDEX: 26.29 KG/M2

## 2023-12-20 DIAGNOSIS — Z85.819 H/O MALIGNANT NEOPLASM OF OROPHARYNX: Primary | ICD-10-CM

## 2023-12-20 DIAGNOSIS — C61 PROSTATE CANCER (MULTI): ICD-10-CM

## 2023-12-20 PROCEDURE — 1160F RVW MEDS BY RX/DR IN RCRD: CPT | Performed by: OTOLARYNGOLOGY

## 2023-12-20 PROCEDURE — 1126F AMNT PAIN NOTED NONE PRSNT: CPT | Performed by: OTOLARYNGOLOGY

## 2023-12-20 PROCEDURE — 1036F TOBACCO NON-USER: CPT | Performed by: OTOLARYNGOLOGY

## 2023-12-20 PROCEDURE — 1159F MED LIST DOCD IN RCRD: CPT | Performed by: OTOLARYNGOLOGY

## 2023-12-20 PROCEDURE — 99213 OFFICE O/P EST LOW 20 MIN: CPT | Performed by: OTOLARYNGOLOGY

## 2023-12-20 NOTE — PROGRESS NOTES
The patient returns.  We are seeing him back today surveillance history of oropharyngeal squamous cell carcinoma.  He is doing very well.  No worrisome oncologic symptoms or signs.  There have been no significant changes in past medical or past surgical histories except as mentioned.    Physical exam: No acute distress.  The external ear structures appear normal. The ear canals patent and the tympanic membranes are intact without evidence of air-fluid levels, retraction, or congenital defects.  Anterior rhinoscopy notes essentially a midline nasal septum. Examination is noted for normal healthy mucosal membranes without any evidence of lesions, polyps, or exudate. The tongue is normally mobile. There are no lesions on the gingiva, buccal, or oral mucosa. There are no oral cavity masses.  The neck is negative for mass lymphadenopathy. The trachea and parotid are clear. The thyroid bed is grossly unremarkable. The salivary gland structures are grossly unremarkable. The laryngeal structures are noted for grossly normal appearance. The true vocal cords, the false focal cords, and the remaining structures including the epiglottis, piriform sinuses, and base of tongue are all grossly normal. There is no evidence of gross mass nodule, polyp, tumor or other defect.        Assessment and plan:  Doing very well following treatment for oropharyngeal squamous of carcinoma.  Currently JOEY.  Routine surveillance recheck 3 months, sooner with issue.  All questions were answered in this regard accordingly.

## 2023-12-27 RX ORDER — LEVOTHYROXINE SODIUM 0.07 MG/1
75 TABLET ORAL DAILY
Qty: 90 TABLET | Refills: 0 | Status: SHIPPED | OUTPATIENT
Start: 2023-12-27

## 2023-12-27 NOTE — TELEPHONE ENCOUNTER
Future Appointments    Encounter Information   Provider Department Appt Notes   1/11/2024 Jose Lara [address] [facility] Pain Management follow up   1/25/2024 Meg Doss MD [facility] Cardiology 4 month follow up   2/9/2024 Berenice Waterman MD [facility] [place] [facility] 3 month follow up   5/29/2024 Gayathri Galicia, [address] [facility] Pulmonology 1 YEAR F/U   6/27/2024 Sandrita Douglass, [facility] Urology 6 mo PSA   8/16/2024 Clare Chan MD [facility] Gastroenterology 1 year f/u     Past Visits    Date Provider Specialty Visit Type Primary Dx   12/22/2023 Sandrita Douglass MD Urology Office Visit Prostate cancer [facility])   12/14/2023 Jose Lara DO Pain Management Office Visit Lumbosacral spondylosis without myelopathy   11/09/2023 Berenice Waterman MD Family Medicine Office Visit Anxiety No - the patient is unable to be screened due to medical condition

## 2024-01-09 DIAGNOSIS — C61 MALIGNANT NEOPLASM OF PROSTATE (MULTI): Primary | ICD-10-CM

## 2024-01-11 ENCOUNTER — OFFICE VISIT (OUTPATIENT)
Dept: PAIN MANAGEMENT | Age: 69
End: 2024-01-11

## 2024-01-11 VITALS
WEIGHT: 180 LBS | HEIGHT: 68 IN | BODY MASS INDEX: 27.28 KG/M2 | DIASTOLIC BLOOD PRESSURE: 70 MMHG | SYSTOLIC BLOOD PRESSURE: 124 MMHG | TEMPERATURE: 97.4 F

## 2024-01-11 DIAGNOSIS — M47.817 LUMBOSACRAL SPONDYLOSIS WITHOUT MYELOPATHY: Primary | ICD-10-CM

## 2024-01-11 DIAGNOSIS — G89.4 CHRONIC PAIN SYNDROME: ICD-10-CM

## 2024-01-11 RX ORDER — ATORVASTATIN CALCIUM 80 MG/1
80 TABLET, FILM COATED ORAL DAILY
Qty: 90 TABLET | Refills: 0 | Status: SHIPPED | OUTPATIENT
Start: 2024-01-11

## 2024-01-11 NOTE — PROGRESS NOTES
[facility]  Neurosurgery and Pain Management Center  [address], [address]  [place], [place]  P: [phone]  F: [phone]        Kelvin Washington  (1955)    1/11/2024    Subjective:     Kelvin Washington is 68 y.o. male who complains today of:     Chief Complaint   Patient presents with    Lower Back Pain    Leg Pain     Both, mostly left per patient    Follow-up     Patient here today for follow-up.  [place].  Patient has chronic pain history of work-related injury.  He is waiting for the lumbar radiofrequency ablation to be approved he just went to court yesterday.  Pain affects his quality life worse with bending backwards activity.  Rest helps sleep is impaired has multiple medical problems.  Takes Norco once a day sometimes in the morning sometimes in the evening.  Pain with medication 3 out of 10 without above 6 out of 10.  Gets spasms.  No side effects or aberrant behavior with pain medication.  Helps with quality of life.    Plan: We discussed options in great detail.  Continue Norco once a day #30 hopefully the nerve ablation gets authorized.  [place] is reviewed narcotic drug policy reviewed.  Continue home excise program as tolerated.  Follow-up in 1 month.  Questions answered chart was reviewed.    Allergies:  Demerol hcl [meperidine] and Valium [diazepam]    Past Medical History:   Diagnosis Date    CAD (coronary artery disease)     Cancer (HCC)     prostate cancer Nov 16 2018    CHF (congestive heart failure) (HCC)     Chicken pox     Chronic back pain     Emphysema of lung (HCC)     Hyperlipidemia     Hypertension     Hypothyroidism     Measles     Mumps     Osteoarthritis     Pneumonia      Past Surgical History:   Procedure Laterality Date    CARDIAC CATHETERIZATION      2019    COLONOSCOPY      2016    CYST REMOVAL      back of neck    CYST REMOVAL Left 03/04/2016    Dr Woo    CYSTOSCOPY  07/2018    DENTAL SURGERY      HERNIA REPAIR

## 2024-01-12 DIAGNOSIS — G89.4 CHRONIC PAIN SYNDROME: ICD-10-CM

## 2024-01-12 DIAGNOSIS — M47.817 LUMBOSACRAL SPONDYLOSIS WITHOUT MYELOPATHY: ICD-10-CM

## 2024-01-12 RX ORDER — HYDROCODONE BITARTRATE AND ACETAMINOPHEN 5; 325 MG/1; MG/1
1 TABLET ORAL DAILY PRN
Qty: 30 TABLET | Refills: 0 | Status: SHIPPED | OUTPATIENT
Start: 2024-01-12 | End: 2024-02-11

## 2024-01-12 NOTE — TELEPHONE ENCOUNTER
Requested Prescriptions     Pending Prescriptions Disp Refills    HYDROcodone-acetaminophen (NORCO) 5-325 MG per tablet 30 tablet 0     Sig: Take 1 tablet by mouth daily as needed for Pain for up to 30 days. Max Daily Amount: 1 tablet       Patient called asking for his refill, patient stated the pharmacy did not receive his scripts. Patient was seen 1/11/24    Next office visit date: Visit date not found    Demerol hcl [meperidine] and Valium [diazepam]

## 2024-01-24 ENCOUNTER — OFFICE VISIT (OUTPATIENT)
Dept: HEMATOLOGY/ONCOLOGY | Facility: CLINIC | Age: 69
End: 2024-01-24
Payer: COMMERCIAL

## 2024-01-24 ENCOUNTER — LAB (OUTPATIENT)
Dept: LAB | Facility: CLINIC | Age: 69
End: 2024-01-24
Payer: COMMERCIAL

## 2024-01-24 VITALS
DIASTOLIC BLOOD PRESSURE: 76 MMHG | SYSTOLIC BLOOD PRESSURE: 123 MMHG | HEART RATE: 59 BPM | RESPIRATION RATE: 16 BRPM | WEIGHT: 185.19 LBS | OXYGEN SATURATION: 95 % | TEMPERATURE: 98.1 F | BODY MASS INDEX: 27.35 KG/M2

## 2024-01-24 DIAGNOSIS — R97.0 ELEVATED CARCINOEMBRYONIC ANTIGEN (CEA): ICD-10-CM

## 2024-01-24 DIAGNOSIS — E78.2 MIXED HYPERLIPIDEMIA: ICD-10-CM

## 2024-01-24 DIAGNOSIS — G89.3 CANCER ASSOCIATED PAIN: ICD-10-CM

## 2024-01-24 DIAGNOSIS — E03.9 PRIMARY HYPOTHYROIDISM: ICD-10-CM

## 2024-01-24 DIAGNOSIS — C61 PROSTATE CANCER (MULTI): ICD-10-CM

## 2024-01-24 DIAGNOSIS — E06.4 DRUG-INDUCED THYROIDITIS: ICD-10-CM

## 2024-01-24 DIAGNOSIS — J44.9 CHRONIC OBSTRUCTIVE PULMONARY DISEASE, UNSPECIFIED COPD TYPE (MULTI): ICD-10-CM

## 2024-01-24 DIAGNOSIS — C01 CANCER OF BASE OF TONGUE (MULTI): Primary | ICD-10-CM

## 2024-01-24 DIAGNOSIS — F41.9 ANXIETY: ICD-10-CM

## 2024-01-24 DIAGNOSIS — C01 CANCER OF BASE OF TONGUE (MULTI): ICD-10-CM

## 2024-01-24 LAB
ALBUMIN SERPL BCP-MCNC: 4.2 G/DL (ref 3.4–5)
ALP SERPL-CCNC: 82 U/L (ref 33–136)
ALT SERPL W P-5'-P-CCNC: 15 U/L (ref 10–52)
ANION GAP SERPL CALC-SCNC: 11 MMOL/L (ref 10–20)
AST SERPL W P-5'-P-CCNC: 17 U/L (ref 9–39)
BASOPHILS # BLD AUTO: 0.05 X10*3/UL (ref 0–0.1)
BASOPHILS NFR BLD AUTO: 0.8 %
BILIRUB SERPL-MCNC: 0.6 MG/DL (ref 0–1.2)
BUN SERPL-MCNC: 21 MG/DL (ref 6–23)
CALCIUM SERPL-MCNC: 9.9 MG/DL (ref 8.6–10.3)
CEA SERPL-MCNC: 2.8 UG/L
CHLORIDE SERPL-SCNC: 99 MMOL/L (ref 98–107)
CO2 SERPL-SCNC: 32 MMOL/L (ref 21–32)
CREAT SERPL-MCNC: 0.86 MG/DL (ref 0.5–1.3)
EGFRCR SERPLBLD CKD-EPI 2021: >90 ML/MIN/1.73M*2
EOSINOPHIL # BLD AUTO: 0.51 X10*3/UL (ref 0–0.7)
EOSINOPHIL NFR BLD AUTO: 7.9 %
ERYTHROCYTE [DISTWIDTH] IN BLOOD BY AUTOMATED COUNT: 13.8 % (ref 11.5–14.5)
GLUCOSE SERPL-MCNC: 108 MG/DL (ref 74–99)
HCT VFR BLD AUTO: 40.1 % (ref 41–52)
HGB BLD-MCNC: 12.7 G/DL (ref 13.5–17.5)
IMM GRANULOCYTES # BLD AUTO: 0.01 X10*3/UL (ref 0–0.7)
IMM GRANULOCYTES NFR BLD AUTO: 0.2 % (ref 0–0.9)
LYMPHOCYTES # BLD AUTO: 1.03 X10*3/UL (ref 1.2–4.8)
LYMPHOCYTES NFR BLD AUTO: 16 %
MCH RBC QN AUTO: 28.3 PG (ref 26–34)
MCHC RBC AUTO-ENTMCNC: 31.7 G/DL (ref 32–36)
MCV RBC AUTO: 89 FL (ref 80–100)
MONOCYTES # BLD AUTO: 0.65 X10*3/UL (ref 0.1–1)
MONOCYTES NFR BLD AUTO: 10.1 %
NEUTROPHILS # BLD AUTO: 4.2 X10*3/UL (ref 1.2–7.7)
NEUTROPHILS NFR BLD AUTO: 65 %
PLATELET # BLD AUTO: 235 X10*3/UL (ref 150–450)
POTASSIUM SERPL-SCNC: 4.7 MMOL/L (ref 3.5–5.3)
PROT SERPL-MCNC: 7.1 G/DL (ref 6.4–8.2)
RBC # BLD AUTO: 4.49 X10*6/UL (ref 4.5–5.9)
SODIUM SERPL-SCNC: 137 MMOL/L (ref 136–145)
TSH SERPL-ACNC: 2.68 MIU/L (ref 0.44–3.98)
WBC # BLD AUTO: 6.5 X10*3/UL (ref 4.4–11.3)

## 2024-01-24 PROCEDURE — 82378 CARCINOEMBRYONIC ANTIGEN: CPT

## 2024-01-24 PROCEDURE — 84443 ASSAY THYROID STIM HORMONE: CPT

## 2024-01-24 PROCEDURE — 1159F MED LIST DOCD IN RCRD: CPT | Performed by: INTERNAL MEDICINE

## 2024-01-24 PROCEDURE — 1126F AMNT PAIN NOTED NONE PRSNT: CPT | Performed by: INTERNAL MEDICINE

## 2024-01-24 PROCEDURE — 1036F TOBACCO NON-USER: CPT | Performed by: INTERNAL MEDICINE

## 2024-01-24 PROCEDURE — 80053 COMPREHEN METABOLIC PANEL: CPT

## 2024-01-24 PROCEDURE — 36415 COLL VENOUS BLD VENIPUNCTURE: CPT

## 2024-01-24 PROCEDURE — 99214 OFFICE O/P EST MOD 30 MIN: CPT | Performed by: INTERNAL MEDICINE

## 2024-01-24 PROCEDURE — 85025 COMPLETE CBC W/AUTO DIFF WBC: CPT

## 2024-01-24 ASSESSMENT — PAIN SCALES - GENERAL: PAINLEVEL: 0-NO PAIN

## 2024-01-25 ENCOUNTER — OFFICE VISIT (OUTPATIENT)
Dept: CARDIOLOGY CLINIC | Age: 69
End: 2024-01-25
Payer: COMMERCIAL

## 2024-01-25 VITALS
BODY MASS INDEX: 28.01 KG/M2 | DIASTOLIC BLOOD PRESSURE: 66 MMHG | SYSTOLIC BLOOD PRESSURE: 112 MMHG | HEIGHT: 68 IN | WEIGHT: 184.8 LBS | HEART RATE: 60 BPM | OXYGEN SATURATION: 97 %

## 2024-01-25 DIAGNOSIS — I50.33 ACUTE ON CHRONIC DIASTOLIC HEART FAILURE (HCC): ICD-10-CM

## 2024-01-25 DIAGNOSIS — R06.09 DOE (DYSPNEA ON EXERTION): ICD-10-CM

## 2024-01-25 DIAGNOSIS — I25.119 ATHEROSCLEROSIS OF NATIVE CORONARY ARTERY OF NATIVE HEART WITH ANGINA PECTORIS (HCC): ICD-10-CM

## 2024-01-25 DIAGNOSIS — I20.9 ANGINA PECTORIS, UNSPECIFIED (HCC): ICD-10-CM

## 2024-01-25 DIAGNOSIS — R07.9 CHEST PAIN, UNSPECIFIED TYPE: ICD-10-CM

## 2024-01-25 DIAGNOSIS — I10 ESSENTIAL HYPERTENSION: ICD-10-CM

## 2024-01-25 DIAGNOSIS — N52.9 VASCULOGENIC ERECTILE DYSFUNCTION, UNSPECIFIED VASCULOGENIC ERECTILE DYSFUNCTION TYPE: Primary | ICD-10-CM

## 2024-01-25 DIAGNOSIS — E78.5 DYSLIPIDEMIA: ICD-10-CM

## 2024-01-25 DIAGNOSIS — I25.10 CORONARY ARTERY DISEASE INVOLVING NATIVE CORONARY ARTERY OF NATIVE HEART WITHOUT ANGINA PECTORIS: ICD-10-CM

## 2024-01-25 PROCEDURE — 99214 OFFICE O/P EST MOD 30 MIN: CPT | Performed by: INTERNAL MEDICINE

## 2024-01-25 PROCEDURE — 3078F DIAST BP <80 MM HG: CPT | Performed by: INTERNAL MEDICINE

## 2024-01-25 PROCEDURE — 1123F ACP DISCUSS/DSCN MKR DOCD: CPT | Performed by: INTERNAL MEDICINE

## 2024-01-25 PROCEDURE — 3074F SYST BP LT 130 MM HG: CPT | Performed by: INTERNAL MEDICINE

## 2024-01-25 ASSESSMENT — ENCOUNTER SYMPTOMS
STRIDOR: 0
GASTROINTESTINAL NEGATIVE: 1
NAUSEA: 0
EYES NEGATIVE: 1
COUGH: 0
BACK PAIN: 1
WHEEZING: 0
SHORTNESS OF BREATH: 1
BLOOD IN STOOL: 0
CHEST TIGHTNESS: 0

## 2024-01-25 NOTE — PROGRESS NOTES
meds. Still off cigs.     Stopped smoking 11/2018 with prior 2 ppd  Disabled  from knee and back.  Lives wife     EKG: SR 61    Past Medical History:   Diagnosis Date    CAD (coronary artery disease)     Cancer (HCC)     prostate cancer Nov 16 2018    CHF (congestive heart failure) (HCC)     Chicken pox     Chronic back pain     Emphysema of lung (HCC)     Hyperlipidemia     Hypertension     Hypothyroidism     Measles     Mumps     Osteoarthritis     Pneumonia        Past Surgical History:   Procedure Laterality Date    CARDIAC CATHETERIZATION      2019    COLONOSCOPY      2016    CYST REMOVAL      back of neck    CYST REMOVAL Left 03/04/2016    Dr Woo    CYSTOSCOPY  07/2018    DENTAL SURGERY      HERNIA REPAIR      2016, 2019    LARYNGOSCOPY N/A 8/16/2022    DIRECT LARYNGOSCOPY WITH BIOPSY. 30 MIN  TO BE LATEX FREE performed by Raheel Rios MD at [facility] OR    PROSTATE SURGERY  07/2018    Biopsy    PROSTATECTOMY  11/16/2018    TONSILLECTOMY N/A 08/12/2021    TONSILLECTOMY performed by Jordin Ramos MD at [facility] OR    UMBILICAL HERNIA REPAIR  03/04/2016    Dr Woo    UPPER GASTROINTESTINAL ENDOSCOPY N/A 04/05/2021    EGD ESOPHAGOGASTRODUODENOSCOPY WITH BIOPSY AND DILATION performed by Teri Jolly MD at [facility] GASTRO CENTER    VASECTOMY      VENTRAL HERNIA REPAIR N/A 11/18/2019    REPAIR OF RECURRENT VENTRAL HERNIA performed by Yuri Woo MD at [facility] OR       Family History   Problem Relation Age of Onset    Other Mother     Alzheimer's Disease Mother     Cancer Father         prostate    Other Father     Cancer Sister         lung ca    Cancer Brother         lung ca    Lung Cancer Brother     Cancer Brother         prostate ca    No Known Problems Son     No Known Problems Daughter     Colon Cancer Neg Hx        Social History     Socioeconomic History    Marital status:      Spouse name: None    Number of children: None    Years of education: None    Highest education level: None

## 2024-01-27 NOTE — PROGRESS NOTES
Patient ID: Miguel Lofton is a 68 y.o. male.  Referring Physician: No referring provider defined for this encounter.  Primary Care Provider: Terrence Graham MD  Visit Type: Follow Up      Subjective    HPI  Do you think I need radiation?    Review of Systems   Constitutional: Negative.    HENT:  Negative.     Eyes: Negative.    Respiratory: Negative.     Cardiovascular: Negative.    Gastrointestinal: Negative.    Endocrine: Negative.    Genitourinary: Negative.     Musculoskeletal: Negative.    Skin: Negative.    Neurological: Negative.    Hematological: Negative.    Psychiatric/Behavioral: Negative.          Objective   BSA: 2.02 meters squared  /76 (BP Location: Right arm, Patient Position: Sitting, BP Cuff Size: Adult)   Pulse 59   Temp 36.7 °C (98.1 °F) (Temporal)   Resp 16   Wt 84 kg (185 lb 3 oz)   SpO2 95%   BMI 27.35 kg/m²      has a past medical history of Hypothyroidism, unspecified, Personal history of other endocrine, nutritional and metabolic disease, and Personal history of other mental and behavioral disorders.   has a past surgical history that includes Hernia repair (08/29/2018) and Vasectomy (08/29/2018).  No family history on file.  Oncology History   Prostate cancer (CMS/HCC)   11/29/2023 Initial Diagnosis    Prostate cancer (CMS/[facility])     11/29/2023 Cancer Staged    Staging form: Prostate, AJCC 8th Edition, Pathologic stage from 11/29/2023: Stage IIC (rpT2, pN0, cM0, PSA: 0, Grade Group: 3) - Signed by Britney Rangel MD on 12/1/2023         Miguel Lofton  reports that he has quit smoking. His smoking use included cigarettes. He has never used smokeless tobacco.  He  reports that he does not currently use alcohol.  He  reports no history of drug use.    Physical Exam  Vitals reviewed.   Constitutional:       Appearance: Normal appearance.   HENT:      Head: Normocephalic.      Mouth/Throat:      Mouth: Mucous membranes are moist.   Eyes:      Extraocular Movements:  Extraocular movements intact.      Pupils: Pupils are equal, round, and reactive to light.   Cardiovascular:      Rate and Rhythm: Normal rate and regular rhythm.      Heart sounds: Normal heart sounds.   Pulmonary:      Breath sounds: Normal breath sounds.   Abdominal:      General: Bowel sounds are normal.      Palpations: Abdomen is soft.   Musculoskeletal:         General: Normal range of motion.      Cervical back: Normal range of motion and neck supple.   Skin:     General: Skin is warm.   Neurological:      General: No focal deficit present.      Mental Status: He is alert and oriented to person, place, and time.   Psychiatric:         Mood and Affect: Mood normal.         Behavior: Behavior normal.         WBC   Date/Time Value Ref Range Status   01/24/2024 10:49 AM 6.5 4.4 - 11.3 x10*3/uL Final   08/17/2023 07:39 AM 3.8 (L) 4.4 - 11.3 x10E9/L Final   07/10/2023 10:03 AM 6.0 4.4 - 11.3 x10E9/L Final   03/27/2023 10:29 AM 5.4 4.4 - 11.3 x10E9/L Final     nRBC   Date Value Ref Range Status   11/17/2018 0.0 0.0 - 0.0 /100 WBC Final   11/07/2018 0.0 0.0 - 0.0 /100 WBC Final     RBC   Date Value Ref Range Status   01/24/2024 4.49 (L) 4.50 - 5.90 x10*6/uL Final   08/17/2023 4.17 (L) 4.50 - 5.90 x10E12/L Final   07/10/2023 3.97 (L) 4.50 - 5.90 x10E12/L Final   03/27/2023 3.98 (L) 4.50 - 5.90 x10E12/L Final     Hemoglobin   Date Value Ref Range Status   01/24/2024 12.7 (L) 13.5 - 17.5 g/dL Final   08/17/2023 12.3 (L) 13.5 - 17.5 g/dL Final   07/10/2023 11.9 (L) 13.5 - 17.5 g/dL Final   03/27/2023 12.4 (L) 13.5 - 17.5 g/dL Final     Hematocrit   Date Value Ref Range Status   01/24/2024 40.1 (L) 41.0 - 52.0 % Final   08/17/2023 38.2 (L) 41.0 - 52.0 % Final   07/10/2023 38.6 (L) 41.0 - 52.0 % Final   03/27/2023 37.6 (L) 41.0 - 52.0 % Final     MCV   Date/Time Value Ref Range Status   01/24/2024 10:49 AM 89 80 - 100 fL Final   08/17/2023 07:39 AM 92 80 - 100 fL Final   07/10/2023 10:03 AM 97 80 - 100 fL Final  "  03/27/2023 10:29 AM 94 80 - 100 fL Final     MCH   Date/Time Value Ref Range Status   01/24/2024 10:49 AM 28.3 26.0 - 34.0 pg Final     MCHC   Date/Time Value Ref Range Status   01/24/2024 10:49 AM 31.7 (L) 32.0 - 36.0 g/dL Final   08/17/2023 07:39 AM 32.2 32.0 - 36.0 g/dL Final   07/10/2023 10:03 AM 30.8 (L) 32.0 - 36.0 g/dL Final   03/27/2023 10:29 AM 33.0 32.0 - 36.0 g/dL Final     RDW   Date/Time Value Ref Range Status   01/24/2024 10:49 AM 13.8 11.5 - 14.5 % Final   08/17/2023 07:39 AM 13.7 11.5 - 14.5 % Final   07/10/2023 10:03 AM 13.2 11.5 - 14.5 % Final   03/27/2023 10:29 AM 12.7 11.5 - 14.5 % Final     Platelets   Date/Time Value Ref Range Status   01/24/2024 10:49  150 - 450 x10*3/uL Final   08/17/2023 07:39  150 - 450 x10E9/L Final   07/10/2023 10:03  150 - 450 x10E9/L Final   03/27/2023 10:29  150 - 450 x10E9/L Final     No results found for: \"MPV\"  Neutrophils %   Date/Time Value Ref Range Status   01/24/2024 10:49 AM 65.0 40.0 - 80.0 % Final   07/10/2023 10:03 AM 64.9 40.0 - 80.0 % Final   01/23/2023 02:34 PM 63.5 40.0 - 80.0 % Final   12/13/2022 11:48 AM 65.5 40.0 - 80.0 % Final     Immature Granulocytes %, Automated   Date/Time Value Ref Range Status   01/24/2024 10:49 AM 0.2 0.0 - 0.9 % Final     Comment:     Immature Granulocyte Count (IG) includes promyelocytes, myelocytes and metamyelocytes but does not include bands. Percent differential counts (%) should be interpreted in the context of the absolute cell counts (cells/UL).   07/10/2023 10:03 AM 0.3 0.0 - 0.9 % Final     Comment:      Immature Granulocyte Count (IG) includes promyelocytes,    myelocytes and metamyelocytes but does not include bands.   Percent differential counts (%) should be interpreted in the   context of the absolute cell counts (cells/L).     01/23/2023 02:34 PM 0.0 0.0 - 0.9 % Final     Comment:      Immature Granulocyte Count (IG) includes promyelocytes,    myelocytes and metamyelocytes but does " not include bands.   Percent differential counts (%) should be interpreted in the   context of the absolute cell counts (cells/L).     12/13/2022 11:48 AM 0.0 0.0 - 0.9 % Final     Comment:      Immature Granulocyte Count (IG) includes promyelocytes,    myelocytes and metamyelocytes but does not include bands.   Percent differential counts (%) should be interpreted in the   context of the absolute cell counts (cells/L).       Lymphocytes %   Date/Time Value Ref Range Status   01/24/2024 10:49 AM 16.0 13.0 - 44.0 % Final   07/10/2023 10:03 AM 16.3 13.0 - 44.0 % Final   01/23/2023 02:34 PM 15.5 13.0 - 44.0 % Final   12/13/2022 11:48 AM 13.9 13.0 - 44.0 % Final     Monocytes %   Date/Time Value Ref Range Status   01/24/2024 10:49 AM 10.1 2.0 - 10.0 % Final   07/10/2023 10:03 AM 12.4 2.0 - 10.0 % Final   01/23/2023 02:34 PM 14.9 2.0 - 10.0 % Final   12/13/2022 11:48 AM 17.0 2.0 - 10.0 % Final     Eosinophils %   Date/Time Value Ref Range Status   01/24/2024 10:49 AM 7.9 0.0 - 6.0 % Final   07/10/2023 10:03 AM 4.9 0.0 - 6.0 % Final   01/23/2023 02:34 PM 5.7 0.0 - 6.0 % Final   12/13/2022 11:48 AM 2.8 0.0 - 6.0 % Final     Basophils %   Date/Time Value Ref Range Status   01/24/2024 10:49 AM 0.8 0.0 - 2.0 % Final   07/10/2023 10:03 AM 1.2 0.0 - 2.0 % Final   01/23/2023 02:34 PM 0.4 0.0 - 2.0 % Final   12/13/2022 11:48 AM 0.8 0.0 - 2.0 % Final     Neutrophils Absolute   Date/Time Value Ref Range Status   01/24/2024 10:49 AM 4.20 1.20 - 7.70 x10*3/uL Final     Comment:     Percent differential counts (%) should be interpreted in the context of the absolute cell counts (cells/uL).   07/10/2023 10:03 AM 3.86 1.20 - 7.70 x10E9/L Final   01/23/2023 02:34 PM 3.32 1.20 - 7.70 x10E9/L Final   12/13/2022 11:48 AM 2.59 1.20 - 7.70 x10E9/L Final     Immature Granulocytes Absolute, Automated   Date/Time Value Ref Range Status   01/24/2024 10:49 AM 0.01 0.00 - 0.70 x10*3/uL Final     Lymphocytes Absolute   Date/Time Value Ref Range  "Status   01/24/2024 10:49 AM 1.03 (L) 1.20 - 4.80 x10*3/uL Final   07/10/2023 10:03 AM 0.97 (L) 1.20 - 4.80 x10E9/L Final   01/23/2023 02:34 PM 0.81 (L) 1.20 - 4.80 x10E9/L Final   12/13/2022 11:48 AM 0.55 (L) 1.20 - 4.80 x10E9/L Final     Monocytes Absolute   Date/Time Value Ref Range Status   01/24/2024 10:49 AM 0.65 0.10 - 1.00 x10*3/uL Final   07/10/2023 10:03 AM 0.74 0.10 - 1.00 x10E9/L Final   01/23/2023 02:34 PM 0.78 0.10 - 1.00 x10E9/L Final   12/13/2022 11:48 AM 0.67 0.10 - 1.00 x10E9/L Final     Eosinophils Absolute   Date/Time Value Ref Range Status   01/24/2024 10:49 AM 0.51 0.00 - 0.70 x10*3/uL Final   07/10/2023 10:03 AM 0.29 0.00 - 0.70 x10E9/L Final   01/23/2023 02:34 PM 0.30 0.00 - 0.70 x10E9/L Final   12/13/2022 11:48 AM 0.11 0.00 - 0.70 x10E9/L Final     Basophils Absolute   Date/Time Value Ref Range Status   01/24/2024 10:49 AM 0.05 0.00 - 0.10 x10*3/uL Final   07/10/2023 10:03 AM 0.07 0.00 - 0.10 x10E9/L Final   01/23/2023 02:34 PM 0.02 0.00 - 0.10 x10E9/L Final   12/13/2022 11:48 AM 0.03 0.00 - 0.10 x10E9/L Final       No components found for: \"PT\"  aPTT   Date/Time Value Ref Range Status   11/07/2018 02:10 PM 32 28 - 38 sec Final     Comment:      Note new reference range as of 10/23/2018.    THE APTT IS NO LONGER USED FOR MONITORING     UNFRACTIONATED HEPARIN THERAPY.    FOR MONITORING HEPARIN THERAPY,     USE THE HEPARIN ASSAY.       Medication Documentation Review Audit       Reviewed by Bryson Phoenix Mackson, MA (Medical Assistant) on 01/24/24 at 1002      Medication Order Taking? Sig Documenting Provider Last Dose Status   acetaminophen (Tylenol) 500 mg tablet [id number] Yes Take 2 tablets (1,000 mg) by mouth. Historical Provider, MD Taking Active   ascorbic acid (Vitamin C) 500 mg tablet [id number] Yes Take 1 tablet (500 mg) by mouth once daily. Historical Provider, MD Taking Active   aspirin 81 mg EC tablet [id number] Yes Take 1 tablet (81 mg) by mouth once daily. Historical Provider, MD " Taking Active   atorvastatin (Lipitor) 80 mg tablet [id number] Yes Take 1 tablet (80 mg) by mouth once daily. Historical MD Ronald Taking Active   cholecalciferol (Vitamin D-3) 25 MCG (1000 UT) tablet [id number] Yes Take 1 tablet (25 mcg) by mouth once daily. Historical MD Ronald Taking Active   cyanocobalamin (Vitamin B-12) 1,000 mcg tablet [id number] Yes Take 1 tablet (1,000 mcg) by mouth once daily. Historical Provider, MD Taking Active   diclofenac sodium (Voltaren) 1 % gel gel [id number] Yes Apply topically. Historical Provider, MD Taking Active   HYDROcodone-acetaminophen (Norco) 5-325 mg tablet [id number] Yes Take 1 tablet by mouth every 6 hours if needed. Historical MD Ronald Taking Active   levothyroxine (Synthroid, Levoxyl) 75 mcg tablet [id number] Yes Take 1 tablet (75 mcg) by mouth once daily. Historical MD Ronald Taking Active   LORazepam (Ativan) 1 mg tablet [id number]  TAKE ONE TABLET BY MOUTH EVERY DAY NIGHTLY AS NEEDED FOR ANXIETY Historical Provider, MD   23   LORazepam (Ativan) 1 mg tablet [id number] Yes Take 1 tablet (1 mg) by mouth 2 times a day as needed (Anxiety prior to radiation therapy). Britney Rangel MD Taking Active   omeprazole (PriLOSEC) 20 mg DR capsule [id number] Yes Take 1 capsule (20 mg) by mouth. Historical MD Ronald Taking Active   sildenafil (Viagra) 100 mg tablet [id number] Yes Take 1 tablet (100 mg) by mouth once daily as needed. Historical MD Ronald Taking Active   tiotropium (Spiriva with HandiHaler) 18 mcg inhalation capsule [id number] Yes Place into inhaler and inhale. 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, p16+  -completed course of chemoradiation by end of 10/2022  -here for interval followup  -labs to be done today include CBC, COMP, CEA, TSH  -results reviewed-wbc 6.5, hgb 12.7, plt  235,000, creatinine 0.86, calcium 9.9, AST 17, ALT 15, CEA 2.8, TSH 2.68  -continues to followup with Dr Gamez--last seen 12/20/2023  -will continue to see him Q6 months        2) cancer pain  -on norco PRN     3) anxiety  -on ativan PRN     4) hyperlipidemia  -on atorvastatin     5) hypothyroidism  -on levothyroxine     6) COPD  -on spiriva     7) prostate cancer  -s/p radical prostatectomy by Dr Lang Mcneil in 2018  -[id number], [place] 7, + right anterior margin  -most recent PSA 0.03  -PSMA-PET done on 12/6/2023 showed no PSMA uptake within the post-surgical bed; there is PSMA uptake of a left common iliac retroperitoneal lymph node SUV 5.6 and PSMA uptake of a right pelvic sidewall lymph node SUV 3.5  -he is now following with urologist Dr Boyd  -Dr Rangel simulated him for radiation, however, we are now waiting for DECIPHER results to risk stratify his prostate cancer          Problem List Items Addressed This Visit    None  Visit Diagnoses         Codes    Cancer of base of tongue (CMS/HCC)    -  Primary C01    Relevant Orders    CBC and Auto Differential (Completed)    Comprehensive Metabolic Panel (Completed)    Carcinoembryonic Antigen (Completed)    TSH with reflex to Free T4 if abnormal (Completed)    [facility]    CBC and Auto Differential    Comprehensive metabolic panel    CEA    Elevated carcinoembryonic antigen (CEA)     R97.0    Relevant Orders    Carcinoembryonic Antigen (Completed)    [facility]    CBC and Auto Differential    Comprehensive metabolic panel    CEA    Drug-induced thyroiditis     E06.4    Relevant Orders    TSH with reflex to Free T4 if abnormal (Completed)    [facility]    CBC and Auto Differential    Comprehensive metabolic panel    CEA    TSH                 Iván Babin  MD

## 2024-01-29 ENCOUNTER — LAB (OUTPATIENT)
Dept: LAB | Facility: LAB | Age: 69
End: 2024-01-29
Payer: COMMERCIAL

## 2024-01-29 DIAGNOSIS — E78.00 PURE HYPERCHOLESTEROLEMIA: ICD-10-CM

## 2024-01-29 DIAGNOSIS — E03.9 HYPOTHYROIDISM, UNSPECIFIED TYPE: ICD-10-CM

## 2024-01-29 DIAGNOSIS — C61 MALIGNANT NEOPLASM OF PROSTATE (MULTI): ICD-10-CM

## 2024-01-29 LAB
CHOLEST SERPL-MCNC: 133 MG/DL (ref 0–199)
HDLC SERPL-MCNC: 53 MG/DL (ref 40–59)
LDLC SERPL CALC-MCNC: 65 MG/DL (ref 0–129)
TRIGL SERPL-MCNC: 77 MG/DL (ref 0–150)
TSH SERPL-MCNC: 3.12 UIU/ML (ref 0.44–3.86)

## 2024-01-29 PROCEDURE — 84153 ASSAY OF PSA TOTAL: CPT

## 2024-01-29 PROCEDURE — 84154 ASSAY OF PSA FREE: CPT

## 2024-01-29 PROCEDURE — 36415 COLL VENOUS BLD VENIPUNCTURE: CPT

## 2024-01-31 LAB
PSA FREE MFR SERPL: <50 %
PSA FREE SERPL-MCNC: <0.1 NG/ML
PSA SERPL IA-MCNC: 0.2 NG/ML (ref 0–4)

## 2024-02-02 PROBLEM — E06.4 DRUG-INDUCED THYROIDITIS: Status: ACTIVE | Noted: 2024-02-02

## 2024-02-02 PROBLEM — C01 CANCER OF BASE OF TONGUE (MULTI): Status: ACTIVE | Noted: 2024-02-02

## 2024-02-02 PROBLEM — E03.9 PRIMARY HYPOTHYROIDISM: Status: ACTIVE | Noted: 2024-02-02

## 2024-02-02 PROBLEM — E78.2 MIXED HYPERLIPIDEMIA: Status: ACTIVE | Noted: 2024-02-02

## 2024-02-02 PROBLEM — G89.3 CANCER ASSOCIATED PAIN: Status: ACTIVE | Noted: 2024-02-02

## 2024-02-02 PROBLEM — R97.0 ELEVATED CARCINOEMBRYONIC ANTIGEN (CEA): Status: ACTIVE | Noted: 2024-02-02

## 2024-02-02 PROBLEM — F41.9 ANXIETY: Status: ACTIVE | Noted: 2024-02-02

## 2024-02-02 ASSESSMENT — ENCOUNTER SYMPTOMS
MUSCULOSKELETAL NEGATIVE: 1
EYES NEGATIVE: 1
HEMATOLOGIC/LYMPHATIC NEGATIVE: 1
PSYCHIATRIC NEGATIVE: 1
ENDOCRINE NEGATIVE: 1
GASTROINTESTINAL NEGATIVE: 1
CARDIOVASCULAR NEGATIVE: 1
RESPIRATORY NEGATIVE: 1
CONSTITUTIONAL NEGATIVE: 1
NEUROLOGICAL NEGATIVE: 1

## 2024-02-09 ENCOUNTER — OFFICE VISIT (OUTPATIENT)
Dept: FAMILY MEDICINE CLINIC | Age: 69
End: 2024-02-09
Payer: COMMERCIAL

## 2024-02-09 VITALS
DIASTOLIC BLOOD PRESSURE: 74 MMHG | HEART RATE: 74 BPM | TEMPERATURE: 97.6 F | HEIGHT: 68 IN | WEIGHT: 186 LBS | SYSTOLIC BLOOD PRESSURE: 120 MMHG | OXYGEN SATURATION: 94 % | BODY MASS INDEX: 28.19 KG/M2

## 2024-02-09 DIAGNOSIS — F11.90 CHRONIC, CONTINUOUS USE OF OPIOIDS: Primary | ICD-10-CM

## 2024-02-09 DIAGNOSIS — F41.9 ANXIETY: ICD-10-CM

## 2024-02-09 DIAGNOSIS — G89.4 CHRONIC PAIN SYNDROME: ICD-10-CM

## 2024-02-09 DIAGNOSIS — M47.817 LUMBOSACRAL SPONDYLOSIS WITHOUT MYELOPATHY: ICD-10-CM

## 2024-02-09 DIAGNOSIS — E03.9 HYPOTHYROIDISM, UNSPECIFIED TYPE: Primary | ICD-10-CM

## 2024-02-09 PROCEDURE — 3074F SYST BP LT 130 MM HG: CPT | Performed by: FAMILY MEDICINE

## 2024-02-09 PROCEDURE — 99214 OFFICE O/P EST MOD 30 MIN: CPT | Performed by: FAMILY MEDICINE

## 2024-02-09 PROCEDURE — 1123F ACP DISCUSS/DSCN MKR DOCD: CPT | Performed by: FAMILY MEDICINE

## 2024-02-09 PROCEDURE — 3078F DIAST BP <80 MM HG: CPT | Performed by: FAMILY MEDICINE

## 2024-02-09 RX ORDER — LORAZEPAM 1 MG/1
1 TABLET ORAL NIGHTLY PRN
Qty: 30 TABLET | Refills: 2 | Status: SHIPPED | OUTPATIENT
Start: 2024-02-09 | End: 2024-03-10

## 2024-02-09 RX ORDER — LORAZEPAM 0.5 MG/1
0.5 TABLET ORAL EVERY 6 HOURS PRN
Status: CANCELLED | OUTPATIENT
Start: 2024-02-09

## 2024-02-09 ASSESSMENT — PATIENT HEALTH QUESTIONNAIRE - PHQ9
SUM OF ALL RESPONSES TO PHQ QUESTIONS 1-9: 0
2. FEELING DOWN, DEPRESSED OR HOPELESS: 0
SUM OF ALL RESPONSES TO PHQ9 QUESTIONS 1 & 2: 0
SUM OF ALL RESPONSES TO PHQ QUESTIONS 1-9: 0
1. LITTLE INTEREST OR PLEASURE IN DOING THINGS: 0

## 2024-02-09 ASSESSMENT — ENCOUNTER SYMPTOMS
VOMITING: 0
DIARRHEA: 0
COUGH: 0

## 2024-02-09 NOTE — TELEPHONE ENCOUNTER
Requested Prescriptions     Pending Prescriptions Disp Refills    HYDROcodone-acetaminophen (NORCO) 5-325 MG per tablet 30 tablet 0     Sig: Take 1 tablet by mouth daily as needed for Pain for up to 30 days. Max Daily Amount: 1 tablet       Patient last seen on:  01/11/2024    Date of last refill:  01/12/2024    Reason for request:  PATIENT LVM REQUESTING REFILL FOR NORCO    Request date for pharmacy pick-up:  02/11/2024    Next office visit date: 02/22/2024    Demerol hcl [meperidine] and Valium [diazepam]

## 2024-02-09 NOTE — PROGRESS NOTES
Subjective:      Patient ID: Kelvin Washington is a 68 y.o. male    Anxiety  Presents for follow-up visit.       Thyroid Problem  Presents for follow-up visit. Patient reports no diarrhea. The symptoms have been stable.     Here in follow up for anxiety and hypothyroidism  and blood work.   No missed doses of medication.  Using ativan once daily for most part.     Review of Systems   Constitutional:  Negative for chills, fever and unexpected weight change.   Respiratory:  Negative for cough.    Gastrointestinal:  Negative for diarrhea and vomiting.   Neurological:  Negative for weakness.     Reviewed allergy, medical, social, surgical, family and med list changes and updated   Files-reviewed blood work which is acceptable   Controlled substances monitoring: [place] report reviewed today- activity consistent with treatment plan.    Social History     Socioeconomic History    Marital status:    Tobacco Use    Smoking status: Former     Current packs/day: 0.00     Average packs/day: 1.5 packs/day for 40.0 years (60.0 ttl pk-yrs)     Types: Cigarettes     Start date: 1978     Quit date: 2018     Years since quittin.2    Smokeless tobacco: Never   Vaping Use    Vaping Use: Never used   Substance and Sexual Activity    Alcohol use: Yes     Comment: very rarely (1 beer a year)    Drug use: No     Social Determinants of Health     Financial Resource Strain: Low Risk  (2023)    Overall Financial Resource Strain (CARDIA)     Difficulty of Paying Living Expenses: Not hard at all   Transportation Needs: Unknown (2023)    PRAPARE - Transportation     Lack of Transportation (Non-Medical): No   Housing Stability: Unknown (2023)    Housing Stability Vital Sign     Unstable Housing in the Last Year: No     Current Outpatient Medications   Medication Sig Dispense Refill    HYDROcodone-acetaminophen (NORCO) 5-325 MG per tablet Take 1 tablet by mouth daily as needed for Pain for up to 30 days. Max Daily

## 2024-02-11 RX ORDER — NALOXONE HYDROCHLORIDE 4 MG/.1ML
1 SPRAY NASAL PRN
Qty: 1 EACH | Refills: 0 | Status: SHIPPED | OUTPATIENT
Start: 2024-02-11

## 2024-02-11 RX ORDER — HYDROCODONE BITARTRATE AND ACETAMINOPHEN 5; 325 MG/1; MG/1
1 TABLET ORAL DAILY PRN
Qty: 12 TABLET | Refills: 0 | Status: SHIPPED | OUTPATIENT
Start: 2024-02-11 | End: 2024-02-22 | Stop reason: SDUPTHER

## 2024-02-12 ENCOUNTER — HOSPITAL ENCOUNTER (OUTPATIENT)
Dept: RADIATION ONCOLOGY | Facility: CLINIC | Age: 69
Setting detail: RADIATION/ONCOLOGY SERIES
Discharge: HOME | End: 2024-02-12
Payer: COMMERCIAL

## 2024-02-12 ENCOUNTER — LAB (OUTPATIENT)
Dept: LAB | Facility: LAB | Age: 69
End: 2024-02-12
Payer: COMMERCIAL

## 2024-02-12 VITALS
RESPIRATION RATE: 18 BRPM | HEART RATE: 67 BPM | DIASTOLIC BLOOD PRESSURE: 80 MMHG | BODY MASS INDEX: 27.26 KG/M2 | OXYGEN SATURATION: 92 % | TEMPERATURE: 98.1 F | SYSTOLIC BLOOD PRESSURE: 148 MMHG | WEIGHT: 184.6 LBS

## 2024-02-12 DIAGNOSIS — C61 PROSTATE CANCER (MULTI): ICD-10-CM

## 2024-02-12 DIAGNOSIS — C61 PROSTATE CANCER (MULTI): Primary | ICD-10-CM

## 2024-02-12 LAB — PSA SERPL-MCNC: 0.19 NG/ML

## 2024-02-12 PROCEDURE — 84153 ASSAY OF PSA TOTAL: CPT

## 2024-02-12 PROCEDURE — 36415 COLL VENOUS BLD VENIPUNCTURE: CPT

## 2024-02-12 PROCEDURE — 99215 OFFICE O/P EST HI 40 MIN: CPT | Performed by: RADIOLOGY

## 2024-02-12 ASSESSMENT — PAIN SCALES - GENERAL: PAINLEVEL: 0-NO PAIN

## 2024-02-12 NOTE — PROGRESS NOTES
Radiation Oncology Follow-Up    Patient Name:  Miguel Lofton  MRN:  [id number]  :  1955    Referring Provider: No ref. provider found  Primary Care Provider: Terrence Graham MD  Care Team: Patient Care Team:  Terrence Graham MD as PCP - General (Family Medicine)  Iván Lucero MD as Consulting Physician (Hematology and Oncology)    Date of Service: 2024     SUMMARY: 1) Unfavorable intermediate risk prostate cancer, s/p RALP 2018; [id number], [place] 4+3=7, -EPE, -SVI, right anterior margin focally positive. Post op PSA undetectable until 22 with PSA 0.02, 23 PSA 0.04, peak postop PSA 0.20. PSMA PET/CT showing bilateral pelvic adenopathy with no evidence of distant mets.     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 No evidence of disease thus far    SUBJECTIVE  History of Present Illness:   Miguel Lofton is a 68 y.o. male who is presenting today for follow-up. Prior radiation treatment as follows:    Radiation Treatments       No radiation treatments to show. (Treatments may have been administered in another system.)            INTERIM HISTORY:    Most recent PSA     Prostate Specific AG   Date Value Ref Range Status   2024 0.19 <=4.00 ng/mL Final     PSA, Free   Date Value Ref Range Status   2024 <0.1 ng/mL Final     PSA, Free Pct   Date Value Ref Range Status   2024 <50 % Final     Comment:     INTERPRETIVE INFORMATION: Prostate Specific Antigen, Free                            Percentage    [facility] uses the Roche Free PSA electrochemiluminescent immunoassay   method in conjunction with the Roche PSA electrochemiluminescent   immunoassay method to determine the free PSA percentage. Values   obtained with different assay methods should not be used   interchangeably. The free PSA percentage is an aid in   distinguishing prostate cancer from benign prostatic conditions in    individuals with a prostate age 50 years and older with a total   PSA between 3 and 10 ng/mL and negative digital rectal examination   findings. Prostatic biopsy is required for the diagnosis of   cancer.     In patients with total PSA concentrations of 4-10 ng/mL, the   probability of finding prostate cancer on needle biopsy by age in   years is:    %fPSA               50-59    60-69    70 or older  0  - 10%            49%      58%      65%  11 - 18%            27%      34%      41%  19 - 25%            18%      24%      30%  Greater than 25%     9%      12%      16%    Other factors may help determine the actual risk of prostate   cancer in individual patients.  Performed By: [facility]  [address] [place] [id number]  : Sabas Rosado MD, PhD  CLIA Number: [id number]       SUBJECTIVE:    Today, the patient reports feeling well overall. He denies changes in his symptoms.      Review of Systems:   Review of Systems   Constitutional:  Positive for fatigue.   HENT:   Positive for trouble swallowing.         Oral drainage noted on pillow upon rising   Eyes: Negative.    Respiratory:  Positive for chest tightness (intermittent, nuclear stress test this friday 2/16/24), cough (at night when laying down. Brown mucous), shortness of breath (with exertion) and wheezing (when laying down).    Cardiovascular:  Positive for chest pain (intermittent slight pains).   Gastrointestinal: Negative.    Endocrine: Negative.    Genitourinary: Negative.     Musculoskeletal:  Positive for back pain and neck pain.        Can't sleep on back. Using voltaren and aleve as needed.    Skin: Negative.    Neurological: Negative.    Hematological:  Bruises/bleeds easily.   Psychiatric/Behavioral:  Positive for sleep disturbance. The patient is nervous/anxious.      The patient's current pain level was assessed.  They report currently having a pain of 0 out of 10.  They feel their pain is under control  without the use of pain medications.    Performance Status:   The Karnofsky performance scale today is 90, Able to carry on normal activity; minor signs or symptoms of disease (ECOG equivalent 0).         OBJECTIVE  Vital Signs:  /80 (BP Location: Right arm, Patient Position: Sitting, BP Cuff Size: Adult)   Pulse 67   Temp 36.7 °C (98.1 °F) (Temporal)   Resp 18   Wt 83.7 kg (184 lb 9.6 oz)   SpO2 92%   BMI 27.26 kg/m²    Physical Exam:  Constitutional: Awake, alert and oriented. No acute distress. Appears stated age.  Eyes: Conjunctivae are clear without exudates or hemorrhage. Eyelids are normal in appearance without swelling or lesions.  ENMT: mucous membranes moist, no apparent injury, no lesions seen  Neck: Neck supple, no apparent injury, trachea midline  Resp/Thorax: Patent airways,  good chest expansion. Thorax symmetric  Cardiovascular: The external chest is normal without lifts, heaves, or thrills. PMI is not visible.  GI: Nondistended, soft, non-tender.  /Gyn: Deferred  MSK: No tenderness noted on palpation of the spine. No ROM limitations.  Extremities: normal extremities, no cyanosis, erythema or edema.  Neurological: alert and oriented x3. Sensory and motor function appear intact. No gait abnormality observed.  Psych: Appropriate mood and behavior.  Skin: Warm and dry, no new lesions or rashes.           PSA   Date Value Ref Range Status   01/29/2024 0.2 0.0 - 4.0 ng/mL Final     Comment:     INTERPRETIVE INFORMATION: Prostate Specific Antigen    The Roche PSA electrochemiluminescent immunoassay is used. Results   obtained with different test methods or kits cannot be used   interchangeably. The Roche PSA method is approved for use as an   aid in the detection of prostate cancer when used in conjunction   with a digital rectal exam in individuals with a prostate age 50   years and older. The Roche PSA is also indicated for the serial   measurement of PSA to aid in the prognosis and  management of   prostate cancer patients. Elevated PSA concentrations can only   suggest the presence of prostate cancer until biopsy is performed.   PSA concentrations can also be elevated in benign prostatic   hyperplasia or inflammatory conditions of the prostate. PSA is   generally not elevated in healthy individuals or individuals with   nonprostatic carcinoma.   08/17/2023 0.03 0.00 - 4.00 ng/mL Final     Comment:     The FDA requires that the method used for PSA assay be   reported to the physician. Values obtained with different   assay methods must not be used interchangeably. This test  was performed at [facility] using the Access   Hy[employer] PSA assay is a two-site immunoenzymatic sandwich   assay. The assay is approved for measurement of   prostate-specific antigen (PSA)in serum and may be used   in conjunction with a digital rectal examination in men   50 years and older as an aid in detection of prostate   cancer.  5-Alpha-reductase inhibitors (e.g. Proscar, Finasteride,   Avodart, Dutasteride and Marycruz) for the treatment of BPH   have been shown to lower PSA levels by an average of 50%   after 6 months of treatment.     07/12/2023 CANCELED       Comment:     The FDA requires that the method used for PSA assay be   reported to the physician. Values obtained with different   assay methods must not be used interchangeably. This test   was performed at [facility] using the Siemens  [employer] PSA method, which is a sandwich immunoassay using   chemiluminescence for quantitation. The assay is approved  for measurement of prostate-specific antigen (PSA) in   serum and may be used in conjunction with a digital rectal  examination in men 50 years and older as an aid in   detection of prostate cancer.   5-Alpha-reductase inhibitors (e.g. Proscar, Finasteride,   Avodart, Dutasteride and Marycruz) for the treatment of BPH   have been shown to lower PSA levels by an average of 50%    after 6 months of treatment.    Result canceled by the ancillary.     07/10/2023 0.03 0.00 - 4.00 ng/mL Final     Comment:     The FDA requires that the method used for PSA assay be   reported to the physician. Values obtained with different   assay methods must not be used interchangeably. This test  was performed at [facility] using the Access   [employer] PSA assay is a two-site immunoenzymatic sandwich   assay. The assay is approved for measurement of   prostate-specific antigen (PSA)in serum and may be used   in conjunction with a digital rectal examination in men   50 years and older as an aid in detection of prostate   cancer.  5-Alpha-reductase inhibitors (e.g. Proscar, Finasteride,   Avodart, Dutasteride and Marycruz) for the treatment of BPH   have been shown to lower PSA levels by an average of 50%   after 6 months of treatment.       Prostate Specific AG   Date Value Ref Range Status   02/12/2024 0.19 <=4.00 ng/mL Final   11/17/2023 0.03 <=4.00 ng/mL Final     PSA, Free   Date Value Ref Range Status   01/29/2024 <0.1 ng/mL Final     PSA, Free Pct   Date Value Ref Range Status   01/29/2024 <50 % Final     Comment:     INTERPRETIVE INFORMATION: Prostate Specific Antigen, Free                            Percentage    [facility] uses the Roche Free PSA electrochemiluminescent immunoassay   method in conjunction with the Roche PSA electrochemiluminescent   immunoassay method to determine the free PSA percentage. Values   obtained with different assay methods should not be used   interchangeably. The free PSA percentage is an aid in   distinguishing prostate cancer from benign prostatic conditions in   individuals with a prostate age 50 years and older with a total   PSA between 3 and 10 ng/mL and negative digital rectal examination   findings. Prostatic biopsy is required for the diagnosis of   cancer.     In patients with total PSA concentrations of 4-10 ng/mL, the   probability of finding prostate  cancer on needle biopsy by age in   years is:    %fPSA               50-59    60-69    70 or older  0  - 10%            49%      58%      65%  11 - 18%            27%      34%      41%  19 - 25%            18%      24%      30%  Greater than 25%     9%      12%      16%    Other factors may help determine the actual risk of prostate   cancer in individual patients.  Performed By: [facility]  [address] [place] [address]  : Sabas Rosado MD, PhD  CLIA Number: [id number]         Lab Results   Component Value Date    WBC 6.5 01/24/2024    HGB 12.7 (L) 01/24/2024    HCT 40.1 (L) 01/24/2024     01/24/2024    ALT 15 01/24/2024    AST 17 01/24/2024     01/24/2024    K 4.7 01/24/2024    CL 99 01/24/2024    CREATININE 0.86 01/24/2024    BUN 21 01/24/2024    CO2 32 01/24/2024    TSH 2.68 01/24/2024    PSA 0.19 02/12/2024    INR 0.9 11/07/2018    HGBA1C 6.0 (H) 09/21/2021             ASSESSMENT:  Miguel Lofton is a 68 y.o. male with Prostate cancer (CMS/[facility]), Pathologic: Stage IIC (rpT2, pN0, cM0, PSA: 0, Grade Group: 3).      COUNSELING AND COORDINATION OF CARE:  The natural history of prostate cancer was reviewed with the patient. Prognostic factors including the following were discussed: Surgical pathology, Wanblee score, post-prostatectomy PSA and the patient's performance status/comorbidities.    We discussed with the patient treatment options including postmastectomy radiation with or without androgen deprivation therapy.  Potential benefit, side effects and complications of each treatment was discussed.    The indications, benefits, side effects and complications of radiotherapy, which include both acute and late side effects were highlighted. Acute: Fatigue, dysuria, frequency, urine retention, rectal urgency, diarrhea. Late: Stricture, cystitis, proctitis, sexual dysfunction, second malignancy have all been discussed in detail with the patient.  Emphasized to  the patient that his urinary function is unlikely to improve past the point it is at currently due to combined modality treatment.  All questions were answered to the patient´s satisfaction.       PLAN:    Recommend proceeding with adjuvant radiation in 20 fractions  Pt hesitant to proceed at this juncture and required additional time to make a decision regarding treatment      NCCN Guidelines were applicable to guide this patients treatment plan.  Britney Rangel MD       Future Appointments   Date Time Provider Department [place]   2/21/2024  9:00 AM Andrews Gamez MD [id number] [place]   7/24/2024  1:00 PM Iván Lucero MD [id number] [place]

## 2024-02-12 NOTE — TELEPHONE ENCOUNTER
Note 1/11/24 reviewed, continue Norco #30/month  [place]ARRS shows co prescription of Lorazepam 1 mg    -Continue Norco 5/325 mg daily prn, 2/11-2/23/24, 12 days, #12  -Increased risks of respiratory depression and death while on opioids and benzodiazepines, Naloxone sent on 2/11/24  -Keep follow up on 2/22/24    Controlled Substance Monitoring:    Acute and Chronic Pain Monitoring:   RX Monitoring Periodic Controlled Substance Monitoring   2/11/2024   7:13 PM Obtaining appropriate analgesic effect of treatment.

## 2024-02-13 ENCOUNTER — TELEMEDICINE (OUTPATIENT)
Dept: FAMILY MEDICINE CLINIC | Age: 69
End: 2024-02-13
Payer: COMMERCIAL

## 2024-02-13 ENCOUNTER — TELEPHONE (OUTPATIENT)
Dept: UROLOGY | Age: 69
End: 2024-02-13

## 2024-02-13 DIAGNOSIS — C61 PROSTATE CANCER (HCC): Primary | ICD-10-CM

## 2024-02-13 DIAGNOSIS — Z00.00 MEDICARE ANNUAL WELLNESS VISIT, SUBSEQUENT: Primary | ICD-10-CM

## 2024-02-13 PROCEDURE — 1123F ACP DISCUSS/DSCN MKR DOCD: CPT | Performed by: NURSE PRACTITIONER

## 2024-02-13 PROCEDURE — G0439 PPPS, SUBSEQ VISIT: HCPCS | Performed by: NURSE PRACTITIONER

## 2024-02-13 NOTE — PATIENT INSTRUCTIONS
Health. If you have questions about a medical condition or this instruction, always ask your healthcare professional. Healthwise, [facility] disclaims any warranty or liability for your use of this information.           A Healthy Heart: Care Instructions  Your Care Instructions     Coronary artery disease, also called heart disease, occurs when a substance called plaque builds up in the vessels that supply oxygen-rich blood to your heart muscle. This can narrow the blood vessels and reduce blood flow. A heart attack happens when blood flow is completely blocked. A high-fat diet, smoking, and other factors increase the risk of heart disease.  Your doctor has found that you have a chance of having heart disease. You can do lots of things to keep your heart healthy. It may not be easy, but you can change your diet, exercise more, and quit smoking. These steps really work to lower your chance of heart disease.  Follow-up care is a key part of your treatment and safety. Be sure to make and go to all appointments, and call your doctor if you are having problems. It's also a good idea to know your test results and keep a list of the medicines you take.  How can you care for yourself at home?  Diet    Use less salt when you cook and eat. This helps lower your blood pressure. Taste food before salting. Add only a little salt when you think you need it. With time, your taste buds will adjust to less salt.     Eat fewer snack items, fast foods, canned soups, and other high-salt, high-fat, processed foods.     Read food labels and try to avoid saturated and trans fats. They increase your risk of heart disease by raising cholesterol levels.     Limit the amount of solid fat-butter, margarine, and shortening-you eat. Use olive, peanut, or canola oil when you cook. Bake, broil, and steam foods instead of frying them.     Eat a variety of fruit and vegetables every day. Dark green, deep orange, red, or yellow fruits and

## 2024-02-13 NOTE — TELEPHONE ENCOUNTER
Pt is trying to make an appt with DANIE Goldstein, to see if he can get a new referral for radiation. He states that he doesn't trust the doctor that he's been seeing (Dr. Snow), and would like to be referred to a provider on [address]

## 2024-02-13 NOTE — PROGRESS NOTES
Medicare Annual Wellness Visit    Kelvin Washington is here for Medicare AWV    Assessment & Plan   Medicare annual wellness visit, subsequent    Recommendations for Preventive Services Due: see orders and patient instructions/AVS.  Recommended screening schedule for the next 5-10 years is provided to the patient in written form: see Patient Instructions/AVS.     Return in 1 year (on 2/13/2025).     Subjective       Patient's complete Health Risk Assessment and screening values have been reviewed and are found in Flowsheets. The following problems were reviewed today and where indicated follow up appointments were made and/or referrals ordered.    Positive Risk Factor Screenings with Interventions:            Controlled Medication Review:      Today's Pain Level: No data recorded   Opioid Risk: (Low risk score <55) Opioid risk score: 25    Patient is low risk for opioid use disorder or overdose.    Last PDMP [place] as Reviewed:  Review User Review Instant Review Result   KAIT SHREYA 2/11/2024  7:20 PM     Reviewed PDMP [1]     Last Controlled Substance Monitoring Documentation      Flowsheet Row Refill from 2/9/2024 in [facility]   Periodic Controlled Substance Monitoring Obtaining appropriate analgesic effect of treatment. filed at 02/11/2024 1913            General HRA Questions:  Select all that apply: (!) New or Increased Fatigue, Stress    Fatigue Interventions:  Patient declined any further interventions or treatment    Stress Interventions:  Patient declined any further interventions or treatment         Vision Screen:  Do you have difficulty driving, watching TV, or doing any of your daily activities because of your eyesight?: No  Have you had an eye exam within the past year?: (!) No  No results found.    Interventions:   Patient encouraged to make appointment with their eye specialist      Advanced Directives:  Do you have a Living Will?: (!) No    Intervention:  has NO advanced

## 2024-02-16 ENCOUNTER — HOSPITAL ENCOUNTER (OUTPATIENT)
Dept: NUCLEAR MEDICINE | Age: 69
Discharge: HOME | End: 2024-02-18
Attending: INTERNAL MEDICINE
Payer: COMMERCIAL

## 2024-02-16 ENCOUNTER — HOSPITAL ENCOUNTER (OUTPATIENT)
Age: 69
Discharge: HOME | End: 2024-02-18
Attending: INTERNAL MEDICINE
Payer: COMMERCIAL

## 2024-02-16 DIAGNOSIS — R07.9 CHEST PAIN, UNSPECIFIED TYPE: ICD-10-CM

## 2024-02-16 LAB
NUC STRESS EJECTION FRACTION: 54 %
STRESS BASELINE DIAS BP: 72 MMHG
STRESS BASELINE HR: 61 BPM
STRESS BASELINE ST DEPRESSION: 0 MM
STRESS BASELINE SYS BP: 133 MMHG
STRESS ESTIMATED WORKLOAD: 1 METS
STRESS PEAK DIAS BP: 72 MMHG
STRESS PEAK SYS BP: 133 MMHG
STRESS PERCENT HR ACHIEVED: 62 %
STRESS POST PEAK HR: 94 BPM
STRESS RATE PRESSURE PRODUCT: NORMAL BPM*MMHG
STRESS ST DEPRESSION: 0 MM
STRESS TARGET HR: 152 BPM
TID: 1.06

## 2024-02-16 PROCEDURE — 93017 CV STRESS TEST TRACING ONLY: CPT

## 2024-02-16 PROCEDURE — 93018 CV STRESS TEST I&R ONLY: CPT | Performed by: INTERNAL MEDICINE

## 2024-02-16 PROCEDURE — 3430000000 HC RX DIAGNOSTIC RADIOPHARMACEUTICAL: Performed by: INTERNAL MEDICINE

## 2024-02-16 PROCEDURE — 6360000002 HC RX W HCPCS: Performed by: INTERNAL MEDICINE

## 2024-02-16 PROCEDURE — 78452 HT MUSCLE IMAGE SPECT MULT: CPT

## 2024-02-16 PROCEDURE — 2580000003 HC RX 258: Performed by: INTERNAL MEDICINE

## 2024-02-16 PROCEDURE — A9502 TC99M TETROFOSMIN: HCPCS | Performed by: INTERNAL MEDICINE

## 2024-02-16 RX ORDER — REGADENOSON 0.08 MG/ML
0.4 INJECTION, SOLUTION INTRAVENOUS
Status: COMPLETED | OUTPATIENT
Start: 2024-02-16 | End: 2024-02-16

## 2024-02-16 RX ORDER — SODIUM CHLORIDE 0.9 % (FLUSH) 0.9 %
10 SYRINGE (ML) INJECTION PRN
Status: DISCONTINUED | OUTPATIENT
Start: 2024-02-16 | End: 2024-02-19 | Stop reason: HOSPADM

## 2024-02-16 RX ADMIN — SODIUM CHLORIDE, PRESERVATIVE FREE 10 ML: 5 INJECTION INTRAVENOUS at 10:27

## 2024-02-16 RX ADMIN — REGADENOSON 0.4 MG: 0.08 INJECTION, SOLUTION INTRAVENOUS at 10:26

## 2024-02-16 RX ADMIN — TETROFOSMIN 31.4 MILLICURIE: 1.38 INJECTION, POWDER, LYOPHILIZED, FOR SOLUTION INTRAVENOUS at 10:26

## 2024-02-16 RX ADMIN — TETROFOSMIN 11.7 MILLICURIE: 1.38 INJECTION, POWDER, LYOPHILIZED, FOR SOLUTION INTRAVENOUS at 08:39

## 2024-02-16 RX ADMIN — SODIUM CHLORIDE, PRESERVATIVE FREE 10 ML: 5 INJECTION INTRAVENOUS at 10:26

## 2024-02-16 RX ADMIN — SODIUM CHLORIDE, PRESERVATIVE FREE 10 ML: 5 INJECTION INTRAVENOUS at 08:40

## 2024-02-16 NOTE — ADDENDUM NOTE
Encounter addended by: Britney Rangel MD on: 2/16/2024 3:17 PM   Actions taken: Level of Service modified, Clinical Note Signed Patient's any cardiac a nurse practitioner.  Please see H and P for assessment and plan.    Patient with very impressive left great toe infection.  No systemic symptoms other, nausea vomiting.  Patient hemodynamically stable  Patient is a type 2 diabetic.  Likely will need amputation the left was started empirically on broad-spectrum antibiotics  Consult Podiatry in the morning   General

## 2024-02-21 ENCOUNTER — OFFICE VISIT (OUTPATIENT)
Dept: OTOLARYNGOLOGY | Facility: CLINIC | Age: 69
End: 2024-02-21
Payer: COMMERCIAL

## 2024-02-21 VITALS — BODY MASS INDEX: 27.62 KG/M2 | WEIGHT: 187 LBS

## 2024-02-21 DIAGNOSIS — C01 CANCER OF BASE OF TONGUE (MULTI): Primary | ICD-10-CM

## 2024-02-21 PROCEDURE — 99213 OFFICE O/P EST LOW 20 MIN: CPT | Performed by: OTOLARYNGOLOGY

## 2024-02-21 PROCEDURE — 1159F MED LIST DOCD IN RCRD: CPT | Performed by: OTOLARYNGOLOGY

## 2024-02-21 PROCEDURE — 31575 DIAGNOSTIC LARYNGOSCOPY: CPT | Performed by: OTOLARYNGOLOGY

## 2024-02-21 PROCEDURE — 1126F AMNT PAIN NOTED NONE PRSNT: CPT | Performed by: OTOLARYNGOLOGY

## 2024-02-21 PROCEDURE — 1036F TOBACCO NON-USER: CPT | Performed by: OTOLARYNGOLOGY

## 2024-02-21 NOTE — PROGRESS NOTES
The patient returns.  We are seeing him back today follow-up check history of oropharyngeal carcinoma status post chemoradiation.  Symptomatically is doing very well.  He is having some issues with potential prostate concerns but nothing of the severe nature he does follow with our colleagues in urology for that.  Remaining head neck inquiry otherwise clear with no worrisome oncologic symptoms or signs.    Physical exam:  No acute distress.  The external ear structures appear normal. The ear canals patent and the tympanic membranes are intact without evidence of air-fluid levels, retraction, or congenital defects.  Anterior rhinoscopy notes essentially a midline nasal septum. Examination is noted for normal healthy mucosal membranes without any evidence of lesions, polyps, or exudate. The tongue is normally mobile. There are no lesions on the gingiva, buccal, or oral mucosa. There are no oral cavity masses.  The neck is negative for mass lymphadenopathy. The trachea and parotid are clear. The thyroid bed is grossly unremarkable. The salivary gland structures are grossly unremarkable.     Procedure:  In order to assess the larynx, flexible laryngoscopy was performed based on the patient's history.  After topical anesthesia, a very complete flexible laryngoscopy was performed. This examination reveals a normal appearance to the laryngeal structures including the true cords, false cords, epiglottis, base of tongue, and piriform sinus, except as noted.    Assessment and plan:  History of oropharyngeal squamous of carcinoma currently JOEY.  Doing very well overall from our vantage point.  Reassurance provided.  Continue to follow with urology for his prostate issues and cancer concerns.  Recheck with me in 4 months, sooner with issue.  All questions were answered in this regard accordingly.

## 2024-02-22 ENCOUNTER — OFFICE VISIT (OUTPATIENT)
Dept: PAIN MANAGEMENT | Age: 69
End: 2024-02-22

## 2024-02-22 VITALS
DIASTOLIC BLOOD PRESSURE: 60 MMHG | SYSTOLIC BLOOD PRESSURE: 102 MMHG | HEIGHT: 68 IN | WEIGHT: 175 LBS | BODY MASS INDEX: 26.52 KG/M2

## 2024-02-22 DIAGNOSIS — G89.4 CHRONIC PAIN SYNDROME: ICD-10-CM

## 2024-02-22 DIAGNOSIS — M47.817 LUMBOSACRAL SPONDYLOSIS WITHOUT MYELOPATHY: Primary | ICD-10-CM

## 2024-02-22 RX ORDER — HYDROCODONE BITARTRATE AND ACETAMINOPHEN 5; 325 MG/1; MG/1
1 TABLET ORAL DAILY PRN
Qty: 30 TABLET | Refills: 0 | Status: SHIPPED | OUTPATIENT
Start: 2024-02-22 | End: 2024-03-23

## 2024-02-22 NOTE — PROGRESS NOTES
[facility]  [facility]  [address], [address]  [place], [place]  P: [phone]  F: [phone]        Kelvin Washington  (1955)    2/22/2024    Subjective:     Kelvin Washington is 68 y.o. male who complains today of:     Chief Complaint   Patient presents with    Back Pain     Patient here today for follow-up.  Patient has chronic pain history of work-related injury.  [place]ARRS was reviewed.  He is finally approved for his lumbar radiograph concern ablation at L3-4-5.  Backwards bothers him morning time is rough.  Sleep is impaired.  Rest helps.  Has mild medical problems.  Takes Norco usually once a day sometimes in the morning sometimes in the evening depending on pain.  No medications.  10 without above 6 out of 10.  Gets spasms.  No side effects or aberrant behavior from pain medication.  Helping with quality of life activities daily living, chores around the house.  Achy sharp pain with spasms and varies.  He gets numbness in the legs.  The ablation has helped with that as well.    Plan: We discussed options in great detail.  Continue Norco once a day #30.  Scheduled for his RF ablation.  OARRS was reviewed.  Narcotic policy reviewed.  Continue home excise program as tolerated.  Follow-up in 1 month.  Questions answered chart was reviewed.  He is in agreement with plan.    Allergies:  Demerol hcl [meperidine] and Valium [diazepam]    Past Medical History:   Diagnosis Date    CAD (coronary artery disease)     Cancer (HCC)     prostate cancer Nov 16 2018    CHF (congestive heart failure) (HCC)     Chicken pox     Chronic back pain     Emphysema of lung (HCC)     Hyperlipidemia     Hypertension     Hypothyroidism     Measles     Mumps     Osteoarthritis     Pneumonia      Past Surgical History:   Procedure Laterality Date    CARDIAC CATHETERIZATION      2019    COLONOSCOPY      2016    CYST REMOVAL      back of neck    CYST REMOVAL Left

## 2024-02-27 ENCOUNTER — OFFICE VISIT (OUTPATIENT)
Dept: PAIN MANAGEMENT | Age: 69
End: 2024-02-27

## 2024-02-27 DIAGNOSIS — M47.817 LUMBOSACRAL SPONDYLOSIS WITHOUT MYELOPATHY: Primary | ICD-10-CM

## 2024-02-27 RX ORDER — BETAMETHASONE SODIUM PHOSPHATE AND BETAMETHASONE ACETATE 3; 3 MG/ML; MG/ML
6 INJECTION, SUSPENSION INTRA-ARTICULAR; INTRALESIONAL; INTRAMUSCULAR; SOFT TISSUE ONCE
Status: COMPLETED | OUTPATIENT
Start: 2024-02-27 | End: 2024-02-27

## 2024-02-27 RX ORDER — LIDOCAINE HYDROCHLORIDE 10 MG/ML
3 INJECTION, SOLUTION EPIDURAL; INFILTRATION; INTRACAUDAL; PERINEURAL ONCE
Status: COMPLETED | OUTPATIENT
Start: 2024-02-27 | End: 2024-02-27

## 2024-02-27 RX ADMIN — BETAMETHASONE SODIUM PHOSPHATE AND BETAMETHASONE ACETATE 6 MG: 3; 3 INJECTION, SUSPENSION INTRA-ARTICULAR; INTRALESIONAL; INTRAMUSCULAR; SOFT TISSUE at 15:04

## 2024-02-27 RX ADMIN — LIDOCAINE HYDROCHLORIDE 3 ML: 10 INJECTION, SOLUTION EPIDURAL; INFILTRATION; INTRACAUDAL; PERINEURAL at 15:03

## 2024-02-27 NOTE — PROGRESS NOTES
[facility]  Neurosurgery [facility]  [address], [address]  [place], [place]  P: [phone]  F: [phone]      Lumbar Radio Frequency Ablation     Provider: ALLYN CANCHOLA DO          Patient Name: Kelvin Washington : 1955        Date: 2024      Kelvin Washington is here today for interventional pain management.  Standard ASI guidelines were followed and sterile technique used.  Area was cleaned with Betadine x3.  Informed consent was obtained.  Fluoroscopic guidance was used for this procedure. Multiple views of fluoroscopy were used during procedure to assist with needle placement. Appropriate sized RF 10mm active tip needle was used and advance to appropriate anatomic location.    There was appropriate multifidus contraction noted with motor stimulation at 2 Hz between 0.5-1.5 volts. No limb or gluteal contraction was noted taking it up to 3.5 volts. Prior to lesioning at 80 degrees Celsius for 90 seconds, approximately 0.75mg/1mg of Celestone and ½ cc of 1% preservative free Lidocaine was injected. Impedance was between 200-500 ohms during the procedure.     Patient tolerated the procedure well, no obvious complications occurred during the procedure.  Patient was appropriately monitored and discharged home in stable condition with their usual motor strength. Post Op instructions were given to patient.          [] Bilateral [] T11 [] L1 [] S1     [] T12 [] L2 [] S2    [x] Right  [x] L3 [] S3      [x] L4 [] S4    [] Left  [x] L5                              ALLYN CANCHOLA DO

## 2024-02-29 DIAGNOSIS — J44.9 CHRONIC OBSTRUCTIVE PULMONARY DISEASE, UNSPECIFIED COPD TYPE (HCC): ICD-10-CM

## 2024-03-04 ENCOUNTER — HOSPITAL ENCOUNTER (OUTPATIENT)
Dept: RADIATION ONCOLOGY | Age: 69
Discharge: HOME | End: 2024-03-04
Payer: COMMERCIAL

## 2024-03-04 ENCOUNTER — TELEPHONE (OUTPATIENT)
Dept: UROLOGY | Age: 69
End: 2024-03-04

## 2024-03-04 ENCOUNTER — TELEPHONE (OUTPATIENT)
Dept: CARDIOLOGY CLINIC | Age: 69
End: 2024-03-04

## 2024-03-04 VITALS
RESPIRATION RATE: 18 BRPM | BODY MASS INDEX: 28.13 KG/M2 | WEIGHT: 185 LBS | SYSTOLIC BLOOD PRESSURE: 139 MMHG | TEMPERATURE: 97.3 F | HEART RATE: 64 BPM | DIASTOLIC BLOOD PRESSURE: 71 MMHG | OXYGEN SATURATION: 98 %

## 2024-03-04 DIAGNOSIS — C61 PROSTATE CANCER (HCC): Primary | ICD-10-CM

## 2024-03-04 PROCEDURE — 99205 OFFICE O/P NEW HI 60 MIN: CPT | Performed by: RADIOLOGY

## 2024-03-04 PROCEDURE — 99497 ADVNCD CARE PLAN 30 MIN: CPT | Performed by: RADIOLOGY

## 2024-03-04 PROCEDURE — 99212 OFFICE O/P EST SF 10 MIN: CPT | Performed by: RADIOLOGY

## 2024-03-04 RX ORDER — BICALUTAMIDE 50 MG/1
50 TABLET, FILM COATED ORAL DAILY
Qty: 14 TABLET | Refills: 0 | Status: SHIPPED | OUTPATIENT
Start: 2024-03-04

## 2024-03-04 NOTE — CONSULTS
regular surveillance with flexible nasopharyngoscopy through Dr. Rios with ENT.     Thank you very much for the referral and for the opportunity to provide care for this patient.    A total of 16 minutes were spent discussing Advance Care Planning to understand personal values, life goals, and preferences regarding future medical care with respect to potential modifications/establishing of advanced directives and any modifications of current goals of care.      A combined total of 65 minutes was spent in preparing this consultation as well as in meeting with the patient in person.  Please excuse any typos in this consultation note as voice dictation was used.    ORDERS: CT simulation.; oral Casodex    FOLLOW-UP: Return to clinic for CT simulation on 3/13/2024.    Cas Hamilton MD PHD  Radiation Oncologist, DABR  Medical Director    Department of Radiation Oncology  [address]  [address]

## 2024-03-04 NOTE — TELEPHONE ENCOUNTER
Patient contacted our office with concerns about starting a new medication Lupron and other hormone therapy

## 2024-03-04 NOTE — PROGRESS NOTES
SW met with pt and his wife, Nadia, as he was seen for consultation in radiation oncology for treatment of prostate cancer.  Pt endorsed a distress of 3/10 today, citing his source of distress as stemming from the lack of clarity he feels he has received with regard to his prostate cancer and treatment needs.  He also requested information about Advance Directives.     He states that he received radiation therapy in the past, for head and neck cancer. He was treated by Dr. Stone at [facility] whom pt held in high regard, but Dr. Stone is no longer at that facility. This is a loss for pt. Pt states has not connected well with the current radiation oncologist at that facility, stating that he feels he has no clear understanding of his condition. The family state they had a very difficult Spelter, as they had shared dire news of cancer with their family and friends, and now they do not understand if he has cancer.  It is hoped that clarification on these points should help to alleviate some distress for the couple.  Additionally, the couple note that pt has a long-standing history with anxiety, and this is a contributor to his distress, which appears higher than endorsed.    The couple report they live at home together, and they describe numerous family and friends, who are supportive in the community.  Pt and or his wife will transport him to treatment, should that be indicated. Pt states that he historically took Ativan prior to his head and neck treatment, and thus he did not drive, but he notes he may not find himself in need of such medication given the very different nature of his planned treatment.     Pt was provided a copy of Advance Directive documents, with a brief education about the forms. [facility] was provided, and couple was invited to contact SW should they wish assistance in completing documents in the future, to which they were agreeable. SW will continue to follow pt should he have

## 2024-03-04 NOTE — TELEPHONE ENCOUNTER
Candis from Dr. Hamilton's office called to report that the pt was prescribed Casodex today and Dr. Hamilton would like to get him on Lupron injections with Dr. Ordonez.     Spoke with patient, he is starting Casodex tomorrow, and he is scheduled for 3/18/24 for Lupron injection.

## 2024-03-04 NOTE — PROGRESS NOTES
NURSING ASSESSMENT     Date: 3/4/2024        Patient Name: Kelvin Washington     YOB: 1955      Age:  68 y.o.      MRN: [id number]       Chaperone [x] Yes   [] No  Nadia    Advance Directives:   Do you currently have completed advance directives (living will)? [] Yes   [x] No         *If yes, please bring us a copy for your records.  *If no, would you like info or assistance in completing advance directives (living will)?   [] Yes   [x] No has paperwork at home    Pain Score:   Pain Score (1-10):  3  Pain Location: lower back into legs  Pain Duration: all the time  Pain Management/Control: Tylenol prn Norco prn      Is pain affecting your ability to take care of yourself or move throughout your home?                        [] Yes   [x] No    General: No Problems  Patient has gained weight [] Yes   [x] No  Patient has lost weight [] Yes   [x] No  How much weight in pounds and over what length of time:     Eyes (Ophthalmic): wears glasses     Skin (Dermatological): No Problems     ENT: history of head and neck cancer, Rt BOT, still has altered taste, dry mouth, continues to have difficulty swallowing some foods, follows with Dr Lozano and Dr Rios     Respiratory: occasional RAMOS, has occasional cough to clear throat     Cardiovascular: No Problems      Device   [] Yes   [x] No   Copy of Card Obtained [] Yes   [x] No    Gastrointestinal: No Problems    Genito-Urinary: IPSS 6, rare frequency, urgency and weak stream, nocturia x3     Breast: No Problems     Musculoskeletal: Back Pain radiates into both legs, chronic    Neurological: Numbness and Tingling in thighs at times and toes      Hematological and Lymphatic: Easy Bruising     Endocrine: Thyroid Problems takes synthroid    A 10-point review of systems  has been conducted and pertinent positives have been   recorded. All other review of systems are negative    Was the patient admitted during the course of treatment OR within 30 days of treatment?

## 2024-03-05 NOTE — TELEPHONE ENCOUNTER
Patient is following on on yesterday's call because he is to start  Lupron and other hormone therapy  today 03/05/24 as part of his CA treatment

## 2024-03-06 ENCOUNTER — OFFICE VISIT (OUTPATIENT)
Dept: CARDIOLOGY CLINIC | Age: 69
End: 2024-03-06
Payer: COMMERCIAL

## 2024-03-06 VITALS
HEIGHT: 68 IN | HEART RATE: 61 BPM | OXYGEN SATURATION: 93 % | BODY MASS INDEX: 28.25 KG/M2 | SYSTOLIC BLOOD PRESSURE: 116 MMHG | WEIGHT: 186.4 LBS | DIASTOLIC BLOOD PRESSURE: 60 MMHG

## 2024-03-06 DIAGNOSIS — I10 ESSENTIAL HYPERTENSION: ICD-10-CM

## 2024-03-06 DIAGNOSIS — Z00.00 PE (PHYSICAL EXAM), ANNUAL: Primary | ICD-10-CM

## 2024-03-06 DIAGNOSIS — I25.10 CORONARY ARTERY DISEASE INVOLVING NATIVE CORONARY ARTERY OF NATIVE HEART WITHOUT ANGINA PECTORIS: ICD-10-CM

## 2024-03-06 DIAGNOSIS — N52.9 VASCULOGENIC ERECTILE DYSFUNCTION, UNSPECIFIED VASCULOGENIC ERECTILE DYSFUNCTION TYPE: ICD-10-CM

## 2024-03-06 DIAGNOSIS — I25.119 ATHEROSCLEROSIS OF NATIVE CORONARY ARTERY OF NATIVE HEART WITH ANGINA PECTORIS (HCC): ICD-10-CM

## 2024-03-06 DIAGNOSIS — E78.5 DYSLIPIDEMIA: ICD-10-CM

## 2024-03-06 DIAGNOSIS — R06.09 DOE (DYSPNEA ON EXERTION): ICD-10-CM

## 2024-03-06 DIAGNOSIS — I50.33 ACUTE ON CHRONIC DIASTOLIC HEART FAILURE (HCC): ICD-10-CM

## 2024-03-06 PROCEDURE — 99214 OFFICE O/P EST MOD 30 MIN: CPT | Performed by: INTERNAL MEDICINE

## 2024-03-06 PROCEDURE — 1123F ACP DISCUSS/DSCN MKR DOCD: CPT | Performed by: INTERNAL MEDICINE

## 2024-03-06 PROCEDURE — 3078F DIAST BP <80 MM HG: CPT | Performed by: INTERNAL MEDICINE

## 2024-03-06 PROCEDURE — 3074F SYST BP LT 130 MM HG: CPT | Performed by: INTERNAL MEDICINE

## 2024-03-06 PROCEDURE — 93000 ELECTROCARDIOGRAM COMPLETE: CPT | Performed by: INTERNAL MEDICINE

## 2024-03-06 ASSESSMENT — ENCOUNTER SYMPTOMS
STRIDOR: 0
BACK PAIN: 1
NAUSEA: 0
CHEST TIGHTNESS: 0
EYES NEGATIVE: 1
COUGH: 0
SHORTNESS OF BREATH: 1
BLOOD IN STOOL: 0
GASTROINTESTINAL NEGATIVE: 1
WHEEZING: 0

## 2024-03-06 NOTE — PROGRESS NOTES
Office Visit.         Patient: Kelvin Washington  YOB: 1955  MRN: [id number]    Chief Complaint: COLE LE edema Orthopnea  Chief Complaint   Patient presents with    Follow-up     Discuss concerns w/ Lupron and cancer treatment       CV Data:  7/2019 echo EF 60  7/2019 Dobut stress echo negative   LAD 40-50% sequential, RCA  mid. LVEF 55, EDP 14, PCWP 14 CO 7.2 L/min  10/2019 CUS- mild  10/2010 Abd US negative.   11/2020 CUS mild   9/23 CUS mild  2/24 SPECT - possible small Nd=ferior defect.  ( He has  RCA)    Subjective/HPI COLE is getting worse.  Can't do much due to knee and back pain.  Can't lie flat due to sob    10/16/2019: still sob but better since BB and Imdur. No cp. No bleed no falls.     1/17/2020 no cp occ cole no falls occ nose bleed no falls     6/19/2020 Patient and/or health care decision maker is aware that that he/she may receive a bill for this telephone service, depending on his insurance coverage, and has provided verbal consent to proceed.  This visit was completed via telephone.  Total time 13 minutes.    Still has some cole but better. No cp no falls no bleed little edema.     7/27/2020 still COLE.     10/28/2020 no COLE No CP active at home. No falls no bleed.     1/28/21 still sob occ cp but not bad. No NTG needed. No bleed no falls     4/29/21 lately occ LE edema no cp no sob no bleed. Takes mes. Recent EGD     8/31/21 No cp no sob no falls no bleed. Lost near 30LBS by deiting.  Takes med    4/18/22 doing welll no cp no sob no falls no bleed no edema.     8/22/22 dz with throat and tongue cancer.  Getting PE scan.  CV stable no pc  no sob no falls nobleed. Takes meds    12/22/22  finished chemo for tongue and throat cancer. No cp stlll sob no worse. Not eating. Gets tube feed via PEG.  Coreg stopped due to so much weight loss.     5/5/23 doing well no cp no sob no falls no bleed mariusz sees ENT for f/u throat cancer.     9/18/23 had throat dilated  by ENT. Feels we;ll active no

## 2024-03-06 NOTE — TELEPHONE ENCOUNTER
Called patient to notify him that per Dr. Zepeda, he can proceed with his Lupron. Patient states he has an appointment with Dr. Zepeda to discuss his concerns today in the office. No additional questions or concerns at this time.

## 2024-03-11 ENCOUNTER — HOSPITAL ENCOUNTER (OUTPATIENT)
Dept: RADIATION ONCOLOGY | Age: 69
Discharge: HOME | End: 2024-03-11
Payer: COMMERCIAL

## 2024-03-12 ENCOUNTER — TELEPHONE (OUTPATIENT)
Dept: PAIN MANAGEMENT | Age: 69
End: 2024-03-12

## 2024-03-12 ENCOUNTER — OFFICE VISIT (OUTPATIENT)
Dept: PAIN MANAGEMENT | Age: 69
End: 2024-03-12

## 2024-03-12 DIAGNOSIS — M47.817 LUMBOSACRAL SPONDYLOSIS WITHOUT MYELOPATHY: Primary | ICD-10-CM

## 2024-03-12 RX ORDER — BETAMETHASONE SODIUM PHOSPHATE AND BETAMETHASONE ACETATE 3; 3 MG/ML; MG/ML
6 INJECTION, SUSPENSION INTRA-ARTICULAR; INTRALESIONAL; INTRAMUSCULAR; SOFT TISSUE ONCE
Status: COMPLETED | OUTPATIENT
Start: 2024-03-12 | End: 2024-03-12

## 2024-03-12 RX ORDER — LIDOCAINE HYDROCHLORIDE 10 MG/ML
3 INJECTION, SOLUTION EPIDURAL; INFILTRATION; INTRACAUDAL; PERINEURAL ONCE
Status: COMPLETED | OUTPATIENT
Start: 2024-03-12 | End: 2024-03-12

## 2024-03-12 RX ADMIN — BETAMETHASONE SODIUM PHOSPHATE AND BETAMETHASONE ACETATE 6 MG: 3; 3 INJECTION, SUSPENSION INTRA-ARTICULAR; INTRALESIONAL; INTRAMUSCULAR; SOFT TISSUE at 14:16

## 2024-03-12 RX ADMIN — LIDOCAINE HYDROCHLORIDE 3 ML: 10 INJECTION, SOLUTION EPIDURAL; INFILTRATION; INTRACAUDAL; PERINEURAL at 14:17

## 2024-03-12 NOTE — TELEPHONE ENCOUNTER
Left voicemail for the patient to return office call.  Dr. Maddox will continue prescribing the patient's Norco.  Patient to call us when it is due around 3/22/24.

## 2024-03-12 NOTE — PROGRESS NOTES
[facility]  Neurosurgery and Pain Management Center  [address], [address]  [place], [place]  P: [phone]  F: [phone]      Lumbar Radio Frequency Ablation     Provider: ALLYN CANCHOLA DO          Patient Name: Kelvin Washington : 1955        Date: 3/12/2024      Kelvin Washington is here today for interventional pain management.  Standard ASI guidelines were followed and sterile technique used.  Area was cleaned with Betadine x3.  Informed consent was obtained.  Fluoroscopic guidance was used for this procedure. Multiple views of fluoroscopy were used during procedure to assist with needle placement. Appropriate sized RF 10mm active tip needle was used and advance to appropriate anatomic location.    There was appropriate multifidus contraction noted with motor stimulation at 2 Hz between 0.5-1.5 volts. No limb or gluteal contraction was noted taking it up to 3.5 volts. Prior to lesioning at 80 degrees Celsius for 90 seconds, approximately 0.75mg/1mg of Celestone and ½ cc of 1% preservative free Lidocaine was injected. Impedance was between 200-500 ohms during the procedure.     Patient tolerated the procedure well, no obvious complications occurred during the procedure.  Patient was appropriately monitored and discharged home in stable condition with their usual motor strength. Post Op instructions were given to patient.          [] Bilateral [] T11 [] L1 [] S1     [] T12 [] L2 [] S2    [] Right  [x] L3 [] S3      [x] L4 [] S4    [x] Left  [x] L5                              ALLYN CANCHOLA DO

## 2024-03-12 NOTE — TELEPHONE ENCOUNTER
Patient called regarding his pain medication he says he discussed with Dr Maddox getting this from his cancer providers. However patient states that he will have to pay for it then and if Dr Maddox prescribes it will be under workers comp so he will not have to pay for it. He is asking if Dr Maddox will prescribe him the norco ?

## 2024-03-13 ENCOUNTER — HOSPITAL ENCOUNTER (OUTPATIENT)
Dept: RADIATION ONCOLOGY | Age: 69
Discharge: HOME | End: 2024-03-13
Payer: COMMERCIAL

## 2024-03-13 DIAGNOSIS — C61 PROSTATE CANCER (HCC): Primary | ICD-10-CM

## 2024-03-13 PROCEDURE — 77334 RADIATION TREATMENT AID(S): CPT | Performed by: RADIOLOGY

## 2024-03-13 PROCEDURE — 77263 THER RADIOLOGY TX PLNG CPLX: CPT | Performed by: RADIOLOGY

## 2024-03-13 NOTE — PROGRESS NOTES
Teaching & Instructions - Prostate  General  Simulation  Initial Treatment  Self-Care Info  Nutrition  Social Service    Site-Specific  Side Effects  Cystitis Mgmt  Bowel Mgmt  Perineal Care  Proctitis Mgmt  Sexuality Issues      Educational Handouts  Radiation Therapy & You  Site Specific Instructions  Radiation Oncology Dept Information  CBMS Program      Patient and his wife, Jael eager to learn, verbalized understanding of verbal education and handouts.

## 2024-03-15 ENCOUNTER — OFFICE VISIT (OUTPATIENT)
Dept: PALLATIVE CARE | Age: 69
End: 2024-03-15
Payer: COMMERCIAL

## 2024-03-15 VITALS
DIASTOLIC BLOOD PRESSURE: 74 MMHG | SYSTOLIC BLOOD PRESSURE: 114 MMHG | HEART RATE: 74 BPM | TEMPERATURE: 97.1 F | WEIGHT: 184 LBS | BODY MASS INDEX: 27.98 KG/M2 | RESPIRATION RATE: 15 BRPM | OXYGEN SATURATION: 93 %

## 2024-03-15 DIAGNOSIS — I50.32 CHRONIC DIASTOLIC CHF (CONGESTIVE HEART FAILURE) (HCC): ICD-10-CM

## 2024-03-15 DIAGNOSIS — F40.240 CLAUSTROPHOBIA: ICD-10-CM

## 2024-03-15 DIAGNOSIS — F41.9 ANXIETY: ICD-10-CM

## 2024-03-15 DIAGNOSIS — J44.9 CHRONIC OBSTRUCTIVE PULMONARY DISEASE, UNSPECIFIED COPD TYPE (HCC): ICD-10-CM

## 2024-03-15 DIAGNOSIS — C61 PROSTATE CANCER (HCC): Primary | ICD-10-CM

## 2024-03-15 DIAGNOSIS — M47.817 LUMBOSACRAL SPONDYLOSIS WITHOUT MYELOPATHY: ICD-10-CM

## 2024-03-15 DIAGNOSIS — Z51.5 PALLIATIVE CARE ENCOUNTER: ICD-10-CM

## 2024-03-15 PROCEDURE — 3078F DIAST BP <80 MM HG: CPT | Performed by: NURSE PRACTITIONER

## 2024-03-15 PROCEDURE — 3074F SYST BP LT 130 MM HG: CPT | Performed by: NURSE PRACTITIONER

## 2024-03-15 PROCEDURE — 1123F ACP DISCUSS/DSCN MKR DOCD: CPT | Performed by: NURSE PRACTITIONER

## 2024-03-15 PROCEDURE — 99205 OFFICE O/P NEW HI 60 MIN: CPT | Performed by: NURSE PRACTITIONER

## 2024-03-15 RX ORDER — HYDROCODONE BITARTRATE AND ACETAMINOPHEN 5; 325 MG/1; MG/1
1 TABLET ORAL 3 TIMES DAILY PRN
Qty: 90 TABLET | Refills: 0 | Status: SHIPPED | OUTPATIENT
Start: 2024-03-15 | End: 2024-04-14

## 2024-03-15 RX ORDER — LORAZEPAM 1 MG/1
1 TABLET ORAL 2 TIMES DAILY PRN
Qty: 30 TABLET | Refills: 2
Start: 2024-03-15 | End: 2024-04-14

## 2024-03-15 ASSESSMENT — ENCOUNTER SYMPTOMS
COUGH: 1
NAUSEA: 0
BACK PAIN: 1
TROUBLE SWALLOWING: 1
DIARRHEA: 0
CONSTIPATION: 1
WHEEZING: 1
SHORTNESS OF BREATH: 1

## 2024-03-15 NOTE — PROGRESS NOTES
Cuff Size: Medium Adult)   Pulse 74   Temp 97.1 °F (36.2 °C)   Resp 15   Wt 83.5 kg (184 lb)   SpO2 93%   BMI 27.98 kg/m²    Wt Readings from Last 3 Encounters:   03/15/24 83.5 kg (184 lb)   03/06/24 84.6 kg (186 lb 6.4 oz)   03/04/24 83.9 kg (185 lb)     Physical Exam  Constitutional:       General: He is not in acute distress.  HENT:      Head: Normocephalic and atraumatic.      Nose: No rhinorrhea.   Eyes:      General: No scleral icterus.        Right eye: No discharge.         Left eye: No discharge.      Extraocular Movements: Extraocular movements intact.      Conjunctiva/sclera: Conjunctivae normal.   Cardiovascular:      Rate and Rhythm: Normal rate and regular rhythm.   Pulmonary:      Effort: Pulmonary effort is normal.      Breath sounds: Normal breath sounds.   Abdominal:      General: Bowel sounds are normal. There is no distension.      Palpations: Abdomen is soft.      Tenderness: There is no abdominal tenderness.   Musculoskeletal:      Cervical back: Normal range of motion.      Right lower leg: No edema.      Left lower leg: No edema.   Skin:     General: Skin is warm and dry.   Neurological:      General: No focal deficit present.      Mental Status: He is alert and oriented to person, place, and time.      Gait: Gait abnormal.   Psychiatric:         Mood and Affect: Mood normal.         Behavior: Behavior normal.         Assessment and Plan:      1. Prostate cancer (HCC)  2. Lumbosacral spondylosis without myelopathy  Upcoming radiation x 35 treatments. Will temporarily increase norco to TID prn with instructions to take one at least 45 minutes prior to radiation. Messaged Dr. Maddox to notify him of change. Once radiation complete, he will continue on Dr. Maddox's service for pain prescriptions.    - HYDROcodone-acetaminophen (NORCO) 5-325 MG per tablet; Take 1 tablet by mouth 3 times daily as needed for Pain for up to 30 days. Intended supply: 30 days. Take lowest dose possible to manage

## 2024-03-18 ENCOUNTER — OFFICE VISIT (OUTPATIENT)
Dept: UROLOGY | Age: 69
End: 2024-03-18
Payer: COMMERCIAL

## 2024-03-18 VITALS
WEIGHT: 184 LBS | SYSTOLIC BLOOD PRESSURE: 100 MMHG | HEIGHT: 68 IN | HEART RATE: 66 BPM | BODY MASS INDEX: 27.89 KG/M2 | DIASTOLIC BLOOD PRESSURE: 72 MMHG

## 2024-03-18 DIAGNOSIS — C61 PROSTATE CANCER (HCC): Primary | ICD-10-CM

## 2024-03-18 PROCEDURE — 3074F SYST BP LT 130 MM HG: CPT | Performed by: UROLOGY

## 2024-03-18 PROCEDURE — 99213 OFFICE O/P EST LOW 20 MIN: CPT | Performed by: UROLOGY

## 2024-03-18 PROCEDURE — 3078F DIAST BP <80 MM HG: CPT | Performed by: UROLOGY

## 2024-03-18 PROCEDURE — 96402 CHEMO HORMON ANTINEOPL SQ/IM: CPT | Performed by: UROLOGY

## 2024-03-18 PROCEDURE — 1123F ACP DISCUSS/DSCN MKR DOCD: CPT | Performed by: UROLOGY

## 2024-03-18 ASSESSMENT — ENCOUNTER SYMPTOMS
ABDOMINAL DISTENTION: 0
ABDOMINAL PAIN: 0

## 2024-03-18 NOTE — PROGRESS NOTES
Subjective:      Patient ID: Kelvin Washington is a 68 y.o. male    HPI  This is a 67 yo male with h/o DDD, Anxiety, CAD on Imdur, Kidney stones and s/p RALP on 11/16/18 with positive margin back in follow-up. Since last seen on 12/22/23, he has no new  complaints. He still has mild and stable ABDIAS. He did see Dr Stone in past and was being followed by him until recently and he recommended IMRT with 6 mo of ADT once his ABDIAS resolves but the patient and wife had been reluctant to comply with this option but recently saw a new  Rad Oncologist and was to start IMRT in 1/24, however he wanted a second opinion from Dr Hamilton and is planning on having the IMRT at [facility] now. Dr Hamilton is requesting 6 mo of ADT with Lupron and started him on Casodex. He has no interval complaints. He was found to have an oral cancer and has received radiation and chemotherapy for this by Dr Stone. I again reviewed the possible SE of Lupron including but not limited to hot flashes, ED, decreased libido, anemia, muscle wasting/fatigue, osteoporosis (need for Vit D and Ca supplementation) possible increased risk of CAD/DM, pain at injection site and wants to proceed as planned.       PATH: Godfrey 4 + 3 = 7 adenocarcinoma of prostate, 11 Ln neg, EPE neg, Margin pos (Rt anterior), SV neg. [id number].     Past Medical History:   Diagnosis Date    CAD (coronary artery disease)     Cancer (HCC)     prostate cancer Nov 16 2018, SCCA right BOT 2022    CHF (congestive heart failure) (HCC)     Chicken pox     Chronic back pain     Emphysema of lung (HCC)     Hyperlipidemia     Hypertension     Hypothyroidism     Measles     Mumps     Osteoarthritis     Pneumonia      Past Surgical History:   Procedure Laterality Date    CARDIAC CATHETERIZATION      2019    COLONOSCOPY      2016    CYST REMOVAL      back of neck    CYST REMOVAL Left 03/04/2016    Dr Woo    CYSTOSCOPY  07/2018    DENTAL SURGERY      HERNIA REPAIR      2016, 2019    LARYNGOSCOPY

## 2024-03-20 ENCOUNTER — HOSPITAL ENCOUNTER (OUTPATIENT)
Dept: RADIATION ONCOLOGY | Age: 69
Discharge: HOME | End: 2024-03-20
Payer: COMMERCIAL

## 2024-03-20 VITALS
WEIGHT: 185.6 LBS | RESPIRATION RATE: 18 BRPM | HEART RATE: 51 BPM | TEMPERATURE: 97.6 F | OXYGEN SATURATION: 96 % | DIASTOLIC BLOOD PRESSURE: 89 MMHG | SYSTOLIC BLOOD PRESSURE: 137 MMHG | BODY MASS INDEX: 28.22 KG/M2

## 2024-03-20 DIAGNOSIS — C61 PROSTATE CANCER (HCC): Primary | ICD-10-CM

## 2024-03-20 DIAGNOSIS — C61 PROSTATE CANCER (HCC): ICD-10-CM

## 2024-03-20 DIAGNOSIS — F13.99 SEDATIVE, HYPNOTIC OR ANXIOLYTIC USE, UNSPECIFIED WITH UNSPECIFIED SEDATIVE, HYPNOTIC OR ANXIOLYTIC-INDUCED DISORDER (HCC): Primary | ICD-10-CM

## 2024-03-20 DIAGNOSIS — F13.20 SEDATIVE, HYPNOTIC OR ANXIOLYTIC DEPENDENCE, UNCOMPLICATED (HCC): ICD-10-CM

## 2024-03-20 DIAGNOSIS — M47.817 LUMBOSACRAL SPONDYLOSIS WITHOUT MYELOPATHY: ICD-10-CM

## 2024-03-20 PROCEDURE — 77014 CHG CT GUIDANCE RADIATION THERAPY FLDS PLACEMENT: CPT | Performed by: RADIOLOGY

## 2024-03-20 PROCEDURE — 77385 HC NTSTY MODUL RAD TX DLVR SMPL: CPT | Performed by: RADIOLOGY

## 2024-03-20 NOTE — PROGRESS NOTES
[facility]           Radiation Oncology      [address] [id number]        O: [phone]        F: [phone]       [facility]                   Dr. Cas Hamilton MD PhD    ON TREATMENT VISIT (OTV) NOTE     Date of Service: 3/20/2024  Patient ID: Kelvin Washington   : 1955  MRN: [id number]   Acct Number: [id number]       DIAGNOSIS:   Cancer Staging   Prostate cancer (HCC)  Staging form: Prostate, AJCC 8th Edition  - Pathologic stage from 2019: Stage IIC (pT2, pN0, cM0, PSA: 4.4, Grade Group: 3) - Signed by Raul Stone MD on 2019  - Pathologic: No Stage Recommended (rcTX, cN1, cM0, PSA: 0.2, Grade Group: 3) - Signed by Cas Hamilton MD on 3/4/2024      Treatment Area: Pelvis- Male    Current Total Dose(cGy): 200  Current Fraction: 1    Patient was seen today for weekly visit.     Wt Readings from Last 3 Encounters:   24 84.2 kg (185 lb 9.6 oz)   24 83.5 kg (184 lb)   03/15/24 83.5 kg (184 lb)       /89   Pulse 51   Temp 97.6 °F (36.4 °C)   Resp 18   Wt 84.2 kg (185 lb 9.6 oz)   SpO2 96%   BMI 28.22 kg/m²     Lab Results   Component Value Date    WBC 3.8 (L) 2023    HGB 12.3 (L) 2023    HCT 38.2 (L) 2023     2023       Comfort Alteration  Fatigue:Able to perform daily activities with rest periods    Pain Location: lower back  Pain Intensity (Current): 2 Mild pain  Pain Treatment:  vicodin  and ativan  Pain Relief: Pain relieved 75%    Emotional Alteration:   Coping: effective    Nutritional Alteration  Anorexia: none   Nausea: No nausea noted  Vomiting: No vomiting     Skin Alteration   Skin reaction: No changes noted    Elimination Alterations  Urinary Frequency: None  Urinary Retention: Normal  Urinary Incontinence: dribbles on occasion, uses incontinence pads daily  Dysuria: None  Nocturia: 1-3 times nightly is baseline  Proctitis: None  Diarrhea: None  Comments: baseline moves bowels

## 2024-03-21 ENCOUNTER — HOSPITAL ENCOUNTER (OUTPATIENT)
Dept: RADIATION ONCOLOGY | Age: 69
Discharge: HOME | End: 2024-03-21
Payer: COMMERCIAL

## 2024-03-21 PROCEDURE — 77014 CHG CT GUIDANCE RADIATION THERAPY FLDS PLACEMENT: CPT | Performed by: RADIOLOGY

## 2024-03-21 PROCEDURE — 77385 HC NTSTY MODUL RAD TX DLVR SMPL: CPT | Performed by: RADIOLOGY

## 2024-03-22 ENCOUNTER — HOSPITAL ENCOUNTER (OUTPATIENT)
Dept: RADIATION ONCOLOGY | Age: 69
Discharge: HOME | End: 2024-03-22
Payer: COMMERCIAL

## 2024-03-22 PROCEDURE — 77385 HC NTSTY MODUL RAD TX DLVR SMPL: CPT | Performed by: RADIOLOGY

## 2024-03-25 ENCOUNTER — HOSPITAL ENCOUNTER (OUTPATIENT)
Dept: RADIATION ONCOLOGY | Age: 69
Discharge: HOME | End: 2024-03-25
Payer: COMMERCIAL

## 2024-03-25 PROCEDURE — 77385 HC NTSTY MODUL RAD TX DLVR SMPL: CPT | Performed by: RADIOLOGY

## 2024-03-25 PROCEDURE — 77014 CHG CT GUIDANCE RADIATION THERAPY FLDS PLACEMENT: CPT | Performed by: RADIOLOGY

## 2024-03-26 ENCOUNTER — HOSPITAL ENCOUNTER (OUTPATIENT)
Dept: RADIATION ONCOLOGY | Age: 69
Discharge: HOME | End: 2024-03-26
Payer: COMMERCIAL

## 2024-03-26 PROCEDURE — 77385 HC NTSTY MODUL RAD TX DLVR SMPL: CPT | Performed by: RADIOLOGY

## 2024-03-26 PROCEDURE — 77336 RADIATION PHYSICS CONSULT: CPT | Performed by: RADIOLOGY

## 2024-03-26 PROCEDURE — 77014 CHG CT GUIDANCE RADIATION THERAPY FLDS PLACEMENT: CPT | Performed by: RADIOLOGY

## 2024-03-27 ENCOUNTER — HOSPITAL ENCOUNTER (OUTPATIENT)
Dept: RADIATION ONCOLOGY | Age: 69
Discharge: HOME | End: 2024-03-27
Payer: COMMERCIAL

## 2024-03-27 PROCEDURE — 77014 CHG CT GUIDANCE RADIATION THERAPY FLDS PLACEMENT: CPT | Performed by: RADIOLOGY

## 2024-03-27 PROCEDURE — 77385 HC NTSTY MODUL RAD TX DLVR SMPL: CPT | Performed by: RADIOLOGY

## 2024-03-28 ENCOUNTER — HOSPITAL ENCOUNTER (OUTPATIENT)
Dept: RADIATION ONCOLOGY | Age: 69
Discharge: HOME | End: 2024-03-28
Payer: COMMERCIAL

## 2024-03-28 VITALS
WEIGHT: 187.4 LBS | OXYGEN SATURATION: 96 % | TEMPERATURE: 96.8 F | SYSTOLIC BLOOD PRESSURE: 130 MMHG | HEART RATE: 65 BPM | DIASTOLIC BLOOD PRESSURE: 80 MMHG | RESPIRATION RATE: 18 BRPM | BODY MASS INDEX: 28.49 KG/M2

## 2024-03-28 DIAGNOSIS — C61 PROSTATE CANCER (HCC): Primary | ICD-10-CM

## 2024-03-28 PROCEDURE — 77385 HC NTSTY MODUL RAD TX DLVR SMPL: CPT | Performed by: RADIOLOGY

## 2024-03-28 RX ORDER — BISACODYL 5 MG/1
5 TABLET, DELAYED RELEASE ORAL DAILY PRN
COMMUNITY

## 2024-03-28 RX ORDER — LEVOTHYROXINE SODIUM 0.07 MG/1
75 TABLET ORAL DAILY
Qty: 90 TABLET | Refills: 0 | Status: SHIPPED | OUTPATIENT
Start: 2024-03-28

## 2024-03-28 RX ORDER — M-VIT,TX,IRON,MINS/CALC/FOLIC 27MG-0.4MG
1 TABLET ORAL DAILY
COMMUNITY

## 2024-03-28 RX ORDER — CALCIUM CARBONATE 500(1250)
500 TABLET ORAL DAILY
COMMUNITY

## 2024-03-28 NOTE — TELEPHONE ENCOUNTER
4/26/2024  8:30 AM SCHEDULE, [facility] RAD ONC LINAC 2 [facility] RAD ONC [place] HOD   4/29/2024  8:30 AM SCHEDULE, [facility] RAD ONC LINAC 2 [facility] RAD ON[facility] [place] HOD   4/30/2024  8:30 AM SCHEDULE, [facility] RAD ONC LINAC 2 [facility] RAD ON[facility] [place] HOD   5/1/2024  8:30 AM SCHEDULE, [facility] RAD ONC LINAC 2 [facility] RAD ON[facility] [place] HOD   5/2/2024  8:30 AM SCHEDULE, [facility] RAD ONC LINAC 2 [facility] RAD ON[facility] [place] HOD   5/3/2024  8:30 AM SCHEDULE, [facility] RAD ONC LINAC 2 [facility] RAD ON[facility] [place] HOD   5/9/2024 10:00 AM Billy Akhtar MD [facility] [place]   5/29/2024 10:00 AM Laurel Thomas MD [place] [place]   7/11/2024  2:15 PM Gage Zepeda MD [place] [facility] [place]   8/16/2024  2:45 PM Teri Jolly MD [place]   9/19/2024 10:15 AM Eyal Ordonez MD [facility] [place]

## 2024-03-28 NOTE — PROGRESS NOTES
MG EC tablet Take 1 tablet by mouth daily      acetaminophen (TYLENOL) 500 MG tablet Take 2 tablets by mouth daily At night       No current facility-administered medications for this encounter.     * New    PHYSICAL EXAM:       ECO - Symptomatic but completely ambulatory (Restricted in physically strenuous activity but ambulatory and able to carry out work of a light or sedentary nature. For example, light housework, office work)     General: NAD, AO x 3, Mentation is clear with appropriate affect.  HEENT: Normocephalic, atraumatic  Thorax:  Unlabored  Abdomen:  Non-distended    Chemotherapy Update: Concurrent ADT    Treatment Imaging: CBCT    ASSESSMENT: No significant radiation side effects. Responding appropriately to symptomatic management.    New medications, diagnostic results: Continue treatment as planned    PLAN: Again reviewed potential side effects of radiation for the patient's treatment.    Continue local/topical care.  I recommended that the patient start Gas-X twice daily to help minimize day-to-day variation in target geometry and positioning.   Continue current radiation course as prescribed.

## 2024-03-29 ENCOUNTER — HOSPITAL ENCOUNTER (OUTPATIENT)
Dept: RADIATION ONCOLOGY | Age: 69
Discharge: HOME | End: 2024-03-29
Payer: COMMERCIAL

## 2024-03-29 PROCEDURE — 77385 HC NTSTY MODUL RAD TX DLVR SMPL: CPT | Performed by: RADIOLOGY

## 2024-04-01 ENCOUNTER — HOSPITAL ENCOUNTER (OUTPATIENT)
Dept: RADIATION ONCOLOGY | Age: 69
Discharge: HOME | End: 2024-04-01
Payer: COMMERCIAL

## 2024-04-01 PROCEDURE — 77014 CHG CT GUIDANCE RADIATION THERAPY FLDS PLACEMENT: CPT | Performed by: RADIOLOGY

## 2024-04-01 PROCEDURE — 77385 HC NTSTY MODUL RAD TX DLVR SMPL: CPT | Performed by: RADIOLOGY

## 2024-04-02 ENCOUNTER — HOSPITAL ENCOUNTER (OUTPATIENT)
Dept: RADIATION ONCOLOGY | Age: 69
Discharge: HOME | End: 2024-04-02
Payer: COMMERCIAL

## 2024-04-02 PROCEDURE — 77385 HC NTSTY MODUL RAD TX DLVR SMPL: CPT | Performed by: RADIOLOGY

## 2024-04-02 PROCEDURE — 77014 CHG CT GUIDANCE RADIATION THERAPY FLDS PLACEMENT: CPT | Performed by: RADIOLOGY

## 2024-04-02 PROCEDURE — 77336 RADIATION PHYSICS CONSULT: CPT | Performed by: RADIOLOGY

## 2024-04-03 ENCOUNTER — HOSPITAL ENCOUNTER (OUTPATIENT)
Dept: RADIATION ONCOLOGY | Age: 69
Discharge: HOME | End: 2024-04-03
Payer: COMMERCIAL

## 2024-04-03 PROCEDURE — 77385 HC NTSTY MODUL RAD TX DLVR SMPL: CPT | Performed by: RADIOLOGY

## 2024-04-03 PROCEDURE — 77014 CHG CT GUIDANCE RADIATION THERAPY FLDS PLACEMENT: CPT | Performed by: RADIOLOGY

## 2024-04-04 ENCOUNTER — HOSPITAL ENCOUNTER (OUTPATIENT)
Dept: RADIATION ONCOLOGY | Age: 69
Discharge: HOME | End: 2024-04-04
Payer: COMMERCIAL

## 2024-04-04 VITALS
TEMPERATURE: 97.3 F | WEIGHT: 189.6 LBS | BODY MASS INDEX: 28.83 KG/M2 | SYSTOLIC BLOOD PRESSURE: 123 MMHG | DIASTOLIC BLOOD PRESSURE: 72 MMHG | RESPIRATION RATE: 18 BRPM | OXYGEN SATURATION: 96 % | HEART RATE: 57 BPM

## 2024-04-04 DIAGNOSIS — C61 PROSTATE CANCER (HCC): Primary | ICD-10-CM

## 2024-04-04 PROCEDURE — 77385 HC NTSTY MODUL RAD TX DLVR SMPL: CPT | Performed by: RADIOLOGY

## 2024-04-04 RX ORDER — SIMETHICONE 80 MG
80 TABLET,CHEWABLE ORAL EVERY 6 HOURS PRN
COMMUNITY

## 2024-04-04 NOTE — TELEPHONE ENCOUNTER
patient requesting medication refill. Please approve or deny this request.    Rx requested:  Requested Prescriptions     Pending Prescriptions Disp Refills    atorvastatin (LIPITOR) 80 MG tablet 90 tablet 0     Sig: Take 1 tablet by mouth daily         Last Office Visit:   2/9/2024      Next Visit Date:  Future Appointments   Date Time Provider Department Center   4/5/2024  8:30 AM SCHEDULE, [facility] RAD ONC [facility] 2 [facility] RAD ON[facility] [place] HOD   4/5/2024  9:00 AM Khushi Harvey APRN - CNP [facility] [place]   4/8/2024  8:30 AM SCHEDULE, [facility] RAD ONC LIN[facility] 2 [facility] RAD ON[facility] [place] [facility]OD   4/9/2024  8:30 AM SCHEDULE, [facility] RAD ONC LIN[facility] 2 [facility] RAD ON[facility] [place] HOD   4/10/2024  8:30 AM SCHEDULE, [facility] RAD ONC LIN[facility] 2 [facility] RAD ONC [place] HOD   4/11/2024  8:30 AM SCHEDULE, [facility] RAD ONC LIN[facility] 2 [facility] RAD ON[facility] [place] HOD   4/12/2024  8:30 AM SCHEDULE, [facility] RAD ONC LIN[facility] 2 [facility] RAD ONC [place] HOD   4/15/2024  8:30 AM SCHEDULE, [facility] RAD ONC LIN[facility] 2 [facility] RAD ONC [place] HOD   4/16/2024  8:30 AM SCHEDULE, [facility] RAD ONC LINAC 2 [facility] RAD ONC [place] HOD   4/17/2024  8:30 AM SCHEDULE, [facility] RAD ONC LINAC 2 [facility] RAD ONC [place] HOD   4/18/2024  8:30 AM SCHEDULE, [facility] RAD ONC LIN[facility] 2 [facility] RAD ONC [place] HOD   4/19/2024  8:30 AM SCHEDULE, [facility] RAD ONC LINAC 2 [facility] RAD ON[facility] [place] HOD   4/22/2024  8:30 AM SCHEDULE, [facility] RAD ONC LIN[facility] 2 [facility] RAD ON[facility] [place] HOD   4/23/2024  8:30 AM SCHEDULE, [facility] RAD ONC LINAC 2 [facility] RAD ONC [place] HOD   4/24/2024  8:30 AM SCHEDULE, [facility] RAD ONC LINAC 2 [facility] RAD ONC [place] HOD   4/25/2024  8:30 AM SCHEDULE, M[facility] RAD ONC LINAC 2 [facility] RAD ONC [place] HOD   4/26/2024  8:30 AM SCHEDULE, MLOZ RAD ONC LINAC 2 [facility] RAD ONC [place] HOD   4/29/2024  8:30 AM SCHEDULE, MLO[facility] RAD ONC LINAC 2 [facility] RAD ONC [place] HOD   4/30/2024  8:30 AM SCHEDULE, M[facility] RAD ONC LINAC 2 [facility] RAD ONC [place] HOD   5/1/2024  8:30 AM SCHEDULE, M[facility] RAD ONC LINAC 2 [facility] RAD ONC [place] HOD   5/2/2024  8:30 AM SCHEDULE, M[facility]Z RAD ONC LINAC 2 [facility] RAD ONC

## 2024-04-04 NOTE — PROGRESS NOTES
[facility]           Radiation Oncology      [id number] [place], [place] [id number]        O: [phone]        F: [phone]       [facility]                   Dr. Cas Hamilton MD PhD    ON TREATMENT VISIT (OTV) NOTE     Date of Service: 2024  Patient ID: Kelvin Washington   : 1955  MRN: [id number]   Acct Number: [id number]       DIAGNOSIS:   Cancer Staging   Prostate cancer (HCC)  Staging form: Prostate, AJCC 8th Edition  - Pathologic stage from 2019: Stage IIC (pT2, pN0, cM0, PSA: 4.4, Grade Group: 3) - Signed by Raul Stone MD on 2019  - Pathologic: No Stage Recommended (rcTX, cN1, cM0, PSA: 0.2, Grade Group: 3) - Signed by Cas Hamilton MD on 3/4/2024      Treatment Area: Pelvis- Male    Current Total Dose(cGy): 2400 cGy  Current Fraction: 12    Patient was seen today for weekly visit.     Wt Readings from Last 3 Encounters:   24 86 kg (189 lb 9.6 oz)   24 85 kg (187 lb 6.4 oz)   24 84.2 kg (185 lb 9.6 oz)       /72   Pulse 57   Temp 97.3 °F (36.3 °C)   Resp 18   Wt 86 kg (189 lb 9.6 oz)   SpO2 96%   BMI 28.83 kg/m²     Lab Results   Component Value Date    WBC 3.8 (L) 2023    HGB 12.3 (L) 2023    HCT 38.2 (L) 2023     2023       Comfort Alteration  Fatigue:ongoing mild fatigue at times    Pain Location: Chronic low back pain, joint discomfort in the last week  Pain Intensity (Current): Ongoing chronic back pain, new pain to joints in the last week to week and a half.  Pain Treatment: Hydrocodone TID,  Pain Relief:  50-60%    Emotional Alteration:   Coping: effective    Nutritional Alteration  Anorexia: none   Nausea: No nausea noted  Vomiting: No vomiting     Skin Alteration   Skin reaction: No changes noted    Elimination Alterations  Urinary Frequency:  About every 1-2 hours  Urinary Retention: Normal  Urinary Incontinence: Dribbles on occasion with laughing or coughing. Wears

## 2024-04-05 ENCOUNTER — HOSPITAL ENCOUNTER (OUTPATIENT)
Dept: RADIATION ONCOLOGY | Age: 69
Discharge: HOME | End: 2024-04-05
Payer: COMMERCIAL

## 2024-04-05 ENCOUNTER — OFFICE VISIT (OUTPATIENT)
Dept: PALLATIVE CARE | Age: 69
End: 2024-04-05
Payer: COMMERCIAL

## 2024-04-05 VITALS
OXYGEN SATURATION: 96 % | WEIGHT: 189 LBS | BODY MASS INDEX: 28.74 KG/M2 | DIASTOLIC BLOOD PRESSURE: 76 MMHG | HEART RATE: 55 BPM | SYSTOLIC BLOOD PRESSURE: 136 MMHG | TEMPERATURE: 96 F

## 2024-04-05 DIAGNOSIS — F41.9 ANXIETY: ICD-10-CM

## 2024-04-05 DIAGNOSIS — F40.240 CLAUSTROPHOBIA: ICD-10-CM

## 2024-04-05 DIAGNOSIS — J44.9 CHRONIC OBSTRUCTIVE PULMONARY DISEASE, UNSPECIFIED COPD TYPE (HCC): ICD-10-CM

## 2024-04-05 DIAGNOSIS — Z51.5 PALLIATIVE CARE ENCOUNTER: ICD-10-CM

## 2024-04-05 DIAGNOSIS — M47.817 LUMBOSACRAL SPONDYLOSIS WITHOUT MYELOPATHY: Primary | ICD-10-CM

## 2024-04-05 DIAGNOSIS — I50.32 CHRONIC DIASTOLIC CHF (CONGESTIVE HEART FAILURE) (HCC): ICD-10-CM

## 2024-04-05 DIAGNOSIS — C61 PROSTATE CANCER (HCC): ICD-10-CM

## 2024-04-05 PROCEDURE — 77385 HC NTSTY MODUL RAD TX DLVR SMPL: CPT | Performed by: RADIOLOGY

## 2024-04-05 PROCEDURE — 99214 OFFICE O/P EST MOD 30 MIN: CPT | Performed by: NURSE PRACTITIONER

## 2024-04-05 PROCEDURE — 1123F ACP DISCUSS/DSCN MKR DOCD: CPT | Performed by: NURSE PRACTITIONER

## 2024-04-05 PROCEDURE — 3078F DIAST BP <80 MM HG: CPT | Performed by: NURSE PRACTITIONER

## 2024-04-05 PROCEDURE — 3075F SYST BP GE 130 - 139MM HG: CPT | Performed by: NURSE PRACTITIONER

## 2024-04-05 RX ORDER — ATORVASTATIN CALCIUM 80 MG/1
80 TABLET, FILM COATED ORAL DAILY
Qty: 90 TABLET | Refills: 0 | Status: SHIPPED | OUTPATIENT
Start: 2024-04-05

## 2024-04-05 RX ORDER — LORAZEPAM 1 MG/1
1 TABLET ORAL 2 TIMES DAILY PRN
Qty: 60 TABLET | Refills: 0 | Status: SHIPPED | OUTPATIENT
Start: 2024-04-05 | End: 2024-05-05

## 2024-04-05 NOTE — PROGRESS NOTES
Subjective:      Patient Id: Seen Kelvin at the clinic at the  [facility] , for follow-up palliative medicine visit. He was accompanied to the appointment by: self.   Chief Complaint   Patient presents with    Generalized increase in pain.      HPI       Kelvin Washington is a 68 y.o. male referred to palliative care by Dr. Hamilton for Sedative, hypnotic or anxiolytic use, unspecified with unspecified sedative, hypnotic or anxiolytic-induced disorder, Sedative, hypnotic or anxiolytic dependence, uncomplicated, Lumbosacral spondylosis without myelopathy, Prostate cancer.  Kelvin has complex medical history that includes CAD, prostate cancer, oropharyngeal cancer in remission, diastolic CHF, chronic back pain, emphysema, HLD, HTN, hypothyroidism, OA, anxiety.    General: Patient is alert and oriented x 4. In NAD.     Functional status: Up independent.    Prostate cancer/mouth/throat cancer: On casodex. Started radiation. On treatment 13. Plan is for 35 treatments. Patient has had radiation in the past for his oropharyngeal cancer. Follows with Dr. Lozano for oncology needs.    COPD/CHF: Some SOB with exertion. Patient normally works out regularly but has only been going on Saturdays since starting radiation. Some coughing and wheezing at night. Baseline. No current issues with leg swelling. Following with Dr. Thomas and Dr. Zepeda.     Pain: Increased multiple joint pain since starting radiation especially knees, shoulders, and neck. Also has increased low back pain. Has chronic low back pain since 2005. Patient reports he stepped down off a truck and ended up having significant injury to lumbar and sacral spine. He has not been able to work since then. Pain gets up to a 7-8/10. Currently rating pain at a 2/10. Describes as sharp pain. Also gets numbness in his legs. Had radiofrequency ablation done multiple times. Normally following with pain management but temporarily having palliative care manage his pain

## 2024-04-08 ENCOUNTER — HOSPITAL ENCOUNTER (OUTPATIENT)
Dept: RADIATION ONCOLOGY | Age: 69
Discharge: HOME | End: 2024-04-08
Payer: COMMERCIAL

## 2024-04-08 PROCEDURE — 77385 HC NTSTY MODUL RAD TX DLVR SMPL: CPT | Performed by: RADIOLOGY

## 2024-04-08 PROCEDURE — 77014 CHG CT GUIDANCE RADIATION THERAPY FLDS PLACEMENT: CPT | Performed by: RADIOLOGY

## 2024-04-09 ENCOUNTER — HOSPITAL ENCOUNTER (OUTPATIENT)
Dept: RADIATION ONCOLOGY | Age: 69
Discharge: HOME | End: 2024-04-09
Payer: COMMERCIAL

## 2024-04-09 PROCEDURE — 77014 CHG CT GUIDANCE RADIATION THERAPY FLDS PLACEMENT: CPT | Performed by: RADIOLOGY

## 2024-04-09 PROCEDURE — 77385 HC NTSTY MODUL RAD TX DLVR SMPL: CPT | Performed by: RADIOLOGY

## 2024-04-09 PROCEDURE — 77336 RADIATION PHYSICS CONSULT: CPT | Performed by: RADIOLOGY

## 2024-04-10 ENCOUNTER — HOSPITAL ENCOUNTER (OUTPATIENT)
Dept: RADIATION ONCOLOGY | Age: 69
Discharge: HOME | End: 2024-04-10
Payer: COMMERCIAL

## 2024-04-10 VITALS
WEIGHT: 190 LBS | OXYGEN SATURATION: 99 % | BODY MASS INDEX: 28.89 KG/M2 | HEART RATE: 66 BPM | SYSTOLIC BLOOD PRESSURE: 121 MMHG | DIASTOLIC BLOOD PRESSURE: 76 MMHG | RESPIRATION RATE: 18 BRPM | TEMPERATURE: 97.3 F

## 2024-04-10 DIAGNOSIS — C61 PROSTATE CANCER (HCC): Primary | ICD-10-CM

## 2024-04-10 PROCEDURE — 77385 HC NTSTY MODUL RAD TX DLVR SMPL: CPT | Performed by: RADIOLOGY

## 2024-04-10 PROCEDURE — 77014 CHG CT GUIDANCE RADIATION THERAPY FLDS PLACEMENT: CPT | Performed by: RADIOLOGY

## 2024-04-10 ASSESSMENT — ENCOUNTER SYMPTOMS: TROUBLE SWALLOWING: 1

## 2024-04-10 NOTE — PROGRESS NOTES
[facility]           Radiation Oncology      [address] [id number]        O: [phone]        F: [phone]       [facility]                   Dr. Cas Hamilton MD PhD    ON TREATMENT VISIT (OTV) NOTE     Date of Service: 4/10/2024  Patient ID: Kelvin Washington   : 1955  MRN: [id number]   Acct Number: [id number]       DIAGNOSIS:   Cancer Staging   Prostate cancer (HCC)  Staging form: Prostate, AJCC 8th Edition  - Pathologic stage from 2019: Stage IIC (pT2, pN0, cM0, PSA: 4.4, Grade Group: 3) - Signed by Raul Stone MD on 2019  - Pathologic: No Stage Recommended (rcTX, cN1, cM0, PSA: 0.2, Grade Group: 3) - Signed by Cas Hamilton MD on 3/4/2024      Treatment Area: Pelvis- Male    Current Total Dose(cGy): 3200  Current Fraction: 16    Patient was seen today for weekly visit.     Wt Readings from Last 3 Encounters:   24 85.7 kg (189 lb)   24 86 kg (189 lb 9.6 oz)   24 85 kg (187 lb 6.4 oz)       97.3-66-/76-99%-wt 190 lb    Lab Results   Component Value Date    WBC 3.8 (L) 2023    HGB 12.3 (L) 2023    HCT 38.2 (L) 2023     2023       Comfort Alteration  Fatigue:has fatigue daily, naps after treatment    Pain Location: lower back  Pain Intensity (Current): 3 Between mild and moderate pain  Pain Treatment:  hydrocodone TID  Pain Relief: Pain relieved 50%    Emotional Alteration:   Coping: effective    Nutritional Alteration  Anorexia: none   Nausea: No nausea noted  Vomiting: No vomiting     Skin Alteration   Skin reaction: No changes noted    Elimination Alterations  Urinary Frequency: None, voids ever 1-2 hrs  Urinary Retention: Normal  Urinary Incontinence: Stress incontinence, wears pads, baseline since prostatectomy  Dysuria: None  Nocturia: 1-3 times nightly  Proctitis: None  Diarrhea: 2-3 soft stools in the am, sometimes one in the afternoon, takes dulcolax 3-4 times a

## 2024-04-11 ENCOUNTER — HOSPITAL ENCOUNTER (OUTPATIENT)
Dept: RADIATION ONCOLOGY | Age: 69
Discharge: HOME | End: 2024-04-11
Payer: COMMERCIAL

## 2024-04-11 DIAGNOSIS — M47.817 LUMBOSACRAL SPONDYLOSIS WITHOUT MYELOPATHY: ICD-10-CM

## 2024-04-11 DIAGNOSIS — C61 PROSTATE CANCER (HCC): ICD-10-CM

## 2024-04-11 PROCEDURE — 77385 HC NTSTY MODUL RAD TX DLVR SMPL: CPT | Performed by: RADIOLOGY

## 2024-04-11 PROCEDURE — 77014 CHG CT GUIDANCE RADIATION THERAPY FLDS PLACEMENT: CPT | Performed by: RADIOLOGY

## 2024-04-11 RX ORDER — HYDROCODONE BITARTRATE AND ACETAMINOPHEN 5; 325 MG/1; MG/1
1 TABLET ORAL 3 TIMES DAILY PRN
Qty: 90 TABLET | Refills: 0 | Status: SHIPPED | OUTPATIENT
Start: 2024-04-13 | End: 2024-05-13

## 2024-04-11 NOTE — TELEPHONE ENCOUNTER
Rx requested:  Requested Prescriptions     Pending Prescriptions Disp Refills    HYDROcodone-acetaminophen (NORCO) 5-325 MG per tablet 90 tablet 0     Sig: Take 1 tablet by mouth 3 times daily as needed for Pain for up to 30 days. Intended supply: 30 days. Take lowest dose possible to manage pain Max Daily Amount: 3 tablets       Last Office Visit:   Visit date not found      Last filled:  3/15/24    Next Visit Date:  Future Appointments   Date Time Provider Department Center   4/12/2024  8:30 AM SCHEDULE, MLOZ RAD ONC LINAC 2 [facility] RAD ONC [place] HOD   4/15/2024  8:30 AM SCHEDULE, MLOZ RAD ONC LINAC 2 [facility] RAD ONC [place] HOD   4/16/2024  8:30 AM SCHEDULE, [facility] RAD ONC LINAC 2 [facility] RAD ONC [place] HOD   4/17/2024  8:30 AM SCHEDULE, M[facility] RAD ONC LINAC 2 [facility] RAD ONC [place] HOD   4/18/2024  8:30 AM SCHEDULE, MLO[facility] RAD ONC LINAC 2 [facility] RAD ONC [place] HOD   4/19/2024  8:30 AM SCHEDULE, MLOZ RAD ONC LINAC 2 [facility] RAD ONC [place] HOD   4/22/2024  8:30 AM SCHEDULE, MLO[facility] RAD ONC LINAC 2 [facility] RAD ONC [place] HOD   4/23/2024  8:30 AM SCHEDULE, MLOZ RAD ONC LINAC 2 [facility] RAD ONC [place] HOD   4/24/2024  8:30 AM SCHEDULE, MLOZ RAPHA [facility] RAD ONC [place] HOD   4/25/2024  8:30 AM SCHEDULE, MLOZ RAPHA [facility] RAD ONC [place] HOD   4/26/2024  8:30 AM SCHEDULE, MLOZ RAD ONC LINAC 2 [facility] RAD ONC [place] HOD   4/26/2024  9:00 AM Khushi Harvey APRN - CNP PC [facility] PHYS [facility] [place]   4/29/2024  8:30 AM SCHEDULE, MLOZ RAD ONC LINAC 2 [facility] RAD ONC [place] HOD   4/30/2024  8:30 AM SCHEDULE, MLO[facility] RAD ONC LINAC 2 [facility] RAD ONC [place] HOD   5/1/2024  8:30 AM SCHEDULE, MLOZ RAD ONC LINAC 2 [facility] RAD ONC [place] HOD   5/2/2024  8:30 AM SCHEDULE, MLOZ RAD ONC LINAC 2 [facility] RAD ONC [place] HOD   5/3/2024  8:30 AM SCHEDULE[facility]   5/9/2024 10:00 AM Billy Akhtar MD [facility]   5/29/2024 10:00 AM Laurel Thomas MD [place] [facility]   7/11/2024  2:15 PM Gage Zepeda MD [place] [facility]

## 2024-04-12 ENCOUNTER — HOSPITAL ENCOUNTER (OUTPATIENT)
Dept: RADIATION ONCOLOGY | Age: 69
Discharge: HOME | End: 2024-04-12
Payer: COMMERCIAL

## 2024-04-12 PROCEDURE — 77385 HC NTSTY MODUL RAD TX DLVR SMPL: CPT | Performed by: RADIOLOGY

## 2024-04-15 ENCOUNTER — HOSPITAL ENCOUNTER (OUTPATIENT)
Dept: RADIATION ONCOLOGY | Age: 69
Discharge: HOME | End: 2024-04-15
Payer: COMMERCIAL

## 2024-04-16 ENCOUNTER — HOSPITAL ENCOUNTER (OUTPATIENT)
Dept: RADIATION ONCOLOGY | Age: 69
Discharge: HOME | End: 2024-04-16
Payer: COMMERCIAL

## 2024-04-16 PROCEDURE — 77014 CHG CT GUIDANCE RADIATION THERAPY FLDS PLACEMENT: CPT | Performed by: RADIOLOGY

## 2024-04-16 PROCEDURE — 77336 RADIATION PHYSICS CONSULT: CPT | Performed by: RADIOLOGY

## 2024-04-16 PROCEDURE — 77385 HC NTSTY MODUL RAD TX DLVR SMPL: CPT | Performed by: RADIOLOGY

## 2024-04-16 NOTE — PROGRESS NOTES
Kelvin Washington   [id number]  1955   Patient Mid-Point Distress Screening Form   Please select the number that describes how much distress you have experiened in the past week (0-10): 5    Please select the number that descrbes how much distress you have experienced today (0-10): 5    If you responded with a score of 6 or above to the first tow questions; please address the following concerns:    Practical Concerns 0-10 Comment        Work, Concerns about Job          Finances, Bills, Insurance          Housing          Transportation          Availability of Caregiver     Family Concerns          Dealing with Children          Dealing with Partner          Ability to have Children          Family Health Issues     Emotional Concerns          Sadness, Depression          Anxiety, Worry, Fear          Concerns about Appearance          Loss of Interest in Usual Activities     Spiritual Concerns          Connection to a higher power          Sense of meaning and purpose          Support for my spiritual community     Physical Concerns          Nausea          Eating, Loss of Appetite          Pain  Some leg and joint pain secondary to hormonal therapy        Fatigue  He reports fatigue associated with treatment, though he states he strives to remain as active as possible     Other Concerns/Notes: Pt was seen for Midpoint Assessment prior to his radiation treatment today.  He presents as pleasant and cordial, stating that he feels he is doing well with treatment. He identifies that his anxiety tends to run high, however, \"this (prostate cancer treatment) is a walk in the park\" he says compared to his head and neck cancer treatment which was quite extensive and difficult along with his history of heart disease.  Pt states that he is very grateful for his stable health at present, stating that he is here now \"by the jonathan of God.\" He states he is well supported by family, between he and his wife they have 7

## 2024-04-17 ENCOUNTER — HOSPITAL ENCOUNTER (OUTPATIENT)
Dept: RADIATION ONCOLOGY | Age: 69
Discharge: HOME | End: 2024-04-17
Payer: COMMERCIAL

## 2024-04-17 PROCEDURE — 77385 HC NTSTY MODUL RAD TX DLVR SMPL: CPT | Performed by: RADIOLOGY

## 2024-04-17 PROCEDURE — 77014 CHG CT GUIDANCE RADIATION THERAPY FLDS PLACEMENT: CPT | Performed by: RADIOLOGY

## 2024-04-18 ENCOUNTER — HOSPITAL ENCOUNTER (OUTPATIENT)
Dept: RADIATION ONCOLOGY | Age: 69
Discharge: HOME | End: 2024-04-18
Payer: COMMERCIAL

## 2024-04-18 VITALS
WEIGHT: 188.8 LBS | SYSTOLIC BLOOD PRESSURE: 109 MMHG | DIASTOLIC BLOOD PRESSURE: 73 MMHG | TEMPERATURE: 97.5 F | BODY MASS INDEX: 28.71 KG/M2 | HEART RATE: 61 BPM | OXYGEN SATURATION: 96 % | RESPIRATION RATE: 18 BRPM

## 2024-04-18 DIAGNOSIS — C61 PROSTATE CANCER (HCC): Primary | ICD-10-CM

## 2024-04-18 PROCEDURE — 77385 HC NTSTY MODUL RAD TX DLVR SMPL: CPT | Performed by: RADIOLOGY

## 2024-04-18 NOTE — PROGRESS NOTES
[facility]           Radiation Oncology      [id number] [place], [place] [address]        O: [phone]        F: [phone]       [facility]                   Dr. Cas Hamilton MD PhD    ON TREATMENT VISIT (OTV) NOTE     Date of Service: 2024  Patient ID: Kelvin Washington   : 1955  MRN: [id number]   Acct Number: [id number]       DIAGNOSIS:   Cancer Staging   Prostate cancer (HCC)  Staging form: Prostate, AJCC 8th Edition  - Pathologic stage from 2019: Stage IIC (pT2, pN0, cM0, PSA: 4.4, Grade Group: 3) - Signed by Raul Stone MD on 2019  - Pathologic: No Stage Recommended (rcTX, cN1, cM0, PSA: 0.2, Grade Group: 3) - Signed by Cas Hamilton MD on 3/4/2024      Treatment Area: Pelvis- Male    Current Total Dose(cGy): 4400 cGy  Current Fraction: 22    Patient was seen today for weekly visit.     Wt Readings from Last 3 Encounters:   24 85.6 kg (188 lb 12.8 oz)   04/10/24 86.2 kg (190 lb)   24 85.7 kg (189 lb)       /73   Pulse 61   Temp 97.5 °F (36.4 °C)   Resp 18   Wt 85.6 kg (188 lb 12.8 oz)   SpO2 96%   BMI 28.71 kg/m²     Lab Results   Component Value Date    WBC 3.8 (L) 2023    HGB 12.3 (L) 2023    HCT 38.2 (L) 2023     2023       Comfort Alteration  Fatigue:same as last week, feels some fatigue daily, takes naps as needed.    Pain Location: Bilateral groin cramping on and off for the last 2-3 days, left is worse than right  Pain Intensity (Current): Moderate to severe   Pain Treatment: Applies heat for about 1/2  hour  as needed   Pain Relief: Pain relieved 100%    Emotional Alteration:   Coping: effective    Nutritional Alteration  Anorexia: none   Nausea: No nausea noted  Vomiting: No vomiting     Skin Alteration   Skin reaction: No changes noted    Elimination Alterations  Urinary Frequency: Urinates about every 1-2 hours  Urinary Retention: Normal  Urinary Incontinence: Stress incontinence

## 2024-04-19 ENCOUNTER — HOSPITAL ENCOUNTER (OUTPATIENT)
Dept: RADIATION ONCOLOGY | Age: 69
Discharge: HOME | End: 2024-04-19
Payer: COMMERCIAL

## 2024-04-19 PROCEDURE — 77385 HC NTSTY MODUL RAD TX DLVR SMPL: CPT | Performed by: RADIOLOGY

## 2024-04-22 ENCOUNTER — HOSPITAL ENCOUNTER (OUTPATIENT)
Dept: RADIATION ONCOLOGY | Age: 69
Discharge: HOME | End: 2024-04-22
Payer: COMMERCIAL

## 2024-04-22 ENCOUNTER — APPOINTMENT (OUTPATIENT)
Dept: RADIATION ONCOLOGY | Age: 69
End: 2024-04-22
Payer: COMMERCIAL

## 2024-04-23 ENCOUNTER — HOSPITAL ENCOUNTER (OUTPATIENT)
Dept: RADIATION ONCOLOGY | Age: 69
Discharge: HOME | End: 2024-04-23
Payer: COMMERCIAL

## 2024-04-23 ENCOUNTER — APPOINTMENT (OUTPATIENT)
Dept: RADIATION ONCOLOGY | Age: 69
End: 2024-04-23
Payer: COMMERCIAL

## 2024-04-23 PROCEDURE — 77336 RADIATION PHYSICS CONSULT: CPT | Performed by: RADIOLOGY

## 2024-04-23 PROCEDURE — 77385 HC NTSTY MODUL RAD TX DLVR SMPL: CPT | Performed by: RADIOLOGY

## 2024-04-23 PROCEDURE — 77014 CHG CT GUIDANCE RADIATION THERAPY FLDS PLACEMENT: CPT | Performed by: RADIOLOGY

## 2024-04-24 ENCOUNTER — HOSPITAL ENCOUNTER (OUTPATIENT)
Dept: RADIATION ONCOLOGY | Age: 69
Discharge: HOME | End: 2024-04-24
Payer: COMMERCIAL

## 2024-04-24 ENCOUNTER — APPOINTMENT (OUTPATIENT)
Dept: RADIATION ONCOLOGY | Age: 69
End: 2024-04-24
Payer: COMMERCIAL

## 2024-04-24 VITALS
RESPIRATION RATE: 16 BRPM | OXYGEN SATURATION: 98 % | WEIGHT: 189 LBS | SYSTOLIC BLOOD PRESSURE: 126 MMHG | DIASTOLIC BLOOD PRESSURE: 78 MMHG | BODY MASS INDEX: 28.74 KG/M2 | HEART RATE: 75 BPM | TEMPERATURE: 97.3 F

## 2024-04-24 DIAGNOSIS — C61 PROSTATE CANCER (HCC): Primary | ICD-10-CM

## 2024-04-24 PROCEDURE — 77385 HC NTSTY MODUL RAD TX DLVR SMPL: CPT | Performed by: RADIOLOGY

## 2024-04-24 NOTE — PROGRESS NOTES
[facility]           Radiation Oncology      [address] [id number]        O: [phone]        F: [phone]       [facility]                   Dr. Cas Hamilton MD PhD    ON TREATMENT VISIT (OTV) NOTE     Date of Service: 2024  Patient ID: Kelvin Washington   : 1955  MRN: [id number]   Acct Number: [id number]       DIAGNOSIS:   Cancer Staging   Prostate cancer (HCC)  Staging form: Prostate, AJCC 8th Edition  - Pathologic stage from 2019: Stage IIC (pT2, pN0, cM0, PSA: 4.4, Grade Group: 3) - Signed by Raul Stone MD on 2019  - Pathologic: No Stage Recommended (rcTX, cN1, cM0, PSA: 0.2, Grade Group: 3) - Signed by Cas Hamilton MD on 3/4/2024      Treatment Area: Pelvis- Male    Current Total Dose(cGy): 5350  Current Fraction: 26    Patient was seen today for weekly visit.     Wt Readings from Last 3 Encounters:   24 85.7 kg (189 lb)   24 85.6 kg (188 lb 12.8 oz)   04/10/24 86.2 kg (190 lb)       /78   Pulse 75   Temp 97.3 °F (36.3 °C)   Resp 16   Wt 85.7 kg (189 lb)   SpO2 98%   BMI 28.74 kg/m²     Lab Results   Component Value Date    WBC 3.8 (L) 2023    HGB 12.3 (L) 2023    HCT 38.2 (L) 2023     2023       Comfort Alteration  Fatigue:Able to perform daily activities with rest periods    Pain Location: joints and back  Pain Intensity (Current): 5 Between moderate and severe pain  Pain Treatment:  vicodin, tylenol  Pain Relief: takes the edge off    Emotional Alteration:   Coping: effective    Nutritional Alteration  Anorexia: none   Nausea: No nausea noted  Vomiting: No vomiting     Skin Alteration   Skin reaction: No changes noted    Elimination Alterations  Urinary Frequency: None  Urinary Retention: none  Urinary Incontinence: Stress incontinence  Dysuria: None  Nocturia: 4-6 times nightly now increased since starting treatment  Proctitis: None  Diarrhea:  takes stool softener

## 2024-04-25 ENCOUNTER — APPOINTMENT (OUTPATIENT)
Dept: RADIATION ONCOLOGY | Age: 69
End: 2024-04-25
Payer: COMMERCIAL

## 2024-04-25 ENCOUNTER — HOSPITAL ENCOUNTER (OUTPATIENT)
Dept: RADIATION ONCOLOGY | Age: 69
Discharge: HOME | End: 2024-04-25
Payer: COMMERCIAL

## 2024-04-25 PROCEDURE — 77385 HC NTSTY MODUL RAD TX DLVR SMPL: CPT | Performed by: RADIOLOGY

## 2024-04-26 ENCOUNTER — HOSPITAL ENCOUNTER (OUTPATIENT)
Dept: RADIATION ONCOLOGY | Age: 69
Discharge: HOME | End: 2024-04-26
Payer: COMMERCIAL

## 2024-04-26 ENCOUNTER — OFFICE VISIT (OUTPATIENT)
Dept: PALLATIVE CARE | Age: 69
End: 2024-04-26
Payer: COMMERCIAL

## 2024-04-26 ENCOUNTER — APPOINTMENT (OUTPATIENT)
Dept: RADIATION ONCOLOGY | Age: 69
End: 2024-04-26
Payer: COMMERCIAL

## 2024-04-26 VITALS
RESPIRATION RATE: 16 BRPM | OXYGEN SATURATION: 99 % | WEIGHT: 188.8 LBS | SYSTOLIC BLOOD PRESSURE: 128 MMHG | DIASTOLIC BLOOD PRESSURE: 79 MMHG | BODY MASS INDEX: 28.71 KG/M2 | TEMPERATURE: 96.7 F | HEART RATE: 65 BPM

## 2024-04-26 DIAGNOSIS — C61 PROSTATE CANCER (HCC): ICD-10-CM

## 2024-04-26 DIAGNOSIS — I50.32 CHRONIC DIASTOLIC CHF (CONGESTIVE HEART FAILURE) (HCC): ICD-10-CM

## 2024-04-26 DIAGNOSIS — F41.9 ANXIETY: ICD-10-CM

## 2024-04-26 DIAGNOSIS — F40.240 CLAUSTROPHOBIA: ICD-10-CM

## 2024-04-26 DIAGNOSIS — R73.03 PREDIABETES: ICD-10-CM

## 2024-04-26 DIAGNOSIS — Z51.5 PALLIATIVE CARE ENCOUNTER: ICD-10-CM

## 2024-04-26 DIAGNOSIS — J44.9 CHRONIC OBSTRUCTIVE PULMONARY DISEASE, UNSPECIFIED COPD TYPE (HCC): ICD-10-CM

## 2024-04-26 DIAGNOSIS — M47.817 LUMBOSACRAL SPONDYLOSIS WITHOUT MYELOPATHY: Primary | ICD-10-CM

## 2024-04-26 PROBLEM — E03.9 HYPOTHYROIDISM: Status: ACTIVE | Noted: 2023-03-30

## 2024-04-26 PROBLEM — E78.5 HYPERLIPIDEMIA: Status: ACTIVE | Noted: 2023-05-25

## 2024-04-26 PROBLEM — I73.9 PERIPHERAL VASCULAR DISEASE (HCC): Status: ACTIVE | Noted: 2023-03-30

## 2024-04-26 PROCEDURE — 1123F ACP DISCUSS/DSCN MKR DOCD: CPT | Performed by: NURSE PRACTITIONER

## 2024-04-26 PROCEDURE — 3078F DIAST BP <80 MM HG: CPT | Performed by: NURSE PRACTITIONER

## 2024-04-26 PROCEDURE — 3074F SYST BP LT 130 MM HG: CPT | Performed by: NURSE PRACTITIONER

## 2024-04-26 PROCEDURE — 99214 OFFICE O/P EST MOD 30 MIN: CPT | Performed by: NURSE PRACTITIONER

## 2024-04-26 ASSESSMENT — ENCOUNTER SYMPTOMS
NAUSEA: 0
CONSTIPATION: 0
BACK PAIN: 1
DIARRHEA: 0
WHEEZING: 1
TROUBLE SWALLOWING: 1
SHORTNESS OF BREATH: 1
COUGH: 1

## 2024-04-29 ENCOUNTER — APPOINTMENT (OUTPATIENT)
Dept: RADIATION ONCOLOGY | Age: 69
End: 2024-04-29
Payer: COMMERCIAL

## 2024-04-29 ENCOUNTER — HOSPITAL ENCOUNTER (OUTPATIENT)
Dept: RADIATION ONCOLOGY | Age: 69
Discharge: HOME | End: 2024-04-29
Payer: COMMERCIAL

## 2024-04-30 ENCOUNTER — APPOINTMENT (OUTPATIENT)
Dept: RADIATION ONCOLOGY | Age: 69
End: 2024-04-30
Payer: COMMERCIAL

## 2024-04-30 ENCOUNTER — HOSPITAL ENCOUNTER (OUTPATIENT)
Dept: RADIATION ONCOLOGY | Age: 69
Discharge: HOME | End: 2024-04-30
Payer: COMMERCIAL

## 2024-04-30 PROCEDURE — 77336 RADIATION PHYSICS CONSULT: CPT | Performed by: RADIOLOGY

## 2024-04-30 PROCEDURE — 77385 HC NTSTY MODUL RAD TX DLVR SMPL: CPT | Performed by: RADIOLOGY

## 2024-04-30 PROCEDURE — 77014 CHG CT GUIDANCE RADIATION THERAPY FLDS PLACEMENT: CPT | Performed by: RADIOLOGY

## 2024-05-01 ENCOUNTER — HOSPITAL ENCOUNTER (OUTPATIENT)
Dept: RADIATION ONCOLOGY | Age: 69
Discharge: HOME | End: 2024-05-01
Payer: COMMERCIAL

## 2024-05-01 ENCOUNTER — APPOINTMENT (OUTPATIENT)
Dept: RADIATION ONCOLOGY | Age: 69
End: 2024-05-01
Payer: COMMERCIAL

## 2024-05-01 PROCEDURE — 77385 HC NTSTY MODUL RAD TX DLVR SMPL: CPT | Performed by: RADIOLOGY

## 2024-05-02 ENCOUNTER — HOSPITAL ENCOUNTER (OUTPATIENT)
Dept: RADIATION ONCOLOGY | Age: 69
Discharge: HOME | End: 2024-05-02
Payer: COMMERCIAL

## 2024-05-02 ENCOUNTER — APPOINTMENT (OUTPATIENT)
Dept: RADIATION ONCOLOGY | Age: 69
End: 2024-05-02
Payer: COMMERCIAL

## 2024-05-02 VITALS
OXYGEN SATURATION: 93 % | DIASTOLIC BLOOD PRESSURE: 68 MMHG | SYSTOLIC BLOOD PRESSURE: 118 MMHG | RESPIRATION RATE: 18 BRPM | WEIGHT: 188 LBS | BODY MASS INDEX: 28.59 KG/M2 | TEMPERATURE: 97.3 F | HEART RATE: 59 BPM

## 2024-05-02 DIAGNOSIS — C61 PROSTATE CANCER (HCC): Primary | ICD-10-CM

## 2024-05-02 PROCEDURE — 77385 HC NTSTY MODUL RAD TX DLVR SMPL: CPT | Performed by: RADIOLOGY

## 2024-05-02 NOTE — PROGRESS NOTES
[facility]           Radiation Oncology      [address] [id number]        O: [phone]        F: [phone]       [facility]                   Dr. Cas Hamilton MD PhD    ON TREATMENT VISIT (OTV) NOTE     Date of Service: 2024  Patient ID: Kelvin Washington   : 1955  MRN: [id number]   Acct Number: [id number]       DIAGNOSIS:   Cancer Staging   Prostate cancer (HCC)  Staging form: Prostate, AJCC 8th Edition  - Pathologic stage from 2019: Stage IIC (pT2, pN0, cM0, PSA: 4.4, Grade Group: 3) - Signed by Raul Stone MD on 2019  - Pathologic: No Stage Recommended (rcTX, cN1, cM0, PSA: 0.2, Grade Group: 3) - Signed by Cas Hamilton MD on 3/4/2024      Treatment Area: Pelvis- Male    Current Total Dose(cGy): 6850 cGy  Current Fraction: 32    Patient was seen today for weekly visit.     Wt Readings from Last 3 Encounters:   24 85.3 kg (188 lb)   24 85.6 kg (188 lb 12.8 oz)   24 85.7 kg (189 lb)       /68   Pulse 59   Temp 97.3 °F (36.3 °C)   Resp 18   Wt 85.3 kg (188 lb)   SpO2 93%   BMI 28.59 kg/m²     Lab Results   Component Value Date    WBC 3.8 (L) 2023    HGB 12.3 (L) 2023    HCT 38.2 (L) 2023     2023       Comfort Alteration  Fatigue:Moderate fatigue, naps after radiation treatments    Pain Location: Joint and back at baseline  Pain Intensity (Current): 4 Moderate pain  Pain Treatment:  Norco 5/325 mg TID  Pain Relief:  Takes the edge off    Emotional Alteration:   Coping: effective    Nutritional Alteration  Anorexia:  Loss of appetite this week  Nausea: No nausea noted  Vomiting: No vomiting     Skin Alteration   Skin reaction: No changes noted    Elimination Alterations  Urinary Frequency: None  Urinary Retention: Normal  Urinary Incontinence: Stress incontinence at baseline-wears pads  Dysuria: None  Nocturia:  5 times  Proctitis: None  Diarrhea:  diarrhea once this week,

## 2024-05-03 ENCOUNTER — APPOINTMENT (OUTPATIENT)
Dept: RADIATION ONCOLOGY | Age: 69
End: 2024-05-03
Payer: COMMERCIAL

## 2024-05-03 ENCOUNTER — HOSPITAL ENCOUNTER (OUTPATIENT)
Dept: RADIATION ONCOLOGY | Age: 69
Discharge: HOME | End: 2024-05-03
Payer: COMMERCIAL

## 2024-05-03 PROCEDURE — 77385 HC NTSTY MODUL RAD TX DLVR SMPL: CPT | Performed by: RADIOLOGY

## 2024-05-03 NOTE — PROGRESS NOTES
Patient completed radiation treatment today. Discharge written instructions and reviewed with patient. Follow up visit with Dr. Hamilton is scheduled 6/4/24. Encouraged patient to call with any questions or concerns.

## 2024-05-03 NOTE — PROGRESS NOTES
[facility]           Radiation Oncology      [address] [id number]        O: [phone]        F: [phone]       [facility]                   Dr. Cas Hamilton MD PhD    RADIATION TREATMENT SUMMARY NOTE     Date of Service: 5/3/2024  Patient ID: Kelvin Washington   : 1955  MRN: [id number]   Acct Number: [id number]       Patient Name:  Kelvin Washington,  1955,  68 y.o., male       Referring Physician: Eyal Ordonez MD  [address] [facility].  [address] [address]      PCP: Billy Akhtar MD       Diagnosis: Prostate cancer (HCC)  Staging form: Prostate, AJCC 8th Edition  - Pathologic stage from 2019: Stage IIC (pT2, pN0, cM0, PSA: 4.4, Grade Group: 3) - Signed by Raul Stone MD on 2019  - Pathologic: No Stage Recommended (rcTX, cN1, cM0, PSA: 0.2, Grade Group: 3) - Signed by Cas Hamilton MD on 3/4/2024        Recent History:  ***    Site Start TX Last TX ED Fractions Dose Fx Dose Technique   Prostate Bed LN  3/20/24  4/19/24  30      23  4600 cGy  200 cGy  VMAT   Prostate Bed LN CD  4/22/24  5/3/24  11      10  2500 cGy  250 cGy  VMAT     Concurrent therapy:      ***    Technique:                 ***      Treatment course:       ***    Plan:  ***         Cas Hamilton MD PhD  Radiation Oncologist, [facility]    Department of Radiation Oncology  [facility]

## 2024-05-06 ENCOUNTER — APPOINTMENT (OUTPATIENT)
Dept: RADIATION ONCOLOGY | Age: 69
End: 2024-05-06
Payer: COMMERCIAL

## 2024-05-06 RX ORDER — OMEPRAZOLE 20 MG/1
CAPSULE, DELAYED RELEASE ORAL
Qty: 90 CAPSULE | Refills: 3 | Status: SHIPPED | OUTPATIENT
Start: 2024-05-06

## 2024-05-07 ENCOUNTER — APPOINTMENT (OUTPATIENT)
Dept: RADIATION ONCOLOGY | Age: 69
End: 2024-05-07
Payer: COMMERCIAL

## 2024-05-08 DIAGNOSIS — M47.817 LUMBOSACRAL SPONDYLOSIS WITHOUT MYELOPATHY: ICD-10-CM

## 2024-05-08 DIAGNOSIS — C61 PROSTATE CANCER (HCC): ICD-10-CM

## 2024-05-08 RX ORDER — HYDROCODONE BITARTRATE AND ACETAMINOPHEN 5; 325 MG/1; MG/1
1 TABLET ORAL 3 TIMES DAILY PRN
Qty: 90 TABLET | Refills: 0 | Status: SHIPPED | OUTPATIENT
Start: 2024-05-12 | End: 2024-06-11

## 2024-05-08 NOTE — TELEPHONE ENCOUNTER
Rx requested:  Requested Prescriptions     Pending Prescriptions Disp Refills    HYDROcodone-acetaminophen (NORCO) 5-325 MG per tablet 90 tablet 0     Sig: Take 1 tablet by mouth 3 times daily as needed for Pain for up to 30 days. Intended supply: 30 days. Take lowest dose possible to manage pain Max Daily Amount: 3 tablets       Last Office Visit:   Visit date not found      Last filled:  4/13/24    Next Visit Date:  Future Appointments   Date Time Provider Department Center   5/9/2024 10:00 AM Billy Akhtar MD [facility] [place]   5/13/2024  8:30 AM [facility] ROOM 1 [facility]   5/24/2024  8:30 AM Khushi Harvey APRN - CNP PC CC PHYS [facility]   5/29/2024 10:00 AM Laurel Thomas MD [place] [facility]   6/4/2024 10:30 AM SCHEDULE, [facility] RAD ONC NURSE [facility] RAD ON[facility] [place] HOD   7/11/2024  2:15 PM Gage Zepeda MD [place] Card [facility]   8/16/2024  2:45 PM Teri Jolly MD [place] [facility]   9/19/2024 10:15 AM Eyal Ordonez MD [place] [facility]

## 2024-05-09 ENCOUNTER — OFFICE VISIT (OUTPATIENT)
Dept: FAMILY MEDICINE CLINIC | Age: 69
End: 2024-05-09
Payer: COMMERCIAL

## 2024-05-09 VITALS
OXYGEN SATURATION: 94 % | HEART RATE: 56 BPM | WEIGHT: 188 LBS | HEIGHT: 68 IN | SYSTOLIC BLOOD PRESSURE: 120 MMHG | DIASTOLIC BLOOD PRESSURE: 70 MMHG | BODY MASS INDEX: 28.49 KG/M2 | TEMPERATURE: 97.2 F

## 2024-05-09 DIAGNOSIS — E03.9 HYPOTHYROIDISM, UNSPECIFIED TYPE: ICD-10-CM

## 2024-05-09 DIAGNOSIS — F41.9 ANXIETY: Primary | ICD-10-CM

## 2024-05-09 PROCEDURE — 3074F SYST BP LT 130 MM HG: CPT | Performed by: FAMILY MEDICINE

## 2024-05-09 PROCEDURE — 3078F DIAST BP <80 MM HG: CPT | Performed by: FAMILY MEDICINE

## 2024-05-09 PROCEDURE — 1123F ACP DISCUSS/DSCN MKR DOCD: CPT | Performed by: FAMILY MEDICINE

## 2024-05-09 PROCEDURE — 99213 OFFICE O/P EST LOW 20 MIN: CPT | Performed by: FAMILY MEDICINE

## 2024-05-09 RX ORDER — LORAZEPAM 1 MG/1
1 TABLET ORAL NIGHTLY PRN
Qty: 30 TABLET | Refills: 2 | Status: SHIPPED | OUTPATIENT
Start: 2024-05-09 | End: 2024-06-08

## 2024-05-09 SDOH — ECONOMIC STABILITY: FOOD INSECURITY: WITHIN THE PAST 12 MONTHS, THE FOOD YOU BOUGHT JUST DIDN'T LAST AND YOU DIDN'T HAVE MONEY TO GET MORE.: NEVER TRUE

## 2024-05-09 SDOH — ECONOMIC STABILITY: FOOD INSECURITY: WITHIN THE PAST 12 MONTHS, YOU WORRIED THAT YOUR FOOD WOULD RUN OUT BEFORE YOU GOT MONEY TO BUY MORE.: NEVER TRUE

## 2024-05-09 SDOH — ECONOMIC STABILITY: INCOME INSECURITY: HOW HARD IS IT FOR YOU TO PAY FOR THE VERY BASICS LIKE FOOD, HOUSING, MEDICAL CARE, AND HEATING?: NOT HARD AT ALL

## 2024-05-09 ASSESSMENT — PATIENT HEALTH QUESTIONNAIRE - PHQ9
1. LITTLE INTEREST OR PLEASURE IN DOING THINGS: NOT AT ALL
SUM OF ALL RESPONSES TO PHQ QUESTIONS 1-9: 0
SUM OF ALL RESPONSES TO PHQ QUESTIONS 1-9: 0
SUM OF ALL RESPONSES TO PHQ9 QUESTIONS 1 & 2: 0
SUM OF ALL RESPONSES TO PHQ QUESTIONS 1-9: 0
2. FEELING DOWN, DEPRESSED OR HOPELESS: NOT AT ALL
SUM OF ALL RESPONSES TO PHQ QUESTIONS 1-9: 0

## 2024-05-09 ASSESSMENT — ENCOUNTER SYMPTOMS
DIARRHEA: 0
VOMITING: 0

## 2024-05-09 NOTE — PROGRESS NOTES
per Week: 5 days     Minutes of Exercise per Session: 30 min   Housing Stability: Unknown (5/9/2024)    Housing Stability Vital Sign     Unstable Housing in the Last Year: No     Current Outpatient Medications   Medication Sig Dispense Refill    [START ON 5/12/2024] HYDROcodone-acetaminophen (NORCO) 5-325 MG per tablet Take 1 tablet by mouth 3 times daily as needed for Pain for up to 30 days. Intended supply: 30 days. Take lowest dose possible to manage pain Max Daily Amount: 3 tablets 90 tablet 0    omeprazole (PRILOSEC) 20 MG delayed release capsule TAKE ONE CAPSULE BY MOUTH EVERY DAY IN THE MORNING BEFORE BREAKFAST 90 capsule 3    atorvastatin (LIPITOR) 80 MG tablet Take 1 tablet by mouth daily 90 tablet 0    simethicone (MYLICON) 80 MG chewable tablet Take 1 tablet by mouth every 6 hours as needed for Flatulence Taking 1/2 tab before RT and 1/2 tab in the evening      Multiple Vitamins-Minerals (THERAPEUTIC MULTIVITAMIN-MINERALS) tablet Take 1 tablet by mouth daily      calcium carbonate (OSCAL) 500 MG TABS tablet Take 1 tablet by mouth daily      bisacodyl (DULCOLAX) 5 MG EC tablet Take 1 tablet by mouth daily as needed for Constipation      levothyroxine (SYNTHROID) 75 MCG tablet TAKE ONE TABLET BY MOUTH EVERY DAY 90 tablet 0    tiotropium (SPIRIVA RESPIMAT) 2.5 MCG/ACT AERS inhaler Inhale 2 puffs into the lungs daily 2 each 0    naloxone 4 MG/0.1ML LIQD nasal spray 1 spray by Nasal route as needed for Opioid Reversal (Patient not taking: Reported on 3/28/2024) 1 each 0    sildenafil (VIAGRA) 100 MG tablet TAKE ONE TABLET BY MOUTH EVERY DAY AS NEEDED 10 tablet 5    diclofenac sodium (VOLTAREN) 1 % GEL Apply topically daily as needed for Pain      aspirin 81 MG EC tablet Take 1 tablet by mouth daily      acetaminophen (TYLENOL) 500 MG tablet Take 2 tablets by mouth daily At night       No current facility-administered medications for this visit.     Family History   Problem Relation Age of Onset    Other Mother

## 2024-05-16 ENCOUNTER — HOSPITAL ENCOUNTER (OUTPATIENT)
Dept: CT IMAGING | Age: 69
Discharge: HOME | End: 2024-05-18
Payer: COMMERCIAL

## 2024-05-16 DIAGNOSIS — Z87.891 PERSONAL HISTORY OF TOBACCO USE: ICD-10-CM

## 2024-05-16 PROCEDURE — 71271 CT THORAX LUNG CANCER SCR C-: CPT

## 2024-05-22 ENCOUNTER — CLINICAL SUPPORT (OUTPATIENT)
Dept: AUDIOLOGY | Facility: CLINIC | Age: 69
End: 2024-05-22
Payer: COMMERCIAL

## 2024-05-22 ENCOUNTER — OFFICE VISIT (OUTPATIENT)
Dept: OTOLARYNGOLOGY | Facility: CLINIC | Age: 69
End: 2024-05-22
Payer: COMMERCIAL

## 2024-05-22 DIAGNOSIS — H90.3 BILATERAL SENSORINEURAL HEARING LOSS: Primary | ICD-10-CM

## 2024-05-22 DIAGNOSIS — H90.3 BILATERAL SENSORINEURAL HEARING LOSS: ICD-10-CM

## 2024-05-22 DIAGNOSIS — C01 CANCER OF BASE OF TONGUE (MULTI): Primary | ICD-10-CM

## 2024-05-22 PROCEDURE — 92567 TYMPANOMETRY: CPT | Performed by: AUDIOLOGIST

## 2024-05-22 PROCEDURE — 31575 DIAGNOSTIC LARYNGOSCOPY: CPT | Performed by: OTOLARYNGOLOGY

## 2024-05-22 PROCEDURE — 1159F MED LIST DOCD IN RCRD: CPT | Performed by: OTOLARYNGOLOGY

## 2024-05-22 PROCEDURE — 92557 COMPREHENSIVE HEARING TEST: CPT | Performed by: AUDIOLOGIST

## 2024-05-22 PROCEDURE — 1159F MED LIST DOCD IN RCRD: CPT | Performed by: AUDIOLOGIST

## 2024-05-22 PROCEDURE — 99213 OFFICE O/P EST LOW 20 MIN: CPT | Performed by: OTOLARYNGOLOGY

## 2024-05-22 NOTE — PROGRESS NOTES
Patient returns.  Seeing him back today status post treatment for oropharyngeal squamous cell carcinoma.  He is doing fairly well from that standpoint but has noted significant bilateral hearing loss.  Having a lot more difficulty in groups of people etc.  All remaining ENT inquiry is otherwise unremarkable.  No other change in past medical surgical history.  No worrisome vertigo or tinnitus.    Physical examination  No acute distress.  The external ear structures appear normal. The ear canals patent and the tympanic membranes are intact without evidence of air-fluid levels, retraction, or congenital defects.  Anterior rhinoscopy notes essentially a midline nasal septum. Examination is noted for normal healthy mucosal membranes without any evidence of lesions, polyps, or exudate. The tongue is normally mobile. There are no lesions on the gingiva, buccal, or oral mucosa. There are no oral cavity masses.  The neck is negative for mass lymphadenopathy. The trachea and parotid are clear. The thyroid bed is grossly unremarkable. The salivary gland structures are grossly unremarkable.     Procedure:  In order to assess the larynx, flexible laryngoscopy was performed based on the patient's history.  After topical anesthesia, a very complete flexible laryngoscopy was performed. This examination reveals a normal appearance to the laryngeal structures including the true cords, false cords, epiglottis, base of tongue, and piriform sinus, except as noted.    Audiogram:   Bilateral symmetric high-frequency loss    Assessment and plan:  1.  Doing very well following treatment for squamous cell carcinoma oropharynx currently JOEY.  Recheck 4 months.  2.  Bilateral symmetric sensorineural hearing loss.  May certainly consider but not mandated to have hearing aid amplification strategies.  All questions were answered in this regard accordingly.

## 2024-05-22 NOTE — PROGRESS NOTES
Mr. Lofton, age 68, was seen today for a hearing evaluation during his ENT follow up with Dr. Gamez.  He reported concern for hearing loss and muffled hearing, left greater than right.  He states this has become an issue over the last several weeks following chemotherapy and radiation treatments.    Results:  Otoscopy revealed clear ear canals and tympanic membranes were visualized bilaterally.  Tympanometry revealed normal, Type A tympanograms, indicating normal ear canal volume, peak pressure and compliance bilaterally.  Audiometric thresholds revealed normal hearing sensitivity 250-2000 Hz sloping to a severe high frequency sensorineural hearing loss bilaterally.  Word recognition scores were excellent bilaterally.    Recommendations:  Follow-up with PCP, Dr. Graham, as medically directed.  Follow-up with ENT, Dr. Gamez, as medically directed.  Consider binaural amplification.  Retest hearing annually to monitor.

## 2024-05-23 ENCOUNTER — DOCUMENTATION (OUTPATIENT)
Dept: RADIATION ONCOLOGY | Facility: CLINIC | Age: 69
End: 2024-05-23
Payer: COMMERCIAL

## 2024-05-23 NOTE — PROGRESS NOTES
Attempted to contact pt numerous times over the past few months regarding treatment since his PSA is rising and received no response. Pt appears apprehensive about proceeding with treatment. He is meeting with Dr Lucero in July.

## 2024-05-24 ENCOUNTER — OFFICE VISIT (OUTPATIENT)
Dept: PALLATIVE CARE | Age: 69
End: 2024-05-24
Payer: COMMERCIAL

## 2024-05-24 VITALS
WEIGHT: 185 LBS | TEMPERATURE: 97.4 F | HEART RATE: 61 BPM | DIASTOLIC BLOOD PRESSURE: 55 MMHG | SYSTOLIC BLOOD PRESSURE: 101 MMHG | OXYGEN SATURATION: 98 % | BODY MASS INDEX: 28.13 KG/M2

## 2024-05-24 DIAGNOSIS — I50.32 CHRONIC DIASTOLIC CHF (CONGESTIVE HEART FAILURE) (HCC): ICD-10-CM

## 2024-05-24 DIAGNOSIS — R09.89 CHEST CONGESTION: Primary | ICD-10-CM

## 2024-05-24 DIAGNOSIS — C61 PROSTATE CANCER (HCC): ICD-10-CM

## 2024-05-24 DIAGNOSIS — R09.89 CHEST CRACKLES: ICD-10-CM

## 2024-05-24 DIAGNOSIS — J44.9 CHRONIC OBSTRUCTIVE PULMONARY DISEASE, UNSPECIFIED COPD TYPE (HCC): ICD-10-CM

## 2024-05-24 DIAGNOSIS — F41.9 ANXIETY: ICD-10-CM

## 2024-05-24 DIAGNOSIS — R73.03 PREDIABETES: ICD-10-CM

## 2024-05-24 DIAGNOSIS — M47.817 LUMBOSACRAL SPONDYLOSIS WITHOUT MYELOPATHY: ICD-10-CM

## 2024-05-24 DIAGNOSIS — Z51.5 PALLIATIVE CARE ENCOUNTER: ICD-10-CM

## 2024-05-24 DIAGNOSIS — F40.240 CLAUSTROPHOBIA: ICD-10-CM

## 2024-05-24 PROBLEM — H90.3 SENSORINEURAL HEARING LOSS (SNHL) OF BOTH EARS: Status: ACTIVE | Noted: 2024-05-22

## 2024-05-24 PROCEDURE — 99215 OFFICE O/P EST HI 40 MIN: CPT | Performed by: NURSE PRACTITIONER

## 2024-05-24 PROCEDURE — 1123F ACP DISCUSS/DSCN MKR DOCD: CPT | Performed by: NURSE PRACTITIONER

## 2024-05-24 PROCEDURE — 3078F DIAST BP <80 MM HG: CPT | Performed by: NURSE PRACTITIONER

## 2024-05-24 PROCEDURE — 3074F SYST BP LT 130 MM HG: CPT | Performed by: NURSE PRACTITIONER

## 2024-05-24 RX ORDER — ASCORBIC ACID 500 MG
500 TABLET ORAL DAILY
COMMUNITY

## 2024-05-24 RX ORDER — DOXYCYCLINE HYCLATE 100 MG
100 TABLET ORAL 2 TIMES DAILY
Qty: 10 TABLET | Refills: 0 | Status: SHIPPED | OUTPATIENT
Start: 2024-05-24 | End: 2024-05-29

## 2024-05-24 ASSESSMENT — ENCOUNTER SYMPTOMS
COUGH: 1
TROUBLE SWALLOWING: 1
CONSTIPATION: 0
WHEEZING: 1
DIARRHEA: 0
BACK PAIN: 1
NAUSEA: 0
SHORTNESS OF BREATH: 1

## 2024-05-24 NOTE — PROGRESS NOTES
Subjective:      Patient Id: Seen Kelvin at the clinic at the  [facility] , for follow-up palliative medicine visit. He was accompanied to the appointment by: self.   Chief Complaint   Patient presents with    Pain    coughing and sputum production at night      HPI       Kelvin Washington is a 68 y.o. male referred to palliative care by Dr. Hamilton for Sedative, hypnotic or anxiolytic use, unspecified with unspecified sedative, hypnotic or anxiolytic-induced disorder, Sedative, hypnotic or anxiolytic dependence, uncomplicated, Lumbosacral spondylosis without myelopathy, Prostate cancer.  Kelvin has complex medical history that includes CAD, prostate cancer, oropharyngeal cancer in remission, diastolic CHF, chronic back pain, emphysema, HLD, HTN, hypothyroidism, OA, anxiety.    General: Patient is alert and oriented x 4. In NAD.     Functional status: Up independent. Abnormal gait.     Prostate cancer: Completed casodex and radiation. Received one Lupron injection by urology about 3 months ago. Having side effects from this including increased pain, hot flashes, and poor appetite. Patient has also had radiation in the past for his oropharyngeal cancer. Follows with Dr. Lozano for oncology needs.    COPD/CHF: Some SOB with exertion. Patient normally works out regularly but has now stopped due to radiation side effects. Having issues with coughing at night and phlegm production. Had recent CT chest without contrast on 5/16/24. Impression as follows:    IMPRESSION:  New since 2003, bronchiectasis with reticulation of lung markings in the right middle lobe and reticulonodularity in the lateral aspect of the right lower lobe. Additionally, new 5 mm ground-glass centrilobular nodules seen in the left lower lobe. Sequela of infection/inflammation is favored.    No current issues with leg swelling. Following with Dr. Thomas and Dr. Zepeda. Next appointment with Dr. Thomas on Wednesday.    Pain: Ongoing increased

## 2024-05-29 ENCOUNTER — OFFICE VISIT (OUTPATIENT)
Dept: PULMONOLOGY | Age: 69
End: 2024-05-29
Payer: COMMERCIAL

## 2024-05-29 VITALS
RESPIRATION RATE: 16 BRPM | BODY MASS INDEX: 27.67 KG/M2 | OXYGEN SATURATION: 95 % | TEMPERATURE: 97.4 F | WEIGHT: 186.8 LBS | SYSTOLIC BLOOD PRESSURE: 102 MMHG | HEART RATE: 61 BPM | HEIGHT: 69 IN | DIASTOLIC BLOOD PRESSURE: 66 MMHG

## 2024-05-29 DIAGNOSIS — R91.1 LUNG NODULE: ICD-10-CM

## 2024-05-29 DIAGNOSIS — R91.8 ABNORMAL CT LUNG SCREENING: Primary | ICD-10-CM

## 2024-05-29 DIAGNOSIS — J44.9 CHRONIC OBSTRUCTIVE PULMONARY DISEASE, UNSPECIFIED COPD TYPE (HCC): ICD-10-CM

## 2024-05-29 PROCEDURE — 99214 OFFICE O/P EST MOD 30 MIN: CPT | Performed by: INTERNAL MEDICINE

## 2024-05-29 PROCEDURE — 3074F SYST BP LT 130 MM HG: CPT | Performed by: INTERNAL MEDICINE

## 2024-05-29 PROCEDURE — 3078F DIAST BP <80 MM HG: CPT | Performed by: INTERNAL MEDICINE

## 2024-05-29 PROCEDURE — 1123F ACP DISCUSS/DSCN MKR DOCD: CPT | Performed by: INTERNAL MEDICINE

## 2024-05-29 NOTE — PROGRESS NOTES
Left eye: No discharge.      Conjunctiva/sclera: Conjunctivae normal.      Pupils: Pupils are equal, round, and reactive to light.   Neck:      Vascular: No JVD.   Cardiovascular:      Rate and Rhythm: Normal rate and regular rhythm.      Heart sounds: Normal heart sounds. No murmur heard.     No friction rub. No gallop.   Pulmonary:      Effort: Pulmonary effort is normal. No respiratory distress.      Breath sounds: Rales (minimal rales RLL) present. No wheezing.   Chest:      Chest wall: No tenderness.   Abdominal:      General: Bowel sounds are normal.      Palpations: Abdomen is soft.   Musculoskeletal:         General: No tenderness or deformity.      Cervical back: Normal range of motion and neck supple.      Right lower leg: No edema.      Left lower leg: No edema.   Skin:     General: Skin is warm and dry.   Neurological:      Mental Status: He is alert and oriented to person, place, and time.   Psychiatric:         Judgment: Judgment normal.         Imaging studies reviewed and interpreted by me CT lung cancer screening May 2024 shows centrilobular nodules right middle lobe, with hazy infiltrates and atelectatic changes.  New 5 mm nodule in the left lower lobe.    Lab results reviewed in chart  PFT       December 2023 FEV1 93% FEV1/FVC 0.68  2019 FEV1 76%, FEV1/FVC 0.70  ECHO: EF 60%     Assessment and Plan       Diagnosis Orders   1. Abnormal CT lung screening  CT CHEST WO CONTRAST      2. Chronic obstructive pulmonary disease, unspecified COPD type (HCC)        3. Lung nodule          Abnormal CT, likely inflammatory/infectious etiology, responded to doxycycline  Will repeat CT scan in 4 months  He will call me if symptoms recur  5 mm lung nodule, left lower lobe, will monitor  Continue Spiriva  Yearly flu shot          Orders Placed This Encounter   Procedures    CT CHEST WO CONTRAST     Standing Status:   Future     Standing Expiration Date:   5/29/2025     No orders of the defined types were placed in

## 2024-06-04 ENCOUNTER — HOSPITAL ENCOUNTER (OUTPATIENT)
Dept: RADIATION ONCOLOGY | Age: 69
Discharge: HOME | End: 2024-06-04
Payer: COMMERCIAL

## 2024-06-04 VITALS
HEART RATE: 60 BPM | OXYGEN SATURATION: 96 % | DIASTOLIC BLOOD PRESSURE: 70 MMHG | SYSTOLIC BLOOD PRESSURE: 102 MMHG | RESPIRATION RATE: 16 BRPM | WEIGHT: 186 LBS | TEMPERATURE: 96.6 F | BODY MASS INDEX: 27.47 KG/M2

## 2024-06-04 DIAGNOSIS — C61 PROSTATE CANCER (HCC): Primary | ICD-10-CM

## 2024-06-04 PROCEDURE — 99212 OFFICE O/P EST SF 10 MIN: CPT | Performed by: RADIOLOGY

## 2024-06-07 DIAGNOSIS — M47.817 LUMBOSACRAL SPONDYLOSIS WITHOUT MYELOPATHY: ICD-10-CM

## 2024-06-07 DIAGNOSIS — C61 PROSTATE CANCER (HCC): ICD-10-CM

## 2024-06-07 RX ORDER — HYDROCODONE BITARTRATE AND ACETAMINOPHEN 5; 325 MG/1; MG/1
1 TABLET ORAL 3 TIMES DAILY PRN
Qty: 90 TABLET | Refills: 0 | Status: SHIPPED | OUTPATIENT
Start: 2024-06-10 | End: 2024-07-10

## 2024-06-07 NOTE — TELEPHONE ENCOUNTER
Rx requested:  Requested Prescriptions     Pending Prescriptions Disp Refills    HYDROcodone-acetaminophen (NORCO) 5-325 MG per tablet 90 tablet 0     Sig: Take 1 tablet by mouth 3 times daily as needed for Pain for up to 30 days. Intended supply: 30 days. Take lowest dose possible to manage pain Max Daily Amount: 3 tablets       Last Office Visit:   Visit date not found      Last filled:  5/12/24    Next Visit Date:  Future Appointments   Date Time Provider Department Center   7/11/2024  2:15 PM Gage Zepeda MD [facility]   7/12/2024  8:30 AM Khushi Harvey APRN - CNP [facility]   8/9/2024 10:15 AM Billy Akhtar MD [facility]   8/16/2024  2:45 PM Teri Jolly MD [facility]   8/21/2024  8:30 AM SCHEDULE, [facility] NURSE [facility]   9/16/2024  9:00 AM [facility] ROOM 1 [facility]   9/19/2024 10:15 AM Eyal Ordonez MD [facility]   9/30/2024  9:45 AM Laurel Thomas MD [facility]

## 2024-07-01 RX ORDER — LEVOTHYROXINE SODIUM 0.07 MG/1
75 TABLET ORAL DAILY
Qty: 90 TABLET | Refills: 0 | Status: SHIPPED | OUTPATIENT
Start: 2024-07-01

## 2024-07-02 NOTE — PROGRESS NOTES
[facility]           Radiation Oncology      [address] [place], [place] [address]        O: [phone]        F: [phone]       [facility]                   Dr. Cas Hamilton MD PhD    FOLLOW-UP NOTE     Date of Service: 2024  Patient ID: Kelvin Washington   : 1955  MRN: [id number]   Acct Number: [id number]       NAME:  Kelvin Washington    :  1955 68 y.o. male     PCP: Billy Akhtar MD    REFERRING PROVIDER: Mery    DIAGNOSIS:  1. Prostate cancer (HCC)        STAGING: Cancer Staging   Prostate cancer (HCC)  Staging form: Prostate, AJCC 8th Edition  - Pathologic stage from 2019: Stage IIC (pT2, pN0, cM0, PSA: 4.4, Grade Group: 3) - Signed by Raul Stone MD on 2019  - Pathologic: No Stage Recommended (rcTX, cN1, cM0, PSA: 0.2, Grade Group: 3) - Signed by Cas Hamilton MD on 3/4/2024      PRIOR TREATMENT: 4600 cGy in 23 fractions to the pelvis and LN followed by a [place] to the prostate bed and LN to 2500 cGy in 10 fractions    TIME SINCE TREATMENT: 1 month    RECENT HISTORY: Kelvin Washington returns for quick check approximately 1 month status post completion of salvage radiation therapy for the above diagnosis.  Today he notes an IPSS score of 3 with biggest complaint being nocturia 3-4 times nightly.  He has moderate to severe weakness and joint aches since receiving his Lupron injection and is quite hesitant about continuing therapy same but is adversely affecting his quality of life and he is unable to be active as a result.  He has some mild low blood pressure but denies any chest pain, lightheadedness, falls, or other complaints.  He is being followed by ENT surgery per his history of locally advanced head neck cancer.    Past medical, surgical, social and family histories reviewed and updated as indicated.    ALLERGIES:  Demerol hcl [meperidine] and Valium [diazepam]       MEDICATIONS:   Current Outpatient Medications:

## 2024-07-08 DIAGNOSIS — C61 PROSTATE CANCER (HCC): ICD-10-CM

## 2024-07-08 LAB — PSA SERPL-MCNC: <0.01 NG/ML (ref 0–4)

## 2024-07-11 ENCOUNTER — OFFICE VISIT (OUTPATIENT)
Dept: CARDIOLOGY CLINIC | Age: 69
End: 2024-07-11
Payer: COMMERCIAL

## 2024-07-11 VITALS
HEART RATE: 67 BPM | WEIGHT: 183.8 LBS | DIASTOLIC BLOOD PRESSURE: 74 MMHG | BODY MASS INDEX: 27.14 KG/M2 | SYSTOLIC BLOOD PRESSURE: 120 MMHG

## 2024-07-11 DIAGNOSIS — R09.89 BILATERAL CAROTID BRUITS: ICD-10-CM

## 2024-07-11 DIAGNOSIS — I50.33 ACUTE ON CHRONIC DIASTOLIC HEART FAILURE (HCC): ICD-10-CM

## 2024-07-11 DIAGNOSIS — R06.09 DOE (DYSPNEA ON EXERTION): ICD-10-CM

## 2024-07-11 DIAGNOSIS — E78.5 DYSLIPIDEMIA: Primary | ICD-10-CM

## 2024-07-11 DIAGNOSIS — I10 ESSENTIAL HYPERTENSION: ICD-10-CM

## 2024-07-11 DIAGNOSIS — I25.119 ATHEROSCLEROSIS OF NATIVE CORONARY ARTERY OF NATIVE HEART WITH ANGINA PECTORIS (HCC): ICD-10-CM

## 2024-07-11 DIAGNOSIS — I25.10 CORONARY ARTERY DISEASE INVOLVING NATIVE CORONARY ARTERY OF NATIVE HEART WITHOUT ANGINA PECTORIS: ICD-10-CM

## 2024-07-11 PROCEDURE — 99214 OFFICE O/P EST MOD 30 MIN: CPT | Performed by: INTERNAL MEDICINE

## 2024-07-11 PROCEDURE — 1123F ACP DISCUSS/DSCN MKR DOCD: CPT | Performed by: INTERNAL MEDICINE

## 2024-07-11 PROCEDURE — 3078F DIAST BP <80 MM HG: CPT | Performed by: INTERNAL MEDICINE

## 2024-07-11 PROCEDURE — 3074F SYST BP LT 130 MM HG: CPT | Performed by: INTERNAL MEDICINE

## 2024-07-11 RX ORDER — METOPROLOL SUCCINATE 25 MG/1
25 TABLET, EXTENDED RELEASE ORAL DAILY
Qty: 90 TABLET | Refills: 3 | Status: SHIPPED | OUTPATIENT
Start: 2024-07-11

## 2024-07-11 ASSESSMENT — ENCOUNTER SYMPTOMS
BACK PAIN: 1
STRIDOR: 0
COUGH: 0
WHEEZING: 0
BLOOD IN STOOL: 0
CHEST TIGHTNESS: 0
SHORTNESS OF BREATH: 1
EYES NEGATIVE: 1
GASTROINTESTINAL NEGATIVE: 1
NAUSEA: 0

## 2024-07-11 NOTE — PROGRESS NOTES
Discontinued During This Encounter   Medication Reason    naloxone 4 MG/0.1ML LIQD nasal spray LIST CLEANUP    simethicone (MYLICON) 80 MG chewable tablet DISCONTINUED BY ANOTHER CLINICIAN           Modified Medications    No medications on file       Orders Placed This Encounter   Medications    metoprolol succinate (TOPROL XL) 25 MG extended release tablet     Sig: Take 1 tablet by mouth daily     Dispense:  90 tablet     Refill:  3       Assessment/Plan:    1. Dyslipidemia   LDL Elevated- on Atorva.  Repeat labs reviewed. Much better   2. Essential hypertension  Stable - continue meds. Low salt diet    3. Shortness of breath /CP - worse- will obtain TM SPECT - spect shows small defect On ferior segments. He has known RCA . Cpontioneu med rx.  No CP    4. Acute on chronic diastolic heart failure (HCC) - stable     5. Carotid Bruits- CUS - will need conontued surveillance.     6. Lung Nodule- f/u CT ordered. F/u Dr. ARAIZA     7. ED- wants to try Viagra- no angina. BP on low side.  Advance Viagra to 100 mg PRN. No nitrates.     8. Throat and Tongue - PET scan and f/u for further staging and Rx. - stable.  Finished chemo now.     9. CAD_ stable no angina.  BB stopped in the past due to low BP.     10. Prostate Ca s/p Prostatectomy.  Ok to take Lupron injections.      Counseling:  Heart Healthy Lifestyle, Improve BMI, Low Salt Diet, Volume Restriction 1500cc per day, Take Precautions to Prevent Falls and Walk Daily    Return in about 4 months (around 11/11/2024).    Electronically signed by ALANIS LOCKETT MD on 7/11/2024 at 2:47 PM

## 2024-07-12 ENCOUNTER — TELEMEDICINE (OUTPATIENT)
Dept: PALLATIVE CARE | Age: 69
End: 2024-07-12
Payer: COMMERCIAL

## 2024-07-12 DIAGNOSIS — J44.1 COPD EXACERBATION (HCC): Primary | ICD-10-CM

## 2024-07-12 DIAGNOSIS — M47.817 LUMBOSACRAL SPONDYLOSIS WITHOUT MYELOPATHY: ICD-10-CM

## 2024-07-12 DIAGNOSIS — C61 PROSTATE CANCER (HCC): ICD-10-CM

## 2024-07-12 DIAGNOSIS — I50.32 CHRONIC DIASTOLIC CHF (CONGESTIVE HEART FAILURE) (HCC): ICD-10-CM

## 2024-07-12 DIAGNOSIS — R09.89 CHEST CONGESTION: ICD-10-CM

## 2024-07-12 DIAGNOSIS — Z51.5 PALLIATIVE CARE ENCOUNTER: ICD-10-CM

## 2024-07-12 DIAGNOSIS — R09.89 CHEST CRACKLES: ICD-10-CM

## 2024-07-12 PROCEDURE — 1123F ACP DISCUSS/DSCN MKR DOCD: CPT | Performed by: NURSE PRACTITIONER

## 2024-07-12 PROCEDURE — 99214 OFFICE O/P EST MOD 30 MIN: CPT | Performed by: NURSE PRACTITIONER

## 2024-07-12 RX ORDER — PREDNISONE 10 MG/1
TABLET ORAL
Qty: 30 TABLET | Refills: 0 | Status: SHIPPED | OUTPATIENT
Start: 2024-07-12

## 2024-07-12 RX ORDER — HYDROCODONE BITARTRATE AND ACETAMINOPHEN 5; 325 MG/1; MG/1
1 TABLET ORAL 3 TIMES DAILY PRN
Qty: 90 TABLET | Refills: 0 | Status: SHIPPED | OUTPATIENT
Start: 2024-07-12 | End: 2024-08-11

## 2024-07-12 ASSESSMENT — ENCOUNTER SYMPTOMS
BACK PAIN: 1
CONSTIPATION: 0
COUGH: 1
SHORTNESS OF BREATH: 1
WHEEZING: 1
NAUSEA: 0
DIARRHEA: 0
TROUBLE SWALLOWING: 1

## 2024-07-12 NOTE — PROGRESS NOTES
Nose: No rhinorrhea.   Eyes:      General: No scleral icterus.        Right eye: No discharge.         Left eye: No discharge.      Extraocular Movements: Extraocular movements intact.      Conjunctiva/sclera: Conjunctivae normal.   Pulmonary:      Effort: Pulmonary effort is normal.   Neurological:      General: No focal deficit present.      Mental Status: He is alert and oriented to person, place, and time.   Psychiatric:         Mood and Affect: Mood normal.         Behavior: Behavior normal.         Thought Content: Thought content normal.         Judgment: Judgment normal.         Assessment and Plan:      1. Prostate cancer (HCC)  2. Lumbosacral spondylosis without myelopathy  Completed radiation. Pain remains exacerbated, but patient feels pain is tolerable on current dose of Norco. Continue current dose of norco 5/325 mg TID prn as he is still having increased pain. Will have palliative RN reach out to Dr. Maddox's office to update.     - HYDROcodone-acetaminophen (NORCO) 5-325 MG per tablet; Take 1 tablet by mouth 3 times daily as needed for Pain for up to 30 days. Intended supply: 30 days. Take lowest dose possible to manage pain Max Daily Amount: 3 tablets     3. CHF  4. COPD exacerbation  5. Chest crackles  6. Chest congestion  Treated for infection last visit with doxy. Will add prednisone taper as symptoms still not fully resolved. Continue to follow with pulmonary and cardiology. Plan is for repeat CT in September.     7. Palliative care encounter  Discussed symptom management related to chronic disease/condition. Provided emotional support and active listening. Patient understands and is agreeable to current plan.            Opioid contract signed:No    Due to acuity, symptomatology and high-risk medication management, I advised patient to Return in about 6 weeks (around 8/23/2024).     MDM: Desiree Harvey, APRN - CNP    Collaborating physician: Dr. Alexandre

## 2024-07-15 ENCOUNTER — TELEPHONE (OUTPATIENT)
Dept: PAIN MANAGEMENT | Age: 69
End: 2024-07-15

## 2024-07-15 RX ORDER — ATORVASTATIN CALCIUM 80 MG/1
80 TABLET, FILM COATED ORAL DAILY
Qty: 90 TABLET | Refills: 0 | Status: SHIPPED | OUTPATIENT
Start: 2024-07-15

## 2024-07-15 NOTE — TELEPHONE ENCOUNTER
Received a call from Kelli with [facility] today stating that the patient told them that we manage his BWC claim. They are stating that [employer] only allows patient to have 1 norco per day but patient needs 3 pills per day. She is wanting to know if there is something we can do or if you can give her a call and tell her what they can do to take care of this.    Email was sent to Bessy practice manager/SULEMA

## 2024-07-24 ENCOUNTER — LAB (OUTPATIENT)
Dept: LAB | Facility: CLINIC | Age: 69
End: 2024-07-24
Payer: COMMERCIAL

## 2024-07-24 ENCOUNTER — OFFICE VISIT (OUTPATIENT)
Dept: HEMATOLOGY/ONCOLOGY | Facility: CLINIC | Age: 69
End: 2024-07-24
Payer: COMMERCIAL

## 2024-07-24 VITALS
TEMPERATURE: 97.7 F | WEIGHT: 184.3 LBS | BODY MASS INDEX: 27.22 KG/M2 | RESPIRATION RATE: 18 BRPM | SYSTOLIC BLOOD PRESSURE: 115 MMHG | HEART RATE: 60 BPM | DIASTOLIC BLOOD PRESSURE: 70 MMHG | OXYGEN SATURATION: 93 %

## 2024-07-24 DIAGNOSIS — R97.0 ELEVATED CARCINOEMBRYONIC ANTIGEN (CEA): ICD-10-CM

## 2024-07-24 DIAGNOSIS — C61 PROSTATE CANCER (MULTI): ICD-10-CM

## 2024-07-24 DIAGNOSIS — E06.4 DRUG-INDUCED THYROIDITIS: ICD-10-CM

## 2024-07-24 DIAGNOSIS — E78.2 MIXED HYPERLIPIDEMIA: ICD-10-CM

## 2024-07-24 DIAGNOSIS — F41.9 ANXIETY: ICD-10-CM

## 2024-07-24 DIAGNOSIS — C01 CANCER OF BASE OF TONGUE (MULTI): ICD-10-CM

## 2024-07-24 DIAGNOSIS — J44.9 CHRONIC OBSTRUCTIVE PULMONARY DISEASE, UNSPECIFIED COPD TYPE (MULTI): ICD-10-CM

## 2024-07-24 DIAGNOSIS — C61 PROSTATE CANCER (MULTI): Primary | ICD-10-CM

## 2024-07-24 DIAGNOSIS — E03.9 PRIMARY HYPOTHYROIDISM: ICD-10-CM

## 2024-07-24 LAB
ALBUMIN SERPL BCP-MCNC: 3.9 G/DL (ref 3.4–5)
ALP SERPL-CCNC: 64 U/L (ref 33–136)
ALT SERPL W P-5'-P-CCNC: 17 U/L (ref 10–52)
ANION GAP SERPL CALC-SCNC: 12 MMOL/L (ref 10–20)
AST SERPL W P-5'-P-CCNC: 15 U/L (ref 9–39)
BASOPHILS # BLD AUTO: 0.02 X10*3/UL (ref 0–0.1)
BASOPHILS NFR BLD AUTO: 0.2 %
BILIRUB SERPL-MCNC: 0.7 MG/DL (ref 0–1.2)
BUN SERPL-MCNC: 18 MG/DL (ref 6–23)
CALCIUM SERPL-MCNC: 9.3 MG/DL (ref 8.6–10.3)
CHLORIDE SERPL-SCNC: 101 MMOL/L (ref 98–107)
CO2 SERPL-SCNC: 30 MMOL/L (ref 21–32)
CREAT SERPL-MCNC: 0.83 MG/DL (ref 0.5–1.3)
EGFRCR SERPLBLD CKD-EPI 2021: >90 ML/MIN/1.73M*2
EOSINOPHIL # BLD AUTO: 0.1 X10*3/UL (ref 0–0.7)
EOSINOPHIL NFR BLD AUTO: 0.9 %
ERYTHROCYTE [DISTWIDTH] IN BLOOD BY AUTOMATED COUNT: 14.6 % (ref 11.5–14.5)
GLUCOSE SERPL-MCNC: 130 MG/DL (ref 74–99)
HCT VFR BLD AUTO: 35.8 % (ref 41–52)
HGB BLD-MCNC: 11.1 G/DL (ref 13.5–17.5)
IMM GRANULOCYTES # BLD AUTO: 0.06 X10*3/UL (ref 0–0.7)
IMM GRANULOCYTES NFR BLD AUTO: 0.5 % (ref 0–0.9)
LYMPHOCYTES # BLD AUTO: 0.42 X10*3/UL (ref 1.2–4.8)
LYMPHOCYTES NFR BLD AUTO: 3.8 %
MCH RBC QN AUTO: 29.1 PG (ref 26–34)
MCHC RBC AUTO-ENTMCNC: 31 G/DL (ref 32–36)
MCV RBC AUTO: 94 FL (ref 80–100)
MONOCYTES # BLD AUTO: 0.39 X10*3/UL (ref 0.1–1)
MONOCYTES NFR BLD AUTO: 3.5 %
NEUTROPHILS # BLD AUTO: 10.18 X10*3/UL (ref 1.2–7.7)
NEUTROPHILS NFR BLD AUTO: 91.1 %
PLATELET # BLD AUTO: 255 X10*3/UL (ref 150–450)
POTASSIUM SERPL-SCNC: 4.2 MMOL/L (ref 3.5–5.3)
PROT SERPL-MCNC: 6.4 G/DL (ref 6.4–8.2)
RBC # BLD AUTO: 3.81 X10*6/UL (ref 4.5–5.9)
SODIUM SERPL-SCNC: 139 MMOL/L (ref 136–145)
TSH SERPL-ACNC: 1.63 MIU/L (ref 0.44–3.98)
WBC # BLD AUTO: 11.2 X10*3/UL (ref 4.4–11.3)

## 2024-07-24 PROCEDURE — 82378 CARCINOEMBRYONIC ANTIGEN: CPT

## 2024-07-24 PROCEDURE — 99214 OFFICE O/P EST MOD 30 MIN: CPT | Performed by: INTERNAL MEDICINE

## 2024-07-24 PROCEDURE — 1159F MED LIST DOCD IN RCRD: CPT | Performed by: INTERNAL MEDICINE

## 2024-07-24 PROCEDURE — 84153 ASSAY OF PSA TOTAL: CPT

## 2024-07-24 PROCEDURE — 80053 COMPREHEN METABOLIC PANEL: CPT

## 2024-07-24 PROCEDURE — 84443 ASSAY THYROID STIM HORMONE: CPT

## 2024-07-24 PROCEDURE — 85025 COMPLETE CBC W/AUTO DIFF WBC: CPT

## 2024-07-24 PROCEDURE — 36415 COLL VENOUS BLD VENIPUNCTURE: CPT

## 2024-07-24 RX ORDER — METOPROLOL SUCCINATE 25 MG/1
25 TABLET, EXTENDED RELEASE ORAL DAILY
COMMUNITY

## 2024-07-25 ENCOUNTER — TELEPHONE (OUTPATIENT)
Dept: HEMATOLOGY/ONCOLOGY | Facility: CLINIC | Age: 69
End: 2024-07-25
Payer: COMMERCIAL

## 2024-07-25 PROBLEM — J44.9 CHRONIC OBSTRUCTIVE PULMONARY DISEASE (MULTI): Status: ACTIVE | Noted: 2024-07-25

## 2024-07-25 LAB
CEA SERPL-MCNC: 2.7 UG/L
PSA SERPL-MCNC: <0.1 NG/ML

## 2024-07-25 ASSESSMENT — ENCOUNTER SYMPTOMS
EYES NEGATIVE: 1
NEUROLOGICAL NEGATIVE: 1
PSYCHIATRIC NEGATIVE: 1
MUSCULOSKELETAL NEGATIVE: 1
ENDOCRINE NEGATIVE: 1
RESPIRATORY NEGATIVE: 1
CARDIOVASCULAR NEGATIVE: 1
CONSTITUTIONAL NEGATIVE: 1
HEMATOLOGIC/LYMPHATIC NEGATIVE: 1
GASTROINTESTINAL NEGATIVE: 1

## 2024-07-25 NOTE — TELEPHONE ENCOUNTER
Patient asking that labs recently done by Dr. Lucero be faxed to Dr. Ely's office at [facility].  Office phone: [phone].  Patient does not have fax number.

## 2024-07-25 NOTE — PROGRESS NOTES
Patient ID: Miguel Lofton is a 68 y.o. male.  Referring Physician: Iván Lucero MD  [id number] [facility] [address] [place] [address]  Primary Care Provider: Terrence Graham MD  Visit Type: Follow Up      Subjective    HPI I am doing okay, the right cheek area is still sore    Review of Systems   Constitutional: Negative.    HENT:  Negative.     Eyes: Negative.    Respiratory: Negative.     Cardiovascular: Negative.    Gastrointestinal: Negative.    Endocrine: Negative.    Genitourinary: Negative.     Musculoskeletal: Negative.    Skin: Negative.    Neurological: Negative.    Hematological: Negative.    Psychiatric/Behavioral: Negative.          Objective   BSA: 2.02 meters squared  /70 (BP Location: Right arm)   Pulse 60   Temp 36.5 °C (97.7 °F) (Temporal)   Resp 18   Wt 83.6 kg (184 lb 4.9 oz)   SpO2 93%   BMI 27.22 kg/m²      has a past medical history of Hypothyroidism, unspecified, Personal history of other endocrine, nutritional and metabolic disease, and Personal history of other mental and behavioral disorders.   has a past surgical history that includes Hernia repair (08/29/2018) and Vasectomy (08/29/2018).  No family history on file.  Oncology History   Prostate cancer (Multi)   11/29/2023 Initial Diagnosis    Prostate cancer (CMS/[facility])     11/29/2023 Cancer Staged    Staging form: Prostate, AJCC 8th Edition, Pathologic stage from 11/29/2023: Stage IIC (rpT2, pN0, cM0, PSA: 0, Grade Group: 3) - Signed by Britney Rangel MD on 12/1/2023         Miguel Lofton  reports that he has quit smoking. His smoking use included cigarettes. He has never used smokeless tobacco.  He  reports that he does not currently use alcohol.  He  reports no history of drug use.    Physical Exam  Vitals reviewed.   Constitutional:       Appearance: Normal appearance.   HENT:      Head: Normocephalic.      Mouth/Throat:      Mouth: Mucous membranes are moist.   Eyes:      Extraocular Movements: Extraocular  movements intact.      Pupils: Pupils are equal, round, and reactive to light.   Neck:      Comments: Fibrotic changes secondary to radiation  Cardiovascular:      Rate and Rhythm: Normal rate and regular rhythm.      Heart sounds: Normal heart sounds.   Pulmonary:      Breath sounds: Normal breath sounds.   Abdominal:      General: Bowel sounds are normal.      Palpations: Abdomen is soft.   Musculoskeletal:         General: Normal range of motion.      Cervical back: Normal range of motion and neck supple.   Skin:     General: Skin is warm.   Neurological:      General: No focal deficit present.      Mental Status: He is alert and oriented to person, place, and time.   Psychiatric:         Mood and Affect: Mood normal.         Behavior: Behavior normal.         WBC   Date/Time Value Ref Range Status   07/24/2024 12:56 PM 11.2 4.4 - 11.3 x10*3/uL Final   01/24/2024 10:49 AM 6.5 4.4 - 11.3 x10*3/uL Final   08/17/2023 07:39 AM 3.8 (L) 4.4 - 11.3 x10E9/L Final   07/10/2023 10:03 AM 6.0 4.4 - 11.3 x10E9/L Final   03/27/2023 10:29 AM 5.4 4.4 - 11.3 x10E9/L Final     nRBC   Date Value Ref Range Status   11/17/2018 0.0 0.0 - 0.0 /100 WBC Final   11/07/2018 0.0 0.0 - 0.0 /100 WBC Final     RBC   Date Value Ref Range Status   07/24/2024 3.81 (L) 4.50 - 5.90 x10*6/uL Final   01/24/2024 4.49 (L) 4.50 - 5.90 x10*6/uL Final   08/17/2023 4.17 (L) 4.50 - 5.90 x10E12/L Final   07/10/2023 3.97 (L) 4.50 - 5.90 x10E12/L Final   03/27/2023 3.98 (L) 4.50 - 5.90 x10E12/L Final     Hemoglobin   Date Value Ref Range Status   07/24/2024 11.1 (L) 13.5 - 17.5 g/dL Final   01/24/2024 12.7 (L) 13.5 - 17.5 g/dL Final   08/17/2023 12.3 (L) 13.5 - 17.5 g/dL Final   07/10/2023 11.9 (L) 13.5 - 17.5 g/dL Final   03/27/2023 12.4 (L) 13.5 - 17.5 g/dL Final     Hematocrit   Date Value Ref Range Status   07/24/2024 35.8 (L) 41.0 - 52.0 % Final   01/24/2024 40.1 (L) 41.0 - 52.0 % Final   08/17/2023 38.2 (L) 41.0 - 52.0 % Final   07/10/2023 38.6 (L) 41.0  "- 52.0 % Final   03/27/2023 37.6 (L) 41.0 - 52.0 % Final     MCV   Date/Time Value Ref Range Status   07/24/2024 12:56 PM 94 80 - 100 fL Final   01/24/2024 10:49 AM 89 80 - 100 fL Final   08/17/2023 07:39 AM 92 80 - 100 fL Final   07/10/2023 10:03 AM 97 80 - 100 fL Final   03/27/2023 10:29 AM 94 80 - 100 fL Final     MCH   Date/Time Value Ref Range Status   07/24/2024 12:56 PM 29.1 26.0 - 34.0 pg Final   01/24/2024 10:49 AM 28.3 26.0 - 34.0 pg Final     MCHC   Date/Time Value Ref Range Status   07/24/2024 12:56 PM 31.0 (L) 32.0 - 36.0 g/dL Final   01/24/2024 10:49 AM 31.7 (L) 32.0 - 36.0 g/dL Final   08/17/2023 07:39 AM 32.2 32.0 - 36.0 g/dL Final   07/10/2023 10:03 AM 30.8 (L) 32.0 - 36.0 g/dL Final   03/27/2023 10:29 AM 33.0 32.0 - 36.0 g/dL Final     RDW   Date/Time Value Ref Range Status   07/24/2024 12:56 PM 14.6 (H) 11.5 - 14.5 % Final   01/24/2024 10:49 AM 13.8 11.5 - 14.5 % Final   08/17/2023 07:39 AM 13.7 11.5 - 14.5 % Final   07/10/2023 10:03 AM 13.2 11.5 - 14.5 % Final   03/27/2023 10:29 AM 12.7 11.5 - 14.5 % Final     Platelets   Date/Time Value Ref Range Status   07/24/2024 12:56  150 - 450 x10*3/uL Final   01/24/2024 10:49  150 - 450 x10*3/uL Final   08/17/2023 07:39  150 - 450 x10E9/L Final   07/10/2023 10:03  150 - 450 x10E9/L Final   03/27/2023 10:29  150 - 450 x10E9/L Final     No results found for: \"MPV\"  Neutrophils %   Date/Time Value Ref Range Status   07/24/2024 12:56 PM 91.1 40.0 - 80.0 % Final   01/24/2024 10:49 AM 65.0 40.0 - 80.0 % Final   07/10/2023 10:03 AM 64.9 40.0 - 80.0 % Final   01/23/2023 02:34 PM 63.5 40.0 - 80.0 % Final   12/13/2022 11:48 AM 65.5 40.0 - 80.0 % Final     Immature Granulocytes %, Automated   Date/Time Value Ref Range Status   07/24/2024 12:56 PM 0.5 0.0 - 0.9 % Final     Comment:     Immature Granulocyte Count (IG) includes promyelocytes, myelocytes and metamyelocytes but does not include bands. Percent differential counts (%) should " be interpreted in the context of the absolute cell counts (cells/UL).   01/24/2024 10:49 AM 0.2 0.0 - 0.9 % Final     Comment:     Immature Granulocyte Count (IG) includes promyelocytes, myelocytes and metamyelocytes but does not include bands. Percent differential counts (%) should be interpreted in the context of the absolute cell counts (cells/UL).   07/10/2023 10:03 AM 0.3 0.0 - 0.9 % Final     Comment:      Immature Granulocyte Count (IG) includes promyelocytes,    myelocytes and metamyelocytes but does not include bands.   Percent differential counts (%) should be interpreted in the   context of the absolute cell counts (cells/L).     01/23/2023 02:34 PM 0.0 0.0 - 0.9 % Final     Comment:      Immature Granulocyte Count (IG) includes promyelocytes,    myelocytes and metamyelocytes but does not include bands.   Percent differential counts (%) should be interpreted in the   context of the absolute cell counts (cells/L).     12/13/2022 11:48 AM 0.0 0.0 - 0.9 % Final     Comment:      Immature Granulocyte Count (IG) includes promyelocytes,    myelocytes and metamyelocytes but does not include bands.   Percent differential counts (%) should be interpreted in the   context of the absolute cell counts (cells/L).       Lymphocytes %   Date/Time Value Ref Range Status   07/24/2024 12:56 PM 3.8 13.0 - 44.0 % Final   01/24/2024 10:49 AM 16.0 13.0 - 44.0 % Final   07/10/2023 10:03 AM 16.3 13.0 - 44.0 % Final   01/23/2023 02:34 PM 15.5 13.0 - 44.0 % Final   12/13/2022 11:48 AM 13.9 13.0 - 44.0 % Final     Monocytes %   Date/Time Value Ref Range Status   07/24/2024 12:56 PM 3.5 2.0 - 10.0 % Final   01/24/2024 10:49 AM 10.1 2.0 - 10.0 % Final   07/10/2023 10:03 AM 12.4 2.0 - 10.0 % Final   01/23/2023 02:34 PM 14.9 2.0 - 10.0 % Final   12/13/2022 11:48 AM 17.0 2.0 - 10.0 % Final     Eosinophils %   Date/Time Value Ref Range Status   07/24/2024 12:56 PM 0.9 0.0 - 6.0 % Final   01/24/2024 10:49 AM 7.9 0.0 - 6.0 % Final    07/10/2023 10:03 AM 4.9 0.0 - 6.0 % Final   01/23/2023 02:34 PM 5.7 0.0 - 6.0 % Final   12/13/2022 11:48 AM 2.8 0.0 - 6.0 % Final     Basophils %   Date/Time Value Ref Range Status   07/24/2024 12:56 PM 0.2 0.0 - 2.0 % Final   01/24/2024 10:49 AM 0.8 0.0 - 2.0 % Final   07/10/2023 10:03 AM 1.2 0.0 - 2.0 % Final   01/23/2023 02:34 PM 0.4 0.0 - 2.0 % Final   12/13/2022 11:48 AM 0.8 0.0 - 2.0 % Final     Neutrophils Absolute   Date/Time Value Ref Range Status   07/24/2024 12:56 PM 10.18 (H) 1.20 - 7.70 x10*3/uL Final     Comment:     Percent differential counts (%) should be interpreted in the context of the absolute cell counts (cells/uL).   01/24/2024 10:49 AM 4.20 1.20 - 7.70 x10*3/uL Final     Comment:     Percent differential counts (%) should be interpreted in the context of the absolute cell counts (cells/uL).   07/10/2023 10:03 AM 3.86 1.20 - 7.70 x10E9/L Final   01/23/2023 02:34 PM 3.32 1.20 - 7.70 x10E9/L Final   12/13/2022 11:48 AM 2.59 1.20 - 7.70 x10E9/L Final     Immature Granulocytes Absolute, Automated   Date/Time Value Ref Range Status   07/24/2024 12:56 PM 0.06 0.00 - 0.70 x10*3/uL Final   01/24/2024 10:49 AM 0.01 0.00 - 0.70 x10*3/uL Final     Lymphocytes Absolute   Date/Time Value Ref Range Status   07/24/2024 12:56 PM 0.42 (L) 1.20 - 4.80 x10*3/uL Final   01/24/2024 10:49 AM 1.03 (L) 1.20 - 4.80 x10*3/uL Final   07/10/2023 10:03 AM 0.97 (L) 1.20 - 4.80 x10E9/L Final   01/23/2023 02:34 PM 0.81 (L) 1.20 - 4.80 x10E9/L Final   12/13/2022 11:48 AM 0.55 (L) 1.20 - 4.80 x10E9/L Final     Monocytes Absolute   Date/Time Value Ref Range Status   07/24/2024 12:56 PM 0.39 0.10 - 1.00 x10*3/uL Final   01/24/2024 10:49 AM 0.65 0.10 - 1.00 x10*3/uL Final   07/10/2023 10:03 AM 0.74 0.10 - 1.00 x10E9/L Final   01/23/2023 02:34 PM 0.78 0.10 - 1.00 x10E9/L Final   12/13/2022 11:48 AM 0.67 0.10 - 1.00 x10E9/L Final     Eosinophils Absolute   Date/Time Value Ref Range Status   07/24/2024 12:56 PM 0.10 0.00 - 0.70  "x10*3/uL Final   01/24/2024 10:49 AM 0.51 0.00 - 0.70 x10*3/uL Final   07/10/2023 10:03 AM 0.29 0.00 - 0.70 x10E9/L Final   01/23/2023 02:34 PM 0.30 0.00 - 0.70 x10E9/L Final   12/13/2022 11:48 AM 0.11 0.00 - 0.70 x10E9/L Final     Basophils Absolute   Date/Time Value Ref Range Status   07/24/2024 12:56 PM 0.02 0.00 - 0.10 x10*3/uL Final   01/24/2024 10:49 AM 0.05 0.00 - 0.10 x10*3/uL Final   07/10/2023 10:03 AM 0.07 0.00 - 0.10 x10E9/L Final   01/23/2023 02:34 PM 0.02 0.00 - 0.10 x10E9/L Final   12/13/2022 11:48 AM 0.03 0.00 - 0.10 x10E9/L Final       No components found for: \"PT\"  aPTT   Date/Time Value Ref Range Status   11/07/2018 02:10 PM 32 28 - 38 sec Final     Comment:      Note new reference range as of 10/23/2018.    THE APTT IS NO LONGER USED FOR MONITORING     UNFRACTIONATED HEPARIN THERAPY.    FOR MONITORING HEPARIN THERAPY,     USE THE HEPARIN ASSAY.       Medication Documentation Review Audit       Reviewed by Jannet Gee MA (Medical Assistant) on 07/24/24 at 1253      Medication Order Taking? Sig Documenting Provider Last Dose Status   acetaminophen (Tylenol) 500 mg tablet [id number] Yes Take 2 tablets (1,000 mg) by mouth. Historical Provider, MD Taking Active   ascorbic acid (Vitamin C) 500 mg tablet [id number] Yes Take 1 tablet (500 mg) by mouth once daily. Historical Provider, MD Taking Active   aspirin 81 mg EC tablet [id number] Yes Take 1 tablet (81 mg) by mouth once daily. Historical Provider, MD Taking Active   atorvastatin (Lipitor) 80 mg tablet [id number] Yes Take 1 tablet (80 mg) by mouth once daily. Historical Provider, MD Taking Active   cholecalciferol (Vitamin D-3) 25 MCG (1000 UT) tablet [id number] Yes Take 1 tablet (25 mcg) by mouth once daily. Historical Provider, MD Taking Active   cyanocobalamin (Vitamin B-12) 1,000 mcg tablet [id number] Yes Take 1 tablet (1,000 mcg) by mouth once daily. Historical Provider, MD Taking Active   diclofenac sodium (Voltaren) 1 % gel gel [id number] Yes " Apply topically. Historical MD Ronald Taking Active   HYDROcodone-acetaminophen (Norco) 5-325 mg tablet [id number] Yes Take 1 tablet by mouth every 6 hours if needed. Yonis Cardenas MD Taking Active   levothyroxine (Synthroid, Levoxyl) 75 mcg tablet [id number] Yes Take 1 tablet (75 mcg) by mouth once daily. Yonis Cardenas MD Taking Active   LORazepam (Ativan) 1 mg tablet [id number]  TAKE ONE TABLET BY MOUTH EVERY DAY NIGHTLY AS NEEDED FOR ANXIETY Historical Provider, MD   23   LORazepam (Ativan) 1 mg tablet [id number] Yes Take 1 tablet (1 mg) by mouth 2 times a day as needed (Anxiety prior to radiation therapy). Britney Rangel MD Taking Active   metoprolol succinate XL (Toprol-XL) 25 mg 24 hr tablet [id number] Yes Take 1 tablet (25 mg) by mouth once daily. Do not crush or chew. Yonis Cardenas MD Taking Active   omeprazole (PriLOSEC) 20 mg DR capsule [id number] Yes Take 1 capsule (20 mg) by mouth. Historical MD Ronald Taking Active   sildenafil (Viagra) 100 mg tablet [id number] Yes Take 1 tablet (100 mg) by mouth once daily as needed. Historical MD Ronald Taking Active   tiotropium (Spiriva with HandiHaler) 18 mcg inhalation capsule [id number] Yes Place into inhaler and inhale. 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, p16+  -completed course of chemoradiation by end of 10/2022  -here for interval followup  -labs to be done today include CBC, COMP, CEA, TSH  -results reviewed-wbc 11.2, hgb 11.1 plt 255,000, creatinine 0.83, calcium 9.3, AST 15, ALT 17, CEA 2.7, TSH 1.63  -continues to followup with Dr Gamez--last seen 2024  -will continue to see him Q6 months        2) cancer pain  -on norco PRN     3) anxiety  -on ativan PRN     4) hyperlipidemia  -on atorvastatin     5) hypothyroidism  -on levothyroxine     6) COPD  -on  spiriva     7) prostate cancer  -s/p radical prostatectomy by Dr Lang Mcneil in 2018  -[id number], [place] 7, + right anterior margin  -most recent PSA 0.03  -PSMA-PET done on 12/6/2023 showed no PSMA uptake within the post-surgical bed; there is PSMA uptake of a left common iliac retroperitoneal lymph node SUV 5.6 and PSMA uptake of a right pelvic sidewall lymph node SUV 3.5  -he is now following with urologist Dr Boyd  -Dr Rangel recommended radiation therapy  -Pierce instead went to [place] and was treated by Dr Zackary Ely  -most recent PSA done on 7/8/2024 at [place] was <0.01  Problem List Items Addressed This Visit             ICD-10-CM    Prostate cancer (Multi) - Primary C61    Relevant Orders    Prostate Specific Antigen (Completed)    Cancer of base of tongue (Multi) C01    Relevant Orders    Clinic Appointment Request Follow Up; DEMI BAIBN; [facility] MEDONC1    CBC and Auto Differential    Comprehensive metabolic panel    Elevated carcinoembryonic antigen (CEA) R97.0    Relevant Orders    CEA    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

## 2024-07-29 DIAGNOSIS — E03.9 HYPOTHYROIDISM, UNSPECIFIED TYPE: ICD-10-CM

## 2024-07-29 LAB
ALBUMIN SERPL-MCNC: 3.9 G/DL (ref 3.5–4.6)
ALP SERPL-CCNC: 86 U/L (ref 35–104)
ALT SERPL-CCNC: 17 U/L (ref 0–41)
ANION GAP SERPL CALCULATED.3IONS-SCNC: 9 MEQ/L (ref 9–15)
AST SERPL-CCNC: 22 U/L (ref 0–40)
BASOPHILS # BLD: 0 K/UL (ref 0–0.2)
BASOPHILS NFR BLD: 0.6 %
BILIRUB SERPL-MCNC: 0.5 MG/DL (ref 0.2–0.7)
BUN SERPL-MCNC: 14 MG/DL (ref 8–23)
CALCIUM SERPL-MCNC: 9.2 MG/DL (ref 8.5–9.9)
CHLORIDE SERPL-SCNC: 104 MEQ/L (ref 95–107)
CHOLEST SERPL-MCNC: 147 MG/DL (ref 0–199)
CO2 SERPL-SCNC: 27 MEQ/L (ref 20–31)
CREAT SERPL-MCNC: 0.91 MG/DL (ref 0.7–1.2)
EOSINOPHIL # BLD: 0.6 K/UL (ref 0–0.7)
EOSINOPHIL NFR BLD: 9.6 %
ERYTHROCYTE [DISTWIDTH] IN BLOOD BY AUTOMATED COUNT: 14.6 % (ref 11.5–14.5)
GLOBULIN SER CALC-MCNC: 2.6 G/DL (ref 2.3–3.5)
GLUCOSE SERPL-MCNC: 104 MG/DL (ref 70–99)
HCT VFR BLD AUTO: 36.8 % (ref 42–52)
HDLC SERPL-MCNC: 70 MG/DL (ref 40–59)
HGB BLD-MCNC: 11.6 G/DL (ref 14–18)
LDLC SERPL CALC-MCNC: 60 MG/DL (ref 0–129)
LYMPHOCYTES # BLD: 0.6 K/UL (ref 1–4.8)
LYMPHOCYTES NFR BLD: 9.2 %
MCH RBC QN AUTO: 28.9 PG (ref 27–31.3)
MCHC RBC AUTO-ENTMCNC: 31.5 % (ref 33–37)
MCV RBC AUTO: 91.8 FL (ref 79–92.2)
MONOCYTES # BLD: 0.6 K/UL (ref 0.2–0.8)
MONOCYTES NFR BLD: 9.8 %
NEUTROPHILS # BLD: 4.5 K/UL (ref 1.4–6.5)
NEUTS SEG NFR BLD: 70.5 %
PLATELET # BLD AUTO: 288 K/UL (ref 130–400)
POTASSIUM SERPL-SCNC: 4.5 MEQ/L (ref 3.4–4.9)
PROT SERPL-MCNC: 6.5 G/DL (ref 6.3–8)
RBC # BLD AUTO: 4.01 M/UL (ref 4.7–6.1)
SODIUM SERPL-SCNC: 140 MEQ/L (ref 135–144)
TRIGL SERPL-MCNC: 83 MG/DL (ref 0–150)
TSH SERPL-MCNC: 4.83 UIU/ML (ref 0.44–3.86)
WBC # BLD AUTO: 6.4 K/UL (ref 4.8–10.8)

## 2024-08-08 DIAGNOSIS — C61 PROSTATE CANCER (HCC): ICD-10-CM

## 2024-08-08 DIAGNOSIS — M47.817 LUMBOSACRAL SPONDYLOSIS WITHOUT MYELOPATHY: ICD-10-CM

## 2024-08-08 RX ORDER — HYDROCODONE BITARTRATE AND ACETAMINOPHEN 5; 325 MG/1; MG/1
1 TABLET ORAL 3 TIMES DAILY PRN
Qty: 90 TABLET | Refills: 0 | Status: SHIPPED | OUTPATIENT
Start: 2024-08-11 | End: 2024-09-10

## 2024-08-08 NOTE — TELEPHONE ENCOUNTER
Rx requested:  Requested Prescriptions     Pending Prescriptions Disp Refills    HYDROcodone-acetaminophen (NORCO) 5-325 MG per tablet 90 tablet 0     Sig: Take 1 tablet by mouth 3 times daily as needed for Pain for up to 30 days. Intended supply: 30 days. Take lowest dose possible to manage pain Max Daily Amount: 3 tablets       Last Office Visit:   Visit date not found      Last filled:  7/12/24    Next Visit Date:  Future Appointments   Date Time Provider Department Center   8/9/2024 10:15 AM Billy Akhtar MD [facility] DEP   8/16/2024 11:15 AM Teri Jolly MD [place] [facility] [place]   8/21/2024  8:30 AM SCHEDULE, MLJOAN RAD ONC NURSE [facility] RAD ONC [facility]   8/23/2024  8:30 AM Khushi Harvey APRN - CNP PC [facility] PHYS [facility] [place]   9/16/2024  9:00 AM [facility] ROOM 1 [facility]   9/19/2024 10:15 AM Eyal Ordonez MD [place] [facility] [place]   9/30/2024  9:45 AM Laurel Thomas MD [place] [facility]   11/12/2024  2:45 PM Gage Zepeda MD [facility]

## 2024-08-09 ENCOUNTER — OFFICE VISIT (OUTPATIENT)
Dept: FAMILY MEDICINE CLINIC | Age: 69
End: 2024-08-09
Payer: COMMERCIAL

## 2024-08-09 VITALS
HEART RATE: 61 BPM | OXYGEN SATURATION: 97 % | BODY MASS INDEX: 27.25 KG/M2 | HEIGHT: 69 IN | TEMPERATURE: 97.1 F | DIASTOLIC BLOOD PRESSURE: 60 MMHG | SYSTOLIC BLOOD PRESSURE: 110 MMHG | WEIGHT: 184 LBS

## 2024-08-09 DIAGNOSIS — E78.00 PURE HYPERCHOLESTEROLEMIA: ICD-10-CM

## 2024-08-09 DIAGNOSIS — E03.9 HYPOTHYROIDISM, UNSPECIFIED TYPE: ICD-10-CM

## 2024-08-09 DIAGNOSIS — I10 ESSENTIAL HYPERTENSION: Primary | ICD-10-CM

## 2024-08-09 DIAGNOSIS — F41.9 ANXIETY: ICD-10-CM

## 2024-08-09 PROCEDURE — 3074F SYST BP LT 130 MM HG: CPT | Performed by: FAMILY MEDICINE

## 2024-08-09 PROCEDURE — 1123F ACP DISCUSS/DSCN MKR DOCD: CPT | Performed by: FAMILY MEDICINE

## 2024-08-09 PROCEDURE — 99214 OFFICE O/P EST MOD 30 MIN: CPT | Performed by: FAMILY MEDICINE

## 2024-08-09 PROCEDURE — 3078F DIAST BP <80 MM HG: CPT | Performed by: FAMILY MEDICINE

## 2024-08-09 RX ORDER — LORAZEPAM 1 MG/1
1 TABLET ORAL NIGHTLY PRN
Qty: 30 TABLET | Refills: 2 | Status: SHIPPED | OUTPATIENT
Start: 2024-08-09 | End: 2024-09-08

## 2024-08-09 RX ORDER — LEVOTHYROXINE SODIUM 88 UG/1
88 TABLET ORAL DAILY
Qty: 90 TABLET | Refills: 0 | Status: SHIPPED | OUTPATIENT
Start: 2024-08-09

## 2024-08-09 RX ORDER — LORAZEPAM 1 MG/1
TABLET ORAL
COMMUNITY
Start: 2024-07-10

## 2024-08-09 ASSESSMENT — PATIENT HEALTH QUESTIONNAIRE - PHQ9
1. LITTLE INTEREST OR PLEASURE IN DOING THINGS: NOT AT ALL
SUM OF ALL RESPONSES TO PHQ9 QUESTIONS 1 & 2: 0
SUM OF ALL RESPONSES TO PHQ QUESTIONS 1-9: 0
2. FEELING DOWN, DEPRESSED OR HOPELESS: NOT AT ALL

## 2024-08-09 ASSESSMENT — ENCOUNTER SYMPTOMS
DIARRHEA: 0
VOMITING: 0
CHOKING: 0

## 2024-08-09 NOTE — PROGRESS NOTES
Subjective:      Patient ID: Kelvin Washington is a 68 y.o. male    Hypertension  The current episode started more than 1 year ago. Past treatments include beta blockers. Identifiable causes of hypertension include a thyroid problem.   Hyperlipidemia  The current episode started more than 1 year ago. The problem is controlled. Current antihyperlipidemic treatment includes statins. The current treatment provides moderate improvement of lipids.   Thyroid Problem  Presents for follow-up visit. Patient reports no diarrhea. His past medical history is significant for hyperlipidemia.     Here in follow up for htn and lipids and hypothyroidism and anxiety.  No missed doses of medication.   Weight down few pounds since last time.  Still using ativan each day   Review of Systems   Constitutional:  Negative for chills and fever.   Respiratory:  Negative for choking.    Gastrointestinal:  Negative for diarrhea and vomiting.   Skin:  Negative for rash.   Neurological:  Negative for weakness.     Reviewed allergy, medical, social, surgical, family and med list changes and updated   Files--reviewed blood work with slightly elevated tsh      Social History     Socioeconomic History    Marital status:    Tobacco Use    Smoking status: Former     Current packs/day: 0.00     Average packs/day: 1.5 packs/day for 40.0 years (60.0 ttl pk-yrs)     Types: Cigarettes     Start date: 1978     Quit date: 2018     Years since quittin.7    Smokeless tobacco: Never   Vaping Use    Vaping Use: Never used   Substance and Sexual Activity    Alcohol use: Not Currently     Comment: very rarely (1 beer a year)    Drug use: No    Sexual activity: Yes     Partners: Female     Social Determinants of Health     Financial Resource Strain: Low Risk  (2024)    Overall Financial Resource Strain (CARDIA)     Difficulty of Paying Living Expenses: Not hard at all   Food Insecurity: No Food Insecurity (2024)    Hunger Vital Sign

## 2024-08-12 DIAGNOSIS — C61 PROSTATE CANCER (HCC): ICD-10-CM

## 2024-08-12 LAB — PSA SERPL-MCNC: <0.01 NG/ML (ref 0–4)

## 2024-08-16 ENCOUNTER — OFFICE VISIT (OUTPATIENT)
Dept: GASTROENTEROLOGY | Age: 69
End: 2024-08-16
Payer: COMMERCIAL

## 2024-08-16 VITALS — BODY MASS INDEX: 26.96 KG/M2 | WEIGHT: 182 LBS | HEIGHT: 69 IN | OXYGEN SATURATION: 97 % | HEART RATE: 54 BPM

## 2024-08-16 DIAGNOSIS — K21.9 GASTROESOPHAGEAL REFLUX DISEASE, UNSPECIFIED WHETHER ESOPHAGITIS PRESENT: ICD-10-CM

## 2024-08-16 DIAGNOSIS — R13.11 ORAL PHASE DYSPHAGIA: Primary | ICD-10-CM

## 2024-08-16 PROCEDURE — 1123F ACP DISCUSS/DSCN MKR DOCD: CPT | Performed by: SPECIALIST

## 2024-08-16 PROCEDURE — 99212 OFFICE O/P EST SF 10 MIN: CPT | Performed by: SPECIALIST

## 2024-08-16 ASSESSMENT — ENCOUNTER SYMPTOMS
RESPIRATORY NEGATIVE: 1
VOMITING: 0
NAUSEA: 0
DIARRHEA: 0
RECTAL PAIN: 0
ABDOMINAL PAIN: 0
EYES NEGATIVE: 1
BLOOD IN STOOL: 0
GASTROINTESTINAL NEGATIVE: 1
CONSTIPATION: 0
ABDOMINAL DISTENTION: 0
ANAL BLEEDING: 0

## 2024-08-16 NOTE — PROGRESS NOTES
Multiple Vitamins-Minerals (THERAPEUTIC MULTIVITAMIN-MINERALS) tablet Take 1 tablet by mouth daily      calcium carbonate (OSCAL) 500 MG TABS tablet Take 1 tablet by mouth daily      bisacodyl (DULCOLAX) 5 MG EC tablet Take 1 tablet by mouth daily as needed for Constipation      sildenafil (VIAGRA) 100 MG tablet TAKE ONE TABLET BY MOUTH EVERY DAY AS NEEDED 10 tablet 5    diclofenac sodium (VOLTAREN) 1 % GEL Apply topically daily as needed for Pain      aspirin 81 MG EC tablet Take 1 tablet by mouth daily      acetaminophen (TYLENOL) 500 MG tablet Take 2 tablets by mouth daily At night       No current facility-administered medications on file prior to visit.     Family History   Problem Relation Age of Onset    Other Mother     Alzheimer's Disease Mother     Cancer Father         prostate    Other Father     Cancer Sister         lung ca    Cancer Brother         lung ca    Lung Cancer Brother     Cancer Brother         prostate ca    No Known Problems Son     No Known Problems Daughter     Colon Cancer Neg Hx       Social History     Socioeconomic History    Marital status:    Tobacco Use    Smoking status: Former     Current packs/day: 0.00     Average packs/day: 1.5 packs/day for 40.0 years (60.0 ttl pk-yrs)     Types: Cigarettes     Start date: 1978     Quit date: 2018     Years since quittin.7    Smokeless tobacco: Never   Vaping Use    Vaping status: Never Used   Substance and Sexual Activity    Alcohol use: Not Currently     Comment: very rarely (1 beer a year)    Drug use: No    Sexual activity: Yes     Partners: Female     Social Determinants of Health     Financial Resource Strain: Low Risk  (2024)    Overall Financial Resource Strain (CARDIA)     Difficulty of Paying Living Expenses: Not hard at all   Food Insecurity: No Food Insecurity (2024)    Hunger Vital Sign     Worried About Running Out of Food in the Last Year: Never true     Ran Out of Food in the Last Year: Never

## 2024-08-21 ENCOUNTER — HOSPITAL ENCOUNTER (OUTPATIENT)
Dept: RADIATION ONCOLOGY | Age: 69
Discharge: HOME | End: 2024-08-21
Payer: COMMERCIAL

## 2024-08-21 VITALS
HEART RATE: 69 BPM | OXYGEN SATURATION: 95 % | WEIGHT: 183.8 LBS | DIASTOLIC BLOOD PRESSURE: 70 MMHG | TEMPERATURE: 97.7 F | SYSTOLIC BLOOD PRESSURE: 107 MMHG | RESPIRATION RATE: 16 BRPM | BODY MASS INDEX: 27.14 KG/M2

## 2024-08-21 DIAGNOSIS — C61 PROSTATE CANCER (HCC): Primary | ICD-10-CM

## 2024-08-21 DIAGNOSIS — R91.1 LUNG NODULE: ICD-10-CM

## 2024-08-21 PROCEDURE — 99212 OFFICE O/P EST SF 10 MIN: CPT | Performed by: RADIOLOGY

## 2024-08-21 NOTE — PROGRESS NOTES
NURSING ASSESSMENT     Date: 8/21/2024        Patient Name: Kelvin Washington     YOB: 1955      Age:  68 y.o.      MRN: [id number]       Chaperone [] Yes   [x] No      Advance Directives:   Do you currently have completed advance directives (living will)? [] Yes   [x] No         *If yes, please bring us a copy for your records.  *If no, would you like info or assistance in completing advance directives (living will)?   [] Yes   [x] No    Pain Score:   Pain Score (1-10): 4  Pain Location: joint and back pain  Pain Duration: all the time   Pain Management/Control: norco and tylenol, diclofenac gel      Is pain affecting your ability to take care of yourself or move throughout your home?                        [] Yes   [x] No    General: Fatigue on occasion  Patient has gained weight [] Yes   [x] No  Patient has lost weight [] Yes   [x] No  How much weight in pounds and over what length of time:     Eyes (Ophthalmic): wears glasses     Skin (Dermatological): No Problems     ENT: continues to have dysphagia, dry mouth, and altered taste.  Uses sips of water and biotene rinses     Respiratory: RAMOS with lifting or strenuous yard work, baseline     Cardiovascular: has CAD, follows with Dr Zepeda      Device   [] Yes   [x] No   Copy of Card Obtained [] Yes   [x] No    Gastrointestinal: Constipation takes dulcolax prn    Genito-Urinary: IPSS 2-3, nocturia     Breast: No Problems     Musculoskeletal: Joint Pain and Back Pain    Neurological: numbness and tingling left hand and lower legs and feet which is baseline      Hematological and Lymphatic: No Problems     Endocrine: No Problems       A 10-point review of systems  has been conducted and pertinent positives have been   recorded. All other review of systems are negative    Was the patient admitted during the course of treatment OR within 30 days of treatment? no    Additional Comments: will follow with Dr Ordonez 9/19/24

## 2024-08-23 ENCOUNTER — OFFICE VISIT (OUTPATIENT)
Dept: PALLATIVE CARE | Age: 69
End: 2024-08-23
Payer: COMMERCIAL

## 2024-08-23 VITALS
OXYGEN SATURATION: 95 % | RESPIRATION RATE: 19 BRPM | BODY MASS INDEX: 26.73 KG/M2 | WEIGHT: 181 LBS | SYSTOLIC BLOOD PRESSURE: 105 MMHG | HEART RATE: 66 BPM | DIASTOLIC BLOOD PRESSURE: 64 MMHG

## 2024-08-23 DIAGNOSIS — I50.32 CHRONIC DIASTOLIC CHF (CONGESTIVE HEART FAILURE) (HCC): ICD-10-CM

## 2024-08-23 DIAGNOSIS — J44.1 COPD EXACERBATION (HCC): ICD-10-CM

## 2024-08-23 DIAGNOSIS — C61 PROSTATE CANCER (HCC): ICD-10-CM

## 2024-08-23 DIAGNOSIS — Z51.5 PALLIATIVE CARE ENCOUNTER: ICD-10-CM

## 2024-08-23 DIAGNOSIS — R09.89 RHONCHI AT RIGHT LUNG BASE: ICD-10-CM

## 2024-08-23 DIAGNOSIS — R09.89 CHEST CONGESTION: Primary | ICD-10-CM

## 2024-08-23 DIAGNOSIS — M47.817 LUMBOSACRAL SPONDYLOSIS WITHOUT MYELOPATHY: ICD-10-CM

## 2024-08-23 DIAGNOSIS — M25.50 MULTIPLE JOINT PAIN: ICD-10-CM

## 2024-08-23 PROCEDURE — 99214 OFFICE O/P EST MOD 30 MIN: CPT | Performed by: NURSE PRACTITIONER

## 2024-08-23 PROCEDURE — 3074F SYST BP LT 130 MM HG: CPT | Performed by: NURSE PRACTITIONER

## 2024-08-23 PROCEDURE — 1123F ACP DISCUSS/DSCN MKR DOCD: CPT | Performed by: NURSE PRACTITIONER

## 2024-08-23 PROCEDURE — 3078F DIAST BP <80 MM HG: CPT | Performed by: NURSE PRACTITIONER

## 2024-08-23 RX ORDER — AMOXICILLIN AND CLAVULANATE POTASSIUM 875; 125 MG/1; MG/1
1 TABLET, FILM COATED ORAL 2 TIMES DAILY
Qty: 20 TABLET | Refills: 0 | Status: SHIPPED | OUTPATIENT
Start: 2024-08-23 | End: 2024-09-02

## 2024-08-23 ASSESSMENT — PATIENT HEALTH QUESTIONNAIRE - PHQ9
SUM OF ALL RESPONSES TO PHQ QUESTIONS 1-9: 0
SUM OF ALL RESPONSES TO PHQ9 QUESTIONS 1 & 2: 0
2. FEELING DOWN, DEPRESSED OR HOPELESS: NOT AT ALL
1. LITTLE INTEREST OR PLEASURE IN DOING THINGS: NOT AT ALL
SUM OF ALL RESPONSES TO PHQ QUESTIONS 1-9: 0

## 2024-08-23 ASSESSMENT — ENCOUNTER SYMPTOMS
COUGH: 1
DIARRHEA: 0
NAUSEA: 0
SHORTNESS OF BREATH: 1
WHEEZING: 1
TROUBLE SWALLOWING: 1
CONSTIPATION: 0
BACK PAIN: 1

## 2024-09-10 DIAGNOSIS — M47.817 LUMBOSACRAL SPONDYLOSIS WITHOUT MYELOPATHY: ICD-10-CM

## 2024-09-10 DIAGNOSIS — C61 PROSTATE CANCER (HCC): ICD-10-CM

## 2024-09-10 RX ORDER — HYDROCODONE BITARTRATE AND ACETAMINOPHEN 5; 325 MG/1; MG/1
1 TABLET ORAL 3 TIMES DAILY PRN
Qty: 90 TABLET | Refills: 0 | Status: SHIPPED | OUTPATIENT
Start: 2024-09-10 | End: 2024-10-10

## 2024-09-16 ENCOUNTER — HOSPITAL ENCOUNTER (OUTPATIENT)
Dept: CT IMAGING | Age: 69
Discharge: HOME | End: 2024-09-18
Payer: COMMERCIAL

## 2024-09-16 DIAGNOSIS — R91.8 ABNORMAL CT LUNG SCREENING: ICD-10-CM

## 2024-09-16 PROCEDURE — 71250 CT THORAX DX C-: CPT

## 2024-09-19 ENCOUNTER — OFFICE VISIT (OUTPATIENT)
Dept: UROLOGY | Age: 69
End: 2024-09-19
Payer: COMMERCIAL

## 2024-09-19 VITALS
DIASTOLIC BLOOD PRESSURE: 60 MMHG | SYSTOLIC BLOOD PRESSURE: 110 MMHG | WEIGHT: 175 LBS | OXYGEN SATURATION: 94 % | BODY MASS INDEX: 25.92 KG/M2 | HEART RATE: 59 BPM | HEIGHT: 69 IN

## 2024-09-19 DIAGNOSIS — C61 PROSTATE CANCER (HCC): Primary | ICD-10-CM

## 2024-09-19 PROCEDURE — 99213 OFFICE O/P EST LOW 20 MIN: CPT | Performed by: UROLOGY

## 2024-09-19 PROCEDURE — 3078F DIAST BP <80 MM HG: CPT | Performed by: UROLOGY

## 2024-09-19 PROCEDURE — 3074F SYST BP LT 130 MM HG: CPT | Performed by: UROLOGY

## 2024-09-19 PROCEDURE — 1123F ACP DISCUSS/DSCN MKR DOCD: CPT | Performed by: UROLOGY

## 2024-09-19 ASSESSMENT — ENCOUNTER SYMPTOMS: ABDOMINAL PAIN: 0

## 2024-09-25 ENCOUNTER — APPOINTMENT (OUTPATIENT)
Dept: OTOLARYNGOLOGY | Facility: CLINIC | Age: 69
End: 2024-09-25
Payer: COMMERCIAL

## 2024-09-25 DIAGNOSIS — R05.3 CHRONIC COUGH: ICD-10-CM

## 2024-09-25 DIAGNOSIS — C01 CANCER OF BASE OF TONGUE (MULTI): Primary | ICD-10-CM

## 2024-09-25 DIAGNOSIS — R91.1 LUNG NODULE: ICD-10-CM

## 2024-09-25 PROCEDURE — 31575 DIAGNOSTIC LARYNGOSCOPY: CPT | Performed by: OTOLARYNGOLOGY

## 2024-09-25 PROCEDURE — 1160F RVW MEDS BY RX/DR IN RCRD: CPT | Performed by: OTOLARYNGOLOGY

## 2024-09-25 PROCEDURE — 1159F MED LIST DOCD IN RCRD: CPT | Performed by: OTOLARYNGOLOGY

## 2024-09-25 PROCEDURE — 99213 OFFICE O/P EST LOW 20 MIN: CPT | Performed by: OTOLARYNGOLOGY

## 2024-09-25 NOTE — PROGRESS NOTES
The patient returns.  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  He is done fairly well with that.  He does have a history of lung nodule and is following with Dr. DEVRIES at [facility].  He has an upcoming appointment as he did recently have CT scan performed.  He still has significant cough but nothing otherwise worrisome.  There have been no significant changes in past medical or past surgical histories except as mentioned.    Exam:  No acute distress.  The external ear structures appear normal. The ear canals patent and the tympanic membranes are intact without evidence of air-fluid levels, retraction, or congenital defects.  Anterior rhinoscopy notes essentially a midline nasal septum. Examination is noted for normal healthy mucosal membranes without any evidence of lesions, polyps, or exudate. The tongue is normally mobile. There are no lesions on the gingiva, buccal, or oral mucosa. There are no oral cavity masses.  The neck is negative for mass lymphadenopathy. The trachea and parotid are clear. The thyroid bed is grossly unremarkable. The salivary gland structures are grossly unremarkable.    Procedure:  In order to assess the larynx, flexible laryngoscopy was performed based on the patient's history.  After topical anesthesia, a very complete flexible laryngoscopy was performed. This examination reveals a normal appearance to the laryngeal structures including the true cords, false cords, epiglottis, base of tongue, and piriform sinus, except as noted.    Assessment and plan:  1.  Doing very well following treatment of oropharyngeal squamous cell carcinoma currently grossly JOEY from the ENT vantage point.  Recheck 3 months.  2.  History of lung nodule.  Follow-up with [facility] as previously mentioned for review of recent CT scanning.  3.  Chronic cough.  This likely relates to his history of lung nodule and probable COPD condition.   Again he will follow with Dr. DEVRIES at [facility] for this.  All questions were answered in this regard accordingly.

## 2024-09-30 ENCOUNTER — OFFICE VISIT (OUTPATIENT)
Dept: PULMONOLOGY | Age: 69
End: 2024-09-30
Payer: COMMERCIAL

## 2024-09-30 VITALS
HEART RATE: 51 BPM | RESPIRATION RATE: 16 BRPM | BODY MASS INDEX: 27.49 KG/M2 | OXYGEN SATURATION: 98 % | HEIGHT: 69 IN | DIASTOLIC BLOOD PRESSURE: 62 MMHG | SYSTOLIC BLOOD PRESSURE: 116 MMHG | WEIGHT: 185.6 LBS | TEMPERATURE: 97.1 F

## 2024-09-30 DIAGNOSIS — E66.09 CLASS 2 OBESITY DUE TO EXCESS CALORIES WITHOUT SERIOUS COMORBIDITY WITH BODY MASS INDEX (BMI) OF 35.0 TO 35.9 IN ADULT: ICD-10-CM

## 2024-09-30 DIAGNOSIS — E66.812 CLASS 2 OBESITY DUE TO EXCESS CALORIES WITHOUT SERIOUS COMORBIDITY WITH BODY MASS INDEX (BMI) OF 35.0 TO 35.9 IN ADULT: ICD-10-CM

## 2024-09-30 DIAGNOSIS — J44.9 CHRONIC OBSTRUCTIVE PULMONARY DISEASE, UNSPECIFIED COPD TYPE (HCC): Primary | ICD-10-CM

## 2024-09-30 DIAGNOSIS — Z87.891 PERSONAL HISTORY OF TOBACCO USE: ICD-10-CM

## 2024-09-30 PROCEDURE — 99214 OFFICE O/P EST MOD 30 MIN: CPT | Performed by: INTERNAL MEDICINE

## 2024-09-30 PROCEDURE — 1123F ACP DISCUSS/DSCN MKR DOCD: CPT | Performed by: INTERNAL MEDICINE

## 2024-09-30 PROCEDURE — G0296 VISIT TO DETERM LDCT ELIG: HCPCS | Performed by: INTERNAL MEDICINE

## 2024-09-30 PROCEDURE — 3074F SYST BP LT 130 MM HG: CPT | Performed by: INTERNAL MEDICINE

## 2024-09-30 PROCEDURE — 3078F DIAST BP <80 MM HG: CPT | Performed by: INTERNAL MEDICINE

## 2024-09-30 RX ORDER — BUDESONIDE, GLYCOPYRROLATE, AND FORMOTEROL FUMARATE 160; 9; 4.8 UG/1; UG/1; UG/1
2 AEROSOL, METERED RESPIRATORY (INHALATION) 2 TIMES DAILY
Qty: 2 EACH | Refills: 0 | Status: SHIPPED | COMMUNITY
Start: 2024-09-30

## 2024-09-30 RX ORDER — LORAZEPAM 1 MG/1
TABLET ORAL
COMMUNITY
Start: 2024-09-10

## 2024-09-30 RX ORDER — BUDESONIDE AND FORMOTEROL FUMARATE DIHYDRATE 160; 4.5 UG/1; UG/1
2 AEROSOL RESPIRATORY (INHALATION) 2 TIMES DAILY
Qty: 10.2 G | Refills: 3 | Status: SHIPPED | OUTPATIENT
Start: 2024-09-30

## 2024-09-30 NOTE — PATIENT INSTRUCTIONS
Learning About Lung Cancer Screening  What is screening for lung cancer?     Lung cancer screening is a way to find some lung cancers early, before a person has any symptoms of the cancer.  Lung cancer screening may help those who have the highest risk for lung cancer--people age 50 and older who are or were heavy smokers. For most people, who aren't at increased risk, screening for lung cancer probably isn't helpful.  Screening won't prevent cancer. And it may not find all lung cancers. Lung cancer screening may lower the risk of dying from lung cancer in a small number of people.  How is it done?  Lung cancer screening is done with a low-dose CT (computed tomography) scan. A CT scan uses X-rays, or radiation, to make detailed pictures of your body. Experts recommend that screening be done in medical centers that focus on finding and treating lung cancer.  Who is screening recommended for?  Lung cancer screening is recommended for people age 50 and older who are or were heavy smokers. That means people with a smoking history of at least 20 pack years. A pack year is a way to measure how heavy a smoker you are or were.  To figure out your pack years, multiply how many packs a day on average (assuming 20 cigarettes per pack) you have smoked by how many years you have smoked. For example:  If you smoked 1 pack a day for 20 years, that's 1 times 20. So you have a smoking history of 20 pack years.  If you smoked 2 packs a day for 10 years, that's 2 times 10. So you have a smoking history of 20 pack years.  Experts agree that screening is for people who have a high risk of lung cancer. But experts don't agree on what high risk means. Some say people age 50 or older with at least a 20-pack-year smoking history are high risk. Others say it's people age 55 or older with a 30-pack-year history.  To see if you could benefit from screening, first find out if you are at high risk for lung cancer. Your doctor can help you  Opzelura Counseling:  I discussed with the patient the risks of Opzelura including but not limited to nasopharngitis, bronchitis, ear infection, eosinophila, hives, diarrhea, folliculitis, tonsillitis, and rhinorrhea.  Taken orally, this medication has been linked to serious infections; higher rate of mortality; malignancy and lymphoproliferative disorders; major adverse cardiovascular events; thrombosis; thrombocytopenia, anemia, and neutropenia; and lipid elevations.

## 2024-09-30 NOTE — PROGRESS NOTES
Discussed with the patient the current USPSTF guidelines released March 9, 2021 for screening for lung cancer.    For adults aged 50 to 80 years who have a 20 pack-year smoking history and currently smoke or have quit within the past 15 years the grade B recommendation is to:  Screen for lung cancer with low-dose computed tomography (LDCT) every year.  Stop screening once a person has not smoked for 15 years or has a health problem that limits life expectancy or the ability to have lung surgery.    The patient  reports that he quit smoking about 5 years ago. His smoking use included cigarettes. He started smoking about 45 years ago. He has a 60 pack-year smoking history. He has never used smokeless tobacco.. Discussed with patient the risks and benefits of screening, including over-diagnosis, false positive rate, and total radiation exposure.  The patient currently exhibits no signs or symptoms suggestive of lung cancer.  Discussed with patient the importance of compliance with yearly annual lung cancer screenings and willingness to undergo diagnosis and treatment if screening scan is positive.  In addition, the patient was counseled regarding the importance of remaining smoke free and/or total smoking cessation.    Also reviewed the following if the patient has Medicare that as of February 10, 2022, Medicare only covers LDCT screening in patients aged 50-77 with at least a 20 pack-year smoking history who currently smoke or have quit in the last 15 years  
(Initial/Annual/Baseline)     Age: Patient is 68 y.o.    Smoking History:   Tobacco Use      Smoking status: Former        Packs/day: 0.00        Years: 1.5 packs/day for 40.0 years (60.0 ttl pk-yrs)        Types: Cigarettes        Start date: 1978        Quit date: 2018        Years since quittin.8      Smokeless tobacco: Never        Date of last lung cancer screenin2024     Standing Status:   Future     Standing Expiration Date:   3/30/2026     Order Specific Question:   Is there documentation of shared decision making?     Answer:   Yes     Order Specific Question:   Is this a low dose CT or a routine CT?     Answer:   Low Dose CT [1]     Order Specific Question:   Is this the first (baseline) CT or an annual exam?     Answer:   Annual [2]     Order Specific Question:   Does the patient show any signs or symptoms of lung cancer?     Answer:   No     Order Specific Question:   Smoking Status?     Answer:   Former [4]     Order Specific Question:   Date quit smoking? (must be within 15 years)     Answer:   2018     Order Specific Question:   Pack Years     Answer:   60    MD VISIT TO DISCUSS LUNG CA SCREEN W LDCT     No orders of the defined types were placed in this encounter.           Discussed with patient the importance of exercise and weight control and  overall health and well-being.     Reviewed with the patient: current clinical status, medications, activities and diet.      Side effects, adverse effects of the medication prescribed today, as well as treatment plan and result expectations have been discussed with the patient who expresses understanding and desires to proceed.       Return in about 4 months (around 2025).   I spent 32 min with this patient, greater the 50% of this time was spent in counseling and/or coordinating of care.      Laurel Thomas MD

## 2024-10-04 ENCOUNTER — OFFICE VISIT (OUTPATIENT)
Dept: PALLATIVE CARE | Age: 69
End: 2024-10-04
Payer: COMMERCIAL

## 2024-10-04 VITALS
TEMPERATURE: 97.9 F | HEART RATE: 72 BPM | RESPIRATION RATE: 20 BRPM | SYSTOLIC BLOOD PRESSURE: 110 MMHG | DIASTOLIC BLOOD PRESSURE: 69 MMHG | BODY MASS INDEX: 27.35 KG/M2 | WEIGHT: 185.2 LBS | OXYGEN SATURATION: 98 %

## 2024-10-04 DIAGNOSIS — C61 PROSTATE CANCER (HCC): ICD-10-CM

## 2024-10-04 DIAGNOSIS — R04.2 BLOOD IN SPUTUM: ICD-10-CM

## 2024-10-04 DIAGNOSIS — J44.9 CHRONIC OBSTRUCTIVE PULMONARY DISEASE, UNSPECIFIED COPD TYPE (HCC): ICD-10-CM

## 2024-10-04 DIAGNOSIS — Z51.5 PALLIATIVE CARE ENCOUNTER: ICD-10-CM

## 2024-10-04 DIAGNOSIS — M25.50 MULTIPLE JOINT PAIN: Primary | ICD-10-CM

## 2024-10-04 DIAGNOSIS — R09.89 CHEST CONGESTION: ICD-10-CM

## 2024-10-04 DIAGNOSIS — M47.817 LUMBOSACRAL SPONDYLOSIS WITHOUT MYELOPATHY: ICD-10-CM

## 2024-10-04 DIAGNOSIS — I50.32 CHRONIC DIASTOLIC CHF (CONGESTIVE HEART FAILURE) (HCC): ICD-10-CM

## 2024-10-04 PROCEDURE — 3074F SYST BP LT 130 MM HG: CPT | Performed by: NURSE PRACTITIONER

## 2024-10-04 PROCEDURE — 3078F DIAST BP <80 MM HG: CPT | Performed by: NURSE PRACTITIONER

## 2024-10-04 PROCEDURE — 99214 OFFICE O/P EST MOD 30 MIN: CPT | Performed by: NURSE PRACTITIONER

## 2024-10-04 PROCEDURE — 1123F ACP DISCUSS/DSCN MKR DOCD: CPT | Performed by: NURSE PRACTITIONER

## 2024-10-04 RX ORDER — MENTHOL, UNSPECIFIED FORM 3.97 G/113.4G
1 GEL TOPICAL 4 TIMES DAILY PRN
Qty: 1 EACH | Refills: 3 | Status: SHIPPED | OUTPATIENT
Start: 2024-10-04

## 2024-10-04 RX ORDER — HYDROCODONE BITARTRATE AND ACETAMINOPHEN 5; 325 MG/1; MG/1
1 TABLET ORAL 3 TIMES DAILY PRN
Qty: 90 TABLET | Refills: 0 | Status: SHIPPED | OUTPATIENT
Start: 2024-10-09 | End: 2024-11-08

## 2024-10-04 ASSESSMENT — ENCOUNTER SYMPTOMS
WHEEZING: 1
CONSTIPATION: 0
SHORTNESS OF BREATH: 1
DIARRHEA: 0
COUGH: 1
NAUSEA: 0
BACK PAIN: 1
TROUBLE SWALLOWING: 1

## 2024-10-04 NOTE — PROGRESS NOTES
Follows with Dr. Zepeda for cardiology needs. No leg swelling.     Pain: Patient with ongoing multiple joint pain in left shoulder, low back, right knee, and hips. Has chronic low back pain since 2005. Patient reports he stepped down off a truck and ended up having significant injury to lumbar and sacral spine. He has not been able to work since then. Pain remains exacerbated but feels pain medication helps keep pain manageable. Rating pain at a 8/10 before Norco and a 4/10 after. Describes as sharp pain. Also gets numbness in his legs. Had radiofrequency ablation done multiple times. Continues to follow with pain management for this. Also has cramping in his legs and arms at night. Magnesium oil helps. Would like order for arctic pain gel relief for joint pain.           Past Medical History:   Diagnosis Date    CAD (coronary artery disease)     Cancer (HCC)     prostate cancer Nov 16 2018, [facility] right BOT 2022    CHF (congestive heart failure) (HCC)     Chicken pox     Chronic back pain     Emphysema of lung (HCC)     Hyperlipidemia     Hypertension     Hypothyroidism     Measles     Mumps     Osteoarthritis     Pneumonia      Past Surgical History:   Procedure Laterality Date    CARDIAC CATHETERIZATION      2019    COLONOSCOPY      2016    CYST REMOVAL      back of neck    CYST REMOVAL Left 03/04/2016    Dr Woo    CYSTOSCOPY  07/2018    DENTAL SURGERY      HERNIA REPAIR      2016, 2019    LARYNGOSCOPY N/A 8/16/2022    DIRECT LARYNGOSCOPY WITH BIOPSY. 30 MIN  TO BE LATEX FREE [place] [facility] [place]    PROSTATE SURGERY  07/2018    Biopsy    PROSTATECTOMY  11/16/2018    TONSILLECTOMY N/A 08/12/2021    TONSILLECTOMY performed [place] Jordin Ramos [place] [facility] [place]    UMBILICAL HERNIA REPAIR  03/04/2016    Dr Woo    UPPER GASTROINTESTINAL ENDOSCOPY N/A 04/05/2021    EGD ESOPHAGOGASTRODUODENOSCOPY WITH BIOPSY AND DILATION performed by Teri Jolly [place] [facility]    VASECTOMY

## 2024-10-07 RX ORDER — ATORVASTATIN CALCIUM 80 MG/1
80 TABLET, FILM COATED ORAL DAILY
Qty: 90 TABLET | Refills: 0 | Status: SHIPPED | OUTPATIENT
Start: 2024-10-07

## 2024-10-07 NOTE — TELEPHONE ENCOUNTER
Future Appointments    Encounter Information   Provider Department Appt Notes   11/12/2024 Gage Zepeda MD [facility] 4 month follow up   11/15/2024 Khushi Harvey APRN - CNP [facility] Phys F/U CC   11/19/2024 Billy Akhtar MD [facility] 3 Month Follow Up   2/3/2025 Laurel Thomas MD [facility] Pulmonology 4m fu   2/25/2025 Cas Hamilton MD; SCHEDULE, [facility] NURSE [facility] Follow Up with PSA   3/20/2025 Eyal Ordonez MD [facility] U[facility] 6 mo PSA   8/1/2025 Teri Jolly MD [facility] Gastroenterology f/u 1 year     Past Visits    Date Provider Specialty Visit Type Primary Dx   10/04/2024 Khushi Harvey APRN - CNP Palliative Care Office Visit Multiple joint pain   09/30/2024 Laurel Thomas MD Pulmonology Office Visit Chronic obstructive pulmonary disease, unspecified COPD type (HCC)   09/19/2024 Eyal Ordonez MD Urology Office Visit Prostate cancer (HCC)   08/23/2024 Khushi Harvey APRN - CNP Palliative Care Office Visit Chest congestion   08/16/2024 Teri Jolly MD Gastroenterology Office Visit Oral phase dysphagia

## 2024-10-08 DIAGNOSIS — C61 PROSTATE CANCER (HCC): ICD-10-CM

## 2024-10-08 DIAGNOSIS — M47.817 LUMBOSACRAL SPONDYLOSIS WITHOUT MYELOPATHY: ICD-10-CM

## 2024-10-08 RX ORDER — HYDROCODONE BITARTRATE AND ACETAMINOPHEN 5; 325 MG/1; MG/1
1 TABLET ORAL 3 TIMES DAILY PRN
Qty: 90 TABLET | Refills: 0 | Status: SHIPPED | OUTPATIENT
Start: 2024-10-09 | End: 2024-11-08

## 2024-10-08 NOTE — PROGRESS NOTES
Controlled now  Been on the 75 mg for 4 months  Will send over the new script now  Can go up if needed but is doing well so no need  Questions answered  Removed the xanax since not taking it anymore   Office Visit. Patient: Selma Mckinney  YOB: 1955  MRN: [id number]    Chief Complaint: RAMOS LE edema Orthopnea  Chief Complaint   Patient presents with    Follow-up     4 month for Bilateral carotid bruits       CV Data:  7/2019 echo EF 60  7/2019 Dobut stress echo negative   LAD 40-50% sequential, RCA  mid. LVEF 55, EDP 14, PCWP 14 CO 7.2 L/min  10/2019 CUS- mild  10/2010 Abd US negative. 11/2020 CUS mild     Subjective/HPI RAMOS is getting worse. Can't do much due to knee and back pain. Can't lie flat due to sob    10/16/2019: still sob but better since BB and Imdur. No cp. No bleed no falls. 1/17/2020 no cp occ ramos no falls occ nose bleed no falls     6/19/2020 Patient and/or health care decision maker is aware that that he/she may receive a bill for this telephone service, depending on his insurance coverage, and has provided verbal consent to proceed. This visit was completed via telephone. Total time 13 minutes. Still has some ramos but better. No cp no falls no bleed little edema. 7/27/2020 still RAMOS. 10/28/2020 no RAMOS No CP active at home. No falls no bleed. 1/28/21 still sob occ cp but not bad. No NTG needed. No bleed no falls     4/29/21 lately occ LE edema no cp no sob no bleed. Takes mes. Recent EGD     8/31/21 No cp no sob no falls no bleed. Lost near 30LBS by deiting. Takes med    4/18/22 doing welll no cp no sob no falls no bleed no edema. 8/22/22 dz with throat and tongue cancer. Getting PE scan. CV stable no pc  no sob no falls nobleed. Takes meds    12/22/22  =finished chemo for tongue and throat cancer. No cp stlll sob no worse. Not eating. Gets tube feed via PEG. Coreg stopped due to so much weight loss. 5/5/23 doing well no cp no sob no falls no bleed mariusz sees ENT for f/u throat cancer. Stopped smoking 11/2018 with prior 2 ppd  Disabled  from knee and back.   Lives wife     EKG: SR 64    Past Medical History:   Diagnosis

## 2024-10-08 NOTE — TELEPHONE ENCOUNTER
He wants to let you know that he feels the phlegm is coming from his throat and not his lungs.  Was asking for refill on this med but it seems it has already been requested.

## 2024-11-02 DIAGNOSIS — F41.9 ANXIETY: Primary | ICD-10-CM

## 2024-11-04 RX ORDER — LORAZEPAM 1 MG/1
TABLET ORAL
Qty: 30 TABLET | Refills: 0 | Status: SHIPPED | OUTPATIENT
Start: 2024-11-04 | End: 2025-02-02

## 2024-11-04 RX ORDER — LEVOTHYROXINE SODIUM 88 UG/1
88 TABLET ORAL DAILY
Qty: 90 TABLET | Refills: 0 | Status: SHIPPED | OUTPATIENT
Start: 2024-11-04

## 2024-11-04 NOTE — TELEPHONE ENCOUNTER
LAST APPT THIS DEPT  08/09/2024 - Comp  NEXT APPT  With Family Medicine (Billy Akhtar MD)  11/19/2024 at 9:15 AM

## 2024-11-05 DIAGNOSIS — C61 PROSTATE CANCER (HCC): ICD-10-CM

## 2024-11-05 DIAGNOSIS — M47.817 LUMBOSACRAL SPONDYLOSIS WITHOUT MYELOPATHY: ICD-10-CM

## 2024-11-05 NOTE — TELEPHONE ENCOUNTER
Requested Prescriptions     Pending Prescriptions Disp Refills    HYDROcodone-acetaminophen (NORCO) 5-325 MG per tablet 90 tablet 0     Sig: Take 1 tablet by mouth 3 times daily as needed for Pain for up to 30 days. Intended supply: 30 days. Take lowest dose possible to manage pain Max Daily Amount: 3 tablets    10.9.24

## 2024-11-06 RX ORDER — HYDROCODONE BITARTRATE AND ACETAMINOPHEN 5; 325 MG/1; MG/1
1 TABLET ORAL 3 TIMES DAILY PRN
Qty: 90 TABLET | Refills: 0 | Status: SHIPPED | OUTPATIENT
Start: 2024-11-08 | End: 2024-12-08

## 2024-11-12 ENCOUNTER — OFFICE VISIT (OUTPATIENT)
Dept: CARDIOLOGY CLINIC | Age: 69
End: 2024-11-12
Payer: COMMERCIAL

## 2024-11-12 VITALS
HEART RATE: 77 BPM | BODY MASS INDEX: 27.62 KG/M2 | OXYGEN SATURATION: 98 % | SYSTOLIC BLOOD PRESSURE: 124 MMHG | DIASTOLIC BLOOD PRESSURE: 70 MMHG | RESPIRATION RATE: 16 BRPM | WEIGHT: 187 LBS

## 2024-11-12 DIAGNOSIS — I10 ESSENTIAL HYPERTENSION: Primary | ICD-10-CM

## 2024-11-12 DIAGNOSIS — E03.9 HYPOTHYROIDISM, UNSPECIFIED TYPE: ICD-10-CM

## 2024-11-12 PROBLEM — R91.1 LUNG NODULE: Status: ACTIVE | Noted: 2024-11-12

## 2024-11-12 LAB — TSH SERPL-MCNC: 2.14 UIU/ML (ref 0.44–3.86)

## 2024-11-12 PROCEDURE — 99214 OFFICE O/P EST MOD 30 MIN: CPT | Performed by: INTERNAL MEDICINE

## 2024-11-12 PROCEDURE — 1123F ACP DISCUSS/DSCN MKR DOCD: CPT | Performed by: INTERNAL MEDICINE

## 2024-11-12 PROCEDURE — 3074F SYST BP LT 130 MM HG: CPT | Performed by: INTERNAL MEDICINE

## 2024-11-12 PROCEDURE — 3078F DIAST BP <80 MM HG: CPT | Performed by: INTERNAL MEDICINE

## 2024-11-12 ASSESSMENT — ENCOUNTER SYMPTOMS
SHORTNESS OF BREATH: 1
CHEST TIGHTNESS: 0
STRIDOR: 0
NAUSEA: 0
GASTROINTESTINAL NEGATIVE: 1
WHEEZING: 0
BLOOD IN STOOL: 0
COUGH: 0
BACK PAIN: 1
EYES NEGATIVE: 1

## 2024-11-12 NOTE — PROGRESS NOTES
[facility]           Radiation Oncology      [address] [place], [place] [id number]        O: [phone]        F: [phone]       [facility]                   Dr. Cas Hamilton MD PhD    FOLLOW-UP NOTE     Date of Service: 2024  Patient ID: Kelvin [place]   : 1955  MRN: [id number]   Acct Number: [id number]       NAME:  Kelvin [place]    :  1955 69 y.o. male     PCP: Billy Akhtar MD    REFERRING PROVIDER: Mery    DIAGNOSIS:  1. Prostate cancer (HCC)    2. Lung nodule        STAGING: Cancer Staging   Prostate cancer (HCC)  Staging form: Prostate, AJCC 8th Edition  - Pathologic stage from 2019: Stage IIC (pT2, pN0, cM0, PSA: 4.4, Grade Group: 3) - Signed by Raul Stone MD on 2019  - Pathologic: No Stage Recommended (rcTX, cN1, cM0, PSA: 0.2, Grade Group: 3) - Signed by Cas Hamilton MD on 3/4/2024      PRIOR TREATMENT: 4600 cGy in 23 fractions to the pelvis and LN followed by a [place] to the prostate bed and LN to 2500 cGy in 10 fractions     TIME SINCE TREATMENT: 3 months     RECENT HISTORY: Kelvin [place] returns for routine follow-up approximately 3 months status post completion of salvage radiation therapy for the above diagnosis.  Today he notes an IPSS score of 2 which represents an improvement from his prior IPSS score of 3.  His biggest complaint is nocturia but he does state that he drinks a lot of water at bedtime for his medications and also out of habit.  He does experience occasional constipation for which he takes Dulcolax 2 times weekly.  He is being followed by ENT at  for his history of head neck cancer and there are been no issues from that.  On 2024 he had a CT lung screen which was read as lung RADS 3 revealing a new 5 mm groundglass centrilobular nodule in the left lower lobe.  He is being followed by pulmonology and will have a repeat CT chest in 4 months with follow-up on 2024.  On 2024 he had

## 2024-11-15 ENCOUNTER — OFFICE VISIT (OUTPATIENT)
Dept: PALLATIVE CARE | Age: 69
End: 2024-11-15
Payer: COMMERCIAL

## 2024-11-15 VITALS
SYSTOLIC BLOOD PRESSURE: 113 MMHG | OXYGEN SATURATION: 94 % | HEART RATE: 66 BPM | TEMPERATURE: 97.3 F | DIASTOLIC BLOOD PRESSURE: 79 MMHG

## 2024-11-15 DIAGNOSIS — M25.50 MULTIPLE JOINT PAIN: ICD-10-CM

## 2024-11-15 DIAGNOSIS — Z51.5 PALLIATIVE CARE ENCOUNTER: ICD-10-CM

## 2024-11-15 DIAGNOSIS — I50.32 CHRONIC DIASTOLIC CHF (CONGESTIVE HEART FAILURE) (HCC): ICD-10-CM

## 2024-11-15 DIAGNOSIS — C61 PROSTATE CANCER (HCC): ICD-10-CM

## 2024-11-15 DIAGNOSIS — J44.9 CHRONIC OBSTRUCTIVE PULMONARY DISEASE, UNSPECIFIED COPD TYPE (HCC): ICD-10-CM

## 2024-11-15 DIAGNOSIS — M47.817 LUMBOSACRAL SPONDYLOSIS WITHOUT MYELOPATHY: ICD-10-CM

## 2024-11-15 DIAGNOSIS — R68.89 CONGESTION OF THROAT: Primary | ICD-10-CM

## 2024-11-15 DIAGNOSIS — H93.8X3 SENSATION OF FULLNESS IN BOTH EARS: ICD-10-CM

## 2024-11-15 PROCEDURE — 3078F DIAST BP <80 MM HG: CPT | Performed by: NURSE PRACTITIONER

## 2024-11-15 PROCEDURE — 3074F SYST BP LT 130 MM HG: CPT | Performed by: NURSE PRACTITIONER

## 2024-11-15 PROCEDURE — 99214 OFFICE O/P EST MOD 30 MIN: CPT | Performed by: NURSE PRACTITIONER

## 2024-11-15 PROCEDURE — 1123F ACP DISCUSS/DSCN MKR DOCD: CPT | Performed by: NURSE PRACTITIONER

## 2024-11-15 RX ORDER — GUAIFENESIN 600 MG/1
1200 TABLET, EXTENDED RELEASE ORAL 2 TIMES DAILY
Qty: 120 TABLET | Refills: 0 | Status: SHIPPED | OUTPATIENT
Start: 2024-11-15 | End: 2024-12-15

## 2024-11-15 RX ORDER — FLUTICASONE PROPIONATE 50 MCG
2 SPRAY, SUSPENSION (ML) NASAL DAILY
Qty: 16 G | Refills: 0 | Status: SHIPPED | OUTPATIENT
Start: 2024-11-15

## 2024-11-15 NOTE — PROGRESS NOTES
Subjective:      Patient Id: Seen Kelvin in the clinic at the  [facility] , for follow-up palliative medicine visit. He was accompanied to the appointment by: self.   Chief Complaint   Patient presents with    Follow-up    Throat congestion.     Ongoing issue.        HPI       Kelvin [place] is a 69 y.o. male referred to palliative care by Dr. Hamilton for Sedative, hypnotic or anxiolytic use, unspecified with unspecified sedative, hypnotic or anxiolytic-induced disorder, Sedative, hypnotic or anxiolytic dependence, uncomplicated, Lumbosacral spondylosis without myelopathy, Prostate cancer.  Kelvin has complex medical history that includes CAD, prostate cancer, prostatectomy in 2018, oropharyngeal cancer in remission, diastolic CHF, chronic back pain, emphysema, HLD, HTN, hypothyroidism, OA, anxiety.    General: Patient is alert and oriented x 4. In NAD.     Functional status: Up independent. Abnormal gait. Having trouble getting to the gym due to his chronic pain.     Prostate cancer: Completed casodex and radiation. Received one Lupron injection by urology. No longer going to receive this. Stable disease process. Follows with Dr. Lozano, Dr. Ordonez, and Dr. Hamilton for oncology needs. Currently under surveillance.     COPD/CHF: Continues to have issues with coughing especially at night. Patient reports audible secretions at times. This is an ongoing issue. Using Spriva. Had recent repeat CT of chest. Impression as follows:    IMPRESSION:  Resolution of the previously seen ground-glass nodule described in the left  lower lobe.  The patient may return to annual low-dose screening examination    Patient also had recent scope for oropharyngeal cancer monitoring. He reports no signs of recurrence. Continues to have issues with excessive phlegm. Goes from thick to thin from brown to clear. This has been an ongoing issue for several months. Has been treated in the past with antibiotics. Feels cough is getting worse.

## 2024-11-19 ENCOUNTER — OFFICE VISIT (OUTPATIENT)
Dept: FAMILY MEDICINE CLINIC | Age: 69
End: 2024-11-19
Payer: COMMERCIAL

## 2024-11-19 VITALS
HEIGHT: 69 IN | DIASTOLIC BLOOD PRESSURE: 60 MMHG | BODY MASS INDEX: 28.14 KG/M2 | HEART RATE: 78 BPM | OXYGEN SATURATION: 93 % | WEIGHT: 190 LBS | TEMPERATURE: 97.3 F | SYSTOLIC BLOOD PRESSURE: 100 MMHG

## 2024-11-19 DIAGNOSIS — E03.9 HYPOTHYROIDISM, UNSPECIFIED TYPE: ICD-10-CM

## 2024-11-19 DIAGNOSIS — F41.9 ANXIETY: Primary | ICD-10-CM

## 2024-11-19 PROBLEM — R05.3 CHRONIC COUGH: Status: ACTIVE | Noted: 2024-09-25

## 2024-11-19 PROBLEM — Z51.11 ENCOUNTER FOR ANTINEOPLASTIC CHEMOTHERAPY: Status: ACTIVE | Noted: 2022-10-24

## 2024-11-19 PROCEDURE — 99214 OFFICE O/P EST MOD 30 MIN: CPT | Performed by: FAMILY MEDICINE

## 2024-11-19 PROCEDURE — 3074F SYST BP LT 130 MM HG: CPT | Performed by: FAMILY MEDICINE

## 2024-11-19 PROCEDURE — 1123F ACP DISCUSS/DSCN MKR DOCD: CPT | Performed by: FAMILY MEDICINE

## 2024-11-19 PROCEDURE — 3078F DIAST BP <80 MM HG: CPT | Performed by: FAMILY MEDICINE

## 2024-11-19 RX ORDER — LORAZEPAM 1 MG/1
TABLET ORAL
Qty: 30 TABLET | Refills: 0 | Status: CANCELLED | OUTPATIENT
Start: 2024-11-19 | End: 2025-02-17

## 2024-11-19 ASSESSMENT — ENCOUNTER SYMPTOMS
VOMITING: 0
DIARRHEA: 0

## 2024-11-19 NOTE — PROGRESS NOTES
Subjective:      Patient ID: Kelvin [place] is a 69 y.o. male    Anxiety  Presents for follow-up visit. Symptoms occur rarely.       Thyroid Problem  Presents for follow-up visit. Patient reports no diarrhea. The symptoms have been stable.     Here in follow up for hypothyroidism and anxiety and blood work.   No missed doses of medication.  Using ativan daily for anxiety.  Last dose of ativan last night and takes most nights with rare skipped dose .  Does take vicodin thru pain managment   Review of Systems   Constitutional:  Negative for fever.   Gastrointestinal:  Negative for diarrhea and vomiting.   Skin:  Negative for rash.   Neurological:  Negative for weakness.     Reviewed allergy, medical, social, surgical, family and med list changes and updated   Files--reviewed blood work which is acceptable      Social History     Socioeconomic History    Marital status:    Tobacco Use    Smoking status: Former     Current packs/day: 0.00     Average packs/day: 1.5 packs/day for 40.0 years (60.0 ttl pk-yrs)     Types: Cigarettes     Start date: 1978     Quit date: 2018     Years since quittin.0    Smokeless tobacco: Never   Vaping Use    Vaping status: Never Used   Substance and Sexual Activity    Alcohol use: Not Currently     Comment: very rarely (1 beer a year)    Drug use: No    Sexual activity: Yes     Partners: Female     Social Determinants of Health     Financial Resource Strain: Low Risk  (2024)    Overall Financial Resource Strain (CARDIA)     Difficulty of Paying Living Expenses: Not hard at all   Food Insecurity: No Food Insecurity (2024)    Hunger Vital Sign     Worried About Running Out of Food in the Last Year: Never true     Ran Out of Food in the Last Year: Never true   Transportation Needs: Unknown (2024)    PRAPARE - Transportation     Lack of Transportation (Non-Medical): No   Physical Activity: Sufficiently Active (2024)    Exercise Vital Sign     Days of

## 2024-11-23 LAB
6MAM UR QL: NOT DETECTED
7-AMINOCLONAZEPAM: NOT DETECTED
ALPHA-OH-ALPRAZOLAM: NOT DETECTED
ALPHA-OH-MIDAZOLAM, URINE: NOT DETECTED
ALPRAZOLAM: NOT DETECTED
AMPHET UR QL SCN: NOT DETECTED
BARBITURATES: NEGATIVE
BENZOYLECGONINE: NEGATIVE
BUPRENORPHINE: NOT DETECTED
CARISOPRODOL UR QL: NEGATIVE
CLONAZEPAM UR QL: NOT DETECTED
CODEINE: NOT DETECTED
CREAT UR-MCNC: 33.3 MG/DL (ref 20–400)
DIAZEPAM: NOT DETECTED
ETHYL GLUCURONIDE: NEGATIVE
FENTANYL UR QL: NOT DETECTED
GABAPENTIN: NOT DETECTED
HYDROCODONE UR QL: NOT DETECTED
HYDROMORPHONE: NOT DETECTED
LORAZEPAM UR QL: PRESENT
MARIJUANA METABOLITE: NEGATIVE
MDA: NOT DETECTED
MDEA: NOT DETECTED
MDMA UR QL: NOT DETECTED
MEPERIDINE: NOT DETECTED
METHADONE: NEGATIVE
METHAMPHETAMINE: NOT DETECTED
METHYLPHENIDATE: NOT DETECTED
MIDAZOLAM UR QL SCN: NOT DETECTED
MORPHINE: NOT DETECTED
NALOXONE: NOT DETECTED
NORBUPRENORPHINE, FREE: NOT DETECTED
NORDIAZEPAM: NOT DETECTED
NORFENTANYL: NOT DETECTED
NORHYDROCODONE, URINE: NOT DETECTED
NOROXYCODONE: NOT DETECTED
NOROXYMORPHONE, URINE: NOT DETECTED
OXAZEPAM UR QL: NOT DETECTED
OXYCODONE UR QL: NOT DETECTED
OXYMORPHONE UR QL: NOT DETECTED
PAIN MANAGEMENT DRUG PANEL: NORMAL
PATHOLOGY STUDY: NORMAL
PCP: NEGATIVE
PHENTERMINE: NOT DETECTED
PREGABALIN: NOT DETECTED
TAPENTADOL, URINE: NOT DETECTED
TAPENTADOL-O-SULFATE, URINE: NOT DETECTED
TEMAZEPAM: NOT DETECTED
TRAMADOL: NEGATIVE
ZOLPIDEM: NOT DETECTED

## 2024-12-05 DIAGNOSIS — M47.817 LUMBOSACRAL SPONDYLOSIS WITHOUT MYELOPATHY: ICD-10-CM

## 2024-12-05 DIAGNOSIS — H93.8X3 SENSATION OF FULLNESS IN BOTH EARS: ICD-10-CM

## 2024-12-05 DIAGNOSIS — C61 PROSTATE CANCER (HCC): ICD-10-CM

## 2024-12-05 RX ORDER — FLUTICASONE PROPIONATE 50 MCG
2 SPRAY, SUSPENSION (ML) NASAL DAILY
Qty: 16 G | Refills: 3 | Status: SHIPPED | OUTPATIENT
Start: 2024-12-05

## 2024-12-05 RX ORDER — HYDROCODONE BITARTRATE AND ACETAMINOPHEN 5; 325 MG/1; MG/1
1 TABLET ORAL 3 TIMES DAILY PRN
Qty: 90 TABLET | Refills: 0 | Status: SHIPPED | OUTPATIENT
Start: 2024-12-08 | End: 2025-01-07

## 2024-12-05 NOTE — TELEPHONE ENCOUNTER
Requested Prescriptions     Pending Prescriptions Disp Refills    HYDROcodone-acetaminophen (NORCO) 5-325 MG per tablet 90 tablet 0     Sig: Take 1 tablet by mouth 3 times daily as needed for Pain for up to 30 days. Intended supply: 30 days. Take lowest dose possible to manage pain Max Daily Amount: 3 tablets    fluticasone (FLONASE) 50 MCG/ACT nasal spray 16 g 0     Si sprays by Each Nostril route daily

## 2024-12-06 DIAGNOSIS — F41.9 ANXIETY: ICD-10-CM

## 2024-12-08 NOTE — TELEPHONE ENCOUNTER
requesting medication refill. Please approve or deny this request.    Rx requested:  Requested Prescriptions     Pending Prescriptions Disp Refills    LORazepam (ATIVAN) 1 MG tablet [Pharmacy Med Name: LORAZEPAM 1MG TABS] 30 tablet 0     Sig: TAKE ONE TABLET BY MOUTH AT BEDTIME AS NEEDED         Last Office Visit:   11/19/2024      Next Visit Date:  Future Appointments   Date Time Provider Department Center   12/27/2024  9:00 AM Khushi Harvey APRN - CNP [facility]   2/3/2025  9:30 AM Laurel Thomas MD [facility] [place]   2/19/2025 11:00 AM Billy Akhtar MD [facility] DEP   2/25/2025  8:30 AM SCHEDULE, [facility] NURSE [facility]C [place] [facility]   3/20/2025 10:15 AM Eyal Ordonez MD [facility] [place]   5/13/2025  2:30 PM Gage Zepeda MD [facility]   8/1/2025  8:30 AM Teri Jolly MD [facility]

## 2024-12-09 RX ORDER — LORAZEPAM 1 MG/1
TABLET ORAL
Qty: 30 TABLET | Refills: 0 | Status: SHIPPED | OUTPATIENT
Start: 2024-12-09 | End: 2025-01-09

## 2024-12-18 ENCOUNTER — APPOINTMENT (OUTPATIENT)
Dept: OTOLARYNGOLOGY | Facility: CLINIC | Age: 69
End: 2024-12-18
Payer: COMMERCIAL

## 2024-12-18 VITALS — BODY MASS INDEX: 28.65 KG/M2 | WEIGHT: 194 LBS

## 2024-12-18 DIAGNOSIS — C01 CANCER OF BASE OF TONGUE (MULTI): Primary | ICD-10-CM

## 2024-12-18 PROCEDURE — 99213 OFFICE O/P EST LOW 20 MIN: CPT | Performed by: OTOLARYNGOLOGY

## 2024-12-18 PROCEDURE — 1160F RVW MEDS BY RX/DR IN RCRD: CPT | Performed by: OTOLARYNGOLOGY

## 2024-12-18 PROCEDURE — 1159F MED LIST DOCD IN RCRD: CPT | Performed by: OTOLARYNGOLOGY

## 2024-12-18 NOTE — PROGRESS NOTES
Patient returns.  Seeing him back today follow-up check history of oropharyngeal cancer.  He is doing very well.  No worrisome oncologic symptoms or signs.  Here for routine surveillance check.  There have been no significant changes in past medical or past surgical histories except as mentioned.    Exam:  No acute distress.  The external ear structures appear normal. The ear canals patent and the tympanic membranes are intact without evidence of air-fluid levels, retraction, or congenital defects.  Anterior rhinoscopy notes essentially a midline nasal septum. Examination is noted for normal healthy mucosal membranes without any evidence of lesions, polyps, or exudate. The tongue is normally mobile. There are no lesions on the gingiva, buccal, or oral mucosa. There are no oral cavity masses.  The neck is negative for mass lymphadenopathy. The trachea and parotid are clear. The thyroid bed is grossly unremarkable. The salivary gland structures are grossly unremarkable. The laryngeal structures are noted for grossly normal appearance. The true vocal cords, the false focal cords, and the remaining structures including the epiglottis, piriform sinuses, and base of tongue are all grossly normal. There is no evidence of gross mass nodule, polyp, tumor or other defect.      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  Currently JOEY.  Recheck with me in 4 months, sooner with issue.  All questions were answered in this regard accordingly.

## 2024-12-19 PROBLEM — Z51.11 ENCOUNTER FOR ANTINEOPLASTIC CHEMOTHERAPY: Status: RESOLVED | Noted: 2022-10-24 | Resolved: 2024-12-19

## 2024-12-27 ENCOUNTER — OFFICE VISIT (OUTPATIENT)
Dept: PALLATIVE CARE | Age: 69
End: 2024-12-27
Payer: COMMERCIAL

## 2024-12-27 VITALS
HEART RATE: 60 BPM | OXYGEN SATURATION: 97 % | TEMPERATURE: 97.8 F | SYSTOLIC BLOOD PRESSURE: 117 MMHG | DIASTOLIC BLOOD PRESSURE: 72 MMHG

## 2024-12-27 DIAGNOSIS — C61 PROSTATE CANCER (HCC): ICD-10-CM

## 2024-12-27 DIAGNOSIS — H93.8X3 SENSATION OF FULLNESS IN BOTH EARS: ICD-10-CM

## 2024-12-27 DIAGNOSIS — M47.817 LUMBOSACRAL SPONDYLOSIS WITHOUT MYELOPATHY: ICD-10-CM

## 2024-12-27 DIAGNOSIS — J44.9 CHRONIC OBSTRUCTIVE PULMONARY DISEASE, UNSPECIFIED COPD TYPE (HCC): Primary | ICD-10-CM

## 2024-12-27 DIAGNOSIS — R68.89 CONGESTION OF THROAT: ICD-10-CM

## 2024-12-27 DIAGNOSIS — Z51.5 PALLIATIVE CARE ENCOUNTER: ICD-10-CM

## 2024-12-27 DIAGNOSIS — M25.50 MULTIPLE JOINT PAIN: ICD-10-CM

## 2024-12-27 DIAGNOSIS — I50.32 CHRONIC DIASTOLIC CHF (CONGESTIVE HEART FAILURE) (HCC): ICD-10-CM

## 2024-12-27 PROCEDURE — 99214 OFFICE O/P EST MOD 30 MIN: CPT | Performed by: NURSE PRACTITIONER

## 2024-12-27 PROCEDURE — 1123F ACP DISCUSS/DSCN MKR DOCD: CPT | Performed by: NURSE PRACTITIONER

## 2024-12-27 PROCEDURE — 3074F SYST BP LT 130 MM HG: CPT | Performed by: NURSE PRACTITIONER

## 2024-12-27 PROCEDURE — 3078F DIAST BP <80 MM HG: CPT | Performed by: NURSE PRACTITIONER

## 2024-12-27 RX ORDER — DOXYCYCLINE HYCLATE 100 MG
100 TABLET ORAL 2 TIMES DAILY
Qty: 14 TABLET | Refills: 0 | Status: SHIPPED | OUTPATIENT
Start: 2024-12-27 | End: 2025-01-03

## 2024-12-27 RX ORDER — PREDNISONE 20 MG/1
20 TABLET ORAL 2 TIMES DAILY
Qty: 10 TABLET | Refills: 0 | Status: SHIPPED | OUTPATIENT
Start: 2024-12-27 | End: 2025-01-01

## 2024-12-27 NOTE — PROGRESS NOTES
symptom management related to chronic disease/condition. Provided emotional support and active listening. Patient understands and is agreeable to current plan.    Handicap placard ordered.           Opioid contract signed:No    Due to acuity, symptomatology and high-risk medication management, I advised patient to Return in about 7 weeks (around 2/14/2025).     MDM: YANDEL Faust - CNP    Collaborating physician: Dr. Alexandre

## 2025-01-06 DIAGNOSIS — H93.8X3 SENSATION OF FULLNESS IN BOTH EARS: ICD-10-CM

## 2025-01-06 DIAGNOSIS — C61 PROSTATE CANCER (HCC): ICD-10-CM

## 2025-01-06 DIAGNOSIS — F41.9 ANXIETY: ICD-10-CM

## 2025-01-06 DIAGNOSIS — M47.817 LUMBOSACRAL SPONDYLOSIS WITHOUT MYELOPATHY: ICD-10-CM

## 2025-01-06 RX ORDER — FLUTICASONE PROPIONATE 50 MCG
2 SPRAY, SUSPENSION (ML) NASAL DAILY
Qty: 16 G | Refills: 3 | Status: SHIPPED | OUTPATIENT
Start: 2025-01-06

## 2025-01-06 RX ORDER — HYDROCODONE BITARTRATE AND ACETAMINOPHEN 5; 325 MG/1; MG/1
1 TABLET ORAL 3 TIMES DAILY PRN
Qty: 90 TABLET | Refills: 0 | Status: SHIPPED | OUTPATIENT
Start: 2025-01-07 | End: 2025-02-06

## 2025-01-06 RX ORDER — LORAZEPAM 1 MG/1
TABLET ORAL
Qty: 30 TABLET | Refills: 0 | OUTPATIENT
Start: 2025-01-06 | End: 2025-02-06

## 2025-01-06 RX ORDER — ATORVASTATIN CALCIUM 80 MG/1
80 TABLET, FILM COATED ORAL DAILY
Qty: 90 TABLET | Refills: 0 | OUTPATIENT
Start: 2025-01-06

## 2025-01-06 NOTE — TELEPHONE ENCOUNTER
Requested Prescriptions     Pending Prescriptions Disp Refills    HYDROcodone-acetaminophen (NORCO) 5-325 MG per tablet 90 tablet 0     Sig: Take 1 tablet by mouth 3 times daily as needed for Pain for up to 30 days. Intended supply: 30 days. Take lowest dose possible to manage pain Max Daily Amount: 3 tablets    fluticasone (FLONASE) 50 MCG/ACT nasal spray 16 g 3     Si sprays by Each Nostril route daily      Next palliative appt 25

## 2025-01-07 ENCOUNTER — EVALUATION (OUTPATIENT)
Dept: SPEECH THERAPY | Facility: CLINIC | Age: 70
End: 2025-01-07
Payer: COMMERCIAL

## 2025-01-07 DIAGNOSIS — R13.12 OROPHARYNGEAL DYSPHAGIA: Primary | ICD-10-CM

## 2025-01-07 PROCEDURE — 92610 EVALUATE SWALLOWING FUNCTION: CPT | Mod: GN

## 2025-01-07 NOTE — PROGRESS NOTES
Patient Name: Miguel Lofton  MRN: [id number]  : 1955  Today's Date: 25  Time Calculation  Start Time: 1310  Stop Time: 1345  Time Calculation (min): 35 min      Current Problem:   Oral pharyngeal dysphagia    SLP Assessment:  Assessment Results:   DYSPHAGIA EVALUATION: (Consistencies attempted: Puree, gram cracker trial, thin liquids via cup and straws)    Oral Motor status: Patient edentulous at time of clinical swallow evaluation.  He states that he has dentures at home and does put them in when he eats but does not have them with him currently.  No asymmetry noted.  No lingual strength or range of motion deficits noted.  Patient does appear to have edema/fluid possible lymphedema in his anterior neck tissue.  He previously was treated by speech therapy and given lymphedema drain chain techniques to complete manually at home.  Patient reports that he continues to complete lymphedema techniques but has not had success in reducing his lymphedema.    Oral Phase: Patient's oral phase of the swallow characterized by minimal oral delay with applesauce and good oral clearance.  Patient had moderate oral delay with lina cracker with mild oral residual post swallow.  He was able to tolerate thin liquids via cup and straw without anterior leakage.    Pharyngeal Phase: Patient's pharyngeal phase of the swallow characterized by no overt signs symptoms aspiration however patient reported that he does have coughing periodically when eating and drinking.  He also indicated that he feels like food is getting caught in the back of his throat and pointed to the area just superior to the thyroid cartilage.      DIAGNOSTIC IMPRESSIONS:  Patient presents with mild oral phase dysphagia today during clinical swallow evaluation due to edentulous status.  He also has recent history of mild-moderate oral dysphagia and minimal to mild pharyngeal dysphagia on a modified barium swallow study that was completed on 2023.   At that time, a soft diet with thin liquids and use of a double swallow and swallow-throat clear-swallow technique was recommended.  Patient is interested in determining if he is a candidate for advanced pneumatic compression for lymphedema management.  Speech therapist recommends a repeat modified barium swallow study be completed and will contact [facility] regarding possibility of having a representative come out to assess patient's ability to tolerate an advanced pneumatic compression system.      Miguel Lofton will benefit from skilled speech therapy at this time to address above deficits.     SLP Plan:  Miguel Lofton is recommended to be seen for Skilled Speech Therapy 1 time per week for 12 weeks.  This was reviewed with the patient and he is in agreement of this plan.       Goals:  1.  Patient will tolerate highest/safest p.o. diet without overt signs symptoms aspiration or pulmonary compromise  Start Date: 01/07/2025  Progress: No overt signs symptoms aspiration seen during clinical swallow evaluation however patient does report coughing and sensation of food getting caught in his throat during meals and p.o. intake.  Status: Continue goal    2.  Patient will be able to complete a modified barium swallow study to determine highest/safest p.o. diet.  Start Date: To be scheduled at later date.  Requesting orders from ENT.  Progress: N/A  Status: Continue goal    3.  Patient will be able to benefit from the use of a advanced pneumatic compression garment for lymphedema reduction.  Start Date: To be determined  Progress: Speech therapist to contact [facility] for assessment with representative.  Status: Continue goal    *Additional goals to be determined after modified barium swallow study completed.    Prognosis: Good  Factors that may affect progress: Patient motivated to improve and has been actively completing therapy program at home since previous speech therapy treatment sessions.  Pt/family  "agrees to the reviewed goals and Plan for therapy.    Plan of care was developed with input and agreement by the pt/family.    Subjective:   Miguel KYL[facility].        Referred by: Dr. Andrews Gamez  Reason for Referral:  Dysphagia/lymphedema  Chief Complaint: Tightness in throat and food getting caught in his throat.  No overt symptoms/signs of abuse/neglect.   Medical History: Patient has a past medical history of HLD, anxiety,   prostate cancer, base of tongue cancer, GERD, CAD, COPD, emphysema,   and hiatal hernia.     Pt prefer to learn via demonstration, printed materials, performance, and explanation/discussion.    Pain:  Pain Assessment:   0-10  Pain Score:  0    Insurance:  Reviewed: Yes  Onset Date: May 6, 2024  Medicare Certification Period:   Beginning\" 2025    Endin2025      Total Number of Visits:  1      Treatment Provided:   No    Outpatient Education:   Reviewed results of today's assessment.    Reviewed plan for Therapy and Miguel Lofton and family were in agreement of this.   "

## 2025-01-08 DIAGNOSIS — F41.9 ANXIETY: ICD-10-CM

## 2025-01-08 DIAGNOSIS — R13.12 OROPHARYNGEAL DYSPHAGIA: Primary | ICD-10-CM

## 2025-01-08 RX ORDER — ATORVASTATIN CALCIUM 80 MG/1
80 TABLET, FILM COATED ORAL DAILY
Qty: 30 TABLET | Refills: 0 | Status: SHIPPED | OUTPATIENT
Start: 2025-01-08

## 2025-01-08 RX ORDER — LORAZEPAM 1 MG/1
1 TABLET ORAL NIGHTLY
Qty: 30 TABLET | Refills: 0 | Status: SHIPPED | OUTPATIENT
Start: 2025-01-08 | End: 2025-02-07

## 2025-01-08 NOTE — TELEPHONE ENCOUNTER
Patient asking for refills   he does have an appt 2/19/25  w Dr Stiles   Can patient have 30 day supply  patient will then go thru Dr. Akhtar for future refills    If any problems or questions, please katie patient    Thank you

## 2025-01-16 ENCOUNTER — HOSPITAL ENCOUNTER (OUTPATIENT)
Dept: RADIOLOGY | Facility: HOSPITAL | Age: 70
Discharge: HOME | End: 2025-01-16
Payer: COMMERCIAL

## 2025-01-16 DIAGNOSIS — R13.12 OROPHARYNGEAL DYSPHAGIA: ICD-10-CM

## 2025-01-16 PROCEDURE — 92611 MOTION FLUOROSCOPY/SWALLOW: CPT | Mod: GN

## 2025-01-16 PROCEDURE — 2500000005 HC RX 250 GENERAL PHARMACY W/O HCPCS: Performed by: OTOLARYNGOLOGY

## 2025-01-16 PROCEDURE — 74230 X-RAY XM SWLNG FUNCJ C+: CPT

## 2025-01-16 RX ADMIN — BARIUM SULFATE 130 ML: 0.81 POWDER, FOR SUSPENSION ORAL at 11:33

## 2025-01-16 RX ADMIN — BARIUM SULFATE 700 MG: 700 TABLET ORAL at 11:34

## 2025-01-16 RX ADMIN — BARIUM SULFATE 45 ML: 400 SUSPENSION ORAL at 11:34

## 2025-01-16 RX ADMIN — BARIUM SULFATE 10 ML: 400 PASTE ORAL at 11:33

## 2025-01-16 NOTE — PROCEDURES
Speech-Language Pathology    Outpatient Modified Barium Swallow Study    Patient Name: Miguel Lofton  MRN: [id number]  : 1955  Today's Date: 25  Time Calculation  Start Time: 1045  Stop Time: 1130  Time Calculation (min): 45 min      Room/bed info not found    Modified Barium Swallow Study completed. Informed verbal consent obtained prior to completion of exam. The study was completed per protocol with various liquid barium consistencies, pudding, solids and a 13mm barium tablet. A 1.9 cm or .75 inch (outer diameter) ring was placed on the chin in the lateral view and on the lateral, left side of the neck in the a-p view in order to complete objective measurements during swallowing. The anatomic structures and function of the oropharynx, larynx, hypopharynx and cervical esophagus were evaluated.    SLP: Puma Ca SLP   Contact info: [employer] secure chat; phone: [phone]    Reason for Referral: Patient was seen for an outpatient clinical swallow evaluation where a modified barium swallow study was recommended to further assess the physiology of the swallow.  Patient reports having difficulty swallowing solid foods with specific difficulty with breads and meats.  Patient Hx: Patient has a past medical history of HLD, anxiety,   prostate cancer, base of tongue cancer, GERD, CAD, COPD, emphysema,   and hiatal hernia.   Respiratory Status: Room air  Current diet: Regular diet with thin liquids.  Patient avoids meats and breads    Pain:  Pain Scale: 0-10  Ratin    FINAL SPEECH RECOMMENDATIONS    DIET:   - Regular (IDDSI Level 7)  - Thin liquids (IDDSI Level 0)    STRATEGIES:  - Small bites  - Small, single sips  - Alternate consistencies  - Effortful swallow  - Add moisture to dry foods  - Upright for all PO intake  - Swallow 2-3 times per bite/sip    Plan:  Treatment/Interventions: Pharyngeal exercises, Patient/family education, Bolus trials, Compensatory strategy training, Diet  tolerance/advancement  SLP Plan: Skilled SLP warranted  SLP Frequency: 1x per week   Duration: 12 weeks    Discussed POC: Patient  Discussed Risks/Benefits: Yes  Patient/Caregiver Agreeable: Yes    Short term goals established 01/16/25:     1.  Patient will be able to tolerate regular diet with thin liquids without overt signs symptoms aspiration or pulmonary compromise.  Start Date: 01/16/25  Progress: Patient had penetration with aspiration of multiple sips and straw sips of thin liquids.  Status: Continue goal    2.  Patient will be able to use small sip strategy to reduce his risk of aspiration with thin liquids.  Start Date: 01/16/25  Progress: Patient educated regarding use of single sip strategy with thin liquids to reduce his risk of aspiration.  Status: Continue goal    3.  Patient will be able to use a swallow-throat clear-swallow technique to reduce his risk of aspiration with thin liquids.  Start Date: 01/16/25  Progress: Patient educated regarding use of swallow-throat clear-swallow technique to reduce his risk of aspiration.  Status: Continue goal    Long term goals 01/16/25:   Patient will tolerate the least restrictive diet without overt difficulty or further pulmonary compromise by time of discharge.    Education Provided: Results and recommendations per MBSS, with video review; recommendations and POC at this time. Verbal understanding and agreement given on all accounts.     Additional consult suggested:   Recommend consult with Dr. Rosa M Cespedes, ENT for further assessment of pharyngoesophageal phase of the swallow.    Mechanics of the Swallow Summary:  ORAL PHASE:  Lip Closure - Interlabial escape/no progression to anterior lip   Tongue Control During Bolus Hold - Escape to lateral buccal cavity and/or floor of mouth   Bolus prep/mastication - Slow prolonged mastication with complete re-collection necessary   Bolus transport/lingual motion - Delayed initiation of tongue motion for A-P movement of  the bolus   Oral residue - Residue collection on oral structure     PHARYNGEAL PHASE:  Initiation of pharyngeal swallow - Bolus head at pit of pyriforms   Soft palate elevation - No bolus between soft palate/pharyngeal wall   Laryngeal elevation - Partial superior movement of thyroid cartilage and/or partial approximation of arytenoids to epiglottic petiole   Anterior hyoid excursion - Partial anterior movement   Epiglottic movement - Complete inversion    Laryngeal vestibule closure - Incomplete - narrow column of air/contrast in laryngeal vestibule   Pharyngeal stripping wave - Present, however, diminished   Pharyngeal contraction (A/P view) - Complete  Pharyngoesophageal segment opening - Partial distension/partial duration with partial obstruction of flow of bolus   Tongue base retraction - Narrow column of contrast or air between tongue base and pharyngeal wall   Pharyngeal residue - Collection of residue within or on the pharyngeal structures     ESOPHAGEAL PHASE:  Esophageal clearance -  20 mL thin liquid trial Complete clearance   20 mL nectar thick liquid trial Complete clearance   Puree consistency trial Complete clearance   Barium tablet trial Complete clearance       SLP Impressions with Severity Rating:   Pt presents with mild to moderate oropharyngeal dysphagia upon completion of modified barium swallow study this date. Swallowing physiology is detailed above. Impairments most impacting swallowing safety and efficiency include bolus transport/lingual motion, initiation of the pharyngeal phase of the swallow, laryngeal vestibule closure and reduced pharyngoesophageal stripping wave.  Patient also had reduced pharyngoesophageal segment opening.  Patient demonstrated penetration with aspiration followed by throat clearing, wet vocal quality and cough with multiple sips and straw sips of thin liquids.  Patient was able to use a single sip strategy to reduce his risk of aspiration with thin liquids.  No  further penetration was observed for any other consistency. Patient demonstrated mild to moderate oral and pharyngeal residue that was reduced with second swallow.  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  Recommending a regular diet with thin liquids and use of compensatory swallowing strategies including small single sips, multiple swallows, and adding moisture to foods.  Also recommending consult with Dr. Rosa M Cespedes, ENT for further assessment of the pharyngoesophageal phase of the swallow.    Strategies attempted-   Effortful swallow   Double swallow    OUTCOME MEASURES:  Functional Oral Intake Scale  Functional Oral Intake Scale: Level 5        total oral diet with multiple consistencies, but requires special preparations and compensations     Eating Assessment Tool (EAT-10)   0=No problem, 1=Mild problem, 2=Mild to moderate problem, 3=Moderate problem, 4=Severe problem    EAT 10  My swallowing problem has caused me to lose weight.: 0  My swallowing problem interferes with my ability to go out for meals.: 1  Swallowing liquids takes extra effort.: 0  Swallowing solids takes extra effort.: 3  Swallowing pills takes extra effort.: 2  Swallowing is painful: 0  The pleasure of eating is affected by my swallowing.: 4  When I swallow food sticks in my throat.: 4  I cough when I eat.: 3  Swallowing is stressful: 3  EAT-10 TOTAL SCORE:: 20    A total score of 3 or above may indicate difficulty with swallowing safely and/or efficiently    Rosenbek's Penetration Aspiration Scale  Thin Liquids: 7. OVERT ASPIRATION, material is not cleared - contrast passes glottis, visible residue, W/pt response  During the Swallow  After the Swallow  Nectar Thick Liquids: 1. NO ASPIRATION & NO PENETRATION - no aspiration, contrast does not enter airway  Puree: 1. NO  ASPIRATION & NO PENETRATION - no aspiration, contrast does not enter airway  Soft Solids: 1. NO ASPIRATION & NO PENETRATION - no aspiration, contrast does not enter airway  Solids: 1. NO ASPIRATION & NO PENETRATION - no aspiration, contrast does not enter airway    This portion of the study signed by PARMINDER Dickerson on 01/16/25.

## 2025-01-22 ENCOUNTER — SOCIAL WORK (OUTPATIENT)
Dept: HEMATOLOGY/ONCOLOGY | Facility: CLINIC | Age: 70
End: 2025-01-22
Payer: COMMERCIAL

## 2025-01-22 ENCOUNTER — LAB (OUTPATIENT)
Dept: LAB | Facility: CLINIC | Age: 70
End: 2025-01-22
Payer: COMMERCIAL

## 2025-01-22 ENCOUNTER — OFFICE VISIT (OUTPATIENT)
Dept: HEMATOLOGY/ONCOLOGY | Facility: CLINIC | Age: 70
End: 2025-01-22
Payer: COMMERCIAL

## 2025-01-22 VITALS
OXYGEN SATURATION: 93 % | TEMPERATURE: 96.8 F | SYSTOLIC BLOOD PRESSURE: 124 MMHG | DIASTOLIC BLOOD PRESSURE: 64 MMHG | WEIGHT: 199.96 LBS | HEART RATE: 57 BPM | BODY MASS INDEX: 29.53 KG/M2 | RESPIRATION RATE: 18 BRPM

## 2025-01-22 DIAGNOSIS — E03.9 PRIMARY HYPOTHYROIDISM: ICD-10-CM

## 2025-01-22 DIAGNOSIS — C61 PROSTATE CANCER (MULTI): ICD-10-CM

## 2025-01-22 DIAGNOSIS — C01 CANCER OF BASE OF TONGUE (MULTI): Primary | ICD-10-CM

## 2025-01-22 DIAGNOSIS — E78.2 MIXED HYPERLIPIDEMIA: ICD-10-CM

## 2025-01-22 DIAGNOSIS — R97.0 ELEVATED CARCINOEMBRYONIC ANTIGEN (CEA): ICD-10-CM

## 2025-01-22 DIAGNOSIS — F41.9 ANXIETY: ICD-10-CM

## 2025-01-22 DIAGNOSIS — R91.1 LUNG NODULE: ICD-10-CM

## 2025-01-22 DIAGNOSIS — R05.3 CHRONIC COUGH: ICD-10-CM

## 2025-01-22 DIAGNOSIS — J44.9 CHRONIC OBSTRUCTIVE PULMONARY DISEASE, UNSPECIFIED COPD TYPE (MULTI): ICD-10-CM

## 2025-01-22 DIAGNOSIS — C01 CANCER OF BASE OF TONGUE (MULTI): ICD-10-CM

## 2025-01-22 DIAGNOSIS — E06.4 DRUG-INDUCED THYROIDITIS: ICD-10-CM

## 2025-01-22 DIAGNOSIS — G89.3 CANCER ASSOCIATED PAIN: ICD-10-CM

## 2025-01-22 LAB
ALBUMIN SERPL BCP-MCNC: 3.9 G/DL (ref 3.4–5)
ALP SERPL-CCNC: 75 U/L (ref 33–136)
ALT SERPL W P-5'-P-CCNC: 8 U/L (ref 10–52)
ANION GAP SERPL CALC-SCNC: 10 MMOL/L (ref 10–20)
AST SERPL W P-5'-P-CCNC: 11 U/L (ref 9–39)
BASOPHILS # BLD AUTO: 0.02 X10*3/UL (ref 0–0.1)
BASOPHILS NFR BLD AUTO: 0.3 %
BILIRUB SERPL-MCNC: 0.5 MG/DL (ref 0–1.2)
BUN SERPL-MCNC: 22 MG/DL (ref 6–23)
CALCIUM SERPL-MCNC: 9.2 MG/DL (ref 8.6–10.3)
CHLORIDE SERPL-SCNC: 105 MMOL/L (ref 98–107)
CO2 SERPL-SCNC: 30 MMOL/L (ref 21–32)
CREAT SERPL-MCNC: 0.7 MG/DL (ref 0.5–1.3)
EGFRCR SERPLBLD CKD-EPI 2021: >90 ML/MIN/1.73M*2
EOSINOPHIL # BLD AUTO: 0.38 X10*3/UL (ref 0–0.7)
EOSINOPHIL NFR BLD AUTO: 5.7 %
ERYTHROCYTE [DISTWIDTH] IN BLOOD BY AUTOMATED COUNT: 15 % (ref 11.5–14.5)
GLUCOSE SERPL-MCNC: 115 MG/DL (ref 74–99)
HCT VFR BLD AUTO: 34.7 % (ref 41–52)
HGB BLD-MCNC: 10.5 G/DL (ref 13.5–17.5)
IMM GRANULOCYTES # BLD AUTO: 0.01 X10*3/UL (ref 0–0.7)
IMM GRANULOCYTES NFR BLD AUTO: 0.2 % (ref 0–0.9)
LYMPHOCYTES # BLD AUTO: 0.79 X10*3/UL (ref 1.2–4.8)
LYMPHOCYTES NFR BLD AUTO: 11.9 %
MCH RBC QN AUTO: 27.5 PG (ref 26–34)
MCHC RBC AUTO-ENTMCNC: 30.3 G/DL (ref 32–36)
MCV RBC AUTO: 91 FL (ref 80–100)
MONOCYTES # BLD AUTO: 0.67 X10*3/UL (ref 0.1–1)
MONOCYTES NFR BLD AUTO: 10.1 %
NEUTROPHILS # BLD AUTO: 4.77 X10*3/UL (ref 1.2–7.7)
NEUTROPHILS NFR BLD AUTO: 71.8 %
PLATELET # BLD AUTO: 271 X10*3/UL (ref 150–450)
POTASSIUM SERPL-SCNC: 4 MMOL/L (ref 3.5–5.3)
PROT SERPL-MCNC: 6.9 G/DL (ref 6.4–8.2)
RBC # BLD AUTO: 3.82 X10*6/UL (ref 4.5–5.9)
SODIUM SERPL-SCNC: 141 MMOL/L (ref 136–145)
TSH SERPL-ACNC: 2.59 MIU/L (ref 0.44–3.98)
WBC # BLD AUTO: 6.6 X10*3/UL (ref 4.4–11.3)

## 2025-01-22 PROCEDURE — 84443 ASSAY THYROID STIM HORMONE: CPT

## 2025-01-22 PROCEDURE — 1160F RVW MEDS BY RX/DR IN RCRD: CPT | Performed by: INTERNAL MEDICINE

## 2025-01-22 PROCEDURE — 1159F MED LIST DOCD IN RCRD: CPT | Performed by: INTERNAL MEDICINE

## 2025-01-22 PROCEDURE — G2211 COMPLEX E/M VISIT ADD ON: HCPCS | Performed by: INTERNAL MEDICINE

## 2025-01-22 PROCEDURE — 1126F AMNT PAIN NOTED NONE PRSNT: CPT | Performed by: INTERNAL MEDICINE

## 2025-01-22 PROCEDURE — 82378 CARCINOEMBRYONIC ANTIGEN: CPT

## 2025-01-22 PROCEDURE — 99214 OFFICE O/P EST MOD 30 MIN: CPT | Performed by: INTERNAL MEDICINE

## 2025-01-22 PROCEDURE — 36415 COLL VENOUS BLD VENIPUNCTURE: CPT

## 2025-01-22 PROCEDURE — 85025 COMPLETE CBC W/AUTO DIFF WBC: CPT

## 2025-01-22 PROCEDURE — 80053 COMPREHEN METABOLIC PANEL: CPT

## 2025-01-22 ASSESSMENT — PAIN SCALES - GENERAL: PAINLEVEL_OUTOF10: 0-NO PAIN

## 2025-01-22 NOTE — PROGRESS NOTES
Social work continues to follow this patient for ongoing assessment and support. The patient stopped in to the office prior to his appointment today. He remains very pleasant and easily engaged in conversation. He requested a new hospital assistance application and Lindsay Khalil's, Financial Navigator, address to submit it. I touched base with Lindsay Sarah via email and requested information was provided to the patient. He reported doing okay otherwise. No further needs identified at this time. He expressed appreciation for the assistance. Social work will remain available to assist this patient.    Valery Nunez, FRAN, LISW-S

## 2025-01-22 NOTE — PROGRESS NOTES
Patient ID: Miguel Lofton is a 69 y.o. male.  Referring Physician: Iván Lucero MD  [address] [facility] [address] [place] [address]  Primary Care Provider: Terrence Graham MD  Visit Type: Follow Up      Subjective    HPI I can't stop coughing    Review of Systems   Constitutional:  Positive for fatigue.   HENT:   Positive for trouble swallowing.    Eyes: Negative.    Respiratory:  Positive for cough.    Cardiovascular: Negative.    Gastrointestinal: Negative.    Endocrine: Negative.    Genitourinary: Negative.     Musculoskeletal: Negative.    Skin: Negative.    Neurological: Negative.    Hematological: Negative.    Psychiatric/Behavioral: Negative.          Objective   BSA: 2.1 meters squared  /64 (BP Location: Right arm)   Pulse 57   Temp 36 °C (96.8 °F) (Temporal)   Resp 18   Wt 90.7 kg (199 lb 15.3 oz)   SpO2 93%   BMI 29.53 kg/m²      has a past medical history of Hypothyroidism, unspecified, Personal history of other endocrine, nutritional and metabolic disease, and Personal history of other mental and behavioral disorders.   has a past surgical history that includes Hernia repair (08/29/2018) and Vasectomy (08/29/2018).  No family history on file.  Oncology History   Prostate cancer (Multi)   11/29/2023 Initial Diagnosis    Prostate cancer (CMS/[facility])     11/29/2023 Cancer Staged    Staging form: Prostate, AJCC 8th Edition, Pathologic stage from 11/29/2023: Stage IIC (rpT2, pN0, cM0, PSA: 0, Grade Group: 3) - Signed by Britney Rangel MD on 12/1/2023         Miguel Lofton  reports that he has quit smoking. His smoking use included cigarettes. He has never used smokeless tobacco.  He  reports that he does not currently use alcohol.  He  reports no history of drug use.    Physical Exam  Vitals reviewed.   Constitutional:       Appearance: Normal appearance.   HENT:      Head: Normocephalic.      Mouth/Throat:      Mouth: Mucous membranes are moist.   Eyes:      Extraocular Movements:  Extraocular movements intact.      Pupils: Pupils are equal, round, and reactive to light.   Cardiovascular:      Rate and Rhythm: Normal rate and regular rhythm.      Pulses: Normal pulses.      Heart sounds: Normal heart sounds.   Pulmonary:      Effort: Pulmonary effort is normal.      Breath sounds: Normal breath sounds.   Abdominal:      General: Bowel sounds are normal.      Palpations: Abdomen is soft.   Musculoskeletal:         General: Normal range of motion.   Skin:     General: Skin is warm.   Neurological:      General: No focal deficit present.      Mental Status: He is alert and oriented to person, place, and time.   Psychiatric:         Mood and Affect: Mood normal.         Behavior: Behavior normal.         WBC   Date/Time Value Ref Range Status   01/22/2025 09:58 AM 6.6 4.4 - 11.3 x10*3/uL Final   07/24/2024 12:56 PM 11.2 4.4 - 11.3 x10*3/uL Final   01/24/2024 10:49 AM 6.5 4.4 - 11.3 x10*3/uL Final     nRBC   Date Value Ref Range Status   11/17/2018 0.0 0.0 - 0.0 /100 WBC Final   11/07/2018 0.0 0.0 - 0.0 /100 WBC Final     RBC   Date Value Ref Range Status   01/22/2025 3.82 (L) 4.50 - 5.90 x10*6/uL Final   07/24/2024 3.81 (L) 4.50 - 5.90 x10*6/uL Final   01/24/2024 4.49 (L) 4.50 - 5.90 x10*6/uL Final     Hemoglobin   Date Value Ref Range Status   01/22/2025 10.5 (L) 13.5 - 17.5 g/dL Final   07/24/2024 11.1 (L) 13.5 - 17.5 g/dL Final   01/24/2024 12.7 (L) 13.5 - 17.5 g/dL Final     Hematocrit   Date Value Ref Range Status   01/22/2025 34.7 (L) 41.0 - 52.0 % Final   07/24/2024 35.8 (L) 41.0 - 52.0 % Final   01/24/2024 40.1 (L) 41.0 - 52.0 % Final     MCV   Date/Time Value Ref Range Status   01/22/2025 09:58 AM 91 80 - 100 fL Final   07/24/2024 12:56 PM 94 80 - 100 fL Final   01/24/2024 10:49 AM 89 80 - 100 fL Final     [facility]   Date/Time Value Ref Range Status   01/22/2025 09:58 AM 27.5 26.0 - 34.0 pg Final   07/24/2024 12:56 PM 29.1 26.0 - 34.0 pg Final   01/24/2024 10:49 AM 28.3 26.0 - 34.0 pg Final  "    MCHC   Date/Time Value Ref Range Status   01/22/2025 09:58 AM 30.3 (L) 32.0 - 36.0 g/dL Final   07/24/2024 12:56 PM 31.0 (L) 32.0 - 36.0 g/dL Final   01/24/2024 10:49 AM 31.7 (L) 32.0 - 36.0 g/dL Final     RDW   Date/Time Value Ref Range Status   01/22/2025 09:58 AM 15.0 (H) 11.5 - 14.5 % Final   07/24/2024 12:56 PM 14.6 (H) 11.5 - 14.5 % Final   01/24/2024 10:49 AM 13.8 11.5 - 14.5 % Final     Platelets   Date/Time Value Ref Range Status   01/22/2025 09:58  150 - 450 x10*3/uL Final   07/24/2024 12:56  150 - 450 x10*3/uL Final   01/24/2024 10:49  150 - 450 x10*3/uL Final     No results found for: \"MPV\"  Neutrophils %   Date/Time Value Ref Range Status   01/22/2025 09:58 AM 71.8 40.0 - 80.0 % Final   07/24/2024 12:56 PM 91.1 40.0 - 80.0 % Final   01/24/2024 10:49 AM 65.0 40.0 - 80.0 % Final     Immature Granulocytes %, Automated   Date/Time Value Ref Range Status   01/22/2025 09:58 AM 0.2 0.0 - 0.9 % Final     Comment:     Immature Granulocyte Count (IG) includes promyelocytes, myelocytes and metamyelocytes but does not include bands. Percent differential counts (%) should be interpreted in the context of the absolute cell counts (cells/UL).   07/24/2024 12:56 PM 0.5 0.0 - 0.9 % Final     Comment:     Immature Granulocyte Count (IG) includes promyelocytes, myelocytes and metamyelocytes but does not include bands. Percent differential counts (%) should be interpreted in the context of the absolute cell counts (cells/UL).   01/24/2024 10:49 AM 0.2 0.0 - 0.9 % Final     Comment:     Immature Granulocyte Count (IG) includes promyelocytes, myelocytes and metamyelocytes but does not include bands. Percent differential counts (%) should be interpreted in the context of the absolute cell counts (cells/UL).     Lymphocytes %   Date/Time Value Ref Range Status   01/22/2025 09:58 AM 11.9 13.0 - 44.0 % Final   07/24/2024 12:56 PM 3.8 13.0 - 44.0 % Final   01/24/2024 10:49 AM 16.0 13.0 - 44.0 % Final "     Monocytes %   Date/Time Value Ref Range Status   01/22/2025 09:58 AM 10.1 2.0 - 10.0 % Final   07/24/2024 12:56 PM 3.5 2.0 - 10.0 % Final   01/24/2024 10:49 AM 10.1 2.0 - 10.0 % Final     Eosinophils %   Date/Time Value Ref Range Status   01/22/2025 09:58 AM 5.7 0.0 - 6.0 % Final   07/24/2024 12:56 PM 0.9 0.0 - 6.0 % Final   01/24/2024 10:49 AM 7.9 0.0 - 6.0 % Final     Basophils %   Date/Time Value Ref Range Status   01/22/2025 09:58 AM 0.3 0.0 - 2.0 % Final   07/24/2024 12:56 PM 0.2 0.0 - 2.0 % Final   01/24/2024 10:49 AM 0.8 0.0 - 2.0 % Final     Neutrophils Absolute   Date/Time Value Ref Range Status   01/22/2025 09:58 AM 4.77 1.20 - 7.70 x10*3/uL Final     Comment:     Percent differential counts (%) should be interpreted in the context of the absolute cell counts (cells/uL).   07/24/2024 12:56 PM 10.18 (H) 1.20 - 7.70 x10*3/uL Final     Comment:     Percent differential counts (%) should be interpreted in the context of the absolute cell counts (cells/uL).   01/24/2024 10:49 AM 4.20 1.20 - 7.70 x10*3/uL Final     Comment:     Percent differential counts (%) should be interpreted in the context of the absolute cell counts (cells/uL).     Immature Granulocytes Absolute, Automated   Date/Time Value Ref Range Status   01/22/2025 09:58 AM 0.01 0.00 - 0.70 x10*3/uL Final   07/24/2024 12:56 PM 0.06 0.00 - 0.70 x10*3/uL Final   01/24/2024 10:49 AM 0.01 0.00 - 0.70 x10*3/uL Final     Lymphocytes Absolute   Date/Time Value Ref Range Status   01/22/2025 09:58 AM 0.79 (L) 1.20 - 4.80 x10*3/uL Final   07/24/2024 12:56 PM 0.42 (L) 1.20 - 4.80 x10*3/uL Final   01/24/2024 10:49 AM 1.03 (L) 1.20 - 4.80 x10*3/uL Final     Monocytes Absolute   Date/Time Value Ref Range Status   01/22/2025 09:58 AM 0.67 0.10 - 1.00 x10*3/uL Final   07/24/2024 12:56 PM 0.39 0.10 - 1.00 x10*3/uL Final   01/24/2024 10:49 AM 0.65 0.10 - 1.00 x10*3/uL Final     Eosinophils Absolute   Date/Time Value Ref Range Status   01/22/2025 09:58 AM 0.38 0.00  "- 0.70 x10*3/uL Final   07/24/2024 12:56 PM 0.10 0.00 - 0.70 x10*3/uL Final   01/24/2024 10:49 AM 0.51 0.00 - 0.70 x10*3/uL Final     Basophils Absolute   Date/Time Value Ref Range Status   01/22/2025 09:58 AM 0.02 0.00 - 0.10 x10*3/uL Final   07/24/2024 12:56 PM 0.02 0.00 - 0.10 x10*3/uL Final   01/24/2024 10:49 AM 0.05 0.00 - 0.10 x10*3/uL Final       No components found for: \"PT\"  aPTT   Date/Time Value Ref Range Status   11/07/2018 02:10 PM 32 28 - 38 sec Final     Comment:      Note new reference range as of 10/23/2018.    THE APTT IS NO LONGER USED FOR MONITORING     UNFRACTIONATED HEPARIN THERAPY.    FOR MONITORING HEPARIN THERAPY,     USE THE HEPARIN ASSAY.       Medication Documentation Review Audit       Reviewed by Jannet Gee MA (Medical Assistant) on 01/22/25 at 0956      Medication Order Taking? Sig Documenting Provider Last Dose Status   acetaminophen (Tylenol) 500 mg tablet [id number] Yes Take 2 tablets (1,000 mg) by mouth. Historical Provider, MD  Active   ascorbic acid (Vitamin C) 500 mg tablet [id number] Yes Take 1 tablet (500 mg) by mouth once daily. Historical Provider, MD  Active   aspirin 81 mg EC tablet [id number] Yes Take 1 tablet (81 mg) by mouth once daily. Historical Provider, MD  Active   atorvastatin (Lipitor) 80 mg tablet [id number] Yes Take 1 tablet (80 mg) by mouth once daily. Historical Provider, MD  Active   cholecalciferol (Vitamin D-3) 25 MCG (1000 UT) tablet [id number] Yes Take 1 tablet (25 mcg) by mouth once daily. Historical Provider, MD  Active   cyanocobalamin (Vitamin B-12) 1,000 mcg tablet [id number] Yes Take 1 tablet (1,000 mcg) by mouth once daily. Historical Provider, MD  Active   diclofenac sodium (Voltaren) 1 % gel gel [id number] Yes Apply topically. Historical Provider, MD  Active   HYDROcodone-acetaminophen (Norco) 5-325 mg tablet [id number] Yes Take 1 tablet by mouth every 6 hours if needed. Historical Provider, MD  Active   levothyroxine (Synthroid, Levoxyl) 75 mcg " tablet [id number] Yes Take 1 tablet (75 mcg) by mouth once daily. Historical Provider, MD  Active   LORazepam (Ativan) 1 mg tablet [id number]  TAKE ONE TABLET BY MOUTH EVERY DAY NIGHTLY AS NEEDED FOR ANXIETY Historical Provider, MD   23 [id number]   LORazepam (Ativan) 1 mg tablet [id number] Yes Take 1 tablet (1 mg) by mouth 2 times a day as needed (Anxiety prior to radiation therapy). Britney Rangel MD  Active   metoprolol succinate XL (Toprol-XL) 25 mg 24 hr tablet [id number] Yes Take 1 tablet (25 mg) by mouth once daily. Do not crush or chew. Historical Provider, MD  Active   omeprazole (PriLOSEC) 20 mg DR capsule [id number] Yes Take 1 capsule (20 mg) by mouth. Historical Provider, MD  Active   sildenafil (Viagra) 100 mg tablet [id number] Yes Take 1 tablet (100 mg) by mouth once daily as needed. Historical Provider, MD  Active   tiotropium (Spiriva with HandiHaler) 18 mcg inhalation capsule [id number] Yes Place into inhaler and inhale. 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, p16+  -completed course of chemoradiation by end of 10/2022  -here for interval followup  -saw Dr Gamez 2024-exam: normal appearance to laryngeal structures including true cords, false cords, epiglottis, base of tongue and piriform sinus  -labs to be done today include CBC, COMP, CEA, TSH  -results reviewed-wbc 6.6, hgb 10.5 plt 271,000, creatinine 0.7, calcium 9.2, AST 11, ALT 8, CEA 2.3, TSH 2.59  -continues to followup with Dr Gamez--last seen 2024: laryngeal structures are noted for grossly normal appearance; true vocal cords, false vocal cords, remaining structures, including epiglottis, piriform sinuses and base of tongue are all grossly normal; there is no evidence of gross mass nodule, polyp, tumor or other defect  -continues to consume a lot of pastries, cookies, and pie,  everything garnished with whipped cream  -he had a barium swallow done on 1/16/2025--was told he aspirates  -he continues to cough significantly with production of phlegm--sometimes yellow  -he thinks he may have a pneumonia, he is concerned about his chest  -will check new chest CT scan  l continue to see him Q6 months        2) cancer pain  -follows with [facility] palliative care team  -on norco PRN     3) anxiety  -on ativan PRN     4) hyperlipidemia  -on atorvastatin     5) hypothyroidism  -on levothyroxine     6) COPD  -on spiriva     7) prostate cancer  -s/p radical prostatectomy by Dr Lang Mcneil in 2018  -[id number], [place] 7, + right anterior margin  -most recent PSA 0.03  -PSMA-PET done on 12/6/2023 showed no PSMA uptake within the post-surgical bed; there is PSMA uptake of a left common iliac retroperitoneal lymph node SUV 5.6 and PSMA uptake of a right pelvic sidewall lymph node SUV 3.5  -he is now following with urologist Dr Boyd  -Dr Rangel recommended radiation therapy  -Pierce instead went to [facility] and was treated by Dr Zackary Ely (4600 cGy in 23 fractions to the pelvis and LN followed by [place] to prostate bed and LN to 2500 cGy in 10 fractions)  -most recent PSA done on 7/8/2024 at [facility] was <0.01  -since completing radiation, has been experiencing significant fatigue  -8/12/2024 PSA <0.01    8) copd  -follows with pulmonologist Dr Hutchison  -on breztri  -on symbicort  -5/16/2024 chest CT: new since 2003 bronchiectasis with reticulation of lung markings in the right middle lobe and reticulonodularity in the lateral aspect of the right lower lobe; additionally new 5 mm ground glass centrilobular nodules seen in LLL  -9/16/2024 chest CT: resolution of previously seen ground glass nodule in LLL  -Pierce was told that he now only needs to see Dr Hutchison once a year     Problem List Items Addressed This Visit             ICD-10-CM    Cancer of base of tongue (Multi) C01    Relevant Orders    Clinic  Appointment Request Other (comment) (Telephone followup); IV[facility]N LUCERO; [facility] MEDON[facility]1    CT chest w IV contrast            Iván Lucero MD

## 2025-01-23 LAB — CEA SERPL-MCNC: 2.3 UG/L

## 2025-01-23 ASSESSMENT — ENCOUNTER SYMPTOMS
HEMATOLOGIC/LYMPHATIC NEGATIVE: 1
NEUROLOGICAL NEGATIVE: 1
ENDOCRINE NEGATIVE: 1
EYES NEGATIVE: 1
TROUBLE SWALLOWING: 1
PSYCHIATRIC NEGATIVE: 1
FATIGUE: 1
MUSCULOSKELETAL NEGATIVE: 1
CARDIOVASCULAR NEGATIVE: 1
COUGH: 1
GASTROINTESTINAL NEGATIVE: 1

## 2025-01-27 RX ORDER — ATORVASTATIN CALCIUM 80 MG/1
80 TABLET, FILM COATED ORAL DAILY
Qty: 90 TABLET | Refills: 0 | Status: SHIPPED | OUTPATIENT
Start: 2025-01-27 | End: 2025-04-27

## 2025-01-28 RX ORDER — LEVOTHYROXINE SODIUM 88 UG/1
88 TABLET ORAL DAILY
Qty: 90 TABLET | Refills: 0 | Status: SHIPPED | OUTPATIENT
Start: 2025-01-28

## 2025-01-28 NOTE — TELEPHONE ENCOUNTER
Patient calling in requesting refill    Gabby Barrios confirmed pharmacy    Patient phone [phone]

## 2025-01-28 NOTE — TELEPHONE ENCOUNTER
Future Appointments    Encounter Information   Provider Department Appt Notes   2/3/2025 Laurel Thomas MD [facility] Pulmonology 4m fu   2/14/2025 Khushi Harvey APRN - CNP [facility] Phys F/U CC   2/19/2025 Billy Akhtar MD [facility] Primary and Specialty Care 3 Month Follow Up/AWV   2/25/2025 Cas Hamilton MD; SCHEDULE, [facility] NURSE [facility]C Follow Up with PSA   3/20/2025 Eyal Ordonez MD [facility] Urology 6 mo PSA   5/13/2025 Gage Zepeda MD [facility] Cardiology 6 month follow up   8/1/2025 Teri Jolly MD [facility] Gastroenterology f/u 1 year     Past Visits    Date Provider Specialty Visit Type Primary Dx   12/27/2024 Khushi Harvey APRN - CNP Palliative Care Office Visit Chronic obstructive pulmonary disease, unspecified COPD type (HCC)   11/19/2024 Billy Akhtar MD Family Medicine Office Visit Anxiety   11/15/2024 Khushi Harvey APRN - CNP Palliative Care Office Visit Congestion of throat   11/12/2024 Gage Zepeda MD Cardiology Office Visit Essential hypertension   10/04/2024 Khushi Harvey APRN - CNP Palliative Care Office Visit Multiple joint pain

## 2025-02-03 ENCOUNTER — OFFICE VISIT (OUTPATIENT)
Dept: PULMONOLOGY | Age: 70
End: 2025-02-03
Payer: COMMERCIAL

## 2025-02-03 VITALS
HEART RATE: 66 BPM | HEIGHT: 69 IN | BODY MASS INDEX: 29.53 KG/M2 | SYSTOLIC BLOOD PRESSURE: 114 MMHG | WEIGHT: 199.4 LBS | RESPIRATION RATE: 18 BRPM | OXYGEN SATURATION: 92 % | TEMPERATURE: 97.2 F | DIASTOLIC BLOOD PRESSURE: 64 MMHG

## 2025-02-03 DIAGNOSIS — E66.812 CLASS 2 OBESITY DUE TO EXCESS CALORIES WITHOUT SERIOUS COMORBIDITY WITH BODY MASS INDEX (BMI) OF 35.0 TO 35.9 IN ADULT: ICD-10-CM

## 2025-02-03 DIAGNOSIS — J44.9 CHRONIC OBSTRUCTIVE PULMONARY DISEASE, UNSPECIFIED COPD TYPE (HCC): Primary | ICD-10-CM

## 2025-02-03 DIAGNOSIS — Z87.891 PERSONAL HISTORY OF TOBACCO USE: ICD-10-CM

## 2025-02-03 DIAGNOSIS — E66.09 CLASS 2 OBESITY DUE TO EXCESS CALORIES WITHOUT SERIOUS COMORBIDITY WITH BODY MASS INDEX (BMI) OF 35.0 TO 35.9 IN ADULT: ICD-10-CM

## 2025-02-03 PROCEDURE — 3074F SYST BP LT 130 MM HG: CPT | Performed by: INTERNAL MEDICINE

## 2025-02-03 PROCEDURE — 1123F ACP DISCUSS/DSCN MKR DOCD: CPT | Performed by: INTERNAL MEDICINE

## 2025-02-03 PROCEDURE — 99214 OFFICE O/P EST MOD 30 MIN: CPT | Performed by: INTERNAL MEDICINE

## 2025-02-03 PROCEDURE — 3078F DIAST BP <80 MM HG: CPT | Performed by: INTERNAL MEDICINE

## 2025-02-03 RX ORDER — BUDESONIDE, GLYCOPYRROLATE, AND FORMOTEROL FUMARATE 160; 9; 4.8 UG/1; UG/1; UG/1
2 AEROSOL, METERED RESPIRATORY (INHALATION) 2 TIMES DAILY
Qty: 4 EACH | Refills: 0 | COMMUNITY
Start: 2025-02-03

## 2025-02-03 NOTE — PROGRESS NOTES
Subjective:     Kelvin Washington is a 69 y.o. male who complains today of:     Chief Complaint   Patient presents with    Follow-up     5 Month F/U for COPD and Personal history of tobacco use     Shortness of Breath     More increased       HPI  Patient presents for COPD     2/3/2025  Presents for follow-up, continues to have dyspnea on exertion, minimal coughing, no chest pain, he has not been taking his maintenance inhalers mainly due to cost up to 400 the month.  No chest pain, no lower extremity edema, no heartburn, no nasal congestion or postnasal drip.          9/30/2024  Presents for follow-up, overall doing good, able to do his daily activities with no issues, he reports congestion and coughing mainly at night, no chest pain, no wheezing, no lower extremity edema, no heartburn, no nasal congestion or postnasal drip, weight is stable.      5/29/2024  Presents for follow-up, doing okay, he was started on doxycycline by his palliative care NP, he feels better after starting treatment, he finished yesterday, he had cough essentially dry and worse at night, he did not cough any yesterday night.  No fever or chills, no skin rash, no lower extremity edema, no heartburn, no nasal congestion or postnasal drip and weight is stable    5/31/2023  Presents for follow-up, he is doing good, no symptoms of chest pain or shortness of breath, no coughing, he is on Spiriva, no heartburn, weight is stable, no lower extremity edema, he is able to eat, no nasal congestion or postnasal drip and no heartburn.      3/21/2023  Patient presents for follow-up, he was recently diagnosed with throat cancer, currently follows up with radiation oncology, oncology and ENT at .  He has no chest pain, no shortness of breath, no coughing, no fever no chills, no lower extremity edema, weight is stable, and no nose congestion.  He had CT chest done at [facility].  And he reports having PET scan done at  earlier this year, I could not find it in

## 2025-02-05 ENCOUNTER — HOSPITAL ENCOUNTER (OUTPATIENT)
Dept: RADIOLOGY | Facility: HOSPITAL | Age: 70
Discharge: HOME | End: 2025-02-05
Payer: COMMERCIAL

## 2025-02-05 DIAGNOSIS — C01 CANCER OF BASE OF TONGUE (MULTI): ICD-10-CM

## 2025-02-05 DIAGNOSIS — C61 PROSTATE CANCER (HCC): ICD-10-CM

## 2025-02-05 DIAGNOSIS — M47.817 LUMBOSACRAL SPONDYLOSIS WITHOUT MYELOPATHY: ICD-10-CM

## 2025-02-05 DIAGNOSIS — H93.8X3 SENSATION OF FULLNESS IN BOTH EARS: ICD-10-CM

## 2025-02-05 PROCEDURE — 2550000001 HC RX 255 CONTRASTS: Performed by: INTERNAL MEDICINE

## 2025-02-05 PROCEDURE — 71260 CT THORAX DX C+: CPT

## 2025-02-05 RX ORDER — FLUTICASONE PROPIONATE 50 MCG
2 SPRAY, SUSPENSION (ML) NASAL DAILY
Qty: 16 G | Refills: 3 | Status: SHIPPED | OUTPATIENT
Start: 2025-02-05

## 2025-02-05 RX ORDER — HYDROCODONE BITARTRATE AND ACETAMINOPHEN 5; 325 MG/1; MG/1
1 TABLET ORAL 3 TIMES DAILY PRN
Qty: 90 TABLET | Refills: 0 | Status: SHIPPED | OUTPATIENT
Start: 2025-02-05 | End: 2025-03-07

## 2025-02-05 RX ADMIN — IOHEXOL 75 ML: 350 INJECTION, SOLUTION INTRAVENOUS at 09:54

## 2025-02-05 NOTE — TELEPHONE ENCOUNTER
Encounter Summary
  Created on: 2019
 
 Cherie Villalobos
 External Reference #: [id number]
 : 49
 Sex: Female
 
 Demographics
 
 
+-----------------------+--------------------------+
| Address               | [address]               |
|                       | [place]  [address] |
+-----------------------+--------------------------+
| Home Phone            | [phone]          |
+-----------------------+--------------------------+
| Preferred Language    | Unknown                  |
+-----------------------+--------------------------+
| Marital Status        |                   |
+-----------------------+--------------------------+
| [employer] Affiliation | 1013                     |
+-----------------------+--------------------------+
| Race                  | Unknown                  |
+-----------------------+--------------------------+
| Ethnic Group          | Unknown                  |
+-----------------------+--------------------------+
 
 
 Author
 
 
+--------------+-----------------------+
| Author       | Sherry Miartech ([place]) |
+--------------+-----------------------+
| Organization | [facility] |
+--------------+-----------------------+
| Address      | Unknown               |
+--------------+-----------------------+
| Phone        | Unavailable           |
+--------------+-----------------------+
 
 
 
 Support
 
 
+---------------+--------------+---------------------+-----------------+
| Name          | Relationship | Address             | Phone           |
+---------------+--------------+---------------------+-----------------+
| Anoop Villalobos | ECON         | [place], | [phone] |
|               |              |  [place]  [address]     |                 |
+---------------+--------------+---------------------+-----------------+
| Jennifer Dickson | ECON         | [place]       | [phone] |
|               |              | [address]               |                 |
+---------------+--------------+---------------------+-----------------+
 
 
 
 
 Care Team Providers
 
 
+-----------------------+------+-----------------+
| Care Team Member Name | Role | Phone           |
+-----------------------+------+-----------------+
| Ivy Montague DO      | PCP  | [phone] |
+-----------------------+------+-----------------+
 
 
 
 Reason for Referral
 Home Health Care (Routine)
 
+-------------+--------------+--------------+--------------+--------------+--------------+
| Status      | Reason       | Specialty    | Diagnoses /  | Referred By  | Referred To  |
|             |              |              | Procedures   | Contact      | Contact      |
+-------------+--------------+--------------+--------------+--------------+--------------+
| New Request |   Specialty  | Home Health  |   Diagnoses  |              |              |
|             | Services     | Services     |  Fall in     | Sanna,  |              |
|             | Required     |              | home,        | MD Angelique    |              |
|             |              |              | initial      | [address]    |              |
|             |              |              | encounter    | [address]VD         |              |
|             |              |              |              | [place], [place] |              |
|             |              |              |              |  [address]       |              |
|             |              |              |              | Phone:       |              |
|             |              |              |              | [phone] |              |
|             |              |              |              |   Fax:       |              |
|             |              |              |              | [phone] |              |
+-------------+--------------+--------------+--------------+--------------+--------------+
 
 
 
 
 Reason for Visit
 
 
+----------------+-------------+
| Reason         | Comments    |
+----------------+-------------+
| Referral       | Dr. Elliott    |
+----------------+-------------+
| Skin Complaint | right foot  |
+----------------+-------------+
 Auth/Cert
 
+--------+--------+-----------+--------------+--------------+---------------+
| Status | Reason | Specialty | Diagnoses /  | Referred By  | Referred To   |
|        |        |           | Procedures   | Contact      | Contact       |
+--------+--------+-----------+--------------+--------------+---------------+
|        |        |           |   Diagnoses  |              |   Kr 3rd    |
|        |        |           |  Generalized |              | [address] [place] |
|        |        |           |  weakness    |              |  Pavilion     |
|        |        |           | Closed       |              | [address]     |
|        |        |           | displaced    |              | Blvd          |
|        |        |           | fracture of  |              | [place], [place]  |
|        |        |           | distal       |              | [address]  Phone: |
|        |        |           | phalanx of   |              |  [phone] |
|        |        |           | right great  |              |   Fax:        |
 
|        |        |           | toe, initial |              | [phone]  |
|        |        |           |  encounter   |              |               |
|        |        |           | Fall in      |              |               |
|        |        |           | home,        |              |               |
|        |        |           | initial      |              |               |
|        |        |           | encounter    |              |               |
|        |        |           | Cellulitis   |              |               |
|        |        |           | of right toe |              |               |
|        |        |           |   Fatigue,   |              |               |
|        |        |           | unspecified  |              |               |
|        |        |           | type         |              |               |
|        |        |           |              |              |               |
+--------+--------+-----------+--------------+--------------+---------------+
 
 
 
 
 Encounter Details
 
 
+--------+-----------+----------------------+----------------------+----------------------+
| Date   | Type      | Department           | Care Team            | Description          |
+--------+-----------+----------------------+----------------------+----------------------+
| / | Emergency |   [facility]    |   Cookson, Rachel M, | Fall in home,        |
| 2019 - |           | [facility]     |  DO  [address]  | initial encounter    |
|        |           | [address]        |  [place] [address]  | (Primary Dx);        |
| 02/15/ |           | Pavilion  [address]  |  [phone]        | Cellulitis of right  |
| 2019   |           | Blvd  [place], [place]   | Ginny Raya MD  | toe; Closed          |
|        |           | [address]  [phone]  |  [address]      | displaced fracture   |
|        |           |                      | [place] [address]   | of distal phalanx of |
|        |           |                      | [phone]         |  right great toe,    |
|        |           |                      | [phone] (Fax)   | initial encounter;   |
|        |           |                      | Angelique Isidro,   | Generalized          |
|        |           |                      | MD  [address]   | weakness; Fatigue,   |
|        |           |                      | [place] [address]   | unspecified type     |
|        |           |                      | [phone]         |                      |
|        |           |                      | [phone] (Fax)   |                      |
+--------+-----------+----------------------+----------------------+----------------------+
 
 
 
 Social History
 
 
+---------------+------------+-----------+--------+------------------+
| Tobacco Use   | Types      | Packs/Day | Years  | Date             |
|               |            |           | Used   |                  |
+---------------+------------+-----------+--------+------------------+
| Former Smoker | Cigarettes | 1         | 30     | Quit: 2004 |
+---------------+------------+-----------+--------+------------------+
 
 
 
+---------------------+---+---+---+
| Smokeless Tobacco:  |   |   |   |
| Never Used          |   |   |   |
+---------------------+---+---+---+
 
 
 
 
+------------------------------+
| Comments: quite smoking  |
+------------------------------+
 
 
 
+-------------+-----------+---------+----------+
| Alcohol Use | Drinks/We | oz/Week | Comments |
|             | ek        |         |          |
+-------------+-----------+---------+----------+
| No          |           |         |          |
+-------------+-----------+---------+----------+
 
 
 
+------------------+---------------+
| Sex Assigned at  | Date Recorded |
| Birth            |               |
+------------------+---------------+
| Not on file      |               |
+------------------+---------------+
 as of this encounter
 
 Last Filed Vital Signs
 
 
+-------------------+-------------------+-------------------------+
| Vital Sign        | Reading           | Time Taken              |
+-------------------+-------------------+-------------------------+
| Blood Pressure    | 112/57            | 02/15/2019 11:30 AM PST |
+-------------------+-------------------+-------------------------+
| Pulse             | 66                | 02/15/2019 11:30 AM PST |
+-------------------+-------------------+-------------------------+
| Temperature       | 36.7   C (98   F) | 02/15/2019 11:30 AM PST |
+-------------------+-------------------+-------------------------+
| Respiratory Rate  | 18                | 02/15/2019 11:30 AM PST |
+-------------------+-------------------+-------------------------+
| Oxygen Saturation | 96%               | 02/15/2019 11:30 AM PST |
+-------------------+-------------------+-------------------------+
| Inhaled Oxygen    | -                 | -                       |
| Concentration     |                   |                         |
+-------------------+-------------------+-------------------------+
| Weight            | 65.3 kg (144 lb)  | 2019 10:29 PM PST |
+-------------------+-------------------+-------------------------+
| Height            | 177.8 cm (5' 10") | 2019 10:29 PM PST |
+-------------------+-------------------+-------------------------+
| Body Mass Index   | 20.66             | 2019 10:29 PM PST |
+-------------------+-------------------+-------------------------+
 in this encounter
 
 Discharge Summaries
 Angelique Isidro MD - 02/15/2019  2:09 PM PSTFormatting of this note may be different fro
m the original.
 
 Patient:  Cherie Villalobos         :  1949     MRN:  [id number]
 Date of Admission:  2019  
 Date of Discharge:  2/15/2019   
 Treatment Team: 
 Consulting Physician: Srinivasan Abdi DPM
 Consulting Physician: Andrés Elliott MD
 
 Consulting Physician: João Asher MD
 Admitting Provider: Ginny Raya MD
 Discharging Provider:  ANGELIQUE ISIDRO MD
 Discharge Diagnoses: 
 Principal Problem:
   Falls frequently
 Active Problems:
   Depression
   ESRD (end stage renal disease) ([facility])
   Type 2 diabetes mellitus with chronic kidney disease on chronic dialysis, with long-term 
current use of insulin ([facility])
   Anemia in ESRD (end-stage renal disease) ([facility])
   Hypertension, essential
   Dyslipidemia
   Gastroesophageal reflux disease without esophagitis
   Loss of weight
   Severe protein-calorie malnutrition ([facility])
   Chronic systolic congestive heart failure ([facility])
   COPD (chronic obstructive pulmonary disease) ([facility])
   ICD (implantable cardioverter-defibrillator) in place
   Paroxysmal atrial fibrillation ([facility])
   S/P CABG x 2
   S/P mitral valve repair
   PVD (peripheral vascular disease) ([facility])
   CAD (coronary artery disease)
   Macrocytosis
   Lactic acidosis
   Restless legs syndrome
   Overactive bladder
 Resolved Problems:
   * No resolved hospital problems. *
     
 Procedures Performed:
 
 Chief Complaint:
 Referral (Dr. Elliott) and Skin Complaint (right foot )
 
 Hospital Course: 
 
 Frequent falls: 
 Assessment:  It was unclear initially  The exact culprit and it was felt potentially she wa
s a bit dry (as per nephrology who saw her this admission) as she had just had ultrafiltrati
on with HD, versus other culprit.  However the patient did state that when she has recently 
fallen it was in the setting of not using her walker as she had been instructed to as she wa
s only going a very short distance and felt she did not need it.  She will be very diligent 
about using her walker and taking all needed precautions.  
 
 Coronary artery disease with history of CABG, peripheral vascular disease, hypertension and
 hyperlipidemia with history of paroxysmal atrial fibrillation on anticoagulation:.  Will re
sume PTA meds as below on d/c 
 
  
 Overactive bladder: Continue oxybutynin. 
  
 Requip for restless leg syndrome.
  
 Anxiety depression: recommended to Refrain from using benzodiazepinesas much as possible an
d per d/w nephrology will decrease the ativan dose. 
 
  
 
 Diabetes mellitus with diabetic nephropathy with end-stage renal disease on hemodialysis: 
 Continue with current regimen for now, follow, and adjust as needed based on progress. 
 F/u with outpatient providers 
 
  
 
 With respect to her left foot prior injury and prior antibiotics: 
 Assessment:  She was seen by ID here and recently had completed a course of antbx reportedl
y.  They would like to have her off antbx for now and f/u withthem.  Should f/u with her pod
iatrist dr abdi for her foot as well. 
 
 Discharge Exam and Data:
 Vital Signs:
 /57 (BP Location: Right upper arm)  | Pulse 66  | Temp 98 F (36.7 C) (Oral)  | Re
sp 18  | Ht 1.778 m (5' 10")  | Wt 65.3 kg (144 lb)  | SpO2 96%  | Breastfeeding? No  | BMI 
20.66 kg/m 
 I&O Last 3 Shifts:  No intake/output data recorded.
 
 Physical Examination: 
 
 Constitutional: Alert and oriented to person, place, and time. Appears well-developed and w
ell-nourished. 
 
 HEENT: Neck supple, no JVD, non icteric sclera.
 
 Cardiovascular: Normal rate, regular rhythm, normal heart sounds, and intact distal pulses.
  Exam reveals no appreciated gallop or friction rub.  No murmur heard.
 
 Pulmonary/Chest: Effort normal and breath sounds normal. No stridor. No respiratory distres
s.  no wheezes. no rales. exhibits no tenderness. 
 
 Abdominal: Soft. Bowel sounds are normal. exhibits no distension. There is no tenderness. T
here is no rebound and no guarding. 
 
 Extremeties/Musculoskeletal: Normal general range of motion.exhibits no tenderness.  exhibi
ts no edema. Left foot in boot wrapped in dressing, see wound care and ID george . 
   
 Neurological:  Alert and oriented to person, place, and time. Has normal reflexes.  No morteza
s cranial nerve or focal deficit.  Exhibits normal muscle tone. Coordination normal.
 
 Skin: Skin is warm and dry. No rash noted. No erythema. No pallor. 
 
 Psychiatric: Has a normal mood and affect given situation. Behavior is normal. Judgment nor
mal for patient. 
 
 Recent Labs 
 Recent Labs
 Lab 19 
 WBC 5.14 
 HGB 10.1* 
 HCT 30.9* 
 * 
 
 Recent Labs
 Lab 19 
  
 K 4.7 
 CL 96* 
 
 CO2 >40* 
 BUN 9 
 CREATININE 2.96* 
 No results for input(s): INR in the last 168 hours.
 
 Results  
  Procedure Component Value Units Date/Time 
  Blood Culture Set 2 [[id number]] Collected:  19 
  Specimen:  Blood from Blood, peripheral draw Updated:  19 
  Blood Culture Set 1 [[id number]] Collected:  19 165 
  Specimen:  Blood from Blood Line Draw Updated:  19 
  
 
 Recent Radiology Results
 Xr Toe Right 2 View
 
 Result Date: 2019
 No radiographic evidence of osteomyelitis seen.  If further assessment is warranted, consid
er correlation with MRI. Potential acute fracture through the base of the distal phalanx. Si
gned by: Gavin South Sign Date/Time: 2019 5:15 PM
 
 Outstanding Issues:  
 F/u with podiatry, ID, PCP and resume HD.  
 
 Discharge Information:
 Follow up:
 Ivy Montague DO
 [address], [address]
 [facility] [id number] [phone]
 
 Schedule an appointment as soon as possible for a visit
 
 Srinivasan Abdi DPM
 [address] [facility], [facility] [address]
 [facility] [id number] [phone]
 
 Schedule an appointment as soon as possible for a visit
 please continue prior to admission plan
 
 Andrés Elliott MD
 [address] [facility] [address] [phone]
 
 Call
 please call to see when they would like to see you in follow up 
 
 resume care with all providers you were seeing prior to admission 
 
  
 Medication List 
  
 CHANGE how you take these medications  
 LORazepam 1 MG tablet
 Refills:  0
 Commonly known as:  ATIVAN
 Take 0.5 tablets by mouth every 8 (eight) hours as needed for Anxiety. Patient states she h
as the 1mg tablets at home and that they are scored and she will split them in half
 
 What changed:
  how much to take
  additional instructions
  
  
 CONTINUE taking these medications  
 albuterol 108 (90 Base) MCG/ACT inhaler
 Refills:  0
 Commonly known as:  PROVENTIL HFA;VENTOLIN HFA
  
 apixaban 2.5 MG tablet
 QTY:  60 tablet
 Refills:  0
 Commonly known as:  ELIQUIS
 Take 1 tablet by mouth 2 (two) times daily.
  
 atorvastatin 80 MG tablet
 Refills:  0
 Commonly known as:  LIPITOR
  
 carvedilol 12.5 MG tablet
 QTY:  60 tablet
 Refills:  0
 Doctor's comments:  Hold for SBP less than 100
 Hold for HR less than 60
 Commonly known as:  COREG
 Take 1 tablet by mouth 2 (two) times daily with meals.
  
 esomeprazole 40 MG capsule
 Refills:  0
 Commonly known as:  NEXIUM
  
 HYDROcodone-acetaminophen 5-325 MG per tablet
 QTY:  30 tablet
 Refills:  0
 Commonly known as:  NORCO
 Take 1 tablet by mouth every 4 (four) hours as needed.
  
 insulin aspart 100 UNIT/ML injection
 Refills:  0
 Commonly known as:  NOVOLOG
  
 insulin degludec 100 UNIT/ML injection
 Refills:  0
 Commonly known as:  TRESIBA
  
 isosorbide mononitrate 60 MG 24 hr tablet
 QTY:  30 tablet
 Refills:  1
 Take 1 tablet by mouth daily.
  
 losartan 100 MG tablet
 Refills:  0
 Commonly known as:  COZAAR
  
 nitroGLYCERIN 0.4 MG SL tablet
 QTY:  30 tablet
 Refills:  0
 Doctor's comments:  Hold for SBP less than 100
 Commonly known as:  NITROSTAT
 
 Place 1 tablet under the tongue every 5 (five) minutes as needed for Chest pain.
  
 nortriptyline 25 MG capsule
 Refills:  0
 Commonly known as:  PAMELOR
  
 ondansetron 4 MG disintegrating tablet
 Refills:  0
 Commonly known as:  ZOFRAN-ODT
  
 oxybutynin 5 MG tablet
 Refills:  0
 Commonly known as:  DITROPAN
  
 prochlorperazine 5 MG tablet
 QTY:  60 tablet
 Refills:  11
 Doctor's comments:  Please consider 90 day supplies to promote better adherence
 TAKE ONE TABLET BY MOUTH TWICE DAILY AS NEEDED FOR NAUSEA
  
 rOPINIRole 0.25 MG tablet
 Refills:  0
 Commonly known as:  REQUIP
  
 TRINTELLIX 20 MG Tabs
 Refills:  0
 Generic drug:  Vortioxetine HBr
  
  
 You might also be taking other medications not listed above. If you have questions about an
y of your other medications, talk to the person who prescribed them or your Primary Care Pro
vider. 
  
  
  
  
 Where to Get Your Medications 
  
 Information about where to get these medications is not yet available  
 Ask your nurse or doctor about these medications
  LORazepam 1 MG tablet
  
 
 Disposition:  Home
 Condition:  Stable
 Code Status:  Full Code
 
 Discharge took 35 minutes, to include final examination, discussion of admission, and prepa
ration of prescriptions, instructions for on-going care, follow-up and documentation of disc
harge summary.
 
 ANGELIQUE ISIDRO MD
 2:09 PMin this encounter
 
 Discharge Instructions
 Chandler Jean-Baptiste, [place] - 02/15/2019
 HOME HEALTH
 I certify that Cherie Villalobos is under my care and that I had a face to face encounter on  that meets the physician face to face encounter requirements. I certify that based on m
y findings , the patient is in need of intermittent skilled services and a plan for furnishi
 
ng these services to include, but not limited to the services listed in the questions below:
 
 Primary diagnosis for home health: Frequent Falls.
 Additional diagnosis: left great toe gangrene.
 Services needed: Physical & Occupational therapy.
 Patient is homebound because she cannot leave home without assistance of another individual
 and leaving the home requires a considerable and taxing effort. Patient needs the assistanc
e of another individual to leave home because: she has difficulty with ambulation and transf
ers. 
 Physician assuming care for Home Health services: IVY MONTAGUE
 
 FallPrevention
 Falls often occur due to slipping, tripping or losing your balance. Millions of people fall
 every year and injure themselves.Here are ways to reduce your risk of falling again.
  Think about your fall, was there anything that caused your fall that can be fixed, remov
ed, or replaced?
  Make your home safe by keeping walkways clear of objects you may trip over.
  Use non-slip pads under rugs. Do not use area rugs or small throw rugs.
  Use non-slip mats in bathtubs and showers.
  Install handrails and lights on staircases.
  Do not walk in poorly lit areas.
  Do not stand on chairs or wobbly ladders.
  Use caution when reaching overhead or looking upward.This position can cause a loss of
 balance.
  Be sure your shoes fit properly, have non-slip bottoms and are in good condition.
  Wear shoes both inside and out. Avoid going barefoot or wearing slippers.
  Be cautious when going up and down stairs, curbs, and when walking on uneven sidewalks.
  If your balance is poor, consider using a cane or walker.
  If your fall was related to alcohol use, stop or limit alcohol intake.
  If your fall was related to use of sleeping medicines, talk to your doctor about this.
You may need to reduce your dosage at bedtime if you awaken during the night to go to the [facility].
  To reduce the need for nighttime bathroom trips:
  Avoid drinking fluids for several hours before going to bed
  Empty your bladder before going to bed
  Men can keep a urinal at the bedside
  Stay as active as you can. Balance, flexibility, strength, and endurance all come from e
xercise. They all play a role in preventing falls. Ask your healthcare provider which types 
of activity are right for you.
  Get your vision checked on a regular basis.
  If you have pets, know where they are before you stand up or walk so you don't trip over
 them.
  Use night lights.
 Date Last Reviewed: 2015-2018 [employer]. [address] [place] [address]. All righ
ts reserved. This information is not intended as a substitute for professional medical care.
 Always follow your healthcare professional's instructions.
 
 in this encounter
 
 Medications at Time of Discharge
 
 
+----------------------+----------------------+--------+---------+----------+-----------+
| Medication           | Sig.                 | Disp.  | Refills | Start    | End Date  |
|                      |                      |        |         | Date     |           |
+----------------------+----------------------+--------+---------+----------+-----------+
|   albuterol          | Inhale 2 puffs into  |        |         |          |           |
| (PROVENTIL           | the lungs every 4    |        |         |          |           |
| HFA;VENTOLIN HFA)    | (four) hours as      |        |         |          |           |
 
| 108 (90 Base)        | needed for Wheezing. |        |         |          |           |
| MCG/ACT              |                      |        |         |          |           |
| inhalerIndications:  |                      |        |         |          |           |
| states uses 2x       |                      |        |         |          |           |
| monthly average      |                      |        |         |          |           |
+----------------------+----------------------+--------+---------+----------+-----------+
|   apixaban (ELIQUIS) | Take 1 tablet by     |   60   | 0       | 20 |           |
|  2.5 MG tablet       | mouth 2 (two) times  | tablet |         | 18       |           |
|                      | daily.               |        |         |          |           |
+----------------------+----------------------+--------+---------+----------+-----------+
|   atorvastatin       | Take 80 mg by mouth  |        |         | 20 |           |
| (LIPITOR) 80 MG      | nightly.             |        |         | 19       |           |
| tablet               |                      |        |         |          |           |
+----------------------+----------------------+--------+---------+----------+-----------+
|   carvedilol (COREG) | Take 1 tablet by     |   60   | 0       | 20 |  |
|  12.5 MG tablet      | mouth 2 (two) times  | tablet |         | 19       | 0         |
|                      | daily with meals.    |        |         |          |           |
+----------------------+----------------------+--------+---------+----------+-----------+
|   esomeprazole       | Take 1 capsule by    |        |         |          |           |
| (NEXIUM) 40 MG       | mouth every day      |        |         |          |           |
| capsule              |                      |        |         |          |           |
+----------------------+----------------------+--------+---------+----------+-----------+
|                      | Take 1 tablet by     |   30   | 0       | 20 |           |
| HYDROcodone-acetamin | mouth every 4 (four) | tablet |         | 19       |           |
| ophen (NORCO) 5-325  |  hours as needed.    |        |         |          |           |
| MG per tablet        |                      |        |         |          |           |
+----------------------+----------------------+--------+---------+----------+-----------+
|   insulin aspart     | Inject  into the     |        |         |          |           |
| (NOVOLOG) 100        | skin 3 (three) times |        |         |          |           |
| UNIT/ML injection    |  daily before meals. |        |         |          |           |
|                      |  Sliding scale       |        |         |          |           |
+----------------------+----------------------+--------+---------+----------+-----------+
|   insulin degludec   | Inject 21 units      |        |         |          |           |
| (TRESIBA) 100        | every night          |        |         |          |           |
| UNIT/ML injection    |                      |        |         |          |           |
+----------------------+----------------------+--------+---------+----------+-----------+
|   isosorbide         | Take 1 tablet by     |   30   | 1       | 20 |  |
| mononitrate (IMDUR)  | mouth daily.         | tablet |         | 18       | 9         |
| 60 MG 24 hr tablet   |                      |        |         |          |           |
+----------------------+----------------------+--------+---------+----------+-----------+
|   LORazepam (ATIVAN) | Take 0.5 tablets by  |        |         | 02/15/20 |           |
|  1 MG tablet         | mouth every 8        |        |         | 19       |           |
|                      | (eight) hours as     |        |         |          |           |
|                      | needed for Anxiety.  |        |         |          |           |
|                      | Patient states she   |        |         |          |           |
|                      | has the 1mg tablets  |        |         |          |           |
|                      | at home and that     |        |         |          |           |
|                      | they are scored and  |        |         |          |           |
|                      | she will split them  |        |         |          |           |
|                      | in half              |        |         |          |           |
+----------------------+----------------------+--------+---------+----------+-----------+
|   losartan (COZAAR)  | Take 100 mg by mouth |        |         | 20 |           |
| 100 MG tablet        |  daily.              |        |         | 19       |           |
+----------------------+----------------------+--------+---------+----------+-----------+
|   nitroGLYCERIN      | Place 1 tablet under |   30   | 0       | 20 |  |
| (NITROSTAT) 0.4 MG   |  the tongue every 5  | tablet |         | 19       | 0         |
| SL tablet            | (five) minutes as    |        |         |          |           |
|                      | needed for Chest     |        |         |          |           |
|                      | pain.                |        |         |          |           |
+----------------------+----------------------+--------+---------+----------+-----------+
 
|   nortriptyline      | Take 25-75 mg by     |        |         |          |           |
| (PAMELOR) 25 MG      | mouth See Admin      |        |         |          |           |
| capsule              | Instructions. Takes  |        |         |          |           |
|                      | 25 mg by mouth every |        |         |          |           |
|                      |  morning and 75 mg   |        |         |          |           |
|                      | every night          |        |         |          |           |
+----------------------+----------------------+--------+---------+----------+-----------+
|   ondansetron        | Take 4 mg by mouth   |        |         | 20 |           |
| (ZOFRAN-ODT) 4 MG    | every 8 (eight)      |        |         | 18       |           |
| disintegrating       | hours as needed.     |        |         |          |           |
| tablet               |                      |        |         |          |           |
+----------------------+----------------------+--------+---------+----------+-----------+
|   oxybutynin         | Take 7.5 mg by mouth |        |         |          |           |
| (DITROPAN) 5 MG      |  2 (two) times       |        |         |          |           |
| tablet               | daily.               |        |         |          |           |
+----------------------+----------------------+--------+---------+----------+-----------+
|   prochlorperazine 5 | TAKE ONE TABLET BY   |   60   | 11      | 20 |           |
|  MG tablet           | MOUTH TWICE DAILY AS | tablet |         | 19       |           |
|                      |  NEEDED FOR NAUSEA   |        |         |          |           |
+----------------------+----------------------+--------+---------+----------+-----------+
|   rOPINIRole         | Take 0.75 mg by      |        |         |          |           |
| (REQUIP) 0.25 MG     | mouth nightly.       |        |         |          |           |
| tablet               |                      |        |         |          |           |
+----------------------+----------------------+--------+---------+----------+-----------+
|   TRINTELLIX 20 MG   | Take 20 mg by mouth  |        |         | 20 |           |
| TABS                 | every evening.       |        |         | 19       |           |
+----------------------+----------------------+--------+---------+----------+-----------+
 as of this encounter
 
 Progress Notes
 Hortencia Vela RD - 02/15/2019  3:02 PM PSTFormatting of this note may be different from the
 original.
 
  02/15/19 1430 
 Subjective 
 Timepoint Admit
 (Consult - poor appetite) 
 Pt c/o Pt reports she is discharging today.  PO intake much improved.  Pt reports she plans
 on getting set up with a meal service.  Also noted that if she purchases food, caregiver wi
ll prepare meals for her.  Pt states she will eat anything if it prepared for her.  States s
he is discharging and has no needs today 
 Diet Experience 
 Self-selected diet(s) followed Pt reports she sees her RD at [facility] monthly.  She is not on
 a renal diet at this time due to poor po intake.  Pt has been eating a lot of soups even th
ough high in sodium.  Has protein shakes at home, but hasn't been drinking because she read 
an article about a boy who drank too many and  and now she is afraid to drink.   
 Food Intake 
 Amount of Food Pt reports she is eating >50% of meals 
 Type of Food / Meals Renal cardiac  
 Biochemical data, medical tests, and procedures reviewed 
 Biochemical data, medical tests, and procedures reviewed K WNL 
 Recommendations 
 Recommended energy needs Encourage po intake.  Discussed safe use with oral nutrition suppl
ements.  Will follow with RD at [facility].  Monitor and follow as indicated.   
 Nutritional Risk 
 Nutritional risk Moderate 
 Follow up date 19 
 Michael David MD - 02/15/2019  2:11 PM PSTFormatting of this note may be different f
rom the original.
 Infectious Disease
 
 Progress Note
 
 Hospital Day:   LOS: 0 days 
 SUBJECTIVE
 No acute event overnight.  No new complaint.  No fever.
 
 Antibiotics:IV vancomycin was given yesterday x1
 
 Allergy:
 Allergies 
 Allergen Reactions 
   Quinapril Hcl Anaphylaxis 
   Digoxin And Related Other (See Comments) 
   toxic 
   Metaxalone Other (See Comments) 
   Morphine Mental Changes 
   Make her crazy/ "funny feeling in my head"
 Has used hydromorphone in-house 
   Pantoprazole Sodium Nausea and Vomiting 
   Penicillins Rash 
   Tolerates cephalosporins 
   Quinapril Hcl Edema 
   Facial Edema 
   Sudafed [Pseudoephedrine Hcl] Rash 
 
 OBJECTIVE
 Vital Signs:
 /57 (BP Location: Right upper arm)  | Pulse 66  | Temp 98 F (36.7 C) (Oral)  | Re
sp 18  | Ht 1.778 m (5' 10")  | Wt 65.3 kg (144 lb)  | SpO2 96%  | Breastfeeding? No  | BMI 
20.66 kg/m 
 
 Examination:
 
 Constitutional: Resting in the recliner, not in acute distress.She is alert, awake, oriente
d well.
 HEENT: 
 Head: Normocephalic and Atraumatic. 
 Nose: Nose normal. 
 Neck: Neck supple.No palpable mass
 Cardiovascular: Not appreciate rubs or gallops
 Pulmonary/Chest:  Clear to auscultation bilaterally.
 Abdominal: Soft, bowel sounds are present, no distension, no ascites. 
 Musculoskeletal: Right great toe slightly swollen, there is a small skin tear with no surro
unding erythema or purulent drainage.Left TMA stump heals nicely with no evidence of infecti
on.
 Neurological:  No focal neurologic deficits. 
 Skin: Skin is warm and dry. No rash noted. 
 Psychiatric: Alert, oriented well.  No agitation.
 
 LABS:
 Recent Results (from the past 24 hour(s)) 
 Septic Lactic Acid 
  Collection Time: 19  4:55 PM 
 Result Value Ref Range 
  LACTIC ACID 2.7 (H) 0.4 - 2.0 mmol/L 
 C-Reactive Protein 
  Collection Time: 19  5:39 PM 
 Result Value Ref Range 
  CRP 0.8 (H) <0.5 mg/dL 
 CBC with differential 
 
  Collection Time: 19  5:39 PM 
 Result Value Ref Range 
  WBC 5.14 3.80 - 11.00 K/uL 
  RBC 2.91 (L) 3.70 - 5.10 M/uL 
  HGB 10.1 (L) 11.3 - 15.5 g/dL 
  HCT 30.9 (L) 34.0 - 46.0 % 
  .1 (H) 80.0 - 100.0 fl 
  MCH 34.8 (H) 27.0 - 34.0 pg 
  MCHC 32.8 32.0 - 35.5 g/dL 
  RDW SD 61.3 (H) 37 - 53 fl 
   (L) 150 - 400 K/uL 
  MPV 9.3 fl 
  DIFF TYPE AUTOMATED  
  NEUTROPHILS 74.03 % 
  LYMPHOCYTES 20.20 % 
  MONOCYTES 5.16 % 
  EOSINOPHILS 0.03 % 
  BASOPHILS 0.58 % 
  NEUTROPHILS ABS 3.81 1.90 - 7.40 K/uL 
  LYMPHOCYTES ABS 1.04 1.00 - 3.90 K/uL 
  MONOCYTES ABS 0.27 0.00 - 0.80 K/uL 
  EOSINOPHILS ABS 0.00 0.00 - 0.50 K/uL 
  BASOPHILS ABS 0.03 0.00 - 0.10 K/uL 
  MORPHOLOGY 1+  
  Platelet Estimate DECREASED  
 Comprehensive metabolic panel 
  Collection Time: 19  5:39 PM 
 Result Value Ref Range 
  SODIUM 143 135 - 145 mmol/L 
  POTASSIUM 4.7 3.5 - 4.9 mmol/L 
  CHLORIDE 96 (L) 99 - 109 mmol/L 
  CO2 >40 (HH) 23 - 32 mmol/L 
  ANION GAP AGAP UNABLE TO CALCULATE 5 - 20 mmol/L 
  GLUCOSE 160 (H) 65 - 99 mg/dL 
  BUN 9 8 - 25 mg/dL 
  CREATININE 2.96 (H) 0.50 - 1.00 mg/dL 
  BUN/CREAT 3  
  CALCIUM 9.4 8.5 - 10.5 mg/dL 
  TOTAL PROTEIN 6.7 6.3 - 8.2 g/dL 
  Albumin 4.3 3.3 - 4.8 g/dL 
  GLOBULIN 2.4 1.3 - 4.9 g/dL 
  A/G 1.8 1.0 - 2.4 
  TBIL 0.5 0.1 - 1.5 mg/dL 
  ALK PHOS 103 35 - 115 U/L 
  AST 54 (H) 10 - 45 U/L 
  ALT 40 10 - 65 U/L 
  EGFR 16 (L) >60 mL/min/1.73m2 
 Sedimentation Rate (ESR) 
  Collection Time: 19  5:39 PM 
 Result Value Ref Range 
  ESR 13 0 - 30 mm/Hr 
 Vitamin B12 
  Collection Time: 19  5:39 PM 
 Result Value Ref Range 
  VITAMIN B12 553 254 - 1,320 pg/mL 
 Folate 
  Collection Time: 19  5:39 PM 
 Result Value Ref Range 
  FOLATE 8.0 >5.4 ng/mL 
 TSH 
 
  Collection Time: 19  5:39 PM 
 Result Value Ref Range 
  TSH 0.904 0.450 - 5.100 uIU/mL 
 Septic Lactic Acid 
  Collection Time: 19  7:22 PM 
 Result Value Ref Range 
  LACTIC ACID 2.7 (H) 0.4 - 2.0 mmol/L 
 Septic Lactic Acid 
  Collection Time: 19 10:12 PM 
 Result Value Ref Range 
  LACTIC ACID 2.0 0.4 - 2.0 mmol/L 
 POCT glucose 
  Collection Time: 19 10:32 PM 
 Result Value Ref Range 
  GLUCOSE,POC SCREEN 202 (H) 65 - 99 mg/dL 
 POCT glucose 
  Collection Time: 02/15/19  6:01 AM 
 Result Value Ref Range 
  GLUCOSE,POC SCREEN 108 (H) 65 - 99 mg/dL 
 POCT glucose 
  Collection Time: 02/15/19 11:36 AM 
 Result Value Ref Range 
  GLUCOSE,POC SCREEN 231 (H) 65 - 99 mg/dL 
 
 Results  
  Procedure Component Value Units Date/Time 
  Blood Culture Set 2 [[id number]] Collected:  19 1739 
  Specimen:  Blood from Blood, peripheral draw Updated:  19 
  Blood Culture Set 1 [[id number]] Collected:  19 1655 
  Specimen:  Blood from Blood Line Draw Updated:  19 
  
 
 ASSESSMENT & PLAN
 This is a 69-year-old female with multiple comorbidities including End-stage renal disease,
 on regular hemodialysis via fistula at the left upper extremity.  History of severe periphe
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
A resection .  She completed the course of IV antibiotic and the wound is healing nicely wit
h no evidence of ongoing infection.  
 She was admitted at this time for frequent fall at home and noted to have right great toe f
racture, base of distal phalanx.  No intervention recommended at this point.  No evidence of
 ongoing skin and soft tissue infection at present.  Would agree to discharge home and follo
w-up with podiatrist.  Continue to off antibiotic per infectious disease standpoint
 
 Plan discussed with patient at length.  She can follow-up with infectious disease as needed
 if concern for recurrent infection.  Plan discussed with medicine team. Thank you for consu
ation
 
 Michael David MD
 2/15/2019
 
 in this encounter
 
 Plan of Treatment
 
 
+--------+----------+-----------------+----------------------+-------------+
| Date   | Type     | Specialty       | Care Team            | Description |
+--------+----------+-----------------+----------------------+-------------+
| / | Initial  | General Surgery |   Сергей Medeiros, |             |
|    | consult  |                 |  MD NEREYDA DOUGLAS [facility]  |             |
 
|        |          |                 |  [place] [address]  |             |
|        |          |                 |  [phone]        |             |
|        |          |                 | [phone] (Fax)   |             |
+--------+----------+-----------------+----------------------+-------------+
 
 
 
+-------------------------+--------+----------------------+---------------------+
| Name                    | Priori | Associated Diagnoses | Order Schedule      |
|                         | ty     |                      |                     |
+-------------------------+--------+----------------------+---------------------+
| Referral to Home Health | Routin |   Fall in home,      | Ordered: 02/15/2019 |
|                         | e      | initial encounter    |                     |
+-------------------------+--------+----------------------+---------------------+
 as of this encounter
 
 Procedures
 
 
+----------------------+--------+-------------+----------------------+----------------------
+
| Procedure Name       | Priori | Date/Time   | Associated Diagnosis | Comments             
|
|                      | ty     |             |                      |                      
|
+----------------------+--------+-------------+----------------------+----------------------
+
| POCT GLUCOSE         | Routin | 02/15/2019  |                      |   Results for this   
|
|                      | e      | 11:36 AM    |                      | procedure are in the 
|
|                      |        | PST         |                      |  results section.    
|
+----------------------+--------+-------------+----------------------+----------------------
+
| POCT GLUCOSE         | Routin | 02/15/2019  |                      |   Results for this   
|
|                      | e      |  6:01 AM    |                      | procedure are in the 
|
|                      |        | PST         |                      |  results section.    
|
+----------------------+--------+-------------+----------------------+----------------------
+
| POCT GLUCOSE         | Routin | 2019  |                      |   Results for this   
|
|                      | e      | 10:32 PM    |                      | procedure are in the 
|
|                      |        | PST         |                      |  results section.    
|
+----------------------+--------+-------------+----------------------+----------------------
+
| [facility] SEPT[facility] LACTIC   | STAT   | 2019  |                      |   Results for this   
|
| ACID                 |        | 10:12 PM    |                      | procedure are in the 
|
|                      |        | PST         |                      |  results section.    
|
+----------------------+--------+-------------+----------------------+----------------------
+
| [facility]PT[facility]ACT[facility]   | STAT   | 2019  |                      |   Results for this   
 
|
| ACID                 |        |  7:22 PM    |                      | procedure are in the 
|
|                      |        | PST         |                      |  results section.    
|
+----------------------+--------+-------------+----------------------+----------------------
+
| BLOOD CULTURE, SET 2 | STAT   | 2019  |                      |   Results for this   
|
|                      |        |  5:39 PM    |                      | procedure are in the 
|
|                      |        | PST         |                      |  results section.    
|
+----------------------+--------+-------------+----------------------+----------------------
+
| SEDIMENTATION RATE,  | STAT   | 2019  |                      |   Results for this   
|
| AUTOMATED            |        |  5:39 PM    |                      | procedure are in the 
|
|                      |        | PST         |                      |  results section.    
|
+----------------------+--------+-------------+----------------------+----------------------
+
| CBC W/AUTO DIFF      | STAT   | 2019  |                      |   Results for this   
|
| (REFLEX TO MANUAL)   |        |  5:39 PM    |                      | procedure are in the 
|
|                      |        | PST         |                      |  results section.    
|
+----------------------+--------+-------------+----------------------+----------------------
+
| C-REACTIVE PROTEIN   | STAT   | 2019  |                      |   Results for this   
|
|                      |        |  5:39 PM    |                      | procedure are in the 
|
|                      |        | PST         |                      |  results section.    
|
+----------------------+--------+-------------+----------------------+----------------------
+
| TSH                  | STAT   | 2019  |                      |   Results for this   
|
|                      |        |  5:39 PM    |                      | procedure are in the 
|
|                      |        | PST         |                      |  results section.    
|
+----------------------+--------+-------------+----------------------+----------------------
+
| FOLATE               | Add-On | 2019  |                      |   Results for this   
|
|                      |        |  5:39 PM    |                      | procedure are in the 
|
|                      |        | PST         |                      |  results section.    
|
+----------------------+--------+-------------+----------------------+----------------------
+
| VITAMIN B12          | Add-On | 2019  |                      |   Results for this   
|
|                      |        |  5:39 PM    |                      | procedure are in the 
|
|                      |        | PST         |                      |  results section.    
 
|
+----------------------+--------+-------------+----------------------+----------------------
+
| COMPREHENSIVE        | STAT   | 2019  |                      |   Results for this   
|
| METABOLIC PANEL      |        |  5:39 PM    |                      | procedure are in the 
|
|                      |        | PST         |                      |  results section.    
|
+----------------------+--------+-------------+----------------------+----------------------
+
| DEVEN LEWIS RIGHT        | ASAP   | 2019  |                      |   Results for this   
|
|                      |        |  5:08 PM    |                      | procedure are in the 
|
|                      |        | PST         |                      |  results section.    
|
+----------------------+--------+-------------+----------------------+----------------------
+
| [facility]PT[facility]ACT[facility]   | STAT   | 2019  |                      |   Results for this   
|
| ACID                 |        |  4:55 PM    |                      | procedure are in the 
|
|                      |        | PST         |                      |  results section.    
|
+----------------------+--------+-------------+----------------------+----------------------
+
| BLOOD CULTURE, SET 1 | STAT   | 2019  |                      |   Results for this   
|
|                      |        |  4:55 PM    |                      | procedure are in the 
|
|                      |        | PST         |                      |  results section.    
|
+----------------------+--------+-------------+----------------------+----------------------
+
| ED INFORMATION       | Routin | 2019  |                      |   Results for this   
|
| EXCHANGE             | e      |  3:21 PM    |                      | procedure are in the 
|
|                      |        | PST         |                      |  results section.    
|
+----------------------+--------+-------------+----------------------+----------------------
+
 in this encounter
 
 Results
 POCT glucose (02/15/2019 11:36 AM)
 
+--------------------+--------------------------+---------------+-----------------+
| Component          | Value                    | Ref Range     | Performed At    |
+--------------------+--------------------------+---------------+-----------------+
| GLUCOSE,POC SCREEN | 231 (H)Comment: Testing  | 65 - 99 mg/dL | [facility] |
|                    | performed at [facility];[address]     |               |                 |
|                    | [address];[place], [place]   |               |                 |
|                    | [id number]                    |               |                 |
+--------------------+--------------------------+---------------+-----------------+
 
 
 
+-------------------+------------------+--------------------+--------------+
 
| Performing        | Address          | City/State/Zipcode | Phone Number |
| Organization      |                  |                    |              |
+-------------------+------------------+--------------------+--------------+
|   [facility] |   [address] | [place] [address] |              |
+-------------------+------------------+--------------------+--------------+
 POCT glucose (02/15/2019  6:01 AM)
 
+--------------------+--------------------------+---------------+-----------------+
| Component          | Value                    | Ref Range     | Performed At    |
+--------------------+--------------------------+---------------+-----------------+
| GLUCOSE,POC SCREEN | 108 (H)Comment: Testing  | 65 - 99 mg/dL | [facility] |
|                    | performed at [facility];[address]     |               |                 |
|                    | [address];[place]   |               |                 |
|                    | [id number]                    |               |                 |
+--------------------+--------------------------+---------------+-----------------+
 
 
 
+-------------------+------------------+--------------------+--------------+
| Performing        | Address          | City/State/Zipcode | Phone Number |
| Organization      |                  |                    |              |
+-------------------+------------------+--------------------+--------------+
|   [facility] |   [address] | [place] [address] |              |
+-------------------+------------------+--------------------+--------------+
 POCT glucose (2019 10:32 PM)
 
+--------------------+--------------------------+---------------+-----------------+
| Component          | Value                    | Ref Range     | Performed At    |
+--------------------+--------------------------+---------------+-----------------+
| GLUCOSE,POC SCREEN | 202 (H)Comment: Testing  | 65 - 99 mg/dL | [facility] |
|                    | performed at [facility];[address]     |               |                 |
|                    | [address];[place]   |               |                 |
|                    | [id number]                    |               |                 |
+--------------------+--------------------------+---------------+-----------------+
 
 
 
+-------------------+------------------+--------------------+--------------+
| Performing        | Address          | City/State/Zipcode | Phone Number |
| Organization      |                  |                    |              |
+-------------------+------------------+--------------------+--------------+
|   [facility] |   [address] | [place] [address] |              |
+-------------------+------------------+--------------------+--------------+
 Septic Lactic Acid (2019 10:12 PM)
 
+-------------+-------------------------+------------------+-----------------+
| Component   | Value                   | Ref Range        | Performed At    |
+-------------+-------------------------+------------------+-----------------+
| LACTIC ACID | 2.0Comment: Testing     | 0.4 - 2.0 mmol/L | [facility] |
|             | performed at [facility];[address]    |                  |                 |
|             | [facility];[place], [place]  |                  |                 |
|             | [id number]                   |                  |                 |
+-------------+-------------------------+------------------+-----------------+
 
 
 
+-------------------+------------------+--------------------+--------------+
| Performing        | Address          | City/State/Zipcode | Phone Number |
| Organization      |                  |                    |              |
+-------------------+------------------+--------------------+--------------+
 
|   [facility] |   [address] | [place] [address] |              |
+-------------------+------------------+--------------------+--------------+
 Septic Lactic Acid (2019  7:22 PM)
 
+-------------+--------------------------+------------------+-----------------+
| Component   | Value                    | Ref Range        | Performed At    |
+-------------+--------------------------+------------------+-----------------+
| LACTIC ACID | 2.7 (H)Comment: Testing  | 0.4 - 2.0 mmol/L | [facility] |
|             | performed at [facility];[address]     |                  |                 |
|             | [address];[place]   |                  |                 |
|             | [address]                    |                  |                 |
+-------------+--------------------------+------------------+-----------------+
 
 
 
+-------------------+------------------+--------------------+--------------+
| Performing        | Address          | City/State/Zipcode | Phone Number |
| Organization      |                  |                    |              |
+-------------------+------------------+--------------------+--------------+
|   [facility] |   [address] | [place] [address] |              |
+-------------------+------------------+--------------------+--------------+
 TSH (2019  5:39 PM)
 
+-----------+-------------------------+----------------------+-----------------+
| Component | Value                   | Ref Range            | Performed At    |
+-----------+-------------------------+----------------------+-----------------+
| TSH       | 0.904Comment: Testing   | 0.450 - 5.100 uIU/mL | [facility] |
|           | performed at [facility];[address]    |                      |                 |
|           | [address];[place], [place]  |                      |                 |
|           | [address]                   |                      |                 |
+-----------+-------------------------+----------------------+-----------------+
 
 
 
+----------+
| Specimen |
+----------+
| Blood    |
+----------+
 
 
 
+-------------------+------------------+--------------------+--------------+
| Performing        | Address          | City/State/Zipcode | Phone Number |
| Organization      |                  |                    |              |
+-------------------+------------------+--------------------+--------------+
|   [facility] |   [address] | [place] [address] |              |
+-------------------+------------------+--------------------+--------------+
 Folate (2019  5:39 PM)
 
+-----------+--------------------------+------------+--------------+
| Component | Value                    | Ref Range  | Performed At |
+-----------+--------------------------+------------+--------------+
| FOLATE    | 8.0Comment: Testing      | >5.4 ng/mL | [facility]   |
|           | performed at [facility], [address] |            | LABORATORY   |
|           |  [facility],        |            |              |
|           | [place]  [address]     |            |              |
+-----------+--------------------------+------------+--------------+
 
 
 
 
+----------+
| Specimen |
+----------+
| Blood    |
+----------+
 
 
 
+---------------+-------------------------+---------------------+----------------+
| Performing    | Address                 | City/State/Zipcode  | Phone Number   |
| Organization  |                         |                     |                |
+---------------+-------------------------+---------------------+----------------+
|   [facility]  |   [address]  | [place] [address] |   [phone] |
| LABORATORY    | [address]                   |                     |                |
+---------------+-------------------------+---------------------+----------------+
 Vitamin B12 (2019  5:39 PM)
 
+-------------+--------------------------+-------------------+--------------+
| Component   | Value                    | Ref Range         | Performed At |
+-------------+--------------------------+-------------------+--------------+
| VITAMIN B12 | 553Comment: Testing      | 254 - 1,320 pg/mL | [facility]   |
|             | performed at [facility], [address] |                   | LABORATORY   |
|             |  [address],        |                   |              |
|             | [place]  [address]     |                   |              |
+-------------+--------------------------+-------------------+--------------+
 
 
 
+----------+
| Specimen |
+----------+
| Blood    |
+----------+
 
 
 
+---------------+-------------------------+---------------------+----------------+
| Performing    | Address                 | City/State/Zipcode  | Phone Number   |
| Organization  |                         |                     |                |
+---------------+-------------------------+---------------------+----------------+
|   [facility]  |   [address]  | [place] [address] |   [phone] |
| LABORATORY    | [address]                   |                     |                |
+---------------+-------------------------+---------------------+----------------+
 Blood Culture Set 2 (2019  5:39 PM)
 
+----------------------+------------------------+-----------+-----------------+
| Component            | Value                  | Ref Range | Performed At    |
+----------------------+------------------------+-----------+-----------------+
| Specimen Description | BLOOD, PERIPHERAL DRAW |           | [facility]      |
|                      |                        |           | LABORATORY      |
+----------------------+------------------------+-----------+-----------------+
| SPECIAL REQUESTS     | R HAND                 |           | [facility] |
+----------------------+------------------------+-----------+-----------------+
| CULTURE              | NO GROWTH 6 DAYS       |           | [facility]      |
|                      |                        |           | LABORATORY      |
+----------------------+------------------------+-----------+-----------------+
 
 
 
 
+-----------------+
| Specimen        |
+-----------------+
| Blood - Blood,  |
| peripheral draw |
+-----------------+
 
 
 
+-------------------+-------------------------+---------------------+----------------+
| Performing        | Address                 | City/State/Zipcode  | Phone Number   |
| Organization      |                         |                     |                |
+-------------------+-------------------------+---------------------+----------------+
|   [facility]      |   [address]  | [place] [address] |   [phone] |
| LABORATORY        | [address]                   |                     |                |
+-------------------+-------------------------+---------------------+----------------+
|   [facility] |   [address]        | [place] [address]  |                |
+-------------------+-------------------------+---------------------+----------------+
 Sedimentation Rate (ESR) (2019  5:39 PM)
 
+-----------+-------------------------+--------------+-----------------+
| Component | Value                   | Ref Range    | Performed At    |
+-----------+-------------------------+--------------+-----------------+
| ESR       | 13Comment: Testing      | 0 - 30 mm/Hr | [facility] |
|           | performed at [facility];[address]    |              |                 |
|           | [address];[place]  |              |                 |
|           | [address]                   |              |                 |
+-----------+-------------------------+--------------+-----------------+
 
 
 
+----------+
| Specimen |
+----------+
| Blood    |
+----------+
 
 
 
+-------------------+------------------+--------------------+--------------+
| Performing        | Address          | City/State/Zipcode | Phone Number |
| Organization      |                  |                    |              |
+-------------------+------------------+--------------------+--------------+
|   [facility] |   [address] | [place] [address] |              |
+-------------------+------------------+--------------------+--------------+
 Comprehensive metabolic panel (2019  5:39 PM)
 
+----------------+--------------------------+-------------------+-----------------+
| Component      | Value                    | Ref Range         | Performed At    |
+----------------+--------------------------+-------------------+-----------------+
| SODIUM         | 143                      | 135 - 145 mmol/L  | [facility] |
+----------------+--------------------------+-------------------+-----------------+
| POTASSIUM      | 4.7                      | 3.5 - 4.9 mmol/L  | [facility] |
+----------------+--------------------------+-------------------+-----------------+
| CHLORIDE       | 96 (L)                   | 99 - 109 mmol/L   | [facility] |
+----------------+--------------------------+-------------------+-----------------+
| CO2            | >40 (HH)Comment: CALLED  | 23 - 32 mmol/L    | [facility] |
|                | NURSING UNITREAD BACK    |                   |                 |
|                | RESULTS VERIFIEDCALLED   |                   |                 |
|                | TO RN IN ED AT 1830 BY   |                   |                 |
 
|                | [facility]J                      |                   |                 |
|                |                          |                   |                 |
+----------------+--------------------------+-------------------+-----------------+
| ANION GAP AGAP | UNABLE TO CALCULATE      | 5 - 20 mmol/L     | [facility] |
+----------------+--------------------------+-------------------+-----------------+
| GLUCOSE        | 160 (H)                  | 65 - 99 mg/dL     | [facility] |
+----------------+--------------------------+-------------------+-----------------+
| BUN            | 9                        | 8 - 25 mg/dL      | [facility] |
+----------------+--------------------------+-------------------+-----------------+
| CREATININE     | 2.96 (H)                 | 0.50 - 1.00 mg/dL | [facility] |
+----------------+--------------------------+-------------------+-----------------+
| BUN/CREAT      | 3                        |                   | [facility] |
+----------------+--------------------------+-------------------+-----------------+
| CALCIUM        | 9.4                      | 8.5 - 10.5 mg/dL  | [facility] |
+----------------+--------------------------+-------------------+-----------------+
| TOTAL PROTEIN  | 6.7                      | 6.3 - 8.2 g/dL    | [facility] |
+----------------+--------------------------+-------------------+-----------------+
| Albumin        | 4.3                      | 3.3 - 4.8 g/dL    | [facility] |
+----------------+--------------------------+-------------------+-----------------+
| GLOBULIN       | 2.4                      | 1.3 - 4.9 g/dL    | [facility] |
+----------------+--------------------------+-------------------+-----------------+
| A/G            | 1.8                      | 1.0 - 2.4         | [facility] |
+----------------+--------------------------+-------------------+-----------------+
| TBIL           | 0.5                      | 0.1 - 1.5 mg/dL   | [facility] |
+----------------+--------------------------+-------------------+-----------------+
| ALK PHOS       | 103                      | 35 - 115 U/L      | [facility] |
+----------------+--------------------------+-------------------+-----------------+
| AST            | 54 (H)                   | 10 - 45 U/L       | [facility] |
+----------------+--------------------------+-------------------+-----------------+
| ALT            | 40                       | 10 - 65 U/L       | [facility] |
+----------------+--------------------------+-------------------+-----------------+
| EGFR           | 16 (L)Comment: GFR <60:  | >60 mL/min/1.73m2 | [facility] |
|                | CHRONIC KIDNEY DISEASE,  |                   |                 |
|                | IF FOUND OVER A 3 MONTH  |                   |                 |
|                | PERIOD.GFR <15: KIDNEY   |                   |                 |
|                | FAILURE.FOR       |                   |                 |
|                | [employer]IC[employer], MULTIPLY THE  |                   |                 |
|                | CALCULATED GFR BY        |                   |                 |
|                | 1.210.This eGFR is       |                   |                 |
|                | calculated using the     |                   |                 |
|                | MDRD IDMS traceable      |                   |                 |
|                | equation.Testing         |                   |                 |
|                | performed at [facility];[address]     |                   |                 |
|                | [facility];[place], [place]   |                   |                 |
|                | [address]                    |                   |                 |
+----------------+--------------------------+-------------------+-----------------+
 
 
 
+----------+
| Specimen |
+----------+
| Blood    |
+----------+
 
 
 
+-------------------+------------------+--------------------+--------------+
| Performing        | Address          | City/State/Zipcode | Phone Number |
| Organization      |                  |                    |              |
 
+-------------------+------------------+--------------------+--------------+
|   [facility] |   [address] | [place] [address] |              |
+-------------------+------------------+--------------------+--------------+
 CBC with differential (2019  5:39 PM)
 
+-------------------+------------------------+-------------------+-----------------+
| Component         | Value                  | Ref Range         | Performed At    |
+-------------------+------------------------+-------------------+-----------------+
| WBC               | 5.14                   | 3.80 - 11.00 K/uL | [facility] |
+-------------------+------------------------+-------------------+-----------------+
| RBC               | 2.91 (L)               | 3.70 - 5.10 M/uL  | [facility] |
+-------------------+------------------------+-------------------+-----------------+
| HGB               | 10.1 (L)               | 11.3 - 15.5 g/dL  | [facility] |
+-------------------+------------------------+-------------------+-----------------+
| HCT               | 30.9 (L)               | 34.0 - 46.0 %     | [facility] |
+-------------------+------------------------+-------------------+-----------------+
| MCV               | 106.1 (H)              | 80.0 - 100.0 fl   | [facility] |
+-------------------+------------------------+-------------------+-----------------+
| MCH               | 34.8 (H)               | 27.0 - 34.0 pg    | [facility] |
+-------------------+------------------------+-------------------+-----------------+
| MCHC              | 32.8                   | 32.0 - 35.5 g/dL  | [facility] |
+-------------------+------------------------+-------------------+-----------------+
| RDW SD            | 61.3 (H)               | 37 - 53 fl        | [facility] |
+-------------------+------------------------+-------------------+-----------------+
| PLT               | 145 (L)                | 150 - 400 K/uL    | [facility] |
+-------------------+------------------------+-------------------+-----------------+
| MPV               | 9.3                    | fl                | [facility] |
+-------------------+------------------------+-------------------+-----------------+
| DIFF TYPE         | AUTOMATED              |                   | [facility] |
+-------------------+------------------------+-------------------+-----------------+
| NEUTROPHILS       | 74.03                  | %                 | [facility] |
+-------------------+------------------------+-------------------+-----------------+
| LYMPHOCYTES       | 20.20                  | %                 | [facility] |
+-------------------+------------------------+-------------------+-----------------+
| MONOCYTES         | 5.16                   | %                 | [facility] |
+-------------------+------------------------+-------------------+-----------------+
| EOSINOPHILS       | 0.03                   | %                 | [facility] |
+-------------------+------------------------+-------------------+-----------------+
| BASOPHILS         | 0.58                   | %                 | [facility] |
+-------------------+------------------------+-------------------+-----------------+
| NEUTROPHILS ABS   | 3.81                   | 1.90 - 7.40 K/uL  | [facility] |
+-------------------+------------------------+-------------------+-----------------+
| LYMPHOCYTES ABS   | 1.04                   | 1.00 - 3.90 K/uL  | [facility] |
+-------------------+------------------------+-------------------+-----------------+
| MONOCYTES ABS     | 0.27                   | 0.00 - 0.80 K/uL  | [facility] |
+-------------------+------------------------+-------------------+-----------------+
| EOSINOPHILS ABS   | 0.00                   | 0.00 - 0.50 K/uL  | [facility] |
+-------------------+------------------------+-------------------+-----------------+
| BASOPHILS ABS     | 0.03                   | 0.00 - 0.10 K/uL  | [facility] |
+-------------------+------------------------+-------------------+-----------------+
| MORPHOLOGY        | 1+                     |                   | [facility] |
|                   | Comment:               |                   |                 |
|                   | ANISO                  |                   |                 |
|                   | 1+                     |                   |                 |
|                   | MACRO                  |                   |                 |
|                   | NORMAL PLT MORPH       |                   |                 |
|                   |                        |                   |                 |
+-------------------+------------------------+-------------------+-----------------+
| Platelet Estimate | DECREASEDComment:      |                   | [facility] |
|                   | Testing performed at   |                   |                 |
 
|                   | [facility];[address]          |                   |                 |
|                   | [address];[place] [address] |                   |                 |
+-------------------+------------------------+-------------------+-----------------+
 
 
 
+----------+
| Specimen |
+----------+
| Blood    |
+----------+
 
 
 
+-------------------+------------------+--------------------+--------------+
| Performing        | Address          | City/State/Zipcode | Phone Number |
| Organization      |                  |                    |              |
+-------------------+------------------+--------------------+--------------+
|   [facility] |   [address] | [place] [address] |              |
+-------------------+------------------+--------------------+--------------+
 C-Reactive Protein (2019  5:39 PM)
 
+-----------+--------------------------+------------+-----------------+
| Component | Value                    | Ref Range  | Performed At    |
+-----------+--------------------------+------------+-----------------+
| CRP       | 0.8 (H)Comment: Testing  | <0.5 mg/dL | [facility] |
|           | performed at [facility];[address]     |            |                 |
|           | [address];[place] [place]   |            |                 |
|           | [id number]                    |            |                 |
+-----------+--------------------------+------------+-----------------+
 
 
 
+----------+
| Specimen |
+----------+
| Blood    |
+----------+
 
 
 
+-------------------+------------------+--------------------+--------------+
| Performing        | Address          | City/State/Zipcode | Phone Number |
| Organization      |                  |                    |              |
+-------------------+------------------+--------------------+--------------+
|   [facility] |   [address] | [place] [address] |              |
+-------------------+------------------+--------------------+--------------+
 XR Toe Right 2 View (2019  5:08 PM)
 
+----------------------------------------------------------------------+--------------+
| Impressions                                                          | Performed At |
+----------------------------------------------------------------------+--------------+
|   No radiographic evidence of osteomyelitis seen.    If further      |   [facility]     |
| assessment  is warranted, consider correlation with MRI.  Potential  | RADIOLOGY    |
| acute fracture through the base of the distal phalanx.  Signed by:   |              |
| Gavin South Date/Time: 2019 5:15 PM                      |              |
+----------------------------------------------------------------------+--------------+
 
 
 
 
+------------------------------------------------------------------------+--------------+
| Narrative                                                              | Performed At |
+------------------------------------------------------------------------+--------------+
|   RIGHT TOE  CLINICAL INFORMATION:  Other (see comments) Pain, concern |   [facility]     |
|  for osteomyelitis.  COMPARISON:  XR FOOT LEFT (2018); XR FOOT   | RADIOLOGY    |
| LEFT (2018); XR FOOT LEFT  (2018);  FINDINGS:  Advanced     |              |
| degenerative changes identified involving the interphalangeal  joint   |              |
| of the 1st digit.    On the lateral view, there appears to be  lucency |              |
|  through the base of the phalanx, potentially reflecting an  acute     |              |
| fracture.    No erosive or destructive changes appreciated to  suggest |              |
|  osteomyelitis.                                                        |              |
+------------------------------------------------------------------------+--------------+
 
 
 
+------------------------------------------------------------------------+
| Procedure Note                                                         |
+------------------------------------------------------------------------+
|   Denny, Rad Results In - 2019  5:18 PM PST  RIGHT TOE             |
| CLINICAL INFORMATION:                                                  |
| Other (see comments) Pain, concern for osteomyelitis.                  |
| COMPARISON:                                                            |
| XR FOOT LEFT (2018); XR FOOT LEFT (2018); XR FOOT LEFT     |
| (2018);                                                           |
| FINDINGS:                                                              |
| Advanced degenerative changes identified involving the interphalangeal |
| joint of the 1st digit.  On the lateral view, there appears to be      |
| lucency through the base of the phalanx, potentially reflecting an     |
| acute fracture.  No erosive or destructive changes appreciated to      |
| suggest osteomyelitis.                                                 |
| IMPRESSION:                                                            |
| No radiographic evidence of osteomyelitis seen.  If further assessment |
| is warranted, consider correlation with MRI.                           |
| Potential acute fracture through the base of the distal phalanx.       |
| Signed by: Gavin South                                                 |
| Sign Date/Time: 2019 5:15 PM                                     |
+------------------------------------------------------------------------+
 
 
 
+--------------------+------------------+--------------------+--------------+
| Performing         | Address          | City/State/Zipcode | Phone Number |
| Organization       |                  |                    |              |
+--------------------+------------------+--------------------+--------------+
|   [facility] |   [address] | [place] [address] |              |
+--------------------+------------------+--------------------+--------------+
 Septic Lactic Acid (2019  4:55 PM)
 
+-------------+--------------------------+------------------+-----------------+
| Component   | Value                    | Ref Range        | Performed At    |
+-------------+--------------------------+------------------+-----------------+
| LACTIC ACID | 2.7 (H)Comment: Testing  | 0.4 - 2.0 mmol/L | [facility] |
|             | performed at [facility];[address]     |                  |                 |
|             | [address];[place]   |                  |                 |
|             | [id number]                    |                  |                 |
+-------------+--------------------------+------------------+-----------------+
 
 
 
+-------------------+------------------+--------------------+--------------+
 
| Performing        | Address          | City/State/Zipcode | Phone Number |
| Organization      |                  |                    |              |
+-------------------+------------------+--------------------+--------------+
|   [facility] |   [address] | [place] [address] |              |
+-------------------+------------------+--------------------+--------------+
 Blood Culture Set 1 (2019  4:55 PM)
 
+----------------------+------------------+-----------+-----------------+
| Component            | Value            | Ref Range | Performed At    |
+----------------------+------------------+-----------+-----------------+
| Specimen Description | BLOOD, LINE DRAW |           | [facility]      |
|                      |                  |           | LABORATORY      |
+----------------------+------------------+-----------+-----------------+
| SPECIAL REQUESTS     | R FOREARM        |           | [facility] |
+----------------------+------------------+-----------+-----------------+
| CULTURE              | NO GROWTH 6 DAYS |           | [facility]      |
|                      |                  |           | LABORATORY      |
+----------------------+------------------+-----------+-----------------+
 
 
 
+---------------------+
| Specimen            |
+---------------------+
| Blood - Blood Line  |
| Draw                |
+---------------------+
 
 
 
+-------------------+-------------------------+---------------------+----------------+
| Performing        | Address                 | City/State/Zipcode  | Phone Number   |
| Organization      |                         |                     |                |
+-------------------+-------------------------+---------------------+----------------+
|   [facility]      |   [address]  | [place] [address] |   [phone] |
| LABORATORY        | [place]                   |                     |                |
+-------------------+-------------------------+---------------------+----------------+
|   [facility] |   [address]        | [place] [address]  |                |
+-------------------+-------------------------+---------------------+----------------+
 ED INFORMATION EXCHANGE (2019  3:21 PM)
 
+------------------------------------------------------------------------+--------------+
| Narrative                                                              | Performed At |
+------------------------------------------------------------------------+--------------+
|   [id number]     Criteria Met         Care        |   ED         |
| Guidelines      10 in      Security and Safety  No recent Security   | INFORMATION  |
| Events currently on file     ED Care Guidelines from [facility] -        | EXCHANGE     |
| [place]  Last Updated: 18 10:16 AM         Other Information:    |              |
| Currently being seen by [facility] in [place] for mental health        |              |
| [phone]  These are guidelines and the provider should    |              |
| exercise clinical judgment when providing care.  ED Care Guidelines    |              |
| from [facility]  Last Updated: 17 10:44 AM         Care |              |
|  Coordination:  This patient has been identified as having at          |              |
| leastEmergency Departments visits in the 12 months immediately         |              |
| preceding the date these guidelines were entered.Patient requires      |              |
| education on appropriate ED usage.Emphasize the importance of using    |              |
| outpatient   medical services for the treatment of chronic conditions. |              |
|   Please contact [facility] Worker[facility]ard at [phone] if   |              |
| patient is seen in ED.  These are guidelines and the provider should   |              |
| exercise clinical judgment when providing care.  These are guidelines  |              |
 
| and the provider should exercise clinical judgment when providing      |              |
| care.           Prescription Drug Report (12 Mo.)  PDMP query found no |              |
|  report.        E.D. Visit Count (12 mo.)  Facility Visits Low Acuity  |              |
|   [facility] 18 0   [facility] 2 0   [facility]   |              |
| [facility] 5 0   Total 25 0   Note: Visits indicate total |              |
|  known visits. Medicaid Low Acuity Dx are the number of primary        |              |
| diagnoses on the Medicaid's Low Acuity dx list.         Recent         |              |
| Emergency Department Visit Summary  Showing 10 most recent visits out  |              |
| of 25 in the past 12 months  Date [facility] State Type Diagnoses   |              |
| or Chief Complaint   2019 [facility]       |              |
| Emergency       2019 [facility]            |              |
| Emergency          Multiple Falls        Referral        Circulatory   |              |
| Problem      2019 [facility] [place] Emergency      |              |
|      ABD PAIN        Other chest pain      2019 [facility]         |              |
| [facility] Emergency          Foot Pain      2019 |              |
|  [facility] Emergency          Chest Pain          |              |
| Nausea        Shortness of Breath        Chest pain, unspecified       |              |
|      Elevated blood-pressure reading, without diagnosis of             |              |
| hypertension        Presence of aortocoronary bypass graft             |              |
| Other specified postprocedural states      2019 [facility]  |              |
| [facility] [place] Emergency          CHEST PAIN        Abnormal levels  |              |
| of other serum enzymes        Personal history of other diseases of    |              |
| the circulatory system        Chest pain, unspecified      2019 |              |
|  [facility] [place] Emergency          FISTULA BLEEDING    |              |
|      Hemorrhage due to vascular prosthetic devices, implants and       |              |
| grafts, initial encounter      Dec 22, 2018 [facility]       |              |
| [place] Emergency          CHEST PAIN        Type 2 diabetes         |              |
| mellitus with hyperglycemia        Chest pain, unspecified      Nov    |              |
| 2018 [facility] [place] Emergency    Chief Complaint:   |              |
| SOB      2018 [facility] [place] Emergency         |              |
|     FALL        Unspecified fall, initial encounter        Dependence  |              |
| on renal dialysis        End stage renal disease            Recent     |              |
| Inpatient Visit Summary  Showing 10 most recent visits out of 11 in    |              |
| the past 12 months  Date [facility] State Type Diagnoses or Chief   |              |
| Complaint   2019 [facility] Vascular       |              |
| Surgery          Foot Pain        End stage renal disease              |              |
| Dependence on renal dialysis        Peripheral vascular disease,       |              |
| unspecified        Anemia in chronic kidney disease        Other       |              |
| disorders of plasma-protein metabolism, not elsewhere classified       |              |
|      Other specified personal risk factors, not elsewhere classified   |              |
|     Dec 27, 2018 [facility] Recovery               |              |
|     Peripheral arterial disease, osteomyelitis left foot        Other  |              |
| acute osteomyelitis, left ankle and foot        Long term (current)    |              |
| use of antibiotics        End stage renal disease        Anemia in     |              |
| chronic kidney disease      Dec 5, 2018 [facility] |              |
|  General Medicine          Peripheral vascular disease, unspecified    |              |
|      End stage renal disease        Dependence on renal dialysis       |              |
|      Long term (current) use of insulin        Other chronic           |              |
| osteomyelitis, left ankle and foot        Type 2 diabetes mellitus     |              |
| with diabetic chronic kidney disease        Essential (primary)        |              |
| hypertension      2018 [facility]          |              |
| Inpatient          Upper GIB        Other chronic osteomyelitis,       |              |
| unspecified ankle and foot        End stage renal disease        Other |              |
|  specified personal risk factors, not elsewhere classified             |              |
| Chronic systolic (congestive) heart failure        Anemia in chronic   |              |
| kidney disease        Other disorders of plasma-protein metabolism,    |              |
| not elsewhere classified        Moderate protein-calorie malnutrition  |              |
|        Other disorders of phosphorus metabolism      2018      |              |
| [facility] [place] [place] [facility]          1. Type 2      |              |
| diabetes mellitus with other specified complication        2. Urinary  |              |
 
| tract infection, site not specified        3. Unspecified              |              |
| protein-calorie malnutrition        4. Other chronic osteomyelitis,    |              |
| unspecified site        5. Hypertensive heart and chronic kidney       |              |
| disease with heart failure and with stage 5 chronic kidney disease, or |              |
|  end stage renal disease        6. Chronic diastolic (congestive)      |              |
| heart failure        7. Other osteomyelitis, ankle and foot        8.  |              |
| Type 2 diabetes mellitus with foot ulcer        9. Atherosclerotic     |              |
| heart disease of native coronary artery without angina pectoris        |              |
|  10. Chronic obstructive pulmonary disease, unspecified      ,    |              |
|  [facility] [place] [facility]          1. Benign |              |
|  paroxysmal vertigo, unspecified ear        2. End stage renal disease |              |
|         3. Hypertensive chronic kidney disease with stage 5 chronic    |              |
| kidney disease or end stage renal disease        4. Urinary tract      |              |
| infection, site not specified        5. Hypertensive heart and chronic |              |
|  kidney disease with heart failure and with stage 5 chronic kidney     |              |
| disease, or end stage renal disease        6. Kidney transplant status |              |
|         7. Unspecified systolic (congestive) heart failure        8.   |              |
| Syncope and collapse        9. Type 2 diabetes mellitus with diabetic  |              |
| chronic kidney disease        10. Atherosclerotic heart disease of     |              |
| native coronary artery without angina pectoris      Sep 15[facility] 2018       |              |
| [facility] [place] [facility]          Acute     |              |
| ischemic heart disease, unspecified        Long term (current) use of  |              |
| insulin        Dependence on renal dialysis        End stage renal     |              |
| disease        Type 2 diabetes mellitus with diabetic chronic kidney   |              |
| disease        Essential (primary) hypertension        Anemia in       |              |
| chronic kidney disease      2018 [facility] |              |
|  Medical Surgical          0. Cough        1. Pneumonia due to         |              |
| Pseudomonas        2. End stage renal disease        3. Hypertensive   |              |
| heart and chronic kidney disease with heart failure and with stage 5   |              |
| chronic kidney disease, or end stage renal disease        4. Cachexia  |              |
|        5. Chronic obstructive pulmonary disease with acute lower       |              |
| respiratory infection        6. Type 2 diabetes mellitus with diabetic |              |
|  chronic kidney disease        7. Heart failure, unspecified        8. |              |
|  Hyperlipidemia, unspecified        9. Gastro-esophageal reflux        |              |
| disease without esophagitis      2018 [facility]       |              |
| [facility] [place] [facility]          0. Other chest pain        1.      |              |
| Gastro-esophageal reflux disease without esophagitis        2. End     |              |
| stage renal disease        3. Hypertensive heart and chronic kidney    |              |
| disease with heart failure and with stage 5 chronic kidney disease, or |              |
|  end stage renal disease        4. Chronic systolic (congestive) heart |              |
|  failure        5. Atherosclerotic heart disease of native coronary    |              |
| artery with other forms of angina pectoris        6. Type 2 diabetes   |              |
| mellitus with diabetic chronic kidney disease        7.                |              |
| Hyperlipidemia, unspecified        8. Major depressive disorder,       |              |
| single episode, unspecified        9. Chronic obstructive pulmonary    |              |
| disease, unspecified      Mar 6, 2018 [facility].     |              |
| [facility]. [place] Cardiology          Heart Failure        Need for iv access  |              |
|        Type 2 diabetes mellitus with other specified complication      |              |
|      Long term (current) use of insulin        Paroxysmal atrial       |              |
| fibrillation        Anemia in chronic kidney disease                   |              |
| Atherosclerotic heart disease of native coronary artery without angina |              |
|  pectoris        Cardiomyopathy, unspecified        End stage renal    |              |
| disease        Other specified postprocedural states            Care   |              |
| Providers  Provider PRC Type Phone Fax Service Dates   HEADINGS, SANTIAGO, |              |
|  F.N.P. Nurse Practitioner: Family     Current      Marco Antonio Martinez     |              |
| /Care Coordinator [phone]    2019 -          |              |
| Current      [facility] Antonio Martinez Primary Care [phone]    2019 |              |
|  - Current      [facility] Primary            |              |
| Care    [phone] Current      [facility]      |              |
| [place] [facility]     Current      MANOLO GONZALEZ Primary Care         |              |
 
| Current      [facility] [phone] |              |
|  [phone] Current      [facility] Case or Care Manager     Current      |              |
|  MIRTA Goel Case or Care Manager [phone]  |              |
| [phone] Current      Jairo Gutierrez MD Other     Current     |              |
|      [employer] Portal  This patient has registered at the [facility]      |              |
| [facility] Emergency Department   For more information    |              |
| visit:                                                                 |              |
| [email or web address] |              |
| [id number]   The above information is provided for the sole purpose of     |              |
| patient treatment. Use of this information beyond the terms of Data    |              |
| Sharing Memorandum of Understanding and License Agreement is           |              |
| prohibited. In certain cases not all visits may be represented.        |              |
| Consult the aforementioned facilities for additional information.      |              |
| 2019 [employer] - [place], [place] -      |              |
| [email or web address]                                         |              |
+------------------------------------------------------------------------+--------------+
 
 
 
+-------------------------------------------------------------------------------------------
--------------------------------------------------------------------------------------------
--------------------+
| Procedure Note                                                                            
                                                                                            
                    |
+-------------------------------------------------------------------------------------------
--------------------------------------------------------------------------------------------
--------------------+
|   [place], Lab - 2019  3:22 PM PST  Formatting of this note may be different      
                                                                                            
                    |
| from the original.[id number]  Care Guidelines  10 in     
                                                                                            
                    |
| 12Security and SafetyNo recent Security Events currently on fileED Care Guidelines from   
                                                                                            
                    |
| [place] Updated: 18 10:16 AM  Other Information:Currently being      
                                                                                            
                    |
| [facility] [place] for [facility] concerns.[phone] are            
                                                                                            
                    |
| guidelines and the provider should exercise clinical judgment when providing care.ED      
                                                                                            
                    |
| Care Guidelines from [facility] Updated: 17 10:44 AM  Care             
                                                                                            
                    |
| Coordination:This patient has been identified as having at leastEmergency Departments     
                                                                                            
                    |
| visits in the 12 months immediately preceding the date these guidelines were              
                                                                                            
                    |
| entered.Patient requires education on appropriate ED usage.Emphasize the importance of    
                                                                                            
                    |
| using outpatient medical services for the treatment of chronic conditions.Please contact  
                                                                                            
 
                    |
|  Community Health WorkerWillard at [phone] if patient is seen in ED.These are      
                                                                                            
                    |
| guidelines and the provider should exercise clinical judgment when providing care.These   
                                                                                            
                    |
| are guidelines and the provider should exercise clinical judgment when providing          
                                                                                            
                    |
| care.Prescription Drug Report (12 Mo.)PDMP query found no report.E.D. Visit Count (12     
                                                                                            
                    |
| mo.)Facility Visits Low Acuity [facility] 18 0 [facility] 2 0    
                                                                                            
                    |
| [facility] 5 0 Total 25 0 Note: Visits indicate total known visits.   
                                                                                            
                    |
| Medicaid Low Acuity Dx are the number of primary diagnoses on the Medicaid's Low Acuity   
                                                                                            
                    |
| dx list.  Recent Emergency Department Visit SummaryShowing 10 most recent visits out of   
                                                                                            
                    |
| 25 in the past 12 monthsDate Facility City State Type Diagnoses or Chief Complaint Feb    
                                                                                            
                    |
| 2019 [facility] [place]. [place] Emergency   2019 [facility] [place]     
                                                                                            
                    |
| [place]. [place] Emergency    Multiple Falls    Referral    Circulatory Problem  2019     
                                                                                            
                    |
| [facility]. [place] Emergency    ABD PAIN    Other chest pain  2019    
                                                                                            
                    |
| [place] Emergency    Foot Pain  2019 [facility] [place]  
                                                                                            
                    |
|  [place] Emergency    Chest Pain    Nausea    Shortness of Breath    Chest pain,        
                                                                                            
                    |
| unspecified    Elevated blood-pressure reading, without diagnosis of hypertension         
                                                                                            
                    |
| Presence of aortocoronary bypass graft    Other specified postprocedural states  Elvis 16,  
                                                                                            
                    |
|  2019 [facility] [place]. [place] Emergency    CHEST PAIN    Abnormal levels of other  
                                                                                            
                    |
|  serum enzymes    Personal history of other diseases of the circulatory system    Chest   
                                                                                            
                    |
| pain, unspecified  2019 [facility] [place] Emergency    FISTULA        
                                                                                            
                    |
| BLEEDING    Hemorrhage due to vascular prosthetic devices, implants and grafts, initial   
                                                                                            
 
                    |
| encounter  Dec 22, 2018 [facility] [place]. [place] Emergency    CHEST PAIN    Type 2  
                                                                                            
                    |
|  diabetes mellitus with hyperglycemia    Chest pain, unspecified  2018 [place]      
                                                                                            
                    |
| [place] Emergency  Chief Complaint: SOB  2018 [facility]       
                                                                                            
                    |
| [facility] [place] [place] Emergency    FALL    Unspecified fall, initial encounter    Dependence   
                                                                                            
                    |
| on renal dialysis    End stage renal disease  Recent Inpatient Visit SummaryShowing 10    
                                                                                            
                    |
| most recent visits out of 11 in the past 12 monthsDate Facility City State Type           
                                                                                            
                    |
| Diagnoses or [facility] [place] [facility]         
                                                                                            
                    |
| Surgery    Foot Pain    End stage renal disease    Dependence on renal dialysis           
                                                                                            
                    |
| Peripheral vascular disease, unspecified    Anemia in chronic kidney disease    Other     
                                                                                            
                    |
| disorders of plasma-protein metabolism, not elsewhere classified    Other specified       
                                                                                            
                    |
| personal risk factors, not elsewhere classified  Dec 27, 2018 [facility]        
                                                                                            
                    |
| [place] Recovery    Peripheral arterial disease, osteomyelitis left foot    Other       
                                                                                            
                    |
| acute osteomyelitis, left ankle and foot    Long term (current) use of antibiotics        
                                                                                            
                    |
| End stage renal disease    Anemia in chronic kidney disease  Dec 5, 2018 [facility]  
                                                                                            
                    |
|  [place] General Medicine    Peripheral vascular disease, unspecified    End       
                                                                                            
                    |
| stage renal disease    Dependence on renal dialysis    Long term (current) use of         
                                                                                            
                    |
| insulin    Other chronic osteomyelitis, left ankle and foot    Type 2 diabetes mellitus   
                                                                                            
                    |
| with diabetic chronic kidney disease    Essential (primary) hypertension  2018    
                                                                                            
                    |
| [facility] [place] [facility]    Upper GIB    Other chronic osteomyelitis,     
                                                                                            
                    |
| unspecified ankle and foot    End stage renal disease    Other specified personal risk    
                                                                                            
 
                    |
| factors, not elsewhere classified    Chronic systolic (congestive) heart failure          
                                                                                            
                    |
| Anemia in chronic kidney disease    Other disorders of plasma-protein metabolism, not     
                                                                                            
                    |
| elsewhere classified    Moderate protein-calorie malnutrition    Other disorders of       
                                                                                            
                    |
| phosphorus metabolism  2018 [place]. [place] Medical Surgical    1.  
                                                                                            
                    |
|  Type 2 diabetes mellitus with other specified complication    2. Urinary tract           
                                                                                            
                    |
| infection, site not specified    3. Unspecified protein-calorie malnutrition    4. Other  
                                                                                            
                    |
|  chronic osteomyelitis, unspecified site    5. Hypertensive heart and chronic kidney      
                                                                                            
                    |
| disease with heart failure and with stage 5 chronic kidney disease, or end stage renal    
                                                                                            
                    |
| disease    6. Chronic diastolic (congestive) heart failure    7. Other osteomyelitis,     
                                                                                            
                    |
| ankle and foot    8. Type 2 diabetes mellitus with foot ulcer    9. Atherosclerotic       
                                                                                            
                    |
| heart disease of native coronary artery without angina pectoris    10. Chronic            
                                                                                            
                    |
| obstructive pulmonary disease, unspecified  2018 [facility] [place] [place]     
                                                                                            
                    |
| Medical Surgical    1. Benign paroxysmal vertigo, unspecified ear    2. End stage renal   
                                                                                            
                    |
| disease    3. Hypertensive chronic kidney disease with stage 5 chronic kidney disease or  
                                                                                            
                    |
|  end stage renal disease    4. Urinary tract infection, site not specified    5.          
                                                                                            
                    |
| Hypertensive heart and chronic kidney disease with heart failure and with stage 5         
                                                                                            
                    |
| chronic kidney disease, or end stage renal disease    6. Kidney transplant status    7.   
                                                                                            
                    |
| Unspecified systolic (congestive) heart failure    8. Syncope and collapse    9. Type 2   
                                                                                            
                    |
| diabetes mellitus with diabetic chronic kidney disease    10. Atherosclerotic heart       
                                                                                            
                    |
| disease of native coronary artery without angina pectoris  Sep 15, 2018 [facility]    
                                                                                            
 
                    |
| [place]. [place] Medical Surgical    Acute ischemic heart disease, unspecified         
                                                                                            
                    |
| Long term (current) use of insulin    Dependence on renal dialysis    End stage renal     
                                                                                            
                    |
| disease    Type 2 diabetes mellitus with diabetic chronic kidney disease    Essential     
                                                                                            
                    |
| (primary) hypertension    Anemia in chronic kidney disease  2018 [facility]            
                                                                                            
                    |
| [facility] [place] [facility]. [place] [facility]    0. Cough    1. Pneumonia due to Pseudomonas   
                                                                                            
                    |
|    2. End stage renal disease    3. Hypertensive heart and chronic kidney disease with    
                                                                                            
                    |
| heart failure and with stage 5 chronic kidney disease, or end stage renal disease    4.   
                                                                                            
                    |
| Cachexia    5. Chronic obstructive pulmonary disease with acute lower respiratory         
                                                                                            
                    |
| infection    6. Type 2 diabetes mellitus with diabetic chronic kidney disease    7.       
                                                                                            
                    |
| Heart failure, unspecified    8. Hyperlipidemia, unspecified    9. Gastro-esophageal      
                                                                                            
                    |
| reflux disease without esophagitis  2018 [facility] [place] [facility]     
                                                                                            
                    |
| Surgical    0. Other chest pain    1. Gastro-esophageal reflux disease without            
                                                                                            
                    |
| esophagitis    2. End stage renal disease    3. Hypertensive heart and chronic kidney     
                                                                                            
                    |
| disease with heart failure and with stage 5 chronic kidney disease, or end stage renal    
                                                                                            
                    |
| disease    4. Chronic systolic (congestive) heart failure    5. Atherosclerotic heart     
                                                                                            
                    |
| disease of native coronary artery with other forms of angina pectoris    6. Type 2        
                                                                                            
                    |
| diabetes mellitus with diabetic chronic kidney disease    7. Hyperlipidemia, unspecified  
                                                                                            
                    |
|     8. Major depressive disorder, single episode, unspecified    9. Chronic obstructive   
                                                                                            
                    |
| pulmonary disease, unspecified  Mar 6, 2018 [facility]        
                                                                                            
                    |
| Cardiology    Heart Failure    Need for iv access    Type 2 diabetes mellitus with other  
                                                                                            
 
                    |
|  specified complication    Long term (current) use of insulin    Paroxysmal atrial        
                                                                                            
                    |
| fibrillation    Anemia in chronic kidney disease    Atherosclerotic heart disease of      
                                                                                            
                    |
| native coronary artery without angina pectoris    Cardiomyopathy, unspecified    End      
                                                                                            
                    |
| stage renal disease    Other specified postprocedural states  [facility]  
                                                                                            
                    |
|  Type Phone Fax Service Dates HEADINGS, CARLOS ALBERTO HENDERSON. Nurse Practitioner: Family           
                                                                                            
                    |
| Current  Marco Antonio Martinez /Care Coordinator [phone]  2019 -       
                                                                                            
                    |
| Current  Marco Antonio Martinez Primary Care [phone]  2019 - Current  [facility]        
                                                                                            
                    |
| [facility] Primary Care  [phone] [facility]  [facility]  
                                                                                            
                    |
|  [facility] Primary Care   Current  MANOLO GONZALEZ Primary Care   Current  [facility],    
                                                                                            
                    |
| [facility] Mental Health Provider [phone] Current  [employer] Case or Care  
                                                                                            
                    |
|  Manager   Current  MIRTA Goel Case or Care Manager [phone]  
                                                                                            
                    |
|  [phone] Current  Jairo Gutierrez MD Other   Current  [employer] PortalThis      
                                                                                            
                    |
| patient has registered at the [facility] Emergency Department For     
                                                                                            
                    |
| more information visit:                                                                   
                                                                                            
                    |
| [email or web address] The above    
                                                                                            
                    |
| information is provided for the sole purpose of patient treatment. Use of this            
                                                                                            
                    |
| information beyond the terms of Data Sharing Memorandum of Understanding and License      
                                                                                            
                    |
| Agreement is prohibited. In certain cases not all visits may be represented. Consult the  
                                                                                            
                    |
|  aforementioned facilities for additional information. 2019 [facility]            
                                                                                            
                    |
| [employer] - [place], [place] - [email or web address]                  
                                                                                            
 
                    |
|   End stage renal disease                                                                 
                                                                                            
                    |
|   Dependence on renal dialysis                                                            
                                                                                            
                    |
|   Long term (current) use of insulin                                                      
                                                                                            
                    |
|   Other chronic osteomyelitis, left ankle and foot                                        
                                                                                            
                    |
|   Type 2 diabetes mellitus with diabetic chronic kidney disease                           
                                                                                            
                    |
|   Essential (primary) hypertension                                                        
                                                                                            
                    |
|                                                                                           
                                                                                            
                    |
|[facility] [place] [place] [facility]                                      
                                                                                            
                    |
|  Upper GIB                                                                                
                                                                                            
                    |
|   Other chronic osteomyelitis, unspecified ankle and foot                                 
                                                                                            
                    |
|   End stage renal disease                                                                 
                                                                                            
                    |
|   Other specified personal risk factors, not elsewhere classified                         
                                                                                            
                    |
|   Chronic systolic (congestive) heart failure                                             
                                                                                            
                    |
|   Anemia in chronic kidney disease                                                        
                                                                                            
                    |
|   Other disorders of plasma-protein metabolism, not elsewhere classified                  
                                                                                            
                    |
|   Moderate protein-calorie malnutrition                                                   
                                                                                            
                    |
|   Other disorders of phosphorus metabolism                                                
                                                                                            
                    |
|                                                                                           
                                                                                            
                    |
|2018 Suzanne Cox. [place] [facility]                                
                                                                                            
                    |
|  1. Type 2 diabetes mellitus with other specified complication                            
                                                                                            
 
                    |
|   2. Urinary tract infection, site not specified                                          
                                                                                            
                    |
|   3. Unspecified protein-calorie malnutrition                                             
                                                                                            
                    |
|   4. Other chronic osteomyelitis, unspecified site                                        
                                                                                            
                    |
|   5. Hypertensive heart and chronic kidney disease with heart failure and with stage 5 chr
onic kidney disease, or end stage renal disease                                             
                    |
|   6. Chronic diastolic (congestive) heart failure                                         
                                                                                            
                    |
|   7. Other osteomyelitis, ankle and foot                                                  
                                                                                            
                    |
|   8. Type 2 diabetes mellitus with foot ulcer                                             
                                                                                            
                    |
|   9. Atherosclerotic heart disease of native coronary artery without angina pectoris      
                                                                                            
                    |
|   10. Chronic obstructive pulmonary disease, unspecified                                  
                                                                                            
                    |
|                                                                                           
                                                                                            
                    |
|2018 Suzanne Cox. [place] [facility]                                 
                                                                                            
                    |
|  1. Benign paroxysmal vertigo, unspecified ear                                            
                                                                                            
                    |
|   2. End stage renal disease                                                              
                                                                                            
                    |
|   3. Hypertensive chronic kidney disease with stage 5 chronic kidney disease or end stage 
renal disease                                                                               
                    |
|   4. Urinary tract infection, site not specified                                          
                                                                                            
                    |
|   5. Hypertensive heart and chronic kidney disease with heart failure and with stage 5 chr
onic kidney disease, or end stage renal disease                                             
                    |
|   6. Kidney transplant status                                                             
                                                                                            
                    |
|   7. Unspecified systolic (congestive) heart failure                                      
                                                                                            
                    |
|   8. Syncope and collapse                                                                 
                                                                                            
                    |
|   9. Type 2 diabetes mellitus with diabetic chronic kidney disease                        
                                                                                            
 
                    |
|   10. Atherosclerotic heart disease of native coronary artery without angina pectoris     
                                                                                            
                    |
|                                                                                           
                                                                                            
                    |
|Sep 15, 2018 [facility] JANEEN Vivar. [place] [facility]                           
                                                                                            
                    |
|  Acute ischemic heart disease, unspecified                                                
                                                                                            
                    |
|   Long term (current) use of insulin                                                      
                                                                                            
                    |
|   Dependence on renal dialysis                                                            
                                                                                            
                    |
|   End stage renal disease                                                                 
                                                                                            
                    |
|   Type 2 diabetes mellitus with diabetic chronic kidney disease                           
                                                                                            
                    |
|   Essential (primary) hypertension                                                        
                                                                                            
                    |
|   Anemia in chronic kidney disease                                                        
                                                                                            
                    |
|                                                                                           
                                                                                            
                    |
|2018 Suzanne Cox. [place] [facility]                                
                                                                                            
                    |
|  0. Cough                                                                                 
                                                                                            
                    |
|   1. Pneumonia due to Pseudomonas                                                         
                                                                                            
                    |
|   2. End stage renal disease                                                              
                                                                                            
                    |
|   3. Hypertensive heart and chronic kidney disease with heart failure and with stage 5 chr
onic kidney disease, or end stage renal disease                                             
                    |
|   4. Cachexia                                                                             
                                                                                            
                    |
|   5. Chronic obstructive pulmonary disease with acute lower respiratory infection         
                                                                                            
                    |
|   6. Type 2 diabetes mellitus with diabetic chronic kidney disease                        
                                                                                            
                    |
|   7. Heart failure, unspecified                                                           
                                                                                            
 
                    |
|   8. Hyperlipidemia, unspecified                                                          
                                                                                            
                    |
|   9. Gastro-esophageal reflux disease without esophagitis                                 
                                                                                            
                    |
|                                                                                           
                                                                                            
                    |
|2018 [facility] Akash Cox. [place] [facility]                                 
                                                                                            
                    |
|  0. Other chest pain                                                                      
                                                                                            
                    |
|   1. Gastro-esophageal reflux disease without esophagitis                                 
                                                                                            
                    |
|   2. End stage renal disease                                                              
                                                                                            
                    |
|   3. Hypertensive heart and chronic kidney disease with heart failure and with stage 5 chr
onic kidney disease, or end stage renal disease                                             
                    |
|   4. Chronic systolic (congestive) heart failure                                          
                                                                                            
                    |
|   5. Atherosclerotic heart disease of native coronary artery with other forms of angina pe
ctoris                                                                                      
                    |
|   6. Type 2 diabetes mellitus with diabetic chronic kidney disease                        
                                                                                            
                    |
|   7. Hyperlipidemia, unspecified                                                          
                                                                                            
                    |
|   8. Major depressive disorder, single episode, unspecified                               
                                                                                            
                    |
|   9. Chronic obstructive pulmonary disease, unspecified                                   
                                                                                            
                    |
|                                                                                           
                                                                                            
                    |
|Mar 6, 2018 [facility] JANEEN Ramsay. [place] [facility]                              
                                                                                            
                    |
|  Heart Failure                                                                            
                                                                                            
                    |
|   Need for iv access                                                                      
                                                                                            
                    |
|   Type 2 diabetes mellitus with other specified complication                              
                                                                                            
                    |
|   Long term (current) use of insulin                                                      
                                                                                            
 
                    |
|   Paroxysmal atrial fibrillation                                                          
                                                                                            
                    |
|   Anemia in chronic kidney disease                                                        
                                                                                            
                    |
|   Atherosclerotic heart disease of native coronary artery without angina pectoris         
                                                                                            
                    |
|   Cardiomyopathy, unspecified                                                             
                                                                                            
                    |
|   End stage renal disease                                                                 
                                                                                            
                    |
|   Other specified postprocedural states                                                   
                                                                                            
                    |
|                                                                                           
                                                                                            
                    |
|                                                                                           
                                                                                            
                    |
|                                                                                           
                                                                                            
                    |
|Care Providers                                                                             
                                                                                            
                    |
|Provider PRC Type Phone Fax Service Dates                                                  
                                                                                            
                    |
|HEADINGS, SANTIAGO, F.N.P. Nurse Practitioner: Family   Current                                
                                                                                            
                    |
|Marco Antonio Martinez /Care Coordinator [phone]  2019 - Current         
                                                                                            
                    |
|[facility] [phone]  2019 - Current                          
                                                                                            
                    |
|[facility]  [phone] Current                   
                                                                                            
                    |
[facility]   Current                                
                                                                                            
                    |
|[facility]   Current                                                        
                                                                                            
                    |
|[facility] [phone] Current                 
                                                                                            
                    |
|[facility] Case or Care Manager   Current                                                   
                                                                                            
                    |
|Willard Ortiz SATISH Case or Care Manager [phone] Current
                                                                                            
 
                    |
|Jairo Gutierrez MD Other   Current                                                       
                                                                                            
                    |
|                                                                                           
                                                                                            
                    |
|Collective Portal                                                                          
                                                                                            
                    |
|This patient has registered at the [facility] Emergency Department     
                                                                                            
                    |
|For more information visit: [email or web address]
[email or web address]                                                                               
                    |
|The above information is provided for the sole purpose of patient treatment. Use of this in
formation beyond the terms of Data Sharing Memorandum of Understanding and License Agreement
 is prohibited. In  |
|certain cases not all visits may be represented. Consult the aforementioned facilities for 
additional information.                                                                     
                    |
|2019 [employer] - [place], [place] - [email or web address]                                                                                       
                    |
+-------------------------------------------------------------------------------------------
--------------------------------------------------------------------------------------------
--------------------+
 
 
 
+-------------------+---------+--------------------+--------------+
| Performing        | Address | City/State/Zipcode | Phone Number |
| Organization      |         |                    |              |
+-------------------+---------+--------------------+--------------+
|   ED INFORMATION  |         |                    |              |
| EXCHANGE          |         |                    |              |
+-------------------+---------+--------------------+--------------+
 in this encounter
 
 Visit Diagnoses
 
 
+-------------------------------------------------------------------------------------+
| Diagnosis                                                                           |
+-------------------------------------------------------------------------------------+
|   Fall in home, initial encounter - Primary                                         |
+-------------------------------------------------------------------------------------+
|   Cellulitis of right toe                                                           |
+-------------------------------------------------------------------------------------+
|   Closed displaced fracture of distal phalanx of right great toe, initial encounter |
+-------------------------------------------------------------------------------------+
|   Generalized weakness                                                              |
+-------------------------------------------------------------------------------------+
|   Other malaise and fatigue                                                         |
+-------------------------------------------------------------------------------------+
|   Fatigue, unspecified type                                                         |
+-------------------------------------------------------------------------------------+
 
 
 
 
 Admitting Diagnoses
 
 
+-------------------------------------------------------------------------------------+
| Diagnosis                                                                           |
+-------------------------------------------------------------------------------------+
|   Generalized weakness                                                              |
+-------------------------------------------------------------------------------------+
|   Other malaise and fatigue                                                         |
+-------------------------------------------------------------------------------------+
|   Closed displaced fracture of distal phalanx of right great toe, initial encounter |
+-------------------------------------------------------------------------------------+
|   Fall in home, initial encounter                                                   |
+-------------------------------------------------------------------------------------+
|   Cellulitis of right toe                                                           |
+-------------------------------------------------------------------------------------+
|   Fatigue, unspecified type                                                         |
+-------------------------------------------------------------------------------------+
 
 
 
 Administered Medications
 
 
+------------------+--------+---------+------+------+------+
| Medication Order | MAR    | Action  | Dose | Rate | Site |
|                  | Action | Date    |      |      |      |
+------------------+--------+---------+------+------+------+
 
 
 
+-----------------------------------+---+
|   acetaminophen (TYLENOL)         |   |
| suppository 650 mg  650 mg,       |   |
| Rectal, Every 6 Hours PRN, Mild   |   |
| Pain (1-3), Fever, Starting Thu   |   |
| 19 at 2229                   |   |
+-----------------------------------+---+
|                                   |   |
+-----------------------------------+---+
|   acetaminophen (TYLENOL) tablet  |   |
| 650 mg  650 mg, Oral, Every 6     |   |
| Hours PRN, Mild Pain (1-3),       |   |
| Fever, Starting Thu 19 at    |   |
| 2229                              |   |
+-----------------------------------+---+
|                                   |   |
+-----------------------------------+---+
 
 
 
+-----------------------------------+-------+----------+--------+---+---+
|   apixaban (ELIQUIS) tablet 2.5   | Given |  | 2.5 mg |   |   |
| mg  2.5 mg, Oral, 2 Times Daily,  |       | 9 23:39  |        |   |   |
| First dose on Thu 19 at 2300 |       | PST      |        |   |   |
+-----------------------------------+-------+----------+--------+---+---+
 
 
 
 
+-------+----------+--------+---+---+
| Given | 2/15/201 | 2.5 mg |   |   |
|       | 9 09:40  |        |   |   |
|       | PST      |        |   |   |
+-------+----------+--------+---+---+
 
 
 
+---+---+
|   |   |
+---+---+
 
 
 
+-----------------------------------+-------+----------+-------+---+---+
|   atorvastatin (LIPITOR) tablet   | Given |  | 80 mg |   |   |
| 80 mg  80 mg, Oral, Nightly,      |       | 9 23:38  |       |   |   |
| First dose on Thu 19 at 2300 |       | PST      |       |   |   |
+-----------------------------------+-------+----------+-------+---+---+
 
 
 
+---+---+
|   |   |
+---+---+
 
 
 
+-----------------------------------+-------+----------+---------+---+---+
|   carvedilol (COREG) tablet 12.5  | Given |  | 12.5 mg |   |   |
| mg  12.5 mg, Oral, 2 Times Daily  |       | 9 23:38  |         |   |   |
| With Meals, First dose on Thu     |       | PST      |         |   |   |
| 19 at 2300                   |       |          |         |   |   |
+-----------------------------------+-------+----------+---------+---+---+
 
 
 
+-------+----------+---------+---+---+
| Given | 2/15/201 | 12.5 mg |   |   |
|       | 9 09:41  |         |   |   |
|       | PST      |         |   |   |
+-------+----------+---------+---+---+
 
 
 
+---+---+
|   |   |
+---+---+
 
 
 
+-----------------------------------+---------+----------+-----+-------+---+
|   cefTAZidime (FORTAZ) 2 g in     | New Bag |  | 2 g | 100   |   |
| sodium chloride (IV) 0.9 % 50 mL  |         | 9 18:05  |     | mL/hr |   |
| IVPB  2 g, Intravenous,           |         | PST      |     |       |   |
| Administer over 30 Minutes, Once, |         |          |     |       |   |
|  Thu 19 at 1700, For 1 dose  |         |          |     |       |   |
+-----------------------------------+---------+----------+-----+-------+---+
 
 
 
 
+-----------------------------------+---+
|                                   |   |
+-----------------------------------+---+
|   dextrose 10 % infusion  at      |   |
| 0-100 mL/hr, Intravenous,         |   |
| Continuous PRN, Hypoglycemia,     |   |
| Starting Thu 19 at , For |   |
|  BG 70 or less run infusion at    |   |
| 100 mL/ hr. Discontinue when BG   |   |
| increases to 100 mg/dl or greater |   |
|  x 2-3 hours and patient is       |   |
| eating. Call provider if BG not   |   |
| maintained after 2-3 hours.       |   |
+-----------------------------------+---+
|                                   |   |
+-----------------------------------+---+
|   dextrose 50 % solution 12 mL    |   |
| 12 mL, Intravenous, PRN,          |   |
| Hypoglycemia (BG < 70 mg/dL),     |   |
| Starting Thu 19 at       |   |
+-----------------------------------+---+
|                                   |   |
+-----------------------------------+---+
|   dextrose 50 % solution 25 mL    |   |
| 25 mL, Intravenous, PRN,          |   |
| Hypoglycemia (BG < 70 mg/dL),     |   |
| Starting Thu 19 at       |   |
+-----------------------------------+---+
|                                   |   |
+-----------------------------------+---+
|   glucagon (GLUCAGEN) injection   |   |
| 0.5 mg  0.5 mg, Intramuscular,    |   |
| PRN, Hypoglycemia, (BG < 70       |   |
| mg/dL), Starting Thu 19 at   |   |
|                               |   |
+-----------------------------------+---+
|                                   |   |
+-----------------------------------+---+
|   glucagon (GLUCAGEN) injection 1 |   |
|  mg  1 mg, Intramuscular, PRN,    |   |
| Hypoglycemia, (BG < 70 mg/dL),    |   |
| Starting Thu 19 at       |   |
+-----------------------------------+---+
|                                   |   |
+-----------------------------------+---+
 
 
 
+-----------------------------------+-------+----------+----------+---+---+
|   HYDROcodone-acetaminophen       | Given |  | 1 tablet |   |   |
| (NORCO) 5-325 MG per tablet 1     |       | 9 17:51  |          |   |   |
| tablet  1 tablet, Oral, Once, Thu |       | PST      |          |   |   |
|  19 at 1740, For 1 dose      |       |          |          |   |   |
+-----------------------------------+-------+----------+----------+---+---+
 
 
 
+---+---+
|   |   |
 
+---+---+
 
 
 
+-----------------------------------+-------+----------+----------+---+---+
|   HYDROcodone-acetaminophen       | Given |  | 1 tablet |   |   |
| (NORCO) 5-325 MG per tablet 1     |       | 9 23:39  |          |   |   |
| tablet  1 tablet, Oral, Every 4   |       | PST      |          |   |   |
| Hours PRN, Moderate Pain (4-6),   |       |          |          |   |   |
| Severe Pain (7-10), Starting Thu  |       |          |          |   |   |
| 19 at                    |       |          |          |   |   |
+-----------------------------------+-------+----------+----------+---+---+
 
 
 
+-------+----------+----------+---+---+
| Given | 2/15/201 | 1 tablet |   |   |
|       | 9 04:30  |          |   |   |
|       | PST      |          |   |   |
+-------+----------+----------+---+---+
| Given | 2/15/201 | 1 tablet |   |   |
|       | 9 09:39  |          |   |   |
|       | PST      |          |   |   |
+-------+----------+----------+---+---+
 
 
 
+---+---+
|   |   |
+---+---+
 
 
 
+-----------------------------------+-------+----------+----------+---+---+
|   insulin glargine (LANTUS)       | Given | 2/15/201 | 21 Units |   |   |
| injection 21 Units  21 Units,     |       | 9 00:29  |          |   |   |
| Subcutaneous, Nightly, First dose |       | PST      |          |   |   |
|  on Fri 2/15/19 at 0000           |       |          |          |   |   |
+-----------------------------------+-------+----------+----------+---+---+
 
 
 
+---+---+
|   |   |
+---+---+
 
 
 
+-----------------------------------+-------+----------+---------+---+---+
|   insulin lispro (human)          | Given |  | 3 Units |   |   |
| (HUMALOG) injection 0-10 Units    |       | 9 23:36  |         |   |   |
| 0-10 Units, Subcutaneous, 3 Times |       | PST      |         |   |   |
|  Daily Before Meals, First dose   |       |          |         |   |   |
| on Thu 19 at 2300            |       |          |         |   |   |
+-----------------------------------+-------+----------+---------+---+---+
 
 
 
+-------+----------+---------+---+---+
| Given | 2/15/201 | 3 Units |   |   |
 
|       | 9 12:25  |         |   |   |
|       | PST      |         |   |   |
+-------+----------+---------+---+---+
 
 
 
+---+---+
|   |   |
+---+---+
 
 
 
+-----------------------------------+-------+----------+---------+---+---+
|   insulin lispro (human)          | Given |  | 1 Units |   |   |
| (HUMALOG) injection 0-5 Units     |       | 9 23:36  |         |   |   |
| 0-5 Units, Subcutaneous, Nightly, |       | PST      |         |   |   |
|  First dose on Thu 19 at     |       |          |         |   |   |
| 2300                              |       |          |         |   |   |
+-----------------------------------+-------+----------+---------+---+---+
 
 
 
+---+---+
|   |   |
+---+---+
 
 
 
+-----------------------------------+-------+----------+-------+---+---+
|   isosorbide mononitrate 24 hr    | Given | 2/15/201 | 60 mg |   |   |
| tablet 60 mg  60 mg, Oral, Daily, |       | 9 09:40  |       |   |   |
|  First dose on Fri 2/15/19 at     |       | PST      |       |   |   |
| 0900                              |       |          |       |   |   |
+-----------------------------------+-------+----------+-------+---+---+
 
 
 
+---+---+
|   |   |
+---+---+
 
 
 
+-----------------------------------+-------+----------+------+---+---+
|   LORazepam (ATIVAN) tablet 1 mg  | Given | 2/15/201 | 1 mg |   |   |
|  1 mg, Oral, Every 8 Hours PRN,   |       | 9 13:33  |      |   |   |
| Anxiety, Starting Fri 2/15/19 at  |       | PST      |      |   |   |
| 1247                              |       |          |      |   |   |
+-----------------------------------+-------+----------+------+---+---+
 
 
 
+---+---+
|   |   |
+---+---+
 
 
 
+-----------------------------------+-------+----------+--------+---+---+
|   losartan (COZAAR) tablet 100 mg | Given | 2/15/201 | 100 mg |   |   |
 
|   100 mg, Oral, Daily, First dose |       | 9 09:41  |        |   |   |
|  on Fri 2/15/19 at 0900           |       | PST      |        |   |   |
+-----------------------------------+-------+----------+--------+---+---+
 
 
 
+---+---+
|   |   |
+---+---+
 
 
 
+-----------------------------------+-------+----------+-------+---+---+
|   nortriptyline (PAMELOR) capsule | Given | 2/15/201 | 25 mg |   |   |
|  25 mg  25 mg, Oral, Every        |       | 9 09:39  |       |   |   |
| Morning, First dose on Fri        |       | PST      |       |   |   |
| 2/15/19 at 0900                   |       |          |       |   |   |
+-----------------------------------+-------+----------+-------+---+---+
 
 
 
+---+---+
|   |   |
+---+---+
 
 
 
+-----------------------------------+-------+----------+-------+---+---+
|   nortriptyline (PAMELOR) capsule | Given | 2/15/201 | 75 mg |   |   |
|  75 mg  75 mg, Oral, Nightly,     |       | 9 00:30  |       |   |   |
| First dose on Fri 2/15/19 at 0000 |       | PST      |       |   |   |
+-----------------------------------+-------+----------+-------+---+---+
 
 
 
+-----------------------------------+---+
|                                   |   |
+-----------------------------------+---+
|   ondansetron (ZOFRAN) injection  |   |
| 4 mg  4 mg, Intravenous, Every 6  |   |
| Hours PRN, Nausea, Vomiting,      |   |
| Starting Thu 19 at 2229      |   |
+-----------------------------------+---+
|                                   |   |
+-----------------------------------+---+
|   ondansetron (ZOFRAN-ODT)        |   |
| disintegrating tablet 4 mg  4 mg, |   |
|  Oral, Every 6 Hours PRN, Nausea, |   |
|  Vomiting, Starting Thu 19   |   |
| at 2229                           |   |
+-----------------------------------+---+
|                                   |   |
+-----------------------------------+---+
 
 
 
+-----------------------------------+-------+----------+--------+---+---+
|   oxybutynin (DITROPAN) tablet    | Given |  | 7.5 mg |   |   |
| 7.5 mg  7.5 mg, Oral, 2 Times     |       | 9 23:38  |        |   |   |
| Daily, First dose on Thu 19  |       | PST      |        |   |   |
 
| at 2300                           |       |          |        |   |   |
+-----------------------------------+-------+----------+--------+---+---+
 
 
 
+-------+----------+--------+---+---+
| Given | 2/15/201 | 7.5 mg |   |   |
|       | 9 09:39  |        |   |   |
|       | PST      |        |   |   |
+-------+----------+--------+---+---+
 
 
 
+---+---+
|   |   |
+---+---+
 
 
 
+-----------------------------------+-------+----------+-------+---+---+
|   pantoprazole (PROTONIX) EC      | Given | 2/15/201 | 40 mg |   |   |
| tablet 40 mg  40 mg, Oral, Every  |       | 9 06:25  |       |   |   |
| Morning Before Breakfast, First   |       | PST      |       |   |   |
| dose on Fri 2/15/19 at 0630       |       |          |       |   |   |
+-----------------------------------+-------+----------+-------+---+---+
 
 
 
+---+---+
|   |   |
+---+---+
 
 
 
+-----------------------------------+-------+----------+---------+---+---+
|   rOPINIRole (REQUIP) tablet 0.75 | Given |  | 0.75 mg |   |   |
|  mg  0.75 mg, Oral, Nightly,      |       | 9 23:39  |         |   |   |
| First dose on Thu 19 at 2200 |       | PST      |         |   |   |
+-----------------------------------+-------+----------+---------+---+---+
 
 
 
+---+---+
|   |   |
+---+---+
 
 
 
+-----------------------------------+-------+----------+--------+---+---+
|   sodium chloride (PF) 0.9 %      | Given |  | 10 mLs |   |   |
| flush 10 mL  10 mL, Intravenous,  |       | 9 23:39  |        |   |   |
| Every 8 Hours, First dose on Thu  |       | PST      |        |   |   |
| 19 at 2300                   |       |          |        |   |   |
+-----------------------------------+-------+----------+--------+---+---+
 
 
 
+-------+----------+--------+---+---+
| Given | 2/15/201 | 10 mLs |   |   |
|       | 9 06:26  |        |   |   |
 
|       | PST      |        |   |   |
+-------+----------+--------+---+---+
 
 
 
+---+---+
|   |   |
+---+---+
 
 
 
+----------------------------------+---------+----------+----------+---+---+
|   vancomycin (VANCOCIN) 1500     | New Bag |  | 1,500 mg |   |   |
| mg/250 mL IVPB  1,500 mg,        |         | 9 18:41  |          |   |   |
| Intravenous, Administer over 90  |         | PST      |          |   |   |
| Minutes, Once, Thu 19 at    |         |          |          |   |   |
| 1700, For 1 dose                 |         |          |          |   |   |
+----------------------------------+---------+----------+----------+---+---+
 
 
 
+---+---+
|   |   |
+---+---+
 in this encounter Requested Prescriptions     Pending Prescriptions Disp Refills    HYDROcodone-acetaminophen (NORCO) 5-325 MG per tablet 90 tablet 0     Sig: Take 1 tablet by mouth 3 times daily as needed for Pain for up to 30 days. Intended supply: 30 days. Take lowest dose possible to manage pain Max Daily Amount: 3 tablets    fluticasone (FLONASE) 50 MCG/ACT nasal spray 16 g 3     Si sprays by Each Nostril route daily      Next palliative care appointment is 25

## 2025-02-06 DIAGNOSIS — F41.9 ANXIETY: ICD-10-CM

## 2025-02-06 NOTE — TELEPHONE ENCOUNTER
Please approve or deny request. Thank you!    Rx requested:  Requested Prescriptions     Pending Prescriptions Disp Refills    LORazepam (ATIVAN) 1 MG tablet [Pharmacy Med Name: LORAZEPAM 1MG TABS] 30 tablet 0     Sig: Take 1 tablet by mouth nightly.         Last Office Visit:   Visit date not found      Next Visit Date:  Future Appointments   Date Time Provider Department Center   2/14/2025  9:00 AM Khushi Harvey APRN - CNP [facility]   2/19/2025 11:00 AM Billy Akhtar MD [facility]   3/7/2025  1:30 PM SCHEDULE, [facility] NUR[facility]   3/20/2025 10:15 AM Eyal Ordonez MD [place]   5/8/2025 11:15 AM Laurel Thomas MD [facility]   5/13/2025  2:30 PM Gage Zepeda MD [facility]   8/1/2025  8:30 AM Teri Jolly MD [facility]

## 2025-02-07 ENCOUNTER — TELEMEDICINE (OUTPATIENT)
Dept: HEMATOLOGY/ONCOLOGY | Facility: CLINIC | Age: 70
End: 2025-02-07
Payer: COMMERCIAL

## 2025-02-07 DIAGNOSIS — R97.0 ELEVATED CARCINOEMBRYONIC ANTIGEN (CEA): ICD-10-CM

## 2025-02-07 DIAGNOSIS — C61 PROSTATE CANCER (MULTI): ICD-10-CM

## 2025-02-07 DIAGNOSIS — J18.9 PNEUMONIA OF RIGHT MIDDLE LOBE DUE TO INFECTIOUS ORGANISM: ICD-10-CM

## 2025-02-07 DIAGNOSIS — J44.9 CHRONIC OBSTRUCTIVE PULMONARY DISEASE, UNSPECIFIED COPD TYPE (MULTI): ICD-10-CM

## 2025-02-07 DIAGNOSIS — F41.9 ANXIETY: ICD-10-CM

## 2025-02-07 DIAGNOSIS — C01 CANCER OF BASE OF TONGUE (MULTI): ICD-10-CM

## 2025-02-07 DIAGNOSIS — E03.9 PRIMARY HYPOTHYROIDISM: ICD-10-CM

## 2025-02-07 DIAGNOSIS — E78.2 MIXED HYPERLIPIDEMIA: ICD-10-CM

## 2025-02-07 DIAGNOSIS — R91.1 LUNG NODULE: ICD-10-CM

## 2025-02-07 DIAGNOSIS — G89.3 CANCER ASSOCIATED PAIN: Primary | ICD-10-CM

## 2025-02-07 DIAGNOSIS — J18.9 PNEUMONIA DUE TO INFECTIOUS ORGANISM, UNSPECIFIED LATERALITY, UNSPECIFIED PART OF LUNG: ICD-10-CM

## 2025-02-07 RX ORDER — AMOXICILLIN AND CLAVULANATE POTASSIUM 875; 125 MG/1; MG/1
875 TABLET, FILM COATED ORAL 2 TIMES DAILY
Qty: 20 TABLET | Refills: 0 | Status: SHIPPED | OUTPATIENT
Start: 2025-02-07

## 2025-02-07 RX ORDER — LORAZEPAM 1 MG/1
1 TABLET ORAL NIGHTLY
Qty: 30 TABLET | Refills: 0 | Status: SHIPPED | OUTPATIENT
Start: 2025-02-07 | End: 2025-03-07

## 2025-02-07 ASSESSMENT — ENCOUNTER SYMPTOMS
NEUROLOGICAL NEGATIVE: 1
GASTROINTESTINAL NEGATIVE: 1
EYES NEGATIVE: 1
MUSCULOSKELETAL NEGATIVE: 1
CARDIOVASCULAR NEGATIVE: 1
ENDOCRINE NEGATIVE: 1
HEMATOLOGIC/LYMPHATIC NEGATIVE: 1
COUGH: 1
PSYCHIATRIC NEGATIVE: 1
FATIGUE: 1

## 2025-02-07 NOTE — PROGRESS NOTES
Patient ID: Miguel Lofton is a 69 y.o. male.  Referring Physician: Iván Lucero MD  [address] [facility] [address] [place] [address] [place] [address]  Primary Care Provider: Terrence Graham MD  Visit Type:  Follow Up     Verbal consent was requested and obtained from patient on this date for a telehealth visit.    Subjective    HPI How was my CT scan?    Review of Systems   Constitutional:  Positive for fatigue.   HENT:  Negative.     Eyes: Negative.    Respiratory:  Positive for cough.         Sputum   Cardiovascular: Negative.    Gastrointestinal: Negative.    Endocrine: Negative.    Genitourinary: Negative.     Musculoskeletal: Negative.    Skin: Negative.    Neurological: Negative.    Hematological: Negative.    Psychiatric/Behavioral: Negative.          Objective   BSA: There is no height or weight on file to calculate BSA.  There were no vitals taken for this visit.     has a past medical history of Hypothyroidism, unspecified, Personal history of other endocrine, nutritional and metabolic disease, and Personal history of other mental and behavioral disorders.   has a past surgical history that includes Hernia repair (08/29/2018) and Vasectomy (08/29/2018).  No family history on file.  Oncology History   Prostate cancer (Multi)   11/29/2023 Initial Diagnosis    Prostate cancer (CMS/[facility])     11/29/2023 Cancer Staged    Staging form: Prostate, AJCC 8th Edition, Pathologic stage from 11/29/2023: Stage IIC (rpT2, pN0, cM0, PSA: 0, Grade Group: 3) - Signed by Britney Rangel MD on 12/1/2023         Miguel Lofton  reports that he has quit smoking. His smoking use included cigarettes. He has never used smokeless tobacco.  He  reports that he does not currently use alcohol.  He  reports no history of drug use.    Physical Exam    WBC   Date/Time Value Ref Range Status   01/22/2025 09:58 AM 6.6 4.4 - 11.3 x10*3/uL Final   07/24/2024 12:56 PM 11.2 4.4 - 11.3 x10*3/uL Final   01/24/2024 10:49 AM 6.5 4.4 - 11.3 x10*3/uL  "Final     nRBC   Date Value Ref Range Status   11/17/2018 0.0 0.0 - 0.0 /100 WBC Final   11/07/2018 0.0 0.0 - 0.0 /100 WBC Final     RBC   Date Value Ref Range Status   01/22/2025 3.82 (L) 4.50 - 5.90 x10*6/uL Final   07/24/2024 3.81 (L) 4.50 - 5.90 x10*6/uL Final   01/24/2024 4.49 (L) 4.50 - 5.90 x10*6/uL Final     Hemoglobin   Date Value Ref Range Status   01/22/2025 10.5 (L) 13.5 - 17.5 g/dL Final   07/24/2024 11.1 (L) 13.5 - 17.5 g/dL Final   01/24/2024 12.7 (L) 13.5 - 17.5 g/dL Final     Hematocrit   Date Value Ref Range Status   01/22/2025 34.7 (L) 41.0 - 52.0 % Final   07/24/2024 35.8 (L) 41.0 - 52.0 % Final   01/24/2024 40.1 (L) 41.0 - 52.0 % Final     MCV   Date/Time Value Ref Range Status   01/22/2025 09:58 AM 91 80 - 100 fL Final   07/24/2024 12:56 PM 94 80 - 100 fL Final   01/24/2024 10:49 AM 89 80 - 100 fL Final     MCH   Date/Time Value Ref Range Status   01/22/2025 09:58 AM 27.5 26.0 - 34.0 pg Final   07/24/2024 12:56 PM 29.1 26.0 - 34.0 pg Final   01/24/2024 10:49 AM 28.3 26.0 - 34.0 pg Final     MCHC   Date/Time Value Ref Range Status   01/22/2025 09:58 AM 30.3 (L) 32.0 - 36.0 g/dL Final   07/24/2024 12:56 PM 31.0 (L) 32.0 - 36.0 g/dL Final   01/24/2024 10:49 AM 31.7 (L) 32.0 - 36.0 g/dL Final     RDW   Date/Time Value Ref Range Status   01/22/2025 09:58 AM 15.0 (H) 11.5 - 14.5 % Final   07/24/2024 12:56 PM 14.6 (H) 11.5 - 14.5 % Final   01/24/2024 10:49 AM 13.8 11.5 - 14.5 % Final     Platelets   Date/Time Value Ref Range Status   01/22/2025 09:58  150 - 450 x10*3/uL Final   07/24/2024 12:56  150 - 450 x10*3/uL Final   01/24/2024 10:49  150 - 450 x10*3/uL Final     No results found for: \"MPV\"  Neutrophils %   Date/Time Value Ref Range Status   01/22/2025 09:58 AM 71.8 40.0 - 80.0 % Final   07/24/2024 12:56 PM 91.1 40.0 - 80.0 % Final   01/24/2024 10:49 AM 65.0 40.0 - 80.0 % Final     Immature Granulocytes %, Automated   Date/Time Value Ref Range Status   01/22/2025 09:58 AM 0.2 " 0.0 - 0.9 % Final     Comment:     Immature Granulocyte Count (IG) includes promyelocytes, myelocytes and metamyelocytes but does not include bands. Percent differential counts (%) should be interpreted in the context of the absolute cell counts (cells/UL).   07/24/2024 12:56 PM 0.5 0.0 - 0.9 % Final     Comment:     Immature Granulocyte Count (IG) includes promyelocytes, myelocytes and metamyelocytes but does not include bands. Percent differential counts (%) should be interpreted in the context of the absolute cell counts (cells/UL).   01/24/2024 10:49 AM 0.2 0.0 - 0.9 % Final     Comment:     Immature Granulocyte Count (IG) includes promyelocytes, myelocytes and metamyelocytes but does not include bands. Percent differential counts (%) should be interpreted in the context of the absolute cell counts (cells/UL).     Lymphocytes %   Date/Time Value Ref Range Status   01/22/2025 09:58 AM 11.9 13.0 - 44.0 % Final   07/24/2024 12:56 PM 3.8 13.0 - 44.0 % Final   01/24/2024 10:49 AM 16.0 13.0 - 44.0 % Final     Monocytes %   Date/Time Value Ref Range Status   01/22/2025 09:58 AM 10.1 2.0 - 10.0 % Final   07/24/2024 12:56 PM 3.5 2.0 - 10.0 % Final   01/24/2024 10:49 AM 10.1 2.0 - 10.0 % Final     Eosinophils %   Date/Time Value Ref Range Status   01/22/2025 09:58 AM 5.7 0.0 - 6.0 % Final   07/24/2024 12:56 PM 0.9 0.0 - 6.0 % Final   01/24/2024 10:49 AM 7.9 0.0 - 6.0 % Final     Basophils %   Date/Time Value Ref Range Status   01/22/2025 09:58 AM 0.3 0.0 - 2.0 % Final   07/24/2024 12:56 PM 0.2 0.0 - 2.0 % Final   01/24/2024 10:49 AM 0.8 0.0 - 2.0 % Final     Neutrophils Absolute   Date/Time Value Ref Range Status   01/22/2025 09:58 AM 4.77 1.20 - 7.70 x10*3/uL Final     Comment:     Percent differential counts (%) should be interpreted in the context of the absolute cell counts (cells/uL).   07/24/2024 12:56 PM 10.18 (H) 1.20 - 7.70 x10*3/uL Final     Comment:     Percent differential counts (%) should be interpreted in  "the context of the absolute cell counts (cells/uL).   01/24/2024 10:49 AM 4.20 1.20 - 7.70 x10*3/uL Final     Comment:     Percent differential counts (%) should be interpreted in the context of the absolute cell counts (cells/uL).     Immature Granulocytes Absolute, Automated   Date/Time Value Ref Range Status   01/22/2025 09:58 AM 0.01 0.00 - 0.70 x10*3/uL Final   07/24/2024 12:56 PM 0.06 0.00 - 0.70 x10*3/uL Final   01/24/2024 10:49 AM 0.01 0.00 - 0.70 x10*3/uL Final     Lymphocytes Absolute   Date/Time Value Ref Range Status   01/22/2025 09:58 AM 0.79 (L) 1.20 - 4.80 x10*3/uL Final   07/24/2024 12:56 PM 0.42 (L) 1.20 - 4.80 x10*3/uL Final   01/24/2024 10:49 AM 1.03 (L) 1.20 - 4.80 x10*3/uL Final     Monocytes Absolute   Date/Time Value Ref Range Status   01/22/2025 09:58 AM 0.67 0.10 - 1.00 x10*3/uL Final   07/24/2024 12:56 PM 0.39 0.10 - 1.00 x10*3/uL Final   01/24/2024 10:49 AM 0.65 0.10 - 1.00 x10*3/uL Final     Eosinophils Absolute   Date/Time Value Ref Range Status   01/22/2025 09:58 AM 0.38 0.00 - 0.70 x10*3/uL Final   07/24/2024 12:56 PM 0.10 0.00 - 0.70 x10*3/uL Final   01/24/2024 10:49 AM 0.51 0.00 - 0.70 x10*3/uL Final     Basophils Absolute   Date/Time Value Ref Range Status   01/22/2025 09:58 AM 0.02 0.00 - 0.10 x10*3/uL Final   07/24/2024 12:56 PM 0.02 0.00 - 0.10 x10*3/uL Final   01/24/2024 10:49 AM 0.05 0.00 - 0.10 x10*3/uL Final       No components found for: \"PT\"  aPTT   Date/Time Value Ref Range Status   11/07/2018 02:10 PM 32 28 - 38 sec Final     Comment:      Note new reference range as of 10/23/2018.    THE APTT IS NO LONGER USED FOR MONITORING     UNFRACTIONATED HEPARIN THERAPY.    FOR MONITORING HEPARIN THERAPY,     USE THE HEPARIN ASSAY.       Medication Documentation Review Audit       Reviewed by Iván Lucero MD (Physician) on 01/23/25 at 1721      Medication Order Taking? Sig Documenting Provider Last Dose Status   acetaminophen (Tylenol) 500 mg tablet [id number] Yes Take 2 tablets " (1,000 mg) by mouth. Historical Provider, MD  Active   ascorbic acid (Vitamin C) 500 mg tablet [id number] Yes Take 1 tablet (500 mg) by mouth once daily. Historical Provider, MD  Active   aspirin 81 mg EC tablet [id number] Yes Take 1 tablet (81 mg) by mouth once daily. Historical Provider, MD  Active   atorvastatin (Lipitor) 80 mg tablet [id number] Yes Take 1 tablet (80 mg) by mouth once daily. Historical Provider, MD  Active   cholecalciferol (Vitamin D-3) 25 MCG (1000 UT) tablet [id number] Yes Take 1 tablet (25 mcg) by mouth once daily. Historical Provider, MD  Active   cyanocobalamin (Vitamin B-12) 1,000 mcg tablet [id number] Yes Take 1 tablet (1,000 mcg) by mouth once daily. Historical Provider, MD  Active   diclofenac sodium (Voltaren) 1 % gel gel [id number] Yes Apply topically. Historical Provider, MD  Active   HYDROcodone-acetaminophen (Norco) 5-325 mg tablet [id number] Yes Take 1 tablet by mouth every 6 hours if needed. Historical Provider, MD  Active   levothyroxine (Synthroid, Levoxyl) 75 mcg tablet [id number] Yes Take 1 tablet (75 mcg) by mouth once daily. Historical Provider, MD  Active   LORazepam (Ativan) 1 mg tablet [id number]  TAKE ONE TABLET BY MOUTH EVERY DAY NIGHTLY AS NEEDED FOR ANXIETY Historical Provider, MD   23 235   LORazepam (Ativan) 1 mg tablet [id number] Yes Take 1 tablet (1 mg) by mouth 2 times a day as needed (Anxiety prior to radiation therapy). Britney Rangel MD  Active   metoprolol succinate XL (Toprol-XL) 25 mg 24 hr tablet [id number] Yes Take 1 tablet (25 mg) by mouth once daily. Do not crush or chew. Historical Provider, MD  Active   omeprazole (PriLOSEC) 20 mg DR capsule [id number] Yes Take 1 capsule (20 mg) by mouth. Historical Provider, MD  Active   sildenafil (Viagra) 100 mg tablet [id number] Yes Take 1 tablet (100 mg) by mouth once daily as needed. Historical Provider, MD  Active   tiotropium (Spiriva with HandiHaler) 18 mcg inhalation capsule [id number] Yes Place into inhaler  and inhale. 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, p16+  -completed course of chemoradiation by end of 10/2022  -here for interval followup  -saw Dr Gamez 9/25/2024-exam: normal appearance to laryngeal structures including true cords, false cords, epiglottis, base of tongue and piriform sinus  -labs to be done today include CBC, COMP, CEA, TSH  -results reviewed-wbc 6.6, hgb 10.5 plt 271,000, creatinine 0.7, calcium 9.2, AST 11, ALT 8, CEA 2.3, TSH 2.59  -continues to followup with Dr Gamez--last seen 12/18/2024: laryngeal structures are noted for grossly normal appearance; true vocal cords, false vocal cords, remaining structures, including epiglottis, piriform sinuses and base of tongue are all grossly normal; there is no evidence of gross mass nodule, polyp, tumor or other defect  -continues to consume a lot of pastries, cookies, and pie, everything garnished with whipped cream  -he had a barium swallow done on 1/16/2025--was told he aspirates  -he continues to cough significantly with production of phlegm--sometimes yellow  -he thinks he may have a pneumonia, he is concerned about his chest  -will check new chest CT scan  -will continue to see him Q6 months  -here for interval followup via telephone  -still coughing, productive of sputum  -CT chest done on 2/5/2025 reviewed-new consolidation with air bronchograms and volume loss in the right middle lobe; additional areas of diffuse tree-in-bud opacities are seen throughout the right upper and lower lobes; mild to moderate centrilobular and paraseptal emphysematous changes noted; subcentimeter mediastinal lymph nodes nonenlarged by size criteria  -he was previously prescribed doxycycline  -will prescribe for him 10 day course of augmentin BID  -will see him again in 6 months        2) cancer pain  -follows with  [facility] palliative care team  -on norco PRN     3) anxiety  -on ativan PRN     4) hyperlipidemia  -on atorvastatin     5) hypothyroidism  -on levothyroxine     6) COPD  -on spiriva     7) prostate cancer  -s/p radical prostatectomy by Dr Lang Mcneil in 2018  -[id number], [place] 7, + right anterior margin  -most recent PSA 0.03  -PSMA-PET done on 12/6/2023 showed no PSMA uptake within the post-surgical bed; there is PSMA uptake of a left common iliac retroperitoneal lymph node SUV 5.6 and PSMA uptake of a right pelvic sidewall lymph node SUV 3.5  -he is now following with urologist Dr Boyd  -Dr Rangel recommended radiation therapy  -Pierce instead went to [facility] and was treated by Dr Zackary Ely (4600 cGy in 23 fractions to the pelvis and LN followed by [place] to prostate bed and LN to 2500 cGy in 10 fractions)  -most recent PSA done on 7/8/2024 at [facility] was <0.01  -since completing radiation, has been experiencing significant fatigue  -8/12/2024 PSA <0.01     8) copd  -follows with pulmonologist Dr Hutchison  -on breztri  -on symbicort  -5/16/2024 chest CT: new since 2003 bronchiectasis with reticulation of lung markings in the right middle lobe and reticulonodularity in the lateral aspect of the right lower lobe; additionally new 5 mm ground glass centrilobular nodules seen in LLL  -9/16/2024 chest CT: resolution of previously seen ground glass nodule in LLL  -Pierce was told that he now only needs to see Dr Hutchison once a year          Problem List Items Addressed This Visit             ICD-10-CM    Cancer of base of tongue (Multi) C01            Iván Lucero MD

## 2025-02-14 ENCOUNTER — OFFICE VISIT (OUTPATIENT)
Dept: PALLATIVE CARE | Age: 70
End: 2025-02-14
Payer: COMMERCIAL

## 2025-02-14 VITALS
TEMPERATURE: 97.3 F | OXYGEN SATURATION: 97 % | DIASTOLIC BLOOD PRESSURE: 72 MMHG | WEIGHT: 200.2 LBS | HEART RATE: 67 BPM | SYSTOLIC BLOOD PRESSURE: 125 MMHG | BODY MASS INDEX: 29.56 KG/M2

## 2025-02-14 DIAGNOSIS — M47.817 LUMBOSACRAL SPONDYLOSIS WITHOUT MYELOPATHY: ICD-10-CM

## 2025-02-14 DIAGNOSIS — J18.9 PNEUMONIA DUE TO INFECTIOUS ORGANISM, UNSPECIFIED LATERALITY, UNSPECIFIED PART OF LUNG: ICD-10-CM

## 2025-02-14 DIAGNOSIS — Z51.5 PALLIATIVE CARE ENCOUNTER: ICD-10-CM

## 2025-02-14 DIAGNOSIS — J44.9 CHRONIC OBSTRUCTIVE PULMONARY DISEASE, UNSPECIFIED COPD TYPE (HCC): Primary | ICD-10-CM

## 2025-02-14 DIAGNOSIS — C61 PROSTATE CANCER (HCC): ICD-10-CM

## 2025-02-14 DIAGNOSIS — H93.8X3 SENSATION OF FULLNESS IN BOTH EARS: ICD-10-CM

## 2025-02-14 DIAGNOSIS — R68.89 CONGESTION OF THROAT: ICD-10-CM

## 2025-02-14 DIAGNOSIS — M25.50 MULTIPLE JOINT PAIN: ICD-10-CM

## 2025-02-14 DIAGNOSIS — I50.32 CHRONIC DIASTOLIC CHF (CONGESTIVE HEART FAILURE) (HCC): ICD-10-CM

## 2025-02-14 PROCEDURE — 99214 OFFICE O/P EST MOD 30 MIN: CPT | Performed by: NURSE PRACTITIONER

## 2025-02-14 PROCEDURE — 3074F SYST BP LT 130 MM HG: CPT | Performed by: NURSE PRACTITIONER

## 2025-02-14 PROCEDURE — 1123F ACP DISCUSS/DSCN MKR DOCD: CPT | Performed by: NURSE PRACTITIONER

## 2025-02-14 PROCEDURE — 3078F DIAST BP <80 MM HG: CPT | Performed by: NURSE PRACTITIONER

## 2025-02-14 ASSESSMENT — ENCOUNTER SYMPTOMS
DIARRHEA: 0
CONSTIPATION: 0
NAUSEA: 0
BACK PAIN: 1
COUGH: 1
SHORTNESS OF BREATH: 1
TROUBLE SWALLOWING: 1

## 2025-02-14 NOTE — PROGRESS NOTES
painful lately too. Has chronic low back pain since 2005. Patient reports he stepped down off a truck and ended up having significant injury to lumbar and sacral spine. He has not been able to work since then. Pain remains exacerbated but feels pain medication helps keep pain manageable. Describes as sharp pain. Also gets numbness in his legs. Had radiofrequency ablation done multiple times. Continues to follow with pain management for this. Also has cramping in his legs and arms at night. Magnesium spray helps. Using arctic pain gel and voltaren relief for joint pain.           Update 2/14/24:    History of Present Illness    Patient has been experiencing persistent fatigue, which he attributes to recent diagnosis of pneumonia. He has been on a 10-day course of amoxicillin clavulanate, with 3 days remaining. He reports that the amoxicillin has been beneficial and fatigue, cough, and shortness of breath are improving. However, he has not been engaging in gym activities due to his fatigue. He has been using Mucinex as needed. He continues to experience some coughing at night, but it is much less severe.     He reports no issues with bowel movements.    He has been using Flonase for throat congestion and ear fullness.    He is currently under observation for his cancer treatment. He has been experiencing ongoing difficulty swallowing and is awaiting a call from a specialist regarding intervention.     He reports that his pain level is at 4 with medication. His last radiofrequency ablation was over a year ago, and he is planning to do more.                Past Medical History:   Diagnosis Date    CAD (coronary artery disease)     Cancer (HCC)     prostate cancer Nov 16 2018, SCCA right BOT 2022    CHF (congestive heart failure) (HCC)     Chicken pox     Chronic back pain     Emphysema of lung (HCC)     Hyperlipidemia     Hypertension     Hypothyroidism     Measles     Mumps     Osteoarthritis     Pneumonia      Past

## 2025-02-19 ENCOUNTER — OFFICE VISIT (OUTPATIENT)
Age: 70
End: 2025-02-19

## 2025-02-19 VITALS
DIASTOLIC BLOOD PRESSURE: 70 MMHG | HEART RATE: 75 BPM | SYSTOLIC BLOOD PRESSURE: 110 MMHG | WEIGHT: 202 LBS | OXYGEN SATURATION: 94 % | BODY MASS INDEX: 29.92 KG/M2 | TEMPERATURE: 97 F | HEIGHT: 69 IN

## 2025-02-19 DIAGNOSIS — Z00.00 MEDICARE ANNUAL WELLNESS VISIT, SUBSEQUENT: Primary | ICD-10-CM

## 2025-02-19 DIAGNOSIS — Z11.59 NEED FOR HEPATITIS C SCREENING TEST: ICD-10-CM

## 2025-02-19 DIAGNOSIS — Z72.89 OTHER PROBLEMS RELATED TO LIFESTYLE: ICD-10-CM

## 2025-02-19 DIAGNOSIS — Z23 NEED FOR PROPHYLACTIC VACCINATION AND INOCULATION AGAINST VARICELLA: ICD-10-CM

## 2025-02-19 DIAGNOSIS — E03.9 HYPOTHYROIDISM, UNSPECIFIED TYPE: ICD-10-CM

## 2025-02-19 DIAGNOSIS — E78.00 PURE HYPERCHOLESTEROLEMIA: ICD-10-CM

## 2025-02-19 DIAGNOSIS — C61 PROSTATE CANCER (HCC): ICD-10-CM

## 2025-02-19 DIAGNOSIS — J18.9 PNEUMONIA OF RIGHT MIDDLE LOBE DUE TO INFECTIOUS ORGANISM: Primary | ICD-10-CM

## 2025-02-19 DIAGNOSIS — I73.9 PERIPHERAL VASCULAR DISEASE: ICD-10-CM

## 2025-02-19 DIAGNOSIS — J18.9 PNEUMONIA OF RIGHT LUNG DUE TO INFECTIOUS ORGANISM, UNSPECIFIED PART OF LUNG: ICD-10-CM

## 2025-02-19 DIAGNOSIS — F41.9 ANXIETY: ICD-10-CM

## 2025-02-19 DIAGNOSIS — Z85.810 H/O TONGUE CANCER: ICD-10-CM

## 2025-02-19 DIAGNOSIS — I25.10 CORONARY ARTERY DISEASE INVOLVING NATIVE CORONARY ARTERY OF NATIVE HEART WITHOUT ANGINA PECTORIS: ICD-10-CM

## 2025-02-19 DIAGNOSIS — I10 ESSENTIAL HYPERTENSION: ICD-10-CM

## 2025-02-19 DIAGNOSIS — I50.33 ACUTE ON CHRONIC DIASTOLIC HEART FAILURE (HCC): ICD-10-CM

## 2025-02-19 LAB — PSA SERPL-MCNC: <0.01 NG/ML (ref 0–4)

## 2025-02-19 SDOH — ECONOMIC STABILITY: FOOD INSECURITY: WITHIN THE PAST 12 MONTHS, YOU WORRIED THAT YOUR FOOD WOULD RUN OUT BEFORE YOU GOT MONEY TO BUY MORE.: NEVER TRUE

## 2025-02-19 SDOH — ECONOMIC STABILITY: FOOD INSECURITY: WITHIN THE PAST 12 MONTHS, THE FOOD YOU BOUGHT JUST DIDN'T LAST AND YOU DIDN'T HAVE MONEY TO GET MORE.: NEVER TRUE

## 2025-02-19 ASSESSMENT — PATIENT HEALTH QUESTIONNAIRE - PHQ9
SUM OF ALL RESPONSES TO PHQ9 QUESTIONS 1 & 2: 0
1. LITTLE INTEREST OR PLEASURE IN DOING THINGS: NOT AT ALL
SUM OF ALL RESPONSES TO PHQ QUESTIONS 1-9: 0
SUM OF ALL RESPONSES TO PHQ QUESTIONS 1-9: 0
2. FEELING DOWN, DEPRESSED OR HOPELESS: NOT AT ALL
SUM OF ALL RESPONSES TO PHQ QUESTIONS 1-9: 0
SUM OF ALL RESPONSES TO PHQ QUESTIONS 1-9: 0

## 2025-02-19 ASSESSMENT — LIFESTYLE VARIABLES
HOW OFTEN DO YOU HAVE A DRINK CONTAINING ALCOHOL: NEVER
HOW MANY STANDARD DRINKS CONTAINING ALCOHOL DO YOU HAVE ON A TYPICAL DAY: PATIENT DOES NOT DRINK

## 2025-02-19 NOTE — PROGRESS NOTES
Spoke with the patient, already started antibiotic, please schedule him to see me in 4 weeks, with a chest x-ray.

## 2025-02-19 NOTE — PROGRESS NOTES
Medicare Annual Wellness Visit    Kelvin Washington is here for Anxiety, Hypertension, Hypothyroidism, Medicare AWV, and Results (Review CT)  Here in follow up for awv and anxiety.  Still using ativan nightly for anxiety which is helpful .  Did have ct done recently thru oncologist and now making appt with pulmonary as does show possible pneumonia-just took last dose of augmentin given by oncologist last night-has been feeling better.  No fever   Assessment & Plan         Diagnosis Orders   1. Medicare annual wellness visit, subsequent        2. Anxiety  ESTABLISHED, LOW MDM, 20-29 MIN [[id number]]      3. Pneumonia of right lung due to infectious organism, unspecified part of lung        4. Hypothyroidism, unspecified type        5. Essential hypertension        6. Pure hypercholesterolemia        7. Prostate cancer (HCC)        8. Acute on chronic diastolic heart failure (HCC)        9. Coronary artery disease involving native coronary artery of native heart without angina pectoris        10. Peripheral vascular disease        11. H/O tongue cancer        12. Need for prophylactic vaccination and inoculation against varicella  zoster recombinant adjuvanted vaccine (SHINGRIX) 50 MCG/0.5ML SUSR injection      13. Need for hepatitis C screening test  Hepatitis C Antibody      14. Other problems related to lifestyle  Hepatitis C Antibody         Orders Placed This Encounter   Procedures    Hepatitis C Antibody     Standing Status:   Future     Standing Expiration Date:   2/19/2026    ESTABLISHED, LOW MDM, 20-29 MIN [[id number]]     Continue ativan prn for anxiety   Orders Placed This Encounter   Medications    zoster recombinant adjuvanted vaccine (SHINGRIX) 50 MCG/0.5ML SUSR injection     Sig: Inject 0.5 mLs into the muscle once for 1 dose     Dispense:  0.5 mL     Refill:  0    amoxicillin-clavulanate (AUGMENTIN) 875-125 MG per tablet     Sig: Take 1 tablet by mouth 2 times daily for 3 days     Dispense:  6 tablet     Refill:  0

## 2025-02-19 NOTE — PATIENT INSTRUCTIONS
Learning About Being Active as an Older Adult  Why is being active important as you get older?     Being active is one of the best things you can do for your health. And it's never too late to start. Being active--or getting active, if you aren't already--has definite benefits. It can:  Give you more energy,  Keep your mind sharp.  Improve balance to reduce your risk of falls.  Help you manage chronic illness with fewer medicines.  No matter how old you are, how fit you are, or what health problems you have, there is a form of activity that will work for you. And the more physical activity you can do, the better your overall health will be.  What kinds of activity can help you stay healthy?  Being more active will make your daily activities easier. Physical activity includes planned exercise and things you do in daily life. There are four types of activity:  Aerobic.  Doing aerobic activity makes your heart and lungs strong.  Includes walking, dancing, and gardening.  Aim for at least 2½ hours spread throughout the week.  It improves your energy and can help you sleep better.  Muscle-strengthening.  This type of activity can help maintain muscle and strengthen bones.  Includes climbing stairs, using resistance bands, and lifting or carrying heavy loads.  Aim for at least twice a week.  It can help protect the knees and other joints.  Stretching.  Stretching gives you better range of motion in joints and muscles.  Includes upper arm stretches, calf stretches, and gentle yoga.  Aim for at least twice a week, preferably after your muscles are warmed up from other activities.  It can help you function better in daily life.  Balancing.  This helps you stay coordinated and have good posture.  Includes heel-to-toe walking, shawna chi, and certain types of yoga.  Aim for at least 3 days a week.  It can reduce your risk of falling.  Even if you have a hard time meeting the recommendations, it's better to be more active

## 2025-02-20 LAB — HEPATITIS C ANTIBODY: NONREACTIVE

## 2025-03-06 DIAGNOSIS — F41.9 ANXIETY: ICD-10-CM

## 2025-03-06 DIAGNOSIS — M47.817 LUMBOSACRAL SPONDYLOSIS WITHOUT MYELOPATHY: ICD-10-CM

## 2025-03-06 DIAGNOSIS — C61 PROSTATE CANCER (HCC): ICD-10-CM

## 2025-03-06 RX ORDER — LORAZEPAM 1 MG/1
1 TABLET ORAL NIGHTLY
Qty: 30 TABLET | Refills: 0 | Status: SHIPPED | OUTPATIENT
Start: 2025-03-06 | End: 2025-04-05

## 2025-03-06 RX ORDER — HYDROCODONE BITARTRATE AND ACETAMINOPHEN 5; 325 MG/1; MG/1
1 TABLET ORAL 3 TIMES DAILY PRN
Qty: 90 TABLET | Refills: 0 | Status: SHIPPED | OUTPATIENT
Start: 2025-03-06 | End: 2025-04-05

## 2025-03-06 NOTE — TELEPHONE ENCOUNTER
Requested Prescriptions     Pending Prescriptions Disp Refills    HYDROcodone-acetaminophen (NORCO) 5-325 MG per tablet 90 tablet 0     Sig: Take 1 tablet by mouth 3 times daily as needed for Pain for up to 30 days. Intended supply: 30 days. Take lowest dose possible to manage pain Max Daily Amount: 3 tablets     2.5.25

## 2025-03-07 ENCOUNTER — HOSPITAL ENCOUNTER (OUTPATIENT)
Dept: RADIATION ONCOLOGY | Age: 70
Discharge: HOME | End: 2025-03-07
Payer: COMMERCIAL

## 2025-03-07 VITALS
WEIGHT: 204.8 LBS | DIASTOLIC BLOOD PRESSURE: 69 MMHG | HEART RATE: 64 BPM | TEMPERATURE: 97.8 F | SYSTOLIC BLOOD PRESSURE: 115 MMHG | OXYGEN SATURATION: 97 % | BODY MASS INDEX: 30.24 KG/M2 | RESPIRATION RATE: 16 BRPM

## 2025-03-07 DIAGNOSIS — C61 PROSTATE CANCER (HCC): Primary | ICD-10-CM

## 2025-03-07 DIAGNOSIS — J39.2 OROPHARYNGEAL MASS: ICD-10-CM

## 2025-03-07 PROCEDURE — 99214 OFFICE O/P EST MOD 30 MIN: CPT | Performed by: RADIOLOGY

## 2025-03-07 PROCEDURE — G2211 COMPLEX E/M VISIT ADD ON: HCPCS | Performed by: RADIOLOGY

## 2025-03-07 PROCEDURE — 99212 OFFICE O/P EST SF 10 MIN: CPT | Performed by: RADIOLOGY

## 2025-03-07 NOTE — PROGRESS NOTES
normal.    Genito-Urinary: IPSS is 5-6 for rare frequency, urgency, and weak stream and nocturia 2-3. States he goes to bathroom frequently to keep his bladder empty due to baseline leakage with harsh coughing.      Breast: No Problems     Musculoskeletal: Joint Pain and Back Pain    Neurological: Occasional dizziness with harsh cough. Ongoing numbness and tingling to bilateral legs due to back/spinal issues.       Hematological and Lymphatic: No Problems     Endocrine: Thyroid Problems      Was the patient admitted during the course of treatment OR within 30 days of treatment? none    If yes: n/a  Date of Admission: n/a  Hospital: n/a    Additional Comments:

## 2025-03-14 ENCOUNTER — HOSPITAL ENCOUNTER (OUTPATIENT)
Dept: PULMONOLOGY | Age: 70
Discharge: HOME | End: 2025-03-14
Payer: COMMERCIAL

## 2025-03-14 ENCOUNTER — HOSPITAL ENCOUNTER (OUTPATIENT)
Dept: GENERAL RADIOLOGY | Age: 70
Discharge: HOME | End: 2025-03-16
Attending: INTERNAL MEDICINE
Payer: COMMERCIAL

## 2025-03-14 DIAGNOSIS — J18.9 PNEUMONIA OF RIGHT MIDDLE LOBE DUE TO INFECTIOUS ORGANISM: ICD-10-CM

## 2025-03-14 DIAGNOSIS — J44.9 CHRONIC OBSTRUCTIVE PULMONARY DISEASE, UNSPECIFIED COPD TYPE (HCC): ICD-10-CM

## 2025-03-14 PROCEDURE — 94729 DIFFUSING CAPACITY: CPT

## 2025-03-14 PROCEDURE — 71046 X-RAY EXAM CHEST 2 VIEWS: CPT

## 2025-03-14 PROCEDURE — 6360000002 HC RX W HCPCS

## 2025-03-14 PROCEDURE — 94726 PLETHYSMOGRAPHY LUNG VOLUMES: CPT

## 2025-03-14 PROCEDURE — 94060 EVALUATION OF WHEEZING: CPT

## 2025-03-14 RX ORDER — ALBUTEROL SULFATE 0.83 MG/ML
SOLUTION RESPIRATORY (INHALATION)
Status: COMPLETED
Start: 2025-03-14 | End: 2025-03-14

## 2025-03-14 RX ADMIN — ALBUTEROL SULFATE 2.5 MG: 2.5 SOLUTION RESPIRATORY (INHALATION) at 08:52

## 2025-03-20 ENCOUNTER — OFFICE VISIT (OUTPATIENT)
Dept: PULMONOLOGY | Age: 70
End: 2025-03-20
Payer: COMMERCIAL

## 2025-03-20 ENCOUNTER — OFFICE VISIT (OUTPATIENT)
Dept: UROLOGY | Age: 70
End: 2025-03-20
Payer: COMMERCIAL

## 2025-03-20 VITALS
WEIGHT: 192.6 LBS | SYSTOLIC BLOOD PRESSURE: 102 MMHG | TEMPERATURE: 97.7 F | RESPIRATION RATE: 18 BRPM | OXYGEN SATURATION: 99 % | BODY MASS INDEX: 28.53 KG/M2 | DIASTOLIC BLOOD PRESSURE: 70 MMHG | HEART RATE: 78 BPM | HEIGHT: 69 IN

## 2025-03-20 VITALS
HEART RATE: 76 BPM | SYSTOLIC BLOOD PRESSURE: 122 MMHG | WEIGHT: 192 LBS | BODY MASS INDEX: 28.44 KG/M2 | HEIGHT: 69 IN | DIASTOLIC BLOOD PRESSURE: 70 MMHG

## 2025-03-20 DIAGNOSIS — Z87.442 H/O RENAL CALCULI: ICD-10-CM

## 2025-03-20 DIAGNOSIS — E66.812 CLASS 2 OBESITY DUE TO EXCESS CALORIES WITHOUT SERIOUS COMORBIDITY WITH BODY MASS INDEX (BMI) OF 35.0 TO 35.9 IN ADULT: ICD-10-CM

## 2025-03-20 DIAGNOSIS — E66.09 CLASS 2 OBESITY DUE TO EXCESS CALORIES WITHOUT SERIOUS COMORBIDITY WITH BODY MASS INDEX (BMI) OF 35.0 TO 35.9 IN ADULT: ICD-10-CM

## 2025-03-20 DIAGNOSIS — R91.8 ABNORMAL CT LUNG SCREENING: ICD-10-CM

## 2025-03-20 DIAGNOSIS — R91.8 LUNG NODULES: ICD-10-CM

## 2025-03-20 DIAGNOSIS — Z87.891 PERSONAL HISTORY OF TOBACCO USE: ICD-10-CM

## 2025-03-20 DIAGNOSIS — J44.9 CHRONIC OBSTRUCTIVE PULMONARY DISEASE, UNSPECIFIED COPD TYPE (HCC): Primary | ICD-10-CM

## 2025-03-20 DIAGNOSIS — Z85.46 H/O PROSTATE CANCER: Primary | ICD-10-CM

## 2025-03-20 PROCEDURE — 99213 OFFICE O/P EST LOW 20 MIN: CPT | Performed by: UROLOGY

## 2025-03-20 PROCEDURE — 3074F SYST BP LT 130 MM HG: CPT | Performed by: UROLOGY

## 2025-03-20 PROCEDURE — 3078F DIAST BP <80 MM HG: CPT | Performed by: INTERNAL MEDICINE

## 2025-03-20 PROCEDURE — 1123F ACP DISCUSS/DSCN MKR DOCD: CPT | Performed by: UROLOGY

## 2025-03-20 PROCEDURE — 3078F DIAST BP <80 MM HG: CPT | Performed by: UROLOGY

## 2025-03-20 PROCEDURE — 99214 OFFICE O/P EST MOD 30 MIN: CPT | Performed by: INTERNAL MEDICINE

## 2025-03-20 PROCEDURE — 3074F SYST BP LT 130 MM HG: CPT | Performed by: INTERNAL MEDICINE

## 2025-03-20 PROCEDURE — 1123F ACP DISCUSS/DSCN MKR DOCD: CPT | Performed by: INTERNAL MEDICINE

## 2025-03-20 ASSESSMENT — ENCOUNTER SYMPTOMS
ABDOMINAL PAIN: 0
ABDOMINAL DISTENTION: 0

## 2025-03-20 NOTE — PROGRESS NOTES
Subjective:     Kelvin Washington is a 69 y.o. male who complains today of:     Chief Complaint   Patient presents with    Follow-up     6 Week F/U for COPD and Personal history of tobacco use     Results     PFT       HPI  Patient presents for COPD     3/20/2025  Presents for follow-up, he is feeling better after he quit taking Breztri he reported chest tightness, constipation, and urine retention, he felt significantly better when he had to hold Breztri 2 days prior to his PFT, he quit taking it recently and he is able to take deeper breath, he is urinating better and feeling better in general.  He was able to tolerate Spiriva in the past  No chest pain, no lower extremity edema, weight is stable, no heartburn, no nasal congestion or postnasal drip.    2/3/2025  Presents for follow-up, continues to have dyspnea on exertion, minimal coughing, no chest pain, he has not been taking his maintenance inhalers mainly due to cost up to 400 the month.  No chest pain, no lower extremity edema, no heartburn, no nasal congestion or postnasal drip.          9/30/2024  Presents for follow-up, overall doing good, able to do his daily activities with no issues, he reports congestion and coughing mainly at night, no chest pain, no wheezing, no lower extremity edema, no heartburn, no nasal congestion or postnasal drip, weight is stable.      5/29/2024  Presents for follow-up, doing okay, he was started on doxycycline by his palliative care NP, he feels better after starting treatment, he finished yesterday, he had cough essentially dry and worse at night, he did not cough any yesterday night.  No fever or chills, no skin rash, no lower extremity edema, no heartburn, no nasal congestion or postnasal drip and weight is stable    5/31/2023  Presents for follow-up, he is doing good, no symptoms of chest pain or shortness of breath, no coughing, he is on Spiriva, no heartburn, weight is stable, no lower extremity edema, he is able to

## 2025-03-20 NOTE — PROGRESS NOTES
Subjective:      Patient ID: Kelvin Washington is a 69 y.o. male    HPI  This is a 68 yo male with h/o DDD, Anxiety, CAD on Imdur, Kidney stones and s/p RALP on 11/16/18 with positive margin back in follow-up. Since last seen on 9/19/24, he has no new  complaints. He still has mild and stable ABDIAS. He did see Dr Stone in past and was being followed by him until recently and he recommended IMRT with 6 mo of ADT once his ABDIAS resolves but the patient and wife had been reluctant to comply with this option but recently saw a new  Rad Oncologist and was to start IMRT in 1/24, however he wanted a second opinion from Dr Hamilton and is now s/p IMRT plus 6 mo ADT in 5/24. Since last seen  on 9/19/24,he has no new voiding complaints. He has no pain or hematuria. I reviewed the interval PSA. Plans are for no further Lupron. He was found to have an oral cancer and has received radiation and chemotherapy for this by Dr Stone. He is currently being evaluated for a chronic lung infection.         PATH: Aaliyah 4 + 3 = 7 adenocarcinoma of prostate, 11 Ln neg, EPE neg, Margin pos (Rt anterior), SV neg. [id number].     Past Medical History:   Diagnosis Date    CAD (coronary artery disease)     Cancer (HCC)     prostate cancer Nov 16 2018, SCCA right BOT 2022    CHF (congestive heart failure) (HCC)     Chicken pox     Chronic back pain     Emphysema of lung (HCC)     Hyperlipidemia     Hypertension     Hypothyroidism     Measles     Mumps     Osteoarthritis     Pneumonia      Past Surgical History:   Procedure Laterality Date    CARDIAC CATHETERIZATION      2019    COLONOSCOPY      2016    CYST REMOVAL      back of neck    CYST REMOVAL Left 03/04/2016    Dr Woo    CYSTOSCOPY  07/2018    DENTAL SURGERY      HERNIA REPAIR      2016, 2019    LARYNGOSCOPY N/A 8/16/2022    DIRECT LARYNGOSCOPY WITH BIOPSY. 30 MIN  TO BE LATEX FREE performed by Raheel Rios MD at [facility]    PROSTATE SURGERY  07/2018    Biopsy    PROSTATECTOMY  11/16/2018

## 2025-03-24 ENCOUNTER — TELEPHONE (OUTPATIENT)
Dept: PULMONOLOGY | Age: 70
End: 2025-03-24

## 2025-03-24 ENCOUNTER — TELEPHONE (OUTPATIENT)
Age: 70
End: 2025-03-24

## 2025-03-24 NOTE — TELEPHONE ENCOUNTER
Pt called to schedule and appt w/ Dr Akhtar.  Pt aware provider out of office this wk and all other providers do not have appts at time of call.  Pt states he has been having a problem w/ his breathing and taking so many medications causing different side effects.    He states he was just seen by pulmonology and  may call heart Dr Zepeda or go to the ED to help with his sx.  Pt had no further questions or concerns to be addressed at time of call.

## 2025-03-24 NOTE — TELEPHONE ENCOUNTER
CT chest uploaded to PACS, reviewed by me, new atelectatic infiltrative changes in the right middle lobe, will reevaluate on follow-up CAT scan if persisted we will consider bronchoscopy      Please note CT from  uploaded under different medical record number, similar name

## 2025-03-28 ENCOUNTER — OFFICE VISIT (OUTPATIENT)
Dept: PALLATIVE CARE | Age: 70
End: 2025-03-28
Payer: COMMERCIAL

## 2025-03-28 VITALS
SYSTOLIC BLOOD PRESSURE: 111 MMHG | BODY MASS INDEX: 28.56 KG/M2 | HEART RATE: 78 BPM | DIASTOLIC BLOOD PRESSURE: 67 MMHG | OXYGEN SATURATION: 98 % | TEMPERATURE: 98.1 F | WEIGHT: 190.6 LBS

## 2025-03-28 DIAGNOSIS — J44.9 CHRONIC OBSTRUCTIVE PULMONARY DISEASE, UNSPECIFIED COPD TYPE (HCC): ICD-10-CM

## 2025-03-28 DIAGNOSIS — H93.8X3 SENSATION OF FULLNESS IN BOTH EARS: ICD-10-CM

## 2025-03-28 DIAGNOSIS — R68.89 CONGESTION OF THROAT: ICD-10-CM

## 2025-03-28 DIAGNOSIS — Z51.5 PALLIATIVE CARE ENCOUNTER: ICD-10-CM

## 2025-03-28 DIAGNOSIS — R07.89 CHEST WALL PAIN: Primary | ICD-10-CM

## 2025-03-28 DIAGNOSIS — I50.32 CHRONIC DIASTOLIC CHF (CONGESTIVE HEART FAILURE) (HCC): ICD-10-CM

## 2025-03-28 DIAGNOSIS — C61 PROSTATE CANCER (HCC): ICD-10-CM

## 2025-03-28 DIAGNOSIS — M47.817 LUMBOSACRAL SPONDYLOSIS WITHOUT MYELOPATHY: ICD-10-CM

## 2025-03-28 DIAGNOSIS — M25.50 MULTIPLE JOINT PAIN: ICD-10-CM

## 2025-03-28 DIAGNOSIS — K59.00 CONSTIPATION, UNSPECIFIED CONSTIPATION TYPE: ICD-10-CM

## 2025-03-28 PROCEDURE — 1123F ACP DISCUSS/DSCN MKR DOCD: CPT | Performed by: NURSE PRACTITIONER

## 2025-03-28 PROCEDURE — 3074F SYST BP LT 130 MM HG: CPT | Performed by: NURSE PRACTITIONER

## 2025-03-28 PROCEDURE — 99214 OFFICE O/P EST MOD 30 MIN: CPT | Performed by: NURSE PRACTITIONER

## 2025-03-28 PROCEDURE — 3078F DIAST BP <80 MM HG: CPT | Performed by: NURSE PRACTITIONER

## 2025-03-28 RX ORDER — SENNA AND DOCUSATE SODIUM 50; 8.6 MG/1; MG/1
1 TABLET, FILM COATED ORAL DAILY
Qty: 120 TABLET | Refills: 0 | Status: SHIPPED | OUTPATIENT
Start: 2025-03-28

## 2025-03-28 RX ORDER — HYDROCODONE BITARTRATE AND ACETAMINOPHEN 5; 325 MG/1; MG/1
1 TABLET ORAL EVERY 4 HOURS PRN
Qty: 180 TABLET | Refills: 0 | Status: SHIPPED | OUTPATIENT
Start: 2025-03-28 | End: 2025-04-27

## 2025-03-28 RX ORDER — FLUTICASONE PROPIONATE 50 MCG
2 SPRAY, SUSPENSION (ML) NASAL DAILY PRN
Qty: 16 G | Refills: 3 | Status: SHIPPED | OUTPATIENT
Start: 2025-03-28

## 2025-03-28 NOTE — PROGRESS NOTES
department with any increasing/uncontrolled pain. Patient is only to adjust pain medications with our instruction.     - HYDROcodone-acetaminophen (NORCO) 5-325 MG per tablet; Take 1 tablet by mouth q 4 hours as needed for Pain for up to 30 days.    Goal is to wean back down as tolerated.     Continue to follow with Dr. Maddox for radiofrequency ablation.     Prn voltaren gel OTC. Arctic pain relieving gel QID prn.     Pt is tolerating current pain meds without over sedation. Lowest effective dose used. Pt advised to call and notify palliative care for any adverse effects or sedation  Pt is able to maintain adequate functional level and participate in ADLs  OARRS reviewed. There is no indication of aberrant behavior  Pt advised to call for increasing or uncontrolled pain.  Risk vs benefit assessed.  Pt advised to take only as prescribed and not to change frequency of pain meds without consulting palliative care first.  Reviewed side effects of narcotics.    5. CHF  6. COPD  7. Throat congestion  8. Sensation of fullness in both ears  See above. Multiple antibiotics and steroids used in the last several months. Will defer further management to pulmonology at this time. Patient has upcoming CT scan of chest in May. Continue flonase 2 sprays each nostril daily and mucinex 1200 mg po BID prn.     Continue to follow with pulmonary and cardiology.     9. Constipation  Increased constipation. Add senokot-s 1 tablet daily.     10. Palliative care encounter  Discussed symptom management related to chronic disease/condition. Provided emotional support and active listening. Patient understands and is agreeable to current plan.              Opioid contract signed:No    Due to acuity, symptomatology and high-risk medication management, I advised patient to Return in about 2 weeks (around 4/11/2025).     MDM: Desiree Harvey, APRN - CNP    Collaborating physician: Dr. Alexandre

## 2025-04-03 ENCOUNTER — OFFICE VISIT (OUTPATIENT)
Age: 70
End: 2025-04-03
Payer: COMMERCIAL

## 2025-04-03 ENCOUNTER — RESULTS FOLLOW-UP (OUTPATIENT)
Age: 70
End: 2025-04-03

## 2025-04-03 VITALS
WEIGHT: 190 LBS | OXYGEN SATURATION: 93 % | HEIGHT: 69 IN | TEMPERATURE: 97.8 F | SYSTOLIC BLOOD PRESSURE: 116 MMHG | DIASTOLIC BLOOD PRESSURE: 74 MMHG | BODY MASS INDEX: 28.14 KG/M2 | HEART RATE: 94 BPM

## 2025-04-03 DIAGNOSIS — M79.10 MYALGIA: ICD-10-CM

## 2025-04-03 DIAGNOSIS — M25.50 GENERALIZED JOINT PAIN: ICD-10-CM

## 2025-04-03 DIAGNOSIS — M54.6 ACUTE BILATERAL THORACIC BACK PAIN: ICD-10-CM

## 2025-04-03 DIAGNOSIS — R07.89 CHEST WALL PAIN: Primary | ICD-10-CM

## 2025-04-03 DIAGNOSIS — R07.89 CHEST WALL PAIN: ICD-10-CM

## 2025-04-03 DIAGNOSIS — F41.9 ANXIETY: ICD-10-CM

## 2025-04-03 DIAGNOSIS — R94.31 ABNORMAL EKG: ICD-10-CM

## 2025-04-03 LAB
ALBUMIN SERPL-MCNC: 3.6 G/DL (ref 3.5–4.6)
ALP SERPL-CCNC: 125 U/L (ref 35–104)
ALT SERPL-CCNC: 32 U/L (ref 0–41)
ANION GAP SERPL CALCULATED.3IONS-SCNC: 12 MEQ/L (ref 9–15)
AST SERPL-CCNC: 25 U/L (ref 0–40)
BASOPHILS # BLD: 0.1 K/UL (ref 0–0.2)
BASOPHILS NFR BLD: 0.9 %
BILIRUB SERPL-MCNC: 0.3 MG/DL (ref 0.2–0.7)
BUN SERPL-MCNC: 15 MG/DL (ref 8–23)
CALCIUM SERPL-MCNC: 9.8 MG/DL (ref 8.5–9.9)
CHLORIDE SERPL-SCNC: 96 MEQ/L (ref 95–107)
CO2 SERPL-SCNC: 26 MEQ/L (ref 20–31)
CREAT SERPL-MCNC: 1.01 MG/DL (ref 0.7–1.2)
CRP SERPL HS-MCNC: 96.5 MG/L (ref 0–5)
EOSINOPHIL # BLD: 0.2 K/UL (ref 0–0.7)
EOSINOPHIL NFR BLD: 1.8 %
ERYTHROCYTE [DISTWIDTH] IN BLOOD BY AUTOMATED COUNT: 15.8 % (ref 11.5–14.5)
ERYTHROCYTE [SEDIMENTATION RATE] IN BLOOD BY WESTERGREN METHOD: 93 MM (ref 0–20)
GLOBULIN SER CALC-MCNC: 4.8 G/DL (ref 2.3–3.5)
GLUCOSE SERPL-MCNC: 109 MG/DL (ref 70–99)
HCT VFR BLD AUTO: 33.8 % (ref 42–52)
HGB BLD-MCNC: 10.3 G/DL (ref 14–18)
LYMPHOCYTES # BLD: 0.7 K/UL (ref 1–4.8)
LYMPHOCYTES NFR BLD: 8.5 %
MCH RBC QN AUTO: 25 PG (ref 27–31.3)
MCHC RBC AUTO-ENTMCNC: 30.5 % (ref 33–37)
MCV RBC AUTO: 82 FL (ref 79–92.2)
MONOCYTES # BLD: 1 K/UL (ref 0.2–0.8)
MONOCYTES NFR BLD: 12.6 %
NEUTROPHILS # BLD: 6.2 K/UL (ref 1.4–6.5)
NEUTS SEG NFR BLD: 75.7 %
PLATELET # BLD AUTO: 471 K/UL (ref 130–400)
POTASSIUM SERPL-SCNC: 4.6 MEQ/L (ref 3.4–4.9)
PROT SERPL-MCNC: 8.4 G/DL (ref 6.3–8)
RBC # BLD AUTO: 4.12 M/UL (ref 4.7–6.1)
SODIUM SERPL-SCNC: 134 MEQ/L (ref 135–144)
WBC # BLD AUTO: 8.2 K/UL (ref 4.8–10.8)

## 2025-04-03 PROCEDURE — 3074F SYST BP LT 130 MM HG: CPT | Performed by: NURSE PRACTITIONER

## 2025-04-03 PROCEDURE — 1123F ACP DISCUSS/DSCN MKR DOCD: CPT | Performed by: NURSE PRACTITIONER

## 2025-04-03 PROCEDURE — 1159F MED LIST DOCD IN RCRD: CPT | Performed by: NURSE PRACTITIONER

## 2025-04-03 PROCEDURE — 3078F DIAST BP <80 MM HG: CPT | Performed by: NURSE PRACTITIONER

## 2025-04-03 PROCEDURE — 99213 OFFICE O/P EST LOW 20 MIN: CPT | Performed by: NURSE PRACTITIONER

## 2025-04-03 PROCEDURE — 93000 ELECTROCARDIOGRAM COMPLETE: CPT | Performed by: NURSE PRACTITIONER

## 2025-04-03 RX ORDER — PREDNISONE 10 MG/1
TABLET ORAL
Qty: 34 TABLET | Refills: 0 | Status: SHIPPED | OUTPATIENT
Start: 2025-04-03

## 2025-04-03 RX ORDER — LORAZEPAM 1 MG/1
1 TABLET ORAL NIGHTLY
Qty: 30 TABLET | Refills: 0 | Status: SHIPPED | OUTPATIENT
Start: 2025-04-03 | End: 2025-05-03

## 2025-04-03 ASSESSMENT — ENCOUNTER SYMPTOMS
VOMITING: 0
SORE THROAT: 0
SHORTNESS OF BREATH: 1
NAUSEA: 0
EYE REDNESS: 0
ABDOMINAL PAIN: 0
SINUS PRESSURE: 0
CHEST TIGHTNESS: 1
EYE ITCHING: 0
BACK PAIN: 1
RHINORRHEA: 0
SINUS PAIN: 0
EYE DISCHARGE: 0
DIARRHEA: 0
EYE PAIN: 0
WHEEZING: 1
COUGH: 1

## 2025-04-03 NOTE — PROGRESS NOTES
Stress: Not on file   Social Connections: Not on file   Intimate Partner Violence: Not on file   Housing Stability: Low Risk  (2/19/2025)    Housing Stability Vital Sign     Unable to Pay for Housing in the Last Year: No     Number of Times Moved in the Last Year: 0     Homeless in the Last Year: No     Family History   Problem Relation Age of Onset    Other Mother     Alzheimer's Disease Mother     Cancer Father         prostate    Other Father     Cancer Sister         lung ca    Cancer Brother         lung ca    Lung Cancer Brother     Cancer Brother         prostate ca    No Known Problems Son     No Known Problems Daughter     Colon Cancer Neg Hx      Allergies   Allergen Reactions    Breztri Aerosphere [Budeson-Glycopyrrol-Formoterol] Other (See Comments)     Urine retention , constipation and chest tightness, can tolerate SPIRIVA     Demerol Hcl [Meperidine] Nausea Only     migraines    Valium [Diazepam] Other (See Comments)     MIGRAINES     Current Outpatient Medications   Medication Sig Dispense Refill    predniSONE (DELTASONE) 10 MG tablet 4 TABS FOR 4 DAYS, 3 TABS FOR 3 DAYS, 2 TABS FOR 2 DAYS, THEN 1 TAB FOR 3 DAYS. 34 tablet 0    HYDROcodone-acetaminophen (NORCO) 5-325 MG per tablet Take 1 tablet by mouth every 4 hours as needed for Pain for up to 30 days. Intended supply: 30 days. Take lowest dose possible to manage pain Max Daily Amount: 6 tablets 180 tablet 0    fluticasone (FLONASE) 50 MCG/ACT nasal spray 2 sprays by Each Nostril route daily as needed for Rhinitis 16 g 3    sennosides-docusate sodium (SENOKOT-S) 8.6-50 MG tablet Take 1 tablet by mouth daily 120 tablet 0    LORazepam (ATIVAN) 1 MG tablet Take 1 tablet by mouth nightly for 30 days. Max Daily Amount: 1 mg 30 tablet 0    levothyroxine (SYNTHROID) 88 MCG tablet TAKE ONE TABLET BY MOUTH EVERY DAY 90 tablet 0    atorvastatin (LIPITOR) 80 MG tablet Take 1 tablet by mouth daily 90 tablet 0    Handicap Placard MISC by Does not apply route

## 2025-04-03 NOTE — RESULT ENCOUNTER NOTE
Please advise patient that his lungs are clear so he does not need an ANTB at this time. No pneumonia was seen.   His Thoracic spine is showing some deg changes/arthritis but no compression fractures.   I can arrange for patient to see PM concerning his back if he does not feel he is getting any relief from his current medications.   I do not have the blood work back yet but will call when it results.

## 2025-04-03 NOTE — TELEPHONE ENCOUNTER
Last OV: 02/19/2025   Vinicio Villafana (: 2005) is a 12 y.o. male patient, here for evaluation of the following chief complaint(s):  Surgical Follow-up (ORIF left forearm  )       ASSESSMENT/PLAN:  Below is the assessment and plan developed based on review of pertinent history, physical exam, labs, studies, and medications. Doing well ORIF radial shaft left we talked about exercise avoiding contact fitness conditioning I like to see him back at the end of July before football starts we will need an AP and lateral view of his left forearm      1. Left forearm fracture, closed, with routine healing, subsequent encounter  -     XR FOREARM LT AP/LAT; Future      No follow-ups on file. SUBJECTIVE/OBJECTIVE:  Vinicio Villafana (: 2005) is a 12 y.o. male who presents today for the following:  Chief Complaint   Patient presents with    Surgical Follow-up     ORIF left forearm         ORIF broken arm left doing well no issues    IMAGING:  AP lateral view of his left forearm shows a healing fracture well-seated hardware satisfactory alignment no loosening no migration no cut out    Allergies   Allergen Reactions    Penicillins Hives       Current Outpatient Medications   Medication Sig    naproxen sodium (Aleve) 220 mg cap Take  by mouth as needed. (Patient not taking: Reported on 2022)    acetaminophen (TylenoL) 325 mg tablet Take 325 mg by mouth every four (4) hours as needed for Pain. (Patient not taking: Reported on 2022)    cetirizine (ZYRTEC) 5 mg tablet Take 5 mg by mouth. (Patient not taking: Reported on 2022)     No current facility-administered medications for this visit.        Past Medical History:   Diagnosis Date    Bronchitis, not specified as acute or chronic 2009    Dermatophytosis of scalp and beard 2009    Hand, foot and mouth disease 2009    Influenza 2009    Lymphadenitis, unspecified, except mesenteric 2009    Osgood-Schlatter's disease, left 09/12/2016    Dr. Pat Henry, [address]    Unspecified acute inflammation of orbit 12/14/2009    Unspecified otitis media 12/14/2009        Past Surgical History:   Procedure Laterality Date    HX MYRINGOTOMY  3/4/08    Dr. Anusha Ugalde ORTHOPAEDIC      R elbow surgery    HX ORTHOPAEDIC      R knee surgery    HX TYMPANOSTOMY         Family History   Problem Relation Age of Onset    Hypertension Paternal Grandmother     Hypertension Paternal Grandfather     Other Mother         allergic pcn    Hypertension Father     Diabetes Father     Heart Disease Father     Anesth Problems Neg Hx         Social History     Tobacco Use    Smoking status: Never Smoker    Smokeless tobacco: Never Used   Substance Use Topics    Alcohol use: No        Review of Systems     No flowsheet data found. Vitals:  Ht 6' 2\" (1.88 m)   Wt 185 lb (83.9 kg)   BMI 23.75 kg/m²    Body mass index is 23.75 kg/m². Physical Exam    Left arm has full supination pronation flexion extension wound looks good median radial ulnar nerve intact motor light touch      An electronic signature was used to authenticate this note.   -- Trang Hammer MD

## 2025-04-04 ENCOUNTER — TELEPHONE (OUTPATIENT)
Age: 70
End: 2025-04-04

## 2025-04-04 LAB — RHEUMATOID FACTOR: <10 IU/ML (ref 0–13)

## 2025-04-04 NOTE — TELEPHONE ENCOUNTER
Patient states for the last couple weeks he has been taking two Ativan's a day (one in the afternoon and one before bed) to help him relax/sleep due to the pain he has been experiencing/getting treated for.    He is inquiring if his pcp would be willing send in a new script so he can take take two tablets a day or would he need an appt first to discuss.

## 2025-04-05 LAB — NUCLEAR IGG SER QL IA: NORMAL

## 2025-04-09 NOTE — RESULT ENCOUNTER NOTE
BRENDON neg, RF neg.  Cbc showing anemia  CMP showing slightly elevated glucose and low sodium.   If still feeling SOB and fatigued recommend follow up with PCP for more workup

## 2025-04-10 ENCOUNTER — TELEMEDICINE (OUTPATIENT)
Age: 70
End: 2025-04-10

## 2025-04-10 DIAGNOSIS — K59.00 CONSTIPATION, UNSPECIFIED CONSTIPATION TYPE: ICD-10-CM

## 2025-04-10 DIAGNOSIS — M62.838 MUSCLE SPASM: ICD-10-CM

## 2025-04-10 DIAGNOSIS — R07.89 CHEST WALL PAIN: Primary | ICD-10-CM

## 2025-04-10 DIAGNOSIS — Z51.5 PALLIATIVE CARE ENCOUNTER: ICD-10-CM

## 2025-04-10 RX ORDER — CYCLOBENZAPRINE HCL 5 MG
5 TABLET ORAL 3 TIMES DAILY PRN
Qty: 45 TABLET | Refills: 0 | Status: SHIPPED | OUTPATIENT
Start: 2025-04-10 | End: 2025-04-25

## 2025-04-10 ASSESSMENT — ENCOUNTER SYMPTOMS
SHORTNESS OF BREATH: 1
CONSTIPATION: 1
TROUBLE SWALLOWING: 1
BACK PAIN: 1

## 2025-04-15 ENCOUNTER — OFFICE VISIT (OUTPATIENT)
Age: 70
End: 2025-04-15
Payer: COMMERCIAL

## 2025-04-15 ENCOUNTER — APPOINTMENT (OUTPATIENT)
Dept: CT IMAGING | Age: 70
End: 2025-04-15
Payer: COMMERCIAL

## 2025-04-15 ENCOUNTER — HOSPITAL ENCOUNTER (EMERGENCY)
Age: 70
Discharge: HOME | End: 2025-04-15
Payer: COMMERCIAL

## 2025-04-15 VITALS
TEMPERATURE: 97.9 F | RESPIRATION RATE: 16 BRPM | BODY MASS INDEX: 28.79 KG/M2 | WEIGHT: 190 LBS | OXYGEN SATURATION: 98 % | SYSTOLIC BLOOD PRESSURE: 121 MMHG | HEART RATE: 93 BPM | HEIGHT: 68 IN | DIASTOLIC BLOOD PRESSURE: 79 MMHG

## 2025-04-15 VITALS
HEART RATE: 117 BPM | OXYGEN SATURATION: 95 % | TEMPERATURE: 97.9 F | DIASTOLIC BLOOD PRESSURE: 72 MMHG | HEIGHT: 69 IN | BODY MASS INDEX: 28.14 KG/M2 | WEIGHT: 190 LBS | SYSTOLIC BLOOD PRESSURE: 106 MMHG

## 2025-04-15 DIAGNOSIS — S39.012D STRAIN OF LUMBAR REGION, SUBSEQUENT ENCOUNTER: ICD-10-CM

## 2025-04-15 DIAGNOSIS — C61 PROSTATE CANCER (HCC): ICD-10-CM

## 2025-04-15 DIAGNOSIS — F41.9 ANXIETY: ICD-10-CM

## 2025-04-15 DIAGNOSIS — M53.9 MULTILEVEL DEGENERATIVE DISC DISEASE: ICD-10-CM

## 2025-04-15 DIAGNOSIS — M54.6 ACUTE BILATERAL THORACIC BACK PAIN: Primary | ICD-10-CM

## 2025-04-15 DIAGNOSIS — M19.09 PRIMARY OSTEOARTHRITIS OF OTHER SITE: ICD-10-CM

## 2025-04-15 DIAGNOSIS — M54.9 ACUTE ON CHRONIC BACK PAIN: Primary | ICD-10-CM

## 2025-04-15 DIAGNOSIS — Z85.810 H/O TONGUE CANCER: ICD-10-CM

## 2025-04-15 DIAGNOSIS — G89.29 ACUTE ON CHRONIC BACK PAIN: Primary | ICD-10-CM

## 2025-04-15 PROCEDURE — 96375 TX/PRO/DX INJ NEW DRUG ADDON: CPT

## 2025-04-15 PROCEDURE — 1123F ACP DISCUSS/DSCN MKR DOCD: CPT | Performed by: FAMILY MEDICINE

## 2025-04-15 PROCEDURE — 1160F RVW MEDS BY RX/DR IN RCRD: CPT | Performed by: FAMILY MEDICINE

## 2025-04-15 PROCEDURE — 96374 THER/PROPH/DIAG INJ IV PUSH: CPT

## 2025-04-15 PROCEDURE — 99284 EMERGENCY DEPT VISIT MOD MDM: CPT

## 2025-04-15 PROCEDURE — 3078F DIAST BP <80 MM HG: CPT | Performed by: FAMILY MEDICINE

## 2025-04-15 PROCEDURE — 3074F SYST BP LT 130 MM HG: CPT | Performed by: FAMILY MEDICINE

## 2025-04-15 PROCEDURE — 6360000002 HC RX W HCPCS

## 2025-04-15 PROCEDURE — 96376 TX/PRO/DX INJ SAME DRUG ADON: CPT

## 2025-04-15 PROCEDURE — 1159F MED LIST DOCD IN RCRD: CPT | Performed by: FAMILY MEDICINE

## 2025-04-15 PROCEDURE — 99213 OFFICE O/P EST LOW 20 MIN: CPT | Performed by: FAMILY MEDICINE

## 2025-04-15 PROCEDURE — 72131 CT LUMBAR SPINE W/O DYE: CPT

## 2025-04-15 RX ORDER — KETOROLAC TROMETHAMINE 15 MG/ML
15 INJECTION, SOLUTION INTRAMUSCULAR; INTRAVENOUS ONCE
Status: COMPLETED | OUTPATIENT
Start: 2025-04-15 | End: 2025-04-15

## 2025-04-15 RX ADMIN — KETOROLAC TROMETHAMINE 15 MG: 15 INJECTION, SOLUTION INTRAMUSCULAR; INTRAVENOUS at 15:08

## 2025-04-15 RX ADMIN — HYDROMORPHONE HYDROCHLORIDE 0.25 MG: 1 INJECTION, SOLUTION INTRAMUSCULAR; INTRAVENOUS; SUBCUTANEOUS at 16:36

## 2025-04-15 RX ADMIN — HYDROMORPHONE HYDROCHLORIDE 0.5 MG: 1 INJECTION, SOLUTION INTRAMUSCULAR; INTRAVENOUS; SUBCUTANEOUS at 15:09

## 2025-04-15 ASSESSMENT — PAIN SCALES - GENERAL
PAINLEVEL_OUTOF10: 10
PAINLEVEL_OUTOF10: 5

## 2025-04-15 ASSESSMENT — ENCOUNTER SYMPTOMS
CONSTIPATION: 1
BACK PAIN: 1

## 2025-04-15 ASSESSMENT — PAIN DESCRIPTION - LOCATION: LOCATION: BACK

## 2025-04-15 ASSESSMENT — PAIN DESCRIPTION - FREQUENCY: FREQUENCY: INTERMITTENT

## 2025-04-15 ASSESSMENT — PAIN DESCRIPTION - ORIENTATION: ORIENTATION: LOWER;MID

## 2025-04-15 ASSESSMENT — PAIN DESCRIPTION - PAIN TYPE: TYPE: ACUTE PAIN

## 2025-04-15 ASSESSMENT — PAIN - FUNCTIONAL ASSESSMENT
PAIN_FUNCTIONAL_ASSESSMENT: 0-10
PAIN_FUNCTIONAL_ASSESSMENT: 0-10

## 2025-04-15 NOTE — ED TRIAGE NOTES
The patient states he has had back pain x6 weeks. Pt states he has been treated for multiple issues since the back pain began but that he continues to have back pain. Denies any recent fall. Pt appears pale. A&Ox4

## 2025-04-15 NOTE — ED PROVIDER NOTES
moist.      Pharynx: Oropharynx is clear.   Eyes:      Extraocular Movements: Extraocular movements intact.      Conjunctiva/sclera: Conjunctivae normal.   Cardiovascular:      Rate and Rhythm: Normal rate and regular rhythm.      Pulses: Normal pulses.   Pulmonary:      Effort: Pulmonary effort is normal.      Breath sounds: Normal breath sounds.   Abdominal:      Palpations: Abdomen is soft.      Tenderness: There is no abdominal tenderness.   Musculoskeletal:         General: No deformity. Normal range of motion.      Cervical back: Normal range of motion and neck supple.      Comments: No localized midline tenderness noted.   Skin:     General: Skin is warm and dry.      Capillary Refill: Capillary refill takes less than 2 seconds.   Neurological:      General: No focal deficit present.      Mental Status: He is alert and oriented to person, place, and time. Mental status is at baseline.   Psychiatric:         Thought Content: Thought content normal.         DIAGNOSTIC RESULTS     EKG: All EKG's are interpreted by the Emergency Department Physician who either signs or Co-signs this chart in the absence of a cardiologist.        RADIOLOGY:   Non-plain film images such as CT, Ultrasound and MRI are read by the radiologist. Plain radiographic images are visualized and preliminarily interpreted by the emergency physician with the below findings:    No acute process as noted below    Interpretation per the Radiologist below, if available at the time of this note:    CT LUMBAR SPINE WO CONTRAST   Final Result   1. No acute fracture or subluxation.   2. Scoliosis of the spine with multilevel degenerative changes.             LABS:  Labs Reviewed - No data to display    All other labs were within normal range or not returned as of this dictation.    EMERGENCY DEPARTMENT COURSE and DIFFERENTIAL DIAGNOSIS/MDM:     Vitals:    Vitals:    04/15/25 1329 04/15/25 1603   BP: 126/85 121/79   Pulse: (!) 123 93   Resp: 20 16

## 2025-04-15 NOTE — ED NOTES
Patient states pain is better than on arrival. Patient states it still hurts when he moves in certain positions, but when resting pain has improved.

## 2025-04-15 NOTE — PROGRESS NOTES
Subjective:      Patient ID: Kelvin Washington is a 69 y.o. male    Pain  Associated symptoms include arthralgias and weakness.     Here with worsening back pain over the last few weeks.  Pain getting worse.  Not able to walk very well over the last 8 days.  No injury.  On opiates thru palliative care --was interested to go up on ativan dosing as admits has been using this bid over the last few week.     Review of Systems   Constitutional:  Positive for activity change.   Gastrointestinal:  Positive for constipation.   Musculoskeletal:  Positive for arthralgias and back pain.   Neurological:  Positive for weakness.     Reviewed allergy, medical, social, surgical, family and med list changes and updated   Files     Social History     Socioeconomic History    Marital status:      Spouse name: None    Number of children: None    Years of education: None    Highest education level: None   Tobacco Use    Smoking status: Former     Current packs/day: 0.00     Average packs/day: 1.5 packs/day for 40.0 years (60.0 ttl pk-yrs)     Types: Cigarettes     Start date: 1978     Quit date: 2018     Years since quittin.4    Smokeless tobacco: Never   Vaping Use    Vaping status: Never Used   Substance and Sexual Activity    Alcohol use: Not Currently     Comment: very rarely (1 beer a year)    Drug use: No    Sexual activity: Yes     Partners: Female     Social Drivers of Health     Financial Resource Strain: Low Risk  (2024)    Overall Financial Resource Strain (CARDIA)     Difficulty of Paying Living Expenses: Not hard at all   Food Insecurity: No Food Insecurity (2025)    Hunger Vital Sign     Worried About Running Out of Food in the Last Year: Never true     Ran Out of Food in the Last Year: Never true   Transportation Needs: No Transportation Needs (2025)    PRAPARE - Transportation     Lack of Transportation (Medical): No     Lack of Transportation (Non-Medical): No   Physical Activity:

## 2025-04-17 ENCOUNTER — TELEPHONE (OUTPATIENT)
Age: 70
End: 2025-04-17

## 2025-04-17 DIAGNOSIS — F41.9 ANXIETY: ICD-10-CM

## 2025-04-17 RX ORDER — LORAZEPAM 1 MG/1
1 TABLET ORAL 2 TIMES DAILY PRN
Refills: 0 | Status: CANCELLED | OUTPATIENT
Start: 2025-04-17 | End: 2025-05-17

## 2025-04-17 RX ORDER — LORAZEPAM 1 MG/1
1 TABLET ORAL 2 TIMES DAILY PRN
Qty: 60 TABLET | Refills: 0 | Status: SHIPPED | OUTPATIENT
Start: 2025-04-17 | End: 2025-05-17

## 2025-04-17 NOTE — TELEPHONE ENCOUNTER
Igor called stating he has #3 pills left of  LORazepam (ATIVAN) 1 mg.and states it has been hard to sleep and taking one pill at night and one pill  in afternoon.    One Rx pending provider review per pt request.  [facility]  Conf future 3mo 5/29/25

## 2025-04-18 ENCOUNTER — APPOINTMENT (OUTPATIENT)
Dept: OTOLARYNGOLOGY | Facility: CLINIC | Age: 70
End: 2025-04-18
Payer: COMMERCIAL

## 2025-04-25 DIAGNOSIS — C61 PROSTATE CANCER (HCC): ICD-10-CM

## 2025-04-25 DIAGNOSIS — R07.89 CHEST WALL PAIN: ICD-10-CM

## 2025-04-25 DIAGNOSIS — M47.817 LUMBOSACRAL SPONDYLOSIS WITHOUT MYELOPATHY: ICD-10-CM

## 2025-04-25 RX ORDER — HYDROCODONE BITARTRATE AND ACETAMINOPHEN 5; 325 MG/1; MG/1
1 TABLET ORAL EVERY 4 HOURS PRN
Qty: 180 TABLET | Refills: 0 | Status: SHIPPED | OUTPATIENT
Start: 2025-04-26 | End: 2025-05-26

## 2025-04-25 NOTE — TELEPHONE ENCOUNTER
Requested Prescriptions     Pending Prescriptions Disp Refills    HYDROcodone-acetaminophen (NORCO) 5-325 MG per tablet 180 tablet 0     Sig: Take 1 tablet by mouth every 4 hours as needed for Pain for up to 30 days. Intended supply: 30 days. Take lowest dose possible to manage pain Max Daily Amount: 6 tablets     3.28.25

## 2025-04-28 RX ORDER — ATORVASTATIN CALCIUM 80 MG/1
80 TABLET, FILM COATED ORAL DAILY
Qty: 90 TABLET | Refills: 0 | Status: SHIPPED | OUTPATIENT
Start: 2025-04-28 | End: 2025-07-27

## 2025-04-28 RX ORDER — LEVOTHYROXINE SODIUM 88 UG/1
88 TABLET ORAL DAILY
Qty: 90 TABLET | Refills: 0 | OUTPATIENT
Start: 2025-04-28

## 2025-04-30 ASSESSMENT — ENCOUNTER SYMPTOMS
BACK PAIN: 1
TROUBLE SWALLOWING: 1
SHORTNESS OF BREATH: 1
CONSTIPATION: 1

## 2025-04-30 NOTE — PROGRESS NOTES
Subjective:      Patient Id: Kelvin was seen via telephone telehealth visit for follow-up palliative care visit. He was accompanied to the appointment by: self.   Chief Complaint   Patient presents with    Spasms    Follow-up    Constipation     Kelvin Washington, was evaluated through a synchronous (real-time) audio encounter as patient was unable to launch video. The patient (or guardian if applicable) is aware that this is a billable service, which includes applicable co-pays. This Virtual Visit was conducted with patient's (and/or legal guardian's) consent. Patient identification was verified, and a caregiver was present when appropriate. The patient was located in a state where the provider was licensed to provide care. Patient was contacted and agreed to proceed with a virtual visit via mobile phone, with patient at home: [address] [place] [address] with provider at:   [facility] [place] [address]. The risks and benefits of converting to a virtual visit were discussed.     HPI       Kelvin Washington is a 69 y.o. 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 Kelvin has complex medical history that includes CAD, prostate cancer, prostatectomy in 2018, oropharyngeal cancer in remission, diastolic CHF, chronic back pain, emphysema, HLD, HTN, hypothyroidism, OA, anxiety . Home visit is necessary in lieu of office due to significant frailty and high symptom burden from comorbid illnesses.  Patient complains of chronic pain that moves/is generalized but mainly in his back.  [facility], [facility]    General: Upon greeting the patient cooperative, calm, cooperative and in NAD. Negative orthopnea, chest pain, chills, myalgias.   Attempted to

## 2025-05-01 ENCOUNTER — TELEMEDICINE (OUTPATIENT)
Age: 70
End: 2025-05-01

## 2025-05-01 DIAGNOSIS — Z51.5 PALLIATIVE CARE ENCOUNTER: ICD-10-CM

## 2025-05-01 DIAGNOSIS — M62.838 MUSCLE SPASM: Primary | ICD-10-CM

## 2025-05-01 DIAGNOSIS — K59.00 CONSTIPATION, UNSPECIFIED CONSTIPATION TYPE: ICD-10-CM

## 2025-05-01 RX ORDER — CYCLOBENZAPRINE HCL 10 MG
10 TABLET ORAL 3 TIMES DAILY PRN
Qty: 90 TABLET | Refills: 0 | Status: SHIPPED | OUTPATIENT
Start: 2025-05-01 | End: 2025-05-31

## 2025-05-01 RX ORDER — SENNA AND DOCUSATE SODIUM 50; 8.6 MG/1; MG/1
1 TABLET, FILM COATED ORAL DAILY
Qty: 30 TABLET | Refills: 0 | Status: SHIPPED | OUTPATIENT
Start: 2025-05-01 | End: 2025-05-31

## 2025-05-02 RX ORDER — LEVOTHYROXINE SODIUM 88 UG/1
88 TABLET ORAL DAILY
Qty: 90 TABLET | Refills: 0 | Status: SHIPPED | OUTPATIENT
Start: 2025-05-02

## 2025-05-11 DIAGNOSIS — M62.838 MUSCLE SPASM: ICD-10-CM

## 2025-05-12 DIAGNOSIS — E03.9 HYPOTHYROIDISM, UNSPECIFIED TYPE: ICD-10-CM

## 2025-05-12 DIAGNOSIS — I10 ESSENTIAL HYPERTENSION: ICD-10-CM

## 2025-05-12 DIAGNOSIS — R73.03 PREDIABETES: ICD-10-CM

## 2025-05-12 DIAGNOSIS — E78.00 PURE HYPERCHOLESTEROLEMIA: ICD-10-CM

## 2025-05-12 LAB
ALBUMIN SERPL-MCNC: 3.3 G/DL (ref 3.5–4.6)
ALP SERPL-CCNC: 184 U/L (ref 35–104)
ALT SERPL-CCNC: 61 U/L (ref 0–41)
ANION GAP SERPL CALCULATED.3IONS-SCNC: 15 MEQ/L (ref 9–15)
AST SERPL-CCNC: 54 U/L (ref 0–40)
BASOPHILS # BLD: 0.1 K/UL (ref 0–0.2)
BASOPHILS NFR BLD: 0.4 %
BILIRUB SERPL-MCNC: 0.5 MG/DL (ref 0.2–0.7)
BUN SERPL-MCNC: 13 MG/DL (ref 8–23)
CALCIUM SERPL-MCNC: 9.9 MG/DL (ref 8.5–9.9)
CHLORIDE SERPL-SCNC: 89 MEQ/L (ref 95–107)
CHOLEST SERPL-MCNC: 92 MG/DL (ref 0–199)
CO2 SERPL-SCNC: 30 MEQ/L (ref 20–31)
CREAT SERPL-MCNC: 0.88 MG/DL (ref 0.7–1.2)
EOSINOPHIL # BLD: 0.1 K/UL (ref 0–0.7)
EOSINOPHIL NFR BLD: 0.8 %
ERYTHROCYTE [DISTWIDTH] IN BLOOD BY AUTOMATED COUNT: 17.9 % (ref 11.5–14.5)
GLOBULIN SER CALC-MCNC: 5 G/DL (ref 2.3–3.5)
GLUCOSE SERPL-MCNC: 112 MG/DL (ref 70–99)
HCT VFR BLD AUTO: 38.5 % (ref 42–52)
HDLC SERPL-MCNC: 30 MG/DL (ref 40–59)
HGB BLD-MCNC: 11.6 G/DL (ref 14–18)
LDLC SERPL CALC-MCNC: 40 MG/DL (ref 0–129)
LYMPHOCYTES # BLD: 0.9 K/UL (ref 1–4.8)
LYMPHOCYTES NFR BLD: 7.4 %
MCH RBC QN AUTO: 23.8 PG (ref 27–31.3)
MCHC RBC AUTO-ENTMCNC: 30.1 % (ref 33–37)
MCV RBC AUTO: 78.9 FL (ref 79–92.2)
MONOCYTES # BLD: 1 K/UL (ref 0.2–0.8)
MONOCYTES NFR BLD: 7.8 %
NEUTROPHILS # BLD: 10.1 K/UL (ref 1.4–6.5)
NEUTS SEG NFR BLD: 82.4 %
PLATELET # BLD AUTO: 534 K/UL (ref 130–400)
POTASSIUM SERPL-SCNC: 4 MEQ/L (ref 3.4–4.9)
PROT SERPL-MCNC: 8.3 G/DL (ref 6.3–8)
RBC # BLD AUTO: 4.88 M/UL (ref 4.7–6.1)
SODIUM SERPL-SCNC: 134 MEQ/L (ref 135–144)
TRIGL SERPL-MCNC: 112 MG/DL (ref 0–150)
TSH SERPL-MCNC: 4.49 UIU/ML (ref 0.44–3.86)
WBC # BLD AUTO: 12.3 K/UL (ref 4.8–10.8)

## 2025-05-12 RX ORDER — CYCLOBENZAPRINE HCL 10 MG
10 TABLET ORAL 3 TIMES DAILY PRN
Qty: 90 TABLET | Refills: 0 | OUTPATIENT
Start: 2025-05-12

## 2025-05-12 RX ORDER — OMEPRAZOLE 20 MG/1
20 CAPSULE, DELAYED RELEASE ORAL
Qty: 90 CAPSULE | Refills: 3 | Status: SHIPPED | OUTPATIENT
Start: 2025-05-12

## 2025-05-13 ENCOUNTER — OFFICE VISIT (OUTPATIENT)
Age: 70
End: 2025-05-13
Payer: COMMERCIAL

## 2025-05-13 ENCOUNTER — RESULTS FOLLOW-UP (OUTPATIENT)
Age: 70
End: 2025-05-13

## 2025-05-13 ENCOUNTER — HOSPITAL ENCOUNTER (OUTPATIENT)
Dept: CT IMAGING | Age: 70
Discharge: HOME | End: 2025-05-15
Attending: INTERNAL MEDICINE
Payer: COMMERCIAL

## 2025-05-13 VITALS — SYSTOLIC BLOOD PRESSURE: 100 MMHG | HEART RATE: 107 BPM | OXYGEN SATURATION: 90 % | DIASTOLIC BLOOD PRESSURE: 80 MMHG

## 2025-05-13 DIAGNOSIS — R91.8 ABNORMAL CT LUNG SCREENING: ICD-10-CM

## 2025-05-13 DIAGNOSIS — R06.09 DOE (DYSPNEA ON EXERTION): ICD-10-CM

## 2025-05-13 DIAGNOSIS — E78.5 DYSLIPIDEMIA: ICD-10-CM

## 2025-05-13 DIAGNOSIS — R09.89 BILATERAL CAROTID BRUITS: ICD-10-CM

## 2025-05-13 DIAGNOSIS — I25.10 CORONARY ARTERY DISEASE INVOLVING NATIVE CORONARY ARTERY OF NATIVE HEART WITHOUT ANGINA PECTORIS: ICD-10-CM

## 2025-05-13 DIAGNOSIS — I25.119 ATHEROSCLEROSIS OF NATIVE CORONARY ARTERY OF NATIVE HEART WITH ANGINA PECTORIS: ICD-10-CM

## 2025-05-13 DIAGNOSIS — R91.8 LUNG NODULES: ICD-10-CM

## 2025-05-13 DIAGNOSIS — I10 ESSENTIAL HYPERTENSION: Primary | ICD-10-CM

## 2025-05-13 LAB
ESTIMATED AVERAGE GLUCOSE: 137 MG/DL
HBA1C MFR BLD: 6.4 % (ref 4–6)

## 2025-05-13 PROCEDURE — 1123F ACP DISCUSS/DSCN MKR DOCD: CPT | Performed by: INTERNAL MEDICINE

## 2025-05-13 PROCEDURE — 3074F SYST BP LT 130 MM HG: CPT | Performed by: INTERNAL MEDICINE

## 2025-05-13 PROCEDURE — 71250 CT THORAX DX C-: CPT

## 2025-05-13 PROCEDURE — 1159F MED LIST DOCD IN RCRD: CPT | Performed by: INTERNAL MEDICINE

## 2025-05-13 PROCEDURE — 3079F DIAST BP 80-89 MM HG: CPT | Performed by: INTERNAL MEDICINE

## 2025-05-13 PROCEDURE — 99214 OFFICE O/P EST MOD 30 MIN: CPT | Performed by: INTERNAL MEDICINE

## 2025-05-13 RX ORDER — ATORVASTATIN CALCIUM 80 MG/1
40 TABLET, FILM COATED ORAL DAILY
Qty: 45 TABLET | Refills: 0
Start: 2025-05-13 | End: 2025-08-11

## 2025-05-13 ASSESSMENT — ENCOUNTER SYMPTOMS
SHORTNESS OF BREATH: 1
GASTROINTESTINAL NEGATIVE: 1
BLOOD IN STOOL: 0
BACK PAIN: 1
STRIDOR: 0
NAUSEA: 0
CHEST TIGHTNESS: 0
WHEEZING: 0
EYES NEGATIVE: 1
COUGH: 0

## 2025-05-13 NOTE — PROGRESS NOTES
Office Visit.         Patient: Kelvin Washington  YOB: 1955  MRN: [id number]    Chief Complaint: COLE LE edema Orthopnea  Chief Complaint   Patient presents with    Follow-up     6 month  Started in February but got worse 2-3 weeks ago  Can't barely do things due to middle lower back pain feels sharp and aching pain goes down to hips and legs        CV Data:  7/2019 echo EF 60  7/2019 Dobut stress echo negative   LAD 40-50% sequential, RCA  mid. LVEF 55, EDP 14, PCWP 14 CO 7.2 L/min  10/2019 CUS- mild  10/2010 Abd US negative.   11/2020 CUS mild   9/23 CUS mild  2/24 SPECT inferior defect.  ( He has  RCA)    Subjective/HPI COLE is getting worse.  Can't do much due to knee and back pain.  Can't lie flat due to sob    10/16/2019: still sob but better since BB and Imdur. No cp. No bleed no falls.     1/17/2020 no cp occ cole no falls occ nose bleed no falls     6/19/2020 Patient and/or health care decision maker is aware that that he/she may receive a bill for this telephone service, depending on his insurance coverage, and has provided verbal consent to proceed.  This visit was completed via telephone.  Total time 13 minutes.    Still has some cole but better. No cp no falls no bleed little edema.     7/27/2020 still COLE.     10/28/2020 no COLE No CP active at home. No falls no bleed.     1/28/21 still sob occ cp but not bad. No NTG needed. No bleed no falls     4/29/21 lately occ LE edema no cp no sob no bleed. Takes mes. Recent EGD     8/31/21 No cp no sob no falls no bleed. Lost near 30LBS by deiting.  Takes med    4/18/22 doing welll no cp no sob no falls no bleed no edema.     8/22/22 dz with throat and tongue cancer.  Getting PE scan.  CV stable no pc  no sob no falls nobleed. Takes meds    12/22/22  finished chemo for tongue and throat cancer. No cp stlll sob no worse. Not eating. Gets tube feed via PEG.  Coreg stopped due to so much weight loss.     5/5/23 doing well no cp no sob no falls no bleed

## 2025-05-15 ENCOUNTER — OFFICE VISIT (OUTPATIENT)
Age: 70
End: 2025-05-15
Payer: COMMERCIAL

## 2025-05-15 VITALS
WEIGHT: 150 LBS | HEART RATE: 95 BPM | HEIGHT: 68 IN | BODY MASS INDEX: 22.73 KG/M2 | TEMPERATURE: 97.4 F | OXYGEN SATURATION: 96 %

## 2025-05-15 DIAGNOSIS — M47.817 LUMBOSACRAL SPONDYLOSIS WITHOUT MYELOPATHY: Primary | ICD-10-CM

## 2025-05-15 PROCEDURE — 1159F MED LIST DOCD IN RCRD: CPT | Performed by: PAIN MEDICINE

## 2025-05-15 PROCEDURE — 99214 OFFICE O/P EST MOD 30 MIN: CPT | Performed by: PAIN MEDICINE

## 2025-05-15 PROCEDURE — 1125F AMNT PAIN NOTED PAIN PRSNT: CPT | Performed by: PAIN MEDICINE

## 2025-05-15 PROCEDURE — 1123F ACP DISCUSS/DSCN MKR DOCD: CPT | Performed by: PAIN MEDICINE

## 2025-05-15 NOTE — PROGRESS NOTES
[facility]  P: [phone]  F: [phone]        Kelvin Washington  (1955)    5/15/2025    Subjective:     Kelvin Washington is 69 y.o. male who complains today of:     Chief Complaint   Patient presents with    Follow-up   Patient here today for follow-up.  He has a lot of medical problems being treated for cancer and palliative care takes [place].  Pain is flared up across his low back with referred pain into the hips and legs.  History of RF ablation over a year ago helped over 50% for about a year.  He has trouble bending standing walking.  Some mobility through the wheelchair.  It is worsened recently.  He did have CT scan lumbar spine April 15 I reviewed the results he has degenerative changes.  Pain affects his quality life achy sharp pain with spasms.    Assessment: Chronic pain syndrome, lumbar spondylosis    Plan: We discussed option detail.  Like to authorize bilateral L3-4-5 radiofrequency ablation under fluoroscopic guidance.  NRS pain level 9 out of 10.  Previous ablation done over a year ago helped about a year over 50% patient was more functional.  OARRS was reviewed.  Patient is on narcotics through palliative care.  He has multiple medical problems.  He has failed conservative treatment clued and home excise program physical therapy in the past.  Imaging studies reviewed.      Allergies:  Breztri aerosphere [budeson-glycopyrrol-formoterol], Demerol hcl [meperidine], and Valium [diazepam]    Past Medical History:   Diagnosis Date    CAD (coronary artery disease)     Cancer (HCC)     prostate cancer Nov 16 2018, SCCA right BOT 2022    CHF (congestive heart failure) (HCC)     Chicken pox     Chronic back pain     Emphysema of lung (HCC)     Hyperlipidemia     Hypertension     Hypothyroidism     Measles     Mumps     Osteoarthritis     Pneumonia      Past Surgical History:   Procedure Laterality Date    CARDIAC

## 2025-05-16 DIAGNOSIS — F41.9 ANXIETY: ICD-10-CM

## 2025-05-16 RX ORDER — LORAZEPAM 1 MG/1
1 TABLET ORAL 2 TIMES DAILY PRN
Qty: 60 TABLET | Refills: 0 | Status: SHIPPED | OUTPATIENT
Start: 2025-05-16 | End: 2025-06-15

## 2025-05-19 ENCOUNTER — HOSPITAL ENCOUNTER (EMERGENCY)
Age: 70
Discharge: HOME | End: 2025-05-20
Payer: COMMERCIAL

## 2025-05-19 ENCOUNTER — APPOINTMENT (OUTPATIENT)
Dept: MRI IMAGING | Age: 70
End: 2025-05-19
Payer: COMMERCIAL

## 2025-05-19 ENCOUNTER — OFFICE VISIT (OUTPATIENT)
Age: 70
End: 2025-05-19
Payer: COMMERCIAL

## 2025-05-19 VITALS
SYSTOLIC BLOOD PRESSURE: 116 MMHG | DIASTOLIC BLOOD PRESSURE: 68 MMHG | BODY MASS INDEX: 22.48 KG/M2 | TEMPERATURE: 97.8 F | OXYGEN SATURATION: 96 % | HEIGHT: 69 IN | HEART RATE: 114 BPM | RESPIRATION RATE: 18 BRPM

## 2025-05-19 VITALS
BODY MASS INDEX: 24.65 KG/M2 | RESPIRATION RATE: 18 BRPM | SYSTOLIC BLOOD PRESSURE: 120 MMHG | HEART RATE: 98 BPM | TEMPERATURE: 98.1 F | WEIGHT: 164.5 LBS | DIASTOLIC BLOOD PRESSURE: 89 MMHG | OXYGEN SATURATION: 95 %

## 2025-05-19 DIAGNOSIS — J44.9 CHRONIC OBSTRUCTIVE PULMONARY DISEASE, UNSPECIFIED COPD TYPE (HCC): ICD-10-CM

## 2025-05-19 DIAGNOSIS — M54.50 LOW BACK PAIN, UNSPECIFIED BACK PAIN LATERALITY, UNSPECIFIED CHRONICITY, UNSPECIFIED WHETHER SCIATICA PRESENT: Primary | ICD-10-CM

## 2025-05-19 DIAGNOSIS — M46.20 VERTEBRAL OSTEOMYELITIS (HCC): Primary | ICD-10-CM

## 2025-05-19 LAB
ALBUMIN SERPL-MCNC: 3.1 G/DL (ref 3.5–4.6)
ALP SERPL-CCNC: 147 U/L (ref 35–104)
ALT SERPL-CCNC: 30 U/L (ref 0–41)
ANION GAP SERPL CALCULATED.3IONS-SCNC: 12 MEQ/L (ref 9–15)
AST SERPL-CCNC: 24 U/L (ref 0–40)
BASOPHILS # BLD: 0.1 K/UL (ref 0–0.2)
BASOPHILS NFR BLD: 0.4 %
BILIRUB SERPL-MCNC: 0.3 MG/DL (ref 0.2–0.7)
BUN SERPL-MCNC: 15 MG/DL (ref 8–23)
CALCIUM SERPL-MCNC: 9.3 MG/DL (ref 8.5–9.9)
CHLORIDE SERPL-SCNC: 96 MEQ/L (ref 95–107)
CO2 SERPL-SCNC: 28 MEQ/L (ref 20–31)
CREAT SERPL-MCNC: 0.77 MG/DL (ref 0.7–1.2)
EOSINOPHIL # BLD: 0.2 K/UL (ref 0–0.7)
EOSINOPHIL NFR BLD: 1.5 %
ERYTHROCYTE [DISTWIDTH] IN BLOOD BY AUTOMATED COUNT: 18.8 % (ref 11.5–14.5)
ERYTHROCYTE [SEDIMENTATION RATE] IN BLOOD BY WESTERGREN METHOD: 91 MM (ref 0–20)
GLOBULIN SER CALC-MCNC: 4.4 G/DL (ref 2.3–3.5)
GLUCOSE SERPL-MCNC: 128 MG/DL (ref 70–99)
HCT VFR BLD AUTO: 36.5 % (ref 42–52)
HGB BLD-MCNC: 10.8 G/DL (ref 14–18)
INR PPP: 1
LYMPHOCYTES # BLD: 1.1 K/UL (ref 1–4.8)
LYMPHOCYTES NFR BLD: 8.1 %
MCH RBC QN AUTO: 23.8 PG (ref 27–31.3)
MCHC RBC AUTO-ENTMCNC: 29.6 % (ref 33–37)
MCV RBC AUTO: 80.6 FL (ref 79–92.2)
MONOCYTES # BLD: 1 K/UL (ref 0.2–0.8)
MONOCYTES NFR BLD: 7.3 %
NEUTROPHILS # BLD: 10.9 K/UL (ref 1.4–6.5)
NEUTS SEG NFR BLD: 82 %
PLATELET # BLD AUTO: 512 K/UL (ref 130–400)
POTASSIUM SERPL-SCNC: 4 MEQ/L (ref 3.4–4.9)
PROT SERPL-MCNC: 7.5 G/DL (ref 6.3–8)
PROTHROMBIN TIME: 13.8 SEC (ref 12.3–14.9)
RBC # BLD AUTO: 4.53 M/UL (ref 4.7–6.1)
SODIUM SERPL-SCNC: 136 MEQ/L (ref 135–144)
WBC # BLD AUTO: 13.4 K/UL (ref 4.8–10.8)

## 2025-05-19 PROCEDURE — 85610 PROTHROMBIN TIME: CPT

## 2025-05-19 PROCEDURE — 2580000003 HC RX 258: Performed by: PHYSICIAN ASSISTANT

## 2025-05-19 PROCEDURE — 96375 TX/PRO/DX INJ NEW DRUG ADDON: CPT

## 2025-05-19 PROCEDURE — 72157 MRI CHEST SPINE W/O & W/DYE: CPT

## 2025-05-19 PROCEDURE — 99215 OFFICE O/P EST HI 40 MIN: CPT | Performed by: INTERNAL MEDICINE

## 2025-05-19 PROCEDURE — 86140 C-REACTIVE PROTEIN: CPT

## 2025-05-19 PROCEDURE — 96365 THER/PROPH/DIAG IV INF INIT: CPT

## 2025-05-19 PROCEDURE — 99285 EMERGENCY DEPT VISIT HI MDM: CPT

## 2025-05-19 PROCEDURE — 1123F ACP DISCUSS/DSCN MKR DOCD: CPT | Performed by: INTERNAL MEDICINE

## 2025-05-19 PROCEDURE — 80053 COMPREHEN METABOLIC PANEL: CPT

## 2025-05-19 PROCEDURE — 85652 RBC SED RATE AUTOMATED: CPT

## 2025-05-19 PROCEDURE — 6370000000 HC RX 637 (ALT 250 FOR IP): Performed by: PHYSICIAN ASSISTANT

## 2025-05-19 PROCEDURE — 1159F MED LIST DOCD IN RCRD: CPT | Performed by: INTERNAL MEDICINE

## 2025-05-19 PROCEDURE — 3078F DIAST BP <80 MM HG: CPT | Performed by: INTERNAL MEDICINE

## 2025-05-19 PROCEDURE — 96366 THER/PROPH/DIAG IV INF ADDON: CPT

## 2025-05-19 PROCEDURE — 6360000002 HC RX W HCPCS: Performed by: PHYSICIAN ASSISTANT

## 2025-05-19 PROCEDURE — A9577 INJ MULTIHANCE: HCPCS | Performed by: PHYSICIAN ASSISTANT

## 2025-05-19 PROCEDURE — 85025 COMPLETE CBC W/AUTO DIFF WBC: CPT

## 2025-05-19 PROCEDURE — 87040 BLOOD CULTURE FOR BACTERIA: CPT

## 2025-05-19 PROCEDURE — 3074F SYST BP LT 130 MM HG: CPT | Performed by: INTERNAL MEDICINE

## 2025-05-19 PROCEDURE — 6360000004 HC RX CONTRAST MEDICATION: Performed by: PHYSICIAN ASSISTANT

## 2025-05-19 PROCEDURE — 36415 COLL VENOUS BLD VENIPUNCTURE: CPT

## 2025-05-19 RX ORDER — LORAZEPAM 2 MG/ML
2 INJECTION INTRAMUSCULAR ONCE
Status: COMPLETED | OUTPATIENT
Start: 2025-05-19 | End: 2025-05-19

## 2025-05-19 RX ORDER — CYCLOBENZAPRINE HCL 10 MG
10 TABLET ORAL ONCE
Status: COMPLETED | OUTPATIENT
Start: 2025-05-19 | End: 2025-05-19

## 2025-05-19 RX ORDER — HYDROCODONE BITARTRATE AND ACETAMINOPHEN 5; 325 MG/1; MG/1
1 TABLET ORAL ONCE
Status: COMPLETED | OUTPATIENT
Start: 2025-05-19 | End: 2025-05-19

## 2025-05-19 RX ADMIN — VANCOMYCIN HYDROCHLORIDE 1750 MG: 1 INJECTION, POWDER, LYOPHILIZED, FOR SOLUTION INTRAVENOUS at 20:46

## 2025-05-19 RX ADMIN — CYCLOBENZAPRINE 10 MG: 10 TABLET, FILM COATED ORAL at 21:49

## 2025-05-19 RX ADMIN — CEFEPIME 2000 MG: 2 INJECTION, POWDER, FOR SOLUTION INTRAVENOUS at 20:32

## 2025-05-19 RX ADMIN — Medication 2 MG: at 18:50

## 2025-05-19 RX ADMIN — HYDROCODONE BITARTRATE AND ACETAMINOPHEN 1 TABLET: 5; 325 TABLET ORAL at 21:49

## 2025-05-19 RX ADMIN — Medication 2 MG: at 18:22

## 2025-05-19 RX ADMIN — GADOBENATE DIMEGLUMINE 15 ML: 529 INJECTION, SOLUTION INTRAVENOUS at 19:19

## 2025-05-19 ASSESSMENT — PAIN SCALES - GENERAL
PAINLEVEL_OUTOF10: 10
PAINLEVEL_OUTOF10: 4
PAINLEVEL_OUTOF10: 3

## 2025-05-19 ASSESSMENT — PAIN - FUNCTIONAL ASSESSMENT: PAIN_FUNCTIONAL_ASSESSMENT: 0-10

## 2025-05-19 NOTE — PROGRESS NOTES
Subjective:     Kelvin Washington is a 69 y.o. male who complains today of:     Chief Complaint   Patient presents with    Follow-up     2 Month F/U for COPD, Lung Nodule and Personal history of tobacco use     Results     CT Chest       HPI  Patient presents for COPD     5/19/2025  Presents for follow-up, he had a CT scan done for lung nodule monitoring, it shows T5 deformity and compression fracture with concern of possible osteomyelitis.  He report pain at the lumbar spine level, he has bilateral lower extremity weakness for almost 4 weeks, no acute changes.  No fever no chills, no shortness of breath, no coughing.      3/20/2025  Presents for follow-up, he is feeling better after he quit taking Breztri he reported chest tightness, constipation, and urine retention, he felt significantly better when he had to hold Breztri 2 days prior to his PFT, he quit taking it recently and he is able to take deeper breath, he is urinating better and feeling better in general.  He was able to tolerate Spiriva in the past  No chest pain, no lower extremity edema, weight is stable, no heartburn, no nasal congestion or postnasal drip.    2/3/2025  Presents for follow-up, continues to have dyspnea on exertion, minimal coughing, no chest pain, he has not been taking his maintenance inhalers mainly due to cost up to 400 the month.  No chest pain, no lower extremity edema, no heartburn, no nasal congestion or postnasal drip.          9/30/2024  Presents for follow-up, overall doing good, able to do his daily activities with no issues, he reports congestion and coughing mainly at night, no chest pain, no wheezing, no lower extremity edema, no heartburn, no nasal congestion or postnasal drip, weight is stable.      5/29/2024  Presents for follow-up, doing okay, he was started on doxycycline by his palliative care NP, he feels better after starting treatment, he finished yesterday, he had cough essentially dry and worse at night, he did

## 2025-05-19 NOTE — ED PROVIDER NOTES
[facility] EMERGENCY DEPARTMENT  eMERGENCYdEPARTMENT eNCOUnter        Pt Name: Kelvin Washington  MRN: [id number]  Birthdate 1955of evaluation: 5/19/2025  Provider:Buster Barrientos PA-C  6:04 PM EDT    CHIEF COMPLAINT       Chief Complaint   Patient presents with    Back Pain         HISTORY OF PRESENT ILLNESS  (Location/Symptom, Timing/Onset, Context/Setting, Quality, Duration, Modifying Factors, Severity.)   Kelvin Washington is a 69 y.o. male who presents to the emergency department      Presents emergency department with abnormal outpatient CAT scan.  Patient had a CT of the chest done with his pulmonologist which demonstrated T5 and T6 compression fracture concerning for possible osteomyelitis per radiologist.  Patient states he has no pain in the thoracic spine currently but approximately 8 weeks ago he initially had pain in the upper back but this resolved over the past several weeks and he was on pain medications and muscle relaxers which she takes chronically.  He intermittently has radicular symptoms down the right leg, yesterday was down the left leg not currently experiencing radicular symptoms.  He denies any changes in bladder bowel function, denies saddle anesthesias.  He does have a history of chronic low back pain and follows with pain management.  He does follow at  for oncology.    5/13/25 CT Chest  IMPRESSION:  1. There is considerable deformity of what is felt to be T5 which may be  related to a compression fracture of the caudal endplate. There is some  compression of the superior endplate of T6. There is associated spurring  right and left laterally. This was not present on the prior study. No obvious  mass is identified. There is some perivertebral soft tissue prominence.  Recommend MRI of the thoracic spine with and without contrast for further  evaluation.  Osteomyelitis is not excluded.  2. There are small areas of scarring in both apices. There is small areas of  pleural thickening

## 2025-05-20 ENCOUNTER — APPOINTMENT (OUTPATIENT)
Dept: RADIOLOGY | Facility: HOSPITAL | Age: 70
DRG: 447 | End: 2025-05-20
Payer: COMMERCIAL

## 2025-05-20 ENCOUNTER — APPOINTMENT (OUTPATIENT)
Dept: HEMATOLOGY/ONCOLOGY | Facility: HOSPITAL | Age: 70
DRG: 447 | End: 2025-05-20
Payer: COMMERCIAL

## 2025-05-20 ENCOUNTER — TELEPHONE (OUTPATIENT)
Age: 70
End: 2025-05-20

## 2025-05-20 ENCOUNTER — APPOINTMENT (OUTPATIENT)
Dept: RADIOLOGY | Facility: HOSPITAL | Age: 70
End: 2025-05-20
Payer: COMMERCIAL

## 2025-05-20 ENCOUNTER — HOSPITAL ENCOUNTER (INPATIENT)
Facility: HOSPITAL | Age: 70
End: 2025-05-20
Attending: STUDENT IN AN ORGANIZED HEALTH CARE EDUCATION/TRAINING PROGRAM | Admitting: STUDENT IN AN ORGANIZED HEALTH CARE EDUCATION/TRAINING PROGRAM
Payer: COMMERCIAL

## 2025-05-20 DIAGNOSIS — M86.9 OSTEOMYELITIS: Primary | ICD-10-CM

## 2025-05-20 DIAGNOSIS — G89.3 CANCER ASSOCIATED PAIN: ICD-10-CM

## 2025-05-20 DIAGNOSIS — C61 PROSTATE CANCER (MULTI): ICD-10-CM

## 2025-05-20 DIAGNOSIS — E03.9 HYPOTHYROIDISM, UNSPECIFIED TYPE: ICD-10-CM

## 2025-05-20 DIAGNOSIS — E46 MALNUTRITION, UNSPECIFIED TYPE (MULTI): ICD-10-CM

## 2025-05-20 DIAGNOSIS — R78.81 BACTEREMIA DUE TO OTHER BACTERIA: ICD-10-CM

## 2025-05-20 DIAGNOSIS — M86.00 ACUTE HEMATOGENOUS OSTEOMYELITIS, UNSPECIFIED SITE (MULTI): ICD-10-CM

## 2025-05-20 DIAGNOSIS — B96.89 BACTEREMIA DUE TO OTHER BACTERIA: ICD-10-CM

## 2025-05-20 LAB
ABO GROUP (TYPE) IN BLOOD: NORMAL
ALBUMIN SERPL BCP-MCNC: 3 G/DL (ref 3.4–5)
ALP SERPL-CCNC: 131 U/L (ref 33–136)
ALT SERPL W P-5'-P-CCNC: 24 U/L (ref 10–52)
ANION GAP SERPL CALC-SCNC: 13 MMOL/L (ref 10–20)
ANTIBODY SCREEN: NORMAL
APPEARANCE UR: CLEAR
APTT PPP: 31 SECONDS (ref 26–36)
AST SERPL W P-5'-P-CCNC: 16 U/L (ref 9–39)
BILIRUB SERPL-MCNC: 0.4 MG/DL (ref 0–1.2)
BILIRUB UR STRIP.AUTO-MCNC: NEGATIVE MG/DL
BUN SERPL-MCNC: 13 MG/DL (ref 6–23)
CALCIUM SERPL-MCNC: 9.3 MG/DL (ref 8.6–10.6)
CHLORIDE SERPL-SCNC: 99 MMOL/L (ref 98–107)
CO2 SERPL-SCNC: 31 MMOL/L (ref 21–32)
COLOR UR: COLORLESS
CREAT SERPL-MCNC: 0.65 MG/DL (ref 0.5–1.3)
CRP SERPL-MCNC: 7.35 MG/DL
EGFRCR SERPLBLD CKD-EPI 2021: >90 ML/MIN/1.73M*2
ERYTHROCYTE [DISTWIDTH] IN BLOOD BY AUTOMATED COUNT: 18.6 % (ref 11.5–14.5)
ERYTHROCYTE [SEDIMENTATION RATE] IN BLOOD BY WESTERGREN METHOD: 90 MM/H (ref 0–20)
FERRITIN SERPL-MCNC: 305 NG/ML (ref 20–300)
FOLATE SERPL-MCNC: 7.5 NG/ML
GLUCOSE SERPL-MCNC: 88 MG/DL (ref 74–99)
GLUCOSE UR STRIP.AUTO-MCNC: NORMAL MG/DL
HCT VFR BLD AUTO: 33.5 % (ref 41–52)
HGB BLD-MCNC: 9.9 G/DL (ref 13.5–17.5)
INR PPP: 1.1 (ref 0.9–1.1)
IRON SATN MFR SERPL: 15 % (ref 25–45)
IRON SERPL-MCNC: 41 UG/DL (ref 35–150)
KETONES UR STRIP.AUTO-MCNC: NEGATIVE MG/DL
LEUKOCYTE ESTERASE UR QL STRIP.AUTO: ABNORMAL
MAGNESIUM SERPL-MCNC: 1.61 MG/DL (ref 1.6–2.4)
MCH RBC QN AUTO: 24 PG (ref 26–34)
MCHC RBC AUTO-ENTMCNC: 29.6 G/DL (ref 32–36)
MCV RBC AUTO: 81 FL (ref 80–100)
NITRITE UR QL STRIP.AUTO: NEGATIVE
NRBC BLD-RTO: 0 /100 WBCS (ref 0–0)
PH UR STRIP.AUTO: 7.5 [PH]
PHOSPHATE SERPL-MCNC: 3.4 MG/DL (ref 2.5–4.9)
PLATELET # BLD AUTO: 470 X10*3/UL (ref 150–450)
POTASSIUM SERPL-SCNC: 3.8 MMOL/L (ref 3.5–5.3)
PROT SERPL-MCNC: 6.4 G/DL (ref 6.4–8.2)
PROT UR STRIP.AUTO-MCNC: NEGATIVE MG/DL
PROTHROMBIN TIME: 12.6 SECONDS (ref 9.8–12.4)
RBC # BLD AUTO: 4.13 X10*6/UL (ref 4.5–5.9)
RBC # UR STRIP.AUTO: NEGATIVE MG/DL
RBC #/AREA URNS AUTO: ABNORMAL /HPF
RH FACTOR (ANTIGEN D): NORMAL
SODIUM SERPL-SCNC: 139 MMOL/L (ref 136–145)
SP GR UR STRIP.AUTO: 1.01
TIBC SERPL-MCNC: 269 UG/DL (ref 240–445)
UIBC SERPL-MCNC: 228 UG/DL (ref 110–370)
UROBILINOGEN UR STRIP.AUTO-MCNC: NORMAL MG/DL
VIT B12 SERPL-MCNC: 1342 PG/ML (ref 211–911)
WBC # BLD AUTO: 9.6 X10*3/UL (ref 4.4–11.3)
WBC #/AREA URNS AUTO: ABNORMAL /HPF

## 2025-05-20 PROCEDURE — 84153 ASSAY OF PSA TOTAL: CPT

## 2025-05-20 PROCEDURE — 2500000004 HC RX 250 GENERAL PHARMACY W/ HCPCS (ALT 636 FOR OP/ED): Mod: JZ

## 2025-05-20 PROCEDURE — 2500000001 HC RX 250 WO HCPCS SELF ADMINISTERED DRUGS (ALT 637 FOR MEDICARE OP)

## 2025-05-20 PROCEDURE — 1170000001 HC PRIVATE ONCOLOGY ROOM DAILY

## 2025-05-20 PROCEDURE — 99221 1ST HOSP IP/OBS SF/LOW 40: CPT

## 2025-05-20 PROCEDURE — 80053 COMPREHEN METABOLIC PANEL: CPT

## 2025-05-20 PROCEDURE — 87086 URINE CULTURE/COLONY COUNT: CPT | Performed by: PHYSICIAN ASSISTANT

## 2025-05-20 PROCEDURE — 93005 ELECTROCARDIOGRAM TRACING: CPT

## 2025-05-20 PROCEDURE — 86901 BLOOD TYPING SEROLOGIC RH(D): CPT

## 2025-05-20 PROCEDURE — 36415 COLL VENOUS BLD VENIPUNCTURE: CPT

## 2025-05-20 PROCEDURE — 2500000001 HC RX 250 WO HCPCS SELF ADMINISTERED DRUGS (ALT 637 FOR MEDICARE OP): Performed by: PHYSICIAN ASSISTANT

## 2025-05-20 PROCEDURE — 2500000005 HC RX 250 GENERAL PHARMACY W/O HCPCS: Performed by: PHYSICIAN ASSISTANT

## 2025-05-20 PROCEDURE — 82746 ASSAY OF FOLIC ACID SERUM: CPT

## 2025-05-20 PROCEDURE — 85027 COMPLETE CBC AUTOMATED: CPT

## 2025-05-20 PROCEDURE — 87040 BLOOD CULTURE FOR BACTERIA: CPT | Performed by: PHYSICIAN ASSISTANT

## 2025-05-20 PROCEDURE — 83735 ASSAY OF MAGNESIUM: CPT

## 2025-05-20 PROCEDURE — 82728 ASSAY OF FERRITIN: CPT

## 2025-05-20 PROCEDURE — 81001 URINALYSIS AUTO W/SCOPE: CPT | Performed by: PHYSICIAN ASSISTANT

## 2025-05-20 PROCEDURE — 83540 ASSAY OF IRON: CPT

## 2025-05-20 PROCEDURE — 94640 AIRWAY INHALATION TREATMENT: CPT

## 2025-05-20 PROCEDURE — 84100 ASSAY OF PHOSPHORUS: CPT

## 2025-05-20 PROCEDURE — 2500000002 HC RX 250 W HCPCS SELF ADMINISTERED DRUGS (ALT 637 FOR MEDICARE OP, ALT 636 FOR OP/ED)

## 2025-05-20 PROCEDURE — 71045 X-RAY EXAM CHEST 1 VIEW: CPT

## 2025-05-20 PROCEDURE — 71045 X-RAY EXAM CHEST 1 VIEW: CPT | Performed by: RADIOLOGY

## 2025-05-20 PROCEDURE — 86140 C-REACTIVE PROTEIN: CPT

## 2025-05-20 PROCEDURE — 2500000004 HC RX 250 GENERAL PHARMACY W/ HCPCS (ALT 636 FOR OP/ED): Mod: JZ | Performed by: PHYSICIAN ASSISTANT

## 2025-05-20 PROCEDURE — 93010 ELECTROCARDIOGRAM REPORT: CPT | Performed by: INTERNAL MEDICINE

## 2025-05-20 PROCEDURE — 36415 COLL VENOUS BLD VENIPUNCTURE: CPT | Performed by: PHYSICIAN ASSISTANT

## 2025-05-20 PROCEDURE — 92610 EVALUATE SWALLOWING FUNCTION: CPT | Mod: GN

## 2025-05-20 PROCEDURE — 85610 PROTHROMBIN TIME: CPT

## 2025-05-20 PROCEDURE — 82607 VITAMIN B-12: CPT

## 2025-05-20 PROCEDURE — 85652 RBC SED RATE AUTOMATED: CPT

## 2025-05-20 PROCEDURE — 99222 1ST HOSP IP/OBS MODERATE 55: CPT | Performed by: STUDENT IN AN ORGANIZED HEALTH CARE EDUCATION/TRAINING PROGRAM

## 2025-05-20 PROCEDURE — 99223 1ST HOSP IP/OBS HIGH 75: CPT | Performed by: HOSPITALIST

## 2025-05-20 RX ORDER — GABAPENTIN 300 MG/1
300 CAPSULE ORAL DAILY
Status: DISCONTINUED | OUTPATIENT
Start: 2025-05-20 | End: 2025-05-25

## 2025-05-20 RX ORDER — ENOXAPARIN SODIUM 100 MG/ML
40 INJECTION SUBCUTANEOUS DAILY
Status: DISCONTINUED | OUTPATIENT
Start: 2025-05-20 | End: 2025-05-21

## 2025-05-20 RX ORDER — ASPIRIN 81 MG/1
81 TABLET ORAL DAILY
Status: DISCONTINUED | OUTPATIENT
Start: 2025-05-20 | End: 2025-06-08 | Stop reason: HOSPADM

## 2025-05-20 RX ORDER — CYCLOBENZAPRINE HCL 10 MG
10 TABLET ORAL 3 TIMES DAILY PRN
Status: DISCONTINUED | OUTPATIENT
Start: 2025-05-20 | End: 2025-05-21

## 2025-05-20 RX ORDER — CHOLECALCIFEROL (VITAMIN D3) 25 MCG
25 TABLET ORAL DAILY
Status: DISCONTINUED | OUTPATIENT
Start: 2025-05-20 | End: 2025-06-08 | Stop reason: HOSPADM

## 2025-05-20 RX ORDER — LIDOCAINE 560 MG/1
1 PATCH PERCUTANEOUS; TOPICAL; TRANSDERMAL DAILY
Status: DISCONTINUED | OUTPATIENT
Start: 2025-05-20 | End: 2025-05-21

## 2025-05-20 RX ORDER — LANOLIN ALCOHOL/MO/W.PET/CERES
1000 CREAM (GRAM) TOPICAL DAILY
Status: DISCONTINUED | OUTPATIENT
Start: 2025-05-20 | End: 2025-05-22

## 2025-05-20 RX ORDER — MORPHINE SULFATE 2 MG/ML
2 INJECTION, SOLUTION INTRAMUSCULAR; INTRAVENOUS EVERY 6 HOURS PRN
Status: DISCONTINUED | OUTPATIENT
Start: 2025-05-20 | End: 2025-05-20

## 2025-05-20 RX ORDER — PANTOPRAZOLE SODIUM 40 MG/1
40 TABLET, DELAYED RELEASE ORAL
Status: DISCONTINUED | OUTPATIENT
Start: 2025-05-20 | End: 2025-06-08 | Stop reason: HOSPADM

## 2025-05-20 RX ORDER — LEVOTHYROXINE SODIUM 88 UG/1
88 TABLET ORAL DAILY
COMMUNITY
Start: 2025-05-02 | End: 2025-06-08 | Stop reason: HOSPADM

## 2025-05-20 RX ORDER — LEVOTHYROXINE SODIUM 75 UG/1
75 TABLET ORAL DAILY
Status: DISCONTINUED | OUTPATIENT
Start: 2025-05-20 | End: 2025-06-08 | Stop reason: HOSPADM

## 2025-05-20 RX ORDER — LORAZEPAM 1 MG/1
1 TABLET ORAL NIGHTLY PRN
Status: DISCONTINUED | OUTPATIENT
Start: 2025-05-20 | End: 2025-06-08 | Stop reason: HOSPADM

## 2025-05-20 RX ORDER — ACETAMINOPHEN 325 MG/1
650 TABLET ORAL EVERY 6 HOURS
Status: DISCONTINUED | OUTPATIENT
Start: 2025-05-20 | End: 2025-05-21

## 2025-05-20 RX ORDER — MORPHINE SULFATE 4 MG/ML
4 INJECTION INTRAVENOUS EVERY 4 HOURS PRN
Status: DISCONTINUED | OUTPATIENT
Start: 2025-05-20 | End: 2025-05-21

## 2025-05-20 RX ORDER — METOPROLOL SUCCINATE 25 MG/1
25 TABLET, EXTENDED RELEASE ORAL DAILY
Status: DISCONTINUED | OUTPATIENT
Start: 2025-05-20 | End: 2025-06-08 | Stop reason: HOSPADM

## 2025-05-20 RX ORDER — ATORVASTATIN CALCIUM 80 MG/1
80 TABLET, FILM COATED ORAL DAILY
Status: DISCONTINUED | OUTPATIENT
Start: 2025-05-20 | End: 2025-06-08 | Stop reason: HOSPADM

## 2025-05-20 RX ORDER — ACETAMINOPHEN 325 MG/1
650 TABLET ORAL EVERY 6 HOURS PRN
Status: DISCONTINUED | OUTPATIENT
Start: 2025-05-20 | End: 2025-05-20

## 2025-05-20 RX ORDER — POLYETHYLENE GLYCOL 3350 17 G/17G
17 POWDER, FOR SOLUTION ORAL DAILY
Status: DISCONTINUED | OUTPATIENT
Start: 2025-05-20 | End: 2025-05-22

## 2025-05-20 RX ORDER — CYCLOBENZAPRINE HCL 10 MG
10 TABLET ORAL 3 TIMES DAILY PRN
COMMUNITY
Start: 2025-05-01 | End: 2025-05-31

## 2025-05-20 RX ORDER — LORAZEPAM 2 MG/ML
1 INJECTION INTRAMUSCULAR ONCE
Status: DISCONTINUED | OUTPATIENT
Start: 2025-05-20 | End: 2025-05-21

## 2025-05-20 RX ADMIN — ATORVASTATIN CALCIUM 80 MG: 80 TABLET, FILM COATED ORAL at 09:26

## 2025-05-20 RX ADMIN — GABAPENTIN 300 MG: 300 CAPSULE ORAL at 12:21

## 2025-05-20 RX ADMIN — LEVOTHYROXINE SODIUM 75 MCG: 0.07 TABLET ORAL at 09:26

## 2025-05-20 RX ADMIN — ENOXAPARIN SODIUM 40 MG: 100 INJECTION SUBCUTANEOUS at 12:21

## 2025-05-20 RX ADMIN — PANTOPRAZOLE SODIUM 40 MG: 40 TABLET, DELAYED RELEASE ORAL at 06:28

## 2025-05-20 RX ADMIN — MORPHINE SULFATE 4 MG: 4 INJECTION, SOLUTION INTRAMUSCULAR; INTRAVENOUS at 22:53

## 2025-05-20 RX ADMIN — LIDOCAINE 1 PATCH: 4 PATCH TOPICAL at 12:21

## 2025-05-20 RX ADMIN — ASPIRIN 81 MG: 81 TABLET, COATED ORAL at 09:26

## 2025-05-20 RX ADMIN — ACETAMINOPHEN 650 MG: 325 TABLET ORAL at 12:21

## 2025-05-20 RX ADMIN — Medication 25 MCG: at 09:26

## 2025-05-20 RX ADMIN — MORPHINE SULFATE 2 MG: 2 INJECTION, SOLUTION INTRAMUSCULAR; INTRAVENOUS at 09:26

## 2025-05-20 RX ADMIN — METOPROLOL SUCCINATE 25 MG: 25 TABLET, EXTENDED RELEASE ORAL at 09:26

## 2025-05-20 RX ADMIN — TIOTROPIUM BROMIDE INHALATION SPRAY 2 PUFF: 3.12 SPRAY, METERED RESPIRATORY (INHALATION) at 10:20

## 2025-05-20 RX ADMIN — CYANOCOBALAMIN TAB 1000 MCG 1000 MCG: 1000 TAB at 09:26

## 2025-05-20 RX ADMIN — MORPHINE SULFATE 2 MG: 2 INJECTION, SOLUTION INTRAMUSCULAR; INTRAVENOUS at 02:57

## 2025-05-20 RX ADMIN — ACETAMINOPHEN 650 MG: 325 TABLET ORAL at 17:41

## 2025-05-20 SDOH — SOCIAL STABILITY: SOCIAL INSECURITY: ARE YOU OR HAVE YOU BEEN THREATENED OR ABUSED PHYSICALLY, EMOTIONALLY, OR SEXUALLY BY ANYONE?: NO

## 2025-05-20 SDOH — SOCIAL STABILITY: SOCIAL INSECURITY: WERE YOU ABLE TO COMPLETE ALL THE BEHAVIORAL HEALTH SCREENINGS?: YES

## 2025-05-20 SDOH — ECONOMIC STABILITY: INCOME INSECURITY: IN THE PAST 12 MONTHS HAS THE ELECTRIC, GAS, OIL, OR WATER COMPANY THREATENED TO SHUT OFF SERVICES IN YOUR HOME?: NO

## 2025-05-20 SDOH — SOCIAL STABILITY: SOCIAL INSECURITY
WITHIN THE LAST YEAR, HAVE YOU BEEN RAPED OR FORCED TO HAVE ANY KIND OF SEXUAL ACTIVITY BY YOUR PARTNER OR EX-PARTNER?: NO

## 2025-05-20 SDOH — SOCIAL STABILITY: SOCIAL INSECURITY: ABUSE: ADULT

## 2025-05-20 SDOH — ECONOMIC STABILITY: FOOD INSECURITY: WITHIN THE PAST 12 MONTHS, THE FOOD YOU BOUGHT JUST DIDN'T LAST AND YOU DIDN'T HAVE MONEY TO GET MORE.: NEVER TRUE

## 2025-05-20 SDOH — SOCIAL STABILITY: SOCIAL INSECURITY: WITHIN THE LAST YEAR, HAVE YOU BEEN AFRAID OF YOUR PARTNER OR EX-PARTNER?: NO

## 2025-05-20 SDOH — SOCIAL STABILITY: SOCIAL INSECURITY
WITHIN THE LAST YEAR, HAVE YOU BEEN KICKED, HIT, SLAPPED, OR OTHERWISE PHYSICALLY HURT BY YOUR PARTNER OR EX-PARTNER?: NO

## 2025-05-20 SDOH — SOCIAL STABILITY: SOCIAL INSECURITY: DO YOU FEEL UNSAFE GOING BACK TO THE PLACE WHERE YOU ARE LIVING?: NO

## 2025-05-20 SDOH — SOCIAL STABILITY: SOCIAL INSECURITY: WITHIN THE LAST YEAR, HAVE YOU BEEN HUMILIATED OR EMOTIONALLY ABUSED IN OTHER WAYS BY YOUR PARTNER OR EX-PARTNER?: NO

## 2025-05-20 SDOH — ECONOMIC STABILITY: FOOD INSECURITY: WITHIN THE PAST 12 MONTHS, YOU WORRIED THAT YOUR FOOD WOULD RUN OUT BEFORE YOU GOT THE MONEY TO BUY MORE.: NEVER TRUE

## 2025-05-20 SDOH — SOCIAL STABILITY: SOCIAL INSECURITY: ARE THERE ANY APPARENT SIGNS OF INJURIES/BEHAVIORS THAT COULD BE RELATED TO ABUSE/NEGLECT?: NO

## 2025-05-20 SDOH — SOCIAL STABILITY: SOCIAL INSECURITY: HAS ANYONE EVER THREATENED TO HURT YOUR FAMILY OR YOUR PETS?: NO

## 2025-05-20 SDOH — SOCIAL STABILITY: SOCIAL INSECURITY: DOES ANYONE TRY TO KEEP YOU FROM HAVING/CONTACTING OTHER FRIENDS OR DOING THINGS OUTSIDE YOUR HOME?: NO

## 2025-05-20 SDOH — SOCIAL STABILITY: SOCIAL INSECURITY: HAVE YOU HAD THOUGHTS OF HARMING ANYONE ELSE?: NO

## 2025-05-20 SDOH — SOCIAL STABILITY: SOCIAL INSECURITY: DO YOU FEEL ANYONE HAS EXPLOITED OR TAKEN ADVANTAGE OF YOU FINANCIALLY OR OF YOUR PERSONAL PROPERTY?: NO

## 2025-05-20 ASSESSMENT — PATIENT HEALTH QUESTIONNAIRE - PHQ9
2. FEELING DOWN, DEPRESSED OR HOPELESS: SEVERAL DAYS
SUM OF ALL RESPONSES TO PHQ9 QUESTIONS 1 & 2: 1
1. LITTLE INTEREST OR PLEASURE IN DOING THINGS: NOT AT ALL

## 2025-05-20 ASSESSMENT — LIFESTYLE VARIABLES
HOW OFTEN DO YOU HAVE A DRINK CONTAINING ALCOHOL: NEVER
HOW MANY STANDARD DRINKS CONTAINING ALCOHOL DO YOU HAVE ON A TYPICAL DAY: PATIENT DOES NOT DRINK
AUDIT-C TOTAL SCORE: 0
AUDIT-C TOTAL SCORE: 0
PRESCIPTION_ABUSE_PAST_12_MONTHS: NO
HOW OFTEN DO YOU HAVE 6 OR MORE DRINKS ON ONE OCCASION: NEVER
SUBSTANCE_ABUSE_PAST_12_MONTHS: NO
SKIP TO QUESTIONS 9-10: 1

## 2025-05-20 ASSESSMENT — COGNITIVE AND FUNCTIONAL STATUS - GENERAL
WALKING IN HOSPITAL ROOM: A LITTLE
MOVING TO AND FROM BED TO CHAIR: A LITTLE
MOBILITY SCORE: 19
STANDING UP FROM CHAIR USING ARMS: A LITTLE
DRESSING REGULAR LOWER BODY CLOTHING: A LITTLE
DAILY ACTIVITIY SCORE: 22
DAILY ACTIVITIY SCORE: 21
CLIMB 3 TO 5 STEPS WITH RAILING: A LOT
TURNING FROM BACK TO SIDE WHILE IN FLAT BAD: A LITTLE
TOILETING: A LITTLE
MOBILITY SCORE: 14
HELP NEEDED FOR BATHING: A LITTLE
HELP NEEDED FOR BATHING: A LITTLE
MOVING TO AND FROM BED TO CHAIR: A LITTLE
DRESSING REGULAR LOWER BODY CLOTHING: A LITTLE
MOBILITY SCORE: 18
DAILY ACTIVITIY SCORE: 20
WALKING IN HOSPITAL ROOM: A LOT
DRESSING REGULAR LOWER BODY CLOTHING: A LITTLE
CLIMB 3 TO 5 STEPS WITH RAILING: A LOT
HELP NEEDED FOR BATHING: A LITTLE
MOVING TO AND FROM BED TO CHAIR: A LOT
CLIMB 3 TO 5 STEPS WITH RAILING: A LOT
PATIENT BASELINE BEDBOUND: NO
MOVING FROM LYING ON BACK TO SITTING ON SIDE OF FLAT BED WITH BEDRAILS: A LITTLE
STANDING UP FROM CHAIR USING ARMS: A LOT
WALKING IN HOSPITAL ROOM: A LOT
DRESSING REGULAR UPPER BODY CLOTHING: A LITTLE
STANDING UP FROM CHAIR USING ARMS: A LITTLE
DRESSING REGULAR UPPER BODY CLOTHING: A LITTLE

## 2025-05-20 ASSESSMENT — ENCOUNTER SYMPTOMS
FATIGUE: 1
BACK PAIN: 1

## 2025-05-20 ASSESSMENT — PAIN SCALES - GENERAL
PAINLEVEL_OUTOF10: 2
PAINLEVEL_OUTOF10: 6
PAINLEVEL_OUTOF10: 10 - WORST POSSIBLE PAIN
PAINLEVEL_OUTOF10: 2
PAINLEVEL_OUTOF10: 6
PAINLEVEL_OUTOF10: 10 - WORST POSSIBLE PAIN
PAINLEVEL_OUTOF10: 8
PAINLEVEL_OUTOF10: 2

## 2025-05-20 ASSESSMENT — ACTIVITIES OF DAILY LIVING (ADL)
ADEQUATE_TO_COMPLETE_ADL: YES
WALKS IN HOME: NEEDS ASSISTANCE
DRESSING YOURSELF: NEEDS ASSISTANCE
HEARING - RIGHT EAR: FUNCTIONAL
JUDGMENT_ADEQUATE_SAFELY_COMPLETE_DAILY_ACTIVITIES: YES
HEARING - LEFT EAR: FUNCTIONAL
LACK_OF_TRANSPORTATION: NO
GROOMING: NEEDS ASSISTANCE
LACK_OF_TRANSPORTATION: NO
PATIENT'S MEMORY ADEQUATE TO SAFELY COMPLETE DAILY ACTIVITIES?: YES
BATHING: NEEDS ASSISTANCE
FEEDING YOURSELF: NEEDS ASSISTANCE
TOILETING: NEEDS ASSISTANCE

## 2025-05-20 ASSESSMENT — PAIN - FUNCTIONAL ASSESSMENT
PAIN_FUNCTIONAL_ASSESSMENT: 0-10
PAIN_FUNCTIONAL_ASSESSMENT: NONE - DENIES PAIN
PAIN_FUNCTIONAL_ASSESSMENT: 0-10

## 2025-05-20 ASSESSMENT — PAIN DESCRIPTION - DESCRIPTORS
DESCRIPTORS: ACHING

## 2025-05-20 ASSESSMENT — COLUMBIA-SUICIDE SEVERITY RATING SCALE - C-SSRS
1. IN THE PAST MONTH, HAVE YOU WISHED YOU WERE DEAD OR WISHED YOU COULD GO TO SLEEP AND NOT WAKE UP?: NO
6. HAVE YOU EVER DONE ANYTHING, STARTED TO DO ANYTHING, OR PREPARED TO DO ANYTHING TO END YOUR LIFE?: NO
2. HAVE YOU ACTUALLY HAD ANY THOUGHTS OF KILLING YOURSELF?: NO

## 2025-05-20 ASSESSMENT — PAIN DESCRIPTION - ORIENTATION
ORIENTATION: LOWER
ORIENTATION: LOWER

## 2025-05-20 ASSESSMENT — PAIN DESCRIPTION - LOCATION
LOCATION: BACK
LOCATION: BACK

## 2025-05-20 NOTE — PROGRESS NOTES
05/20/25 1200   Discharge Planning   Living Arrangements Spouse/significant other   Support Systems Spouse/significant other   Assistance Needed yes   Type of Residence Private residence   Number of Stairs to Enter Residence 2   Number of Stairs Within Residence 0   Do you have animals or pets at home? Yes   Type of Animals or Pets cats 3   Home or Post Acute Services None   Expected Discharge Disposition Home H   Does the patient need discharge transport arranged? No   Financial Resource Strain   How hard is it for you to pay for the very basics like food, housing, medical care, and heating? Somewhat   Housing Stability   In the last 12 months, was there a time when you were not able to pay the mortgage or rent on time? N   In the past 12 months, how many times have you moved where you were living? 1   At any time in the past 12 months, were you homeless or living in a shelter (including now)? N   Transportation Needs   In the past 12 months, has lack of transportation kept you from medical appointments or from getting medications? no   In the past 12 months, has lack of transportation kept you from meetings, work, or from getting things needed for daily living? No     05/20/2025: TCC spoke with patient at bedside regarding discharge planning. Patient currently lives with wife in [place], [place]. Currently not utilizing any medical equipment at home. Patient to be seen by PT/OT as he has an AMPAC of 14 on admission. All demographics and insurance verified. TCC will continue to monitor for any additional discharge needs. Josee TREJO TCC

## 2025-05-20 NOTE — H&P
H&P    5/20/2025    HPI:  Miguel Lofton is a 69 y.o. male patient, with PMHx of prostate cancer, base of the tongue cancer, HTN, HLD, pre DM, hypothyroidism, COPD, anxiety  Transferred to [facility] [place] for for management of compression fracture and possible thoracic vertebral osteomyelitis.     Reports that he has been having back pain since February 2025, it started in the middle of his back, irradiating bilaterally to the right and the left. Afterwards he started having progressive lower back pain. The pain started to irradiate to both of his legs. He started using a walker 4 weeks ago.     Reported having lower back pain in PCP office on 4/3/2025. XR thoracic spine 4/3/2025: negative for acute process. Moderate osteopenia.     He presented to the ED on 4/15/2025 for persistent lower back pain, without alarm symptoms. CT lumbar spine w/o IV contrast was done, came back negative for fracture.    Patient had an appointment today with his pulmonary doctor, he had a CT chest for annual lung cancer screening. His CT chest on 5/13 reviewed today in the office showed possible compression fracture on T5 and T6, with perivertebral soft tissue prominence, MRI recommended to R/O osteomyelitis.   Patient was therefore sent to [facility] ED for further evaluation.   MRI thoracic spine was done, showed acute discitis/osteomyelitis at T5-6 with marked surrounding phlegmon and circumferential epidural phlegmon resulting in moderate to severe spinal canal stenosis. No epidural abscess.   He was transferred to [facility] for neurosurgery evaluation.      Upon admission:  Patient is HDS, on RA, not in acute distress.   Currently reports back pain in his lower back, currently not irradiating to his legs. Reports that sitting in a certain position helps with the pain.   Last BM 2 days ago. Denies fecal or urinary incontinence. Denies weakness or paresthesias.   Denies fever, chills, URT sx, chest pain, SOB, abdominal pain, diarrhea, urinary burning.  "    Social hx: quit smoking in 2018, occasional alcohol use, denies drug use    Vitals on admission:  /69   Pulse 84   Temp 36.6 °C (97.9 °F)   Resp 18   Ht 1.634 m (5' 4.33\")   Wt 73.4 kg (161 lb 13.1 oz)   SpO2 93%   BMI 27.49 kg/m²        Intake/Output Summary (Last 24 hours) at 5/20/2025 0400  Last data filed at 5/20/2025 0326  Gross per 24 hour   Intake --   Output 200 ml   Net -200 ml       Physical exam:  Alert oriented x3, not in acute distress  Neuro exam: 4/5 in major muscle groups of LE   No tenderness upon palpation of the spine  Lungs clear to auscultation bilaterally, not in respiratory distress, on RA  Heart sounds regular  Abdomen soft, non distended, non tender  No LE edema    Labs on presentation: pending       Imaging:     CT chest w/o contrast:  Impression  1. There is considerable deformity of what is felt to be T5 which may be  related to a compression fracture of the caudal endplate. There is some  compression of the superior endplate of T6. There is associated spurring  right and left laterally. This was not present on the prior study. No obvious  mass is identified. There is some perivertebral soft tissue prominence.  Recommend MRI of the thoracic spine with and without contrast for further  evaluation.  Osteomyelitis is not excluded.  2. There are small areas of scarring in both apices. There is small areas of  pleural thickening posteriorly in the right hemithorax.  3. There is coronary arterial vascular plaque.    MRI thoracic spine:  FINDINGS:  BONES/ALIGNMENT:  There is abnormal bone marrow edema and enhancement within the T5 and T6 vertebral bodies and intervening disc. There is deformity of the inferior endplate of T5 and superior endplate of T6 with loss of height of the vertebral bodies, compatible with  acute discitis/osteomyelitis.    SPINAL CORD:  There is no intramedullary signal abnormality identified within the thoracic spinal cord.    SOFT TISSUES:  There is " marked surrounding phlegmon at T5-6. Phlegmon is present circumferentially within the epidural space at T5-6, resulting in moderate to severe spinal canal stenosis. There is no epidural abscess identified.    DEGENERATIVE CHANGES:  A disc bulge is present at T7-8 without significant spinal canal stenosis or neural foraminal narrowing. Disc bulges are present at T9-10, T10-11, and T11-12 without significant spinal canal stenosis or neural foraminal narrowing.    Impression  1. Acute discitis/osteomyelitis at T5-6 with marked surrounding phlegmon and circumferential epidural phlegmon resulting in moderate to severe spinal canal stenosis. No epidural abscess.        Assessment and plan:  Miguel Lofton is a 69 y.o. male patient, with PMHx of prostate cancer, base of the tongue cancer, HTN, HLD, hypothyroidism, COPD, anxiety  [place] [facility] [place] of [place] fracture and possible thoracic vertebral osteomyelitis.     #Compression fracture   #Vertebral osteomyelitis  :: CT Chest: considerable deformity of what is felt to be T5 which may be  related to a compression fracture of the caudal endplate. There is some  compression of the superior endplate of T6.  :: MRI thoracic spine:  Acute discitis/osteomyelitis at T5-6 with marked surrounding phlegmon and circumferential epidural phlegmon resulting in moderate to severe spinal canal stenosis. 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, p16+  completed course of chemoradiation by end of 10/2022  continues to followup with Dr Gamez--last seen 12/18/2024: laryngeal structures are noted for grossly normal appearance; true vocal cords,  false vocal cords, remaining structures, including epiglottis, piriform sinuses and base of tongue are all grossly normal; there is no evidence of gross mass nodule, polyp, tumor or other defect   he had a barium swallow done on 1/16/2025--was told he aspirate   -Consider repeat SLP eval    #Prostate Ca  :: Oncology Hx:  s/p radical prostatectomy by Dr Lang Mcneil in 2018  [id number], [place] 7, + right anterior margin  PSMA-PET done on 12/6/2023 showed no PSMA uptake within the post-surgical bed; there is PSMA uptake of a left common iliac retroperitoneal lymph node SUV 5.6 and PSMA uptake of a right pelvic sidewall lymph node SUV 3.5  Dr Rangel recommended radiation therapy. Patient instead went to [facility] and was treated by Dr Zackary Ely (4600 cGy in 23 fractions to the pelvis and LN followed by [place] to prostate bed and LN to 2500 cGy in 10 fractions)  most recent PSA 8/12/2024 PSA <0.01  since completing radiation, has been experiencing significant fatigue  he is now following with urologist Dr Boyd  For pain management, follows with [facility] palliative care team     #COPD  #Smoking Hx  #Lung Ca screening  :: follows with pulmonologist Dr Hutchison   PFTs 03/2025: FEV1 72% FEV1/FVC 0.63  Did not tolerate Breztri  -C/w spiriva  -annual CT chest for cancer screening    #HLD  -C/w Atorvastatin    #HTN  -C/w Metop succinate 25 daily    #preDM  :: last A1c 5/12: 6.4    #Hypothyroidism  :: Last TSH on 5/12: 4.49  -C/w levothyroxine    #Anemia  :: Last CBC 5/12: Hb 11.6  :: probably anemia of chronic disease  -Ordered anemia workup (iron studies, folate, vit B12)  -Patient is on vit B12 supplementation    General  F: PRN  E: PRN  N: NPO MN  DVT prophlaxis: holding for possible procedure    NOK: Eleni Lofton (wife) [phone]  Code: full -confirmed with patient on admission     To be formally staffed in     Chantel Cifuentes MD  PGY-2 Internal Medicine

## 2025-05-20 NOTE — PROGRESS NOTES
Speech-Language Pathology  Adult Inpatient Clinical Bedside Swallow Evaluation    Patient Name: Miguel Lofton  MRN: [id number]  Today's Date: 5/20/2025   Start Time: 1030  Stop Time: 1050  Time Calculation (min): 20    History of Present Illness:   Miguel Lofton is a 69 y.o. male patient, with PMHx of prostate cancer, base of the tongue cancer, HTN, HLD, pre DM, hypothyroidism, COPD, anxiety  Transferred to [facility] [place] for for management of compression fracture and possible thoracic vertebral osteomyelitis.      Reports that he has been having back pain since February 2025, it started in the middle of his back, irradiating bilaterally to the right and the left. Afterwards he started having progressive lower back pain. The pain started to irradiate to both of his legs. He started using a walker 4 weeks ago.      Reported having lower back pain in PCP office on 4/3/2025. XR thoracic spine 4/3/2025: negative for acute process. Moderate osteopenia.      He presented to the ED on 4/15/2025 for persistent lower back pain, without alarm symptoms. CT lumbar spine w/o IV contrast was done, came back negative for fracture.     Patient had an appointment today with his pulmonary doctor, he had a CT chest for annual lung cancer screening. His CT chest on 5/13 reviewed today in the office showed possible compression fracture on T5 and T6, with perivertebral soft tissue prominence, MRI recommended to R/O osteomyelitis.   Patient was therefore sent to [facility] ED for further evaluation.   MRI thoracic spine was done, showed acute discitis/osteomyelitis at T5-6 with marked surrounding phlegmon and circumferential epidural phlegmon resulting in moderate to severe spinal canal stenosis. No epidural abscess.   He was transferred to [facility] for neurosurgery evaluation.    Assessment:   Clinical bedside swallow evaluation completed. Pt cleared for evaluation by RN. Received awake/alert and upright in bed for session. A&Ox4. Oral mechanism  "examination revealed edentulous status. Consistently cooperative/able to follow commands and responded appropriately to conversation/questions by SLP. Denies any difficulty swallowing r/t oropharyngeal dysphagia and accurately recalls most recent swallowing assessment (MBSS in January w/ recommendation of Regular Solids & Thin Liquids). Reports he tolerates a regular diet at baseline though endorses poor appetite stating, \"I really only eat applesauce and Ensure.\" Endorses ~40 lb weight loss since February. Additionally reports difficulty w/ mastication 2/2 does not utilize dentures and has xerostomia. Strong volitional cough prior to administration of PO trials.     Trials of single/consecutive sips water via straw, 3 oz protocol via cup, purees, and regular solids were given. Prolonged mastication of regular solids + required liquid wash to obtain oral clearance. No overt clinical s/s aspiration w/ successful completion of 3 oz protocol via cup or prior/subsequent trials; no coughing or changes in respiratory status/vocal quality.     Given pt's most recent imaging supporting WNL laryngeal structures + tolerance of regular diet at baseline, recommend Soft/Bite-Sized (Level 6) Solids & Thin Liquids. Ensure safe swallowing guidelines (listed below) are followed during all oral intake. SLP to continue to follow peripherally to ensure diet tolerance as pt appropriate/schedule permits. Additionally question need for clinical dietician consult/consideration of alternate means nutrition/hydration given pt's reports of decreased appetite/poor PO intake. If pt presents with any changes to mental, medical, or respiratory status, please make NPO and alert SLP. RN/MD aware.      Recommendations:  Soft/Bite-Sized (Level 6) Solids & Thin Liquids  Upright for all PO intake  Remain upright for 20-30 min after eating  Small bites/sips  Straws ok  Slow rate of consumption  Pills per pt preference  SLP to continue to follow to " ensure diet tolerance as pt appropriate/schedule permits  If pt presents with any changes to mental, medical, or respiratory status, please make NPO and alert SLP  Question need for clinical dietician consult/consideration of alternate means nutrition/hydration given pt's reports of decreased appetite/poor PO intake    Goal:   Pt will tolerate least restrictive diet with no overt clinical s/s aspiration 100% of the time.   Start Date: 5/20/2025  End Date: 6/20/2025  Status: Goal Initiated this date       Plan:  SLP Services Indicated: Yes  Frequency: peripherally  Discussed POC with patient  SLP - OK to Discharge    Pain:   0-10  0 = No pain.     Inpatient Education:  Extensive education provided to patient regarding current swallow function, recommendations/results, and POC.      Consultations/Referrals/Coordination of Services:   Clinical Dietician

## 2025-05-20 NOTE — CARE PLAN
The patient's goals for the shift include Pain management    The clinical goals for the shift include Pt will remain HDS and free from falls this shift      Problem: Skin  Goal: Decreased wound size/increased tissue granulation at next dressing change  Outcome: Progressing  Goal: Participates in plan/prevention/treatment measures  Outcome: Progressing  Goal: Prevent/manage excess moisture  Outcome: Progressing  Goal: Prevent/minimize sheer/friction injuries  Outcome: Progressing  Goal: Promote/optimize nutrition  Outcome: Progressing  Goal: Promote skin healing  Outcome: Progressing     Problem: Fall/Injury  Goal: Not fall by end of shift  Outcome: Progressing  Goal: Be free from injury by end of the shift  Outcome: Progressing  Goal: Verbalize understanding of personal risk factors for fall in the hospital  Outcome: Progressing  Goal: Verbalize understanding of risk factor reduction measures to prevent injury from fall in the home  Outcome: Progressing  Goal: Use assistive devices by end of the shift  Outcome: Progressing  Goal: Pace activities to prevent fatigue by end of the shift  Outcome: Progressing     Problem: Pain - Adult  Goal: Verbalizes/displays adequate comfort level or baseline comfort level  Outcome: Progressing     Problem: Safety - Adult  Goal: Free from fall injury  Outcome: Progressing     Problem: Discharge Planning  Goal: Discharge to home or other facility with appropriate resources  Outcome: Progressing     Problem: Chronic Conditions and Co-morbidities  Goal: Patient's chronic conditions and co-morbidity symptoms are monitored and maintained or improved  Outcome: Progressing     Problem: Nutrition  Goal: Nutrient intake appropriate for maintaining nutritional needs  Outcome: Progressing

## 2025-05-20 NOTE — CONSULTS
Inpatient consult to Integrative Hem/Onc  Consult performed by: Yanira Bush, CHRISTINA-CNP  Consult ordered by: Manolo Collins PA-C          Reason For Consult  Symptom management     History Of Present Illness  Miguel Lofton is a 69 y.o. male with PMH of prostate cancer, base of the tongue cancer, HTN, HLD, pre DM, hypothyroidism, COPD, anxiety who was transferred to [facility] for for management of compression fracture and possible thoracic vertebral osteomyelitis. Integrative hematology/oncology consulted by [facility] team for non-pharmacological symptom management of pain.    Pt laying in bed at time of exam, no family at bedside.      Information obtained from chart review, discussion with patient/family, and discussion with primary team.       Past Medical History  He has a past medical history of Hypothyroidism, unspecified, Personal history of other endocrine, nutritional and metabolic disease, and Personal history of other mental and behavioral disorders.    Surgical History  He has a past surgical history that includes Hernia repair (08/29/2018) and Vasectomy (08/29/2018).     Social History  He reports that he has quit smoking. His smoking use included cigarettes. He has never used smokeless tobacco. He reports that he does not currently use alcohol. He reports that he does not use drugs.    Family History  Family History[1]     Allergies  Diazepam and Meperidine    Review of Systems   Constitutional:  Positive for fatigue.   Musculoskeletal:  Positive for back pain.        Physical Exam  Vitals and nursing note reviewed.   Constitutional:       General: He is not in acute distress.     Appearance: Normal appearance. He is normal weight. He is ill-appearing.   HENT:      Head: Normocephalic and atraumatic.      Nose: Nose normal.      Mouth/Throat:      Mouth: Mucous membranes are moist.   Eyes:      Extraocular Movements: Extraocular movements intact.      Pupils: Pupils are equal, round, and reactive to  "light.   Cardiovascular:      Rate and Rhythm: Normal rate.   Pulmonary:      Effort: Pulmonary effort is normal.   Abdominal:      General: Abdomen is flat.      Palpations: Abdomen is soft.   Skin:     General: Skin is warm and dry.   Neurological:      General: No focal deficit present.      Mental Status: He is alert and oriented to person, place, and time. Mental status is at baseline.   Psychiatric:         Mood and Affect: Mood normal.         Behavior: Behavior normal.         Thought Content: Thought content normal.         Judgment: Judgment normal.          Last Recorded Vitals  Blood pressure 125/68, pulse 79, temperature 36.5 °C (97.7 °F), temperature source Temporal, resp. rate 18, height 1.634 m (5' 4.33\"), weight 73.4 kg (161 lb 13.1 oz), SpO2 93%.    Relevant Results  Scheduled medications  Scheduled Medications[2]  Continuous medications  Continuous Medications[3]  PRN medications  PRN Medications[4]    Results for orders placed or performed during the hospital encounter of 05/20/25 (from the past 24 hours)   Vitamin B12   Result Value Ref Range    Vitamin B12 1,342 (H) 211 - 911 pg/mL   CBC   Result Value Ref Range    WBC 9.6 4.4 - 11.3 x10*3/uL    nRBC 0.0 0.0 - 0.0 /100 WBCs    RBC 4.13 (L) 4.50 - 5.90 x10*6/uL    Hemoglobin 9.9 (L) 13.5 - 17.5 g/dL    Hematocrit 33.5 (L) 41.0 - 52.0 %    MCV 81 80 - 100 fL    MCH 24.0 (L) 26.0 - 34.0 pg    MCHC 29.6 (L) 32.0 - 36.0 g/dL    RDW 18.6 (H) 11.5 - 14.5 %    Platelets 470 (H) 150 - 450 x10*3/uL   Magnesium   Result Value Ref Range    Magnesium 1.61 1.60 - 2.40 mg/dL   Comprehensive metabolic panel   Result Value Ref Range    Glucose 88 74 - 99 mg/dL    Sodium 139 136 - 145 mmol/L    Potassium 3.8 3.5 - 5.3 mmol/L    Chloride 99 98 - 107 mmol/L    Bicarbonate 31 21 - 32 mmol/L    Anion Gap 13 10 - 20 mmol/L    Urea Nitrogen 13 6 - 23 mg/dL    Creatinine 0.65 0.50 - 1.30 mg/dL    eGFR >90 >60 mL/min/1.73m*2    Calcium 9.3 8.6 - 10.6 mg/dL    Albumin " 3.0 (L) 3.4 - 5.0 g/dL    Alkaline Phosphatase 131 33 - 136 U/L    Total Protein 6.4 6.4 - 8.2 g/dL    AST 16 9 - 39 U/L    Bilirubin, Total 0.4 0.0 - 1.2 mg/dL    ALT 24 10 - 52 U/L   Type and screen   Result Value Ref Range    ABO TYPE O     Rh TYPE NEG     ANTIBODY SCREEN NEG    Coagulation Screen   Result Value Ref Range    Protime 12.6 (H) 9.8 - 12.4 seconds    INR 1.1 0.9 - 1.1    aPTT 31 26 - 36 seconds   Sedimentation rate, automated   Result Value Ref Range    Sedimentation Rate 90 (H) 0 - 20 mm/h   C-reactive protein   Result Value Ref Range    C-Reactive Protein 7.35 (H) <1.00 mg/dL   Iron and TIBC   Result Value Ref Range    Iron 41 35 - 150 ug/dL    UIBC 228 110 - 370 ug/dL    TIBC 269 240 - 445 ug/dL    % Saturation 15 (L) 25 - 45 %   Ferritin   Result Value Ref Range    Ferritin 305 (H) 20 - 300 ng/mL   Folate   Result Value Ref Range    Folate, Serum 7.5 >5.0 ng/mL   Phosphorus   Result Value Ref Range    Phosphorus 3.4 2.5 - 4.9 mg/dL     MR thoracic spine w and wo IV contrast  Result Date: 5/19/2025  EXAM: MR Thoracic Spine without and with 05/19/2025 07:32:19 PM TECHNIQUE: Multiplanar multisequence MRI of the thoracic spine was performed with and without the administration of intravenous contrast. COMPARISON: CT chest 05/13/2025. CLINICAL HISTORY: T5 compression fracture, concern for osteomyelitis per radiology, chronic back pain. FINDINGS: BONES/ALIGNMENT: There is abnormal bone marrow edema and enhancement within the T5 and T6 vertebral bodies and intervening disc. There is deformity of the inferior endplate of T5 and superior endplate of T6 with loss of height of the vertebral bodies, compatible with acute discitis/osteomyelitis. SPINAL CORD: There is no intramedullary signal abnormality identified within the thoracic spinal cord. SOFT TISSUES: There is marked surrounding phlegmon at T5-6. Phlegmon is present circumferentially within the epidural space at T5-6, resulting in moderate to severe  spinal canal stenosis. There is no epidural abscess identified. DEGENERATIVE CHANGES: A disc bulge is present at T7-8 without significant spinal canal stenosis or neural foraminal narrowing. Disc bulges are present at T9-10, T10-11, and T11-12 without significant spinal canal stenosis or neural foraminal narrowing.     1. Acute discitis/osteomyelitis at T5-6 with marked surrounding phlegmon and circumferential epidural phlegmon resulting in moderate to severe spinal canal stenosis. No epidural abscess.    CT chest wo IV contrast  Result Date: 5/14/2025  EXAMINATION: CT OF THE CHEST WITHOUT CONTRAST 5/13/2025 12:59 pm TECHNIQUE: CT of the chest was performed without the administration of intravenous contrast. Multiplanar reformatted images are provided for review. Automated exposure control, iterative reconstruction, and/or weight based adjustment of the mA/kV was utilized to reduce the radiation dose to as low as reasonably achievable. COMPARISON: 09/16/2024 HISTORY: ORDERING SYSTEM PROVIDED HISTORY: Abnormal CT lung screening, Lung nodules TECHNOLOGIST PROVIDED HISTORY: Imaging Protocol:->Standard What reading provider will be dictating this exam?->CRC FINDINGS: Mediastinum: There is some plaque in the thoracic arch.  There are small anterior mediastinal lymph nodes none larger than 7 mm.  There is some pretracheal lymph nodes.  No hilar nodes are noted.  There is coronary arterial vascular plaque.  Heart size is normal. Lungs/pleura: There are small areas of scarring in both apices.  There is small areas of pleural thickening posteriorly in the right hemithorax. Upper Abdomen: Unremarkable Soft Tissues/Bones: There is considerable deformity of what is felt to be T5 which may be related to a compression fracture of the caudal endplate.  There is some compression of the superior endplate of T6.  There is associated spurring right and left laterally.  This was not present on the prior study. No obvious mass is  identified.  There is some perivertebral soft tissue prominence. There are degenerative changes at L1-L2.    1. There is considerable deformity of what is felt to be T5 which may be related to a compression fracture of the caudal endplate. There is some compression of the superior endplate of T6. There is associated spurring right and left laterally. This was not present on the prior study. No obvious mass is identified. There is some perivertebral soft tissue prominence. Recommend MRI of the thoracic spine with and without contrast for further evaluation.  Osteomyelitis is not excluded. 2. There are small areas of scarring in both apices. There is small areas of pleural thickening posteriorly in the right hemithorax. 3. There is coronary arterial vascular plaque.         Assessment/Plan   Introduction to Integrative Medicine:  Spoke with pt at bedside. Patient seemed to appreciate the extra layer of support.   Integrative Medicine was introduced as a service for patients with serious illness to help with symptoms through non-pharmacological management, such as mindfulness, acupuncture, and gentle bodywork. Such interventions can assist with symptoms such as anxiety, fatigue, nausea, depression and pain.     The [facility] Integrative Medicine Symptom Management program offers multi-disciplinary supervised care of cancer patients using Integrative Modalities billed to insurance using NCCN and SIO/ASCO guideline-driven practices.      Back pain:   pain related to malignancy, vertebral osteomyelitis   Pain is well-controlled  Defer to supportive oncology team for adequate PO/IV pain regimen   Recommend integrative therapy modalities as pt allows:  -Acupuncture; provided pt education and handouts today. Pt declined services today, stating he'd like to discuss with his wife before agreeing to treatment. Pt agreeable to follow up when next available (available on Thursday)   -shruti Baker   -Gentle  bodywork and stretching as tolerated      -Art therapy - pt declined   -Music therapy - pt declined   -Chaplaincy - pt declined   -Pet therapy - pt declined         Thank you for allowing us to participate in the care of this patient. Integrative Medicine Team will continue to follow as needed.  Please contact team with any questions or concerns.           CHRISTINA Garcia-CNP (available by [employer])  [facility]  Inpatient Integrative Medicine      I spent 45 minutes in the care of this patient which included chart review, interviewing patient/family, discussion with primary team, coordination of care, and documentation.     Medical Decision Making was high level due to high complexity of problems, extensive data review, and high risk of management/treatment.                      [1] No family history on file.  [2] acetaminophen, 650 mg, oral, q6h  aspirin, 81 mg, oral, Daily  atorvastatin, 80 mg, oral, Daily  cholecalciferol, 25 mcg, oral, Daily  cyanocobalamin, 1,000 mcg, oral, Daily  enoxaparin, 40 mg, subcutaneous, Daily  gabapentin, 300 mg, oral, Daily  levothyroxine, 75 mcg, oral, Daily  lidocaine, 1 patch, transdermal, Daily  LORazepam, 1 mg, intravenous, Once  metoprolol succinate XL, 25 mg, oral, Daily  pantoprazole, 40 mg, oral, Daily before breakfast  polyethylene glycol, 17 g, oral, Daily  tiotropium, 2 puff, inhalation, Daily  [3]    [4] PRN medications: cyclobenzaprine, LORazepam, morphine

## 2025-05-20 NOTE — PROGRESS NOTES
I assumed care of pt this am. See [facility] note for full details    69 yOM w/ PMH notable for chronic LPB (on annual radioablations; last 2024), prostate cancer (dx 2018; in remission; s/p prostatectomy/XRT), tongue SCC (dx 2022; in remission; s/p XRT) presenting from OSH on 5/20 for subacute/chronic radicular LBP, with b/l LE weakness x 3 weeks. OSH imaging concerning for t-spine osteomyelitis/discitis with ?plegmon, severe spinal canal stenosis. Currently afebrile/HDS/on RA.    #Lower Back Pain, subacute-chronic  #B/L LE weakness, acute  #Thoracic Osteomyelitis/Discitis c/b ?Plegmon/Abscess  #Hx of Chronic Back Pain  -Unclear etiology; source possibly from recurrent IR lumbar radioablations vs hematogenous vs malignancy  -Afebrile/HDS/on RA; pt reports 3 months of worsening LBP, with radicular symptoms and b/l LE weakness over last 3 weeks; denies trauma/falls, f/c/n/v, bowel-bladder incontinence  -OSH MRI t-spine 5/19 with t5-t6 osteo/discitis with plegmon, mod-severe spinal canal stensois; pending MRI c/l-spine to complete imaging   -Check blood cultures, ESR/CRP  -Ortho spine consult  -No abx for now to optimize culture date in case of OR   -Supportive pain regimen: tylenol, toradol, lidocaine patches, doris 300mg at bedtime; oxy prn    #Hx of Prostate Cancer  #Hx of Tongue SCC  -Both in remission/observation   -Check PSA     #Pre-op  -Afebrile/HDS/on RA; denies CP/SOB/cough/dizziness/palpitations, denies fevers/chills/infectious symptoms; warm/euvolemic  -CXR with b/l interstitial markings, RLL opacity  -RCRI 0; pending EKG  -Medically optimized for surgery; anesthesia to evaluate

## 2025-05-20 NOTE — SIGNIFICANT EVENT
Updated plan of care:     Miguel Lofton is a 69 y.o. male patient, with PMHx of prostate cancer, base of the tongue cancer, HTN, HLD, hypothyroidism, COPD, anxiety who was transferred to [facility] for for management of compression fracture and possible thoracic vertebral osteomyelitis.      # Acute on chronic back pain   # Compression fracture   # Vertebral osteomyelitis  - CT Chest (OSH): considerable deformity of what is felt to be T5 which may be related to a compression fracture of the caudal endplate. There is some compression of the superior endplate of T6.  -  MRI thoracic spine (OSH): Acute discitis/osteomyelitis at T5-6 with marked surrounding phlegmon and circumferential epidural phlegmon resulting in moderate to severe spinal canal stenosis. No epidural abscess. Requested for image import from [facility]    - MRI C/L spine pending   - ESR/CRP 90/7.35, repeat order for tomorrow   - Will postpone starting abx pending biopsy since patient is HDS pending neurosurgery eval   - Pain management, Tylenol PRN, morphine 2mg q6h PRN, Lidocaine patch, flexeril PRN  - Will start Gabapentin 300 mg nightly   - UA, blood cult ordered (5/20)   - Type and screen, coag  - EKG and CXR ordered   - Neurosurgery consulted     # Dysphasia  - SLP eval done no aspiration, rec soft/ thin liquid diet   - Ensure   - Nutrition consult      # Base of the tongue Ca  - Dx with right BOT SCC cancer with palatal involvement and bilateral cervical adenopathy, p16+  - completed course of chemoradiation by end of 10/2022  - continues to followup with Dr Gamez--last seen 12/18/2024: laryngeal structures are noted for grossly normal appearance; true vocal cords, false vocal cords, remaining structures, including epiglottis, piriform sinuses and base of tongue are all grossly normal; there is no evidence of gross mass nodule, polyp, tumor or other defect   - Follows yearly with Dr. Lucero      # Prostate Ca  - s/p radical prostatectomy by Dr Croft  [place] in 2018  - [id number], [place] 7, + right anterior margin  - PSMA-PET done on 12/6/2023 showed no PSMA uptake within the post-surgical bed; there is PSMA uptake of a left common iliac retroperitoneal lymph node SUV 5.6 and PSMA uptake of a right pelvic sidewall lymph node SUV 3.5  - Dr Rangel recommended radiation therapy. Patient instead went to [facility] and was treated by Dr Zackary Ely (4600 cGy in 23 fractions to the pelvis and LN followed by [place] to prostate bed and LN to 2500 cGy in 10 fractions)  - most recent PSA 8/12/2024 PSA <0.01  - since completing radiation, has been experiencing significant fatigue  - he is now following with urologist Dr Boyd  - For pain management, follows with [facility] palliative care team      # COPD  # Smoking Hx  # Lung Ca screening  -  follows with pulmonologist Dr Hutchison   - PFTs 03/2025: FEV1 72% FEV1/FVC 0.63  -Did not tolerate Breztri  - C/w spiriva     # HTN/HLD  -C/w Atorvastatin  -C/w Metop succinate 25 daily     # preDM  -  last A1c 5/12: 6.4     # Hypothyroidism  :: Last TSH on 5/12: 4.49  -C/w levothyroxine     #Anemia  - Last CBC 5/12: Hb 11.6  - probably anemia of chronic disease  - Ordered anemia workup (iron studies, folate, vit B12)  - Patient is on vit B12 supplementation    # GI/DVT ppx  - Lovenox      # Dispo:   - Full code, confirmed with patient on admission   - NOK: Eleni Lofton (wife) [phone]  - Discharge pending work up

## 2025-05-20 NOTE — CONSULTS
Date of Service:  5/20/2025 Attending Provider:  Magaly Chávez MD     Reason for Consultation:  Miguel Lofton is being seen today for a consult requested by Magaly Chávez MD for concern for osteomyelitis/discitis.    Subjective   History of Present Illness:  Miguel is a 69 y.o. male with HTN, HLD, CAD (on ASA), hypoT, COPD, anxiety, LBP (annual radioablations), prostate cancer (s/p prostatectomy 2018), base of tongue cancer (dx 2022, s/p radiation/chemo, in remission), diastolic CHF, p/w worsening back pain w BLE radiculopathy x4 m. Patient notes that in February he first had severe back pain that radiated to his chest. This lasted several months, until around three weeks ago when it let up, and he started having low back pain that radiated intermittently to the front of both legs. He also noted that it was increasingly difficult to walk during this time, necessitating a walker. Patient denies any infectious symptoms, has previous history of cancer but not actively undergoing any treatment.     Review of Systems negative other than listed in HPI.    Objective   Vitals:  Vitals:    05/20/25 0747   BP: 134/85   Pulse: 84   Resp: 18   Temp: 36.7 °C (98.1 °F)   SpO2: 95%         Exam:  Constitutional: No acute distress  Resp: breathing comfortably on room air  Neuro: Awake, Ox3  face symmetric  RUE 5/5  LUE 5/5  BLE HF4 ow 5  No hoffmans  No clonus (L ankle with 3 beats)  sensation intact to light touch  Psych: appropriate  Skin: no obvious lesions    Medical History  Medical History[1]    Surgical History  Surgical History[2]     Medications  Current Outpatient Medications   Medication Instructions    acetaminophen (TYLENOL) 1,000 mg    ascorbic acid (Vitamin C) 500 mg tablet 1 tablet, Daily    aspirin 81 mg EC tablet 1 tablet, Daily    atorvastatin (Lipitor) 80 mg tablet 1 tablet, Daily    cholecalciferol (Vitamin D-3) 25 MCG (1000 UT) tablet 1 tablet, Daily    cyanocobalamin (Vitamin B-12) 1,000 mcg tablet  1 tablet, Daily    diclofenac sodium (Voltaren) 1 % gel gel Apply topically.    HYDROcodone-acetaminophen (Norco) 5-325 mg tablet 1 tablet, Every 6 hours PRN    levothyroxine (Synthroid, Levoxyl) 75 mcg tablet 1 tablet, Daily    LORazepam (Ativan) 1 mg tablet TAKE ONE TABLET BY MOUTH EVERY DAY NIGHTLY AS NEEDED FOR ANXIETY    LORazepam (ATIVAN) 1 mg, oral, 2 times daily PRN    metoprolol succinate XL (TOPROL-XL) 25 mg, Daily    omeprazole (PRILOSEC) 20 mg    sildenafil (Viagra) 100 mg tablet 1 tablet, Daily PRN    tiotropium (Spiriva with HandiHaler) 18 mcg inhalation capsule Place into inhaler and inhale.        Diagnostic Results:    Lab Results   Component Value Date    WBC 9.6 05/20/2025    HGB 9.9 (L) 05/20/2025    HCT 33.5 (L) 05/20/2025    MCV 81 05/20/2025     (H) 05/20/2025     Lab Results   Component Value Date    CREATININE 0.65 05/20/2025    BUN 13 05/20/2025     05/20/2025    K 3.8 05/20/2025    CL 99 05/20/2025    CO2 31 05/20/2025     Lab Results   Component Value Date    INR 1.1 05/20/2025    INR 0.9 11/07/2018    PROTIME 12.6 (H) 05/20/2025    PROTIME 10.2 11/07/2018       === 02/05/25 ===    CT CHEST W IV CONTRAST    - Impression -  New consolidation with air bronchograms and volume loss in the right  middle lobe, concerning for underlying pneumonia. Additional areas of  diffuse tree-in-bud opacities are seen throughout the right upper and  lower lobes, suspicious for underlying aspiration or bronchitis.  Subcentimeter mediastinal lymph nodes are likely reactive. Follow-up  CT chest is recommended in 3 months to ensure resolution.    Signed by: Cayla Gonzalez 2/6/2025 8:43 AM  Dictation workstation:   [id number]      Assessment/Plan   Assessment:  Miguel Lofton is a 69 year old male with h/o HTN,  HLD, CAD (on ASA), hypoT, COPD, anxiety, LBP (annual radioablations), prostate cancer (s/p prostatectomy 2018), base of tongue cancer (dx 2022, s/p radiation/chemo, in remission), diastolic  CHF, p/w mid back pain x4 months, low back pain w BLE radiculopathy and weakness x3 weeks, 5/13 CT chest T5 compression fx, MRI TS T5-T6 compression fx w disc/osteo, epidural phlegmon w moderate canal stenosis    Patient presenting with back pain over the last four months, worsening low back and lower extremity pain and weakness over the last three weeks. Patient was found to have findings concerning for disc/osteo with epidural phlegmon at T5-6 level on MRI. Given concern for possible infection at other places on spine, recommend full spine imaging. Also recommend infectious workup.    Plan:  [facility] primary  Recommend MRI C and L spine with and without contrast  Recommend ESR/CRP/Bcx are collected  Please obtain CBC, RFP, coag, T&S, UA, EKG, CXR  Further recs pending above imaging and workup      Puma Daniels MD      I have reviewed imaging and diagnosis with the patient, discussed the natural history of their disease and both non-operative and operative treatments available and rationale vs risks for both.    The patient’s clinical symptoms correlates well with the radiological findings. Patient has been having significant functional impairment with decreased ability to perform her normal activities of daily living. They have tried treatment options including medications (NSAIDs/narcotics/muscle relaxants/membrane stabilizers), formal physical therapy, and injections.    I offered the option of surgery that would consist of a T3-T8 decompression and fusion with open biopsy of disc space and evacuation of small abscess.      I have explained the surgical procedure in detail with expected duration and extent of recovery along risks of surgery that include, but is not limited to bleeding, infection, blood vessel injury or damage, loss of sensation, loss of bladder, bowel or sexual function, nerve injury/damage resulting in weakness/paralysis, malunion, nonunion, CSF leak, brachial plexus injury, peripheral vision  blindness, failure of implants/fusion, failure to relieve symptoms, recurrent disease, adjacent segment disease, need to reoperate for any reason and general anesthesia reaction such as stroke, coma, heart attack, delirium, confusion, death as well as worsening of preexisted medical conditions.    All questions were answered and the patient left satisfied with the surgical plan moving forward.      Timoteo Quintanilla MD, [facility]  Spine , [facility]  Jonathan Gutierrez and Liyah Gutierrez Chair in Spinal Neurosurgery  Neurosurgery , [facility]  Complex Spine Surgery Fellowship Director   of Neurological Surgery  [employer]  Office: [phone]  Fax: [phone]             [1]   Past Medical History:  Diagnosis Date    Hypothyroidism, unspecified     Hypothyroidism, adult    Personal history of other endocrine, nutritional and metabolic disease     History of high cholesterol    Personal history of other mental and behavioral disorders     History of anxiety   [2]   Past Surgical History:  Procedure Laterality Date    HERNIA REPAIR  08/29/2018    Hernia Repair    VASECTOMY  08/29/2018    Surgery Vas Deferens Vasectomy

## 2025-05-20 NOTE — Clinical Note
10F L hip drain placed; connected to ANGELICA bulb. 4ml sample sent to lab. Pt received 3mg versed and 150mcg fentanyl for sedation; tolerated well. VSS throughout procedure. Dressing is clean, dry, and intact. Pt to RPCU; report to RPCU RN.

## 2025-05-20 NOTE — PROGRESS NOTES
Pharmacy Medication History Review    Miguel Lofton is a 69 y.o. male admitted for Osteomyelitis. Pharmacy reviewed the patient's [id number] medications and allergies for accuracy.    The list below reflects the updated PTA list.   Prior to Admission Medications   Prescriptions Last Dose Informant   HYDROcodone-acetaminophen (Norco) 5-325 mg tablet  Self   Sig: Take 1 tablet by mouth every 6 hours if needed.   LORazepam (Ativan) 1 mg tablet  Self   Sig: Take 1 tablet (1 mg) by mouth 2 times a day as needed (Anxiety prior to radiation therapy).   acetaminophen (Tylenol) 500 mg tablet  Self   Sig: Take 2 tablets (1,000 mg) by mouth.   ascorbic acid (Vitamin C) 500 mg tablet  Self   Sig: Take 1 tablet (500 mg) by mouth once daily.   aspirin 81 mg EC tablet  Self   Sig: Take 1 tablet (81 mg) by mouth once daily.   atorvastatin (Lipitor) 80 mg tablet  Self   Sig: Take 1 tablet (80 mg) by mouth once daily.   cholecalciferol (Vitamin D-3) 25 MCG (1000 UT) tablet  Self   Sig: Take 1 tablet (25 mcg) by mouth once daily.   cyanocobalamin (Vitamin B-12) 1,000 mcg tablet  Self   Sig: Take 1 tablet (1,000 mcg) by mouth once daily.   cyclobenzaprine (Flexeril) 10 mg tablet  Self   Sig: Take 1 tablet (10 mg) by mouth 3 times a day as needed for muscle spasms.   diclofenac sodium (Voltaren) 1 % gel gel  Self   Sig: Apply topically.   levothyroxine (Synthroid, Levoxyl) 88 mcg tablet  Self   Sig: Take 1 tablet (88 mcg) by mouth early in the morning..    Patient currently is taking 1/2 tablet daily . Cardiologist told him about a week ago to decrease dose    metoprolol succinate XL (Toprol-XL) 25 mg 24 hr tablet  Self   Sig: Take 1 tablet (25 mg) by mouth once daily. Do not crush or chew.   omeprazole (PriLOSEC) 20 mg DR capsule  Self   Sig: Take 1 capsule (20 mg) by mouth.   sildenafil (Viagra) 100 mg tablet  Self   Sig: Take 1 tablet (100 mg) by mouth once daily as needed.   tiotropium (Spiriva with HandiHaler) 18 mcg  inhalation capsule  Self   Sig: Place into inhaler and inhale daily     Patient not taking      Facility-Administered Medications: None        The list below reflects the updated allergy list. Please review each documented allergy for additional clarification and justification.  Allergies  Reviewed by Carolyn Drew RN on 5/20/2025        Severity Reactions Comments    Diazepam Low Other MIGRAINES    Meperidine Low Nausea Only migraines            Patient accepts M2B at discharge. Pharmacy has been updated to [facility].    Sources used to complete the med history include:    [facility]  Pharmacy dispense history  Patient Interview Good historian  Chart Review  Care Everywhere   Office visit 5/19/25 Pulmonology     Below are additional concerns with the patient's PTA list.  None     Medications ADDED:  Cyclobenzaprine     Medications CHANGED:  Levothyroxine 75 mcg to 88 mcg (now currently taking 1/2 tablet 44 mcg)  Medications REMOVED:   Spiriva (not taking)    [facility].   Transitions of Care Pharmacist    Please reach out via Secure Chat for questions, or if no response call [facility] or [facility] [place]

## 2025-05-20 NOTE — CARE PLAN
The patient's goals for the shift include Pain management    The clinical goals for the shift include Patient pain will be managed    Patient was able to rest and pain was managed      Problem: Pain - Adult  Goal: Verbalizes/displays adequate comfort level or baseline comfort level  Outcome: Progressing     Problem: Safety - Adult  Goal: Free from fall injury  Outcome: Progressing     Problem: Chronic Conditions and Co-morbidities  Goal: Patient's chronic conditions and co-morbidity symptoms are monitored and maintained or improved  Outcome: Progressing     Problem: Nutrition  Goal: Nutrient intake appropriate for maintaining nutritional needs  Outcome: Progressing

## 2025-05-21 ENCOUNTER — APPOINTMENT (OUTPATIENT)
Dept: RADIOLOGY | Facility: HOSPITAL | Age: 70
DRG: 447 | End: 2025-05-21
Payer: COMMERCIAL

## 2025-05-21 ENCOUNTER — ANESTHESIA (OUTPATIENT)
Dept: OPERATING ROOM | Facility: HOSPITAL | Age: 70
End: 2025-05-21
Payer: COMMERCIAL

## 2025-05-21 ENCOUNTER — ANESTHESIA EVENT (OUTPATIENT)
Dept: OPERATING ROOM | Facility: HOSPITAL | Age: 70
End: 2025-05-21
Payer: COMMERCIAL

## 2025-05-21 ENCOUNTER — APPOINTMENT (OUTPATIENT)
Dept: OTOLARYNGOLOGY | Facility: CLINIC | Age: 70
End: 2025-05-21
Payer: COMMERCIAL

## 2025-05-21 LAB
ANION GAP BLDA CALCULATED.4IONS-SCNC: 7 MMO/L (ref 10–25)
BACTERIA UR CULT: NO GROWTH
BASE EXCESS BLDA CALC-SCNC: 1.9 MMOL/L (ref -2–3)
BODY TEMPERATURE: 37 DEGREES CELSIUS
CA-I BLDA-SCNC: 1.23 MMOL/L (ref 1.1–1.33)
CHLORIDE BLDA-SCNC: 101 MMOL/L (ref 98–107)
CRP SERPL HS-MCNC: 61 MG/L (ref 0–5)
GLUCOSE BLDA-MCNC: 118 MG/DL (ref 74–99)
HCO3 BLDA-SCNC: 27.2 MMOL/L (ref 22–26)
HCT VFR BLD EST: 29 % (ref 41–52)
HGB BLDA-MCNC: 9.5 G/DL (ref 13.5–17.5)
HOLD SPECIMEN: NORMAL
INHALED O2 CONCENTRATION: 35 %
LACTATE BLDA-SCNC: 0.7 MMOL/L (ref 0.4–2)
OXYHGB MFR BLDA: 95.6 % (ref 94–98)
PCO2 BLDA: 45 MM HG (ref 38–42)
PH BLDA: 7.39 PH (ref 7.38–7.42)
PO2 BLDA: 109 MM HG (ref 85–95)
POTASSIUM BLDA-SCNC: 3.5 MMOL/L (ref 3.5–5.3)
PSA SERPL-MCNC: <0.1 NG/ML
SAO2 % BLDA: 98 % (ref 94–100)
SODIUM BLDA-SCNC: 132 MMOL/L (ref 136–145)

## 2025-05-21 PROCEDURE — 88331 PATH CONSLTJ SURG 1 BLK 1SPC: CPT | Performed by: STUDENT IN AN ORGANIZED HEALTH CARE EDUCATION/TRAINING PROGRAM

## 2025-05-21 PROCEDURE — 36620 INSERTION CATHETER ARTERY: CPT | Performed by: ANESTHESIOLOGIST ASSISTANT

## 2025-05-21 PROCEDURE — 63266 EXCISE INTRSPINL LESION THRC: CPT | Performed by: STUDENT IN AN ORGANIZED HEALTH CARE EDUCATION/TRAINING PROGRAM

## 2025-05-21 PROCEDURE — 2500000001 HC RX 250 WO HCPCS SELF ADMINISTERED DRUGS (ALT 637 FOR MEDICARE OP): Performed by: STUDENT IN AN ORGANIZED HEALTH CARE EDUCATION/TRAINING PROGRAM

## 2025-05-21 PROCEDURE — 61783 SCAN PROC SPINAL: CPT | Performed by: STUDENT IN AN ORGANIZED HEALTH CARE EDUCATION/TRAINING PROGRAM

## 2025-05-21 PROCEDURE — 00NX0ZZ RELEASE THORACIC SPINAL CORD, OPEN APPROACH: ICD-10-PCS | Performed by: INTERNAL MEDICINE

## 2025-05-21 PROCEDURE — 3600000017 HC OR TIME - EACH INCREMENTAL 1 MINUTE - PROCEDURE LEVEL SIX: Performed by: STUDENT IN AN ORGANIZED HEALTH CARE EDUCATION/TRAINING PROGRAM

## 2025-05-21 PROCEDURE — 63055 DECOMPRESS SPINAL CORD THRC: CPT | Performed by: STUDENT IN AN ORGANIZED HEALTH CARE EDUCATION/TRAINING PROGRAM

## 2025-05-21 PROCEDURE — 3600000018 HC OR TIME - INITIAL BASE CHARGE - PROCEDURE LEVEL SIX: Performed by: STUDENT IN AN ORGANIZED HEALTH CARE EDUCATION/TRAINING PROGRAM

## 2025-05-21 PROCEDURE — 99233 SBSQ HOSP IP/OBS HIGH 50: CPT | Performed by: HOSPITALIST

## 2025-05-21 PROCEDURE — 87070 CULTURE OTHR SPECIMN AEROBIC: CPT | Performed by: HOSPITALIST

## 2025-05-21 PROCEDURE — 2500000004 HC RX 250 GENERAL PHARMACY W/ HCPCS (ALT 636 FOR OP/ED): Mod: TB | Performed by: STUDENT IN AN ORGANIZED HEALTH CARE EDUCATION/TRAINING PROGRAM

## 2025-05-21 PROCEDURE — 3700000001 HC GENERAL ANESTHESIA TIME - INITIAL BASE CHARGE: Performed by: STUDENT IN AN ORGANIZED HEALTH CARE EDUCATION/TRAINING PROGRAM

## 2025-05-21 PROCEDURE — 99222 1ST HOSP IP/OBS MODERATE 55: CPT | Performed by: STUDENT IN AN ORGANIZED HEALTH CARE EDUCATION/TRAINING PROGRAM

## 2025-05-21 PROCEDURE — 2500000005 HC RX 250 GENERAL PHARMACY W/O HCPCS: Performed by: ANESTHESIOLOGY

## 2025-05-21 PROCEDURE — 2780000003 HC OR 278 NO HCPCS: Performed by: STUDENT IN AN ORGANIZED HEALTH CARE EDUCATION/TRAINING PROGRAM

## 2025-05-21 PROCEDURE — 87801 DETECT AGNT MULT DNA AMPLI: CPT | Performed by: STUDENT IN AN ORGANIZED HEALTH CARE EDUCATION/TRAINING PROGRAM

## 2025-05-21 PROCEDURE — 84132 ASSAY OF SERUM POTASSIUM: CPT | Performed by: ANESTHESIOLOGIST ASSISTANT

## 2025-05-21 PROCEDURE — 2500000001 HC RX 250 WO HCPCS SELF ADMINISTERED DRUGS (ALT 637 FOR MEDICARE OP): Performed by: PHYSICIAN ASSISTANT

## 2025-05-21 PROCEDURE — 2500000004 HC RX 250 GENERAL PHARMACY W/ HCPCS (ALT 636 FOR OP/ED): Mod: JZ,TB | Performed by: ANESTHESIOLOGY

## 2025-05-21 PROCEDURE — 3700000002 HC GENERAL ANESTHESIA TIME - EACH INCREMENTAL 1 MINUTE: Performed by: STUDENT IN AN ORGANIZED HEALTH CARE EDUCATION/TRAINING PROGRAM

## 2025-05-21 PROCEDURE — 2500000004 HC RX 250 GENERAL PHARMACY W/ HCPCS (ALT 636 FOR OP/ED): Performed by: STUDENT IN AN ORGANIZED HEALTH CARE EDUCATION/TRAINING PROGRAM

## 2025-05-21 PROCEDURE — 2720000007 HC OR 272 NO HCPCS: Performed by: STUDENT IN AN ORGANIZED HEALTH CARE EDUCATION/TRAINING PROGRAM

## 2025-05-21 PROCEDURE — 94640 AIRWAY INHALATION TREATMENT: CPT

## 2025-05-21 PROCEDURE — 2500000005 HC RX 250 GENERAL PHARMACY W/O HCPCS: Performed by: STUDENT IN AN ORGANIZED HEALTH CARE EDUCATION/TRAINING PROGRAM

## 2025-05-21 PROCEDURE — C1713 ANCHOR/SCREW BN/BN,TIS/BN: HCPCS | Performed by: STUDENT IN AN ORGANIZED HEALTH CARE EDUCATION/TRAINING PROGRAM

## 2025-05-21 PROCEDURE — A22614 PR ARTHRODESIS POSTERIOR/POSTEROLATERAL EA ADDL: Performed by: ANESTHESIOLOGY

## 2025-05-21 PROCEDURE — 87102 FUNGUS ISOLATION CULTURE: CPT | Performed by: HOSPITALIST

## 2025-05-21 PROCEDURE — 2500000001 HC RX 250 WO HCPCS SELF ADMINISTERED DRUGS (ALT 637 FOR MEDICARE OP): Performed by: ANESTHESIOLOGY

## 2025-05-21 PROCEDURE — 2500000004 HC RX 250 GENERAL PHARMACY W/ HCPCS (ALT 636 FOR OP/ED): Mod: JZ

## 2025-05-21 PROCEDURE — 0RG7071 FUSION OF 2 TO 7 THORACIC VERTEBRAL JOINTS WITH AUTOLOGOUS TISSUE SUBSTITUTE, POSTERIOR APPROACH, POSTERIOR COLUMN, OPEN APPROACH: ICD-10-PCS | Performed by: INTERNAL MEDICINE

## 2025-05-21 PROCEDURE — 2500000004 HC RX 250 GENERAL PHARMACY W/ HCPCS (ALT 636 FOR OP/ED): Mod: JZ | Performed by: ANESTHESIOLOGIST ASSISTANT

## 2025-05-21 PROCEDURE — 7100000001 HC RECOVERY ROOM TIME - INITIAL BASE CHARGE: Performed by: STUDENT IN AN ORGANIZED HEALTH CARE EDUCATION/TRAINING PROGRAM

## 2025-05-21 PROCEDURE — A22614 PR ARTHRODESIS POSTERIOR/POSTEROLATERAL EA ADDL: Performed by: ANESTHESIOLOGIST ASSISTANT

## 2025-05-21 PROCEDURE — 88307 TISSUE EXAM BY PATHOLOGIST: CPT | Performed by: STUDENT IN AN ORGANIZED HEALTH CARE EDUCATION/TRAINING PROGRAM

## 2025-05-21 PROCEDURE — 30233N1 TRANSFUSION OF NONAUTOLOGOUS RED BLOOD CELLS INTO PERIPHERAL VEIN, PERCUTANEOUS APPROACH: ICD-10-PCS | Performed by: INTERNAL MEDICINE

## 2025-05-21 PROCEDURE — 2500000001 HC RX 250 WO HCPCS SELF ADMINISTERED DRUGS (ALT 637 FOR MEDICARE OP)

## 2025-05-21 PROCEDURE — 22610 ARTHRD PST TQ 1NTRSPC THRC: CPT | Performed by: STUDENT IN AN ORGANIZED HEALTH CARE EDUCATION/TRAINING PROGRAM

## 2025-05-21 PROCEDURE — 2500000005 HC RX 250 GENERAL PHARMACY W/O HCPCS: Performed by: ANESTHESIOLOGIST ASSISTANT

## 2025-05-21 PROCEDURE — 7100000002 HC RECOVERY ROOM TIME - EACH INCREMENTAL 1 MINUTE: Performed by: STUDENT IN AN ORGANIZED HEALTH CARE EDUCATION/TRAINING PROGRAM

## 2025-05-21 PROCEDURE — 87116 MYCOBACTERIA CULTURE: CPT | Performed by: HOSPITALIST

## 2025-05-21 PROCEDURE — 2500000004 HC RX 250 GENERAL PHARMACY W/ HCPCS (ALT 636 FOR OP/ED): Mod: JZ | Performed by: PHYSICIAN ASSISTANT

## 2025-05-21 PROCEDURE — 22614 ARTHRD PST TQ 1NTRSPC EA ADD: CPT | Performed by: STUDENT IN AN ORGANIZED HEALTH CARE EDUCATION/TRAINING PROGRAM

## 2025-05-21 PROCEDURE — 88307 TISSUE EXAM BY PATHOLOGIST: CPT | Mod: TC,SUR | Performed by: HOSPITALIST

## 2025-05-21 PROCEDURE — 22842 INSERT SPINE FIXATION DEVICE: CPT | Performed by: STUDENT IN AN ORGANIZED HEALTH CARE EDUCATION/TRAINING PROGRAM

## 2025-05-21 PROCEDURE — 1170000001 HC PRIVATE ONCOLOGY ROOM DAILY

## 2025-05-21 DEVICE — SCREW, RELINE LOCK, 5.5MM OPEN TULIP: Type: IMPLANTABLE DEVICE | Site: BACK | Status: FUNCTIONAL

## 2025-05-21 DEVICE — SCREW, RELINE-O, 6.5X50MM 2S POLYAXIAL: Type: IMPLANTABLE DEVICE | Site: BACK | Status: FUNCTIONAL

## 2025-05-21 DEVICE — ATTRAX® SCAFFOLD STRIPS, SMALL
Type: IMPLANTABLE DEVICE | Site: BACK | Status: FUNCTIONAL
Brand: ATTRAX

## 2025-05-21 DEVICE — SCREW, RELINE-O, 6.5X45MM 2S POLYAXIAL: Type: IMPLANTABLE DEVICE | Site: BACK | Status: FUNCTIONAL

## 2025-05-21 DEVICE — ROD, RELINE-0, 5.5 X 300MM, STRAIGHT: Type: IMPLANTABLE DEVICE | Site: BACK | Status: FUNCTIONAL

## 2025-05-21 RX ORDER — LIDOCAINE HYDROCHLORIDE AND EPINEPHRINE 5; 5 MG/ML; UG/ML
INJECTION, SOLUTION INFILTRATION; PERINEURAL AS NEEDED
Status: DISCONTINUED | OUTPATIENT
Start: 2025-05-21 | End: 2025-05-21 | Stop reason: HOSPADM

## 2025-05-21 RX ORDER — METOPROLOL TARTRATE 1 MG/ML
INJECTION, SOLUTION INTRAVENOUS AS NEEDED
Status: DISCONTINUED | OUTPATIENT
Start: 2025-05-21 | End: 2025-05-21

## 2025-05-21 RX ORDER — ACETAMINOPHEN 325 MG/1
650 TABLET ORAL EVERY 4 HOURS
Status: DISCONTINUED | OUTPATIENT
Start: 2025-05-22 | End: 2025-06-04

## 2025-05-21 RX ORDER — ONDANSETRON HYDROCHLORIDE 2 MG/ML
4 INJECTION, SOLUTION INTRAVENOUS ONCE AS NEEDED
Status: DISCONTINUED | OUTPATIENT
Start: 2025-05-21 | End: 2025-05-21 | Stop reason: HOSPADM

## 2025-05-21 RX ORDER — PROPOFOL 10 MG/ML
INJECTION, EMULSION INTRAVENOUS CONTINUOUS PRN
Status: DISCONTINUED | OUTPATIENT
Start: 2025-05-21 | End: 2025-05-21

## 2025-05-21 RX ORDER — ENOXAPARIN SODIUM 100 MG/ML
40 INJECTION SUBCUTANEOUS DAILY
Status: DISCONTINUED | OUTPATIENT
Start: 2025-05-22 | End: 2025-05-22

## 2025-05-21 RX ORDER — OXYCODONE HYDROCHLORIDE 10 MG/1
10 TABLET ORAL EVERY 4 HOURS PRN
Status: DISCONTINUED | OUTPATIENT
Start: 2025-05-21 | End: 2025-06-08 | Stop reason: HOSPADM

## 2025-05-21 RX ORDER — OXYCODONE HYDROCHLORIDE 5 MG/1
2.5 TABLET ORAL EVERY 4 HOURS PRN
Status: DISCONTINUED | OUTPATIENT
Start: 2025-05-21 | End: 2025-05-22

## 2025-05-21 RX ORDER — TRANEXAMIC ACID 10 MG/ML
INJECTION, SOLUTION INTRAVENOUS AS NEEDED
Status: DISCONTINUED | OUTPATIENT
Start: 2025-05-21 | End: 2025-05-21

## 2025-05-21 RX ORDER — CYCLOBENZAPRINE HCL 10 MG
10 TABLET ORAL 3 TIMES DAILY
Status: DISCONTINUED | OUTPATIENT
Start: 2025-05-21 | End: 2025-06-08 | Stop reason: HOSPADM

## 2025-05-21 RX ORDER — ROCURONIUM BROMIDE 10 MG/ML
INJECTION, SOLUTION INTRAVENOUS AS NEEDED
Status: DISCONTINUED | OUTPATIENT
Start: 2025-05-21 | End: 2025-05-21

## 2025-05-21 RX ORDER — VANCOMYCIN HYDROCHLORIDE 1 G/20ML
INJECTION, POWDER, LYOPHILIZED, FOR SOLUTION INTRAVENOUS AS NEEDED
Status: DISCONTINUED | OUTPATIENT
Start: 2025-05-21 | End: 2025-05-21

## 2025-05-21 RX ORDER — LIDOCAINE HCL/PF 100 MG/5ML
SYRINGE (ML) INTRAVENOUS AS NEEDED
Status: DISCONTINUED | OUTPATIENT
Start: 2025-05-21 | End: 2025-05-21

## 2025-05-21 RX ORDER — GLYCOPYRROLATE 0.2 MG/ML
INJECTION INTRAMUSCULAR; INTRAVENOUS AS NEEDED
Status: DISCONTINUED | OUTPATIENT
Start: 2025-05-21 | End: 2025-05-21

## 2025-05-21 RX ORDER — OXYCODONE HYDROCHLORIDE 5 MG/1
5 TABLET ORAL EVERY 4 HOURS PRN
Status: DISCONTINUED | OUTPATIENT
Start: 2025-05-21 | End: 2025-05-21 | Stop reason: HOSPADM

## 2025-05-21 RX ORDER — HYDROMORPHONE HYDROCHLORIDE 0.2 MG/ML
0.2 INJECTION INTRAMUSCULAR; INTRAVENOUS; SUBCUTANEOUS EVERY 5 MIN PRN
Status: DISCONTINUED | OUTPATIENT
Start: 2025-05-21 | End: 2025-05-21 | Stop reason: HOSPADM

## 2025-05-21 RX ORDER — OXYCODONE HYDROCHLORIDE 5 MG/1
5 TABLET ORAL EVERY 4 HOURS PRN
Status: DISCONTINUED | OUTPATIENT
Start: 2025-05-21 | End: 2025-05-22

## 2025-05-21 RX ORDER — LIDOCAINE HYDROCHLORIDE 10 MG/ML
0.1 INJECTION, SOLUTION INFILTRATION; PERINEURAL ONCE
Status: DISCONTINUED | OUTPATIENT
Start: 2025-05-21 | End: 2025-05-21 | Stop reason: HOSPADM

## 2025-05-21 RX ORDER — MIDAZOLAM HYDROCHLORIDE 1 MG/ML
INJECTION INTRAMUSCULAR; INTRAVENOUS AS NEEDED
Status: DISCONTINUED | OUTPATIENT
Start: 2025-05-21 | End: 2025-05-21

## 2025-05-21 RX ORDER — SIMETHICONE 80 MG
80 TABLET,CHEWABLE ORAL 4 TIMES DAILY PRN
Status: DISCONTINUED | OUTPATIENT
Start: 2025-05-21 | End: 2025-06-08 | Stop reason: HOSPADM

## 2025-05-21 RX ORDER — MAGNESIUM SULFATE HEPTAHYDRATE 500 MG/ML
INJECTION, SOLUTION INTRAMUSCULAR; INTRAVENOUS AS NEEDED
Status: DISCONTINUED | OUTPATIENT
Start: 2025-05-21 | End: 2025-05-21

## 2025-05-21 RX ORDER — KETOROLAC TROMETHAMINE 30 MG/ML
30 INJECTION, SOLUTION INTRAMUSCULAR; INTRAVENOUS EVERY 6 HOURS
Status: DISPENSED | OUTPATIENT
Start: 2025-05-21 | End: 2025-05-23

## 2025-05-21 RX ORDER — FENTANYL CITRATE 50 UG/ML
INJECTION, SOLUTION INTRAMUSCULAR; INTRAVENOUS AS NEEDED
Status: DISCONTINUED | OUTPATIENT
Start: 2025-05-21 | End: 2025-05-21

## 2025-05-21 RX ORDER — ONDANSETRON HYDROCHLORIDE 2 MG/ML
INJECTION, SOLUTION INTRAVENOUS AS NEEDED
Status: DISCONTINUED | OUTPATIENT
Start: 2025-05-21 | End: 2025-05-21

## 2025-05-21 RX ORDER — DEXMEDETOMIDINE HYDROCHLORIDE 4 UG/ML
INJECTION, SOLUTION INTRAVENOUS CONTINUOUS PRN
Status: DISCONTINUED | OUTPATIENT
Start: 2025-05-21 | End: 2025-05-21

## 2025-05-21 RX ORDER — PHENYLEPHRINE 10 MG/250 ML(40 MCG/ML)IN 0.9 % SOD.CHLORIDE INTRAVENOUS
CONTINUOUS PRN
Status: DISCONTINUED | OUTPATIENT
Start: 2025-05-21 | End: 2025-05-21

## 2025-05-21 RX ORDER — SODIUM CHLORIDE, SODIUM LACTATE, POTASSIUM CHLORIDE, CALCIUM CHLORIDE 600; 310; 30; 20 MG/100ML; MG/100ML; MG/100ML; MG/100ML
100 INJECTION, SOLUTION INTRAVENOUS CONTINUOUS
Status: DISCONTINUED | OUTPATIENT
Start: 2025-05-21 | End: 2025-05-21 | Stop reason: HOSPADM

## 2025-05-21 RX ORDER — LIDOCAINE 560 MG/1
2 PATCH PERCUTANEOUS; TOPICAL; TRANSDERMAL DAILY
Status: DISCONTINUED | OUTPATIENT
Start: 2025-05-22 | End: 2025-06-08 | Stop reason: HOSPADM

## 2025-05-21 RX ORDER — CEFAZOLIN 1 G/1
INJECTION, POWDER, FOR SOLUTION INTRAVENOUS AS NEEDED
Status: DISCONTINUED | OUTPATIENT
Start: 2025-05-21 | End: 2025-05-21

## 2025-05-21 RX ORDER — POLYMYXIN B 500000 [USP'U]/1
INJECTION, POWDER, LYOPHILIZED, FOR SOLUTION INTRAMUSCULAR; INTRATHECAL; INTRAVENOUS; OPHTHALMIC AS NEEDED
Status: DISCONTINUED | OUTPATIENT
Start: 2025-05-21 | End: 2025-05-21 | Stop reason: HOSPADM

## 2025-05-21 RX ADMIN — LORAZEPAM 1 MG: 1 TABLET ORAL at 23:49

## 2025-05-21 RX ADMIN — PANTOPRAZOLE SODIUM 40 MG: 40 TABLET, DELAYED RELEASE ORAL at 05:36

## 2025-05-21 RX ADMIN — HYDROMORPHONE HYDROCHLORIDE 0.2 MG: 0.2 INJECTION, SOLUTION INTRAMUSCULAR; INTRAVENOUS; SUBCUTANEOUS at 14:09

## 2025-05-21 RX ADMIN — TRANEXAMIC ACID 3 MG/KG/HR: 100 INJECTION INTRAVENOUS at 09:55

## 2025-05-21 RX ADMIN — DEXAMETHASONE SODIUM PHOSPHATE 4 MG: 4 INJECTION, SOLUTION INTRA-ARTICULAR; INTRALESIONAL; INTRAMUSCULAR; INTRAVENOUS; SOFT TISSUE at 09:59

## 2025-05-21 RX ADMIN — PROPOFOL 25 MCG/KG/MIN: 10 INJECTION, EMULSION INTRAVENOUS at 09:47

## 2025-05-21 RX ADMIN — KETAMINE HYDROCHLORIDE 0.3 MG/KG/HR: 100 INJECTION, SOLUTION, CONCENTRATE INTRAMUSCULAR; INTRAVENOUS at 09:50

## 2025-05-21 RX ADMIN — MORPHINE SULFATE 4 MG: 4 INJECTION, SOLUTION INTRAMUSCULAR; INTRAVENOUS at 03:17

## 2025-05-21 RX ADMIN — ACETAMINOPHEN 650 MG: 325 TABLET ORAL at 20:02

## 2025-05-21 RX ADMIN — HYDROMORPHONE HYDROCHLORIDE 0.5 MG: 1 INJECTION, SOLUTION INTRAMUSCULAR; INTRAVENOUS; SUBCUTANEOUS at 13:56

## 2025-05-21 RX ADMIN — ONDANSETRON 4 MG: 2 INJECTION, SOLUTION INTRAMUSCULAR; INTRAVENOUS at 12:14

## 2025-05-21 RX ADMIN — ACETAMINOPHEN 650 MG: 325 TABLET, FILM COATED ORAL at 23:45

## 2025-05-21 RX ADMIN — DEXMEDETOMIDINE HYDROCHLORIDE 0.2 MCG/KG/HR: 4 INJECTION, SOLUTION INTRAVENOUS at 09:33

## 2025-05-21 RX ADMIN — SUGAMMADEX 200 MG: 100 INJECTION, SOLUTION INTRAVENOUS at 13:14

## 2025-05-21 RX ADMIN — PHENYLEPHRINE-NACL IV SOLUTION 10 MG/250ML-0.9% 0.3 MCG/KG/MIN: 10-0.9/25 SOLUTION at 10:44

## 2025-05-21 RX ADMIN — OXYCODONE 5 MG: 5 TABLET ORAL at 16:45

## 2025-05-21 RX ADMIN — SODIUM CHLORIDE, SODIUM LACTATE, POTASSIUM CHLORIDE, AND CALCIUM CHLORIDE: 600; 310; 30; 20 INJECTION, SOLUTION INTRAVENOUS at 08:23

## 2025-05-21 RX ADMIN — HYDROMORPHONE HYDROCHLORIDE 0.5 MG: 1 INJECTION, SOLUTION INTRAMUSCULAR; INTRAVENOUS; SUBCUTANEOUS at 13:31

## 2025-05-21 RX ADMIN — HYDROMORPHONE HYDROCHLORIDE 0.2 MG: 1 INJECTION, SOLUTION INTRAMUSCULAR; INTRAVENOUS; SUBCUTANEOUS at 20:02

## 2025-05-21 RX ADMIN — OXYCODONE HYDROCHLORIDE 10 MG: 10 TABLET ORAL at 20:58

## 2025-05-21 RX ADMIN — KETOROLAC TROMETHAMINE 30 MG: 30 INJECTION, SOLUTION INTRAMUSCULAR; INTRAVENOUS at 21:00

## 2025-05-21 RX ADMIN — CEFAZOLIN 2 G: 1 INJECTION, POWDER, FOR SOLUTION INTRAMUSCULAR; INTRAVENOUS at 09:44

## 2025-05-21 RX ADMIN — ROCURONIUM BROMIDE 20 MG: 10 INJECTION INTRAVENOUS at 09:39

## 2025-05-21 RX ADMIN — SIMETHICONE 80 MG: 80 TABLET, CHEWABLE ORAL at 20:02

## 2025-05-21 RX ADMIN — METOPROLOL TARTRATE 1 MG: 1 INJECTION, SOLUTION INTRAVENOUS at 10:24

## 2025-05-21 RX ADMIN — ROCURONIUM BROMIDE 10 MG: 10 INJECTION INTRAVENOUS at 10:43

## 2025-05-21 RX ADMIN — ACETAMINOPHEN 650 MG: 325 TABLET ORAL at 05:36

## 2025-05-21 RX ADMIN — LIDOCAINE HYDROCHLORIDE 100 MG: 20 INJECTION INTRAVENOUS at 08:43

## 2025-05-21 RX ADMIN — LIDOCAINE HYDROCHLORIDE 100 MG: 20 INJECTION INTRAVENOUS at 08:44

## 2025-05-21 RX ADMIN — CYCLOBENZAPRINE 10 MG: 10 TABLET, FILM COATED ORAL at 20:58

## 2025-05-21 RX ADMIN — TIOTROPIUM BROMIDE INHALATION SPRAY 2 PUFF: 3.12 SPRAY, METERED RESPIRATORY (INHALATION) at 17:23

## 2025-05-21 RX ADMIN — Medication 3 L/MIN: at 13:30

## 2025-05-21 RX ADMIN — METOPROLOL TARTRATE 1 MG: 1 INJECTION, SOLUTION INTRAVENOUS at 11:16

## 2025-05-21 RX ADMIN — LORAZEPAM 1 MG: 1 TABLET ORAL at 00:36

## 2025-05-21 RX ADMIN — FENTANYL CITRATE 50 MCG: 50 INJECTION, SOLUTION INTRAMUSCULAR; INTRAVENOUS at 08:44

## 2025-05-21 RX ADMIN — TRANEXAMIC ACID 1000 MG: 10 INJECTION, SOLUTION INTRAVENOUS at 09:38

## 2025-05-21 RX ADMIN — OXYCODONE HYDROCHLORIDE 5 MG: 5 TABLET ORAL at 14:31

## 2025-05-21 RX ADMIN — SODIUM CHLORIDE: 9 INJECTION, SOLUTION INTRAVENOUS at 08:22

## 2025-05-21 RX ADMIN — HYDROMORPHONE HYDROCHLORIDE 0.5 MG: 1 INJECTION, SOLUTION INTRAMUSCULAR; INTRAVENOUS; SUBCUTANEOUS at 13:44

## 2025-05-21 RX ADMIN — GLYCOPYRROLATE 0.2 MG: 0.2 INJECTION, SOLUTION INTRAMUSCULAR; INTRAVENOUS at 08:29

## 2025-05-21 RX ADMIN — PROPOFOL 180 MG: 10 INJECTION, EMULSION INTRAVENOUS at 08:45

## 2025-05-21 RX ADMIN — KETOROLAC TROMETHAMINE 30 MG: 30 INJECTION, SOLUTION INTRAMUSCULAR; INTRAVENOUS at 16:45

## 2025-05-21 RX ADMIN — ROCURONIUM BROMIDE 50 MG: 10 INJECTION INTRAVENOUS at 08:48

## 2025-05-21 RX ADMIN — MAGNESIUM SULFATE HEPTAHYDRATE 2 G: 500 INJECTION, SOLUTION INTRAMUSCULAR; INTRAVENOUS at 10:05

## 2025-05-21 RX ADMIN — FENTANYL CITRATE 50 MCG: 50 INJECTION, SOLUTION INTRAMUSCULAR; INTRAVENOUS at 08:41

## 2025-05-21 RX ADMIN — SIMETHICONE 80 MG: 80 TABLET, CHEWABLE ORAL at 23:45

## 2025-05-21 RX ADMIN — MIDAZOLAM HYDROCHLORIDE 2 MG: 2 INJECTION, SOLUTION INTRAMUSCULAR; INTRAVENOUS at 08:29

## 2025-05-21 RX ADMIN — ROCURONIUM BROMIDE 10 MG: 10 INJECTION INTRAVENOUS at 11:29

## 2025-05-21 RX ADMIN — VANCOMYCIN HYDROCHLORIDE 1 G: 1025 INJECTION, POWDER, FOR SOLUTION INTRAVENOUS; ORAL at 08:46

## 2025-05-21 RX ADMIN — METOPROLOL TARTRATE 2 MG: 1 INJECTION, SOLUTION INTRAVENOUS at 10:03

## 2025-05-21 SDOH — HEALTH STABILITY: MENTAL HEALTH: CURRENT SMOKER: 0

## 2025-05-21 ASSESSMENT — PAIN SCALES - GENERAL
PAINLEVEL_OUTOF10: 3
PAINLEVEL_OUTOF10: 7
PAINLEVEL_OUTOF10: 4
PAINLEVEL_OUTOF10: 4
PAINLEVEL_OUTOF10: 6
PAINLEVEL_OUTOF10: 10 - WORST POSSIBLE PAIN
PAINLEVEL_OUTOF10: 8
PAINLEVEL_OUTOF10: 6
PAINLEVEL_OUTOF10: 7
PAINLEVEL_OUTOF10: 8
PAINLEVEL_OUTOF10: 3
PAINLEVEL_OUTOF10: 8
PAINLEVEL_OUTOF10: 6
PAINLEVEL_OUTOF10: 10 - WORST POSSIBLE PAIN

## 2025-05-21 ASSESSMENT — COGNITIVE AND FUNCTIONAL STATUS - GENERAL
CLIMB 3 TO 5 STEPS WITH RAILING: A LOT
HELP NEEDED FOR BATHING: A LITTLE
MOBILITY SCORE: 19
STANDING UP FROM CHAIR USING ARMS: A LITTLE
TOILETING: A LITTLE
DRESSING REGULAR LOWER BODY CLOTHING: A LITTLE
WALKING IN HOSPITAL ROOM: A LITTLE
MOVING TO AND FROM BED TO CHAIR: A LITTLE
DAILY ACTIVITIY SCORE: 20
DRESSING REGULAR UPPER BODY CLOTHING: A LITTLE

## 2025-05-21 ASSESSMENT — PAIN - FUNCTIONAL ASSESSMENT
PAIN_FUNCTIONAL_ASSESSMENT: UNABLE TO SELF-REPORT
PAIN_FUNCTIONAL_ASSESSMENT: 0-10
PAIN_FUNCTIONAL_ASSESSMENT: UNABLE TO SELF-REPORT
PAIN_FUNCTIONAL_ASSESSMENT: 0-10

## 2025-05-21 ASSESSMENT — PAIN DESCRIPTION - DESCRIPTORS
DESCRIPTORS: BURNING
DESCRIPTORS: BURNING
DESCRIPTORS: SORE
DESCRIPTORS: BURNING
DESCRIPTORS: BURNING
DESCRIPTORS: SORE

## 2025-05-21 ASSESSMENT — COLUMBIA-SUICIDE SEVERITY RATING SCALE - C-SSRS
6. HAVE YOU EVER DONE ANYTHING, STARTED TO DO ANYTHING, OR PREPARED TO DO ANYTHING TO END YOUR LIFE?: NO
1. IN THE PAST MONTH, HAVE YOU WISHED YOU WERE DEAD OR WISHED YOU COULD GO TO SLEEP AND NOT WAKE UP?: NO
2. HAVE YOU ACTUALLY HAD ANY THOUGHTS OF KILLING YOURSELF?: NO

## 2025-05-21 NOTE — PROGRESS NOTES
"Miguel Lofton is a 69 y.o. male on day 1 of admission presenting with Osteomyelitis.    Subjective   No events overnight    Objective     Physical Exam  Awake Ox3  RUE D5 B5 T5 HG/IO 5  RLE HF4+ KE5 DF/PF 5  LUE D5 B5 T5 HG/IO 5  LLE HF4+ KE5 DF/PF 5  No natarajan  No clonus    Last Recorded Vitals  Blood pressure 139/77, pulse 88, temperature 36.6 °C (97.9 °F), temperature source Temporal, resp. rate 15, height 1.727 m (5' 8\"), weight 73.4 kg (161 lb 13.1 oz), SpO2 92%.  Intake/Output last 3 Shifts:  I/O last 3 completed shifts:  In: - (0 mL/kg)   Out: 700 (9.5 mL/kg) [Urine:700 (0.3 mL/kg/hr)]  Weight: 73.4 kg     Relevant Results  Lab Results   Component Value Date    WBC 9.6 05/20/2025    HGB 9.9 (L) 05/20/2025    HCT 33.5 (L) 05/20/2025    MCV 81 05/20/2025     (H) 05/20/2025     Lab Results   Component Value Date    GLUCOSE 88 05/20/2025    CALCIUM 9.3 05/20/2025     05/20/2025    K 3.8 05/20/2025    CO2 31 05/20/2025    CL 99 05/20/2025    BUN 13 05/20/2025    CREATININE 0.65 05/20/2025       Assessment & Plan  Osteomyelitis    Miguel Loftno is a 69 year old male with h/o HTN,  HLD, CAD (on ASA), hypoT, COPD, anxiety, LBP (annual radioablations), prostate cancer (s/p prostatectomy 2018), base of tongue cancer (dx 2022, s/p radiation/chemo, in remission), diastolic CHF, p/w mid back pain x4 months, low back pain w BLE radiculopathy and weakness x3 weeks, 5/13 CT chest T5 compression fx, MRI TS T5-T6 compression fx w disc/osteo, epidural phlegmon w moderate canal stenosis     [facility] primary  OR today for lami and instrumented fusion  ASA restart plan  PT and OT eval post op  MRI cervical and lumbar spine w/o contrast  Hold dvt ppx           Huan M Gold, MD    "

## 2025-05-21 NOTE — SIGNIFICANT EVENT
Neurosurgery spine postoperative recommendations     Resuscitation/activity  Okay for DVT chemoprophylaxis postoperative day 1  Please ensure the patient works with PTOT daily starting POD1. Okay for WBAT, activity as tolerated with assist as needed, should be up in chair TID.     Pain control  Would recommend scheduled acetaminophen 650q4, scheduled cyclobenzaprine 10q8, lidocaine patches x2 to be applied to either side of back daily  As needed oxycodone 5q4 for mod pain (4-6), 10q4 for severe pain (7-10); dilaudid 0.2 q3 for breakthrough pain  Okay for toradol 30q6 for up to 8 doses; should not be on NSAIDs after POD2     Imaging/wound care  Neurosurgery spine will manage the patient's drain as well as the Prevena negative pressure dressing over the incision. The prevena dressing can be pulled off of the spine incision on postoperative day 7 and discarded if it is still in place. We will also manage the drain which will stay in place until appropriately low output.  We will plan to obtain upright xrays once able to ambulate/stand, likely POD1 or 2  We will arrange for follow-up with Dr. Quintanilla's office at two weeks for a wound check

## 2025-05-21 NOTE — ANESTHESIA PROCEDURE NOTES
Airway  Date/Time: 5/21/2025 8:48 AM  Reason: elective    Airway not difficult    Staffing  Performed: ARABELLA   Authorized by: Michael Blum MD    Performed by: ARABELLA Dorado  Patient location during procedure: OR    Patient Condition  Indications for airway management: anesthesia, airway protection and cardio/pulmonary arrest  Patient position: sniffing  Planned trial extubation  Sedation level: deep     Final Airway Details   Preoxygenated: yes  Final airway type: endotracheal airway  Successful airway: ETT  Cuffed: yes   Successful intubation technique: direct laryngoscopy  Blade: Kan  Blade size: #4  ETT size (mm): 7.5  Cormack-Lehane Classification: grade IIb - view of arytenoids or posterior of glottis only  Placement verified by: chest auscultation   Measured from: lips  ETT to lips (cm): 22  Number of attempts at approach: 1    Additional Comments  Lips in preop condition.

## 2025-05-21 NOTE — OP NOTE
T5-6 laminectomy, right T6 transpedicular decompression, T3-8 instrumented fusion Operative Note     Date: 2025 - 2025  OR Location: [facility] OR    Name: Miguel Lofton, : 1955, Age: 69 y.o., MRN: [id number], Sex: male    Diagnosis  Pre-op Diagnosis      * Osteomyelitis [M86.9] Post-op Diagnosis     * Osteomyelitis [M86.9]     Procedures  T5-6 laminectomy, T3-8 instrumented fusion  81052 - [place] LAMINECTOMY W/O FFD > 2 VERT SEG THORACIC    T5-6 laminectomy, T3-8 instrumented fusion  88251 - [place] ARTHRODESIS PST/PSTLAT TQ 1NTRSPC EA ADDL NTRSPC      Surgeons      * Timoteo Quintanilla - Primary    Resident/Fellow/Other Assistant:  Surgeons and Role:  * No surgeons found with a matching role *    Staff:   Circulator: Gustavo Angulo Person: Braden Deal Circulator: Óscar Deal Scrub: Zoya    Anesthesia Staff: Anesthesiologist: Melody Kasper MD; Michael Blum MD  C-AA: ARABELLA Dorado  Anesthesia Resident: Jacob Ferguson MD  Frontline Breaker: CHRISTINA Pack-CRNA    Procedure Summary  Anesthesia: General  ASA: III  Estimated Blood Loss: 350 mL  Intra-op Medications:   Administrations occurring from 0750 to 1405 on 25:   Medication Name Total Dose   lidocaine-epinephrine (Xylocaine W/EPI) injection 10 mL   polymyxin B injection 500,000 Units   acetaminophen (Tylenol) tablet 650 mg Cannot be calculated   aspirin EC tablet 81 mg Cannot be calculated   ceFAZolin (Ancef) vial 1 g 2 g   dexAMETHasone (Decadron) 4 mg/mL IV Syringe 2 mL 4 mg   dexmedeTOMIDine 4 mcg/mL in 100 mL NS infusion 40.86 mcg   enoxaparin (Lovenox) syringe 40 mg Cannot be calculated   fentaNYL (Sublimaze) injection 50 mcg/mL 100 mcg   gabapentin (Neurontin) capsule 300 mg Cannot be calculated   glycopyrrolate (Robinul) injection 0.2 mg   ketamine (Ketalar) 500 mg in dextrose 5% 250 mL (2 mg/mL) infusion 61.78 mg   LR bolus Cannot be calculated   lidocaine (cardiac) injection 2% prefilled syringe 200 mg    lidocaine 4 % patch 1 patch Cannot be calculated   magnesium sulfate 50 % injection 2 g   metoprolol tartrate (Lopressor) injection 4 mg   midazolam PF (Versed) injection 1 mg/mL 2 mg   ondansetron 2 mg/mL 4 mg   phenylephrine (Nain-Synephrine) 10 mg in sodium chloride 0.9% 250 mL (0.04 mg/mL) infusion (premix) 1.6 mg   polyethylene glycol (Glycolax, Miralax) packet 17 g Cannot be calculated   propofol (Diprivan) injection 10 mg/mL 447.91 mg   rocuronium (ZeMuron) 50 mg/5 mL injection 90 mg   NaCl 0.9 % bolus Cannot be calculated   tranexamic acid (Cyklokapron) 5,000 mg in sodium chloride 0.9% 250 mL (20 mg/mL) infusion 506.46 mg   tranexamic acid 1,000 mg/100 mL NS (premix) 1,000 mg   vancomycin 1 g 1 g              Anesthesia Record               Intraprocedure I/O Totals          Intake    Dexmedetomidine 0.00 mL    The total shown is the total volume documented since Anesthesia Start was filed.    Ketamine 0.00 mL    The total shown is the total volume documented since Anesthesia Start was filed.    LR bolus 700.00 mL    NaCl 0.9 % bolus 550.00 mL    Tranexamic Acid 0.00 mL    The total shown is the total volume documented since Anesthesia Start was filed.    Phenylephrine Drip 0.00 mL    The total shown is the total volume documented since Anesthesia Start was filed.    Total Intake 1250 mL       Output    Urine 80 mL    Est. Blood Loss 50 mL    Total Output 130 mL       Net    Net Volume 1120 mL          Specimen:   ID Type Source Tests Collected by Time   1 : T5-6 Disc Space Tissue SPINE SURGICAL PATHOLOGY EXAM Timoteo Quintanilla MD 5/21/2025 1135   A : T5-6 disc space #1 Tissue SPINE AFB CULTURE/SMEAR, FUNGAL CULTURE/SMEAR, TISSUE/WOUND CULTURE/SMEAR Timoteo Quintanilla MD 5/21/2025 1135   B : T5-6 disc space #2 Tissue SPINE AFB CULTURE/SMEAR, FUNGAL CULTURE/SMEAR, TISSUE/WOUND CULTURE/SMEAR Timoteo Quintanilla MD 5/21/2025 1136         Drains and/or Catheters:   Closed/Suction Drain Midline Back Accordion 10 Fr.  (Active)       Urethral Catheter Non-latex 16 Fr. (Active)     Implants:  Implants       Type Name Action Serial No.      Implant SCAFFOLD, STRIP, ATTRAX, 50 X 25 X 6MM, 15CC - [id number] Implanted      Screw SCREW, RELINE-O, 6.5X45MM 2S POLYAXIAL - [id number] Implanted      Screw SCREW, RELINE-O, 6.5X50MM 2S POLYAXIAL - [id number] Implanted      Neuro Interventional Implant SCREW, RELINE LOCK, 5.5MM OPEN TULIP - [id number] Implanted      Screw GIDEON, RELINE-0, 5.5 X 300MM, STRAIGHT - [id number] Implanted               Findings: prelim path consistent with granulation tissue, phlegmonous appearing tissue in disc space/deep to PLL without extension past PLL    Indications: Miguel Lofton is an 69 y.o. male who is having surgery for Osteomyelitis [M86.9].     The patient was seen in the preoperative area. The risks, benefits, complications, treatment options, non-operative alternatives, expected recovery and outcomes were discussed with the patient. The possibilities of reaction to medication, pulmonary aspiration, injury to surrounding structures, bleeding, recurrent infection, the need for additional procedures, failure to diagnose a condition, and creating a complication requiring transfusion or operation were discussed with the patient. The patient concurred with the proposed plan, giving informed consent.  The site of surgery was properly noted/marked if necessary per policy. The patient has been actively warmed in preoperative area. Preoperative antibiotics have been ordered and given within 1 hours of incision. Venous thrombosis prophylaxis have been ordered including bilateral sequential compression devices    DESCRIPTION OF THE OPERATION:   The patient was brought back from PACU to OR by anesthesia. After patient was appropriately checked in by nursing staff, anesthesia was induced and general endotracheal intubation was performed. Patient was flipped into prone position on Luis table with hip pads. Patient  was prepped and draped in usual sterile fashion. Incision was localized using C-arm from T3-8. The appropriate levels were marked and 1% lidocaine with epinephrine was used to infiltrate skin under marked incision.    A combination of sharp and blunt electrocautery was used to expose spinous process, and lamina out to the pars and TP from T3-8. Once adequate exposure was obtained, a spinous process clamp with stealth star was placed and O-arm was brought in for an intra-operative CT. After O-arm spin was completed and good registration was confirmed, screws were placed in usual fashion, first creating an entry point using drill, the pedicle was cannulated using pedicle probe, ball tip feeler was used to evaluate pedicle walls, and finally 6.5 mm screws were placed under navigated guidance from T3-8 bilaterally, skipping T6. AP and lateral xrays were utilized to confirm screw placement and trajectory.     Then we turned our attention to the decompression portion of the case. A laminectomy was carried out with rongeurs across the T5/6 disc space. A combination of curettes and kerrasin rongeurs were used to complete the laminectomy. The joint across T5-6 was removed on the right and the T6 pedicle was removed using a combination of a high speed drill, curettes, and rongeurs. The curettes were utilized to gain access to the disc space where phelgmonous tissue was encountered. The posterior longitudinal ligament was intact with a good plane between it and the overlying dura. Material in the disc space and below the PLL was removed and sent for both frozen pathology (prelim granulation tissue) and culture. Additional swabs were sent for culture. Ultrasound was used after the laminectomy and throughout the decompression to evaluate degree of decompression. Decompression was continued until  the thecal sac was no longer effaced anteriorly.     Rods were then cut to appropriate lengths, bent, and placed into the tulipheads  at all levels. Set caps were used at all levels and final tightened to 's specifications. Final x-rays were obtained showing good hardware placement    Native bone was decorticated and allograft was packed along the extent of the construct for fusion material. Incision was copiously irrigated with Irricept, followed by antibiotic-impregnated saline. A 10-round drain was placed in the subfascial space and tunneled out superiorly to the incision. 0-vicryl and PDS suture was used to close the muscle over the laminectomy defect, followed by another layer of 0-vicryl and PDS suture was used to close the entirety of the linear fascial defect. 2-0 vicryls were used to approximate the skin edges and a 3-0 startafix followed by prevena negative wound therapy were used to close skin. The drain was secured with a 2-0 silk suture. All counts were correct at end of case without any obvious complication. Patient was carefully flipped into supine position on patient cart, and turned over to anesthesia to extubate.    Evidence of Infection: Yes; Phlegmon Below the level of the fascia (organ/space)  Complications:  None; patient tolerated the procedure well.    Disposition: PACU - hemodynamically stable.  Condition: stable     Additional Details: none    Attending Attestation:     Timoteo Quintanilla  Phone Number: [phone]

## 2025-05-21 NOTE — CARE PLAN
Problem: Skin  Goal: Decreased wound size/increased tissue granulation at next dressing change  Outcome: Progressing  Goal: Participates in plan/prevention/treatment measures  Outcome: Progressing  Goal: Prevent/manage excess moisture  Outcome: Progressing  Goal: Prevent/minimize sheer/friction injuries  Outcome: Progressing  Goal: Promote/optimize nutrition  Outcome: Progressing  Goal: Promote skin healing  Outcome: Progressing     Problem: Fall/Injury  Goal: Not fall by end of shift  Outcome: Progressing  Goal: Be free from injury by end of the shift  Outcome: Progressing  Goal: Verbalize understanding of personal risk factors for fall in the hospital  Outcome: Progressing  Goal: Verbalize understanding of risk factor reduction measures to prevent injury from fall in the home  Outcome: Progressing  Goal: Use assistive devices by end of the shift  Outcome: Progressing  Goal: Pace activities to prevent fatigue by end of the shift  Outcome: Progressing     Problem: Pain - Adult  Goal: Verbalizes/displays adequate comfort level or baseline comfort level  Outcome: Progressing     Problem: Safety - Adult  Goal: Free from fall injury  Outcome: Progressing     Problem: Discharge Planning  Goal: Discharge to home or other facility with appropriate resources  Outcome: Progressing     Problem: Chronic Conditions and Co-morbidities  Goal: Patient's chronic conditions and co-morbidity symptoms are monitored and maintained or improved  Outcome: Progressing     Problem: Nutrition  Goal: Nutrient intake appropriate for maintaining nutritional needs  Outcome: Progressing   The patient's goals for the shift include Pain management    The clinical goals for the shift include Pt will remain HDS and free from falls this shift

## 2025-05-21 NOTE — ANESTHESIA PROCEDURE NOTES
Arterial Line:    Date/Time: 5/21/2025 8:58 AM    Staffing  Performed: ARABELLA   Authorized by: Michael Blum MD    Performed by: ARABELLA Dorado    An arterial line was placed. Procedure performed using surface landmarks.in the OR for the following indication(s): continuous blood pressure monitoring and blood sampling needed.    A 20 gauge (size), 1 and 1/4 inch (length) (type) catheter was placed into the Left radial artery, secured by Tegaderm,   Seldinger technique not used.  Events:  patient tolerated procedure well with no complications.      Additional notes:  Chlorohexadine prep.  Sterile gauze and gloves used.  Good waveform.  No abnormalities or hematoma

## 2025-05-21 NOTE — CONSULTS
Inpatient consult to Infectious Diseases  Consult performed by: Orin Robbins MD  Consult ordered by: CHRISTINA Thornton-CNP          Primary MD: Terrence Graham MD    Reason For Consult  Phlegmonous tissue on spine c/f infection, s/p surgery with neurosurgery who sent tissue path/cultures, recs for management of possible infection    History Of Present Illness  Miguel Lofton is a 69 y.o. male with PMHx of HTN, DM, hyothyroidism, COPD, prostate CA s/p radical prostatectomy 2018 and radiation  and tongue CA s/p chemoradiation 2022. Admitted as a transfer for further management of compression fx and suspicion for thoracic vertebral OM.   Hx noted for 3 m hx of progressive mid back pain and radiculopathy with LE weakness in the last 3 weeks. He Seeks medical attention with PCP and at ED in April, imaging were -ve for acute pathology.   Presented initially to [facility] ED 5/19 through pulmonary office as his chest CT done for lung ca screen revealed compression fx of t5 and T6 . AT ED, he was afebrile, HD stable. Labs revealed leukocytosis 13.4, hb 10, . ESR 93, CRP 96 MRI showed Acute discitis/osteomyelitis at T5-6 with marked surrounding phlegmon and circumferential epidural phlegmon resulting in moderate to severe spinal canal stenosis. Vancomycin and cefepime started.    Today 5/21 pt underwent T5-6 laminectomy, T3-8 instrumented fusion , intra-op findings of phlegmonous appearing tissue in T5-T6 disc space/deep to PLL without extension past PLL .        Past Medical History  He has a past medical history of Hypothyroidism, unspecified, Myocardial infarction (Multi), Personal history of other endocrine, nutritional and metabolic disease, and Personal history of other mental and behavioral disorders.    Surgical History  He has a past surgical history that includes Hernia repair (08/29/2018); Vasectomy (08/29/2018); and Cardiac catheterization.     Social History     Occupational History    Not on  "file   Tobacco Use    Smoking status: Former     Types: Cigarettes    Smokeless tobacco: Never   Substance and Sexual Activity    Alcohol use: Not Currently    Drug use: Never    Sexual activity: Not on file     Travel History   Travel since 04/21/25    No documented travel since 04/21/25            Pets: yes -  2 cats      Family History  Family History[1]  Allergies  Diazepam and Meperidine     Immunization History   Administered Date(s) Administered    Moderna SARS-CoV-2 Vaccination 03/06/2021, 04/03/2021     Medications  Home medications:  Prescriptions Prior to Admission[2]  Current medications:  Scheduled medications  Scheduled Medications[3]  Continuous medications  Continuous Medications[4]  PRN medications  PRN Medications[5]    Objective  Range of Vitals (last 24 hours)  Heart Rate:  [70-88]   Temp:  [36.1 °C (97 °F)-36.7 °C (98.1 °F)]   Resp:  [11-18]   BP: (104-139)/(63-84)   Height:  [172.7 cm (5' 8\")]   Weight:  [73.4 kg (161 lb 13.1 oz)]   SpO2:  [92 %-100 %]   Daily Weight  05/21/25 : 73.4 kg (161 lb 13.1 oz)    Body mass index is 24.6 kg/m².     Physical Exam  Constitutional:       General: He is not in acute distress.  Cardiovascular:      Rate and Rhythm: Normal rate and regular rhythm.   Pulmonary:      Effort: No respiratory distress.      Breath sounds: Normal breath sounds.   Abdominal:      Palpations: Abdomen is soft.   Musculoskeletal:      Comments: Drain in site    Neurological:      Mental Status: He is alert and oriented to person, place, and time.          Relevant Results  Outside Hospital Results    Labs  Results from last 72 hours   Lab Units 05/20/25  0549   WBC AUTO x10*3/uL 9.6   HEMOGLOBIN g/dL 9.9*   HEMATOCRIT % 33.5*   PLATELETS AUTO x10*3/uL 470*     Results from last 72 hours   Lab Units 05/20/25  0549   SODIUM mmol/L 139   POTASSIUM mmol/L 3.8   CHLORIDE mmol/L 99   CO2 mmol/L 31   BUN mg/dL 13   CREATININE mg/dL 0.65   GLUCOSE mg/dL 88   CALCIUM mg/dL 9.3   ANION GAP " "mmol/L 13   EGFR mL/min/1.73m*2 >90   PHOSPHORUS mg/dL 3.4     Results from last 72 hours   Lab Units 05/20/25  0549   ALK PHOS U/L 131   BILIRUBIN TOTAL mg/dL 0.4   PROTEIN TOTAL g/dL 6.4   ALT U/L 24   AST U/L 16   ALBUMIN g/dL 3.0*     Estimated Creatinine Clearance: 103.8 mL/min (by C-G formula based on SCr of 0.65 mg/dL).  C-Reactive Protein   Date Value Ref Range Status   05/20/2025 7.35 (H) <1.00 mg/dL Final     Sedimentation Rate   Date Value Ref Range Status   05/20/2025 90 (H) 0 - 20 mm/h Final     No results found for: \"HIV1X2\", \"HIVCONF\", \"CONKOR4HT\"  No results found for: \"HEPCABINIT\", \"HEPCAB\", \"HCVPCRQUANT\"  Microbiology    Blood c/s taken at [facility] , we'll call tomorrow   5/20 blood c/s NGTD      Imaging  MR Thoracic Spine without and with   05/19/2025 07:32:19 PM   . Acute discitis/osteomyelitis at T5-6 with marked surrounding phlegmon and circumferential epidural phlegmon resulting in moderate to severe spinal canal stenosis. No epidural abscess.     Assessment/Plan    69 y.o. male with PMHx of HTN, DM, hyothyroidism, COPD, prostate CA s/p radical prostatectomy 2018 and radiation and tongue CA 2022 s/p chemoradiation . Hx noted for 3 m hx of progressive mid back pain and radiculopathy with LE weakness in the last 3 weeks. Labs revealed leukocytosis 13.4, hb 10, . ESR 93, CRP 96 MRI showed Acute discitis/osteomyelitis at T5-6 with marked surrounding phlegmon and circumferential epidural phlegmon resulting in moderate to severe spinal canal stenosis.Today 5/21 pt underwent T5-6 laminectomy, T3-8 instrumented fusion , intra-op findings of phlegmonous appearing tissue in T5-T6 disc space/deep to PLL without extension past PLL .     # Acute discitis/osteomyelitis at T5-6 s/p T5-6 laminectomy, T3-8 fusion  Would do vancomycin and ceftriaxone pending intra-op and blood c/s    Recommendations :   - continue vancomycin. Dosing per pharmacy  - start ceftriaxone 2 g IV once daily  - ID will follow "             Orin Robbins MD         [1] No family history on file.  [2]   Medications Prior to Admission   Medication Sig Dispense Refill Last Dose/Taking    cyclobenzaprine (Flexeril) 10 mg tablet Take 1 tablet (10 mg) by mouth 3 times a day as needed for muscle spasms.   Taking As Needed    levothyroxine (Synthroid, Levoxyl) 88 mcg tablet Take 1 tablet (88 mcg) by mouth early in the morning..   Taking    acetaminophen (Tylenol) 500 mg tablet Take 2 tablets (1,000 mg) by mouth.       ascorbic acid (Vitamin C) 500 mg tablet Take 1 tablet (500 mg) by mouth once daily.       aspirin 81 mg EC tablet Take 1 tablet (81 mg) by mouth once daily.       atorvastatin (Lipitor) 80 mg tablet Take 1 tablet (80 mg) by mouth once daily.       cholecalciferol (Vitamin D-3) 25 MCG (1000 UT) tablet Take 1 tablet (25 mcg) by mouth once daily.       cyanocobalamin (Vitamin B-12) 1,000 mcg tablet Take 1 tablet (1,000 mcg) by mouth once daily.       diclofenac sodium (Voltaren) 1 % gel gel Apply topically.       HYDROcodone-acetaminophen (Norco) 5-325 mg tablet Take 1 tablet by mouth every 6 hours if needed.       LORazepam (Ativan) 1 mg tablet TAKE ONE TABLET BY MOUTH EVERY DAY NIGHTLY AS NEEDED FOR ANXIETY       LORazepam (Ativan) 1 mg tablet Take 1 tablet (1 mg) by mouth 2 times a day as needed (Anxiety prior to radiation therapy). 40 tablet 0     metoprolol succinate XL (Toprol-XL) 25 mg 24 hr tablet Take 1 tablet (25 mg) by mouth once daily. Do not crush or chew.       omeprazole (PriLOSEC) 20 mg DR capsule Take 1 capsule (20 mg) by mouth.       sildenafil (Viagra) 100 mg tablet Take 1 tablet (100 mg) by mouth once daily as needed.       tiotropium (Spiriva with HandiHaler) 18 mcg inhalation capsule Place into inhaler and inhale. (Patient not taking: Reported on 5/20/2025)   Not Taking   [3] [Transfer Hold] acetaminophen, 650 mg, oral, q6h  [Held by provider] aspirin, 81 mg, oral, Daily  atorvastatin, 80 mg, oral,  Daily  cholecalciferol, 25 mcg, oral, Daily  cyanocobalamin, 1,000 mcg, oral, Daily  [Held by provider] enoxaparin, 40 mg, subcutaneous, Daily  [Transfer Hold] gabapentin, 300 mg, oral, Daily  levothyroxine, 75 mcg, oral, Daily  lidocaine, 0.1 mL, subcutaneous, Once  [Transfer Hold] lidocaine, 1 patch, transdermal, Daily  [Transfer Hold] LORazepam, 1 mg, intravenous, Once  metoprolol succinate XL, 25 mg, oral, Daily  pantoprazole, 40 mg, oral, Daily before breakfast  [Transfer Hold] polyethylene glycol, 17 g, oral, Daily  tiotropium, 2 puff, inhalation, Daily    [4] lactated Ringer's, 100 mL/hr    [5] PRN medications: [Transfer Hold] cyclobenzaprine, HYDROmorphone, HYDROmorphone, LORazepam, [Transfer Hold] morphine, ondansetron, oxyCODONE, oxyCODONE, oxygen

## 2025-05-21 NOTE — PROGRESS NOTES
"Miguel Lofton is a 69 y.o. male on day 1 of admission presenting with Osteomyelitis.    Subjective   Deferred due to patient in OR all day with neurosurgery, progress note below reflects heme/onc plan with updated post-op recs from neurosurgery.       Objective     Physical Exam - deferred as patient unavailable.    Last Recorded Vitals  Blood pressure 139/77, pulse 88, temperature 36.6 °C (97.9 °F), temperature source Temporal, resp. rate 15, height 1.727 m (5' 8\"), weight 73.4 kg (161 lb 13.1 oz), SpO2 92%.  Intake/Output last 3 Shifts:  I/O last 3 completed shifts:  In: - (0 mL/kg)   Out: 700 (9.5 mL/kg) [Urine:700 (0.3 mL/kg/hr)]  Weight: 73.4 kg     Relevant Results               Results for orders placed or performed during the hospital encounter of 05/20/25 (from the past 24 hours)   Blood Culture    Specimen: Peripheral Venipuncture; Blood culture   Result Value Ref Range    Blood Culture Loaded on Instrument - Culture in progress    Blood Culture    Specimen: Peripheral Venipuncture; Blood culture   Result Value Ref Range    Blood Culture Loaded on Instrument - Culture in progress    Urinalysis with Reflex Culture and Microscopic   Result Value Ref Range    Color, Urine Colorless (N) Light-Yellow, Yellow, Dark-Yellow    Appearance, Urine Clear Clear    Specific Gravity, Urine 1.008 1.005 - 1.035    pH, Urine 7.5 5.0, 5.5, 6.0, 6.5, 7.0, 7.5, 8.0    Protein, Urine NEGATIVE NEGATIVE, 10 (TRACE), 20 (TRACE) mg/dL    Glucose, Urine Normal Normal mg/dL    Blood, Urine NEGATIVE NEGATIVE mg/dL    Ketones, Urine NEGATIVE NEGATIVE mg/dL    Bilirubin, Urine NEGATIVE NEGATIVE mg/dL    Urobilinogen, Urine Normal Normal mg/dL    Nitrite, Urine NEGATIVE NEGATIVE    Leukocyte Esterase, Urine 500 [place]/uL (A) NEGATIVE   Extra Urine Gray Tube   Result Value Ref Range    Extra Tube Hold for add-ons.    Microscopic Only, Urine   Result Value Ref Range    WBC, Urine 11-20 (A) 1-5, NONE /HPF    RBC, Urine 1-2 NONE, 1-2, 3-5 /HPF "   Blood Gas Arterial Full Panel   Result Value Ref Range    POCT pH, Arterial 7.39 7.38 - 7.42 pH    POCT pCO2, Arterial 45 (H) 38 - 42 mm Hg    POCT pO2, Arterial 109 (H) 85 - 95 mm Hg    POCT SO2, Arterial 98 94 - 100 %    POCT Oxy Hemoglobin, Arterial 95.6 94.0 - 98.0 %    POCT Hematocrit Calculated, Arterial 29.0 (L) 41.0 - 52.0 %    POCT Sodium, Arterial 132 (L) 136 - 145 mmol/L    POCT Potassium, Arterial 3.5 3.5 - 5.3 mmol/L    POCT Chloride, Arterial 101 98 - 107 mmol/L    POCT Ionized Calcium, Arterial 1.23 1.10 - 1.33 mmol/L    POCT Glucose, Arterial 118 (H) 74 - 99 mg/dL    POCT Lactate, Arterial 0.7 0.4 - 2.0 mmol/L    POCT Base Excess, Arterial 1.9 -2.0 - 3.0 mmol/L    POCT HCO3 Calculated, Arterial 27.2 (H) 22.0 - 26.0 mmol/L    POCT Hemoglobin, Arterial 9.5 (L) 13.5 - 17.5 g/dL    POCT Anion Gap, Arterial 7 (L) 10 - 25 mmo/L    Patient Temperature 37.0 degrees Celsius    FiO2 35 %            Assessment & Plan  Osteomyelitis    Miguel Lofton is a 69 y.o. male patient, with PMHx of prostate cancer, base of the tongue cancer, HTN, HLD, hypothyroidism, COPD, anxiety who [place] [facility] [place] fracture and possible thoracic vertebral osteomyelitis.      # Acute on chronic back pain   # Compression fracture   # Vertebral osteomyelitis  # Phlegmonous tissue c/f infection  - CT Chest ([facility]): considerable deformity of what is felt to be T5 which may be related to a compression fracture of the caudal endplate. There is some compression of the superior endplate of T6.  -  MRI thoracic spine ([facility]): Acute discitis/osteomyelitis at T5-6 with marked surrounding phlegmon and circumferential epidural phlegmon resulting in moderate to severe spinal canal stenosis. No epidural abscess. Requested for image import from [facility]    - MRI C/L spine pending   - ESR/CRP 90/7.35, repeat order pending  - Will postpone starting abx pending biopsy since patient is HDS pending neurosurgery  eval   - 5/21/25: OR with neurosurgery for T5-6 laminectomy and T3-8 fusion. Per neurosurgery, phlegmon appeared infectious without purulence, tissue and OR cultures were sent to pathology  - Consult to ID for recs following surgery with phlegmonous tissue c/f infection  - Pain management per neurosurgery recs: typenol 650 mg q4h scheduled, cyclobenzaprine 10 mg q8h scheduled, lidocaine patches x2 to back dialy, oxycodone 5 mg q4h PRN for moderate pain (4-6), oxycodone 10 mg q4h PRN for severe pain (7-10), and dilaudid 0.2 mg q3h PRN for breakthrough pain. 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 Lucero      # Prostate Ca  - s/p radical prostatectomy by Dr Lang Mcneil in 2018  - [id number], [place] 7, + right anterior margin  - PSMA-PET done on 12/6/2023 showed no PSMA uptake within the post-surgical bed; there is PSMA uptake of a left common iliac retroperitoneal lymph node SUV 5.6 and PSMA uptake of a right pelvic sidewall lymph node SUV 3.5  - Dr Rangel recommended radiation therapy. Patient instead went to [facility] and was treated by Dr Zackary Ely (4600 cGy in 23  fractions to the pelvis and LN followed by [place] to prostate bed and LN to 2500 cGy in 10 fractions)  - most recent PSA 8/12/2024 PSA <0.01  - since completing radiation, has been experiencing significant fatigue  - he is now following with urologist Dr Boyd  - For pain management, follows with [facility] palliative care team   - PSA 5/20 <0.10     # COPD  # Smoking Hx  # Lung Ca screening  -  follows with pulmonologist Dr Hutchison   - PFTs 03/2025: FEV1 72% FEV1/FVC 0.63  -Did not tolerate Breztri  - C/w spiriva     # HTN/HLD  -C/w Atorvastatin  -C/w Metop succinate 25 daily     # preDM  -  last A1c 5/12: 6.4     # Hypothyroidism  :: Last TSH on 5/12: 4.49  -C/w levothyroxine     #Anemia  - Last CBC 5/12: Hb 11.6  - probably anemia of chronic disease  - Ordered anemia workup (iron studies, folate, vit B12)  - Patient is on vit B12 supplementation     # GI/DVT ppx  - Lovenox, on hold for neurosurgery, restart in afternoon 5/22 for POD 1     # Dispo:   - Full code, confirmed with patient on admission   - NOK: Eleni Lofton (wife) [phone]  - Discharge pending work up       I spent 60 minutes in the professional and overall care of this patient.      CHRISTINA Thornton-CNP  Patient seen and discussed with Dr. Chávez

## 2025-05-21 NOTE — ANESTHESIA PREPROCEDURE EVALUATION
ALLERGIES:  Allergies[1]     MEDICAL HISTORY:  Medical History[2]     Relevant Problems   Cardiac   (+) Mixed hyperlipidemia      Pulmonary   (+) Chronic obstructive pulmonary disease (Multi)   (+) Pneumonia of right middle lobe due to infectious organism      Neuro   (+) Anxiety      GI   (+) Oropharyngeal dysphagia      /Renal   (+) Prostate cancer (Multi)      Liver   (+) Cancer of base of tongue (Multi)      Endocrine   (+) Primary hypothyroidism      HEENT   (+) Bilateral sensorineural hearing loss      ID   (+) Osteomyelitis   (+) Pneumonia of right middle lobe due to infectious organism        SURGICAL HISTORY:  Surgical History[3]     MEDICATIONS:  Current Outpatient Medications   Medication Instructions    acetaminophen (TYLENOL) 1,000 mg, oral    ascorbic acid (Vitamin C) 500 mg tablet 1 tablet, oral, Daily    aspirin 81 mg EC tablet 1 tablet, oral, Daily    atorvastatin (Lipitor) 80 mg tablet 1 tablet, oral, Daily    cholecalciferol (Vitamin D-3) 25 MCG (1000 UT) tablet 1 tablet, oral, Daily    cyanocobalamin (Vitamin B-12) 1,000 mcg tablet 1 tablet, oral, Daily    cyclobenzaprine (FLEXERIL) 10 mg, oral, 3 times daily PRN    diclofenac sodium (Voltaren) 1 % gel gel Topical    HYDROcodone-acetaminophen (Norco) 5-325 mg tablet 1 tablet, oral, Every 6 hours PRN    levothyroxine (SYNTHROID, LEVOXYL) 88 mcg, oral, Daily    LORazepam (Ativan) 1 mg tablet TAKE ONE TABLET BY MOUTH EVERY DAY NIGHTLY AS NEEDED FOR ANXIETY    LORazepam (ATIVAN) 1 mg, oral, 2 times daily PRN    metoprolol succinate XL (TOPROL-XL) 25 mg, oral, Daily, Do not crush or chew.    omeprazole (PRILOSEC) 20 mg, oral    sildenafil (Viagra) 100 mg tablet 1 tablet, oral, Daily PRN    tiotropium (Spiriva with HandiHaler) 18 mcg inhalation capsule Place into inhaler and inhale.        VITALS:      5/21/2025     7:36 AM 5/21/2025     3:20 AM 5/20/2025    11:49 PM   Vitals   Systolic 139 120 104   Diastolic 77 72 63   BP Location Left arm Left arm  "Left arm   Heart Rate 88 70 80   Temp 36.6 °C (97.9 °F) 36.1 °C (97 °F) 36.3 °C (97.3 °F)   Resp 15 18 18   Height 1.727 m (5' 8\")     Weight (lb) 161.82     BMI 24.6 kg/m2     BSA (m2) 1.88 m2         LABS:   BMP Extended  Results from last 7 days   Lab Units 05/20/25  0549   SODIUM mmol/L 139   POTASSIUM mmol/L 3.8   CHLORIDE mmol/L 99   CO2 mmol/L 31   BUN mg/dL 13   CREATININE mg/dL 0.65   EGFR mL/min/1.73m*2 >90   GLUCOSE mg/dL 88   CALCIUM mg/dL 9.3   PHOSPHORUS mg/dL 3.4    , CBC Extended       Latest Ref Rng & Units 5/20/2025     5:49 AM 1/22/2025     9:58 AM 7/24/2024    12:56 PM 1/24/2024    10:49 AM 8/17/2023     7:39 AM 7/10/2023    10:03 AM 3/27/2023    10:29 AM   CBC   Hb 13.5 - 17.5 g/dL 9.9  10.5  11.1  12.7  12.3  11.9  12.4    Hct 41.0 - 52.0 % 33.5  34.7  35.8  40.1  38.2  38.6  37.6    MCV 80 - 100 fL 81  91  94  89  92  97  94    RDW 11.5 - 14.5 % 18.6  15.0  14.6  13.8  13.7  13.2  12.7      , Coags Extendied  Results from last 7 days   Lab Units 05/20/25  0549   PROTIME seconds 12.6*   INR  1.1   APTT seconds 31    , ABG Extended          IMAGES:  EKG        No results found for this or any previous visit (from the past 4464 hours).   , ECHO       No results found for this or any previous visit from the past 730 days.    , CARDIAC CATH      No results found for this or any previous visit from the past 730 days.       SOCIAL:  Tobacco Use History[4]   Social History     Substance and Sexual Activity   Alcohol Use Not Currently      Social History     Substance and Sexual Activity   Drug Use Never        NPO STATUS:  NPO/Void Status  Carbohydrate Drink Given Prior to Surgery? : N  Date of Last Liquid: 05/21/25  Time of Last Liquid: 0500  Date of Last Solid: 05/20/25  Time of Last Solid: 1800  Last Intake Type: Clear fluids  Time of Last Void: 0400        Clinical Areas Reviewed:   Tobacco  Allergies  Meds   Med Hx  Surg Hx   Fam Hx  Soc Hx        Anesthesia Assessment:    Physical " Exam    Airway  Mallampati: II  TM distance: >3 FB  Neck ROM: full  Mouth opening: 3 or more finger widths     Cardiovascular   Rhythm: regular  Rate: normal     Dental     (+) upper dentures, lower dentures     Pulmonary    Abdominal            Anesthesia Plan    History of general anesthesia?: yes  History of complications of general anesthesia?: no    ASA 3     general     The patient is not a current smoker.    intravenous and inhalational induction   Postoperative pain plan includes opioids.  Trial extubation is planned.  Anesthetic plan and risks discussed with patient.  Use of blood products discussed with patient who consented to blood products.    Plan discussed with attending.                [1]   Allergies  Allergen Reactions    Diazepam Other     MIGRAINES    Meperidine Nausea Only     migraines   [2]   Past Medical History:  Diagnosis Date    Hypothyroidism, unspecified     Hypothyroidism, adult    Myocardial infarction (Multi)     Personal history of other endocrine, nutritional and metabolic disease     History of high cholesterol    Personal history of other mental and behavioral disorders     History of anxiety   [3]   Past Surgical History:  Procedure Laterality Date    CARDIAC CATHETERIZATION      HERNIA REPAIR  08/29/2018    Hernia Repair    VASECTOMY  08/29/2018    Surgery Vas Deferens Vasectomy   [4]   Social History  Tobacco Use   Smoking Status Former    Types: Cigarettes   Smokeless Tobacco Never

## 2025-05-21 NOTE — ANESTHESIA PROCEDURE NOTES
Peripheral IV  Date/Time: 5/21/2025 9:08 AM      Placement  Needle size: 16 G  Laterality: right  Location: wrist  Site prep: alcohol

## 2025-05-21 NOTE — ANESTHESIA PROCEDURE NOTES
Peripheral IV  Date/Time: 5/21/2025 9:04 AM      Placement  Needle size: 18 G  Laterality: left  Location: forearm  Site prep: alcohol

## 2025-05-21 NOTE — ANESTHESIA POSTPROCEDURE EVALUATION
Patient: Miguel Lofton    Procedure Summary       Date: 05/21/25 Room / Location: [facility] OR 25 / [facility]    Anesthesia Start: 0831 Anesthesia Stop:     Procedures:       T5-6 laminectomy, T3-8 instrumented fusion (Spine Thoracic)      T5-6 laminectomy, T3-8 instrumented fusion (Spine Thoracic) Diagnosis:       Osteomyelitis      (Osteomyelitis [M86.9])    Surgeons: Timoteo Quintanilla MD Responsible Provider: Melody Kasper MD    Anesthesia Type: general ASA Status: 3            Anesthesia Type: general    Vitals Value Taken Time   /78 05/21/25 13:30   Temp 36.2 05/21/25 13:30   Pulse 84 05/21/25 13:30   Resp 12 05/21/25 13:30   SpO2 99 % 05/21/25 13:30       Anesthesia Post Evaluation    Patient location during evaluation: PACU  Patient participation: complete - patient participated  Level of consciousness: awake and alert  Pain management: adequate  Multimodal analgesia pain management approach  Airway patency: patent  Two or more strategies used to mitigate risk of obstructive sleep apnea  Cardiovascular status: blood pressure returned to baseline  Respiratory status: acceptable  Hydration status: acceptable  Postoperative Nausea and Vomiting: none        There were no known notable events for this encounter.

## 2025-05-22 ENCOUNTER — APPOINTMENT (OUTPATIENT)
Dept: RADIOLOGY | Facility: HOSPITAL | Age: 70
DRG: 447 | End: 2025-05-22
Payer: COMMERCIAL

## 2025-05-22 ENCOUNTER — APPOINTMENT (OUTPATIENT)
Dept: RADIOLOGY | Facility: HOSPITAL | Age: 70
End: 2025-05-22
Payer: COMMERCIAL

## 2025-05-22 LAB
ALBUMIN SERPL BCP-MCNC: 2.8 G/DL (ref 3.4–5)
ANION GAP SERPL CALC-SCNC: 11 MMOL/L (ref 10–20)
BUN SERPL-MCNC: 12 MG/DL (ref 6–23)
CALCIUM SERPL-MCNC: 8.8 MG/DL (ref 8.6–10.6)
CHLORIDE SERPL-SCNC: 99 MMOL/L (ref 98–107)
CO2 SERPL-SCNC: 31 MMOL/L (ref 21–32)
CREAT SERPL-MCNC: 0.61 MG/DL (ref 0.5–1.3)
CRP SERPL-MCNC: 5.94 MG/DL
EGFRCR SERPLBLD CKD-EPI 2021: >90 ML/MIN/1.73M*2
ERYTHROCYTE [DISTWIDTH] IN BLOOD BY AUTOMATED COUNT: 18.6 % (ref 11.5–14.5)
ERYTHROCYTE [SEDIMENTATION RATE] IN BLOOD BY WESTERGREN METHOD: 74 MM/H (ref 0–20)
GLUCOSE SERPL-MCNC: 88 MG/DL (ref 74–99)
HCT VFR BLD AUTO: 26.6 % (ref 41–52)
HGB BLD-MCNC: 8.3 G/DL (ref 13.5–17.5)
MAGNESIUM SERPL-MCNC: 2.01 MG/DL (ref 1.6–2.4)
MCH RBC QN AUTO: 24.2 PG (ref 26–34)
MCHC RBC AUTO-ENTMCNC: 31.2 G/DL (ref 32–36)
MCV RBC AUTO: 78 FL (ref 80–100)
NRBC BLD-RTO: 0 /100 WBCS (ref 0–0)
PHOSPHATE SERPL-MCNC: 2.8 MG/DL (ref 2.5–4.9)
PLATELET # BLD AUTO: 420 X10*3/UL (ref 150–450)
POTASSIUM SERPL-SCNC: 3.8 MMOL/L (ref 3.5–5.3)
RBC # BLD AUTO: 3.43 X10*6/UL (ref 4.5–5.9)
SODIUM SERPL-SCNC: 137 MMOL/L (ref 136–145)
WBC # BLD AUTO: 10.4 X10*3/UL (ref 4.4–11.3)

## 2025-05-22 PROCEDURE — 2500000002 HC RX 250 W HCPCS SELF ADMINISTERED DRUGS (ALT 637 FOR MEDICARE OP, ALT 636 FOR OP/ED): Performed by: STUDENT IN AN ORGANIZED HEALTH CARE EDUCATION/TRAINING PROGRAM

## 2025-05-22 PROCEDURE — 85027 COMPLETE CBC AUTOMATED: CPT | Performed by: STUDENT IN AN ORGANIZED HEALTH CARE EDUCATION/TRAINING PROGRAM

## 2025-05-22 PROCEDURE — 2500000001 HC RX 250 WO HCPCS SELF ADMINISTERED DRUGS (ALT 637 FOR MEDICARE OP): Performed by: STUDENT IN AN ORGANIZED HEALTH CARE EDUCATION/TRAINING PROGRAM

## 2025-05-22 PROCEDURE — 72072 X-RAY EXAM THORAC SPINE 3VWS: CPT | Performed by: RADIOLOGY

## 2025-05-22 PROCEDURE — 2500000004 HC RX 250 GENERAL PHARMACY W/ HCPCS (ALT 636 FOR OP/ED): Mod: JZ | Performed by: NURSE PRACTITIONER

## 2025-05-22 PROCEDURE — 2500000001 HC RX 250 WO HCPCS SELF ADMINISTERED DRUGS (ALT 637 FOR MEDICARE OP)

## 2025-05-22 PROCEDURE — 36415 COLL VENOUS BLD VENIPUNCTURE: CPT | Performed by: STUDENT IN AN ORGANIZED HEALTH CARE EDUCATION/TRAINING PROGRAM

## 2025-05-22 PROCEDURE — 97162 PT EVAL MOD COMPLEX 30 MIN: CPT | Mod: GP

## 2025-05-22 PROCEDURE — 72072 X-RAY EXAM THORAC SPINE 3VWS: CPT

## 2025-05-22 PROCEDURE — 1170000001 HC PRIVATE ONCOLOGY ROOM DAILY

## 2025-05-22 PROCEDURE — 73030 X-RAY EXAM OF SHOULDER: CPT | Mod: LEFT SIDE | Performed by: RADIOLOGY

## 2025-05-22 PROCEDURE — 2500000004 HC RX 250 GENERAL PHARMACY W/ HCPCS (ALT 636 FOR OP/ED)

## 2025-05-22 PROCEDURE — 99233 SBSQ HOSP IP/OBS HIGH 50: CPT | Performed by: NURSE PRACTITIONER

## 2025-05-22 PROCEDURE — 74018 RADEX ABDOMEN 1 VIEW: CPT

## 2025-05-22 PROCEDURE — 85652 RBC SED RATE AUTOMATED: CPT | Performed by: NURSE PRACTITIONER

## 2025-05-22 PROCEDURE — 99232 SBSQ HOSP IP/OBS MODERATE 35: CPT | Performed by: STUDENT IN AN ORGANIZED HEALTH CARE EDUCATION/TRAINING PROGRAM

## 2025-05-22 PROCEDURE — 73030 X-RAY EXAM OF SHOULDER: CPT | Mod: LT

## 2025-05-22 PROCEDURE — 2500000004 HC RX 250 GENERAL PHARMACY W/ HCPCS (ALT 636 FOR OP/ED): Mod: JZ

## 2025-05-22 PROCEDURE — 2500000001 HC RX 250 WO HCPCS SELF ADMINISTERED DRUGS (ALT 637 FOR MEDICARE OP): Performed by: NURSE PRACTITIONER

## 2025-05-22 PROCEDURE — 2500000004 HC RX 250 GENERAL PHARMACY W/ HCPCS (ALT 636 FOR OP/ED): Performed by: STUDENT IN AN ORGANIZED HEALTH CARE EDUCATION/TRAINING PROGRAM

## 2025-05-22 PROCEDURE — 86140 C-REACTIVE PROTEIN: CPT | Performed by: NURSE PRACTITIONER

## 2025-05-22 PROCEDURE — 84520 ASSAY OF UREA NITROGEN: CPT | Performed by: STUDENT IN AN ORGANIZED HEALTH CARE EDUCATION/TRAINING PROGRAM

## 2025-05-22 PROCEDURE — 83735 ASSAY OF MAGNESIUM: CPT | Performed by: STUDENT IN AN ORGANIZED HEALTH CARE EDUCATION/TRAINING PROGRAM

## 2025-05-22 PROCEDURE — 74018 RADEX ABDOMEN 1 VIEW: CPT | Performed by: RADIOLOGY

## 2025-05-22 PROCEDURE — 97530 THERAPEUTIC ACTIVITIES: CPT | Mod: GP

## 2025-05-22 RX ORDER — ENOXAPARIN SODIUM 100 MG/ML
40 INJECTION SUBCUTANEOUS EVERY 24 HOURS
Status: DISCONTINUED | OUTPATIENT
Start: 2025-05-22 | End: 2025-06-08 | Stop reason: HOSPADM

## 2025-05-22 RX ORDER — LACTULOSE 10 G/15ML
20 SOLUTION ORAL 2 TIMES DAILY
Status: DISCONTINUED | OUTPATIENT
Start: 2025-05-22 | End: 2025-05-26

## 2025-05-22 RX ORDER — OXYCODONE HYDROCHLORIDE 5 MG/1
5 TABLET ORAL EVERY 4 HOURS PRN
Refills: 0 | Status: DISCONTINUED | OUTPATIENT
Start: 2025-05-22 | End: 2025-06-08 | Stop reason: HOSPADM

## 2025-05-22 RX ORDER — VANCOMYCIN HYDROCHLORIDE 1 G/20ML
INJECTION, POWDER, LYOPHILIZED, FOR SOLUTION INTRAVENOUS DAILY PRN
Status: DISCONTINUED | OUTPATIENT
Start: 2025-05-22 | End: 2025-06-08 | Stop reason: HOSPADM

## 2025-05-22 RX ORDER — BISACODYL 5 MG
10 TABLET, DELAYED RELEASE (ENTERIC COATED) ORAL DAILY PRN
Status: DISCONTINUED | OUTPATIENT
Start: 2025-05-22 | End: 2025-06-08 | Stop reason: HOSPADM

## 2025-05-22 RX ORDER — POLYETHYLENE GLYCOL 3350 17 G/17G
17 POWDER, FOR SOLUTION ORAL 2 TIMES DAILY
Status: DISCONTINUED | OUTPATIENT
Start: 2025-05-22 | End: 2025-05-25

## 2025-05-22 RX ORDER — AMOXICILLIN 250 MG
3 CAPSULE ORAL 2 TIMES DAILY
Status: DISCONTINUED | OUTPATIENT
Start: 2025-05-22 | End: 2025-05-25

## 2025-05-22 RX ORDER — CEFTRIAXONE 2 G/50ML
2 INJECTION, SOLUTION INTRAVENOUS EVERY 24 HOURS
Status: DISCONTINUED | OUTPATIENT
Start: 2025-05-22 | End: 2025-05-31

## 2025-05-22 RX ADMIN — CYCLOBENZAPRINE 10 MG: 10 TABLET, FILM COATED ORAL at 15:38

## 2025-05-22 RX ADMIN — VANCOMYCIN HYDROCHLORIDE 1250 MG: 1.25 INJECTION, POWDER, LYOPHILIZED, FOR SOLUTION INTRAVENOUS at 10:56

## 2025-05-22 RX ADMIN — CYCLOBENZAPRINE 10 MG: 10 TABLET, FILM COATED ORAL at 20:25

## 2025-05-22 RX ADMIN — KETOROLAC TROMETHAMINE 30 MG: 30 INJECTION, SOLUTION INTRAMUSCULAR; INTRAVENOUS at 22:36

## 2025-05-22 RX ADMIN — ENOXAPARIN SODIUM 40 MG: 100 INJECTION SUBCUTANEOUS at 15:38

## 2025-05-22 RX ADMIN — CYANOCOBALAMIN TAB 1000 MCG 1000 MCG: 1000 TAB at 09:35

## 2025-05-22 RX ADMIN — LEVOTHYROXINE SODIUM 75 MCG: 0.07 TABLET ORAL at 09:35

## 2025-05-22 RX ADMIN — VANCOMYCIN HYDROCHLORIDE 1250 MG: 1.25 INJECTION, POWDER, LYOPHILIZED, FOR SOLUTION INTRAVENOUS at 22:36

## 2025-05-22 RX ADMIN — OXYCODONE 5 MG: 5 TABLET ORAL at 09:35

## 2025-05-22 RX ADMIN — METOPROLOL SUCCINATE 25 MG: 25 TABLET, EXTENDED RELEASE ORAL at 09:35

## 2025-05-22 RX ADMIN — ACETAMINOPHEN 650 MG: 325 TABLET, FILM COATED ORAL at 11:26

## 2025-05-22 RX ADMIN — GABAPENTIN 300 MG: 300 CAPSULE ORAL at 09:34

## 2025-05-22 RX ADMIN — KETOROLAC TROMETHAMINE 30 MG: 30 INJECTION, SOLUTION INTRAMUSCULAR; INTRAVENOUS at 09:32

## 2025-05-22 RX ADMIN — Medication 25 MCG: at 09:34

## 2025-05-22 RX ADMIN — ACETAMINOPHEN 650 MG: 325 TABLET, FILM COATED ORAL at 15:37

## 2025-05-22 RX ADMIN — CYCLOBENZAPRINE 10 MG: 10 TABLET, FILM COATED ORAL at 09:34

## 2025-05-22 RX ADMIN — OXYCODONE HYDROCHLORIDE 5 MG: 5 TABLET ORAL at 15:37

## 2025-05-22 RX ADMIN — PANTOPRAZOLE SODIUM 40 MG: 40 TABLET, DELAYED RELEASE ORAL at 10:56

## 2025-05-22 RX ADMIN — KETOROLAC TROMETHAMINE 30 MG: 30 INJECTION, SOLUTION INTRAMUSCULAR; INTRAVENOUS at 15:38

## 2025-05-22 RX ADMIN — BISACODYL 10 MG: 5 TABLET, COATED ORAL at 03:39

## 2025-05-22 RX ADMIN — KETOROLAC TROMETHAMINE 30 MG: 30 INJECTION, SOLUTION INTRAMUSCULAR; INTRAVENOUS at 03:40

## 2025-05-22 RX ADMIN — ACETAMINOPHEN 650 MG: 325 TABLET, FILM COATED ORAL at 20:25

## 2025-05-22 RX ADMIN — OXYCODONE HYDROCHLORIDE 5 MG: 5 TABLET ORAL at 20:25

## 2025-05-22 RX ADMIN — LORAZEPAM 1 MG: 1 TABLET ORAL at 22:36

## 2025-05-22 RX ADMIN — ACETAMINOPHEN 650 MG: 325 TABLET, FILM COATED ORAL at 03:38

## 2025-05-22 RX ADMIN — CEFTRIAXONE SODIUM 2 G: 2 INJECTION, SOLUTION INTRAVENOUS at 09:28

## 2025-05-22 RX ADMIN — ATORVASTATIN CALCIUM 80 MG: 80 TABLET, FILM COATED ORAL at 09:34

## 2025-05-22 RX ADMIN — POLYETHYLENE GLYCOL 3350 17 G: 17 POWDER, FOR SOLUTION ORAL at 09:32

## 2025-05-22 ASSESSMENT — PAIN SCALES - GENERAL
PAINLEVEL_OUTOF10: 6
PAINLEVEL_OUTOF10: 6
PAINLEVEL_OUTOF10: 3
PAINLEVEL_OUTOF10: 7
PAINLEVEL_OUTOF10: 3
PAINLEVEL_OUTOF10: 4
PAINLEVEL_OUTOF10: 8

## 2025-05-22 ASSESSMENT — PAIN - FUNCTIONAL ASSESSMENT
PAIN_FUNCTIONAL_ASSESSMENT: 0-10

## 2025-05-22 ASSESSMENT — COGNITIVE AND FUNCTIONAL STATUS - GENERAL
STANDING UP FROM CHAIR USING ARMS: A LITTLE
TURNING FROM BACK TO SIDE WHILE IN FLAT BAD: A LITTLE
MOBILITY SCORE: 14
CLIMB 3 TO 5 STEPS WITH RAILING: TOTAL
WALKING IN HOSPITAL ROOM: TOTAL
MOVING FROM LYING ON BACK TO SITTING ON SIDE OF FLAT BED WITH BEDRAILS: A LITTLE
MOVING TO AND FROM BED TO CHAIR: A LITTLE

## 2025-05-22 NOTE — PROGRESS NOTES
Miguel Lofton is a 69 y.o. male on day 2 of admission presenting with Osteomyelitis.    Subjective   Interval History: Patient with no fever or chills. Has some back discomfort. Does have a mass on his abdomen that is bothering him        Review of Systems    Review of systems otherwise negative    Objective   Range of Vitals (last 24 hours)  Heart Rate:  [69-90]   Temp:  [36 °C (96.8 °F)-36.7 °C (98.1 °F)]   Resp:  [16-18]   BP: (120-137)/(71-84)   SpO2:  [96 %-99 %]   Daily Weight  05/21/25 : 73.4 kg (161 lb 13.1 oz)    Body mass index is 24.6 kg/m².    Physical Exam  General: in no acute distress, able to answer questions  HEENT: no conjunctival injection. anicteric.  Back: wound vac in place  Abd: +BS. Soft and lax. ND. Mass present on abdomen but soft to the touch  Ext: No swelling of the LE b/l.   Neuro: Answers questions appropriately.  Integumentary: no obvious lesions       Antibiotics  cefTRIAXone - 2 gram/50 mL  vancomycin - 1250 mg/250 mL    Relevant Results  Labs  Results from last 72 hours   Lab Units 05/22/25  0826 05/20/25  0549   WBC AUTO x10*3/uL 10.4 9.6   HEMOGLOBIN g/dL 8.3* 9.9*   HEMATOCRIT % 26.6* 33.5*   PLATELETS AUTO x10*3/uL 420 470*     Results from last 72 hours   Lab Units 05/22/25  0826 05/20/25  0549   SODIUM mmol/L 137 139   POTASSIUM mmol/L 3.8 3.8   CHLORIDE mmol/L 99 99   CO2 mmol/L 31 31   BUN mg/dL 12 13   CREATININE mg/dL 0.61 0.65   GLUCOSE mg/dL 88 88   CALCIUM mg/dL 8.8 9.3   ANION GAP mmol/L 11 13   EGFR mL/min/1.73m*2 >90 >90   PHOSPHORUS mg/dL 2.8 3.4     Results from last 72 hours   Lab Units 05/22/25  0826 05/20/25  0549   ALK PHOS U/L  --  131   BILIRUBIN TOTAL mg/dL  --  0.4   PROTEIN TOTAL g/dL  --  6.4   ALT U/L  --  24   AST U/L  --  16   ALBUMIN g/dL 2.8* 3.0*     Estimated Creatinine Clearance: 110.6 mL/min (by C-G formula based on SCr of 0.61 mg/dL).  C-Reactive Protein   Date Value Ref Range Status   05/22/2025 5.94 (H) <1.00 mg/dL Final   05/20/2025 7.35 (H)  <1.00 mg/dL Final     Microbiology  No results found for the last 14 days.  5/20 blood c/s NGTD   5/21 OR cultures pending    Imaging    MR Thoracic Spine without and with   05/19/2025 07:32:19 PM   . Acute discitis/osteomyelitis at T5-6 with marked surrounding phlegmon and circumferential epidural phlegmon resulting in moderate to severe spinal canal stenosis. No epidural abscess.           Assessment/Plan   69 y.o. male with PMHx of HTN, DM, hyothyroidism, COPD, prostate CA s/p radical prostatectomy 2018 and radiation and tongue CA 2022 s/p chemoradiation    #Acute discitis/osteomyelitis at T5-6 s/p T5-6 laminectomy, T3-8 fusion   Patient noted for 3 m hx of progressive mid back pain and radiculopathy with LE weakness in the last 3 weeks. Labs revealed leukocytosis 13.4, hb 10, . ESR 93, CRP 96 MRI showed Acute discitis/osteomyelitis at T5-6 with marked surrounding phlegmon and circumferential epidural phlegmon resulting in moderate to severe spinal canal stenosis. Underwent 5/21 pt underwent T5-6 laminectomy, T3-8 instrumented fusion , intra-op findings of phlegmonous appearing tissue in T5-T6 disc space/deep to PLL without extension past PLL .     -Please obtain T spot  -Continue vancomycin (pharm to dose)  -Continue ceftriaxone 2 g  q 24 hours  -Awaiting OR cultures  -If abdomen lesion continues to cause pain consider imaging of the area       ID will continue to follow. If any questions regarding this patient please mohsen Charlton  Infectious Diseases  Team A

## 2025-05-22 NOTE — PROGRESS NOTES
"Miguel Lofton is a 69 y.o. male on day 2 of admission presenting with Osteomyelitis.    Subjective   Seen this AM at the bedside. Pt reports he is overall feeling well. He states his pain is controlled. He endorsed abdominal pain above his previous PEG site- discussed this is likely from lying prone during OR yesterday. Discussed KUB results. Also discussed plan to take out tamayo catheter today and work with PT/OT. He denies any fevers/chills, SOB, or CP.    Objective     Physical Exam  Vitals reviewed.   Constitutional:       Appearance: Normal appearance.   HENT:      Head: Normocephalic and atraumatic.      Nose: Nose normal.      Mouth/Throat:      Mouth: Mucous membranes are moist.      Pharynx: Oropharynx is clear.   Eyes:      Extraocular Movements: Extraocular movements intact.      Pupils: Pupils are equal, round, and reactive to light.   Cardiovascular:      Rate and Rhythm: Normal rate and regular rhythm.      Pulses: Normal pulses.      Heart sounds: Normal heart sounds.   Pulmonary:      Effort: Pulmonary effort is normal.      Breath sounds: Normal breath sounds.   Abdominal:      General: Bowel sounds are normal.      Palpations: Abdomen is soft.   Musculoskeletal:         General: Normal range of motion.   Skin:     General: Skin is warm.      Comments: Thoracic spine dressing with wound vac c/d/I  + Thoracic zuri drain with bloody drainage    Neurological:      General: No focal deficit present.      Mental Status: He is alert and oriented to person, place, and time. Mental status is at baseline.   Psychiatric:         Mood and Affect: Mood normal.         Behavior: Behavior normal.         Last Recorded Vitals  Blood pressure 127/77, pulse 78, temperature 36.6 °C (97.9 °F), temperature source Temporal, resp. rate 18, height 1.727 m (5' 8\"), weight 73.4 kg (161 lb 13.1 oz), SpO2 96%.  Intake/Output last 3 Shifts:  I/O last 3 completed shifts:  In: 1375 (18.7 mL/kg) [P.O.:25; I.V.:100 (1.4 mL/kg); " IV Piggyback:1250]  Out: 1380 (18.8 mL/kg) [Urine:1215 (0.5 mL/kg/hr); Drains:115; Blood:50]  Weight: 73.4 kg     Relevant Results        Results for orders placed or performed during the hospital encounter of 05/20/25 (from the past 24 hours)   Tissue/Wound Culture/Smear    Specimen: SPINE; Tissue   Result Value Ref Range    Tissue/Wound Culture/Smear No growth to date     Gram Stain No polymorphonuclear leukocytes seen     Gram Stain No organisms seen    Tissue/Wound Culture/Smear    Specimen: SPINE; Tissue   Result Value Ref Range    Tissue/Wound Culture/Smear No growth to date     Gram Stain (1+) Rare Polymorphonuclear leukocytes     Gram Stain No organisms seen    CBC   Result Value Ref Range    WBC 10.4 4.4 - 11.3 x10*3/uL    nRBC 0.0 0.0 - 0.0 /100 WBCs    RBC 3.43 (L) 4.50 - 5.90 x10*6/uL    Hemoglobin 8.3 (L) 13.5 - 17.5 g/dL    Hematocrit 26.6 (L) 41.0 - 52.0 %    MCV 78 (L) 80 - 100 fL    MCH 24.2 (L) 26.0 - 34.0 pg    MCHC 31.2 (L) 32.0 - 36.0 g/dL    RDW 18.6 (H) 11.5 - 14.5 %    Platelets 420 150 - 450 x10*3/uL   Magnesium   Result Value Ref Range    Magnesium 2.01 1.60 - 2.40 mg/dL   Renal function panel   Result Value Ref Range    Glucose 88 74 - 99 mg/dL    Sodium 137 136 - 145 mmol/L    Potassium 3.8 3.5 - 5.3 mmol/L    Chloride 99 98 - 107 mmol/L    Bicarbonate 31 21 - 32 mmol/L    Anion Gap 11 10 - 20 mmol/L    Urea Nitrogen 12 6 - 23 mg/dL    Creatinine 0.61 0.50 - 1.30 mg/dL    eGFR >90 >60 mL/min/1.73m*2    Calcium 8.8 8.6 - 10.6 mg/dL    Phosphorus 2.8 2.5 - 4.9 mg/dL    Albumin 2.8 (L) 3.4 - 5.0 g/dL     Results for orders placed or performed during the hospital encounter of 05/20/25 (from the past 24 hours)   Tissue/Wound Culture/Smear    Specimen: SPINE; Tissue   Result Value Ref Range    Tissue/Wound Culture/Smear No growth to date     Gram Stain No polymorphonuclear leukocytes seen     Gram Stain No organisms seen    Tissue/Wound Culture/Smear    Specimen: SPINE; Tissue   Result Value  Ref Range    Tissue/Wound Culture/Smear No growth to date     Gram Stain (1+) Rare Polymorphonuclear leukocytes     Gram Stain No organisms seen    CBC   Result Value Ref Range    WBC 10.4 4.4 - 11.3 x10*3/uL    nRBC 0.0 0.0 - 0.0 /100 WBCs    RBC 3.43 (L) 4.50 - 5.90 x10*6/uL    Hemoglobin 8.3 (L) 13.5 - 17.5 g/dL    Hematocrit 26.6 (L) 41.0 - 52.0 %    MCV 78 (L) 80 - 100 fL    MCH 24.2 (L) 26.0 - 34.0 pg    MCHC 31.2 (L) 32.0 - 36.0 g/dL    RDW 18.6 (H) 11.5 - 14.5 %    Platelets 420 150 - 450 x10*3/uL   Magnesium   Result Value Ref Range    Magnesium 2.01 1.60 - 2.40 mg/dL   Renal function panel   Result Value Ref Range    Glucose 88 74 - 99 mg/dL    Sodium 137 136 - 145 mmol/L    Potassium 3.8 3.5 - 5.3 mmol/L    Chloride 99 98 - 107 mmol/L    Bicarbonate 31 21 - 32 mmol/L    Anion Gap 11 10 - 20 mmol/L    Urea Nitrogen 12 6 - 23 mg/dL    Creatinine 0.61 0.50 - 1.30 mg/dL    eGFR >90 >60 mL/min/1.73m*2    Calcium 8.8 8.6 - 10.6 mg/dL    Phosphorus 2.8 2.5 - 4.9 mg/dL    Albumin 2.8 (L) 3.4 - 5.0 g/dL     Scheduled medications  Scheduled Medications[1]  Continuous medications  Continuous Medications[2]  PRN medications  PRN Medications[3]      Assessment & Plan  Osteomyelitis  Miguel Lofton is a 69 y.o. male patient, with PMHx of prostate cancer, base of the tongue cancer, HTN, HLD, hypothyroidism, COPD, anxiety who had a CT chest for annual lung cancer screening on 5/13 that showed possible compression fx of T5 and T6. Imaging was reviewed at office visit 5/19 and was advised he present to ED for MRI. He presented to [facility] 5/19 where MRI T spine (5/19) showed acute discitis/osteomyelitis at T5-6 with marked surrounding phlegmon and circumferential epidural phlegmon resulting in moderate to severe spinal canal stenosis. No epidural abscess. He was transferred to [facility] 5/20 for NSGY eval. NSGY consulted 5/20, pt taken to the OR 5/21 for T5-6 laminectomy and T3-8 fusion. Cultures taken in OR/pending. ID  consulted 5/21 and rec Vanc (5/22-) and CTX (5/22) pending OR culture results. PT/OT consulted 5/22. DC pending improvement in pain, NSGY/ID clearance, atb plan, and PT/OT recs.    Updates 5/22:  - PT/OT consulted  - Pain managed with current pain regimen  - DC tamayo today, TOV     # Acute on chronic back pain   # Compression fracture   # Vertebral osteomyelitis  # Phlegmonous tissue c/f infection  - CT Chest ([facility]): considerable deformity of what is felt to be T5 which may be related to a compression fracture of the caudal endplate. There is some compression of the superior endplate of T6.  -  MRI T spine (5/19 at OSH): Acute discitis/osteomyelitis at T5-6 with marked surrounding phlegmon and circumferential epidural phlegmon resulting in moderate to severe spinal canal stenosis. No epidural abscess. Requested for image import from [facility]    - CRP 7.35, ESR 90 (5/20); repeat pending 5/22  - s/p OR 5/21/25 with neurosurgery for T5-6 laminectomy and T3-8 fusion. Per neurosurgery, phlegmon appeared infectious without purulence  - OR surg path (5/21): pending  - Tissue/fungal OR cultures x2 (5/21): pending  - Thoracic wound vac in place, NSGY managing  - Yon drain in place s/p OR  - ID consulted 5/21; rec Vanc (5/22-) and CTX (5/22) pending OR culture results.  - Pain management per neurosurgery recs: Tylenol 650mg q4h scheduled, cyclobenzaprine 10mg q8h scheduled, lidocaine patches x2 to back daily, oxycodone 5mg q4h PRN for mild-mod pain, oxycodone 10mg q4h PRN for severe pain, and IV dilaudid 0.2mg q3h PRN for breakthrough pain. Okay for toradol 30 mg q6h up to 8 doses if warranted, not to be used after POD 2 (not ordered)  - Continue home Gabapentin 300mg daily  - Blood cultures x2 (5/20): NGTD  - Urine culture (5/20): negative  - Tamayo placed in OR 5/21, DC tamayo 5/22 with TOV  - PT/OT consulted 5/22, recs pending    # Abdominal pain  - Reported abdominal pain above old PEG site on 5/22  - Likely 2/2  prone positioning after OR +/- developing ileus  - States this pain is new  - KUB (5/22): moderate colonic stool burden, possible post-op ileus  - Bowel regimen with DocuSenna 3tabs BID, Miralax BID, Lactulose BID, Dulcolax sup daily PRN, s/p 2x tap water enema 5/22     # Dysphasia  - SLP eval done no aspiration, rec soft/ thin liquid diet   - Ensure high protein with all meals  - Dietician consulted, recs pending     # Base of the tongue Ca  - 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 Lucero      # Prostate Ca  - s/p radical prostatectomy by Dr Lang Mcneil in 2018  - [id number], [place] 7, + right anterior margin  - PSMA-PET done on 12/6/2023 showed no PSMA uptake within the post-surgical bed; there is PSMA uptake of a left common iliac retroperitoneal lymph node SUV 5.6 and PSMA uptake of a right pelvic sidewall lymph node SUV 3.5  - Dr aRngel recommended radiation therapy. Patient instead went to [facility] and was treated by Dr Zackary Ely (4600 cGy in 23 fractions to the pelvis and LN followed by [place] to prostate bed and LN to 2500 cGy in 10 fractions)  - Since completing radiation, has been experiencing significant fatigue  - He is now following with urologist Dr Boyd  - For pain management, follows with [facility] palliative care team   - PSA < 0.01 (2/19/25)--> PSA <0.10 (5/20)     # COPD  # Smoking Hx  # Lung Ca screening  - Follows with pulmonologist Dr Hutchison   - PFTs 03/2025: FEV1 72% FEV1/FVC 0.63  - Did not tolerate Breztri  - Continue home  Spiriva 2 puffs daily     # HTN/HLD  - Continue home Metop Succs 25mg daily and Lipitor 80mg nightly     # Hypothyroidism  - Last TSH 4.49 (5/12)  - Continue home Synthroid 75mcg daily     # Anemia  - Likely anemia of chronic disease  - BL Hgb ~11 after cancer tx; Hgb 9.9 (5/20)--> 8.3 (5/22) post-op--> will monitor for now  - T&S active from 5/20-  ordered again for 5/23  - Ferritin 305, Folate 7.5, Iron 41, TIBC 269, %Sat 15, UIBC 228, Vit B12 1,342  - Stopped home Vit B12 sup d/t elevated B12 level     DVT prophy: Lovenox, on hold for neurosurgery, restart in afternoon 5/22 for POD 1     # Dispo:   - Full code, confirmed with patient on admission   - NOK: Eleni Lofton (wife) [phone]  - DC pending improvement in pain, NSGY/ID clearance, atb plan, and PT/OT recs  - NSGY FUV 6/9, Dr. Lucero (heme onc) FUV 8/8, NSGY FUV 8/11    I spent >60 minutes on the professional and overall care of this patient    Assessment and plan as above discussed with attending physician Dr. Pro Tsai, APRN-CNP       [1] acetaminophen, 650 mg, oral, q4h  [Held by provider] aspirin, 81 mg, oral, Daily  atorvastatin, 80 mg, oral, Daily  cefTRIAXone, 2 g, intravenous, q24h  cholecalciferol, 25 mcg, oral, Daily  cyanocobalamin, 1,000 mcg, oral, Daily  cyclobenzaprine, 10 mg, oral, TID  [Held by provider] enoxaparin, 40 mg, subcutaneous, Daily  gabapentin, 300 mg, oral, Daily  ketorolac, 30 mg, intravenous, q6h  levothyroxine, 75 mcg, oral, Daily  lidocaine, 2 patch, transdermal, Daily  metoprolol succinate XL, 25 mg, oral, Daily  pantoprazole, 40 mg, oral, Daily before breakfast  polyethylene glycol, 17 g, oral, Daily  tiotropium, 2 puff, inhalation, Daily  vancomycin, 1,250 mg, intravenous, q12h  [2]    [3] PRN medications: bisacodyl, HYDROmorphone, LORazepam, oxyCODONE, oxyCODONE, oxyCODONE, simethicone, vancomycin

## 2025-05-22 NOTE — CONSULTS
Vancomycin Dosing by Pharmacy- INITIAL    Miguel Lofton is a 69 y.o. year old male who Pharmacy has been consulted for vancomycin dosing for osteomyelitis/septic arthritis. Based on the patient's indication and renal status this patient will be dosed based on a goal AUC of 400-600.     Renal function is currently stable.    Visit Vitals  /77 (BP Location: Left arm, Patient Position: 5 min laying)   Pulse 78   Temp 36.6 °C (97.9 °F) (Temporal)   Resp 18        Lab Results   Component Value Date    CREATININE 0.65 2025    CREATININE 0.70 2025    CREATININE 0.83 2024    CREATININE 0.86 2024        Patient weight is as follows:   Vitals:    25 0736   Weight: 73.4 kg (161 lb 13.1 oz)       Cultures:  No results found for the encounter in last 14 days.        I/O last 3 completed shifts:  In: 1375 (18.7 mL/kg) [P.O.:25; I.V.:100 (1.4 mL/kg); IV Piggyback:1250]  Out: 1380 (18.8 mL/kg) [Urine:1215 (0.5 mL/kg/hr); Drains:115; Blood:50]  Weight: 73.4 kg   I/O during current shift:  No intake/output data recorded.    Temp (24hrs), Av.4 °C (97.5 °F), Min:36.1 °C (97 °F), Max:36.7 °C (98.1 °F)         Assessment/Plan     Will initiate vancomycin maintenance, 1250 mg every 12 hours.    This dosing regimen is predicted by [employer] to result in the following pharmacokinetic parameters:  Regimen: 1250 mg IV every 12 hours.  Start time: 08:17 on 2025  Exposure target: AUC24 (range) 400-600 mg/L.hr   VTG67-34: 503 mg/L.hr  AUC24,ss: 582 mg/L.hr  Probability of AUC24 > 400: 85 %  Ctrough,ss: 17.7 mg/L  Probability of Ctrough,ss > 20: 40 %    Follow-up level will be ordered on 25 at 0500 unless clinically indicated sooner.  Will continue to monitor renal function daily while on vancomycin and order serum creatinine at least every 48 hours if not already ordered.  Follow for continued vancomycin needs, clinical response, and signs/symptoms of toxicity.       Maldonado Hale, [facility]

## 2025-05-22 NOTE — CONSULTS
"Nutrition Initial Assessment:   Nutrition Assessment    The patient is a 69 y.o. male who is hospital day #2.  Pt admitted due to imaging showing possible compression fx of T5 to T6. Pt is now s/p T5-6 laminectomy and T3-8 fusion w/ NSGY on 05/21.     PMHx: prostate cancer, base of the tongue cancer, HTN, HLD, hypothyroidism, COPD, anxiety     Reason for Assessment: Admission nursing screening  Have you recently lost weight without trying?: Yes, Lost 2 - 13 pounds  Have you been eating poorly because of a decreased appetite?: Yes    Nutrition History:  Food and Nutrient History: Unable to meet with pt despite multiple attempts on 05/20-22. Pt noted to have decreased PO intake per MST. From chart review, pt reports main source of nutrition at the moment is Ensure and Applesauce. Pt was seen by SLP which rec soft and bite sized diet with thin liquids. Pt has not ordered any meals during admission. Ensure High protein ordered TID. Unclear at this time if pt is meeting nutrient needs - depending on ONS and # /d pt could be potentially meeting >75% of nutr needs.  Vitamin/Herbal Supplement Use: Vitamin C, D, and B12 per home med list.       Anthropometrics:  Start of admission anthropometrics:  Height: 163.4 cm (5' 4.33\")  Weight: 73.4 kg (161 lb 13.1 oz)  BMI (Calculated): 27.49  IBW/kg (Dietitian Calculated): 70 kg  Percent of IBW: 105 %    Wt Hx   05/21/25 73.4 kg (161 lb 13.1 oz) - admit wt - standing scale    01/22/25 90.7 kg (199 lb 15.3 oz) - 19% wt loss x4 months    12/18/24 88 kg (194 lb)   07/24/24 83.6 kg (184 lb 4.9 oz)   02/21/24 84.8 kg (187 lb)   02/12/24 83.7 kg (184 lb 9.6 oz)   01/24/24 84 kg (185 lb 3 oz)     Weight History / % Weight Change: Per EMR wt hx, pt with significant wt loss over the past 4 months.  Significant Weight Loss: Yes    Nutrition Focused Physical Exam Findings:  Subcutaneous Fat Loss   Defer Subcutaneous Fat Loss Assessment: Defer all  Defer All Reason: Unable to meet with " "pt  Muscle Wasting  Defer Muscle Wasting Assessment: Defer all  Defer All Reason: Unable to meet with pt  Edema  Edema: none  Physical Findings   Skin:  (surgical incision)      Last BM Date: 05/20/25    Nutrition Significant Labs:    Results from last 7 days   Lab Units 05/22/25  0826 05/20/25  0549   HEMOGLOBIN g/dL 8.3* 9.9*   MCV fL 78* 81   GLUCOSE mg/dL 88 88   POTASSIUM mmol/L 3.8 3.8   SODIUM mmol/L 137 139   PHOSPHORUS mg/dL 2.8 3.4   MAGNESIUM mg/dL 2.01 1.61   CREATININE mg/dL 0.61 0.65   BUN mg/dL 12 13   ALT U/L  --  24   AST U/L  --  16   ALK PHOS U/L  --  131   No results found for: \"VITD25\", \"VITEALPHA\", \"VITAMINA\"      Nutrition Specific Medications:  atorvastatin, 80 mg, oral, Daily  cefTRIAXone, 2 g, intravenous, q24h  cholecalciferol, 25 mcg, oral, Daily  lactulose, 20 g, oral, BID  levothyroxine, 75 mcg, oral, Daily  pantoprazole, 40 mg, oral, Daily before breakfast  polyethylene glycol, 17 g, oral, BID  sennosides-docusate sodium, 3 tablet, oral, BID    I/O:   I/O last 2 completed shifts:  In: 1375 (18.7 mL/kg) [P.O.:25; I.V.:100 (1.4 mL/kg); IV Piggyback:1250]  Out: 1130 (15.4 mL/kg) [Urine:965 (0.5 mL/kg/hr); Drains:115; Blood:50]  Weight: 73.4 kg     Dietary Orders (From admission, onward)       Start     Ordered    05/21/25 1706  Adult diet Regular; Soft and bite sized 6; Thin 0  Diet effective now        Comments: · Upright for all PO intake  · Remain upright for 20-30 min after eating  · Small bites/sips  · Straws ok  · Slow rate of consumption  · Pills per pt preference   Question Answer Comment   Diet type Regular    Texture Soft and bite sized 6    Fluid consistency Thin 0        05/21/25 1706    05/20/25 0936  Oral nutritional supplements  Until discontinued        Comments: Vanilla   Question Answer Comment   Deliver with Breakfast    Deliver with All meals    Deliver with Lunch    Deliver with Dinner    Select supplement: Ensure High Protein        05/20/25 0936    05/20/25 0223  " May Participate in Room Service  ( ROOM SERVICE MAY PARTICIPATE)  Once        Question:  .  Answer:  Yes    05/20/25 0222                     Estimated Needs:   Total Energy Estimated Needs in 24 hours (kCal): 2050 kCal  Method for Estimating Needs: 28 kcal/kg * 73 kg    Total Protein Estimated Needs in 24 Hours (g): 95 g  Method for Estimating 24 Hour Protein Needs: 1.3+ g/kg * 73 kg    Method for Estimating 24 Hour Fluid Needs: 1 ml/kcal or per team          Nutrition Diagnosis   Malnutrition Diagnosis  Patient has Malnutrition Diagnosis:  (Malnutrition is possible given significant wt loss however, need nutr hx and NFPE for dx.)    Nutrition Diagnosis  Patient has Nutrition Diagnosis: Yes  Diagnosis Status (1): New  Nutrition Diagnosis 1: Unintended weight loss  Related to (1): changes in PO intake  As Evidenced by (1): 19% wt loss [id number]; limited diet of ONS and applesauce       Nutrition Interventions/Recommendations   Nutrition prescription for oral nutrition    Nutrition Recommendations:  Supplement order changed to Boost VHC (530 kcal, 22g pro) TID.           Nutrition Monitoring and Evaluation   Monitoring and Evaluation Plan: Estimated Energy Intake  Estimated Energy Intake: Energy intake greater or equal to 75% of estimated energy needs    Monitoring and Evaluation Plan: Body weight  Body Weight: Body weight - Maintain stable weight              New goal(s) identified         Time Spent (min): 45 minutes

## 2025-05-22 NOTE — TELEPHONE ENCOUNTER
Patient left voicemail to inform us that he did get out voicemail to get him scheduled for RFA..he was recently hospitalized and had emergency surgery and wanted to get Dr Maddox opinion on how long he should wait until moving forward with any procedures with us.

## 2025-05-22 NOTE — CARE PLAN
The patient's goals for the shift include Pain management    The clinical goals for the shift include Pt will be safe and free from injury throughout shift.      Problem: Skin  Goal: Decreased wound size/increased tissue granulation at next dressing change  Outcome: Progressing  Flowsheets (Taken 5/21/2025 2143)  Decreased wound size/increased tissue granulation at next dressing change: Promote sleep for wound healing  Goal: Participates in plan/prevention/treatment measures  Outcome: Progressing  Flowsheets (Taken 5/21/2025 2143)  Participates in plan/prevention/treatment measures: Elevate heels  Goal: Prevent/manage excess moisture  Outcome: Progressing  Flowsheets (Taken 5/21/2025 2143)  Prevent/manage excess moisture: Moisturize dry skin  Goal: Prevent/minimize sheer/friction injuries  Outcome: Progressing  Flowsheets (Taken 5/21/2025 2143)  Prevent/minimize sheer/friction injuries: Use pull sheet  Goal: Promote/optimize nutrition  Outcome: Progressing  Flowsheets (Taken 5/21/2025 2143)  Promote/optimize nutrition: Offer water/supplements/favorite foods  Goal: Promote skin healing  Outcome: Progressing  Flowsheets (Taken 5/21/2025 2143)  Promote skin healing: Assess skin/pad under line(s)/device(s)     Problem: Fall/Injury  Goal: Not fall by end of shift  Outcome: Progressing  Goal: Be free from injury by end of the shift  Outcome: Progressing  Goal: Verbalize understanding of personal risk factors for fall in the hospital  Outcome: Progressing  Goal: Verbalize understanding of risk factor reduction measures to prevent injury from fall in the home  Outcome: Progressing  Goal: Use assistive devices by end of the shift  Outcome: Progressing  Goal: Pace activities to prevent fatigue by end of the shift  Outcome: Progressing     Problem: Pain - Adult  Goal: Verbalizes/displays adequate comfort level or baseline comfort level  Outcome: Progressing     Problem: Safety - Adult  Goal: Free from fall injury  Outcome:  Progressing     Problem: Discharge Planning  Goal: Discharge to home or other facility with appropriate resources  Outcome: Progressing     Problem: Chronic Conditions and Co-morbidities  Goal: Patient's chronic conditions and co-morbidity symptoms are monitored and maintained or improved  Outcome: Progressing     Problem: Nutrition  Goal: Nutrient intake appropriate for maintaining nutritional needs  Outcome: Progressing     Problem: Pain  Goal: Takes deep breaths with improved pain control throughout the shift  Outcome: Progressing  Goal: Turns in bed with improved pain control throughout the shift  Outcome: Progressing  Goal: Walks with improved pain control throughout the shift  Outcome: Progressing  Goal: Performs ADL's with improved pain control throughout shift  Outcome: Progressing  Goal: Participates in PT with improved pain control throughout the shift  Outcome: Progressing  Goal: Free from opioid side effects throughout the shift  Outcome: Progressing  Goal: Free from acute confusion related to pain meds throughout the shift  Outcome: Progressing

## 2025-05-22 NOTE — PROGRESS NOTES
Physical Therapy    Physical Therapy Evaluation & Treatment    Patient Name: Miguel Lofton  MRN: [id number]  Department: [facility]  Room: [facility]  Today's Date: 5/22/2025   Time Calculation  Start Time: 1433  Stop Time: 1507  Time Calculation (min): 34 min    Assessment/Plan   PT Assessment  PT Assessment Results: Decreased strength, Decreased endurance, Impaired balance, Decreased mobility, Orthopedic restrictions  Rehab Prognosis: Good  End of Session Communication: Bedside nurse  Assessment Comment: Pt is a 69 y.o male who is s/p T5-6 laminectomy, T6 transpedicular decompression, T3-8 fusion on 5/21/25.  Pt presents to PT with decreased strength, balance, mobility, and endurance.  Pt able to transfer bed to [facility] with RW and close supervision.  Ambulation and stair training deferred due to dizziness with transfers that did not resolve with prolonged sitting.  BP WNL in supine with HOB elevated after transfers and BP monitor obtained.  D/C recommendations pending further assessment of ambulation and stair training. PT will continue to follow.  End of Session Patient Position: Bed, 3 rail up, Alarm off, not on at start of session (brother-in-law in room)   IP OR SWING BED PT PLAN  Inpatient or Swing Bed: Inpatient  PT Plan  Treatment/Interventions: Bed mobility, Transfer training, Gait training, Stair training, Balance training, Neuromuscular re-education, Strengthening, Endurance training, Therapeutic exercise, Therapeutic activity, Home exercise program  PT Plan: Ongoing PT  PT Frequency: 5 times per week  PT Discharge Recommendations:  (TBD pending gait and stair training assessment)  Equipment Recommended upon Discharge:  (TBD pending further gait assessment)  PT Recommended Transfer Status: Assistive device, Stand by assist (RW)  PT - OK to Discharge:  (No-pending gait and stair assessment, limited by dizziness this session)      Subjective     PT Visit Info:  PT Received On: 05/22/25  General Visit  Information:  General  Reason for Referral: Osteomyelitis, LBP with radiculopathy and weakness x3 weeks, 5/13 CT chest T5 compression fx, MRI TS T5-T6 compression fx w disc/osteo, epidural phlegmon w moderate canal stenosis, s/p T5-6 laminectomy, T6 transpedicular decompression, T3-8 fusion on 5/21/25  Past Medical History Relevant to Rehab: er chart, PMH includes HTN,  HLD, CAD (on ASA), hypoT, COPD, anxiety, LBP (annual radioablations), prostate cancer (s/p prostatectomy 2018), base of tongue cancer (dx 2022, s/p radiation/chemo, in remission), diastolic CHF  Prior to Session Communication: Bedside nurse  Patient Position Received: Bed, 2 rail up, Alarm off, not on at start of session  General Comment: Pt cleared for PT by RN. Pt alert and agreeable to PT. +PIVx3, tamayo, hemovac, wound vac, 3L O2 via NC  Home Living:  Home Living  Type of Home: House  Lives With: Spouse  Home Adaptive Equipment: Cane  Home Layout: One level  Home Access: Stairs to enter with rails  Entrance Stairs-Rails: Both (too far apart to reach both simultaneously)  Entrance Stairs-Number of Steps: 2  Prior Level of Function:  Prior Function Per Pt/Caregiver Report  Level of [place]: Independent with ADLs and functional transfers, Independent with homemaking with ambulation (pt reports having to modify ADLs by sitting down to complete dressing and sponge bathing since LBP/LE pain started 3 weeks ago.  also reports using borrowed RW for last 3 weeks.  Assist from wife for cooking/cleaning)  Receives Help From: Family  Vocational: On disability ()  Prior Function Comments: +driving, -falls  Precautions:  Precautions  Medical Precautions: Fall precautions, Spinal precautions, Oxygen therapy device and L/min (3L O2 via NC)  Post-Surgical Precautions: Spinal precautions     Date/Time Vitals Session Patient Position Pulse Resp SpO2 BP MAP (mmHg)    05/22/25 1433 During PT  Lying after transfers 90  --  --  120/80  99          Objective   Pain:  Pain Assessment  Pain Assessment: 0-10  0-10 (Numeric) Pain Score: 3  Pain Type: Surgical pain  Pain Location: Back  Pain Orientation: Lower  Pain Interventions: Repositioned, Rest (RN notified)  Response to Interventions: Increase in pain (with transfer bed to/from [facility])  Cognition:  Cognition  Overall Cognitive Status: Within Functional Limits    General Assessments:  Activity Tolerance  Endurance: Decreased tolerance for upright activites (limited by dizziness which resolved with return to supine with HOB elevated)    Sensation  Light Touch: No apparent deficits    Coordination  Movements are Fluid and Coordinated: Yes    Postural Control  Postural Control: Within Functional Limits (forward flexed posture)    Static Sitting Balance  Static Sitting-Balance Support: Bilateral upper extremity supported, Feet supported  Static Sitting-Level of Assistance: Close supervision  Static Sitting-Comment/Number of Minutes: forward flexed posture  Dynamic Sitting Balance  Dynamic Sitting-Balance Support: Bilateral upper extremity supported, Feet supported  Dynamic Sitting-Level of Assistance: Close supervision  Dynamic Sitting-Balance: Forward lean    Static Standing Balance  Static Standing-Balance Support: Bilateral upper extremity supported (RW)  Static Standing-Level of Assistance: Close supervision  Dynamic Standing Balance  Dynamic Standing-Balance Support: Bilateral upper extremity supported (RW)  Dynamic Standing-Level of Assistance: Close supervision  Dynamic Standing-Balance: Turning  Functional Assessments:  Bed Mobility  Bed Mobility: Yes  Bed Mobility 1  Bed Mobility 1: Supine to sitting, Sitting to supine  Level of Assistance 1: Close supervision  Bed Mobility Comments 1: VCs for log roll technique, HOB slightly elevated, use of bed rail    Transfers  Transfer: Yes  Transfer 1  Transfer From 1: Bed to  Transfer to 1: Stand  Technique 1: Sit to stand, Stand to sit  Transfer Device 1:  Walker  Transfer Level of Assistance 1: Close supervision  Transfers 2  Transfer From 2: Commode-standard to  Transfer to 2: Stand  Technique 2: Stand to sit, Sit to stand  Transfer Device 2: Walker  Transfer Level of Assistance 2: Close supervision  Trials/Comments 2: VCs for hand placement stand to sit    Ambulation/Gait Training  Ambulation/Gait Training Performed: No (deferred due to dizziness with transfers)    Stairs  Stairs: No (deferred due to dizziness with transfers)  Extremity/Trunk Assessments:  Strength RLE  R Hip Flexion: 4-/5  R Knee Flexion: 4+/5  R Knee Extension: 4/5  R Ankle Dorsiflexion: 4+/5  Strength LLE  L Hip Flexion: 4-/5  L Knee Flexion: 4+/5  L Knee Extension: 4/5  L Ankle Dorsiflexion: 4+/5  Treatments:  Therapeutic Activity  Therapeutic Activity Performed: Yes  Therapeutic Activity 1: Increased time sitting EOB due to dizziness, vital sign monitoring, and pt edu on precautions and HEP  Outcome Measures:  [facility] Basic Mobility  Turning from your back to your side while in a flat bed without using bedrails: A little  Moving from lying on your back to sitting on the side of a flat bed without using bedrails: A little  Moving to and from bed to chair (including a wheelchair): A little  Standing up from a chair using your arms (e.g. wheelchair or bedside chair): A little  To walk in hospital room: Total  Climbing 3-5 steps with railing: Total  Basic Mobility - Total Score: 14    Encounter Problems       Encounter Problems (Active)       Balance       Pt will maintain static/dynamic standing balance x4 minutes with RW and supervision to demonstrate improved balance        Start:  05/22/25    Expected End:  06/05/25               Mobility       Pt will complete bed mobility independently via log roll technique with HOB flat and no use of bed rails       Start:  05/22/25    Expected End:  06/05/25            Pt will amb >250ft with LRAD and supervision in prep for safe discharge home         Start:  05/22/25    Expected End:  06/05/25            Pt will a/descend 2 steps with unliat rail (either side) and CGA in prep for safe home entry        Start:  05/22/25    Expected End:  06/05/25               PT Transfers       Pt will transfer sit to stand with LRAD and supervision in prep for out of bed mobility        Start:  05/22/25    Expected End:  06/05/25                   Education Documentation  Precautions, taught by Purnima Carmen PT at 5/22/2025  3:33 PM.  Learner: Patient  Readiness: Acceptance  Method: Explanation  Response: Verbalizes Understanding  Comment: Spine precautions, log roll technique for bed mobility, importance of positioning in bed, HEP: ankle pumps, heel slides    Body Mechanics, taught by Purnima Carmne PT at 5/22/2025  3:33 PM.  Learner: Patient  Readiness: Acceptance  Method: Explanation  Response: Verbalizes Understanding  Comment: Spine precautions, log roll technique for bed mobility, importance of positioning in bed, HEP: ankle pumps, heel slides    Home Exercise Program, taught by Purnima Carmen, PT at 5/22/2025  3:33 PM.  Learner: Patient  Readiness: Acceptance  Method: Explanation  Response: Verbalizes Understanding  Comment: Spine precautions, log roll technique for bed mobility, importance of positioning in bed, HEP: ankle pumps, heel slides    Mobility Training, taught by Purnima Carmen, PT at 5/22/2025  3:33 PM.  Learner: Patient  Readiness: Acceptance  Method: Explanation  Response: Verbalizes Understanding  Comment: Spine precautions, log roll technique for bed mobility, importance of positioning in bed, HEP: ankle pumps, heel slides    Education Comments  No comments found.

## 2025-05-22 NOTE — ASSESSMENT & PLAN NOTE
Miguel Lofton is a 69 y.o. male patient, with PMHx of prostate cancer, base of the tongue cancer, HTN, HLD, hypothyroidism, COPD, anxiety who had a CT chest for annual lung cancer screening on 5/13 that showed possible compression fx of T5 and T6. Imaging was reviewed at office visit 5/19 and was advised he present to ED for MRI. He presented to [facility] 5/19 where MRI T spine (5/19) showed acute discitis/osteomyelitis at T5-6 with marked surrounding phlegmon and circumferential epidural phlegmon resulting in moderate to severe spinal canal stenosis. No epidural abscess. He was transferred to [facility] 5/20 for NSGY eval. NSGY consulted 5/20, pt taken to the OR 5/21 for T5-6 laminectomy and T3-8 fusion. Cultures taken in OR/pending. ID consulted 5/21 and rec Vanc (5/22-) and CTX (5/22) pending OR culture results. PT/OT consulted 5/22. DC pending improvement in pain, NSGY/ID clearance, atb plan, and PT/OT recs.    Updates 5/22:  - PT/OT consulted  - Pain managed with current pain regimen  - DC tamayo today, TOV     # Acute on chronic back pain   # Compression fracture   # Vertebral osteomyelitis  # Phlegmonous tissue c/f infection  - CT Chest (OSH): considerable deformity of what is felt to be T5 which may be related to a compression fracture of the caudal endplate. There is some compression of the superior endplate of T6.  -  MRI T spine (5/19 at OSH): Acute discitis/osteomyelitis at T5-6 with marked surrounding phlegmon and circumferential epidural phlegmon resulting in moderate to severe spinal canal stenosis. No epidural abscess. Requested for image import from [facility]    - CRP 7.35, ESR 90 (5/20); repeat pending 5/22  - s/p OR 5/21/25 with neurosurgery for T5-6 laminectomy and T3-8 fusion. Per neurosurgery, phlegmon appeared infectious without purulence  - OR surg path (5/21): pending  - Tissue/fungal OR cultures x2 (5/21): pending  - Thoracic wound vac in place, NSGY managing  - Yon drain in place  s/p OR  - ID consulted 5/21; rec Vanc (5/22-) and CTX (5/22) pending OR culture results.  - Pain management per neurosurgery recs: Tylenol 650mg q4h scheduled, cyclobenzaprine 10mg q8h scheduled, lidocaine patches x2 to back daily, oxycodone 5mg q4h PRN for mild-mod pain, oxycodone 10mg q4h PRN for severe pain, and IV dilaudid 0.2mg q3h PRN for breakthrough pain. Okay for toradol 30 mg q6h up to 8 doses if warranted, not to be used after POD 2 (not ordered)  - Continue home Gabapentin 300mg daily  - Blood cultures x2 (5/20): NGTD  - Urine culture (5/20): negative  - Tamayo placed in OR 5/21, DC tamayo 5/22 with TOV  - PT/OT consulted 5/22, recs pending    # Abdominal pain  - Reported abdominal pain above old PEG site on 5/22  - Likely 2/2 prone positioning after OR +/- developing ileus  - States this pain is new  - KUB (5/22): moderate colonic stool burden, possible post-op ileus  - Bowel regimen with DocuSenna 3tabs BID, Miralax BID, Lactulose BID, Dulcolax sup daily PRN, s/p 2x tap water enema 5/22     # Dysphasia  - SLP eval done no aspiration, rec soft/ thin liquid diet   - Ensure high protein with all meals  - Dietician consulted, recs pending     # Base of the tongue Ca  - 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 Lucero      # Prostate Ca  - s/p radical prostatectomy by Dr Lang Mcneil in 2018  - [id number], [place] 7, + right anterior margin  - PSMA-PET done on 12/6/2023 showed no PSMA uptake within the post-surgical bed; there is PSMA uptake of a left  common iliac retroperitoneal lymph node SUV 5.6 and PSMA uptake of a right pelvic sidewall lymph node SUV 3.5  - Dr Rangel recommended radiation therapy. Patient instead went to [facility] and was treated by Dr Zackary Ely (4600 cGy in 23 fractions to the pelvis and LN followed by [place] to prostate bed and LN to 2500 cGy in 10 fractions)  - Since completing radiation, has been experiencing significant fatigue  - He is now following with urologist Dr Boyd  - For pain management, follows with [facility] palliative care team   - PSA < 0.01 (2/19/25)--> PSA <0.10 (5/20)     # COPD  # Smoking Hx  # Lung Ca screening  - Follows with pulmonologist Dr Hutchison   - PFTs 03/2025: FEV1 72% FEV1/FVC 0.63  - Did not tolerate Breztri  - Continue home Spiriva 2 puffs daily     # HTN/HLD  - Continue home Metop Succs 25mg daily and Lipitor 80mg nightly     # Hypothyroidism  - Last TSH 4.49 (5/12)  - Continue home Synthroid 75mcg daily     # Anemia  - Likely anemia of chronic disease  - BL Hgb ~11 after cancer tx; Hgb 9.9 (5/20)--> 8.3 (5/22) post-op--> will monitor for now  - T&S active from 5/20-  ordered again for 5/23  - Ferritin 305, Folate 7.5, Iron 41, TIBC 269, %Sat 15, UIBC 228, Vit B12 1,342  - Stopped home Vit B12 sup d/t elevated B12 level     DVT prophy: Lovenox, on hold for neurosurgery, restart in afternoon 5/22 for POD 1     # Dispo:   - Full code, confirmed with patient on admission   - NOK: Eleni Lofton (wife) [phone]  - DC pending improvement in pain, NSGY/ID clearance, atb plan, and PT/OT recs  - NSGY FUV 6/9, Dr. Lucero (heme onc) FUV 8/8, NSGY FUV 8/11

## 2025-05-22 NOTE — PROGRESS NOTES
"Miguel Lofton is a 69 y.o. male on day 2 of admission presenting with Osteomyelitis.    Subjective   No events overnight. Tolerating liquids. Did not get out of bed yet. No bowel movement having some abdominal discomfort near prior PEG site     Objective     Physical Exam  Awake Ox3  RUE D5 B5 T5 HG/IO 5  RLE HF5 KE5 DF/PF 5  LUE D5 B5 T5 HG/IO 5  LLE HF5 KE5 DF/PF 5  Abd min distended, non tender to palpation, prior PEG site c/d/I  Incision covered with prevena   Drain in place    Last Recorded Vitals  Blood pressure 126/84, pulse 90, temperature 36.7 °C (98.1 °F), temperature source Temporal, resp. rate 18, height 1.727 m (5' 8\"), weight 73.4 kg (161 lb 13.1 oz), SpO2 97%.  Intake/Output last 3 Shifts:  I/O last 3 completed shifts:  In: 1375 (18.7 mL/kg) [P.O.:25; I.V.:100 (1.4 mL/kg); IV Piggyback:1250]  Out: 1380 (18.8 mL/kg) [Urine:1215 (0.5 mL/kg/hr); Drains:115; Blood:50]  Weight: 73.4 kg     Relevant Results  Lab Results   Component Value Date    WBC 9.6 05/20/2025    HGB 9.9 (L) 05/20/2025    HCT 33.5 (L) 05/20/2025    MCV 81 05/20/2025     (H) 05/20/2025     Lab Results   Component Value Date    GLUCOSE 88 05/20/2025    CALCIUM 9.3 05/20/2025     05/20/2025    K 3.8 05/20/2025    CO2 31 05/20/2025    CL 99 05/20/2025    BUN 13 05/20/2025    CREATININE 0.65 05/20/2025       Assessment & Plan  Osteomyelitis    Miguel Lofton is a 69 year old male with h/o HTN,  HLD, CAD (on ASA), hypoT, COPD, anxiety, LBP (annual radioablations), prostate cancer (s/p prostatectomy 2018), base of tongue cancer (dx 2022, s/p radiation/chemo, in remission), diastolic CHF, p/w mid back pain x4 months, low back pain w BLE radiculopathy and weakness x3 weeks, 5/13 CT chest T5 compression fx, MRI TS T5-T6 compression fx w disc/osteo, epidural phlegmon w moderate canal stenosis   5/21 s/p T5-6 transpedicular decompression, T3-8 fusion    [place] primary  Maintain drain and prevena, monitor output  Follow up OR and " Ucx  Hold ASA, ok to restart POD7  PT and OT evaluation  Aggressive bowel regimen, monitor for bowel movement  Wean O2 as able, encourage incentive spirometer  SCDs, ok for dvt ppx           Huan Gold MD

## 2025-05-22 NOTE — PROGRESS NOTES
Physical Therapy                 Therapy Communication Note    Patient Name: Miguel Lofton  MRN: [id number]  Department: [facility]  Room: [address] [place], [place] [address]  Today's Date: 5/22/2025     Discipline: Physical Therapy    Missed Visit: PT Missed Visit: Yes     Missed Visit Reason: Missed Visit Reason: Patient refused (requesting increased time on BSC.  PT will re-attempt as schedule allows. RN notified and aware)    Missed Time: Attempt 1142

## 2025-05-23 ENCOUNTER — TELEPHONE (OUTPATIENT)
Age: 70
End: 2025-05-23

## 2025-05-23 LAB
ABO GROUP (TYPE) IN BLOOD: NORMAL
ALBUMIN SERPL BCP-MCNC: 2.6 G/DL (ref 3.4–5)
ANION GAP SERPL CALC-SCNC: 9 MMOL/L (ref 10–20)
ANTIBODY SCREEN: NORMAL
ATRIAL RATE: 73 BPM
BACTERIA SPEC CULT: NORMAL
BACTERIA SPEC CULT: NORMAL
BUN SERPL-MCNC: 13 MG/DL (ref 6–23)
CALCIUM SERPL-MCNC: 8.4 MG/DL (ref 8.6–10.6)
CHLORIDE SERPL-SCNC: 101 MMOL/L (ref 98–107)
CO2 SERPL-SCNC: 29 MMOL/L (ref 21–32)
CREAT SERPL-MCNC: 0.61 MG/DL (ref 0.5–1.3)
EGFRCR SERPLBLD CKD-EPI 2021: >90 ML/MIN/1.73M*2
ERYTHROCYTE [DISTWIDTH] IN BLOOD BY AUTOMATED COUNT: 19 % (ref 11.5–14.5)
FUNGUS SPEC CULT: NORMAL
FUNGUS SPEC CULT: NORMAL
FUNGUS SPEC FUNGUS STN: NORMAL
FUNGUS SPEC FUNGUS STN: NORMAL
GLUCOSE SERPL-MCNC: 93 MG/DL (ref 74–99)
GRAM STN SPEC: NORMAL
HCT VFR BLD AUTO: 27.5 % (ref 41–52)
HGB BLD-MCNC: 8.1 G/DL (ref 13.5–17.5)
LABORATORY COMMENT REPORT: NORMAL
Lab: NORMAL
MAGNESIUM SERPL-MCNC: 1.93 MG/DL (ref 1.6–2.4)
MCH RBC QN AUTO: 23.7 PG (ref 26–34)
MCHC RBC AUTO-ENTMCNC: 29.5 G/DL (ref 32–36)
MCV RBC AUTO: 80 FL (ref 80–100)
NRBC BLD-RTO: 0 /100 WBCS (ref 0–0)
P AXIS: 35 DEGREES
P OFFSET: 198 MS
P ONSET: 143 MS
PATH REPORT.FINAL DX SPEC: NORMAL
PATH REPORT.GROSS SPEC: NORMAL
PATH REPORT.RELEVANT HX SPEC: NORMAL
PATH REPORT.TOTAL CANCER: NORMAL
PHOSPHATE SERPL-MCNC: 2.8 MG/DL (ref 2.5–4.9)
PLATELET # BLD AUTO: 417 X10*3/UL (ref 150–450)
POTASSIUM SERPL-SCNC: 3.4 MMOL/L (ref 3.5–5.3)
PR INTERVAL: 134 MS
Q ONSET: 210 MS
QRS COUNT: 12 BEATS
QRS DURATION: 100 MS
QT INTERVAL: 410 MS
QTC CALCULATION(BAZETT): 451 MS
QTC FREDERICIA: 438 MS
R AXIS: -4 DEGREES
RBC # BLD AUTO: 3.42 X10*6/UL (ref 4.5–5.9)
RH FACTOR (ANTIGEN D): NORMAL
SODIUM SERPL-SCNC: 136 MMOL/L (ref 136–145)
T AXIS: 27 DEGREES
T OFFSET: 415 MS
VENTRICULAR RATE: 73 BPM
WBC # BLD AUTO: 8.3 X10*3/UL (ref 4.4–11.3)

## 2025-05-23 PROCEDURE — 97535 SELF CARE MNGMENT TRAINING: CPT | Mod: GO

## 2025-05-23 PROCEDURE — 2500000001 HC RX 250 WO HCPCS SELF ADMINISTERED DRUGS (ALT 637 FOR MEDICARE OP): Performed by: STUDENT IN AN ORGANIZED HEALTH CARE EDUCATION/TRAINING PROGRAM

## 2025-05-23 PROCEDURE — 94640 AIRWAY INHALATION TREATMENT: CPT

## 2025-05-23 PROCEDURE — 85027 COMPLETE CBC AUTOMATED: CPT | Performed by: STUDENT IN AN ORGANIZED HEALTH CARE EDUCATION/TRAINING PROGRAM

## 2025-05-23 PROCEDURE — 2500000001 HC RX 250 WO HCPCS SELF ADMINISTERED DRUGS (ALT 637 FOR MEDICARE OP)

## 2025-05-23 PROCEDURE — 2500000004 HC RX 250 GENERAL PHARMACY W/ HCPCS (ALT 636 FOR OP/ED)

## 2025-05-23 PROCEDURE — 1170000001 HC PRIVATE ONCOLOGY ROOM DAILY

## 2025-05-23 PROCEDURE — 2500000005 HC RX 250 GENERAL PHARMACY W/O HCPCS

## 2025-05-23 PROCEDURE — 86901 BLOOD TYPING SEROLOGIC RH(D): CPT | Performed by: NURSE PRACTITIONER

## 2025-05-23 PROCEDURE — 99233 SBSQ HOSP IP/OBS HIGH 50: CPT | Performed by: NURSE PRACTITIONER

## 2025-05-23 PROCEDURE — 86481 TB AG RESPONSE T-CELL SUSP: CPT

## 2025-05-23 PROCEDURE — 2500000002 HC RX 250 W HCPCS SELF ADMINISTERED DRUGS (ALT 637 FOR MEDICARE OP, ALT 636 FOR OP/ED): Performed by: STUDENT IN AN ORGANIZED HEALTH CARE EDUCATION/TRAINING PROGRAM

## 2025-05-23 PROCEDURE — 83735 ASSAY OF MAGNESIUM: CPT | Performed by: STUDENT IN AN ORGANIZED HEALTH CARE EDUCATION/TRAINING PROGRAM

## 2025-05-23 PROCEDURE — 2500000002 HC RX 250 W HCPCS SELF ADMINISTERED DRUGS (ALT 637 FOR MEDICARE OP, ALT 636 FOR OP/ED): Performed by: NURSE PRACTITIONER

## 2025-05-23 PROCEDURE — 2500000004 HC RX 250 GENERAL PHARMACY W/ HCPCS (ALT 636 FOR OP/ED): Mod: JZ | Performed by: NURSE PRACTITIONER

## 2025-05-23 PROCEDURE — 97530 THERAPEUTIC ACTIVITIES: CPT | Mod: GP

## 2025-05-23 PROCEDURE — 97165 OT EVAL LOW COMPLEX 30 MIN: CPT | Mod: GO

## 2025-05-23 PROCEDURE — 99232 SBSQ HOSP IP/OBS MODERATE 35: CPT | Performed by: STUDENT IN AN ORGANIZED HEALTH CARE EDUCATION/TRAINING PROGRAM

## 2025-05-23 PROCEDURE — 80069 RENAL FUNCTION PANEL: CPT | Performed by: STUDENT IN AN ORGANIZED HEALTH CARE EDUCATION/TRAINING PROGRAM

## 2025-05-23 PROCEDURE — 2500000004 HC RX 250 GENERAL PHARMACY W/ HCPCS (ALT 636 FOR OP/ED): Mod: JZ

## 2025-05-23 PROCEDURE — 86923 COMPATIBILITY TEST ELECTRIC: CPT

## 2025-05-23 PROCEDURE — 2500000001 HC RX 250 WO HCPCS SELF ADMINISTERED DRUGS (ALT 637 FOR MEDICARE OP): Performed by: NURSE PRACTITIONER

## 2025-05-23 PROCEDURE — 36415 COLL VENOUS BLD VENIPUNCTURE: CPT | Performed by: STUDENT IN AN ORGANIZED HEALTH CARE EDUCATION/TRAINING PROGRAM

## 2025-05-23 RX ORDER — POTASSIUM CHLORIDE 20 MEQ/1
40 TABLET, EXTENDED RELEASE ORAL 2 TIMES DAILY
Status: COMPLETED | OUTPATIENT
Start: 2025-05-23 | End: 2025-05-24

## 2025-05-23 RX ADMIN — CEFTRIAXONE SODIUM 2 G: 2 INJECTION, SOLUTION INTRAVENOUS at 09:37

## 2025-05-23 RX ADMIN — LIDOCAINE 4% 2 PATCH: 40 PATCH TOPICAL at 09:37

## 2025-05-23 RX ADMIN — ACETAMINOPHEN 650 MG: 325 TABLET, FILM COATED ORAL at 18:25

## 2025-05-23 RX ADMIN — POTASSIUM CHLORIDE 40 MEQ: 1500 TABLET, EXTENDED RELEASE ORAL at 20:43

## 2025-05-23 RX ADMIN — PANTOPRAZOLE SODIUM 40 MG: 40 TABLET, DELAYED RELEASE ORAL at 06:07

## 2025-05-23 RX ADMIN — ATORVASTATIN CALCIUM 80 MG: 80 TABLET, FILM COATED ORAL at 09:39

## 2025-05-23 RX ADMIN — OXYCODONE HYDROCHLORIDE 5 MG: 5 TABLET ORAL at 06:07

## 2025-05-23 RX ADMIN — LEVOTHYROXINE SODIUM 75 MCG: 0.07 TABLET ORAL at 09:39

## 2025-05-23 RX ADMIN — GABAPENTIN 300 MG: 300 CAPSULE ORAL at 09:57

## 2025-05-23 RX ADMIN — Medication 25 MCG: at 09:39

## 2025-05-23 RX ADMIN — CYCLOBENZAPRINE 10 MG: 10 TABLET, FILM COATED ORAL at 20:44

## 2025-05-23 RX ADMIN — OXYCODONE HYDROCHLORIDE 10 MG: 10 TABLET ORAL at 00:05

## 2025-05-23 RX ADMIN — METOPROLOL SUCCINATE 25 MG: 25 TABLET, EXTENDED RELEASE ORAL at 09:39

## 2025-05-23 RX ADMIN — ACETAMINOPHEN 650 MG: 325 TABLET, FILM COATED ORAL at 09:39

## 2025-05-23 RX ADMIN — OXYCODONE HYDROCHLORIDE 5 MG: 5 TABLET ORAL at 20:44

## 2025-05-23 RX ADMIN — ENOXAPARIN SODIUM 40 MG: 100 INJECTION SUBCUTANEOUS at 18:25

## 2025-05-23 RX ADMIN — KETOROLAC TROMETHAMINE 30 MG: 30 INJECTION, SOLUTION INTRAMUSCULAR; INTRAVENOUS at 09:56

## 2025-05-23 RX ADMIN — ACETAMINOPHEN 650 MG: 325 TABLET, FILM COATED ORAL at 14:15

## 2025-05-23 RX ADMIN — TIOTROPIUM BROMIDE INHALATION SPRAY 2 PUFF: 3.12 SPRAY, METERED RESPIRATORY (INHALATION) at 09:45

## 2025-05-23 RX ADMIN — VANCOMYCIN HYDROCHLORIDE 1250 MG: 1.25 INJECTION, POWDER, LYOPHILIZED, FOR SOLUTION INTRAVENOUS at 09:37

## 2025-05-23 RX ADMIN — VANCOMYCIN HYDROCHLORIDE 1250 MG: 1.25 INJECTION, POWDER, LYOPHILIZED, FOR SOLUTION INTRAVENOUS at 22:41

## 2025-05-23 RX ADMIN — OXYCODONE HYDROCHLORIDE 5 MG: 5 TABLET ORAL at 14:23

## 2025-05-23 RX ADMIN — CYCLOBENZAPRINE 10 MG: 10 TABLET, FILM COATED ORAL at 18:25

## 2025-05-23 RX ADMIN — CYCLOBENZAPRINE 10 MG: 10 TABLET, FILM COATED ORAL at 09:39

## 2025-05-23 RX ADMIN — LORAZEPAM 1 MG: 1 TABLET ORAL at 22:41

## 2025-05-23 RX ADMIN — ACETAMINOPHEN 650 MG: 325 TABLET, FILM COATED ORAL at 00:05

## 2025-05-23 ASSESSMENT — PAIN SCALES - GENERAL
PAINLEVEL_OUTOF10: 5 - MODERATE PAIN
PAINLEVEL_OUTOF10: 3
PAINLEVEL_OUTOF10: 5 - MODERATE PAIN
PAINLEVEL_OUTOF10: 7
PAINLEVEL_OUTOF10: 5 - MODERATE PAIN
PAINLEVEL_OUTOF10: 5 - MODERATE PAIN
PAINLEVEL_OUTOF10: 7
PAINLEVEL_OUTOF10: 8
PAINLEVEL_OUTOF10: 7
PAINLEVEL_OUTOF10: 3
PAINLEVEL_OUTOF10: 7
PAINLEVEL_OUTOF10: 3
PAINLEVEL_OUTOF10: 3
PAINLEVEL_OUTOF10: 7

## 2025-05-23 ASSESSMENT — PAIN - FUNCTIONAL ASSESSMENT
PAIN_FUNCTIONAL_ASSESSMENT: 0-10

## 2025-05-23 ASSESSMENT — COGNITIVE AND FUNCTIONAL STATUS - GENERAL
DRESSING REGULAR UPPER BODY CLOTHING: A LITTLE
DAILY ACTIVITIY SCORE: 18
TOILETING: A LOT
PERSONAL GROOMING: A LITTLE
STANDING UP FROM CHAIR USING ARMS: A LITTLE
MOVING TO AND FROM BED TO CHAIR: A LITTLE
STANDING UP FROM CHAIR USING ARMS: A LITTLE
MOVING TO AND FROM BED TO CHAIR: A LITTLE
DRESSING REGULAR LOWER BODY CLOTHING: A LITTLE
TURNING FROM BACK TO SIDE WHILE IN FLAT BAD: A LITTLE
TURNING FROM BACK TO SIDE WHILE IN FLAT BAD: A LITTLE
DRESSING REGULAR UPPER BODY CLOTHING: A LITTLE
CLIMB 3 TO 5 STEPS WITH RAILING: A LOT
DRESSING REGULAR LOWER BODY CLOTHING: A LOT
MOBILITY SCORE: 18
DAILY ACTIVITIY SCORE: 19
MOBILITY SCORE: 17
WALKING IN HOSPITAL ROOM: A LITTLE
HELP NEEDED FOR BATHING: A LITTLE
MOVING FROM LYING ON BACK TO SITTING ON SIDE OF FLAT BED WITH BEDRAILS: A LITTLE
TOILETING: A LITTLE
WALKING IN HOSPITAL ROOM: A LITTLE
CLIMB 3 TO 5 STEPS WITH RAILING: A LOT
HELP NEEDED FOR BATHING: A LITTLE

## 2025-05-23 ASSESSMENT — ACTIVITIES OF DAILY LIVING (ADL)
BATHING_ASSISTANCE: MINIMAL
HOME_MANAGEMENT_TIME_ENTRY: 10

## 2025-05-23 NOTE — PROGRESS NOTES
Occupational Therapy    Evaluation/Treatment    Patient Name: Miguel Lofton  MRN: [id number]  [facility]  Room: [facility]4021-A  Today's Date: 05/23/25  Time Calculation  Start Time: 0950  Stop Time: 1020  Time Calculation (min): 30 min       Assessment:  End of Session Communication: Bedside nurse  End of Session Patient Position: Bed, 3 rail up, Alarm on  OT Assessment Results: Decreased ADL status, Decreased upper extremity strength, Decreased endurance, Decreased functional mobility, Decreased IADLs    Plan:  Treatment Interventions: ADL retraining, Functional transfer training, UE strengthening/ROM, Endurance training, Patient/family training, Equipment evaluation/education, Compensatory technique education  OT Frequency: 3 times per week  OT Discharge Recommendations: Low intensity level of continued care  OT Recommended Transfer Status: Assist of 1, Stand by assist  OT - OK to Discharge: Yes    Subjective   OT Visit Info:  OT Received On: 05/23/25    General Visit Info:   General  Reason for Referral: Osteomyelitis, LBP with radiculopathy and weakness x3 weeks, 5/13 CT chest T5 compression fx, MRI TS T5-T6 compression fx w disc/osteo, epidural phlegmon w moderate canal stenosis, s/p T5-6 laminectomy, T6 transpedicular decompression, T3-8 fusion on 5/21/25  Past Medical History Relevant to Rehab: er chart, PMH includes HTN,  HLD, CAD (on ASA), hypoT, COPD, anxiety, LBP (annual radioablations), prostate cancer (s/p prostatectomy 2018), base of tongue cancer (dx 2022, s/p radiation/chemo, in remission), diastolic CHF  Prior to Session Communication: Bedside nurse  Patient Position Received: Bed, 2 rail up, Alarm off, not on at start of session  General Comment: Pt supine in bed upon arrival, willing to participate in therapy; assisted to chair, however, with increased pain and discomfort in back and unable to tolerate this AM. Pt assisted toilet in restroom and direct hand off to PT.    Precautions:  Medical  Precautions: Fall precautions, Spinal precautions, Oxygen therapy device and L/min    Pain:  Pain Assessment  Pain Assessment: 0-10  0-10 (Numeric) Pain Score:  (pt does not provide scaled rating; minimal at rest and increased pain in lumbar when seated/ambulating)    Objective   Cognition:  Overall Cognitive Status: Within Functional Limits  Arousal/Alertness: Appropriate responses to stimuli  Orientation Level: Oriented X4  Following Commands: Follows multistep commands consistently  Impulsive: Moderately    Home Living:  Type of Home: House  Lives With: Spouse (+2 cats)  Home Adaptive Equipment: Cane  Home Layout: One level  Home Access: Stairs to enter with rails  Entrance Stairs-Number of Steps: 2  Bathroom Shower/Tub: Tub/shower unit  Bathroom Toilet: Standard  Bathroom Equipment: Grab bars in shower, Shower chair with back    Prior Function:  Hand Dominance: Right  Prior Function Comments: INDEP with BADLs and func amb at baseline; however, intermitent help recently d/t lumbar pain    IADL History:  Current License: Yes  Mode of Transportation: Car  Occupation: Retired  Type of Occupation:     ADL:  Eating Assistance: Independent (anticipate)  Grooming Assistance: Stand by (facial grooming)  Bathing Assistance: Minimal (anticipate)  UE Dressing Assistance: Minimal (assist to don/doff hosp gown this date)  LE Dressing Assistance: Minimal (assist to don/doff B socks; via figure 4)  Toileting Assistance with Device: Maximal (assist for hygiene 2/2 BM this date)    Bed Mobility/Transfers:   Bed Mobility 1  Bed Mobility 1: Supine to sitting  Level of Assistance 1: Close supervision  Bed Mobility Comments 1: VCs for log roll technique    Transfer 1  Technique 1: Sit to stand, Stand to sit  Transfer Device 1: Walker  Transfer Level of Assistance 1: Close supervision  Trials/Comments 1: 3x trials throughout; VCs for hand placement on walker    Therapy/Activity:   Therapeutic Activity  Therapeutic Activity  1: VCs provided throughout for body mechanics, safety, and sequencing    Vision:  Vision - Basic Assessment  Current Vision: Wears glasses only for reading  Patient Visual Report:  (Denies acute visual deficits)    Sensation:  Sensation Comment: Endorses with pain in back while seated    Strength:  Strength Comments: BUE grossly WFL    Hand Function:  Hand Function  Gross Grasp: Functional  Coordination: Functional    Outcome Measures:   [facility] Daily Activity  Putting on and taking off regular lower body clothing: A little  Bathing (including washing, rinsing, drying): A little  Putting on and taking off regular upper body clothing: A little  Toileting, which includes using toilet, bedpan or urinal: A lot  Taking care of personal grooming such as brushing teeth: A little  Eating Meals: None  Daily Activity - Total Score: 18    OT Adult Other Outcome Measures  4AT: Negative    Education Documentation  Body Mechanics, taught by Cara Love OT at 5/23/2025  1:54 PM.  Learner: Patient  Readiness: Acceptance  Method: Explanation  Response: Verbalizes Understanding    Precautions, taught by Cara Love OT at 5/23/2025  1:54 PM.  Learner: Patient  Readiness: Acceptance  Method: Explanation  Response: Verbalizes Understanding    ADL Training, taught by Cara Love OT at 5/23/2025  1:54 PM.  Learner: Patient  Readiness: Acceptance  Method: Explanation  Response: Verbalizes Understanding    Education Comments  No comments found.      Goals:  Encounter Problems       Encounter Problems (Active)       ADLs       Pt will complete UB /LB bathing tasks with modified independence while seated and AE as needed.  (Progressing)       Start:  05/23/25    Expected End:  06/13/25            Pt will complete LB dressing with modified independence while seated and/or standing and AE as needed. (Progressing)       Start:  05/23/25    Expected End:  06/13/25            Pt will complete toilet hygiene while seated  /standing with independent level of assistance.   (Progressing)       Start:  05/23/25    Expected End:  06/13/25               COGNITION/SAFETY       Patient will recall and adhere to spinal precautions during all functional mobility/ADL tasks in order to demonstrate improved understanding and promote healing post op (Progressing)       Start:  05/23/25    Expected End:  06/13/25               MOBILITY       Pt will complete functional ambulation household /community distance with modified independence and LRAD.  (Progressing)       Start:  05/23/25    Expected End:  06/13/25               TRANSFERS       Pt will complete functional transfers with modified independence and LRAD.  (Progressing)       Start:  05/23/25    Expected End:  06/13/25                   ---------------  Cara Haynes (OTR/L, OTD)  Inpatient Occupational Therapist   Rehab Office: [phone]

## 2025-05-23 NOTE — ASSESSMENT & PLAN NOTE
Miguel Lofton is a 69 y.o. male patient, with PMHx of prostate cancer, base of the tongue cancer, HTN, HLD, hypothyroidism, COPD, anxiety who had a CT chest for annual lung cancer screening on 5/13 that showed possible compression fx of T5 and T6. Imaging was reviewed at office visit 5/19 and was advised he present to ED for MRI. He presented to [facility] 5/19 where MRI T spine (5/19) showed acute discitis/osteomyelitis at T5-6 with marked surrounding phlegmon and circumferential epidural phlegmon resulting in moderate to severe spinal canal stenosis. No epidural abscess. He was transferred to [facility] 5/20 for NSGY eval. NSGY consulted 5/20, pt taken to the OR 5/21 for T5-6 laminectomy and T3-8 fusion. Cultures taken in OR/pending. ID consulted 5/21 and rec Vanc (5/22-) and CTX (5/22) pending OR culture results. PT/OT consulted 5/22. DC pending improvement in pain, NSGY/ID clearance, atb plan, and PT/OT recs.     # Acute on chronic back pain   # Compression fracture   # Vertebral osteomyelitis  # Phlegmonous tissue c/f infection  - CT Chest (OSH): considerable deformity of what is felt to be T5 which may be related to a compression fracture of the caudal endplate. There is some compression of the superior endplate of T6.  -  MRI T spine (5/19 at OSH): Acute discitis/osteomyelitis at T5-6 with marked surrounding phlegmon and circumferential epidural phlegmon resulting in moderate to severe spinal canal stenosis. No epidural abscess. Requested for image import from [facility]    - CRP 7.35, ESR 90 (5/20); repeat pending 5/22  - s/p OR 5/21/25 with neurosurgery for T5-6 laminectomy and T3-8 fusion. Per neurosurgery, phlegmon appeared infectious without purulence  - OR surg path (5/21): pending  - Tissue/fungal OR cultures x2 (5/21): pending  - Thoracic wound vac in place, NSGY managing  - [place] drain in place s/p OR  - ID consulted 5/21; rec Vanc (5/22-) and CTX (5/22) pending OR culture results.  - Pain  management per neurosurgery recs: Tylenol 650mg q4h scheduled, cyclobenzaprine 10mg q8h scheduled, lidocaine patches x2 to back daily, oxycodone 5mg q4h PRN for mild-mod pain, oxycodone 10mg q4h PRN for severe pain, and IV dilaudid 0.2mg q3h PRN for breakthrough pain. Okay for toradol 30 mg q6h up to 8 doses if warranted, not to be used after POD 2 (not ordered)  - Continue home Gabapentin 300mg daily  - Blood cultures x2 (5/20): NGTD  - Urine culture (5/20): negative  - PT/OT consulted 5/22 with recs for home care    # Abdominal pain  - Reported abdominal pain above old PEG site on 5/22  - Likely 2/2 prone positioning after OR +/- developing ileus  - States this pain is new  - KUB (5/22): moderate colonic stool burden, possible post-op ileus  - Bowel regimen with DocuSenna 3tabs BID, Miralax BID, Lactulose BID, Dulcolax sup daily PRN, s/p 2x tap water enema 5/22     # Dysphasia  - SLP eval done no aspiration, rec soft/ thin liquid diet   - Dietician consulted with recs for Boost VHC (530 kcal, 22g pro) TID      # Base of the tongue Ca  - Dx with 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 Gamez--last seen 12/18/2024: laryngeal structures are noted for grossly normal appearance; true vocal cords, false vocal cords, remaining structures, including epiglottis, piriform sinuses and base of tongue are all grossly normal; there is no evidence of gross mass nodule, polyp, tumor or other defect   - Follows yearly with Dr. Lucero      # Prostate Ca  - s/p radical prostatectomy by Dr Lang Mcneil in 2018  - [id number], [place] 7, + right anterior margin  - PSMA-PET done on 12/6/2023 showed no PSMA uptake within the post-surgical bed; there is PSMA uptake of a left common iliac retroperitoneal lymph node SUV 5.6 and PSMA uptake of a right pelvic sidewall lymph node SUV 3.5  - Dr Rangel recommended  radiation therapy. Patient instead went to [facility] and was treated by Dr Zackary Ely (4600 cGy in 23 fractions to the pelvis and LN followed by [place] to prostate bed and LN to 2500 cGy in 10 fractions)  - Since completing radiation, has been experiencing significant fatigue  - He is now following with urologist Dr. Boyd  - For pain management, follows with [facility] palliative care team   - PSA < 0.01 (2/19/25)--> PSA <0.10 (5/20)     # COPD  # Smoking Hx  # Lung Ca screening  - Follows with pulmonologist Dr Hutchison   - PFTs 03/2025: FEV1 72% FEV1/FVC 0.63  - Did not tolerate Breztri  - Continue home Spiriva 2 puffs daily     # HTN/HLD  - Continue home Metop Succs 25mg daily and Lipitor 80mg nightly     # Hypothyroidism  - Last TSH 4.49 (5/12)  - Continue home Synthroid 75mcg daily     # Anemia  - Likely anemia of chronic disease  - BL Hgb ~11 after cancer tx; Hgb 9.9 (5/20)--> 8.3 (5/22) post-op--> 8.1 (5/23)  - T&S active from 5/20-  ordered again for 5/23  - Ferritin 305, Folate 7.5, Iron 41, TIBC 269, %Sat 15, UIBC 228, Vit B12 1,342  - Stopped home Vit B12 sup d/t elevated B12 level     DVT prophy: Lovenox, was held for neurosurgery and resumed DVT prophy 5/22 PM per ortho     # Dispo:   - Full code, confirmed with patient on admission   - NOK: Eleni Lofton (wife) [phone]  - DC pending improvement in pain, NSGY/ID clearance, culture results and atb plan  - NSGY FUV 6/9, Dr. Lucero (heme onc) FUV 8/8, NSGY FUV 8/11

## 2025-05-23 NOTE — PROGRESS NOTES
"Miguel Lofton is a 69 y.o. male on day 3 of admission presenting with Osteomyelitis.    Subjective   No events overnight. Having BMs    Objective     Physical Exam  Awake Ox3  RUE D5 B5 T5 HG/IO 5  RLE HF5 KE5 DF/PF 5  LUE D5 B5 T5 HG/IO 5  LLE HF5 KE5 DF/PF 5  Abd non tender  Incision covered with prevena   Drain in place    Last Recorded Vitals  Blood pressure 119/73, pulse 81, temperature 36.9 °C (98.4 °F), temperature source Temporal, resp. rate 20, height 1.727 m (5' 8\"), weight 73.4 kg (161 lb 13.1 oz), SpO2 95%.  Intake/Output last 3 Shifts:  I/O last 3 completed shifts:  In: 1725 (23.5 mL/kg) [P.O.:325; I.V.:100 (1.4 mL/kg); IV Piggyback:1300]  Out: 2480 (33.8 mL/kg) [Urine:2315 (0.9 mL/kg/hr); Drains:115; Blood:50]  Weight: 73.4 kg     Relevant Results  Lab Results   Component Value Date    WBC 10.4 05/22/2025    HGB 8.3 (L) 05/22/2025    HCT 26.6 (L) 05/22/2025    MCV 78 (L) 05/22/2025     05/22/2025     Lab Results   Component Value Date    GLUCOSE 88 05/22/2025    CALCIUM 8.8 05/22/2025     05/22/2025    K 3.8 05/22/2025    CO2 31 05/22/2025    CL 99 05/22/2025    BUN 12 05/22/2025    CREATININE 0.61 05/22/2025       Assessment & Plan  Osteomyelitis    Miguel Lofton is a 69 year old male with h/o HTN,  HLD, CAD (on ASA), hypoT, COPD, anxiety, LBP (annual radioablations), prostate cancer (s/p prostatectomy 2018), base of tongue cancer (dx 2022, s/p radiation/chemo, in remission), diastolic CHF, p/w mid back pain x4 months, low back pain w BLE radiculopathy and weakness x3 weeks, 5/13 CT chest T5 compression fx, MRI TS T5-T6 compression fx w disc/osteo, epidural phlegmon w moderate canal stenosis   5/21 s/p T5-6 transpedicular decompression, T3-8 fusion    Rommel primary  Maintain drain and prevena, monitor output  Follow up OR cx  Hold ASA, ok to restart POD7  Continued PT and OT eval  Aggressive bowel regimen  Wean O2 as able, encourage incentive spirometer  MRI C/L spine w/o when " able  Vanc, CTX per ID  SCDs, LVX          Huan Gold MD

## 2025-05-23 NOTE — TELEPHONE ENCOUNTER
Spouse called to report patient has an infection in his vertebra and is at the [facility]. Stated he is down to 150 pounds.

## 2025-05-23 NOTE — CARE PLAN
The patient's goals for the shift include Pain management    The clinical goals for the shift include Pt will tolerate sitting in a chair tid

## 2025-05-23 NOTE — PROGRESS NOTES
Miguel Lofton is a 69 y.o. male on day 3 of admission presenting with Osteomyelitis.    Subjective   Interval History:   Complaining of back pain  On 1 L oxygen  Afebrile, HD stable  C/s NGTD  T- spot P  On ceftriaxone and vancomycin         Objective   Range of Vitals (last 24 hours)  Heart Rate:  [69-90]   Temp:  [36 °C (96.8 °F)-36.9 °C (98.4 °F)]   Resp:  [18-20]   BP: (107-130)/(69-80)   SpO2:  [93 %-99 %]   Daily Weight  05/21/25 : 73.4 kg (161 lb 13.1 oz)    Body mass index is 24.6 kg/m².    Physical Exam  Constitutional:       General: He is not in acute distress.  Cardiovascular:      Rate and Rhythm: Normal rate and regular rhythm.   Pulmonary:      Effort: No respiratory distress.      Breath sounds: Normal breath sounds.   Abdominal:      Palpations: Abdomen is soft.   Musculoskeletal:      Comments: Drain in site    Neurological:      Mental Status: He is alert and oriented to person, place, and time.    Antibiotics  cefTRIAXone - 2 gram/50 mL  vancomycin - 1250 mg/250 mL    Relevant Results  Labs  Results from last 72 hours   Lab Units 05/23/25  0718 05/22/25  0826   WBC AUTO x10*3/uL 8.3 10.4   HEMOGLOBIN g/dL 8.1* 8.3*   HEMATOCRIT % 27.5* 26.6*   PLATELETS AUTO x10*3/uL 417 420     Results from last 72 hours   Lab Units 05/23/25  0718 05/22/25  0826   SODIUM mmol/L 136 137   POTASSIUM mmol/L 3.4* 3.8   CHLORIDE mmol/L 101 99   CO2 mmol/L 29 31   BUN mg/dL 13 12   CREATININE mg/dL 0.61 0.61   GLUCOSE mg/dL 93 88   CALCIUM mg/dL 8.4* 8.8   ANION GAP mmol/L 9* 11   EGFR mL/min/1.73m*2 >90 >90   PHOSPHORUS mg/dL 2.8 2.8     Results from last 72 hours   Lab Units 05/23/25  0718 05/22/25  0826   ALBUMIN g/dL 2.6* 2.8*     Estimated Creatinine Clearance: 110.6 mL/min (by C-G formula based on SCr of 0.61 mg/dL).  C-Reactive Protein   Date Value Ref Range Status   05/22/2025 5.94 (H) <1.00 mg/dL Final   05/20/2025 7.35 (H) <1.00 mg/dL Final          Assessment/Plan   69 y.o. male with PMHx of HTN, DM,  hyothyroidism, COPD, prostate CA s/p radical prostatectomy 2018 and radiation and tongue CA 2022 s/p chemoradiation . Hx noted for 3 m hx of progressive mid back pain and radiculopathy with LE weakness in the last 3 weeks. Labs revealed leukocytosis 13.4, hb 10, . ESR 93, CRP 96 MRI showed Acute discitis/osteomyelitis at T5-6 with marked surrounding phlegmon and circumferential epidural phlegmon resulting in moderate to severe spinal canal stenosis.Today 5/21 pt underwent T5-6 laminectomy, T3-8 instrumented fusion , intra-op findings of phlegmonous appearing tissue in T5-T6 disc space/deep to PLL without extension past PLL .      # Acute discitis/osteomyelitis at T5-6 s/p T5-6 laminectomy, T3-8 fusion  C/s NGTD , pathology Granulation tissue with focal acute osteomyelitis.   Negative for carcinoma.  16S PCR requested on samples.   T spot pending   - anticipate prolonged tx with antibiotics, tentatively 8 weeks     Recommendations:  - Continue vancomycin (pharm to dose)  - Continue ceftriaxone 2 g  q 24 hours  - ID will follow           Orin Robbins MD

## 2025-05-23 NOTE — PROGRESS NOTES
Physical Therapy    Physical Therapy Treatment    Patient Name: Miguel Lofton  MRN: [id number]  Department: [facility]  Room: [facility]402-A  Today's Date: 5/23/2025  Time Calculation  Start Time: 1020  Stop Time: 1040  Time Calculation (min): 20 min         Assessment/Plan   PT Assessment  End of Session Communication: Bedside nurse  End of Session Patient Position: Bed, 3 rail up, Alarm on  PT Plan  Inpatient/Swing Bed or Outpatient: Inpatient  PT Plan  Treatment/Interventions: Bed mobility, Transfer training, Gait training, Stair training, Balance training, Neuromuscular re-education, Strengthening, Endurance training, Therapeutic exercise, Therapeutic activity, Home exercise program  PT Plan: Ongoing PT  PT Frequency: 5 times per week  PT Discharge Recommendations: Low intensity level of continued care  Equipment Recommended upon Discharge: TBD  PT Recommended Transfer Status: Assist x1  PT - OK to Discharge: Yes    PT Visit Info:  PT Received On: 05/23/25     General Visit Information:   General  Prior to Session Communication: Bedside nurse  Patient Position Received: Up in chair, Alarm on  General Comment: pt up in chair. ending session with OT. limited by pain and wanting to return to bed. cues for line management and to slow mobility    Subjective   Precautions:  Precautions  Medical Precautions: Fall precautions, Spinal precautions, Oxygen therapy device and L/min            Objective   Pain:  Pain Assessment  Pain Assessment: 0-10  0-10 (Numeric) Pain Score:  (c/o pain at back. did not formally rate)  Cognition:  Cognition  Overall Cognitive Status: Within Functional Limits  Orientation Level: Oriented X4  Coordination:  Movements are Fluid and Coordinated: Yes  Postural Control:  Postural Control  Postural Control: Within Functional Limits  Static Sitting Balance  Static Sitting-Balance Support: No upper extremity supported, Feet supported  Static Sitting-Level of Assistance: Close supervision  Dynamic Sitting  Balance  Dynamic Sitting-Balance Support: Bilateral upper extremity supported, Feet supported  Dynamic Sitting-Level of Assistance: Close supervision  Dynamic Sitting-Balance: Trunk control activities  Static Standing Balance  Static Standing-Balance Support: Bilateral upper extremity supported (fww)  Static Standing-Level of Assistance: Close supervision  Dynamic Standing Balance  Dynamic Standing-Balance Support: Bilateral upper extremity supported (fww)  Dynamic Standing-Level of Assistance: Contact guard  Dynamic Standing-Balance: Turning    Activity Tolerance:  Activity Tolerance  Endurance: Tolerates 10 - 20 min exercise with multiple rests  Treatments:       Therapeutic Activity  Therapeutic Activity Performed: Yes  Therapeutic Activity 1: VC for hand placement prior to transfers from sitting to standing/standing to sitting  Therapeutic Activity 2: time up in bathroom on toilet, supervision. assist with pericare    Bed Mobility  Bed Mobility: Yes  Bed Mobility 1  Bed Mobility 1: Sitting to supine  Level of Assistance 1: Close supervision  Bed Mobility Comments 1: VC log roll    Ambulation/Gait Training  Ambulation/Gait Training Performed: Yes  Ambulation/Gait Training 1  Surface 1: Level tile  Device 1: Rolling walker  Assistance 1: Contact guard, Minimal verbal cues  Quality of Gait 1: Wide base of support, Decreased step length, Inconsistent stride length, Forward flexed posture  Comments/Distance (ft) 1: ~10ftx2; C/o increased pain down LLE with ambulating  Transfers  Transfer: Yes  Transfer 1  Transfer From 1: Sit to, Stand to  Transfer to 1: Sit, Stand  Technique 1: Sit to stand, Stand to sit  Transfer Device 1: Walker  Transfer Level of Assistance 1: Close supervision, Contact guard, Minimal verbal cues  Trials/Comments 1: x2 trials; cues safety    Stairs  Stairs: No    Outcome Measures:  [facility] Basic Mobility  Turning from your back to your side while in a flat bed without using bedrails: SELVIN  little  Moving from lying on your back to sitting on the side of a flat bed without using bedrails: A little  Moving to and from bed to chair (including a wheelchair): A little  Standing up from a chair using your arms (e.g. wheelchair or bedside chair): A little  To walk in hospital room: A little  Climbing 3-5 steps with railing: A lot  Basic Mobility - Total Score: 17    Education Documentation  Precautions, taught by Jenn Otoole PT at 5/23/2025  2:23 PM.  Learner: Patient  Readiness: Acceptance  Method: Explanation  Response: Verbalizes Understanding  Comment: spine prec    Body Mechanics, taught by Jenn Otoole PT at 5/23/2025  2:23 PM.  Learner: Patient  Readiness: Acceptance  Method: Explanation  Response: Verbalizes Understanding  Comment: spine prec    Home Exercise Program, taught by Jenn Otoole PT at 5/23/2025  2:23 PM.  Learner: Patient  Readiness: Acceptance  Method: Explanation  Response: Verbalizes Understanding  Comment: spine prec    Mobility Training, taught by Jenn Otoole PT at 5/23/2025  2:23 PM.  Learner: Patient  Readiness: Acceptance  Method: Explanation  Response: Verbalizes Understanding  Comment: spine prec    Education Comments  No comments found.        OP EDUCATION:       Encounter Problems       Encounter Problems (Active)       Balance       Pt will maintain static/dynamic standing balance x4 minutes with RW and supervision to demonstrate improved balance  (Progressing)       Start:  05/22/25    Expected End:  06/05/25               Mobility       Pt will complete bed mobility independently via log roll technique with HOB flat and no use of bed rails (Progressing)       Start:  05/22/25    Expected End:  06/05/25            Pt will amb >250ft with LRAD and supervision in prep for safe discharge home  (Progressing)       Start:  05/22/25    Expected End:  06/05/25            Pt will a/descend 2 steps with unliat rail (either side) and CGA in prep for  safe home entry  (Progressing)       Start:  05/22/25    Expected End:  06/05/25               PT Transfers       Pt will transfer sit to stand with LRAD and supervision in prep for out of bed mobility  (Progressing)       Start:  05/22/25    Expected End:  06/05/25 05/23/25 at 2:25 PM - Jenn Otoole, PT

## 2025-05-23 NOTE — PROGRESS NOTES
"Miguel Lofton is a 69 y.o. male on day 3 of admission presenting with Osteomyelitis.    Subjective   Pt reports some pain in the middle of his upper back underneath the spinal dressing. He denies any fevers/chills, SOB, or CP. He moved his bowels last night.    Objective     Physical Exam  Vitals reviewed.   Constitutional:       Appearance: Normal appearance.   HENT:      Head: Normocephalic and atraumatic.      Nose: Nose normal.      Mouth/Throat:      Mouth: Mucous membranes are moist.      Pharynx: Oropharynx is clear.   Eyes:      Extraocular Movements: Extraocular movements intact.      Pupils: Pupils are equal, round, and reactive to light.   Cardiovascular:      Rate and Rhythm: Normal rate and regular rhythm.      Pulses: Normal pulses.      Heart sounds: Normal heart sounds.   Pulmonary:      Effort: Pulmonary effort is normal.      Breath sounds: Normal breath sounds.   Abdominal:      General: Bowel sounds are normal.      Palpations: Abdomen is soft.   Musculoskeletal:         General: Normal range of motion.   Skin:     General: Skin is warm.      Comments: Thoracic spine dressing with wound vac c/d/I  + Thoracic zuri drain with bloody drainage    Neurological:      General: No focal deficit present.      Mental Status: He is alert and oriented to person, place, and time. Mental status is at baseline.   Psychiatric:         Mood and Affect: Mood normal.         Behavior: Behavior normal.       Last Recorded Vitals  Blood pressure 97/59, pulse 83, temperature 36.5 °C (97.7 °F), temperature source Temporal, resp. rate 18, height 1.727 m (5' 8\"), weight 73.4 kg (161 lb 13.1 oz), SpO2 97%.  Intake/Output last 3 Shifts:  I/O last 3 completed shifts:  In: 350 (4.8 mL/kg) [P.O.:300; IV Piggyback:50]  Out: 2630 (35.8 mL/kg) [Urine:2550 (1 mL/kg/hr); Drains:80]  Weight: 73.4 kg     Relevant Results  Labs reviewed    Scheduled Medications[1]    Continuous Medications[2]    PRN Medications[3]      Assessment & " Plan  Osteomyelitis  Miguel Lofton is a 69 y.o. male patient, with PMHx of prostate cancer, base of the tongue cancer, HTN, HLD, hypothyroidism, COPD, anxiety who had a CT chest for annual lung cancer screening on 5/13 that showed possible compression fx of T5 and T6. Imaging was reviewed at office visit 5/19 and was advised he present to ED for MRI. He presented to [facility] 5/19 where MRI T spine (5/19) showed acute discitis/osteomyelitis at T5-6 with marked surrounding phlegmon and circumferential epidural phlegmon resulting in moderate to severe spinal canal stenosis. No epidural abscess. He was transferred to [facility] 5/20 for NSGY eval. NSGY consulted 5/20, pt taken to the OR 5/21 for T5-6 laminectomy and T3-8 fusion. Cultures taken in OR/pending. ID consulted 5/21 and rec Vanc (5/22-) and CTX (5/22) pending OR culture results. PT/OT consulted 5/22. DC pending improvement in pain, NSGY/ID clearance, atb plan, and PT/OT recs.     # Acute on chronic back pain   # Compression fracture   # Vertebral osteomyelitis  # Phlegmonous tissue c/f infection  - CT Chest (OSH): considerable deformity of what is felt to be T5 which may be related to a compression fracture of the caudal endplate. There is some compression of the superior endplate of T6.  -  MRI T spine (5/19 at OSH): Acute discitis/osteomyelitis at T5-6 with marked surrounding phlegmon and circumferential epidural phlegmon resulting in moderate to severe spinal canal stenosis. No epidural abscess. Requested for image import from [facility]    - CRP 7.35, ESR 90 (5/20); repeat pending 5/22  - s/p OR 5/21/25 with neurosurgery for T5-6 laminectomy and T3-8 fusion. Per neurosurgery, phlegmon appeared infectious without purulence  - OR surg path (5/21): pending  - Tissue/fungal OR cultures x2 (5/21): pending  - Thoracic wound vac in place, NSGY managing  - Shannon drain in place s/p OR  - ID consulted 5/21; rec Vanc (5/22-) and CTX (5/22) pending OR culture  results.  - Pain management per neurosurgery recs: Tylenol 650mg q4h scheduled, cyclobenzaprine 10mg q8h scheduled, lidocaine patches x2 to back daily, oxycodone 5mg q4h PRN for mild-mod pain, oxycodone 10mg q4h PRN for severe pain, and IV dilaudid 0.2mg q3h PRN for breakthrough pain. Okay for toradol 30 mg q6h up to 8 doses if warranted, not to be used after POD 2 (not ordered)  - Continue home Gabapentin 300mg daily  - Blood cultures x2 (5/20): NGTD  - Urine culture (5/20): negative  - PT/OT consulted 5/22 with recs for home care    # Abdominal pain  - Reported abdominal pain above old PEG site on 5/22  - Likely 2/2 prone positioning after OR +/- developing ileus  - States this pain is new  - KUB (5/22): moderate colonic stool burden, possible post-op ileus  - Bowel regimen with DocuSenna 3tabs BID, Miralax BID, Lactulose BID, Dulcolax sup daily PRN, s/p 2x tap water enema 5/22     # Dysphasia  - SLP eval done no aspiration, rec soft/ thin liquid diet   - Dietician consulted with recs for Boost VHC (530 kcal, 22g pro) TID      # Base of the tongue Ca  - Dx with 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 Gamez--last seen 12/18/2024: laryngeal structures are noted for grossly normal appearance; true vocal cords, false vocal cords, remaining structures, including epiglottis, piriform sinuses and base of tongue are all grossly normal; there is no evidence of gross mass nodule, polyp, tumor or other defect   - Follows yearly with Dr. Lucero      # Prostate Ca  - s/p radical prostatectomy by Dr Lang Mcneil in 2018  - [id number], [place] 7, + right anterior margin  - PSMA-PET done on 12/6/2023 showed no PSMA uptake within the post-surgical bed; there is PSMA uptake of a left common iliac retroperitoneal lymph node SUV 5.6 and PSMA uptake of a right pelvic sidewall lymph node SUV 3.5  - Dr Rangel  recommended radiation therapy. Patient instead went to [facility] and was treated by Dr Zackary Ely (4600 cGy in 23 fractions to the pelvis and LN followed by [place] to prostate bed and LN to 2500 cGy in 10 fractions)  - Since completing radiation, has been experiencing significant fatigue  - He is now following with urologist Dr. Boyd  - For pain management, follows with [facility] palliative care team   - PSA < 0.01 (2/19/25)--> PSA <0.10 (5/20)     # COPD  # Smoking Hx  # Lung Ca screening  - Follows with pulmonologist Dr Hutchison   - PFTs 03/2025: FEV1 72% FEV1/FVC 0.63  - Did not tolerate Breztri  - Continue home Spiriva 2 puffs daily     # HTN/HLD  - Continue home Metop Succs 25mg daily and Lipitor 80mg nightly     # Hypothyroidism  - Last TSH 4.49 (5/12)  - Continue home Synthroid 75mcg daily     # Anemia  - Likely anemia of chronic disease  - BL Hgb ~11 after cancer tx; Hgb 9.9 (5/20)--> 8.3 (5/22) post-op--> 8.1 (5/23)  - T&S active from 5/20-  ordered again for 5/23  - Ferritin 305, Folate 7.5, Iron 41, TIBC 269, %Sat 15, UIBC 228, Vit B12 1,342  - Stopped home Vit B12 sup d/t elevated B12 level     DVT prophy: Lovenox, was held for neurosurgery and resumed DVT prophy 5/22 PM per ortho     # Dispo:   - Full code, confirmed with patient on admission   - NOK: Eleni Lofton (wife) [phone]  - DC pending improvement in pain, NSGY/ID clearance, culture results and atb plan  - NSGY FUV 6/9, Dr. Lucero (heme onc) FUV 8/8, NSGY FUV 8/11    I spent >60 minutes on the professional and overall care of this patient    Assessment and plan as above discussed with attending physician Dr. Pro Easley, APRN-CNP         [1] acetaminophen, 650 mg, oral, q4h  [Held by provider] aspirin, 81 mg, oral, Daily  atorvastatin, 80 mg, oral, Daily  cefTRIAXone, 2 g, intravenous, q24h  cholecalciferol, 25 mcg, oral, Daily  cyclobenzaprine, 10 mg, oral, TID  enoxaparin, 40 mg, subcutaneous, q24h  gabapentin, 300 mg,  oral, Daily  ketorolac, 30 mg, intravenous, q6h  lactulose, 20 g, oral, BID  levothyroxine, 75 mcg, oral, Daily  lidocaine, 2 patch, transdermal, Daily  metoprolol succinate XL, 25 mg, oral, Daily  pantoprazole, 40 mg, oral, Daily before breakfast  polyethylene glycol, 17 g, oral, BID  sennosides-docusate sodium, 3 tablet, oral, BID  tiotropium, 2 puff, inhalation, Daily  vancomycin, 1,250 mg, intravenous, q12h     [2]    [3] PRN medications: bisacodyl, HYDROmorphone, LORazepam, oxyCODONE, oxyCODONE, simethicone, vancomycin

## 2025-05-24 ENCOUNTER — APPOINTMENT (OUTPATIENT)
Dept: RADIOLOGY | Facility: HOSPITAL | Age: 70
End: 2025-05-24
Payer: COMMERCIAL

## 2025-05-24 LAB
ALBUMIN SERPL BCP-MCNC: 3 G/DL (ref 3.4–5)
ANION GAP SERPL CALC-SCNC: 13 MMOL/L (ref 10–20)
BACTERIA BLD CULT: NORMAL
BACTERIA BLD CULT: NORMAL
BUN SERPL-MCNC: 11 MG/DL (ref 6–23)
CALCIUM SERPL-MCNC: 8.1 MG/DL (ref 8.6–10.6)
CHLORIDE SERPL-SCNC: 103 MMOL/L (ref 98–107)
CO2 SERPL-SCNC: 23 MMOL/L (ref 21–32)
CREAT SERPL-MCNC: 0.49 MG/DL (ref 0.5–1.3)
EGFRCR SERPLBLD CKD-EPI 2021: >90 ML/MIN/1.73M*2
ERYTHROCYTE [DISTWIDTH] IN BLOOD BY AUTOMATED COUNT: 19.5 % (ref 11.5–14.5)
GLUCOSE SERPL-MCNC: 82 MG/DL (ref 74–99)
HCT VFR BLD AUTO: 31.1 % (ref 41–52)
HGB BLD-MCNC: 8.7 G/DL (ref 13.5–17.5)
MAGNESIUM SERPL-MCNC: 2.09 MG/DL (ref 1.6–2.4)
MCH RBC QN AUTO: 24.1 PG (ref 26–34)
MCHC RBC AUTO-ENTMCNC: 28 G/DL (ref 32–36)
MCV RBC AUTO: 86 FL (ref 80–100)
NRBC BLD-RTO: 0 /100 WBCS (ref 0–0)
PHOSPHATE SERPL-MCNC: 2.6 MG/DL (ref 2.5–4.9)
PLATELET # BLD AUTO: 306 X10*3/UL (ref 150–450)
POTASSIUM SERPL-SCNC: 4.5 MMOL/L (ref 3.5–5.3)
RBC # BLD AUTO: 3.61 X10*6/UL (ref 4.5–5.9)
SODIUM SERPL-SCNC: 134 MMOL/L (ref 136–145)
VANCOMYCIN SERPL-MCNC: 18.2 UG/ML (ref 5–20)
WBC # BLD AUTO: 8.4 X10*3/UL (ref 4.4–11.3)

## 2025-05-24 PROCEDURE — 1170000001 HC PRIVATE ONCOLOGY ROOM DAILY

## 2025-05-24 PROCEDURE — 94640 AIRWAY INHALATION TREATMENT: CPT

## 2025-05-24 PROCEDURE — 2500000001 HC RX 250 WO HCPCS SELF ADMINISTERED DRUGS (ALT 637 FOR MEDICARE OP): Performed by: STUDENT IN AN ORGANIZED HEALTH CARE EDUCATION/TRAINING PROGRAM

## 2025-05-24 PROCEDURE — 2500000005 HC RX 250 GENERAL PHARMACY W/O HCPCS

## 2025-05-24 PROCEDURE — 2500000001 HC RX 250 WO HCPCS SELF ADMINISTERED DRUGS (ALT 637 FOR MEDICARE OP)

## 2025-05-24 PROCEDURE — 80069 RENAL FUNCTION PANEL: CPT | Performed by: STUDENT IN AN ORGANIZED HEALTH CARE EDUCATION/TRAINING PROGRAM

## 2025-05-24 PROCEDURE — 2500000002 HC RX 250 W HCPCS SELF ADMINISTERED DRUGS (ALT 637 FOR MEDICARE OP, ALT 636 FOR OP/ED): Performed by: NURSE PRACTITIONER

## 2025-05-24 PROCEDURE — 99231 SBSQ HOSP IP/OBS SF/LOW 25: CPT | Performed by: NURSE PRACTITIONER

## 2025-05-24 PROCEDURE — 2500000004 HC RX 250 GENERAL PHARMACY W/ HCPCS (ALT 636 FOR OP/ED): Mod: JZ | Performed by: NURSE PRACTITIONER

## 2025-05-24 PROCEDURE — 87081 CULTURE SCREEN ONLY: CPT | Performed by: NURSE PRACTITIONER

## 2025-05-24 PROCEDURE — C1751 CATH, INF, PER/CENT/MIDLINE: HCPCS

## 2025-05-24 PROCEDURE — 2500000004 HC RX 250 GENERAL PHARMACY W/ HCPCS (ALT 636 FOR OP/ED): Mod: TB | Performed by: STUDENT IN AN ORGANIZED HEALTH CARE EDUCATION/TRAINING PROGRAM

## 2025-05-24 PROCEDURE — 36573 INSJ PICC RS&I 5 YR+: CPT

## 2025-05-24 PROCEDURE — 36415 COLL VENOUS BLD VENIPUNCTURE: CPT | Performed by: STUDENT IN AN ORGANIZED HEALTH CARE EDUCATION/TRAINING PROGRAM

## 2025-05-24 PROCEDURE — 80202 ASSAY OF VANCOMYCIN: CPT

## 2025-05-24 PROCEDURE — 83735 ASSAY OF MAGNESIUM: CPT | Performed by: STUDENT IN AN ORGANIZED HEALTH CARE EDUCATION/TRAINING PROGRAM

## 2025-05-24 PROCEDURE — 2500000002 HC RX 250 W HCPCS SELF ADMINISTERED DRUGS (ALT 637 FOR MEDICARE OP, ALT 636 FOR OP/ED): Performed by: STUDENT IN AN ORGANIZED HEALTH CARE EDUCATION/TRAINING PROGRAM

## 2025-05-24 PROCEDURE — 85027 COMPLETE CBC AUTOMATED: CPT | Performed by: STUDENT IN AN ORGANIZED HEALTH CARE EDUCATION/TRAINING PROGRAM

## 2025-05-24 PROCEDURE — 2500000001 HC RX 250 WO HCPCS SELF ADMINISTERED DRUGS (ALT 637 FOR MEDICARE OP): Performed by: NURSE PRACTITIONER

## 2025-05-24 PROCEDURE — 2780000003 HC OR 278 NO HCPCS

## 2025-05-24 PROCEDURE — 2500000004 HC RX 250 GENERAL PHARMACY W/ HCPCS (ALT 636 FOR OP/ED): Mod: JZ

## 2025-05-24 RX ORDER — LIDOCAINE HYDROCHLORIDE 10 MG/ML
5 INJECTION, SOLUTION INFILTRATION; PERINEURAL ONCE
Status: DISCONTINUED | OUTPATIENT
Start: 2025-05-24 | End: 2025-06-03

## 2025-05-24 RX ADMIN — HYDROMORPHONE HYDROCHLORIDE 0.2 MG: 1 INJECTION, SOLUTION INTRAMUSCULAR; INTRAVENOUS; SUBCUTANEOUS at 22:43

## 2025-05-24 RX ADMIN — VANCOMYCIN HYDROCHLORIDE 1250 MG: 1.25 INJECTION, POWDER, LYOPHILIZED, FOR SOLUTION INTRAVENOUS at 22:43

## 2025-05-24 RX ADMIN — ATORVASTATIN CALCIUM 80 MG: 80 TABLET, FILM COATED ORAL at 09:04

## 2025-05-24 RX ADMIN — POTASSIUM CHLORIDE 40 MEQ: 1500 TABLET, EXTENDED RELEASE ORAL at 09:05

## 2025-05-24 RX ADMIN — METOPROLOL SUCCINATE 25 MG: 25 TABLET, EXTENDED RELEASE ORAL at 09:05

## 2025-05-24 RX ADMIN — OXYCODONE HYDROCHLORIDE 10 MG: 10 TABLET ORAL at 13:11

## 2025-05-24 RX ADMIN — LORAZEPAM 1 MG: 1 TABLET ORAL at 22:43

## 2025-05-24 RX ADMIN — CYCLOBENZAPRINE 10 MG: 10 TABLET, FILM COATED ORAL at 14:41

## 2025-05-24 RX ADMIN — ACETAMINOPHEN 650 MG: 325 TABLET, FILM COATED ORAL at 09:05

## 2025-05-24 RX ADMIN — CYCLOBENZAPRINE 10 MG: 10 TABLET, FILM COATED ORAL at 20:13

## 2025-05-24 RX ADMIN — LEVOTHYROXINE SODIUM 75 MCG: 0.07 TABLET ORAL at 09:04

## 2025-05-24 RX ADMIN — VANCOMYCIN HYDROCHLORIDE 1250 MG: 1.25 INJECTION, POWDER, LYOPHILIZED, FOR SOLUTION INTRAVENOUS at 10:20

## 2025-05-24 RX ADMIN — LIDOCAINE 4% 2 PATCH: 40 PATCH TOPICAL at 09:04

## 2025-05-24 RX ADMIN — CYCLOBENZAPRINE 10 MG: 10 TABLET, FILM COATED ORAL at 09:05

## 2025-05-24 RX ADMIN — ACETAMINOPHEN 650 MG: 325 TABLET, FILM COATED ORAL at 13:11

## 2025-05-24 RX ADMIN — ACETAMINOPHEN 650 MG: 325 TABLET, FILM COATED ORAL at 01:48

## 2025-05-24 RX ADMIN — OXYCODONE HYDROCHLORIDE 10 MG: 10 TABLET ORAL at 01:48

## 2025-05-24 RX ADMIN — ENOXAPARIN SODIUM 40 MG: 100 INJECTION SUBCUTANEOUS at 14:41

## 2025-05-24 RX ADMIN — PANTOPRAZOLE SODIUM 40 MG: 40 TABLET, DELAYED RELEASE ORAL at 09:05

## 2025-05-24 RX ADMIN — CEFTRIAXONE SODIUM 2 G: 2 INJECTION, SOLUTION INTRAVENOUS at 09:04

## 2025-05-24 RX ADMIN — GABAPENTIN 300 MG: 300 CAPSULE ORAL at 09:04

## 2025-05-24 RX ADMIN — OXYCODONE HYDROCHLORIDE 10 MG: 10 TABLET ORAL at 20:13

## 2025-05-24 RX ADMIN — TIOTROPIUM BROMIDE INHALATION SPRAY 2 PUFF: 3.12 SPRAY, METERED RESPIRATORY (INHALATION) at 10:54

## 2025-05-24 RX ADMIN — Medication 25 MCG: at 09:05

## 2025-05-24 RX ADMIN — ACETAMINOPHEN 650 MG: 325 TABLET, FILM COATED ORAL at 18:25

## 2025-05-24 RX ADMIN — OXYCODONE HYDROCHLORIDE 5 MG: 5 TABLET ORAL at 09:04

## 2025-05-24 ASSESSMENT — PAIN - FUNCTIONAL ASSESSMENT
PAIN_FUNCTIONAL_ASSESSMENT: 0-10

## 2025-05-24 ASSESSMENT — COGNITIVE AND FUNCTIONAL STATUS - GENERAL
TOILETING: A LITTLE
CLIMB 3 TO 5 STEPS WITH RAILING: A LOT
MOVING FROM LYING ON BACK TO SITTING ON SIDE OF FLAT BED WITH BEDRAILS: A LITTLE
DRESSING REGULAR LOWER BODY CLOTHING: A LITTLE
DRESSING REGULAR UPPER BODY CLOTHING: A LITTLE
HELP NEEDED FOR BATHING: A LITTLE
MOVING TO AND FROM BED TO CHAIR: A LITTLE
DRESSING REGULAR UPPER BODY CLOTHING: A LITTLE
MOVING TO AND FROM BED TO CHAIR: A LITTLE
DRESSING REGULAR LOWER BODY CLOTHING: A LITTLE
TOILETING: A LITTLE
TURNING FROM BACK TO SIDE WHILE IN FLAT BAD: A LITTLE
CLIMB 3 TO 5 STEPS WITH RAILING: A LOT
HELP NEEDED FOR BATHING: A LITTLE
STANDING UP FROM CHAIR USING ARMS: A LITTLE
MOBILITY SCORE: 17
WALKING IN HOSPITAL ROOM: A LITTLE
MOBILITY SCORE: 18
STANDING UP FROM CHAIR USING ARMS: A LITTLE
DAILY ACTIVITIY SCORE: 20
TURNING FROM BACK TO SIDE WHILE IN FLAT BAD: A LITTLE
WALKING IN HOSPITAL ROOM: A LITTLE

## 2025-05-24 ASSESSMENT — PAIN SCALES - GENERAL
PAINLEVEL_OUTOF10: 5 - MODERATE PAIN
PAINLEVEL_OUTOF10: 6
PAINLEVEL_OUTOF10: 8
PAINLEVEL_OUTOF10: 3
PAINLEVEL_OUTOF10: 9
PAINLEVEL_OUTOF10: 6
PAINLEVEL_OUTOF10: 3
PAINLEVEL_OUTOF10: 7
PAINLEVEL_OUTOF10: 3
PAINLEVEL_OUTOF10: 5 - MODERATE PAIN
PAINLEVEL_OUTOF10: 7
PAINLEVEL_OUTOF10: 7

## 2025-05-24 ASSESSMENT — ACTIVITIES OF DAILY LIVING (ADL): LACK_OF_TRANSPORTATION: NO

## 2025-05-24 ASSESSMENT — PAIN DESCRIPTION - LOCATION
LOCATION: BACK
LOCATION: BACK

## 2025-05-24 NOTE — PROGRESS NOTES
"Miguel Lofton is a 69 y.o. male on day 4 of admission presenting with Osteomyelitis.    Subjective   No events overnight. Had another BM. Pain controlled.     Objective     Physical Exam  Awake Ox3  RUE D5 B5 T5 HG/IO 5  RLE HF5 KE5 DF/PF 5  LUE D5 B5 T5 HG/IO 5  LLE HF5 KE5 DF/PF 5  Abd non tender  Incision covered with prevena   Drain in place    Last Recorded Vitals  Blood pressure 108/67, pulse 81, temperature 36.8 °C (98.2 °F), temperature source Temporal, resp. rate 16, height 1.727 m (5' 8\"), weight 73.4 kg (161 lb 13.1 oz), SpO2 95%.  Intake/Output last 3 Shifts:  I/O last 3 completed shifts:  In: - (0 mL/kg)   Out: 850 (11.6 mL/kg) [Urine:850 (0.3 mL/kg/hr)]  Weight: 73.4 kg     Relevant Results  Lab Results   Component Value Date    WBC 8.3 05/23/2025    HGB 8.1 (L) 05/23/2025    HCT 27.5 (L) 05/23/2025    MCV 80 05/23/2025     05/23/2025     Lab Results   Component Value Date    GLUCOSE 93 05/23/2025    CALCIUM 8.4 (L) 05/23/2025     05/23/2025    K 3.4 (L) 05/23/2025    CO2 29 05/23/2025     05/23/2025    BUN 13 05/23/2025    CREATININE 0.61 05/23/2025       Assessment & Plan  Osteomyelitis    Miguel Lofton is a 69 year old male with h/o HTN,  HLD, CAD (on ASA), hypoT, COPD, anxiety, LBP (annual radioablations), prostate cancer (s/p prostatectomy 2018), base of tongue cancer (dx 2022, s/p radiation/chemo, in remission), diastolic CHF, p/w mid back pain x4 months, low back pain w BLE radiculopathy and weakness x3 weeks, 5/13 CT chest T5 compression fx, MRI TS T5-T6 compression fx w disc/osteo, epidural phlegmon w moderate canal stenosis   5/21 s/p T5-6 transpedicular decompression, T3-8 fusion, OR cx neg    Carney primary  Maintain drain and prevena, monitor  output, please strip drain q4 hours  Hold ASA, ok to restart POD7  Continued PT and OT eval  Aggressive bowel regimen  Wean O2 as able, encourage incentive spirometer  Please obtain MRI cervical and lumbar spine w/o contrast "   Vanc, CTX per ID  SCDs, LVX          Huan Gold MD

## 2025-05-24 NOTE — CONSULTS
PICC Insertion  Pre-Procedure Checklist:  Emergent Line Insertion: No  Type of Line to be Placed: PICC  Consent Obtained: Yes  Emergency Medication Necessary: No  Patient Identified with 2 Independent Identifiers: Yes  Review of Allergies, Anticoagulation, Relevant Labs, ECG/Telemetry: Yes  Risks/Benefits/Alternatives Discussed with Patient/POA/Legal Representative: Yes  Stop Sign on Door: Yes  Time Out Performed: Yes  Catheter Exchange: No    Positioning Checklist:  All People, Including Patient, in the Room with Cap and Mask: Yes  Fluoroscopy Used to Identify Vessel and Guide Insertion: No   Sterile Cover Used: Yes  Full Barrier Precautions Followed (Mask, Cap, Gown, Gloves): Yes  Hands Washed: Yes  Monitors Attached with Sound Alarms On: No  Full Body Sterile Drape (Head-to-Toe) Used to Cover Patient: Yes  Trendelenburg Position (For IJ and Subclavian): No  CHG Skin Prep Used and Allowed to Air Dry to Skin Procedure: Yes    Procedure Checklist:  Blood Aspirated From All Lumens, All Ports Subsequently Flushed: Yes  Catheter Caps Placed on All Lumens; Lumens Clamped: Yes  Maintain Guidewire Control Throughout, Ensuring Guidewire Removal: Yes  Maintain Sterile Field Throughout Insertion: Yes  Catheter Secured: Yes  Confirmatory Test of Venous Placement: Non-Pulsatile Blood    Post Procedure Checklist:  Date and Time Written on Dressing: Yes  Sharp and Wire Count and Safe Disposal of all Sharps/Wires: Yes  Sterile Dressing Applied Per Protocol: Yes  X-ray Ordered or ECG Image: Yes    PICC Insertion Details:  Size (Fr): 4  Lumen Type: Single  Catheter to Vein Ratio Less Than 50%: Yes  Total Length (cm): 48  External Length (cm): 0  Orientation: Left  Location: Basilic  Site Prep: Chlorohexidine; Usual sterile procedure followed  Local Anesthetic: Injectable/Subcutaneous  Indication: Abx  Insertion Team Members in the Room: Nurse, LPN  Initial Extremity Circumference (cm): 29  Insertion Attempts: 1  Patient Tolerance:  Tolerated Well, Age Appropriate  Comfort Measures: Subcutaneous anesthetic; Verbal  Procedure Location: Bedside  Safety Measures: Patient specific safety measures addressed with RN  Estimated Blood Loss (mL): 1  Vessel Fully Compressible Proximally and Distally to Insertion Site: Yes  Brisk Blood Return Obtained and Line Draws Easily: Yes  Tip Location: SVC  Line Confirmation: ECG  Lot #: [id number]  : Bard  PICC Line Exp Date: 01/31/2026  Securement: Stat Lock  Post Procedure Checklist: Handoff with RN; Obtain all new IV tubing prior to use; Bed at lowest level and wheels locked; Line discharge information at bedside.  Additional Details: Line was inserted using Modified Seldinger Technique.   Placed by: Uche Caballero RN

## 2025-05-24 NOTE — SIGNIFICANT EVENT
Pt seen and examined 5/23, on chart review today remained afebrile, vitals at baseline, on 1L O2.     Current ID plan as per last note:    -Cont IV vancomycin, dosed w/ pharmacy  -Cont IV ceftriaxone 2gm q24H  -Would continue both these as empiric coverage in the setting of thoracic discitis/OM for tentative 8 weeks through 7/16/25  - Please obtain CBC, CMP, CRP weekly and fax results to [phone] ATTN: Dr. Dyer  -Will request outpt ID follow up   -Of note, he is awaiting MRI C and L-spine as per neurosurgery, should he have new findings in these areas concerning for infection, please reengage infectious disease so we can reevaluate his antibiotic plan    Will sign off at this time, please reach out with any questions    Tamar Dyer, DO

## 2025-05-24 NOTE — CARE PLAN
The patient's goals for the shift include Pain management    The clinical goals for the shift include Patient will remain HDS throughout the shift 5/24/25 @ 0700      Problem: Skin  Goal: Decreased wound size/increased tissue granulation at next dressing change  Outcome: Progressing  Goal: Participates in plan/prevention/treatment measures  Outcome: Progressing  Goal: Prevent/manage excess moisture  Outcome: Progressing  Goal: Prevent/minimize sheer/friction injuries  Outcome: Progressing  Goal: Promote/optimize nutrition  Outcome: Progressing  Goal: Promote skin healing  Outcome: Progressing     Problem: Fall/Injury  Goal: Not fall by end of shift  Outcome: Progressing  Goal: Be free from injury by end of the shift  Outcome: Progressing  Goal: Verbalize understanding of personal risk factors for fall in the hospital  Outcome: Progressing  Goal: Verbalize understanding of risk factor reduction measures to prevent injury from fall in the home  Outcome: Progressing  Goal: Use assistive devices by end of the shift  Outcome: Progressing  Goal: Pace activities to prevent fatigue by end of the shift  Outcome: Progressing     Problem: Safety - Adult  Goal: Free from fall injury  Outcome: Progressing     Problem: Discharge Planning  Goal: Discharge to home or other facility with appropriate resources  Outcome: Progressing     Problem: Chronic Conditions and Co-morbidities  Goal: Patient's chronic conditions and co-morbidity symptoms are monitored and maintained or improved  Outcome: Progressing     Problem: Nutrition  Goal: Nutrient intake appropriate for maintaining nutritional needs  Outcome: Progressing     Problem: Pain  Goal: Takes deep breaths with improved pain control throughout the shift  Outcome: Progressing  Goal: Turns in bed with improved pain control throughout the shift  Outcome: Progressing  Goal: Walks with improved pain control throughout the shift  Outcome: Progressing  Goal: Performs ADL's with improved  pain control throughout shift  Outcome: Progressing  Goal: Participates in PT with improved pain control throughout the shift  Outcome: Progressing  Goal: Free from opioid side effects throughout the shift  Outcome: Progressing  Goal: Free from acute confusion related to pain meds throughout the shift  Outcome: Progressing

## 2025-05-24 NOTE — CARE PLAN
The patient's goals for the shift include Pain management    The clinical goals for the shift include patient will rate pain <6

## 2025-05-24 NOTE — PROGRESS NOTES
05/24/25 1100   Discharge Planning   Living Arrangements Spouse/significant other   Support Systems Spouse/significant other   Type of Residence Private residence   Number of Stairs to Enter Residence 2   Number of Stairs Within Residence 0   Do you have animals or pets at home? Yes   Type of Animals or Pets cats 3   Home or Post Acute Services None   Expected Discharge Disposition Home H   Financial Resource Strain   How hard is it for you to pay for the very basics like food, housing, medical care, and heating? Somewhat   Housing Stability   In the last 12 months, was there a time when you were not able to pay the mortgage or rent on time? N   In the past 12 months, how many times have you moved where you were living? 1   At any time in the past 12 months, were you homeless or living in a shelter (including now)? N   Transportation Needs   In the past 12 months, has lack of transportation kept you from medical appointments or from getting medications? no   In the past 12 months, has lack of transportation kept you from meetings, work, or from getting things needed for daily living? No     05/24/2025: Per Kari, patient now will need MRI of the Spine per Neurosurgery. Patient will also need a PICC line for IV abx therapy at home. Referral sent to [facility] for review. Per ID recs, patient wlill require abx therapy until 07/16/2025. ADOD will be on 05/27/2025. TCC will continue to follow for discharge needs. Josee TREJO T[facility]

## 2025-05-24 NOTE — PROGRESS NOTES
Pharmacy to Dose Vancomycin:  Miguel Lofton is a 69 y.o. male ordered pharmacy to dose vancomycin for osteomyelitis. Today is day 3 of therapy.  Goal: -600mg/L*hr  Results from last 7 days   Lab Units 05/24/25  0801 05/23/25  0718 05/22/25  0826   BUN mg/dL 11 13 12   CREATININE mg/dL 0.49* 0.61 0.61   WBC AUTO x10*3/uL 8.4 8.3 10.4   VANCOMYCIN RM ug/mL 18.2  --   --       Urine Culture   Date/Time Value Ref Range Status   05/20/2025 07:53 PM No growth  Final     Blood Culture   Date/Time Value Ref Range Status   05/20/2025 05:32 PM No growth at 3 days  Preliminary   05/20/2025 05:32 PM No growth at 3 days  Preliminary     Tissue/Wound Culture/Smear   Date/Time Value Ref Range Status   05/21/2025 11:36 AM No growth aerobically and anaerobically  Final     Gram Stain   Date/Time Value Ref Range Status   05/21/2025 11:36 AM (1+) Rare Polymorphonuclear leukocytes  Final   05/21/2025 11:36 AM No organisms seen  Final      No results found for the last 90 days.    Antibiotics: Rocephin (Day 3).  Pertinent Medications: none.  Renal function remains stable.  Patient's current regimen is predicted to achieve AUC24,ss of 536.    Plan:  Continue vanco [id number].  The above dosing regimen is predicted by [employer] to result in the following pharmacokinetic parameters:  Loading dose: N/A  Regimen: 1250 mg IV every 12 hours.  Start time: 10:41 on 05/24/2025  Exposure target: AUC24 (range) 400-600 mg/L.hr   [id number]: 532 mg/L.hr  AUC24,ss: 536 mg/L.hr  Probability of AUC24 > 400: 99 %  Ctrough,ss: 15.9 mg/L  Probability of Ctrough,ss > 20: 11 %  Follow-up level ordered for 5/26 AM unless clinically indicated sooner.  Pharmacy will continue daily vancomycin monitoring. Please contact the pharmacy with any questions.    Thank you,  Gino Andino [facility]

## 2025-05-24 NOTE — PROGRESS NOTES
"Miguel Lofton is a 69 y.o. male on day 4 of admission presenting with Osteomyelitis.    Subjective   Denies shortness of breath. Rates pain 6/10 at time of assessment.  Cough at baseline.       Objective     Physical Exam  Constitutional:       Appearance: Normal appearance.   HENT:      Head: Normocephalic and atraumatic.      Mouth/Throat:      Mouth: Mucous membranes are moist.   Eyes:      Extraocular Movements: Extraocular movements intact.   Cardiovascular:      Rate and Rhythm: Normal rate and regular rhythm.   Pulmonary:      Effort: Pulmonary effort is normal.      Breath sounds: Normal breath sounds.   Abdominal:      General: Abdomen is flat. Bowel sounds are normal.      Palpations: Abdomen is soft.   Musculoskeletal:         General: Normal range of motion.      Cervical back: Normal range of motion and neck supple.   Skin:     General: Skin is warm and dry.   Neurological:      General: No focal deficit present.      Mental Status: He is alert and oriented to person, place, and time.   Psychiatric:         Mood and Affect: Mood normal.         Behavior: Behavior normal.         Last Recorded Vitals  Blood pressure 125/76, pulse 80, temperature 36 °C (96.8 °F), temperature source Temporal, resp. rate 16, height 1.727 m (5' 8\"), weight 73.4 kg (161 lb 13.1 oz), SpO2 99%.  Intake/Output last 3 Shifts:  I/O last 3 completed shifts:  In: - (0 mL/kg)   Out: 850 (11.6 mL/kg) [Urine:850 (0.3 mL/kg/hr)]  Weight: 73.4 kg     Relevant Results               Scheduled medications  Scheduled Medications[1]  Continuous medications  Continuous Medications[2]  PRN medications  PRN Medications[3]        Results for orders placed or performed during the hospital encounter of 05/20/25 (from the past 24 hours)   CBC   Result Value Ref Range    WBC 8.4 4.4 - 11.3 x10*3/uL    nRBC 0.0 0.0 - 0.0 /100 WBCs    RBC 3.61 (L) 4.50 - 5.90 x10*6/uL    Hemoglobin 8.7 (L) 13.5 - 17.5 g/dL    Hematocrit 31.1 (L) 41.0 - 52.0 %    MCV " 86 80 - 100 fL    MCH 24.1 (L) 26.0 - 34.0 pg    MCHC 28.0 (L) 32.0 - 36.0 g/dL    RDW 19.5 (H) 11.5 - 14.5 %    Platelets 306 150 - 450 x10*3/uL   Magnesium   Result Value Ref Range    Magnesium 2.09 1.60 - 2.40 mg/dL   Renal function panel   Result Value Ref Range    Glucose 82 74 - 99 mg/dL    Sodium 134 (L) 136 - 145 mmol/L    Potassium 4.5 3.5 - 5.3 mmol/L    Chloride 103 98 - 107 mmol/L    Bicarbonate 23 21 - 32 mmol/L    Anion Gap 13 10 - 20 mmol/L    Urea Nitrogen 11 6 - 23 mg/dL    Creatinine 0.49 (L) 0.50 - 1.30 mg/dL    eGFR >90 >60 mL/min/1.73m*2    Calcium 8.1 (L) 8.6 - 10.6 mg/dL    Phosphorus 2.6 2.5 - 4.9 mg/dL    Albumin 3.0 (L) 3.4 - 5.0 g/dL   Vancomycin   Result Value Ref Range    Vancomycin 18.2 5.0 - 20.0 ug/mL        Assessment & Plan  Osteomyelitis    Miguel Lofton is a 69 y.o. male patient, with PMHx of prostate cancer, base of the tongue cancer, HTN, HLD, hypothyroidism, COPD, anxiety who had a CT chest for annual lung cancer screening on 5/13 that showed possible compression fx of T5 and T6. Imaging was reviewed at office visit 5/19 and was advised he present to ED for MRI. He presented to [facility] 5/19 where MRI T spine (5/19) showed acute discitis/osteomyelitis at T5-6 with marked surrounding phlegmon and circumferential epidural phlegmon resulting in moderate to severe spinal canal stenosis. No epidural abscess. He was transferred to [facility] 5/20 for NSGY eval. NSGY consulted 5/20, pt taken to the OR 5/21 for T5-6 laminectomy and T3-8 fusion. Cultures taken in OR/pending. ID consulted 5/21 and rec Vanc (5/22-) and CTX (5/22) pending OR culture results. PT/OT consulted 5/22. DC pending improvement in pain, NSGY/ID clearance, atb plan, and PT/OT recs. Orders placed for PICC and MRI lumbar and cervical spine to be done prior to discharge.     # Acute on chronic back pain   # Compression fracture   # Vertebral osteomyelitis  # Phlegmonous tissue c/f infection  - CT Chest (OSH):  considerable deformity of what is felt to be T5 which may be related to a compression fracture of the caudal endplate. There is some compression of the superior endplate of T6.  -  MRI T spine (5/19 at OSH): Acute discitis/osteomyelitis at T5-6 with marked surrounding phlegmon and circumferential epidural phlegmon resulting in moderate to severe spinal canal stenosis. No epidural abscess. Requested for image import from [facility]    - CRP 7.35, ESR 90 (5/20); repeat pending 5/22  - s/p OR 5/21/25 with neurosurgery for T5-6 laminectomy and T3-8 fusion. Per neurosurgery, phlegmon appeared infectious without purulence  - OR surg path (5/21): pending  - Tissue/fungal OR cultures x2 (5/21): pending  - Thoracic wound vac in place, NSGY managing  - Yon drain in place s/p OR  - ID consulted 5/21; rec Vanc (5/22-) and CTX (5/22-) pending OR culture results.  - Pain management per neurosurgery recs: Tylenol 650mg q4h scheduled, cyclobenzaprine 10mg q8h scheduled, lidocaine patches x2 to back daily, oxycodone 5mg q4h PRN for mild-mod pain, oxycodone 10mg q4h PRN for severe pain, and IV dilaudid 0.2mg q3h PRN for breakthrough pain. Okay for toradol 30 mg q6h up to 8 doses if warranted, not to be used after POD 2 (not ordered)  - Continue home Gabapentin 300mg daily  - Blood cultures x2 (5/20): NGTD  - Urine culture (5/20): negative  - PT/OT consulted 5/22 with recs for home care  - PICC ordered 5/24  - MRIs lumbar and cervical spine ordered 5/24.     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 Gamez--last seen 12/18/2024: laryngeal structures are noted for grossly normal appearance; true vocal cords, false vocal cords, remaining structures, including epiglottis, piriform sinuses and base of tongue are all grossly normal; there is no evidence of gross mass nodule, polyp, tumor or other defect   - Follows yearly with Dr. Lucero  - evaluated for dysphagia with SLP eval; no aspiration, rec soft/ thin liquid diet   - Dietician consulted with recs for Boost VHC (530 kcal, 22g pro) TID     ::Prostate Ca  - s/p radical prostatectomy by Dr Lang Mcneil in 2018  - [id number], Gleasons 7, + right anterior margin  - PSMA-PET done on 12/6/2023 showed no PSMA uptake within the post-surgical bed; there is PSMA uptake of a left common iliac retroperitoneal lymph node SUV 5.6 and PSMA uptake of a right pelvic sidewall lymph node SUV 3.5  - Dr Rangel recommended radiation therapy. Patient instead went to [facility] and was treated by Dr Zackary Ely (4600 cGy in 23 fractions to the pelvis and LN followed by [place] to prostate bed and LN to 2500 cGy in 10 fractions)  - Since completing radiation, has been experiencing significant fatigue  - He is now following with urologist Dr. Boyd  - For pain management, follows with [facility] palliative care team   - PSA < 0.01 (2/19/25)--> PSA <0.10 (5/20)  ::Anemia  - Likely anemia of chronic disease  - BL Hgb ~11 after cancer tx; Hgb 9.9 (5/20)--> 8.3 (5/22) post-op--> 8.1 (5/23)  - T&S active from 5/20-  ordered again for 5/23  - Ferritin 305, Folate 7.5, Iron 41, TIBC 269, %Sat 15, UIBC 228, Vit B12 1,342  - Stopped home Vit B12 sup d/t elevated B12 level     #PULM  ::COPD, tobacco user  - Follows with pulmonologist Dr Hutchison   - PFTs 03/2025: FEV1 72% FEV1/FVC 0.63  - Did not tolerate Breztri  - Continue home Spiriva 2 puffs daily  - incentive spirometry ordered 5/24      #CV  ::HTN/HLD  - Continue home Metop Succs 25mg daily and Lipitor 80mg nightly     #ENDO   ::Hypothyroidism  - Last TSH 4.49 (5/12)  - Continue home Synthroid 75mcg daily        DVT prophy: Lovenox, was held for neurosurgery and resumed DVT prophy 5/22 PM per ortho     # Dispo:   - Full code, confirmed with patient on admission   - NOK: Eleni Lofton (wife) [phone]  - DC pending improvement in pain, NSGY/ID clearance, culture results and atb plan  - NSGY FUV 6/9, Dr. Lucero (heme onc) FUV 8/8, NSGY FUV 8/11      I spent 60 minutes in the professional and overall care of this patient.    PATIENT/PLAN DISCUSSED WITH DR. SHYANN Ladd, APRN-CNP         [1] acetaminophen, 650 mg, oral, q4h  [Held by provider] aspirin, 81 mg, oral, Daily  atorvastatin, 80 mg, oral, Daily  cefTRIAXone, 2 g, intravenous, q24h  cholecalciferol, 25 mcg, oral, Daily  cyclobenzaprine, 10 mg, oral, TID  enoxaparin, 40 mg, subcutaneous, q24h  gabapentin, 300 mg, oral, Daily  lactulose, 20 g, oral, BID  levothyroxine, 75 mcg, oral, Daily  lidocaine, 5 mL, infiltration, Once  lidocaine, 2 patch, transdermal, Daily  metoprolol succinate XL, 25 mg, oral, Daily  pantoprazole, 40 mg, oral, Daily before breakfast  polyethylene glycol, 17 g, oral, BID  sennosides-docusate sodium, 3 tablet, oral, BID  tiotropium, 2 puff, inhalation, Daily  vancomycin, 1,250 mg, intravenous, q12h  [2]    [3] PRN medications: alteplase, bisacodyl, HYDROmorphone, LORazepam, oxyCODONE, oxyCODONE, simethicone, vancomycin

## 2025-05-25 ENCOUNTER — HOME HEALTH ADMISSION (OUTPATIENT)
Dept: HOME HEALTH SERVICES | Facility: HOME HEALTH | Age: 70
End: 2025-05-25

## 2025-05-25 VITALS
OXYGEN SATURATION: 94 % | BODY MASS INDEX: 24.52 KG/M2 | HEIGHT: 68 IN | SYSTOLIC BLOOD PRESSURE: 104 MMHG | TEMPERATURE: 97 F | HEART RATE: 74 BPM | WEIGHT: 161.82 LBS | DIASTOLIC BLOOD PRESSURE: 64 MMHG | RESPIRATION RATE: 17 BRPM

## 2025-05-25 LAB
ALBUMIN SERPL BCP-MCNC: 2.4 G/DL (ref 3.4–5)
ANION GAP SERPL CALC-SCNC: 10 MMOL/L (ref 10–20)
BACTERIA BLD CULT ORG #2: NORMAL
BACTERIA BLD CULT: NORMAL
BASOPHILS # BLD AUTO: 0.07 X10*3/UL (ref 0–0.1)
BASOPHILS NFR BLD AUTO: 0.8 %
BLOOD EXPIRATION DATE: NORMAL
BUN SERPL-MCNC: 8 MG/DL (ref 6–23)
CALCIUM SERPL-MCNC: 8.3 MG/DL (ref 8.6–10.6)
CHLORIDE SERPL-SCNC: 103 MMOL/L (ref 98–107)
CO2 SERPL-SCNC: 28 MMOL/L (ref 21–32)
CREAT SERPL-MCNC: 0.42 MG/DL (ref 0.5–1.3)
DISPENSE STATUS: NORMAL
EGFRCR SERPLBLD CKD-EPI 2021: >90 ML/MIN/1.73M*2
EOSINOPHIL # BLD AUTO: 0.65 X10*3/UL (ref 0–0.7)
EOSINOPHIL NFR BLD AUTO: 7.8 %
ERYTHROCYTE [DISTWIDTH] IN BLOOD BY AUTOMATED COUNT: 19.3 % (ref 11.5–14.5)
ERYTHROCYTE [DISTWIDTH] IN BLOOD BY AUTOMATED COUNT: 19.6 % (ref 11.5–14.5)
FERRITIN SERPL-MCNC: 307 NG/ML (ref 20–300)
FOLATE SERPL-MCNC: 3.9 NG/ML
GLUCOSE SERPL-MCNC: 91 MG/DL (ref 74–99)
HCT VFR BLD AUTO: 26.4 % (ref 41–52)
HCT VFR BLD AUTO: 27.1 % (ref 41–52)
HGB BLD-MCNC: 7.6 G/DL (ref 13.5–17.5)
HGB BLD-MCNC: 8 G/DL (ref 13.5–17.5)
IMM GRANULOCYTES # BLD AUTO: 0.05 X10*3/UL (ref 0–0.7)
IMM GRANULOCYTES NFR BLD AUTO: 0.6 % (ref 0–0.9)
IRON SATN MFR SERPL: 15 % (ref 25–45)
IRON SERPL-MCNC: 30 UG/DL (ref 35–150)
LYMPHOCYTES # BLD AUTO: 1.02 X10*3/UL (ref 1.2–4.8)
LYMPHOCYTES NFR BLD AUTO: 12.3 %
MAGNESIUM SERPL-MCNC: 1.67 MG/DL (ref 1.6–2.4)
MCH RBC QN AUTO: 24.1 PG (ref 26–34)
MCH RBC QN AUTO: 24.8 PG (ref 26–34)
MCHC RBC AUTO-ENTMCNC: 28.8 G/DL (ref 32–36)
MCHC RBC AUTO-ENTMCNC: 29.5 G/DL (ref 32–36)
MCV RBC AUTO: 84 FL (ref 80–100)
MCV RBC AUTO: 84 FL (ref 80–100)
MONOCYTES # BLD AUTO: 0.95 X10*3/UL (ref 0.1–1)
MONOCYTES NFR BLD AUTO: 11.4 %
NEUTROPHILS # BLD AUTO: 5.58 X10*3/UL (ref 1.2–7.7)
NEUTROPHILS NFR BLD AUTO: 67.1 %
NRBC BLD-RTO: 0 /100 WBCS (ref 0–0)
NRBC BLD-RTO: 0 /100 WBCS (ref 0–0)
PHOSPHATE SERPL-MCNC: 2.6 MG/DL (ref 2.5–4.9)
PLATELET # BLD AUTO: 382 X10*3/UL (ref 150–450)
PLATELET # BLD AUTO: 408 X10*3/UL (ref 150–450)
POTASSIUM SERPL-SCNC: 4.2 MMOL/L (ref 3.5–5.3)
PRODUCT BLOOD TYPE: 9500
PRODUCT CODE: NORMAL
RBC # BLD AUTO: 3.15 X10*6/UL (ref 4.5–5.9)
RBC # BLD AUTO: 3.22 X10*6/UL (ref 4.5–5.9)
SODIUM SERPL-SCNC: 137 MMOL/L (ref 136–145)
STAPHYLOCOCCUS SPEC CULT: ABNORMAL
TIBC SERPL-MCNC: 204 UG/DL (ref 240–445)
UIBC SERPL-MCNC: 174 UG/DL (ref 110–370)
UNIT ABO: NORMAL
UNIT NUMBER: NORMAL
UNIT RH: NORMAL
UNIT VOLUME: 282
WBC # BLD AUTO: 7.9 X10*3/UL (ref 4.4–11.3)
WBC # BLD AUTO: 8.3 X10*3/UL (ref 4.4–11.3)
XM INTEP: NORMAL

## 2025-05-25 PROCEDURE — 2500000002 HC RX 250 W HCPCS SELF ADMINISTERED DRUGS (ALT 637 FOR MEDICARE OP, ALT 636 FOR OP/ED): Performed by: STUDENT IN AN ORGANIZED HEALTH CARE EDUCATION/TRAINING PROGRAM

## 2025-05-25 PROCEDURE — 1170000001 HC PRIVATE ONCOLOGY ROOM DAILY

## 2025-05-25 PROCEDURE — 99223 1ST HOSP IP/OBS HIGH 75: CPT

## 2025-05-25 PROCEDURE — 2500000001 HC RX 250 WO HCPCS SELF ADMINISTERED DRUGS (ALT 637 FOR MEDICARE OP): Performed by: STUDENT IN AN ORGANIZED HEALTH CARE EDUCATION/TRAINING PROGRAM

## 2025-05-25 PROCEDURE — 2500000004 HC RX 250 GENERAL PHARMACY W/ HCPCS (ALT 636 FOR OP/ED)

## 2025-05-25 PROCEDURE — 2500000005 HC RX 250 GENERAL PHARMACY W/O HCPCS

## 2025-05-25 PROCEDURE — 85025 COMPLETE CBC W/AUTO DIFF WBC: CPT | Performed by: NURSE PRACTITIONER

## 2025-05-25 PROCEDURE — 83540 ASSAY OF IRON: CPT | Performed by: NURSE PRACTITIONER

## 2025-05-25 PROCEDURE — 82728 ASSAY OF FERRITIN: CPT | Performed by: NURSE PRACTITIONER

## 2025-05-25 PROCEDURE — 83735 ASSAY OF MAGNESIUM: CPT | Performed by: STUDENT IN AN ORGANIZED HEALTH CARE EDUCATION/TRAINING PROGRAM

## 2025-05-25 PROCEDURE — 85027 COMPLETE CBC AUTOMATED: CPT | Performed by: STUDENT IN AN ORGANIZED HEALTH CARE EDUCATION/TRAINING PROGRAM

## 2025-05-25 PROCEDURE — 2500000001 HC RX 250 WO HCPCS SELF ADMINISTERED DRUGS (ALT 637 FOR MEDICARE OP): Performed by: NURSE PRACTITIONER

## 2025-05-25 PROCEDURE — 99233 SBSQ HOSP IP/OBS HIGH 50: CPT | Performed by: NURSE PRACTITIONER

## 2025-05-25 PROCEDURE — 94640 AIRWAY INHALATION TREATMENT: CPT

## 2025-05-25 PROCEDURE — P9040 RBC LEUKOREDUCED IRRADIATED: HCPCS

## 2025-05-25 PROCEDURE — 82746 ASSAY OF FOLIC ACID SERUM: CPT | Performed by: NURSE PRACTITIONER

## 2025-05-25 PROCEDURE — 2500000004 HC RX 250 GENERAL PHARMACY W/ HCPCS (ALT 636 FOR OP/ED): Mod: JZ | Performed by: NURSE PRACTITIONER

## 2025-05-25 PROCEDURE — 80069 RENAL FUNCTION PANEL: CPT | Performed by: STUDENT IN AN ORGANIZED HEALTH CARE EDUCATION/TRAINING PROGRAM

## 2025-05-25 PROCEDURE — 2500000001 HC RX 250 WO HCPCS SELF ADMINISTERED DRUGS (ALT 637 FOR MEDICARE OP)

## 2025-05-25 PROCEDURE — 36430 TRANSFUSION BLD/BLD COMPNT: CPT

## 2025-05-25 RX ORDER — ONDANSETRON 4 MG/1
4 TABLET, FILM COATED ORAL EVERY 6 HOURS PRN
Status: DISCONTINUED | OUTPATIENT
Start: 2025-05-25 | End: 2025-06-08 | Stop reason: HOSPADM

## 2025-05-25 RX ORDER — OXYCODONE HCL 10 MG/1
10 TABLET, FILM COATED, EXTENDED RELEASE ORAL EVERY 12 HOURS SCHEDULED
Refills: 0 | Status: DISCONTINUED | OUTPATIENT
Start: 2025-05-25 | End: 2025-06-08 | Stop reason: HOSPADM

## 2025-05-25 RX ORDER — AMOXICILLIN 250 MG
2 CAPSULE ORAL 2 TIMES DAILY
Status: DISCONTINUED | OUTPATIENT
Start: 2025-05-25 | End: 2025-06-08 | Stop reason: HOSPADM

## 2025-05-25 RX ORDER — POLYETHYLENE GLYCOL 3350 17 G/17G
17 POWDER, FOR SOLUTION ORAL DAILY PRN
Status: DISCONTINUED | OUTPATIENT
Start: 2025-05-25 | End: 2025-06-08 | Stop reason: HOSPADM

## 2025-05-25 RX ORDER — LEVOTHYROXINE SODIUM 75 UG/1
75 TABLET ORAL DAILY
Start: 2025-05-25

## 2025-05-25 RX ORDER — GABAPENTIN 300 MG/1
300 CAPSULE ORAL 2 TIMES DAILY
Status: DISCONTINUED | OUTPATIENT
Start: 2025-05-25 | End: 2025-05-30

## 2025-05-25 RX ORDER — CEFTRIAXONE 2 G/50ML
2 INJECTION, SOLUTION INTRAVENOUS EVERY 24 HOURS
Qty: 2600 ML | Refills: 0 | Status: SHIPPED
Start: 2025-05-25 | End: 2025-06-06

## 2025-05-25 RX ADMIN — PANTOPRAZOLE SODIUM 40 MG: 40 TABLET, DELAYED RELEASE ORAL at 09:23

## 2025-05-25 RX ADMIN — OXYCODONE HYDROCHLORIDE 10 MG: 10 TABLET ORAL at 05:41

## 2025-05-25 RX ADMIN — CEFTRIAXONE SODIUM 2 G: 2 INJECTION, SOLUTION INTRAVENOUS at 09:12

## 2025-05-25 RX ADMIN — ATORVASTATIN CALCIUM 80 MG: 80 TABLET, FILM COATED ORAL at 09:11

## 2025-05-25 RX ADMIN — CYCLOBENZAPRINE 10 MG: 10 TABLET, FILM COATED ORAL at 09:11

## 2025-05-25 RX ADMIN — METOPROLOL SUCCINATE 25 MG: 25 TABLET, EXTENDED RELEASE ORAL at 09:12

## 2025-05-25 RX ADMIN — OXYCODONE HYDROCHLORIDE 10 MG: 10 TABLET, FILM COATED, EXTENDED RELEASE ORAL at 13:07

## 2025-05-25 RX ADMIN — VANCOMYCIN HYDROCHLORIDE 1250 MG: 1.25 INJECTION, POWDER, LYOPHILIZED, FOR SOLUTION INTRAVENOUS at 10:12

## 2025-05-25 RX ADMIN — GABAPENTIN 300 MG: 300 CAPSULE ORAL at 09:12

## 2025-05-25 RX ADMIN — VANCOMYCIN HYDROCHLORIDE 1250 MG: 1.25 INJECTION, POWDER, LYOPHILIZED, FOR SOLUTION INTRAVENOUS at 22:42

## 2025-05-25 RX ADMIN — CYCLOBENZAPRINE 10 MG: 10 TABLET, FILM COATED ORAL at 20:50

## 2025-05-25 RX ADMIN — ACETAMINOPHEN 650 MG: 325 TABLET, FILM COATED ORAL at 09:11

## 2025-05-25 RX ADMIN — GABAPENTIN 300 MG: 300 CAPSULE ORAL at 20:50

## 2025-05-25 RX ADMIN — ACETAMINOPHEN 650 MG: 325 TABLET, FILM COATED ORAL at 22:42

## 2025-05-25 RX ADMIN — OXYCODONE HYDROCHLORIDE 10 MG: 10 TABLET ORAL at 11:12

## 2025-05-25 RX ADMIN — OXYCODONE HYDROCHLORIDE 10 MG: 10 TABLET, FILM COATED, EXTENDED RELEASE ORAL at 20:50

## 2025-05-25 RX ADMIN — ACETAMINOPHEN 650 MG: 325 TABLET, FILM COATED ORAL at 13:07

## 2025-05-25 RX ADMIN — LORAZEPAM 1 MG: 1 TABLET ORAL at 22:42

## 2025-05-25 RX ADMIN — ENOXAPARIN SODIUM 40 MG: 100 INJECTION SUBCUTANEOUS at 15:51

## 2025-05-25 RX ADMIN — LIDOCAINE 4% 2 PATCH: 40 PATCH TOPICAL at 09:12

## 2025-05-25 RX ADMIN — Medication 25 MCG: at 09:11

## 2025-05-25 RX ADMIN — CYCLOBENZAPRINE 10 MG: 10 TABLET, FILM COATED ORAL at 15:50

## 2025-05-25 RX ADMIN — ACETAMINOPHEN 650 MG: 325 TABLET, FILM COATED ORAL at 18:03

## 2025-05-25 RX ADMIN — ACETAMINOPHEN 650 MG: 325 TABLET, FILM COATED ORAL at 05:41

## 2025-05-25 RX ADMIN — LEVOTHYROXINE SODIUM 75 MCG: 0.07 TABLET ORAL at 09:12

## 2025-05-25 RX ADMIN — TIOTROPIUM BROMIDE INHALATION SPRAY 2 PUFF: 3.12 SPRAY, METERED RESPIRATORY (INHALATION) at 09:50

## 2025-05-25 RX ADMIN — OXYCODONE HYDROCHLORIDE 10 MG: 10 TABLET ORAL at 01:34

## 2025-05-25 ASSESSMENT — PAIN SCALES - GENERAL
PAINLEVEL_OUTOF10: 6
PAINLEVEL_OUTOF10: 7
PAINLEVEL_OUTOF10: 8
PAINLEVEL_OUTOF10: 7
PAINLEVEL_OUTOF10: 3
PAINLEVEL_OUTOF10: 7

## 2025-05-25 ASSESSMENT — PAIN - FUNCTIONAL ASSESSMENT
PAIN_FUNCTIONAL_ASSESSMENT: 0-10

## 2025-05-25 NOTE — PROGRESS NOTES
"Miguel Lofton is a 69 y.o. male on day 5 of admission presenting with Osteomyelitis.    Subjective   No events overnight    Objective     Physical Exam  Awake Ox3  RUE D5 B5 T5 HG/IO 5  RLE HF5 KE5 DF/PF 5  LUE D5 B5 T5 HG/IO 5  LLE HF5 KE5 DF/PF 5  Abd non tender  Incision covered with prevena   Drain in place    Last Recorded Vitals  Blood pressure 106/64, pulse 85, temperature 36.1 °C (97 °F), temperature source Temporal, resp. rate 17, height 1.727 m (5' 8\"), weight 73.4 kg (161 lb 13.1 oz), SpO2 96%.  Intake/Output last 3 Shifts:  I/O last 3 completed shifts:  In: - (0 mL/kg)   Out: 1000 (13.6 mL/kg) [Urine:1000 (0.4 mL/kg/hr)]  Weight: 73.4 kg     Relevant Results  Lab Results   Component Value Date    WBC 7.9 05/25/2025    HGB 7.6 (L) 05/25/2025    HCT 26.4 (L) 05/25/2025    MCV 84 05/25/2025     05/25/2025     Lab Results   Component Value Date    GLUCOSE 91 05/25/2025    CALCIUM 8.3 (L) 05/25/2025     05/25/2025    K 4.2 05/25/2025    CO2 28 05/25/2025     05/25/2025    BUN 8 05/25/2025    CREATININE 0.42 (L) 05/25/2025       Assessment & Plan  Osteomyelitis    Miguel Lofton is a 69 year old male with h/o HTN,  HLD, CAD (on ASA), hypoT, COPD, anxiety, LBP (annual radioablations), prostate cancer (s/p prostatectomy 2018), base of tongue cancer (dx 2022, s/p radiation/chemo, in remission), diastolic CHF, p/w mid back pain x4 months, low back pain w BLE radiculopathy and weakness x3 weeks, 5/13 CT chest T5 compression fx, MRI TS T5-T6 compression fx w disc/osteo, epidural phlegmon w moderate canal stenosis   5/21 s/p T5-6 transpedicular decompression, T3-8 fusion, OR cx neg    [facility] primary  Will dc drain and prevena today  Will arrange a 2 week follow up  Hold ASA, ok to restart POD7  Continued PT and OT eval  Aggressive bowel regimen  Wean O2 as able, encourage incentive spirometer  Please obtain MRI cervical and lumbar spine w/o contrast   Vanc, CTX per ID  SCDs, LVX          Huan M " Site and dressing look good   MD Asif

## 2025-05-25 NOTE — PROGRESS NOTES
"Miguel Lofton is a 69 y.o. male on day 5 of admission presenting with Osteomyelitis.    Subjective   Seen this AM at the bedside. Pt reports that he is feeling ok. Denies pain at rest but reports severe pain in his lower back whenever he gets out of bed. We discussed plan for supportive onc consult today. Pt also expresses severe anxiety r/t MRI (childhood trauma with claustrophobia, etc). We discussed changing MRI orders to be done with anesthesia which pt was grateful for. He otherwise denies any issues urinating or with bowels. LBM 5/24. He denies any fevers/chills, SOB, or CP.    Objective     Physical Exam  Constitutional:       Appearance: Normal appearance.   HENT:      Head: Normocephalic and atraumatic.      Mouth/Throat:      Mouth: Mucous membranes are moist.   Eyes:      Extraocular Movements: Extraocular movements intact.   Cardiovascular:      Rate and Rhythm: Normal rate and regular rhythm.   Pulmonary:      Effort: Pulmonary effort is normal.      Breath sounds: Normal breath sounds.   Abdominal:      General: Abdomen is flat. Bowel sounds are normal.      Palpations: Abdomen is soft.   Musculoskeletal:         General: Normal range of motion.      Cervical back: Normal range of motion and neck supple.   Skin:     General: Skin is warm and dry.      Comments: Thoracic spine dressing with wound vac c/d/I  + Thoracic zuri drain with bloody drainage       Neurological:      General: No focal deficit present.      Mental Status: He is alert and oriented to person, place, and time.   Psychiatric:         Mood and Affect: Mood normal.         Behavior: Behavior normal.       Last Recorded Vitals  Blood pressure 106/64, pulse 85, temperature 36.1 °C (97 °F), temperature source Temporal, resp. rate 17, height 1.727 m (5' 8\"), weight 73.4 kg (161 lb 13.1 oz), SpO2 96%.  Intake/Output last 3 Shifts:  I/O last 3 completed shifts:  In: - (0 mL/kg)   Out: 1000 (13.6 mL/kg) [Urine:1000 (0.4 mL/kg/hr)]  Weight: " 73.4 kg     Relevant Results  Results for orders placed or performed during the hospital encounter of 05/20/25 (from the past 24 hours)   Magnesium   Result Value Ref Range    Magnesium 1.67 1.60 - 2.40 mg/dL   Renal function panel   Result Value Ref Range    Glucose 91 74 - 99 mg/dL    Sodium 137 136 - 145 mmol/L    Potassium 4.2 3.5 - 5.3 mmol/L    Chloride 103 98 - 107 mmol/L    Bicarbonate 28 21 - 32 mmol/L    Anion Gap 10 10 - 20 mmol/L    Urea Nitrogen 8 6 - 23 mg/dL    Creatinine 0.42 (L) 0.50 - 1.30 mg/dL    eGFR >90 >60 mL/min/1.73m*2    Calcium 8.3 (L) 8.6 - 10.6 mg/dL    Phosphorus 2.6 2.5 - 4.9 mg/dL    Albumin 2.4 (L) 3.4 - 5.0 g/dL   CBC   Result Value Ref Range    WBC 7.9 4.4 - 11.3 x10*3/uL    nRBC 0.0 0.0 - 0.0 /100 WBCs    RBC 3.15 (L) 4.50 - 5.90 x10*6/uL    Hemoglobin 7.6 (L) 13.5 - 17.5 g/dL    Hematocrit 26.4 (L) 41.0 - 52.0 %    MCV 84 80 - 100 fL    MCH 24.1 (L) 26.0 - 34.0 pg    MCHC 28.8 (L) 32.0 - 36.0 g/dL    RDW 19.6 (H) 11.5 - 14.5 %    Platelets 382 150 - 450 x10*3/uL     Scheduled medications  Scheduled Medications[1]  Continuous medications  Continuous Medications[2]  PRN medications  PRN Medications[3]        Results for orders placed or performed during the hospital encounter of 05/20/25 (from the past 24 hours)   Magnesium   Result Value Ref Range    Magnesium 1.67 1.60 - 2.40 mg/dL   Renal function panel   Result Value Ref Range    Glucose 91 74 - 99 mg/dL    Sodium 137 136 - 145 mmol/L    Potassium 4.2 3.5 - 5.3 mmol/L    Chloride 103 98 - 107 mmol/L    Bicarbonate 28 21 - 32 mmol/L    Anion Gap 10 10 - 20 mmol/L    Urea Nitrogen 8 6 - 23 mg/dL    Creatinine 0.42 (L) 0.50 - 1.30 mg/dL    eGFR >90 >60 mL/min/1.73m*2    Calcium 8.3 (L) 8.6 - 10.6 mg/dL    Phosphorus 2.6 2.5 - 4.9 mg/dL    Albumin 2.4 (L) 3.4 - 5.0 g/dL   CBC   Result Value Ref Range    WBC 7.9 4.4 - 11.3 x10*3/uL    nRBC 0.0 0.0 - 0.0 /100 WBCs    RBC 3.15 (L) 4.50 - 5.90 x10*6/uL    Hemoglobin 7.6 (L) 13.5 - 17.5  g/dL    Hematocrit 26.4 (L) 41.0 - 52.0 %    MCV 84 80 - 100 fL    MCH 24.1 (L) 26.0 - 34.0 pg    MCHC 28.8 (L) 32.0 - 36.0 g/dL    RDW 19.6 (H) 11.5 - 14.5 %    Platelets 382 150 - 450 x10*3/uL      Assessment & Plan  Osteomyelitis  Miguel Lofton is a 69 y.o. male patient, with PMHx of prostate cancer, base of the tongue cancer, HTN, HLD, hypothyroidism, COPD, anxiety who had a CT chest for annual lung cancer screening on 5/13 that showed possible compression fx of T5 and T6. Imaging was reviewed at office visit 5/19 and was advised he present to ED for MRI. He presented to [facility] 5/19 where MRI T spine (5/19) showed acute discitis/osteomyelitis at T5-6 with marked surrounding phlegmon and circumferential epidural phlegmon resulting in moderate to severe spinal canal stenosis. No epidural abscess. He was transferred to [facility] 5/20 for NSGY eval. NSGY consulted 5/20, pt taken to the OR 5/21 for T5-6 laminectomy and T3-8 fusion. ID consulted 5/21 and rec Vanc and Ceftriaxone (5/22- planned stop 7/16 for total 8 week atb course). OR surg path (5/22) +osteomyelitis. OR tissue/wound culture (5/22) neg, final. OR fungal culture (5/22) NGTD. PT/OT consulted 5/22 and rec home care. S/p 1unit PRBC 5/25 for Hgb 7.6. DC pending improvement in pain and MRI C/L spine completion.    Updates 5/25:  - Supportive onc consult for possible long term pain control and neuropathic pain agent  - MRI orders changed to with anesthesia  - Drain and prevena to be dc by NSGY today  - 1unit PRBC for Hgb 7.6     # Acute on chronic back pain   # Compression fracture   # Vertebral osteomyelitis  # Phlegmonous tissue c/f infection  - CT Chest (OSH): considerable deformity of what is felt to be T5 which may be related to a compression fracture of the caudal endplate. There is some compression of the superior endplate of T6.  -  MRI T spine (5/19 at OSH): Acute discitis/osteomyelitis at T5-6 with marked surrounding phlegmon and  circumferential epidural phlegmon resulting in moderate to severe spinal canal stenosis. No epidural abscess. Requested for image import from [facility]    - CRP 7.35, ESR 90 (5/20); CRP 5.94, ESR 74 (5/22)  - s/p OR 5/21/25 with neurosurgery for T5-6 laminectomy and T3-8 fusion. Per neurosurgery, phlegmon appeared infectious without purulence  - OR surg path (5/22) +osteomyelitis  - OR tissue/wound culture (5/22) neg, final  - OR fungal culture (5/22) NGTD  - Drain and prevena to be dc by NSGY today 5/25  - Remove spine dressing on POD7 (5/28)  - ID consulted 5/21; rec Vanc and Ceftriaxone (5/22- planned stop 7/16 for total 8 week atb course)  - Weekly CBC, CMP, CRP ordered outpt with results to be faxed to [phone] ATTN: Dr. Dyer once discharged per ID  - Pain management per neurosurgery recs: Tylenol 650mg q4h scheduled, cyclobenzaprine 10mg q8h scheduled, lidocaine patches x2 to back daily, oxycodone 5mg q4h PRN for mild-mod pain, oxycodone 10mg q4h PRN for severe pain, and IV dilaudid 0.2mg q3h PRN for breakthrough pain. Okay for toradol 30 mg q6h up to 8 doses if warranted, not to be used after POD 2 (not ordered)  - Supportive onc consulted 5/25 for uncontrolled pain, recs pending  - Continue home Gabapentin 300mg daily  - Blood cultures x2 (5/20): NG final  - Urine culture (5/20): negative  - PT/OT consulted 5/22 and rec home care    # Abdominal pain-- improved  - Reported abdominal pain above old PEG site on 5/22  - Likely 2/2 prone positioning after OR +/- developing ileus  - KUB (5/22): moderate colonic stool burden, possible post-op ileus  - Bowel regimen with DocuSenna 3tabs BID, Miralax BID, Lactulose BID, Dulcolax sup daily PRN, s/p 2x tap water enema 5/22; LBM 5/24     # Dysphasia  - SLP eval done no aspiration, rec soft/ thin liquid diet   - Dietician consulted with recs for Boost VHC (530 kcal, 22g pro) TID      # Base of the tongue Ca  - Dx with right BOT SCC cancer with palatal  involvement and bilateral cervical adenopathy, p16+  - Completed course of chemoradiation by end of 10/2022  - Continues to follow up with Dr. Gamez--last seen 12/18/2024: laryngeal structures are noted for grossly normal appearance; true vocal cords, false vocal cords, remaining structures, including epiglottis, piriform sinuses and base of tongue are all grossly normal; there is no evidence of gross mass nodule, polyp, tumor or other defect   - Follows yearly with Dr. Lucero      # Prostate Ca  - s/p radical prostatectomy by Dr Lang Mcneil in 2018  - [id number], Gleasons 7, + right anterior margin  - PSMA-PET done on 12/6/2023 showed no PSMA uptake within the post-surgical bed; there is PSMA uptake of a left common iliac retroperitoneal lymph node SUV 5.6 and PSMA uptake of a right pelvic sidewall lymph node SUV 3.5  - Dr Rangel recommended radiation therapy. Patient instead went to [facility] and was treated by Dr Zackary Ely (4600 cGy in 23 fractions to the pelvis and LN followed by [place] to prostate bed and LN to 2500 cGy in 10 fractions)  - Since completing radiation, has been experiencing significant fatigue  - He is now following with urologist Dr. Boyd  - For pain management, follows with [facility] palliative care team   - PSA < 0.01 (2/19/25)--> PSA <0.10 (5/20)     # COPD  # Smoking Hx  # Lung Ca screening  - Follows with pulmonologist Dr Hutchison   - PFTs 03/2025: FEV1 72% FEV1/FVC 0.63  - Did not tolerate Breztri  - Continue home Spiriva 2 puffs daily     # HTN/HLD  - Continue home Metop Succs 25mg daily and Lipitor 80mg nightly  - ASA held per NSGY, can resume on POD7 (5/28)     # Hypothyroidism  - Last TSH 4.49 (5/12)  - Continue home Synthroid 75mcg daily     # Anemia  - Likely anemia of chronic disease +/- post-op  - BL Hgb ~11 after cancer tx; Hgb 9.9 (5/20)--> 8.3 (5/22) post-op--> 8.1 (5/23)--> 7.6 (5/25)-->1unit PRBC ordered  - T&S active from 5/23  - Ferritin 305, Folate 7.5, Iron 41, TIBC 269, %Sat 15,  UIBC 228, Vit B12 1,342 (5/20)  - Repeat Iron + TIBC, ferritin, and folate ordered/pending 5/25  - Stopped home Vit B12 sup 5/22 d/t elevated B12 level     DVT prophy: Lovenox, was held for neurosurgery and resumed DVT prophy 5/22 PM per ortho     DISPO:  - Full code, confirmed with patient on admission   - NOK: Eleni Lofton (wife) [phone]  - DC pending improvement in pain, NSGY/ID clearance, culture results and atb plan  - NSGY FUV 6/9, Dr. Lucero (heme onc) FUV 8/8, NSGY FUV 8/11    I spent >60 minutes in the professional and overall care of this patient    Assessment and plan as above discussed with attending physician Dr. Pro Tsai, APRN-CNP         [1] acetaminophen, 650 mg, oral, q4h  [Held by provider] aspirin, 81 mg, oral, Daily  atorvastatin, 80 mg, oral, Daily  cefTRIAXone, 2 g, intravenous, q24h  cholecalciferol, 25 mcg, oral, Daily  cyclobenzaprine, 10 mg, oral, TID  enoxaparin, 40 mg, subcutaneous, q24h  gabapentin, 300 mg, oral, Daily  lactulose, 20 g, oral, BID  levothyroxine, 75 mcg, oral, Daily  lidocaine, 5 mL, infiltration, Once  lidocaine, 2 patch, transdermal, Daily  metoprolol succinate XL, 25 mg, oral, Daily  pantoprazole, 40 mg, oral, Daily before breakfast  polyethylene glycol, 17 g, oral, BID  sennosides-docusate sodium, 3 tablet, oral, BID  tiotropium, 2 puff, inhalation, Daily  vancomycin, 1,250 mg, intravenous, q12h     [2]    [3] PRN medications: alteplase, bisacodyl, HYDROmorphone, LORazepam, oxyCODONE, oxyCODONE, simethicone, vancomycin

## 2025-05-25 NOTE — ASSESSMENT & PLAN NOTE
Miguel Lofton is a 69 y.o. male patient, with PMHx of prostate cancer, base of the tongue cancer, HTN, HLD, hypothyroidism, COPD, anxiety who had a CT chest for annual lung cancer screening on 5/13 that showed possible compression fx of T5 and T6. Imaging was reviewed at office visit 5/19 and was advised he present to ED for MRI. He presented to [facility] 5/19 where MRI T spine (5/19) showed acute discitis/osteomyelitis at T5-6 with marked surrounding phlegmon and circumferential epidural phlegmon resulting in moderate to severe spinal canal stenosis. No epidural abscess. He was transferred to [facility] 5/20 for NSGY eval. NSGY consulted 5/20, pt taken to the OR 5/21 for T5-6 laminectomy and T3-8 fusion. ID consulted 5/21 and rec Vanc and Ceftriaxone (5/22- planned stop 7/16 for total 8 week atb course). OR surg path (5/22) +osteomyelitis. OR tissue/wound culture (5/22) neg, final. OR fungal culture (5/22) NGTD. PT/OT consulted 5/22 and rec home care. S/p 1unit PRBC 5/25 for Hgb 7.6. DC pending improvement in pain and MRI C/L spine completion.    Updates 5/25:  - Supportive onc consult for possible long term pain control and neuropathic pain agent  - MRI orders changed to with anesthesia  - Drain and prevena to be dc by NSGY today  - 1unit PRBC for Hgb 7.6     # Acute on chronic back pain   # Compression fracture   # Vertebral osteomyelitis  # Phlegmonous tissue c/f infection  - CT Chest ([facility]): considerable deformity of what is felt to be T5 which may be related to a compression fracture of the caudal endplate. There is some compression of the superior endplate of T6.  -  MRI T spine (5/19 at OSH): Acute discitis/osteomyelitis at T5-6 with marked surrounding phlegmon and circumferential epidural phlegmon resulting in moderate to severe spinal canal stenosis. No epidural abscess. Requested for image import from [facility]    - CRP 7.35, ESR 90 (5/20); CRP 5.94, ESR 74 (5/22)  - s/p OR 5/21/25 with neurosurgery  for T5-6 laminectomy and T3-8 fusion. Per neurosurgery, phlegmon appeared infectious without purulence  - OR surg path (5/22) +osteomyelitis  - OR tissue/wound culture (5/22) neg, final  - OR fungal culture (5/22) NGTD  - Drain and prevena to be dc by NSGY today 5/25  - Remove spine dressing on POD7 (5/28)  - ID consulted 5/21; rec Vanc and Ceftriaxone (5/22- planned stop 7/16 for total 8 week atb course)  - Weekly CBC, CMP, CRP ordered outpt with results to be faxed to [phone] ATTN: Dr. Dyer once discharged per ID  - Pain management per neurosurgery recs: Tylenol 650mg q4h scheduled, cyclobenzaprine 10mg q8h scheduled, lidocaine patches x2 to back daily, oxycodone 5mg q4h PRN for mild-mod pain, oxycodone 10mg q4h PRN for severe pain, and IV dilaudid 0.2mg q3h PRN for breakthrough pain. Okay for toradol 30 mg q6h up to 8 doses if warranted, not to be used after POD 2 (not ordered)  - Supportive onc consulted 5/25 for uncontrolled pain, recs pending  - Continue home Gabapentin 300mg daily  - Blood cultures x2 (5/20): NG final  - Urine culture (5/20): negative  - PT/OT consulted 5/22 and rec home care    # Abdominal pain-- improved  - Reported abdominal pain above old PEG site on 5/22  - Likely 2/2 prone positioning after OR +/- developing ileus  - KUB (5/22): moderate colonic stool burden, possible post-op ileus  - Bowel regimen with DocuSenna 3tabs BID, Miralax BID, Lactulose BID, Dulcolax sup daily PRN, s/p 2x tap water enema 5/22; LBM 5/24     # Dysphasia  - SLP eval done no aspiration, rec soft/ thin liquid diet   - Dietician consulted with recs for Boost VHC (530 kcal, 22g pro) TID      # Base of the tongue Ca  - Dx with 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 Gamez--last seen 12/18/2024: laryngeal structures are noted for grossly normal appearance; true vocal cords,  false vocal cords, remaining structures, including epiglottis, piriform sinuses and base of tongue are all grossly normal; there is no evidence of gross mass nodule, polyp, tumor or other defect   - Follows yearly with Dr. Lucero      # Prostate Ca  - s/p radical prostatectomy by Dr Lang Mcneil in 2018  - [id number], [place] 7, + right anterior margin  - PSMA-PET done on 12/6/2023 showed no PSMA uptake within the post-surgical bed; there is PSMA uptake of a left common iliac retroperitoneal lymph node SUV 5.6 and PSMA uptake of a right pelvic sidewall lymph node SUV 3.5  - Dr Rangel recommended radiation therapy. Patient instead went to [facility] and was treated by Dr Zackary Ely (4600 cGy in 23 fractions to the pelvis and LN followed by [place] to prostate bed and LN to 2500 cGy in 10 fractions)  - Since completing radiation, has been experiencing significant fatigue  - He is now following with urologist Dr. Boyd  - For pain management, follows with [facility] palliative care team   - PSA < 0.01 (2/19/25)--> PSA <0.10 (5/20)     # COPD  # Smoking Hx  # Lung Ca screening  - Follows with pulmonologist Dr Hutchison   - PFTs 03/2025: FEV1 72% FEV1/FVC 0.63  - Did not tolerate Breztri  - Continue home Spiriva 2 puffs daily     # HTN/HLD  - Continue home Metop Succs 25mg daily and Lipitor 80mg nightly  - ASA held per NSGY, can resume on POD7 (5/28)     # Hypothyroidism  - Last TSH 4.49 (5/12)  - Continue home Synthroid 75mcg daily     # Anemia  - Likely anemia of chronic disease +/- post-op  - BL Hgb ~11 after cancer tx; Hgb 9.9 (5/20)--> 8.3 (5/22) post-op--> 8.1 (5/23)--> 7.6 (5/25)-->1unit PRBC ordered  - T&S active from 5/23  - Ferritin 305, Folate 7.5, Iron 41, TIBC 269, %Sat 15, UIBC 228, Vit B12 1,342 (5/20)  - Repeat Iron + TIBC, ferritin, and folate ordered/pending 5/25  - Stopped home Vit B12 sup 5/22 d/t elevated B12 level     DVT prophy: Lovenox, was held for neurosurgery and resumed DVT prophy 5/22 PM per ortho      DISPO:  - Full code, confirmed with patient on admission   - NOK: Eleni Lofton (wife) [phone]  - DC pending improvement in pain, NSGY/ID clearance, culture results and atb plan  - NSGY FUV 6/9, Dr. Lucero (heme onc) FUV 8/8, NSGY FUV 8/11

## 2025-05-25 NOTE — SIGNIFICANT EVENT
Patient with history of HTN, HLD, CAD (on ASA), hypoT, COPD, anxiety, LBP (annual radioablations), prostate cancer (s/p prostatectomy 2018), base of tongue cancer (dx 2022, s/p radiation/chemo, in remission), diastolic CHF, presenting with back pain, found to have discitis/osteomyelitis at T5-6 with an epidural phlegmon. 5/21 s/p T5-6 transpedicular decompression, T3-8 fusion, OR cx neg. 5/25 drain was dc'd.    Neurosurgery recommends that MRI cervical and lumbar spine with and without contrast are obtain. Prevena okay to remove on POD7. Will arrange 2 week follow up for wound check. No additional recommendations at this time. Please page or chat with any questions. Neurosurgery will sign off.    Puma Daniels MD  Neurosurgery

## 2025-05-25 NOTE — CARE PLAN
The patient's goals for the shift include Pain management    The clinical goals for the shift include Pt will remain safe and free of fall or injury through end of shift 5/25 1900      Problem: Skin  Goal: Decreased wound size/increased tissue granulation at next dressing change  Outcome: Progressing  Flowsheets (Taken 5/25/2025 1817)  Decreased wound size/increased tissue granulation at next dressing change: Promote sleep for wound healing  Goal: Participates in plan/prevention/treatment measures  Outcome: Progressing  Flowsheets (Taken 5/25/2025 1817)  Participates in plan/prevention/treatment measures: Elevate heels  Goal: Prevent/manage excess moisture  Outcome: Progressing  Flowsheets (Taken 5/25/2025 1817)  Prevent/manage excess moisture: Monitor for/manage infection if present  Goal: Prevent/minimize sheer/friction injuries  Outcome: Progressing  Goal: Promote/optimize nutrition  Outcome: Progressing  Goal: Promote skin healing  Outcome: Progressing     Problem: Fall/Injury  Goal: Not fall by end of shift  Outcome: Progressing  Goal: Be free from injury by end of the shift  Outcome: Progressing  Goal: Verbalize understanding of personal risk factors for fall in the hospital  Outcome: Progressing  Goal: Verbalize understanding of risk factor reduction measures to prevent injury from fall in the home  Outcome: Progressing  Goal: Use assistive devices by end of the shift  Outcome: Progressing  Goal: Pace activities to prevent fatigue by end of the shift  Outcome: Progressing     Problem: Safety - Adult  Goal: Free from fall injury  Outcome: Progressing     Problem: Discharge Planning  Goal: Discharge to home or other facility with appropriate resources  Outcome: Progressing     Problem: Chronic Conditions and Co-morbidities  Goal: Patient's chronic conditions and co-morbidity symptoms are monitored and maintained or improved  Outcome: Progressing     Problem: Nutrition  Goal: Nutrient intake appropriate for  maintaining nutritional needs  Outcome: Progressing     Problem: Pain  Goal: Takes deep breaths with improved pain control throughout the shift  Outcome: Progressing  Goal: Turns in bed with improved pain control throughout the shift  Outcome: Progressing  Goal: Walks with improved pain control throughout the shift  Outcome: Progressing  Goal: Performs ADL's with improved pain control throughout shift  Outcome: Progressing  Goal: Free from opioid side effects throughout the shift  Outcome: Progressing  Goal: Free from acute confusion related to pain meds throughout the shift  Outcome: Progressing

## 2025-05-25 NOTE — CONSULTS
SUPPORTIVE AND PALLIATIVE ONCOLOGY CONSULT    SERVICE DATE: 5/25/2025    ASSESSMENT/PLAN:   Miguel Lofton is a 69 y.o. male diagnosed with compresson fractures of T5 and T6 during annual chest CT screening for lung cancer on 5/13. PMH significant for prostate cancer, base of tongue cancer, HTN, HLD, COPD, hypothyroidism, and anxiety. Admitted 5/20/2025 as a transfer from [facility] for further evaluation and management of compression fractures.NSGY was consulted and took pt to OR on 5/21 for T5-T6 laminectomy and T3-T8 fusion.  Course complicated by anemia (required I unit PRBC's this AM for hgb of 7.6) and osteomyelitis per OR surgical pathology.. ID consulted and pt is now on vancomycin and ceftriaxone for 8 weeks. Supportive and Palliative Oncology is consulted for pain management.    Follows annually with Dr. Kemp for base of tongue cancer; completed chemoradiation 10/2022  Dx with prostate cancer, shi 7; s/p radical prostatectomy in 2018; PET scan 12/2023 showed uptake in left common iliac retroperitoneal lymph node and right pelvic sidewall lymph node; completed 23 fractions to pelvic and lymph node and 10 fractions to prostate bed and lymph node)  Endorses + fatigue since completing RT last year  Follows with [facility] car team  Attempted to see pt twice but was sleeping soundly both times; the recs below asre based upon chart review and discussion with primary team as well as my visual assessment of pt    Symptom Management Plan:  Recommended changes are bolded    Pain:  Cancer related pain: bilateral lower back pain r/t compression fractures in setting of malignancy, somatic and neuropathic, sub-optimally controlled  Home regimen:  Vicodin 5/325 po q 4 hrs prn  Intolerances/previously tried: Meperidine  Risk factors:  none  Renal function WNL and Hepatic function WNL  Continue acetaminophen 650 mg po q 4 hrs scheduled  Increase frequency to q 6 hrs if renal function declines  Recommend  increasing gabapentin to 300 mg po bid to optimize neuropathic coverage  Continue oxycodone IR 5 mg po q 4 hrs prn for moderate pain  Continue oxycodone IR 10 mg po q 4 hrs prn for severe pain due to chronic nature of pain  Can increase frequency to q 3 hrs prn if 4 hours does not last  Continue hydromorphone 0.2 mg IV q 3 hrs prn for breakthrough pain  Can increase to 0.4 mg q 3 hrs prn if dose above is ineffective  Recommend starting oxycodone ER 10 mg po bid  Continue cyclobenzaprine 10 mg po tid  Continue lidocaine 4% TD patches x 2 daily    Nausea:  At risk for nausea without vomiting related to opioids and anxiety , pain  Home regimen:  none  QTc:  within normal limits  Well-controlled  Consider adding ondansetron 4 mg IV/PO/ODT q 6 hrs prn    Constipation  At risk for constipation related to medication side effects (including opioids), decreased oral intake, and prolonged immobility in the setting of hospitalization , currently not constipated  Home regimen: none  LBM 5/24/25  Change Miralax 17 g po to daily prn  Change Senna to 2 tabs po q hs prn  Goal to have BM without straining q48-72h, adjust regimen as needed    Altered Mood:  Chronic anxiety related to health concerns   controlled with home regimen   Home regimen: lorazepam 1 mg q hs prn  Continue lorazepam 1 mg po q hs prn    Sleeping Difficulty:  Impaired sleep related to pain, anxiety, and hospital environment  Home regimen:  lorazepam  See pain and anxiety recs    Disposition:  Please start the process of having prior authorization with meds to beds deliver medications to patient prior to discharge via [facility] pharmacy. Prescriptions will need to be sent 48-72 hours prior to discharge so that a prior authorization can be completed.     Discharge date: unknown pending acute issues and pain control  Will assess if patient needs an appointment with Outpatient Supportive Oncology as appropriate-last seen by Dagmar VASQUEZ-CNP 10/2022; per chart  review now follows with pall care team at [facility]    SIGNATURE: MADELINE Mallory  PAGER/CONTACT:  Contact information:  Supportive and Palliative Oncology  Monday-Friday 8 AM-5 PM  Epic Secure chat or pager [phone].  After hours and weekends:  pager [phone]  ==========================================================================================================================  Inpatient consult to [facility] Adult Supportive Oncology  Consult performed by: MADELINE Mallory  Consult ordered by: MADELINE Barrientos      PALLIATIVE MEDICINE OUTPATIENT PROVIDER:  has seen Dagmar CORREA in past; last seen 10/2022; now follows with pall care team at [facility]  CURRENT ATTENDING PROVIDER: Magaly Chávez MD     Medical Oncologist: MD Uche Rivas MD PhD   Radiation Oncologist: No care team member to display  Primary Physician: Terrence Graham  [phone]    REASON FOR CONSULT/CHIEF CONSULT COMPLAINT: pain management    Subjective   HISTORY OF PRESENT ILLNESS: Miguel Lofton is a 69 y.o. male diagnosed with compression fractures of T5 and T6 during annual chest CT screening for lung cancer on 5/13. PMH significant for prostate cancer, base of tongue cancer, HTN, HLD, COPD, hypothyroidism, and anxiety. Admitted 5/20/2025 as a transfer from [facility] for further evaluation and management of compression fractures.NSGY was consulted and took pt to OR on 5/21 for T5-T6 laminectomy and T3-T8 fusion.  Course complicated by anemia (required I unit PRBC's this AM for hgb of 7.6) and osteomyelitis per OR surgical pathology.. ID consulted and pt is now on vancomycin and ceftriaxone for 8 weeks. Supportive and Palliative Oncology is consulted for pain management.     Pain Assessment:  Sleeping; per team and chart review, pt endorses bilateral lower back pain that radiates down legs    Opioid Requirements  Past 24 h opioid requirements (5/24/25 at 0800 to  05/25/25 at 0800):   Oxycodone IR 5 mg PO x 1 doses = 5 mg = 6.2 OME  Oxycodone IR 10 mg PO x 4 doses = 40 mg = 50 OME  Hydromorphone 0.2 mg IV x 1 doses = 0.2 mg = 2.5 OME  Total 24h OME use:  58.7 OME    OARRS/PDMP reviewed; unintentional overdose risk score of 260 (moderate risk);  no aberrant behavior noted.    Symptom Assessment:  Unable to obtain secondary to sleeping (attempted x 2 to interview but unable as he was sleeping)    Information obtained from: chart review, interview of patient, and discussion with primary team  ______________________________________________________________________   Oncology History   Prostate cancer (Multi)   11/29/2023 Initial Diagnosis    Prostate cancer (CMS/[facility])     11/29/2023 Cancer Staged    Staging form: Prostate, AJCC 8th Edition, Pathologic stage from 11/29/2023: Stage IIC (rpT2, pN0, cM0, PSA: 0, Grade Group: 3) - Signed by Britney Rangel MD on 12/1/2023       Medical History[1]  Surgical History[2]  Family History[3]     SOCIAL HISTORY:  Marital Status  to wife Eleni, Children 7, Grandchildren 20 and 1 great grandchild, Support system wife, kids, grandkids, friends, extended family, Employment on disability for last 20 years, and Hobbies camping, boating, working outside in yard   Social History:  reports that he has quit smoking. His smoking use included cigarettes. He has never used smokeless tobacco. He reports that he does not currently use alcohol. He reports that he does not use drugs.    Yazidi and Importance of Yazidi:  Baptism    REVIEW OF SYSTEMS:  Review of systems negative unless noted in HPI.     Objective     Lab Results   Component Value Date    WBC 7.9 05/25/2025    HGB 7.6 (L) 05/25/2025    HCT 26.4 (L) 05/25/2025    MCV 84 05/25/2025     05/25/2025      Lab Results   Component Value Date    GLUCOSE 91 05/25/2025    CALCIUM 8.3 (L) 05/25/2025     05/25/2025    K 4.2 05/25/2025    CO2 28 05/25/2025     05/25/2025    BUN 8  05/25/2025    CREATININE 0.42 (L) 05/25/2025     Lab Results   Component Value Date    ALT 24 05/20/2025    AST 16 05/20/2025    ALKPHOS 131 05/20/2025    BILITOT 0.4 05/20/2025     Estimated Creatinine Clearance: 125 mL/min (A) (by C-G formula based on SCr of 0.42 mg/dL (L)).     Encounter Date: 05/20/25   ECG 12 lead   Result Value    Ventricular Rate 73    Atrial Rate 73    NE Interval 134    QRS Duration 100    QT Interval 410    QTC Calculation(Bazett) 451    P Axis 35    R Axis -4    T Axis 27    QRS Count 12    Q Onset 210    P Onset 143    P Offset 198    T Offset 415    QTC Fredericia 438    Narrative    Normal sinus rhythm  Normal ECG  When compared with ECG of 17-AUG-2023 08:04,  T wave amplitude has decreased in Anterior leads  Confirmed by Eddi Martínez ([id number]) on 5/23/2025 4:27:41 PM     Wt Readings from Last 5 Encounters:   05/21/25 73.4 kg (161 lb 13.1 oz)   01/22/25 90.7 kg (199 lb 15.3 oz)   12/18/24 88 kg (194 lb)   07/24/24 83.6 kg (184 lb 4.9 oz)   02/21/24 84.8 kg (187 lb)     Current Outpatient Medications   Medication Instructions    acetaminophen (TYLENOL) 1,000 mg, oral    alteplase (CATHFLO ACTIVASE) 2 mg, intra-catheter, Once    ascorbic acid (Vitamin C) 500 mg tablet 1 tablet, oral, Daily    aspirin 81 mg EC tablet 1 tablet, oral, Daily    atorvastatin (Lipitor) 80 mg tablet 1 tablet, oral, Daily    cefTRIAXone (Rocephin) 2 gram/50 mL IV 2 g, intravenous, Every 24 hours    cholecalciferol (Vitamin D-3) 25 MCG (1000 UT) tablet 1 tablet, oral, Daily    cyanocobalamin (Vitamin B-12) 1,000 mcg tablet 1 tablet, oral, Daily    cyclobenzaprine (FLEXERIL) 10 mg, oral, 3 times daily PRN    diclofenac sodium (Voltaren) 1 % gel gel Topical    HYDROcodone-acetaminophen (Norco) 5-325 mg tablet 1 tablet, oral, Every 6 hours PRN    levothyroxine (SYNTHROID, LEVOXYL) 88 mcg, oral, Daily    levothyroxine (SYNTHROID, LEVOXYL) 75 mcg, oral, Daily    LORazepam (Ativan) 1 mg tablet TAKE ONE TABLET BY MOUTH  EVERY DAY NIGHTLY AS NEEDED FOR ANXIETY    LORazepam (ATIVAN) 1 mg, oral, 2 times daily PRN    metoprolol succinate XL (TOPROL-XL) 25 mg, oral, Daily, Do not crush or chew.    omeprazole (PRILOSEC) 20 mg, oral    sildenafil (Viagra) 100 mg tablet 1 tablet, oral, Daily PRN    sodium chloride 0.9% parenteral solution 250 mL with vancomycin 1,000 mg recon soln 1.25 g 1.25 g, intravenous, Every 12 hours    tiotropium (Spiriva with HandiHaler) 18 mcg inhalation capsule Place into inhaler and inhale.     Scheduled medications   Scheduled Medications[4]  Continuous medications  Continuous Medications[5]  PRN medications  alteplase, 2 mg, PRN  bisacodyl, 10 mg, Daily PRN  HYDROmorphone, 0.2 mg, q3h PRN  LORazepam, 1 mg, Nightly PRN  oxyCODONE, 10 mg, q4h PRN  oxyCODONE, 5 mg, q4h PRN  simethicone, 80 mg, 4x daily PRN  vancomycin, , Daily PRN    Allergies: RX Allergies[6]          PHYSICAL EXAMINATION:  Vital Signs:   Vital signs reviewed  Vitals:    05/25/25 0814   BP: 106/64   Pulse: 85   Resp: 17   Temp: 36.1 °C (97 °F)   SpO2: 96%     Pain Score: sleeping     Physical Exam  Vitals reviewed.   Constitutional:       Comments: Sleeping; NAD; appears to be comfortable   HENT:      Head: Normocephalic and atraumatic.   Cardiovascular:      Rate and Rhythm: Normal rate.   Pulmonary:      Effort: Pulmonary effort is normal. No respiratory distress.      Comments: Respirations even and unlabored; on supplemental O2 via nasal cannula  Skin:     General: Skin is warm and dry.      Capillary Refill: Capillary refill takes less than 2 seconds.   Psychiatric:      Comments: Sleeping     ==========================================================================================================================  PALLIATIVE CARE ENCOUNTER:  Introduction to Supportive and Palliative Oncology:  Spoke with patient at bedside  Introduced the role and philosophy of Supportive and Palliative oncology in the evaluation and management of symptoms  during cancer treatment  Palliative care was introduced as a service for patients with serious illness to help with symptoms, assist with goals of care conversations, navigate complex decision making, improve quality of life for patients, and provide support both patients and families.  Emotional support provided  Coordination of care:  medication changes    Medical Decision Making/Goals of Care/Advance Care Planning:  Patient's current clinical condition, including diagnosis, prognosis, and management plan, and goals of care were discussed.   Oncologic diagnosis: compression fractures in setting of hx of prostate cancer, base of tongue cancer,  Family: Supportive wife, kids, grandkids  Unable to assess at time of consult d/t sleeping:  Performance status  Joys/meaning/strength  Understanding of health  Information:Wants full disclosure  Goals  Worries and fears now and future  Minimum acceptable outcome/QOL  Code status discussion:  Full code (per chart review and discussion w/primary team)    Advance Directives  Existence of Advance Directives:None  Decision maker: Surrogate decision maker is wife Eleni Lofton [phone]    Supportive Interventions: Will discuss at a future visit    Signature and billing:  Medical complexity was high level due to due to complexity of problems, extensive data review, and high risk of management/treatment.    I spent 75 minutes in the care of this patient which included chart review, interviewing patient/family, discussion with primary team, coordination of care, and documentation.    DATA   Diagnostic tests and information reviewed for today's visit:  Conversation with primary team, Most recent labs and imaging results, Most recent EKG, Medications     Some elements copied from oncology progress note on 5/25/25, the elements have been updated and all reflect current decision making from today, 5/25/2025.    Plan of Care discussed with: Provider, RN, Patient    Thank you for asking  Supportive and Palliative Oncology to assist with care of this patient.  Recommendations will be communicated back to the consulting service by way of shared electronic medical record/secure chat/email or face-to-face.   We will continue to follow.  Please contact us for additional questions or concerns.    SIGNATURE: CHRISTINA Mallory-CNP  PAGER/CONTACT:  Contact information:  Supportive and Palliative Oncology  Monday-Friday 8 AM-5 PM  Epic Secure chat or pager [phone].  After hours and weekends:  pager [phone]           [1]   Past Medical History:  Diagnosis Date    Hypothyroidism, unspecified     Hypothyroidism, adult    Myocardial infarction (Multi)     Personal history of other endocrine, nutritional and metabolic disease     History of high cholesterol    Personal history of other mental and behavioral disorders     History of anxiety   [2]   Past Surgical History:  Procedure Laterality Date    CARDIAC CATHETERIZATION      HERNIA REPAIR  08/29/2018    Hernia Repair    VASECTOMY  08/29/2018    Surgery Vas Deferens Vasectomy   [3] No family history on file.  [4] acetaminophen, 650 mg, oral, q4h  [Held by provider] aspirin, 81 mg, oral, Daily  atorvastatin, 80 mg, oral, Daily  cefTRIAXone, 2 g, intravenous, q24h  cholecalciferol, 25 mcg, oral, Daily  cyclobenzaprine, 10 mg, oral, TID  enoxaparin, 40 mg, subcutaneous, q24h  gabapentin, 300 mg, oral, Daily  lactulose, 20 g, oral, BID  levothyroxine, 75 mcg, oral, Daily  lidocaine, 5 mL, infiltration, Once  lidocaine, 2 patch, transdermal, Daily  metoprolol succinate XL, 25 mg, oral, Daily  pantoprazole, 40 mg, oral, Daily before breakfast  polyethylene glycol, 17 g, oral, BID  sennosides-docusate sodium, 3 tablet, oral, BID  tiotropium, 2 puff, inhalation, Daily  vancomycin, 1,250 mg, intravenous, q12h  [5]    [6]   Allergies  Allergen Reactions    Diazepam Other     MIGRAINES    Meperidine Nausea Only     migraines

## 2025-05-26 LAB
ALBUMIN SERPL BCP-MCNC: 2.5 G/DL (ref 3.4–5)
ALP SERPL-CCNC: 122 U/L (ref 33–136)
ALT SERPL W P-5'-P-CCNC: 20 U/L (ref 10–52)
ANION GAP SERPL CALC-SCNC: 10 MMOL/L (ref 10–20)
AST SERPL W P-5'-P-CCNC: 19 U/L (ref 9–39)
BASOPHILS # BLD AUTO: 0.11 X10*3/UL (ref 0–0.1)
BASOPHILS NFR BLD AUTO: 1.4 %
BILIRUB SERPL-MCNC: 0.4 MG/DL (ref 0–1.2)
BUN SERPL-MCNC: 8 MG/DL (ref 6–23)
CALCIUM SERPL-MCNC: 8.5 MG/DL (ref 8.6–10.6)
CHLORIDE SERPL-SCNC: 104 MMOL/L (ref 98–107)
CO2 SERPL-SCNC: 29 MMOL/L (ref 21–32)
CREAT SERPL-MCNC: 0.44 MG/DL (ref 0.5–1.3)
EGFRCR SERPLBLD CKD-EPI 2021: >90 ML/MIN/1.73M*2
EOSINOPHIL # BLD AUTO: 0.73 X10*3/UL (ref 0–0.7)
EOSINOPHIL NFR BLD AUTO: 9.2 %
ERYTHROCYTE [DISTWIDTH] IN BLOOD BY AUTOMATED COUNT: 18.5 % (ref 11.5–14.5)
GLUCOSE SERPL-MCNC: 84 MG/DL (ref 74–99)
HCT VFR BLD AUTO: 30.2 % (ref 41–52)
HGB BLD-MCNC: 9 G/DL (ref 13.5–17.5)
IMM GRANULOCYTES # BLD AUTO: 0.05 X10*3/UL (ref 0–0.7)
IMM GRANULOCYTES NFR BLD AUTO: 0.6 % (ref 0–0.9)
LYMPHOCYTES # BLD AUTO: 1.18 X10*3/UL (ref 1.2–4.8)
LYMPHOCYTES NFR BLD AUTO: 14.8 %
MAGNESIUM SERPL-MCNC: 1.75 MG/DL (ref 1.6–2.4)
MCH RBC QN AUTO: 25 PG (ref 26–34)
MCHC RBC AUTO-ENTMCNC: 29.8 G/DL (ref 32–36)
MCV RBC AUTO: 84 FL (ref 80–100)
MONOCYTES # BLD AUTO: 0.86 X10*3/UL (ref 0.1–1)
MONOCYTES NFR BLD AUTO: 10.8 %
NEUTROPHILS # BLD AUTO: 5.02 X10*3/UL (ref 1.2–7.7)
NEUTROPHILS NFR BLD AUTO: 63.2 %
NIL(NEG) CONTROL SPOT COUNT: NORMAL
NRBC BLD-RTO: 0 /100 WBCS (ref 0–0)
PANEL A SPOT COUNT: 0
PANEL B SPOT COUNT: 0
PLATELET # BLD AUTO: 431 X10*3/UL (ref 150–450)
POS CONTROL SPOT COUNT: NORMAL
POTASSIUM SERPL-SCNC: 4.4 MMOL/L (ref 3.5–5.3)
PROT SERPL-MCNC: 5.1 G/DL (ref 6.4–8.2)
RBC # BLD AUTO: 3.6 X10*6/UL (ref 4.5–5.9)
SODIUM SERPL-SCNC: 139 MMOL/L (ref 136–145)
T-SPOT. TB INTERPRETATION: NEGATIVE
VANCOMYCIN SERPL-MCNC: 24.9 UG/ML (ref 5–20)
WBC # BLD AUTO: 8 X10*3/UL (ref 4.4–11.3)

## 2025-05-26 PROCEDURE — 2500000001 HC RX 250 WO HCPCS SELF ADMINISTERED DRUGS (ALT 637 FOR MEDICARE OP): Performed by: STUDENT IN AN ORGANIZED HEALTH CARE EDUCATION/TRAINING PROGRAM

## 2025-05-26 PROCEDURE — 80202 ASSAY OF VANCOMYCIN: CPT

## 2025-05-26 PROCEDURE — 2500000005 HC RX 250 GENERAL PHARMACY W/O HCPCS

## 2025-05-26 PROCEDURE — 85025 COMPLETE CBC W/AUTO DIFF WBC: CPT | Performed by: NURSE PRACTITIONER

## 2025-05-26 PROCEDURE — 97530 THERAPEUTIC ACTIVITIES: CPT | Mod: GP | Performed by: PHYSICAL THERAPIST

## 2025-05-26 PROCEDURE — 2500000002 HC RX 250 W HCPCS SELF ADMINISTERED DRUGS (ALT 637 FOR MEDICARE OP, ALT 636 FOR OP/ED): Performed by: STUDENT IN AN ORGANIZED HEALTH CARE EDUCATION/TRAINING PROGRAM

## 2025-05-26 PROCEDURE — 80053 COMPREHEN METABOLIC PANEL: CPT | Performed by: NURSE PRACTITIONER

## 2025-05-26 PROCEDURE — 2500000004 HC RX 250 GENERAL PHARMACY W/ HCPCS (ALT 636 FOR OP/ED): Mod: JZ | Performed by: NURSE PRACTITIONER

## 2025-05-26 PROCEDURE — 2500000004 HC RX 250 GENERAL PHARMACY W/ HCPCS (ALT 636 FOR OP/ED): Mod: JZ

## 2025-05-26 PROCEDURE — 2500000001 HC RX 250 WO HCPCS SELF ADMINISTERED DRUGS (ALT 637 FOR MEDICARE OP): Performed by: NURSE PRACTITIONER

## 2025-05-26 PROCEDURE — 83735 ASSAY OF MAGNESIUM: CPT | Performed by: NURSE PRACTITIONER

## 2025-05-26 PROCEDURE — 2500000001 HC RX 250 WO HCPCS SELF ADMINISTERED DRUGS (ALT 637 FOR MEDICARE OP)

## 2025-05-26 PROCEDURE — 99232 SBSQ HOSP IP/OBS MODERATE 35: CPT

## 2025-05-26 PROCEDURE — 1170000001 HC PRIVATE ONCOLOGY ROOM DAILY

## 2025-05-26 RX ORDER — OXYCODONE HCL 10 MG/1
10 TABLET, FILM COATED, EXTENDED RELEASE ORAL EVERY 12 HOURS
Refills: 0 | OUTPATIENT
Start: 2025-05-26 | End: 2025-06-02

## 2025-05-26 RX ORDER — OXYCODONE HCL 10 MG/1
10 TABLET, FILM COATED, EXTENDED RELEASE ORAL EVERY 12 HOURS SCHEDULED
Qty: 14 TABLET | Refills: 0 | Status: SHIPPED | OUTPATIENT
Start: 2025-05-26 | End: 2025-06-06

## 2025-05-26 RX ADMIN — LEVOTHYROXINE SODIUM 75 MCG: 0.07 TABLET ORAL at 10:25

## 2025-05-26 RX ADMIN — ACETAMINOPHEN 650 MG: 325 TABLET, FILM COATED ORAL at 15:11

## 2025-05-26 RX ADMIN — ACETAMINOPHEN 650 MG: 325 TABLET, FILM COATED ORAL at 10:29

## 2025-05-26 RX ADMIN — ACETAMINOPHEN 650 MG: 325 TABLET, FILM COATED ORAL at 18:52

## 2025-05-26 RX ADMIN — LIDOCAINE 4% 2 PATCH: 40 PATCH TOPICAL at 10:36

## 2025-05-26 RX ADMIN — LORAZEPAM 1 MG: 1 TABLET ORAL at 21:07

## 2025-05-26 RX ADMIN — GABAPENTIN 300 MG: 300 CAPSULE ORAL at 10:25

## 2025-05-26 RX ADMIN — GABAPENTIN 300 MG: 300 CAPSULE ORAL at 20:11

## 2025-05-26 RX ADMIN — OXYCODONE HYDROCHLORIDE 10 MG: 10 TABLET ORAL at 22:15

## 2025-05-26 RX ADMIN — Medication 25 MCG: at 10:25

## 2025-05-26 RX ADMIN — OXYCODONE HYDROCHLORIDE 10 MG: 10 TABLET, FILM COATED, EXTENDED RELEASE ORAL at 20:10

## 2025-05-26 RX ADMIN — OXYCODONE HYDROCHLORIDE 10 MG: 10 TABLET, FILM COATED, EXTENDED RELEASE ORAL at 10:24

## 2025-05-26 RX ADMIN — ACETAMINOPHEN 650 MG: 325 TABLET, FILM COATED ORAL at 05:23

## 2025-05-26 RX ADMIN — VANCOMYCIN HYDROCHLORIDE 1250 MG: 1.25 INJECTION, POWDER, LYOPHILIZED, FOR SOLUTION INTRAVENOUS at 21:15

## 2025-05-26 RX ADMIN — CEFTRIAXONE SODIUM 2 G: 2 INJECTION, SOLUTION INTRAVENOUS at 10:08

## 2025-05-26 RX ADMIN — OXYCODONE HYDROCHLORIDE 5 MG: 5 TABLET ORAL at 05:23

## 2025-05-26 RX ADMIN — ATORVASTATIN CALCIUM 80 MG: 80 TABLET, FILM COATED ORAL at 10:25

## 2025-05-26 RX ADMIN — METOPROLOL SUCCINATE 25 MG: 25 TABLET, EXTENDED RELEASE ORAL at 10:25

## 2025-05-26 RX ADMIN — CYCLOBENZAPRINE 10 MG: 10 TABLET, FILM COATED ORAL at 10:25

## 2025-05-26 RX ADMIN — ACETAMINOPHEN 650 MG: 325 TABLET, FILM COATED ORAL at 22:20

## 2025-05-26 RX ADMIN — CYCLOBENZAPRINE 10 MG: 10 TABLET, FILM COATED ORAL at 15:11

## 2025-05-26 RX ADMIN — ENOXAPARIN SODIUM 40 MG: 100 INJECTION SUBCUTANEOUS at 15:11

## 2025-05-26 RX ADMIN — VANCOMYCIN HYDROCHLORIDE 1250 MG: 1.25 INJECTION, POWDER, LYOPHILIZED, FOR SOLUTION INTRAVENOUS at 10:09

## 2025-05-26 RX ADMIN — SIMETHICONE 80 MG: 80 TABLET, CHEWABLE ORAL at 10:25

## 2025-05-26 RX ADMIN — CYCLOBENZAPRINE 10 MG: 10 TABLET, FILM COATED ORAL at 20:10

## 2025-05-26 RX ADMIN — OXYCODONE HYDROCHLORIDE 5 MG: 5 TABLET ORAL at 11:57

## 2025-05-26 RX ADMIN — PANTOPRAZOLE SODIUM 40 MG: 40 TABLET, DELAYED RELEASE ORAL at 10:25

## 2025-05-26 ASSESSMENT — PAIN SCALES - GENERAL
PAINLEVEL_OUTOF10: 8
PAINLEVEL_OUTOF10: 4
PAINLEVEL_OUTOF10: 4
PAINLEVEL_OUTOF10: 8
PAINLEVEL_OUTOF10: 7
PAINLEVEL_OUTOF10: 6
PAINLEVEL_OUTOF10: 5 - MODERATE PAIN
PAINLEVEL_OUTOF10: 2
PAINLEVEL_OUTOF10: 5 - MODERATE PAIN

## 2025-05-26 ASSESSMENT — PAIN - FUNCTIONAL ASSESSMENT
PAIN_FUNCTIONAL_ASSESSMENT: 0-10

## 2025-05-26 ASSESSMENT — COGNITIVE AND FUNCTIONAL STATUS - GENERAL
DRESSING REGULAR LOWER BODY CLOTHING: A LOT
MOVING TO AND FROM BED TO CHAIR: A LITTLE
MOVING FROM LYING ON BACK TO SITTING ON SIDE OF FLAT BED WITH BEDRAILS: A LITTLE
STANDING UP FROM CHAIR USING ARMS: A LITTLE
TOILETING: A LOT
HELP NEEDED FOR BATHING: A LOT
WALKING IN HOSPITAL ROOM: A LITTLE
STANDING UP FROM CHAIR USING ARMS: A LITTLE
MOBILITY SCORE: 16
DRESSING REGULAR UPPER BODY CLOTHING: A LITTLE
DAILY ACTIVITIY SCORE: 17
WALKING IN HOSPITAL ROOM: A LOT
CLIMB 3 TO 5 STEPS WITH RAILING: TOTAL
MOVING TO AND FROM BED TO CHAIR: A LITTLE
TURNING FROM BACK TO SIDE WHILE IN FLAT BAD: A LITTLE
MOBILITY SCORE: 17
CLIMB 3 TO 5 STEPS WITH RAILING: A LOT
TURNING FROM BACK TO SIDE WHILE IN FLAT BAD: A LITTLE

## 2025-05-26 NOTE — CARE PLAN
The patient's goals for the shift include Pain management    The clinical goals for the shift include pt will be HDS with VSS  -

## 2025-05-26 NOTE — PROGRESS NOTES
Physical Therapy    Physical Therapy Treatment    Patient Name: Miguel Lofton  MRN: [id number]  Today's Date: 5/26/2025  Time Calculation  Start Time: 1024  Stop Time: 1043  Time Calculation (min): 19 min     4021/4021-A    Assessment/Plan   PT Assessment  PT Assessment Results: Decreased strength, Decreased endurance, Impaired balance, Decreased mobility, Orthopedic restrictions  Rehab Prognosis: Fair  Barriers to Discharge Home: Physical needs  Physical Needs: Stair navigation into home limited by function/safety, 24hr mobility assistance needed, High falls risk due to function or environment (secondary to LBP)  Evaluation/Treatment Tolerance: Patient limited by pain  Medical Staff Made Aware: Yes  End of Session Communication: Bedside nurse  Assessment Comment: Pt. severely limited by pain this visit and only able to tolerate short trial of ambulation. Pending MRI and further workup for lumbar pain. Will continue to follow while IP and modify recommendation as appropraite pending functional progress and pain control  End of Session Patient Position: Bed, 3 rail up, Alarm on  PT Plan  Inpatient/Swing Bed or Outpatient: Inpatient  PT Plan  Treatment/Interventions: Bed mobility, Transfer training, Gait training, Stair training, Balance training, Neuromuscular re-education, Strengthening, Endurance training, Therapeutic exercise, Therapeutic activity, Home exercise program  PT Plan: Ongoing PT  PT Frequency: 5 times per week  PT Discharge Recommendations: Low intensity level of continued care  Equipment Recommended upon Discharge:  (TBD pending further gait assessment)  PT Recommended Transfer Status: Assist x1  PT - OK to Discharge:  (Pending pain control and MRI of lumbar spine.)        General Visit Information:   PT  Visit  PT Received On: 05/26/25  Response to Previous Treatment:  (Pt. reports increased LBP, pending further imaging and POC)  General  Reason for Referral: Osteomyelitis, LBP with radiculopathy and  weakness x3 weeks, 5/13 CT chest T5 compression fx, MRI TS T5-T6 compression fx w disc/osteo, epidural phlegmon w moderate canal stenosis, s/p T5-6 laminectomy, T6 transpedicular decompression, T3-8 fusion on 5/21/25  Past Medical History Relevant to Rehab: Per chart, PMH includes HTN,  HLD, CAD (on ASA), hypoT, COPD, anxiety, LBP (annual radioablations), prostate cancer (s/p prostatectomy 2018), base of tongue cancer (dx 2022, s/p radiation/chemo, in remission), diastolic CHF  Family/Caregiver Present: No  Prior to Session Communication: Bedside nurse  Patient Position Received: Bed, 3 rail up, Alarm off, not on at start of session  General Comment: Pt. supine in bed, reporting LBP with mobility but agreeable to trial of ambulation. Limited by LBP, worse in weightbearing position and radicular symptoms into RLE  Subjective     Precautions:  Precautions  Medical Precautions: Fall precautions  Post-Surgical Precautions: Spinal precautions       Objective     Pain:  Pain Assessment  Pain Type: Chronic pain  Pain Location: Back    Cognition:  Cognition  Overall Cognitive Status: Within Functional Limits  Orientation Level: Oriented X4  Following Commands: Follows all commands and directions without difficulty  Insight: Within function limits  Impulsive: Within functional limits    Postural Control:  Postural Control  Postural Control: Within Functional Limits      Activity Tolerance:  Activity Tolerance  Endurance: Decreased tolerance for upright activites    Treatments:  Therapeutic Exercise  Therapeutic Exercise Performed: No (poor tolerance to treatment this session)  Therapeutic Activity  Therapeutic Activity Performed: Yes  Therapeutic Activity 1: bed mobility, transfers, gait     Bed Mobility  Bed Mobility: Yes  Bed Mobility 1  Bed Mobility 1: Supine to sitting, Sitting to supine  Level of Assistance 1: Close supervision  Bed Mobility Comments 1: cues for log roll and to avoid breath holding, HOB  elevated  Ambulation/Gait Training  Ambulation/Gait Training Performed: Yes  Ambulation/Gait Training 1  Surface 1: Level tile  Device 1: Rolling walker  Assistance 1: Contact guard, Minimum assistance  Quality of Gait 1: Inconsistent stride length, Forward flexed posture, Diminished heel strike (Progressively worsening LBP radiating into RLE. Poor tolerance to upright standing, requesting to return supine s/p short ambulation in room)  Comments/Distance (ft) 1: 10 ft  Transfers  Transfer: Yes  Transfer 1  Technique 1: Sit to stand, Stand to sit  Transfer Device 1: Walker  Transfer Level of Assistance 1: Contact guard  Trials/Comments 1: cues for safety/sequencing  Stairs  Stairs: No (unable to trial due to pain)       Outcome Measures:     [facility] Basic Mobility  Turning from your back to your side while in a flat bed without using bedrails: A little  Moving from lying on your back to sitting on the side of a flat bed without using bedrails: A little  Moving to and from bed to chair (including a wheelchair): A little  Standing up from a chair using your arms (e.g. wheelchair or bedside chair): A little  To walk in hospital room: A little  Climbing 3-5 steps with railing: Total  Basic Mobility - Total Score: 16          Education Documentation  Precautions, taught by Ana Gallardo PT at 5/26/2025 11:29 AM.  Learner: Patient  Readiness: Acceptance  Method: Explanation  Response: Verbalizes Understanding    Body Mechanics, taught by Ana Gallardo PT at 5/26/2025 11:29 AM.  Learner: Patient  Readiness: Acceptance  Method: Explanation  Response: Verbalizes Understanding    Home Exercise Program, taught by Ana Gallardo PT at 5/26/2025 11:29 AM.  Learner: Patient  Readiness: Acceptance  Method: Explanation  Response: Verbalizes Understanding    Mobility Training, taught by Ana Gallardo PT at 5/26/2025 11:29 AM.  Learner: Patient  Readiness: Acceptance  Method: Explanation  Response: Verbalizes  Understanding    Education Comments  No comments found.           EDUCATION:     Encounter Problems       Encounter Problems (Active)       Balance       Pt will maintain static/dynamic standing balance x4 minutes with RW and supervision to demonstrate improved balance  (Not Progressing)       Start:  05/22/25    Expected End:  06/05/25               Mobility       Pt will complete bed mobility independently via log roll technique with HOB flat and no use of bed rails (Progressing)       Start:  05/22/25    Expected End:  06/05/25            Pt will amb >250ft with LRAD and supervision in prep for safe discharge home  (Not Progressing)       Start:  05/22/25    Expected End:  06/05/25            Pt will a/descend 2 steps with unliat rail (either side) and CGA in prep for safe home entry  (Not Progressing)       Start:  05/22/25    Expected End:  06/05/25               PT Transfers       Pt will transfer sit to stand with LRAD and supervision in prep for out of bed mobility  (Progressing)       Start:  05/22/25    Expected End:  06/05/25

## 2025-05-26 NOTE — PROGRESS NOTES
"Miguel Lofton is a 69 y.o. male on day 6 of admission presenting with Osteomyelitis.    Subjective   Pt seen this morning resting in bed. Pt reports pain is controlled, drinking ensure. Reports he is trying to eat some solids, willing to try chicken noodle soup today. Unable to sit in the chair d/t back pain, willing to get up oob and ambulate with walker        Objective     Physical Exam  Vitals reviewed.   Constitutional:       Appearance: Normal appearance.   HENT:      Head: Normocephalic.   Cardiovascular:      Rate and Rhythm: Normal rate and regular rhythm.      Pulses: Normal pulses.   Pulmonary:      Effort: Pulmonary effort is normal.   Abdominal:      Palpations: Abdomen is soft.   Skin:     Capillary Refill: Capillary refill takes less than 2 seconds.      Comments: Dressing with prevena c/d/i   Neurological:      General: No focal deficit present.      Mental Status: He is alert and oriented to person, place, and time.   Psychiatric:         Mood and Affect: Mood normal.         Last Recorded Vitals  Blood pressure 103/69, pulse 79, temperature 36.1 °C (97 °F), temperature source Temporal, resp. rate 17, height 1.727 m (5' 8\"), weight 73.4 kg (161 lb 13.1 oz), SpO2 95%.  Intake/Output last 3 Shifts:  I/O last 3 completed shifts:  In: 1092 (14.9 mL/kg) [P.O.:560; Blood:282; IV Piggyback:250]  Out: 1060 (14.4 mL/kg) [Urine:1050 (0.4 mL/kg/hr); Drains:10]  Weight: 73.4 kg     Relevant Results    This patient has a central line   Reason for the central line remaining today? IV atb             Assessment & Plan  Osteomyelitis  Miguel Lofton is a 69 y.o. male patient, with PMHx of prostate cancer, base of the tongue cancer, HTN, HLD, hypothyroidism, COPD, anxiety who had a CT chest for annual lung cancer screening on 5/13 that showed possible compression fx of T5 and T6. Imaging was reviewed at office visit 5/19 and was advised he present to ED for MRI. He presented to [facility] 5/19 where MRI T spine " (5/19) showed acute discitis/osteomyelitis at T5-6 with marked surrounding phlegmon and circumferential epidural phlegmon resulting in moderate to severe spinal canal stenosis. No epidural abscess. He was transferred to [facility] 5/20 for NSGY eval. NSGY consulted 5/20, pt taken to the OR 5/21 for T5-6 laminectomy and T3-8 fusion. ID consulted 5/21 and rec Vanc and Ceftriaxone (5/22- planned stop 7/16 for total 8 week atb course). OR surg path (5/22) +osteomyelitis. OR tissue/wound culture (5/22) neg, final. OR fungal culture (5/22) NGTD. PT/OT consulted 5/22 and rec home care. S/p 1unit PRBC 5/25 for Hgb 7.6. DC pending improvement in pain and MRI C/L spine completion.     Updates 5/25:  - no changes, awaiting MRI      # Acute on chronic back pain   # Compression fracture   # Vertebral osteomyelitis  # Phlegmonous tissue c/f infection  - CT Chest (OSH): considerable deformity of what is felt to be T5 which may be related to a compression fracture of the caudal endplate. There is some compression of the superior endplate of T6.  -  MRI T spine (5/19 at OSH): Acute discitis/osteomyelitis at T5-6 with marked surrounding phlegmon and circumferential epidural phlegmon resulting in moderate to severe spinal canal stenosis. No epidural abscess. Requested for image import from [facility]    - CRP 7.35, ESR 90 (5/20); CRP 5.94, ESR 74 (5/22)  - s/p OR 5/21/25 with neurosurgery for T5-6 laminectomy and T3-8 fusion. Per neurosurgery, phlegmon appeared infectious without purulence  - OR surg path (5/22) +osteomyelitis  - OR tissue/wound culture/ fungal culture 5/22) neg, final  - 5/20 Blood cultures x2 NG, Urine culture negative  - +MRSA nares 5/24  - Prevena to remove POD7 with dressing (5/28)  - per neurosurgery; MRI C and L spine w/wo contrast pending, d/t claustrophobia will get with anesthesia   - ID consulted 5/21; rec Vanc and Ceftriaxone (5/22- planned stop 7/16 for total 8 week atb course)-- per ID please reengage if  MRI has areas concerning for infection   - Weekly CBC, CMP, CRP ordered outpt with results to be faxed to [phone] ATTN: Dr. Dyer once discharged per ID  - Supportive onc consulted 5/25 for uncontrolled pain, recs start ER Oxy 10mg BID and increased doris 300mg to BID, changed Miralax from daily to PRN, and Senna from 3tabs to 2tabs, added Zofran PRN  - PT/OT rec home care     # Abdominal pain-- improved  - Reported abdominal pain above old PEG site on 5/22  - Likely 2/2 prone positioning after OR +/- developing ileus  - KUB (5/22): moderate colonic stool burden, possible post-op ileus  - having loose Bms now LBM 5/26, stopped lactulose. 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 Gamez--last seen 12/18/2024: laryngeal structures are noted for grossly normal appearance; true vocal cords, false vocal cords, remaining structures, including epiglottis, piriform sinuses and base of tongue are all grossly normal; there is no evidence of gross mass nodule, polyp, tumor or other defect   - Follows yearly with Dr. Lucero      # Prostate Ca  - s/p radical prostatectomy by Dr Lang Mcneil in 2018  - [id number], Gleasons 7, + right anterior margin  - PSMA-PET done on 12/6/2023 showed no PSMA uptake within the post-surgical bed; there is PSMA uptake of a left common iliac retroperitoneal lymph node SUV 5.6 and PSMA uptake of a right pelvic sidewall lymph node SUV 3.5  - Dr Rangel recommended radiation therapy. Patient instead went to [facility] and was treated by Dr Zackary Ely (4600 cGy in 23 fractions to the pelvis and LN followed by [place]  to prostate bed and LN to 2500 cGy in 10 fractions)  - Since completing radiation, has been experiencing significant fatigue  - He is now following with urologist Dr. Boyd  - For pain management, follows with [facility] palliative care team   - PSA < 0.01 (2/19/25)--> PSA <0.10 (5/20)     # COPD  # Smoking Hx  # Lung Ca screening  - Follows with pulmonologist Dr Hutchison   - PFTs 03/2025: FEV1 72% FEV1/FVC 0.63  - Did not tolerate Breztri  - Continue home Spiriva 2 puffs daily     # HTN/HLD  - Continue home Metop Succs 25mg daily and Lipitor 80mg nightly  - ASA held per NSGY, can resume on POD7 (5/28)     # Hypothyroidism  - Last TSH 4.49 (5/12)  - Continue home Synthroid 75mcg daily     # Anemia  - Likely anemia of chronic disease +/- post-op  - BL Hgb ~11 after cancer tx; Hgb 9.9 (5/20)--> 8.3 (5/22) post-op--> 8.1 (5/23)--> 7.6 (5/25)-->s/p 1 unit 5/25, hgb stable 8  - Ferritin 305, Folate 7.5, Iron 41, TIBC 269, %Sat 15, UIBC 228, Vit B12 1,342 (5/20)  - 5/25 Iron 30, TIBC 204, folate 3.9   - Stopped home Vit B12 sup 5/22 d/t elevated B12 level  - will hold off on folic acid/iron replacement at this time given acute phase with post-op period, plan for outpatient monitoring of hgb and anemia labs/ongoing workup     DVT prophy: Lovenox, was held for neurosurgery and resumed 5/22 PM per ortho     DISPO:  - Full code, confirmed with patient on admission   - NOK: Eleni Lofton (wife) [phone]  - DC pending MRI   - NSGY FUV 6/9, Dr. Lucero (heme onc) FUV 8/8, NSGY FUV 8/11     I spent >60 minutes in the professional and overall care of this patient     Assessment and plan as above discussed with attending physician Dr. Pro Brewer, APRN-CNP

## 2025-05-26 NOTE — PROGRESS NOTES
Pharmacy to Dose Vancomycin:  Miguel Lofton is a 69 y.o. male ordered pharmacy to dose vancomycin for osteomyelitis. Today is day 4 of therapy.  Goal: -600mg/L*hr  Results from last 7 days   Lab Units 05/26/25  0528 05/25/25  1005 05/25/25  0532 05/24/25  0801   BUN mg/dL 8  --  8 11   CREATININE mg/dL 0.44*  --  0.42* 0.49*   WBC AUTO x10*3/uL 8.0 8.3 7.9 8.4   VANCOMYCIN RM ug/mL 24.9*  --   --  18.2      Staph/MRSA Screen Culture   Date/Time Value Ref Range Status   05/24/2025 11:46 AM (A)  Final    Isolated: Methicillin Resistant Staphylococcus aureus (MRSA)     Urine Culture   Date/Time Value Ref Range Status   05/20/2025 07:53 PM No growth  Final     Blood Culture   Date/Time Value Ref Range Status   05/20/2025 05:32 PM No growth at 4 days -  FINAL REPORT  Final   05/20/2025 05:32 PM No growth at 4 days -  FINAL REPORT  Final     Tissue/Wound Culture/Smear   Date/Time Value Ref Range Status   05/21/2025 11:36 AM No growth aerobically and anaerobically  Final     Gram Stain   Date/Time Value Ref Range Status   05/21/2025 11:36 AM (1+) Rare Polymorphonuclear leukocytes  Final   05/21/2025 11:36 AM No organisms seen  Final      Susceptibility data from last 90 days.  Collected Specimen Info Organism   05/24/25 Swab from Anterior Nares Methicillin Resistant Staphylococcus aureus (MRSA)     Antibiotics: Rocephin (Day 5).  Pertinent Medications: none.  Renal function remains stable.  Patient's current regimen is predicted to achieve AUC24,ss of 573.    Plan:  Continue vanco [id number].  The above dosing regimen is predicted by [employer] to result in the following pharmacokinetic parameters:  Loading dose: N/A  Regimen: 1250 mg IV every 12 hours.  Start time: 10:42 on 05/26/2025  Exposure target: AUC24 (range) 400-600 mg/L.hr   [id number]: 572 mg/L.hr  AUC24,ss: 573 mg/L.hr  Probability of AUC24 > 400: 100 %  Ctrough,ss: 17.6 mg/L  Probability of Ctrough,ss > 20: 21 %  Follow-up level ordered for 5/29 AM unless  clinically indicated sooner.  Pharmacy will continue daily vancomycin monitoring. Please contact the pharmacy with any questions.    Thank you,  Gino Andino RPh

## 2025-05-26 NOTE — ASSESSMENT & PLAN NOTE
Miguel Lofton is a 69 y.o. male patient, with PMHx of prostate cancer, base of the tongue cancer, HTN, HLD, hypothyroidism, COPD, anxiety who had a CT chest for annual lung cancer screening on 5/13 that showed possible compression fx of T5 and T6. Imaging was reviewed at office visit 5/19 and was advised he present to ED for MRI. He presented to [facility] 5/19 where MRI T spine (5/19) showed acute discitis/osteomyelitis at T5-6 with marked surrounding phlegmon and circumferential epidural phlegmon resulting in moderate to severe spinal canal stenosis. No epidural abscess. He was transferred to [facility] 5/20 for NSGY eval. NSGY consulted 5/20, pt taken to the OR 5/21 for T5-6 laminectomy and T3-8 fusion. ID consulted 5/21 and rec Vanc and Ceftriaxone (5/22- planned stop 7/16 for total 8 week atb course). OR surg path (5/22) +osteomyelitis. OR tissue/wound culture (5/22) neg, final. OR fungal culture (5/22) NGTD. PT/OT consulted 5/22 and rec home care. S/p 1unit PRBC 5/25 for Hgb 7.6. DC pending improvement in pain and MRI C/L spine completion.     Updates 5/25:  - no changes, awaiting MRI      # Acute on chronic back pain   # Compression fracture   # Vertebral osteomyelitis  # Phlegmonous tissue c/f infection  - CT Chest ([facility]): considerable deformity of what is felt to be T5 which may be related to a compression fracture of the caudal endplate. There is some compression of the superior endplate of T6.  -  MRI T spine (5/19 at OSH): Acute discitis/osteomyelitis at T5-6 with marked surrounding phlegmon and circumferential epidural phlegmon resulting in moderate to severe spinal canal stenosis. No epidural abscess. Requested for image import from [facility]    - CRP 7.35, ESR 90 (5/20); CRP 5.94, ESR 74 (5/22)  - s/p OR 5/21/25 with neurosurgery for T5-6 laminectomy and T3-8 fusion. Per neurosurgery, phlegmon appeared infectious without purulence  - OR surg path (5/22) +osteomyelitis  - OR tissue/wound culture/  fungal culture 5/22) neg, final  - 5/20 Blood cultures x2 NG, Urine culture negative  - +MRSA nares 5/24  - Prevena to remove POD7 with dressing (5/28)  - per neurosurgery; MRI C and L spine w/wo contrast pending, d/t claustrophobia will get with anesthesia   - ID consulted 5/21; rec Vanc and Ceftriaxone (5/22- planned stop 7/16 for total 8 week atb course)-- per ID please reengage if MRI has areas concerning for infection   - Weekly CBC, CMP, CRP ordered outpt with results to be faxed to [phone] ATTN: Dr. Dyer once discharged per ID  - Supportive onc consulted 5/25 for uncontrolled pain, recs start ER Oxy 10mg BID and increased doris 300mg to BID, changed Miralax from daily to PRN, and Senna from 3tabs to 2tabs, added Zofran PRN  - PT/OT rec home care     # Abdominal pain-- improved  - Reported abdominal pain above old PEG site on 5/22  - Likely 2/2 prone positioning after OR +/- developing ileus  - KUB (5/22): moderate colonic stool burden, possible post-op ileus  - having loose Bms now LBM 5/26, stopped lactulose. 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 Gamez--last seen 12/18/2024: laryngeal structures are noted for grossly normal appearance; true vocal cords, false vocal cords, remaining structures, including epiglottis, piriform sinuses and base of tongue are all grossly normal; there is no evidence of gross mass nodule, polyp, tumor or other defect   - Follows yearly with Dr. Lucero      # Prostate Ca  - s/p radical prostatectomy by Dr Lang Mcneil in 2018  - [id number], Gleasons 7, + right anterior margin  - PSMA-PET  done on 12/6/2023 showed no PSMA uptake within the post-surgical bed; there is PSMA uptake of a left common iliac retroperitoneal lymph node SUV 5.6 and PSMA uptake of a right pelvic sidewall lymph node SUV 3.5  - Dr Rangel recommended radiation therapy. Patient instead went to [facility] and was treated by Dr Zackary Ely (4600 cGy in 23 fractions to the pelvis and LN followed by [place] to prostate bed and LN to 2500 cGy in 10 fractions)  - Since completing radiation, has been experiencing significant fatigue  - He is now following with urologist Dr. Boyd  - For pain management, follows with [facility] palliative care team   - PSA < 0.01 (2/19/25)--> PSA <0.10 (5/20)     # COPD  # Smoking Hx  # Lung Ca screening  - Follows with pulmonologist Dr Hutchison   - PFTs 03/2025: FEV1 72% FEV1/FVC 0.63  - Did not tolerate Breztri  - Continue home Spiriva 2 puffs daily     # HTN/HLD  - Continue home Metop Succs 25mg daily and Lipitor 80mg nightly  - ASA held per NSGY, can resume on POD7 (5/28)     # Hypothyroidism  - Last TSH 4.49 (5/12)  - Continue home Synthroid 75mcg daily     # Anemia  - Likely anemia of chronic disease +/- post-op  - BL Hgb ~11 after cancer tx; Hgb 9.9 (5/20)--> 8.3 (5/22) post-op--> 8.1 (5/23)--> 7.6 (5/25)-->s/p 1 unit 5/25, hgb stable 8  - Ferritin 305, Folate 7.5, Iron 41, TIBC 269, %Sat 15, UIBC 228, Vit B12 1,342 (5/20)  - 5/25 Iron 30, TIBC 204, folate 3.9   - Stopped home Vit B12 sup 5/22 d/t elevated B12 level  - will hold off on folic acid/iron replacement at this time given acute phase with post-op period, plan for outpatient monitoring of hgb and anemia labs/ongoing workup     DVT prophy: Lovenox, was held for neurosurgery and resumed 5/22 PM per ortho     DISPO:  - Full code, confirmed with patient on admission   - NOK: Eleni Lofton (wife) [phone]  - DC pending MRI   - NSGY FUV 6/9, Dr. Lucero (heme onc) FUV 8/8, NSGY FUV 8/11     I spent >60 minutes in the professional and overall care of this  patient     Assessment and plan as above discussed with attending physician Dr. Pro Valles

## 2025-05-26 NOTE — PROGRESS NOTES
SUPPORTIVE AND PALLIATIVE ONCOLOGY INPATIENT FOLLOW-UP    SERVICE DATE: 5/27/25    Updates 5/27/25, recommended changes are bolded below:  Symptoms controlled, no changes    ASSESSMENT/PLAN:  Miguel Lofton is a 69 y.o. male diagnosed with compresson fractures of T5 and T6 during annual chest CT screening for lung cancer on 5/13. PMH significant for prostate cancer, base of tongue cancer, HTN, HLD, COPD, hypothyroidism, and anxiety. Admitted 5/20/2025 as a transfer from [facility] for further evaluation and management of compression fractures.NSGY was consulted and took pt to OR on 5/21 for T5-T6 laminectomy and T3-T8 fusion.  Course complicated by anemia (required I unit PRBC's this AM for hgb of 7.6) and osteomyelitis per OR surgical pathology.. ID consulted and pt is now on vancomycin and ceftriaxone for 8 weeks. Supportive and Palliative Oncology is consulted for pain management.     Symptom Management Plan:  Recommended changes are bolded     Pain:  Cancer related pain: bilateral lower back pain r/t compression fractures in setting of malignancy, somatic and neuropathic, well controlled  Home regimen:  Vicodin 5/325 po q 4 hrs prn  Intolerances/previously tried: Meperidine  Risk factors:  none  Renal function WNL and Hepatic function WNL  Continue acetaminophen 650 mg po q 4 hrs scheduled  Increase frequency to q 6 hrs if renal function declines  Continue gabapentin 300 mg po bid to optimize neuropathic coverage (increased 5/25)  Continue oxycodone IR 5 mg po q 4 hrs prn for moderate pain  Continue oxycodone IR 10 mg po q 4 hrs prn for severe pain   Can increase frequency to q 3 hrs prn if 4 hours does not last  Continue hydromorphone 0.2 mg IV q 3 hrs prn for breakthrough pain  Can increase to 0.4 mg q 3 hrs prn if dose above is ineffective  Recommend discontinuing 24 hrs prior to expected discharge  Continue oxycodone ER 10 mg po bid due to chronic nature of pain (started 5/25)  Continue cyclobenzaprine  10 mg po tid  Continue lidocaine 4% TD patches x 2 daily     Nausea:  At risk for nausea without vomiting related to opioids and anxiety , pain  Home regimen:  none  QTc:  within normal limits  Well-controlled  Consider adding ondansetron 4 mg IV/PO/ODT q 6 hrs prn     Constipation  At risk for constipation related to medication side effects (including opioids), decreased oral intake, and prolonged immobility in the setting of hospitalization , currently not constipated  Home regimen: none  LBM 5/27/25  Change Miralax 17 g po to daily prn  Change Senna to 2 tabs po q hs prn  Goal to have BM without straining q48-72h, adjust regimen as needed     Altered Mood:  Chronic anxiety related to health concerns   controlled with home regimen   Home regimen: lorazepam 1 mg q hs prn  Currently well controlled  Continue lorazepam 1 mg po q hs prn     Sleeping Difficulty:  Impaired sleep related to pain, anxiety, and hospital environment  Home regimen:  lorazepam  Reports sleeping well now that pain has improved with current regimen  See pain and anxiety recs     Disposition:  Please start the process of having prior authorization with meds to beds deliver medications to patient prior to discharge via [facility] pharmacy. Prescriptions will need to be sent 48-72 hours prior to discharge so that a prior authorization can be completed.   Prior auth completed and approved for Oxy ER.      Discharge date: unknown pending acute issues and pain control  Will assess if patient needs an appointment with Outpatient Supportive Oncology as appropriate-last seen by Dagmar CORREA 10/2022; per chart review now follows with pall care team at [facility]     SIGNATURE: MADELINE Mallory   PAGER/CONTACT:  Contact information:  Supportive and Palliative Oncology  Monday-Friday 8 AM-5 PM  Epic Secure chat or pager [phone].  After hours and weekends:  pager  [phone]  =================================================================  SUBJECTIVE:  Interval Events:  NSGY recommends MRI of cervical and lumbar spine with and without contrast are obtained; wants to follow up with patient 2 weeks following discharge for wound check.  Now signed off    Pain Assessment:  Location: mid to lower back radiates down to knee level bilaterally  Duration: Constant  Characteristics:   Ratin   Descriptors: stretching fullness, sharp, burning, and shooting   Aggravating: movement    Relieving: Analgesics Oxycodone IR and ER, Positioning, and Modifying activity   Interference with Function: Somewhat    Opioid Requirements  Past 24 h opioid requirements (25 at 0800 to 25 at 0800):   Oxycodone IR 5 mg PO x 2 doses = 10 mg = 12 OME  Oxycodone IR 10 mg PO x 2 doses = 20 mg = 25 OME  Oxycodone ER 10 mg PO x 2 doses = 20 mg = 25 OME  Total 24h OME use:  62 OME    Symptom Assessment:  Nausea none  Constipation none  Diarrhea none  Difficulty Sleeping none  Lack of appetite a little    Information obtained from: chart review, interview of patient, and discussion with primary team  _________________________________________________________________   OBJECTIVE:  Lab Results   Component Value Date    WBC 8.0 2025    HGB 9.0 (L) 2025    HCT 30.2 (L) 2025    MCV 84 2025     2025     Lab Results   Component Value Date    GLUCOSE 84 2025    CALCIUM 8.5 (L) 2025     2025    K 4.4 2025    CO2 29 2025     2025    BUN 8 2025    CREATININE 0.44 (L) 2025     Lab Results   Component Value Date    ALT 20 2025    AST 19 2025    ALKPHOS 122 2025    BILITOT 0.4 2025     Estimated Creatinine Clearance: 125 mL/min (A) (by C-G formula based on SCr of 0.44 mg/dL (L)).    Scheduled medications   Scheduled Medications[1]  Continuous medications  Continuous Medications[2]  PRN  medications  alteplase, 2 mg, PRN  bisacodyl, 10 mg, Daily PRN  HYDROmorphone, 0.2 mg, q3h PRN  LORazepam, 1 mg, Nightly PRN  ondansetron, 4 mg, q6h PRN  oxyCODONE, 10 mg, q4h PRN  oxyCODONE, 5 mg, q4h PRN  polyethylene glycol, 17 g, Daily PRN  simethicone, 80 mg, 4x daily PRN  vancomycin, , Daily PRN         PHYSICAL EXAMINATION:  Vital Signs:   Vital signs reviewed  Visit Vitals  /69 (BP Location: Right arm, Patient Position: Lying)   Pulse 79   Temp 36.1 °C (97 °F) (Temporal)   Resp 17      0-10 (Numeric) Pain Score: 4     Physical Exam  Vitals reviewed.   Constitutional:       General: He is not in acute distress.     Appearance: He is ill-appearing.      Comments: A&O x 3; pleasant and cooperative; NAD.  Grandson visiting at bedside   HENT:      Head: Normocephalic and atraumatic.      Mouth/Throat:      Mouth: Mucous membranes are moist.   Eyes:      Pupils: Pupils are equal, round, and reactive to light.   Cardiovascular:      Rate and Rhythm: Normal rate.   Pulmonary:      Effort: Pulmonary effort is normal. No respiratory distress.      Comments: Respirations even and unlabored; on room air  Abdominal:      General: There is no distension.      Palpations: Abdomen is soft.   Musculoskeletal:      Right lower leg: No edema.      Left lower leg: No edema.      Comments: Generalized weakness; some muscular atrophy bilaterally   Skin:     General: Skin is warm and dry.      Capillary Refill: Capillary refill takes less than 2 seconds.      Coloration: Skin is pale.   Neurological:      General: No focal deficit present.      Mental Status: He is alert and oriented to person, place, and time.   Psychiatric:         Mood and Affect: Mood normal.         Behavior: Behavior normal.         Thought Content: Thought content normal.         Judgment: Judgment normal.     PALLIATIVE CARE ENCOUNTER:  Supportive and Palliative Oncology encounter:  Spoke with patient at bedside  Emotional support  "provided  Coordination of care:  medication evaluation and management    Medical Decision Making/Goals of Care/Advance Care Planning:  Patient's current clinical condition, including diagnosis, prognosis, and management plan, and goals of care were discussed.   Oncologic diagnosis: compression fractures in setting of hx of prostate cancer, base of tongue cancer,  Family: Supportive wife, kids, grandkids  Unable to assess at time of consult d/t sleeping:  Performance status: Moderate impact on functional status secondary to pain   Joys/meaning/strength: family, luz elena, independence  Understanding of health: understands need to await pathology results to determine if compression fracture related to malignancy, need for 8 weeks of IV ATB for osteomyelitis  Information:Wants full disclosure  Goals: to get better, to feel better, and work toward treatment for \"whatever this is causing all of this\" and gain strength  Worries and fears now and future: that this is related to cancer; uncontrolled pain  Minimum acceptable outcome/QOL: to gain strength and independence  Code status discussion:  Full code (per chart review and discussion w/primary team)     Advance Directives  Existence of Advance Directives:None  Decision maker: Surrogate decision maker is wife Eleni Lofton [phone]   =================================================================  Signature and billing:  Medical complexity was high level due to due to complexity of problems, extensive data review, and high risk of management/treatment.    I spent 50 minutes in the care of this patient which included chart review, interviewing patient/family, discussion with primary team, coordination of care, and documentation.    Data:   Diagnostic tests and information reviewed for today's visit:  Conversation with primary team, Most recent labs and imaging results, Most recent EKG, Medications    Some elements copied from my note on 5/26/25, the elements have been " updated and all reflect current decision making from today, 5/27/25    Plan of Care discussed with: Provider, RN, Patient, grandson    Thank you for asking Supportive and Palliative Oncology to assist with care of this patient.  Recommendations will be communicated back to the consulting service by way of shared electronic medical record/secure chat/email or face-to-face.   We will continue to follow  Please contact us for additional questions or concerns.    SIGNATURE: CHRISTINA Mallory-CNP   PAGER/CONTACT:  Contact information:  Supportive and Palliative Oncology  Monday-Friday 8 AM-5 PM  Epic Secure chat or pager [phone].  After hours and weekends:  pager [phone]             [1] acetaminophen, 650 mg, oral, q4h  [Held by provider] aspirin, 81 mg, oral, Daily  atorvastatin, 80 mg, oral, Daily  cefTRIAXone, 2 g, intravenous, q24h  cholecalciferol, 25 mcg, oral, Daily  cyclobenzaprine, 10 mg, oral, TID  enoxaparin, 40 mg, subcutaneous, q24h  gabapentin, 300 mg, oral, BID  lactulose, 20 g, oral, BID  levothyroxine, 75 mcg, oral, Daily  lidocaine, 5 mL, infiltration, Once  lidocaine, 2 patch, transdermal, Daily  metoprolol succinate XL, 25 mg, oral, Daily  oxyCODONE ER, 10 mg, oral, q12h ANA  pantoprazole, 40 mg, oral, Daily before breakfast  sennosides-docusate sodium, 2 tablet, oral, BID  tiotropium, 2 puff, inhalation, Daily  vancomycin, 1,250 mg, intravenous, q12h  [2]

## 2025-05-27 ENCOUNTER — DOCUMENTATION (OUTPATIENT)
Dept: HOME HEALTH SERVICES | Facility: HOME HEALTH | Age: 70
End: 2025-05-27
Payer: COMMERCIAL

## 2025-05-27 LAB
ALBUMIN SERPL BCP-MCNC: 2.8 G/DL (ref 3.4–5)
ALP SERPL-CCNC: 125 U/L (ref 33–136)
ALT SERPL W P-5'-P-CCNC: 18 U/L (ref 10–52)
ANION GAP SERPL CALC-SCNC: 11 MMOL/L (ref 10–20)
AST SERPL W P-5'-P-CCNC: 16 U/L (ref 9–39)
BASOPHILS # BLD AUTO: 0.1 X10*3/UL (ref 0–0.1)
BASOPHILS NFR BLD AUTO: 1.3 %
BILIRUB SERPL-MCNC: 0.3 MG/DL (ref 0–1.2)
BUN SERPL-MCNC: 8 MG/DL (ref 6–23)
CALCIUM SERPL-MCNC: 8.8 MG/DL (ref 8.6–10.6)
CHLORIDE SERPL-SCNC: 103 MMOL/L (ref 98–107)
CO2 SERPL-SCNC: 28 MMOL/L (ref 21–32)
CREAT SERPL-MCNC: 0.47 MG/DL (ref 0.5–1.3)
EGFRCR SERPLBLD CKD-EPI 2021: >90 ML/MIN/1.73M*2
EOSINOPHIL # BLD AUTO: 0.72 X10*3/UL (ref 0–0.7)
EOSINOPHIL NFR BLD AUTO: 9.7 %
ERYTHROCYTE [DISTWIDTH] IN BLOOD BY AUTOMATED COUNT: 19 % (ref 11.5–14.5)
FUNGUS SPEC CULT: NORMAL
FUNGUS SPEC CULT: NORMAL
FUNGUS SPEC FUNGUS STN: NORMAL
FUNGUS SPEC FUNGUS STN: NORMAL
GLUCOSE SERPL-MCNC: 97 MG/DL (ref 74–99)
HCT VFR BLD AUTO: 29.8 % (ref 41–52)
HGB BLD-MCNC: 9 G/DL (ref 13.5–17.5)
IMM GRANULOCYTES # BLD AUTO: 0.03 X10*3/UL (ref 0–0.7)
IMM GRANULOCYTES NFR BLD AUTO: 0.4 % (ref 0–0.9)
LYMPHOCYTES # BLD AUTO: 0.95 X10*3/UL (ref 1.2–4.8)
LYMPHOCYTES NFR BLD AUTO: 12.8 %
MAGNESIUM SERPL-MCNC: 1.66 MG/DL (ref 1.6–2.4)
MCH RBC QN AUTO: 25.2 PG (ref 26–34)
MCHC RBC AUTO-ENTMCNC: 30.2 G/DL (ref 32–36)
MCV RBC AUTO: 84 FL (ref 80–100)
MONOCYTES # BLD AUTO: 1.05 X10*3/UL (ref 0.1–1)
MONOCYTES NFR BLD AUTO: 14.2 %
NEUTROPHILS # BLD AUTO: 4.57 X10*3/UL (ref 1.2–7.7)
NEUTROPHILS NFR BLD AUTO: 61.6 %
NRBC BLD-RTO: 0 /100 WBCS (ref 0–0)
PLATELET # BLD AUTO: 397 X10*3/UL (ref 150–450)
POTASSIUM SERPL-SCNC: 3.7 MMOL/L (ref 3.5–5.3)
PROT SERPL-MCNC: 5.9 G/DL (ref 6.4–8.2)
RBC # BLD AUTO: 3.57 X10*6/UL (ref 4.5–5.9)
SODIUM SERPL-SCNC: 138 MMOL/L (ref 136–145)
WBC # BLD AUTO: 7.4 X10*3/UL (ref 4.4–11.3)

## 2025-05-27 PROCEDURE — 80053 COMPREHEN METABOLIC PANEL: CPT | Performed by: NURSE PRACTITIONER

## 2025-05-27 PROCEDURE — 99233 SBSQ HOSP IP/OBS HIGH 50: CPT

## 2025-05-27 PROCEDURE — 97110 THERAPEUTIC EXERCISES: CPT | Mod: GP,CQ

## 2025-05-27 PROCEDURE — 2500000002 HC RX 250 W HCPCS SELF ADMINISTERED DRUGS (ALT 637 FOR MEDICARE OP, ALT 636 FOR OP/ED): Performed by: STUDENT IN AN ORGANIZED HEALTH CARE EDUCATION/TRAINING PROGRAM

## 2025-05-27 PROCEDURE — 1170000001 HC PRIVATE ONCOLOGY ROOM DAILY

## 2025-05-27 PROCEDURE — 2500000004 HC RX 250 GENERAL PHARMACY W/ HCPCS (ALT 636 FOR OP/ED): Mod: JZ | Performed by: NURSE PRACTITIONER

## 2025-05-27 PROCEDURE — 2500000001 HC RX 250 WO HCPCS SELF ADMINISTERED DRUGS (ALT 637 FOR MEDICARE OP): Performed by: NURSE PRACTITIONER

## 2025-05-27 PROCEDURE — 94640 AIRWAY INHALATION TREATMENT: CPT

## 2025-05-27 PROCEDURE — 2500000001 HC RX 250 WO HCPCS SELF ADMINISTERED DRUGS (ALT 637 FOR MEDICARE OP): Performed by: STUDENT IN AN ORGANIZED HEALTH CARE EDUCATION/TRAINING PROGRAM

## 2025-05-27 PROCEDURE — 97530 THERAPEUTIC ACTIVITIES: CPT | Mod: GP,CQ

## 2025-05-27 PROCEDURE — 2500000005 HC RX 250 GENERAL PHARMACY W/O HCPCS

## 2025-05-27 PROCEDURE — 2500000004 HC RX 250 GENERAL PHARMACY W/ HCPCS (ALT 636 FOR OP/ED): Mod: JZ

## 2025-05-27 PROCEDURE — 83735 ASSAY OF MAGNESIUM: CPT | Performed by: NURSE PRACTITIONER

## 2025-05-27 PROCEDURE — 85025 COMPLETE CBC W/AUTO DIFF WBC: CPT | Performed by: NURSE PRACTITIONER

## 2025-05-27 PROCEDURE — 2500000001 HC RX 250 WO HCPCS SELF ADMINISTERED DRUGS (ALT 637 FOR MEDICARE OP)

## 2025-05-27 PROCEDURE — 36415 COLL VENOUS BLD VENIPUNCTURE: CPT | Performed by: NURSE PRACTITIONER

## 2025-05-27 RX ADMIN — CYCLOBENZAPRINE 10 MG: 10 TABLET, FILM COATED ORAL at 14:47

## 2025-05-27 RX ADMIN — OXYCODONE HYDROCHLORIDE 10 MG: 10 TABLET, FILM COATED, EXTENDED RELEASE ORAL at 20:50

## 2025-05-27 RX ADMIN — OXYCODONE HYDROCHLORIDE 5 MG: 5 TABLET ORAL at 13:49

## 2025-05-27 RX ADMIN — OXYCODONE HYDROCHLORIDE 5 MG: 5 TABLET ORAL at 02:59

## 2025-05-27 RX ADMIN — PANTOPRAZOLE SODIUM 40 MG: 40 TABLET, DELAYED RELEASE ORAL at 09:02

## 2025-05-27 RX ADMIN — GABAPENTIN 300 MG: 300 CAPSULE ORAL at 20:50

## 2025-05-27 RX ADMIN — LORAZEPAM 1 MG: 1 TABLET ORAL at 20:51

## 2025-05-27 RX ADMIN — CEFTRIAXONE SODIUM 2 G: 2 INJECTION, SOLUTION INTRAVENOUS at 09:01

## 2025-05-27 RX ADMIN — ACETAMINOPHEN 650 MG: 325 TABLET, FILM COATED ORAL at 07:48

## 2025-05-27 RX ADMIN — CYCLOBENZAPRINE 10 MG: 10 TABLET, FILM COATED ORAL at 09:02

## 2025-05-27 RX ADMIN — VANCOMYCIN HYDROCHLORIDE 1250 MG: 1.25 INJECTION, POWDER, LYOPHILIZED, FOR SOLUTION INTRAVENOUS at 10:26

## 2025-05-27 RX ADMIN — CYCLOBENZAPRINE 10 MG: 10 TABLET, FILM COATED ORAL at 20:51

## 2025-05-27 RX ADMIN — LEVOTHYROXINE SODIUM 75 MCG: 0.07 TABLET ORAL at 09:02

## 2025-05-27 RX ADMIN — GABAPENTIN 300 MG: 300 CAPSULE ORAL at 09:02

## 2025-05-27 RX ADMIN — ACETAMINOPHEN 650 MG: 325 TABLET, FILM COATED ORAL at 23:16

## 2025-05-27 RX ADMIN — OXYCODONE HYDROCHLORIDE 5 MG: 5 TABLET ORAL at 18:48

## 2025-05-27 RX ADMIN — ATORVASTATIN CALCIUM 80 MG: 80 TABLET, FILM COATED ORAL at 09:02

## 2025-05-27 RX ADMIN — TIOTROPIUM BROMIDE INHALATION SPRAY 2 PUFF: 3.12 SPRAY, METERED RESPIRATORY (INHALATION) at 08:54

## 2025-05-27 RX ADMIN — METOPROLOL SUCCINATE 25 MG: 25 TABLET, EXTENDED RELEASE ORAL at 09:02

## 2025-05-27 RX ADMIN — OXYCODONE HYDROCHLORIDE 10 MG: 10 TABLET ORAL at 07:47

## 2025-05-27 RX ADMIN — Medication 25 MCG: at 09:02

## 2025-05-27 RX ADMIN — OXYCODONE HYDROCHLORIDE 10 MG: 10 TABLET, FILM COATED, EXTENDED RELEASE ORAL at 09:02

## 2025-05-27 RX ADMIN — LIDOCAINE 4% 2 PATCH: 40 PATCH TOPICAL at 09:02

## 2025-05-27 RX ADMIN — ACETAMINOPHEN 650 MG: 325 TABLET, FILM COATED ORAL at 02:59

## 2025-05-27 RX ADMIN — OXYCODONE HYDROCHLORIDE 10 MG: 10 TABLET ORAL at 23:16

## 2025-05-27 RX ADMIN — VANCOMYCIN HYDROCHLORIDE 1250 MG: 1.25 INJECTION, POWDER, LYOPHILIZED, FOR SOLUTION INTRAVENOUS at 23:16

## 2025-05-27 RX ADMIN — ACETAMINOPHEN 650 MG: 325 TABLET, FILM COATED ORAL at 18:26

## 2025-05-27 RX ADMIN — ACETAMINOPHEN 650 MG: 325 TABLET, FILM COATED ORAL at 13:48

## 2025-05-27 RX ADMIN — ENOXAPARIN SODIUM 40 MG: 100 INJECTION SUBCUTANEOUS at 14:47

## 2025-05-27 ASSESSMENT — PAIN SCALES - GENERAL
PAINLEVEL_OUTOF10: 6
PAINLEVEL_OUTOF10: 5 - MODERATE PAIN
PAINLEVEL_OUTOF10: 10 - WORST POSSIBLE PAIN
PAINLEVEL_OUTOF10: 7
PAINLEVEL_OUTOF10: 10 - WORST POSSIBLE PAIN
PAINLEVEL_OUTOF10: 5 - MODERATE PAIN
PAINLEVEL_OUTOF10: 5 - MODERATE PAIN
PAINLEVEL_OUTOF10: 6
PAINLEVEL_OUTOF10: 5 - MODERATE PAIN
PAINLEVEL_OUTOF10: 4
PAINLEVEL_OUTOF10: 4
PAINLEVEL_OUTOF10: 3
PAINLEVEL_OUTOF10: 5 - MODERATE PAIN
PAINLEVEL_OUTOF10: 3

## 2025-05-27 ASSESSMENT — PAIN - FUNCTIONAL ASSESSMENT
PAIN_FUNCTIONAL_ASSESSMENT: 0-10

## 2025-05-27 ASSESSMENT — COGNITIVE AND FUNCTIONAL STATUS - GENERAL
CLIMB 3 TO 5 STEPS WITH RAILING: TOTAL
WALKING IN HOSPITAL ROOM: A LOT
MOVING FROM LYING ON BACK TO SITTING ON SIDE OF FLAT BED WITH BEDRAILS: A LOT
MOVING TO AND FROM BED TO CHAIR: A LITTLE
TURNING FROM BACK TO SIDE WHILE IN FLAT BAD: A LOT
MOBILITY SCORE: 13
STANDING UP FROM CHAIR USING ARMS: A LITTLE

## 2025-05-27 ASSESSMENT — PAIN DESCRIPTION - LOCATION
LOCATION: BACK

## 2025-05-27 NOTE — CARE PLAN
The patient's goals for the shift include Pain management    The clinical goals for the shift include Pain Management    Over the shift, the patient did make progress toward the following goals.       Problem: Skin  Goal: Decreased wound size/increased tissue granulation at next dressing change  Outcome: Progressing  Flowsheets (Taken 5/26/2025 2120)  Decreased wound size/increased tissue granulation at next dressing change: Promote sleep for wound healing  Goal: Participates in plan/prevention/treatment measures  Outcome: Progressing  Flowsheets (Taken 5/26/2025 2120)  Participates in plan/prevention/treatment measures: Elevate heels  Goal: Prevent/manage excess moisture  Outcome: Progressing  Flowsheets (Taken 5/26/2025 2120)  Prevent/manage excess moisture: Monitor for/manage infection if present  Goal: Prevent/minimize sheer/friction injuries  Outcome: Progressing  Flowsheets (Taken 5/26/2025 2120)  Prevent/minimize sheer/friction injuries: Use pull sheet  Goal: Promote/optimize nutrition  Outcome: Progressing  Flowsheets (Taken 5/26/2025 2120)  Promote/optimize nutrition: Offer water/supplements/favorite foods  Goal: Promote skin healing  Outcome: Progressing  Flowsheets (Taken 5/26/2025 2120)  Promote skin healing: Protective dressings over bony prominences     Problem: Fall/Injury  Goal: Not fall by end of shift  Outcome: Progressing  Goal: Be free from injury by end of the shift  Outcome: Progressing  Goal: Verbalize understanding of personal risk factors for fall in the hospital  Outcome: Progressing  Goal: Verbalize understanding of risk factor reduction measures to prevent injury from fall in the home  Outcome: Progressing  Goal: Use assistive devices by end of the shift  Outcome: Progressing  Goal: Pace activities to prevent fatigue by end of the shift  Outcome: Progressing     Problem: Safety - Adult  Goal: Free from fall injury  Outcome: Progressing

## 2025-05-27 NOTE — PROGRESS NOTES
"Miguel Lofton is a 69 y.o. male on day 7 of admission presenting with Osteomyelitis.    Subjective   Miguel is doing fine this morning, states he had no acute events overnight. We discussed being NPO for possible MRI today with anesthesia. Denies chest pain, SOB, N/V/DC, fever, chills, s/sx of bleeding, abdominal pain. ROS: A complete review of systems was performed and is negative except for as mentioned above in the HPI        Objective     Physical Exam  Vitals reviewed.   Constitutional:       Appearance: Normal appearance.   HENT:      Head: Normocephalic.   Cardiovascular:      Rate and Rhythm: Normal rate and regular rhythm.      Pulses: Normal pulses.   Pulmonary:      Effort: Pulmonary effort is normal.   Abdominal:      Palpations: Abdomen is soft.   Skin:     Capillary Refill: Capillary refill takes less than 2 seconds.      Comments: Dressing with prevena c/d/i   Neurological:      General: No focal deficit present.      Mental Status: He is alert and oriented to person, place, and time.   Psychiatric:         Mood and Affect: Mood normal.         Last Recorded Vitals  Blood pressure 124/76, pulse 97, temperature 37.1 °C (98.8 °F), temperature source Temporal, resp. rate 16, height 1.727 m (5' 8\"), weight 73.4 kg (161 lb 13.1 oz), SpO2 94%.  Intake/Output last 3 Shifts:  I/O last 3 completed shifts:  In: 600 (8.2 mL/kg) [P.O.:600]  Out: 2100 (28.6 mL/kg) [Urine:2100 (1.2 mL/kg/hr)]  Weight: 73.4 kg     Relevant Results    This patient has a central line   Reason for the central line remaining today? IV atb             Assessment & Plan  Osteomyelitis  Miguel Lofton is a 69 y.o. male patient, with PMHx of prostate cancer, base of the tongue cancer, HTN, HLD, hypothyroidism, COPD, anxiety who had a CT chest for annual lung cancer screening on 5/13 that showed possible compression fx of T5 and T6. Imaging was reviewed at office visit 5/19 and was advised he present to ED for MRI. He presented to [facility] " hospital 5/19 where MRI T spine (5/19) showed acute discitis/osteomyelitis at T5-6 with marked surrounding phlegmon and circumferential epidural phlegmon resulting in moderate to severe spinal canal stenosis. No epidural abscess. He was transferred to [facility] 5/20 for NSGY eval. NSGY consulted 5/20, pt taken to the OR 5/21 for T5-6 laminectomy and T3-8 fusion. ID consulted 5/21 and rec Vanc and Ceftriaxone (5/22- planned stop 7/16 for total 8 week atb course). OR surg path (5/22) +osteomyelitis. OR tissue/wound culture (5/22) neg, final. OR fungal culture (5/22) NGTD. PT/OT consulted 5/22 and rec home care. S/p 1unit PRBC 5/25 for Hgb 7.6 - hgb incremented appropriately . DC pending improvement in pain and MRI C/L spine completion.     Updates 5/27:  - no changes, awaiting MRI with anesthesia, pt currently NPO     # Acute on chronic back pain   # Compression fracture   # Vertebral osteomyelitis  # Phlegmonous tissue c/f infection  - CT Chest ([facility]): considerable deformity of what is felt to be T5 which may be related to a compression fracture of the caudal endplate. There is some compression of the superior endplate of T6.  -  MRI T spine (5/19 at OSH): Acute discitis/osteomyelitis at T5-6 with marked surrounding phlegmon and circumferential epidural phlegmon resulting in moderate to severe spinal canal stenosis. No epidural abscess. Requested for image import from [facility]    - CRP 7.35, ESR 90 (5/20); CRP 5.94, ESR 74 (5/22)  - s/p OR 5/21/25 with neurosurgery for T5-6 laminectomy and T3-8 fusion. Per neurosurgery, phlegmon appeared infectious without purulence  - OR surg path (5/22) +osteomyelitis  - OR tissue/wound culture/ fungal culture 5/22) neg, final  - 5/20 Blood cultures x2 NG, Urine culture negative  - +MRSA nares 5/24  - Prevena to remove POD7 with dressing (5/28)  - per neurosurgery; MRI C and L spine w/wo contrast pending, d/t claustrophobia will get with anesthesia  - ID consulted 5/21; rec Vanc  and Ceftriaxone (5/22- planned stop 7/16 for total 8 week atb course)-- per ID please reengage if MRI has areas concerning for infection   - Weekly CBC, CMP, CRP ordered outpt with results to be faxed to [phone] ATTN: Dr. Dyer once discharged per ID  - Supportive onc consulted 5/25 for uncontrolled pain, recs start ER Oxy 10mg BID and increased doris 300mg to BID, changed Miralax from daily to PRN, and Senna from 3tabs to 2tabs, added Zofran PRN  - PT/OT rec home care, PICC line placed 5/24     # Abdominal pain-- improved  - Reported abdominal pain above old PEG site on 5/22  - Likely 2/2 prone positioning after OR +/- developing ileus  - KUB (5/22): moderate colonic stool burden, possible post-op ileus  - having loose Bms now LBM 5/26, stopped lactulose. 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 Gamez--last seen 12/18/2024: laryngeal structures are noted for grossly normal appearance; true vocal cords, false vocal cords, remaining structures, including epiglottis, piriform sinuses and base of tongue are all grossly normal; there is no evidence of gross mass nodule, polyp, tumor or other defect   - Follows yearly with Dr. Lucero      # Prostate Ca  - s/p radical prostatectomy by Dr Lang Mcneil in 2018  - [id number], [place] 7, + right anterior margin  - PSMA-PET done on 12/6/2023 showed no PSMA uptake within the post-surgical bed; there is PSMA uptake of a left common iliac retroperitoneal lymph node SUV 5.6 and PSMA uptake of a right pelvic sidewall lymph node SUV 3.5  -  Dr Rangel recommended radiation therapy. Patient instead went to [facility] and was treated by Dr Zackary Ely (4600 cGy in 23 fractions to the pelvis and LN followed by [place] to prostate bed and LN to 2500 cGy in 10 fractions)  - Since completing radiation, has been experiencing significant fatigue  - He is now following with urologist Dr. Boyd  - For pain management, follows with [facility] palliative care team   - PSA < 0.01 (2/19/25)--> PSA <0.10 (5/20)     # COPD  # Smoking Hx  # Lung Ca screening  - Follows with pulmonologist Dr Hutchison   - PFTs 03/2025: FEV1 72% FEV1/FVC 0.63  - Did not tolerate Breztri  - Continue home Spiriva 2 puffs daily     # HTN/HLD  - Continue home Metop Succs 25mg daily and Lipitor 80mg nightly  - ASA held per NSGY, can resume on POD7 (5/28)     # Hypothyroidism  - Last TSH 4.49 (5/12)  - Continue home Synthroid 75mcg daily     # Anemia  - Likely anemia of chronic disease +/- post-op  - BL Hgb ~11 after cancer tx; Hgb 9.9 (5/20)--> 8.3 (5/22) post-op--> 8.1 (5/23)--> 7.6 (5/25)-->s/p 1 unit 5/25, hgb stable 9.0 (5/27)  - Ferritin 305, Folate 7.5, Iron 41, TIBC 269, %Sat 15, UIBC 228, Vit B12 1,342 (5/20)  - 5/25 Iron 30, TIBC 204, folate 3.9   - Stopped home Vit B12 sup 5/22 d/t elevated B12 level  - will hold off on folic acid/iron replacement at this time given acute phase with post-op period, plan for outpatient monitoring of hgb and anemia labs/ongoing workup     DVT prophy: Lovenox, was held for neurosurgery and resumed 5/22 PM per ortho     DISPO:  - Full code, confirmed with patient on admission   - NOK: Eleni Lofton (wife) [phone]  - DC pending MRI   - NSGY FUV 6/9, Dr. Lucero (heme onc) FUV 8/8, NSGY FUV 8/11     I spent >60 minutes in the professional and overall care of this patient     Assessment and plan as above discussed with attending physician Dr. Pro Huertas PA-C

## 2025-05-27 NOTE — PROGRESS NOTES
Vancomycin Dosing by Pharmacy- FOLLOW UP    Miguel Lofton is a 69 y.o. year old male who Pharmacy has been consulted for vancomycin dosing for osteomyelitis/septic arthritis. Based on the patient's indication and renal status this patient is being dosed based on a goal AUC of 500-600.     Renal function is currently stable.    Current vancomycin dose: 1,250 mg given every 12 hours    Estimated vancomycin AUC on current dose: 574 mg/L.hr     Visit Vitals  /76 (BP Location: Right arm, Patient Position: Lying)   Pulse 97   Temp 37.1 °C (98.8 °F) (Temporal)   Resp 16        Lab Results   Component Value Date    CREATININE 0.47 (L) 2025    CREATININE 0.44 (L) 2025    CREATININE 0.42 (L) 2025    CREATININE 0.49 (L) 2025        Patient weight is as follows:   Vitals:    25 0736   Weight: 73.4 kg (161 lb 13.1 oz)       Cultures:  Susceptibility data for the encounter in last 14 days.  Collected Specimen Info Organism   25 Swab from Anterior Nares Methicillin Resistant Staphylococcus aureus (MRSA)        I/O last 3 completed shifts:  In: 600 (8.2 mL/kg) [P.O.:600]  Out: 2625 (35.8 mL/kg) [Urine:2625 (1 mL/kg/hr)]  Weight: 73.4 kg   I/O during current shift:  No intake/output data recorded.    Temp (24hrs), Av.6 °C (97.8 °F), Min:35.7 °C (96.3 °F), Max:37.2 °C (99 °F)      Assessment/Plan    Within goal AUC range. Continue current vancomycin regimen.    This dosing regimen is predicted by [employer] to result in the following pharmacokinetic parameters:  Regimen: 1250 mg IV every 12 hours.  Start time: 09:51 on 2025  Exposure target: AUC24 (range) 400-600 mg/L.hr   [id number]: 574 mg/L.hr  AUC24,ss: 574 mg/L.hr  Probability of AUC24 > 400: 100 %  Ctrough,ss: 17.6 mg/L  Probability of Ctrough,ss > 20: 21 %  The next level will be obtained on  with AM labs. May be obtained sooner if clinically indicated.   Will continue to monitor renal function daily while on vancomycin and  order serum creatinine at least every 48 hours if not already ordered.  Follow for continued vancomycin needs, clinical response, and signs/symptoms of toxicity.       Taqueria Temple, PharmD

## 2025-05-27 NOTE — PROGRESS NOTES
Massage Therapy / AcupuAcupuncture Visit:     Miguel Lofton was referred by Yanira [facility]  .  Session Information  Discipline: Acupuncture  Session Start Time: 1100  Visit Type: New patient  Conflict of Service : Declined service                   Provider reviewed plan for the acupuncture session, precautions and contraindications. Patient/guardian/hospital staff has given consent to treat with full understanding of what to expect during thesession. Before acupuncture began, provider explained to the patient to communicate at any time if the procedure was causing discomfort past their tolerance level. Patient agreed to advise acupuncturist. The acupuncturist counseled the patient on the risks of acupuncture treatment including pain, infection, bleeding, and no relief of pain. The patient was positioned comfortably. There was no evidence of infection at the site of needle insertions.       No annotated images are attached to the encounter.                     ncture Note:

## 2025-05-27 NOTE — PROGRESS NOTES
Physical Therapy    Physical Therapy Treatment    Patient Name: Miguel Lofton  MRN: [id number]  Department: [facility]  Room: [facility]402-A  Today's Date: 5/27/2025  Time Calculation  Start Time: 1346  Stop Time: 1411  Time Calculation (min): 25 min         Assessment/Plan   PT Assessment  PT Assessment Results: Decreased strength, Decreased endurance, Impaired balance, Decreased mobility, Orthopedic restrictions  Rehab Prognosis: Fair  Barriers to Discharge Home: Physical needs  Physical Needs: Stair navigation into home limited by function/safety, 24hr mobility assistance needed, High falls risk due to function or environment  Evaluation/Treatment Tolerance: Patient limited by pain  End of Session Communication: Bedside nurse  Assessment Comment: Pt. limited by anticipation of and actual increase in pain- Pt. states that since the surgery he has been able to walk however pain is unbearable. Pt. is due for an MRI to determine any new findings in order to proceed. Pt. has been recommended for continued therapy post acute at a low level of intensity.  PT Plan  Inpatient/Swing Bed or Outpatient: Inpatient  PT Plan  Treatment/Interventions: Bed mobility, Therapeutic exercise, Therapeutic activity  PT Plan: Ongoing PT  PT Frequency: 5 times per week  PT Discharge Recommendations: Low intensity level of continued care  Equipment Recommended upon Discharge:  (TBD pending further gait assessment)  PT Recommended Transfer Status: Assist x1, Assistive device  PT - OK to Discharge:  (Pending pain control and MRI of lumbar spine.)    PT Visit Info:  PT Received On: 05/27/25     General Visit Information:   General  Reason for Referral: Osteomyelitis, LBP with radiculopathy and weakness x3 weeks, 5/13 CT chest T5 compression fx, MRI TS T5-T6 compression fx w disc/osteo, epidural phlegmon w moderate canal stenosis, s/p T5-6 laminectomy, T6 transpedicular decompression, T3-8 fusion on 5/21/25  Past Medical History Relevant to Rehab: Per  "chart, PMH includes HTN,  HLD, CAD (on ASA), hypoT, COPD, anxiety, LBP (annual radioablations), prostate cancer (s/p prostatectomy 2018), base of tongue cancer (dx 2022, s/p radiation/chemo, in remission), diastolic CHF  Prior to Session Communication: Bedside nurse  Patient Position Received: Bed, 3 rail up, Alarm off, not on at start of session  General Comment: Pt. supine in bed upon arrival.  Pt. initially states \"They dont want me doing nothing because of my back\" Reached out to the team Ginger Maddox asking if that is what was said- responded with no that was not the case; therefore asked RN to medicate with breakthru and explained to the pt. that the MD's did not put a hold on therapy or discontinue the orders. (Increased time for pt. to discuss his PMH in regards to the pain that he has been having in his low back and pain management and the MRI that was ordered. Pt. states that he is worried he is doing more damage when sitting/standing/amb.)    Subjective   Precautions:  Precautions  Medical Precautions: Fall precautions  Post-Surgical Precautions: Spinal precautions            Objective   Pain:  Pain Assessment  Pain Assessment: 0-10  0-10 (Numeric) Pain Score: 7  Pain Location: Back  Pain Orientation: Lower  Pain Radiating Towards: legs  Cognition:  Cognition  Overall Cognitive Status: Within Functional Limits  Coordination:  Movements are Fluid and Coordinated: Yes  Postural Control:  Postural Control  Postural Control: Impaired (Unable to sit on EOB without a spike in pain.)  Static Sitting Balance  Static Sitting-Balance Support: Bilateral upper extremity supported  Static Sitting-Level of Assistance: Contact guard  Static Sitting-Comment/Number of Minutes: Pt. reports that when his toroso is pushing down on low back his pain increases to 10+ Pt. leaned back which eased somewhat but then eventually had to lie back down due to pain.  Static Standing Balance  Static Standing-Balance Support:  (Unable " due to pain.)  Extremity/Trunk Assessments:                      Activity Tolerance:  Activity Tolerance  Endurance: Decreased tolerance for upright activites  Treatments:  Therapeutic Exercise  Therapeutic Exercise Performed: Yes  Therapeutic Exercise Activity 1: Increased time spent performing supine bilat le ex AP'S, QS, SAQ'S, HS, AND ABD X 15 REPS. Max cues to avoid straining.    Therapeutic Activity  Therapeutic Activity Performed: Yes  Therapeutic Activity 1: Sat EOB although due to increased pain unable to remain    Bed Mobility  Bed Mobility: Yes  Bed Mobility 1  Bed Mobility 1: Scooting  Level of Assistance 1:  (Mod assist due to bed set into [place] and HOB down. Increased time and slight struggle.)  Bed Mobility 2  Bed Mobility  2: Rolling left  Level of Assistance 2: Minimum assistance  Bed Mobility 3  Bed Mobility 3: Side lying left to sit  Level of Assistance 3:  (Min assist.)    Outcome Measures:  [facility] Basic Mobility  Turning from your back to your side while in a flat bed without using bedrails: A lot  Moving from lying on your back to sitting on the side of a flat bed without using bedrails: A lot  Moving to and from bed to chair (including a wheelchair): A little  Standing up from a chair using your arms (e.g. wheelchair or bedside chair): A little  To walk in hospital room: A lot  Climbing 3-5 steps with railing: Total  Basic Mobility - Total Score: 13    Education Documentation  Home Exercise Program, taught by Kajal Sears PTA at 5/27/2025  2:29 PM.  Learner: Patient  Readiness: Acceptance  Method: Explanation, Demonstration  Response: Needs Reinforcement  Comment: Discussed importance of continuing to try until the MD's hold order. Took pt. thru ther ex protocol and reminded pt. to avoid tensing.    Mobility Training, taught by Kajal Sears PTA at 5/27/2025  2:29 PM.  Learner: Patient  Readiness: Acceptance  Method: Explanation, Demonstration  Response: Needs Reinforcement  Comment:  Discussed importance of continuing to try until the MD's hold order. Took pt. thru ther ex protocol and reminded pt. to avoid tensing.    Education Comments  No comments found.        OP EDUCATION:       Encounter Problems       Encounter Problems (Active)       Balance       Pt will maintain static/dynamic standing balance x4 minutes with RW and supervision to demonstrate improved balance  (Not Progressing)       Start:  05/22/25    Expected End:  06/05/25               Mobility       Pt will complete bed mobility independently via log roll technique with HOB flat and no use of bed rails (Progressing)       Start:  05/22/25    Expected End:  06/05/25            Pt will amb >250ft with LRAD and supervision in prep for safe discharge home  (Not Progressing)       Start:  05/22/25    Expected End:  06/05/25            Pt will a/descend 2 steps with unliat rail (either side) and CGA in prep for safe home entry  (Not Progressing)       Start:  05/22/25    Expected End:  06/05/25               PT Transfers       Pt will transfer sit to stand with LRAD and supervision in prep for out of bed mobility  (Progressing)       Start:  05/22/25    Expected End:  06/05/25

## 2025-05-27 NOTE — HH CARE COORDINATION
This referral has been made a Non Admit with  Home Care due to Patient does not agree to cost. Patient seeking alternate options. Planning for SNF. If you have further questions, feel free to reach out to our office at [phone]. Thank you, [facility] Intake.    Provider Notification:  Sent In Basket to MADELINE Barrientos, informing that [facility] has not accepted patient referral.

## 2025-05-27 NOTE — ASSESSMENT & PLAN NOTE
Migule Lofton is a 69 y.o. male patient, with PMHx of prostate cancer, base of the tongue cancer, HTN, HLD, hypothyroidism, COPD, anxiety who had a CT chest for annual lung cancer screening on 5/13 that showed possible compression fx of T5 and T6. Imaging was reviewed at office visit 5/19 and was advised he present to ED for MRI. He presented to [facility] 5/19 where MRI T spine (5/19) showed acute discitis/osteomyelitis at T5-6 with marked surrounding phlegmon and circumferential epidural phlegmon resulting in moderate to severe spinal canal stenosis. No epidural abscess. He was transferred to [facility] 5/20 for NSGY eval. NSGY consulted 5/20, pt taken to the OR 5/21 for T5-6 laminectomy and T3-8 fusion. ID consulted 5/21 and rec Vanc and Ceftriaxone (5/22- planned stop 7/16 for total 8 week atb course). OR surg path (5/22) +osteomyelitis. OR tissue/wound culture (5/22) neg, final. OR fungal culture (5/22) NGTD. PT/OT consulted 5/22 and rec home care. S/p 1unit PRBC 5/25 for Hgb 7.6 - hgb incremented appropriately . DC pending improvement in pain and MRI C/L spine completion.     Updates 5/27:  - no changes, awaiting MRI with anesthesia, pt currently NPO     # Acute on chronic back pain   # Compression fracture   # Vertebral osteomyelitis  # Phlegmonous tissue c/f infection  - CT Chest ([facility]): considerable deformity of what is felt to be T5 which may be related to a compression fracture of the caudal endplate. There is some compression of the superior endplate of T6.  -  MRI T spine (5/19 at OSH): Acute discitis/osteomyelitis at T5-6 with marked surrounding phlegmon and circumferential epidural phlegmon resulting in moderate to severe spinal canal stenosis. No epidural abscess. Requested for image import from [facility]    - CRP 7.35, ESR 90 (5/20); CRP 5.94, ESR 74 (5/22)  - s/p OR 5/21/25 with neurosurgery for T5-6 laminectomy and T3-8 fusion. Per neurosurgery, phlegmon appeared infectious without  Physical Therapy Visit    Visit Type: Daily Treatment Note  Visit: 3  Referring Provider: Zack Sandoval MD  Medical Diagnosis (from order): M54.50 - Acute low back pain, unspecified back pain laterality, unspecified whether sciatica present     SUBJECTIVE                                                                                                               Patient notes that he has been feeling better. Stretches have been helpful. Feels like he needs a new mattress due to pain with laying down.     Pain / Symptoms  - Pain rating (out of 10): Current: 4     OBJECTIVE                                                                                                                               Treatment     Therapeutic Exercise  NuStep Level 5 resistance x 5 min total- Seat     Single knee to chest 2 x 30sec on right- opp leg flexed with foot flat on table   - Attempted on left but groin pain was noted thus ceased    BKFOs x 10 B- cueing for lumbopelvic control    Hooklying LE extensions following PPT x 10 bilaterally  - Mild pain noted nearing start point but did not worsen with repetition     Seated thoracic extension stretch in chair 2 x 10 x 5 sec holds  - Cueing to prevent extension into lumbar spine   - Towel placed in thoracolumbar junction to reduce soreness and over extension in this region     HEP updated appropriately     Manual Therapy   - Acetabulofemoral joint distractions with Mulligan belt- patient in hooklying    - Acetabulofemoral joint inferior glides Grade III with mulligan belt- patient in hooklying    Skilled input: as detailed above    Writer verbally educated and received verbal consent for hand placement, positioning of patient, and techniques to be performed today from patient for clothing adjustments for techniques, hand placement and palpation for techniques and therapist position for techniques as described above and how they are pertinent to the patient's plan of care.  Home  Exercise Program  Access Code: [email or web address]  URL: [email or web address]  Date: 02/13/2025  Prepared by: Luis Duran    Exercises  - Supine Lower Trunk Rotation  - 2 x daily - 7 x weekly - 1 sets - 10 reps - 5 seconds hold  - Sidelying Pipestem and Arrow Stretch  - 2 x daily - 7 x weekly - 1-2 sets - 10 reps  - Seated Child's Pose with Table  - 2 x daily - 7 x weekly - 1 sets - 5-10 reps - 5 seconds hold  - Seated Flexion Stretch  - 2 x daily - 7 x weekly - 1 sets - 5-10 reps - 5 seconds hold  - Seated Quadratus Lumborum Stretch in Chair  - 2 x daily - 7 x weekly - 1-2 sets - 5-10 reps - 3-5 seconds hold    ASSESSMENT                                                                                                            Increased tightness and limited hip mobility into flexion continues to be noted on left side compared to right that limits progression of stretching and core strengthening some. Mild soreness was noted following hip mobility and stretching exercises upon standing but improved fairly quickly after several steps thus showing benefit of continued daily stretching to address chronic stiffness and poor flexibility present. Mild soreness is likely to be expected going forward as a result of suspected chronic flexibility deficits but should improve if patient remains compliant with HEP. Patient to continue to benefit from skilled therapy to further improve bilateral hip mobility, address mobility deficits to lumbar spine, improve general flexibility and progress HEP to improve day to day function with less pain.   Pain/symptoms after session (out of 10): 3 and 4  Education:   - Results of above outlined education: Verbalizes understanding and Needs reinforcement    PLAN                                                                                                                           Suggestions for next session as indicated: Progress per plan of care, trial of dry needling to  purulence  - OR surg path (5/22) +osteomyelitis  - OR tissue/wound culture/ fungal culture 5/22) neg, final  - 5/20 Blood cultures x2 NG, Urine culture negative  - +MRSA nares 5/24  - Prevena to remove POD7 with dressing (5/28)  - per neurosurgery; MRI C and L spine w/wo contrast pending, d/t claustrophobia will get with anesthesia  - ID consulted 5/21; rec Vanc and Ceftriaxone (5/22- planned stop 7/16 for total 8 week atb course)-- per ID please reengage if MRI has areas concerning for infection   - Weekly CBC, CMP, CRP ordered outpt with results to be faxed to [phone] ATTN: Dr. Dyer once discharged per ID  - Supportive onc consulted 5/25 for uncontrolled pain, recs start ER Oxy 10mg BID and increased doris 300mg to BID, changed Miralax from daily to PRN, and Senna from 3tabs to 2tabs, added Zofran PRN  - PT/OT rec home care, PICC line placed 5/24     # Abdominal pain-- improved  - Reported abdominal pain above old PEG site on 5/22  - Likely 2/2 prone positioning after OR +/- developing ileus  - KUB (5/22): moderate colonic stool burden, possible post-op ileus  - having loose Bms now LBM 5/26, stopped lactulose. 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 Gamez--last seen 12/18/2024: laryngeal structures are noted for grossly normal appearance; true vocal cords, false vocal cords, remaining structures, including epiglottis, piriform sinuses and base of tongue are all grossly normal; there is no evidence of gross mass nodule, polyp, tumor or other defect   - Follows  lumbar musculature, continue hip manual as appropriate      Therapy procedure time and total treatment time can be found documented on the Time Entry flowsheet     yearly with Dr. Lucero      # Prostate Ca  - s/p radical prostatectomy by Dr Lang Mcneil in 2018  - [id number], [place] 7, + right anterior margin  - PSMA-PET done on 12/6/2023 showed no PSMA uptake within the post-surgical bed; there is PSMA uptake of a left common iliac retroperitoneal lymph node SUV 5.6 and PSMA uptake of a right pelvic sidewall lymph node SUV 3.5  - Dr Rangel recommended radiation therapy. Patient instead went to [facility] and was treated by Dr Zackary Ely (4600 cGy in 23 fractions to the pelvis and LN followed by [place] to prostate bed and LN to 2500 cGy in 10 fractions)  - Since completing radiation, has been experiencing significant fatigue  - He is now following with urologist Dr. Boyd  - For pain management, follows with [facility] palliative care team   - PSA < 0.01 (2/19/25)--> PSA <0.10 (5/20)     # COPD  # Smoking Hx  # Lung Ca screening  - Follows with pulmonologist Dr Hutchison   - PFTs 03/2025: FEV1 72% FEV1/FVC 0.63  - Did not tolerate Breztri  - Continue home Spiriva 2 puffs daily     # HTN/HLD  - Continue home Metop Succs 25mg daily and Lipitor 80mg nightly  - ASA held per NSGY, can resume on POD7 (5/28)     # Hypothyroidism  - Last TSH 4.49 (5/12)  - Continue home Synthroid 75mcg daily     # Anemia  - Likely anemia of chronic disease +/- post-op  - BL Hgb ~11 after cancer tx; Hgb 9.9 (5/20)--> 8.3 (5/22) post-op--> 8.1 (5/23)--> 7.6 (5/25)-->s/p 1 unit 5/25, hgb stable 9.0 (5/27)  - Ferritin 305, Folate 7.5, Iron 41, TIBC 269, %Sat 15, UIBC 228, Vit B12 1,342 (5/20)  - 5/25 Iron 30, TIBC 204, folate 3.9   - Stopped home Vit B12 sup 5/22 d/t elevated B12 level  - will hold off on folic acid/iron replacement at this time given acute phase with post-op period, plan for outpatient monitoring of hgb and anemia labs/ongoing workup     DVT prophy: Lovenox, was held for neurosurgery and resumed 5/22 PM per ortho     DISPO:  - Full code, confirmed with patient on admission   - NOK: Eleni Lofton  (wife) [phone]  - QI pending MRI   - NSGY FUV 6/9, Dr. Lucero (heme onc) FUV 8/8, NSGY FUV 8/11     I spent >60 minutes in the professional and overall care of this patient     Assessment and plan as above discussed with attending physician Dr. Pro Valles   normal...

## 2025-05-28 ENCOUNTER — TELEPHONE (OUTPATIENT)
Age: 70
End: 2025-05-28

## 2025-05-28 LAB
ALBUMIN SERPL BCP-MCNC: 2.4 G/DL (ref 3.4–5)
ALP SERPL-CCNC: 122 U/L (ref 33–136)
ALT SERPL W P-5'-P-CCNC: 16 U/L (ref 10–52)
ANION GAP SERPL CALC-SCNC: 11 MMOL/L (ref 10–20)
AST SERPL W P-5'-P-CCNC: 16 U/L (ref 9–39)
BASOPHILS # BLD AUTO: 0.09 X10*3/UL (ref 0–0.1)
BASOPHILS NFR BLD AUTO: 1.3 %
BILIRUB SERPL-MCNC: 0.3 MG/DL (ref 0–1.2)
BUN SERPL-MCNC: 7 MG/DL (ref 6–23)
CALCIUM SERPL-MCNC: 7.9 MG/DL (ref 8.6–10.6)
CHLORIDE SERPL-SCNC: 102 MMOL/L (ref 98–107)
CO2 SERPL-SCNC: 30 MMOL/L (ref 21–32)
CREAT SERPL-MCNC: 0.46 MG/DL (ref 0.5–1.3)
EGFRCR SERPLBLD CKD-EPI 2021: >90 ML/MIN/1.73M*2
EOSINOPHIL # BLD AUTO: 0.57 X10*3/UL (ref 0–0.7)
EOSINOPHIL NFR BLD AUTO: 8.5 %
ERYTHROCYTE [DISTWIDTH] IN BLOOD BY AUTOMATED COUNT: 19.1 % (ref 11.5–14.5)
GLUCOSE SERPL-MCNC: 86 MG/DL (ref 74–99)
HCT VFR BLD AUTO: 28.2 % (ref 41–52)
HGB BLD-MCNC: 8.5 G/DL (ref 13.5–17.5)
IMM GRANULOCYTES # BLD AUTO: 0.08 X10*3/UL (ref 0–0.7)
IMM GRANULOCYTES NFR BLD AUTO: 1.2 % (ref 0–0.9)
LYMPHOCYTES # BLD AUTO: 0.95 X10*3/UL (ref 1.2–4.8)
LYMPHOCYTES NFR BLD AUTO: 14.2 %
MAGNESIUM SERPL-MCNC: 1.7 MG/DL (ref 1.6–2.4)
MCH RBC QN AUTO: 25 PG (ref 26–34)
MCHC RBC AUTO-ENTMCNC: 30.1 G/DL (ref 32–36)
MCV RBC AUTO: 83 FL (ref 80–100)
MONOCYTES # BLD AUTO: 0.98 X10*3/UL (ref 0.1–1)
MONOCYTES NFR BLD AUTO: 14.6 %
NEUTROPHILS # BLD AUTO: 4.03 X10*3/UL (ref 1.2–7.7)
NEUTROPHILS NFR BLD AUTO: 60.2 %
NRBC BLD-RTO: 0 /100 WBCS (ref 0–0)
PLATELET # BLD AUTO: 360 X10*3/UL (ref 150–450)
POTASSIUM SERPL-SCNC: 3.7 MMOL/L (ref 3.5–5.3)
PROT SERPL-MCNC: 5.1 G/DL (ref 6.4–8.2)
RBC # BLD AUTO: 3.4 X10*6/UL (ref 4.5–5.9)
SODIUM SERPL-SCNC: 139 MMOL/L (ref 136–145)
WBC # BLD AUTO: 6.7 X10*3/UL (ref 4.4–11.3)

## 2025-05-28 PROCEDURE — 2500000002 HC RX 250 W HCPCS SELF ADMINISTERED DRUGS (ALT 637 FOR MEDICARE OP, ALT 636 FOR OP/ED): Performed by: STUDENT IN AN ORGANIZED HEALTH CARE EDUCATION/TRAINING PROGRAM

## 2025-05-28 PROCEDURE — 2500000001 HC RX 250 WO HCPCS SELF ADMINISTERED DRUGS (ALT 637 FOR MEDICARE OP)

## 2025-05-28 PROCEDURE — 2500000004 HC RX 250 GENERAL PHARMACY W/ HCPCS (ALT 636 FOR OP/ED): Mod: JZ | Performed by: NURSE PRACTITIONER

## 2025-05-28 PROCEDURE — 1170000001 HC PRIVATE ONCOLOGY ROOM DAILY

## 2025-05-28 PROCEDURE — 80053 COMPREHEN METABOLIC PANEL: CPT | Performed by: NURSE PRACTITIONER

## 2025-05-28 PROCEDURE — 94640 AIRWAY INHALATION TREATMENT: CPT

## 2025-05-28 PROCEDURE — 2500000004 HC RX 250 GENERAL PHARMACY W/ HCPCS (ALT 636 FOR OP/ED)

## 2025-05-28 PROCEDURE — 2500000001 HC RX 250 WO HCPCS SELF ADMINISTERED DRUGS (ALT 637 FOR MEDICARE OP): Performed by: STUDENT IN AN ORGANIZED HEALTH CARE EDUCATION/TRAINING PROGRAM

## 2025-05-28 PROCEDURE — 83735 ASSAY OF MAGNESIUM: CPT | Performed by: NURSE PRACTITIONER

## 2025-05-28 PROCEDURE — 85025 COMPLETE CBC W/AUTO DIFF WBC: CPT | Performed by: NURSE PRACTITIONER

## 2025-05-28 PROCEDURE — 99233 SBSQ HOSP IP/OBS HIGH 50: CPT

## 2025-05-28 PROCEDURE — 2500000001 HC RX 250 WO HCPCS SELF ADMINISTERED DRUGS (ALT 637 FOR MEDICARE OP): Performed by: NURSE PRACTITIONER

## 2025-05-28 RX ADMIN — CYCLOBENZAPRINE 10 MG: 10 TABLET, FILM COATED ORAL at 09:33

## 2025-05-28 RX ADMIN — OXYCODONE HYDROCHLORIDE 10 MG: 10 TABLET, FILM COATED, EXTENDED RELEASE ORAL at 09:48

## 2025-05-28 RX ADMIN — CEFTRIAXONE SODIUM 2 G: 2 INJECTION, SOLUTION INTRAVENOUS at 09:48

## 2025-05-28 RX ADMIN — ATORVASTATIN CALCIUM 80 MG: 80 TABLET, FILM COATED ORAL at 09:33

## 2025-05-28 RX ADMIN — LORAZEPAM 1 MG: 1 TABLET ORAL at 23:58

## 2025-05-28 RX ADMIN — LEVOTHYROXINE SODIUM 75 MCG: 0.07 TABLET ORAL at 09:33

## 2025-05-28 RX ADMIN — VANCOMYCIN HYDROCHLORIDE 1250 MG: 1.25 INJECTION, POWDER, LYOPHILIZED, FOR SOLUTION INTRAVENOUS at 11:23

## 2025-05-28 RX ADMIN — OXYCODONE HYDROCHLORIDE 10 MG: 10 TABLET ORAL at 20:53

## 2025-05-28 RX ADMIN — ENOXAPARIN SODIUM 40 MG: 100 INJECTION SUBCUTANEOUS at 14:33

## 2025-05-28 RX ADMIN — PANTOPRAZOLE SODIUM 40 MG: 40 TABLET, DELAYED RELEASE ORAL at 06:05

## 2025-05-28 RX ADMIN — METOPROLOL SUCCINATE 25 MG: 25 TABLET, EXTENDED RELEASE ORAL at 09:33

## 2025-05-28 RX ADMIN — Medication 25 MCG: at 09:33

## 2025-05-28 RX ADMIN — OXYCODONE HYDROCHLORIDE 5 MG: 5 TABLET ORAL at 09:48

## 2025-05-28 RX ADMIN — OXYCODONE HYDROCHLORIDE 10 MG: 10 TABLET, FILM COATED, EXTENDED RELEASE ORAL at 20:53

## 2025-05-28 RX ADMIN — ACETAMINOPHEN 650 MG: 325 TABLET, FILM COATED ORAL at 06:05

## 2025-05-28 RX ADMIN — ASPIRIN 81 MG: 81 TABLET, COATED ORAL at 09:35

## 2025-05-28 RX ADMIN — CYCLOBENZAPRINE 10 MG: 10 TABLET, FILM COATED ORAL at 14:33

## 2025-05-28 RX ADMIN — GABAPENTIN 300 MG: 300 CAPSULE ORAL at 20:53

## 2025-05-28 RX ADMIN — SENNOSIDES AND DOCUSATE SODIUM 2 TABLET: 50; 8.6 TABLET ORAL at 20:53

## 2025-05-28 RX ADMIN — ACETAMINOPHEN 650 MG: 325 TABLET, FILM COATED ORAL at 09:33

## 2025-05-28 RX ADMIN — GABAPENTIN 300 MG: 300 CAPSULE ORAL at 09:33

## 2025-05-28 RX ADMIN — TIOTROPIUM BROMIDE INHALATION SPRAY 2 PUFF: 3.12 SPRAY, METERED RESPIRATORY (INHALATION) at 10:00

## 2025-05-28 RX ADMIN — VANCOMYCIN HYDROCHLORIDE 1250 MG: 1.25 INJECTION, POWDER, LYOPHILIZED, FOR SOLUTION INTRAVENOUS at 23:47

## 2025-05-28 RX ADMIN — CYCLOBENZAPRINE 10 MG: 10 TABLET, FILM COATED ORAL at 20:53

## 2025-05-28 RX ADMIN — ACETAMINOPHEN 650 MG: 325 TABLET, FILM COATED ORAL at 14:33

## 2025-05-28 ASSESSMENT — PAIN - FUNCTIONAL ASSESSMENT
PAIN_FUNCTIONAL_ASSESSMENT: 0-10

## 2025-05-28 ASSESSMENT — PAIN SCALES - GENERAL
PAINLEVEL_OUTOF10: 4
PAINLEVEL_OUTOF10: 4
PAINLEVEL_OUTOF10: 5 - MODERATE PAIN
PAINLEVEL_OUTOF10: 9
PAINLEVEL_OUTOF10: 4
PAINLEVEL_OUTOF10: 9
PAINLEVEL_OUTOF10: 3

## 2025-05-28 NOTE — CARE PLAN
The patient's goals for the shift include Pain management    The clinical goals for the shift include Patieint will remain safe and free from falls throughout shift until 1900 on 5/28/2025.    Barriers to progression include pain. Recommendations to address these barriers include assess and treat pain as ordered.    Problem: Skin  Goal: Decreased wound size/increased tissue granulation at next dressing change  Outcome: Progressing  Flowsheets (Taken 5/28/2025 1443)  Decreased wound size/increased tissue granulation at next dressing change:   Promote sleep for wound healing   Protective dressings over bony prominences  Goal: Participates in plan/prevention/treatment measures  Outcome: Progressing  Flowsheets (Taken 5/28/2025 1443)  Participates in plan/prevention/treatment measures:   Discuss with provider PT/OT consult   Elevate heels  Goal: Prevent/manage excess moisture  Outcome: Progressing  Flowsheets (Taken 5/28/2025 1443)  Prevent/manage excess moisture: Follow provider orders for dressing changes  Goal: Prevent/minimize sheer/friction injuries  Outcome: Progressing  Flowsheets (Taken 5/28/2025 1443)  Prevent/minimize sheer/friction injuries:   HOB 30 degrees or less   Turn/reposition every 2 hours/use positioning/transfer devices   Increase activity/out of bed for meals   Use pull sheet   Complete micro-shifts as needed if patient unable. Adjust patient position to relieve pressure points, not a full turn  Goal: Promote/optimize nutrition  Outcome: Progressing  Flowsheets (Taken 5/28/2025 1443)  Promote/optimize nutrition:   Assist with feeding   Consume > 50% meals/supplements   Discuss with provider if NPO > 2 days   Offer water/supplements/favorite foods   Monitor/record intake including meals  Goal: Promote skin healing  Outcome: Progressing  Flowsheets (Taken 5/28/2025 1443)  Promote skin healing:   Assess skin/pad under line(s)/device(s)   Rotate device position/do not position patient on device    Turn/reposition every 2 hours/use positioning/transfer devices     Problem: Fall/Injury  Goal: Not fall by end of shift  Outcome: Progressing  Goal: Be free from injury by end of the shift  Outcome: Progressing  Goal: Verbalize understanding of personal risk factors for fall in the hospital  Outcome: Progressing  Goal: Verbalize understanding of risk factor reduction measures to prevent injury from fall in the home  Outcome: Progressing  Goal: Use assistive devices by end of the shift  Outcome: Progressing  Goal: Pace activities to prevent fatigue by end of the shift  Outcome: Progressing     Problem: Safety - Adult  Goal: Free from fall injury  Outcome: Progressing     Problem: Discharge Planning  Goal: Discharge to home or other facility with appropriate resources  Outcome: Progressing     Problem: Chronic Conditions and Co-morbidities  Goal: Patient's chronic conditions and co-morbidity symptoms are monitored and maintained or improved  Outcome: Progressing     Problem: Nutrition  Goal: Nutrient intake appropriate for maintaining nutritional needs  Outcome: Progressing     Problem: Pain  Goal: Takes deep breaths with improved pain control throughout the shift  Outcome: Progressing  Goal: Turns in bed with improved pain control throughout the shift  Outcome: Progressing  Goal: Walks with improved pain control throughout the shift  Outcome: Progressing  Goal: Performs ADL's with improved pain control throughout shift  Outcome: Progressing  Goal: Participates in PT with improved pain control throughout the shift  Outcome: Progressing  Goal: Free from opioid side effects throughout the shift  Outcome: Progressing  Goal: Free from acute confusion related to pain meds throughout the shift  Outcome: Progressing     Patient VSS and HDS throughout shift. Patient remains safe and free from falls. Patient denies n/v/d. Pt uses call light appropriately. MRI ordered and pending

## 2025-05-28 NOTE — PROGRESS NOTES
"Miguel Lofton is a 69 y.o. male on day 8 of admission presenting with Osteomyelitis.    Subjective   Miguel is doing fine this morning, states he had no acute events overnight. We discussed that his MRI will most likely happen today. We also discussed IV abx cost and that  is figuring out the best way to go about lowering costs for the IV abx. Still admits to the same lower back pain. Denies chest pain, SOB, N/V/DC, fever, chills, s/sx of bleeding, abdominal pain, dysuria, saddle anesthesia. ROS: A complete review of systems was performed and is negative except for as mentioned above in the HPI        Objective     Physical Exam  Vitals reviewed.   Constitutional:       Appearance: Normal appearance.   HENT:      Head: Normocephalic.   Cardiovascular:      Rate and Rhythm: Normal rate and regular rhythm.      Pulses: Normal pulses.   Pulmonary:      Effort: Pulmonary effort is normal.   Abdominal:      Palpations: Abdomen is soft.   Skin:     Capillary Refill: Capillary refill takes less than 2 seconds.      Comments: Dressing with prevena c/d/i   Neurological:      General: No focal deficit present.      Mental Status: He is alert and oriented to person, place, and time.   Psychiatric:         Mood and Affect: Mood normal.         Last Recorded Vitals  Blood pressure 115/72, pulse 73, temperature 35.9 °C (96.6 °F), resp. rate 18, height 1.727 m (5' 8\"), weight 73.4 kg (161 lb 13.1 oz), SpO2 90%.  Intake/Output last 3 Shifts:  I/O last 3 completed shifts:  In: 10 (0.1 mL/kg) [I.V.:10 (0.1 mL/kg)]  Out: 1750 (23.8 mL/kg) [Urine:1750 (1 mL/kg/hr)]  Weight: 73.4 kg     Relevant Results    This patient has a central line   Reason for the central line remaining today? IV atb  Assessment & Plan  Osteomyelitis  Miguel Lofton is a 69 y.o. male patient, with PMHx of prostate cancer, base of the tongue cancer, HTN, HLD, hypothyroidism, COPD, anxiety who had a CT chest for annual lung cancer screening on 5/13 that showed " possible compression fx of T5 and T6. Imaging was reviewed at office visit 5/19 and was advised he present to ED for MRI. He presented to [facility] 5/19 where MRI T spine (5/19) showed acute discitis/osteomyelitis at T5-6 with marked surrounding phlegmon and circumferential epidural phlegmon resulting in moderate to severe spinal canal stenosis. No epidural abscess. He was transferred to [facility] 5/20 for NSGY eval. NSGY consulted 5/20, pt taken to the OR 5/21 for T5-6 laminectomy and T3-8 fusion. ID consulted 5/21 and rec Vanc and Ceftriaxone (5/22- planned stop 7/16 for total 8 week atb course). OR surg path (5/22) +osteomyelitis. OR tissue/wound culture (5/22) neg, final. OR fungal culture (5/22) NGTD. PT/OT consulted 5/22 and rec home care. S/p 1unit PRBC 5/25 for Hgb 7.6 - hgb incremented appropriately. MRI L&C spine under anesthesia pending per neuro recs. DC pending improvement in pain and MRI C/L spine completion.     Updates 5/28:  - no changes, awaiting MRI with anesthesia, pt currently NPO  - restart ASA and remove prevena dressing today (POD7)     # Acute on chronic back pain   # Compression fracture   # Vertebral osteomyelitis  # Phlegmonous tissue c/f infection  - CT Chest (OSH): considerable deformity of what is felt to be T5 which may be related to a compression fracture of the caudal endplate. There is some compression of the superior endplate of T6.  -  MRI T spine (5/19 at OSH): Acute discitis/osteomyelitis at T5-6 with marked surrounding phlegmon and circumferential epidural phlegmon resulting in moderate to severe spinal canal stenosis. No epidural abscess. Requested for image import from [facility]    - CRP 7.35, ESR 90 (5/20); CRP 5.94, ESR 74 (5/22)  - s/p OR 5/21/25 with neurosurgery for T5-6 laminectomy and T3-8 fusion. Per neurosurgery, phlegmon appeared infectious without purulence  - OR surg path (5/22) +osteomyelitis  - OR tissue/wound culture/ fungal culture 5/22) neg, final  -  5/20 Blood cultures x2 NG, Urine culture negative  - +MRSA nares 5/24  - plan to remove Prevena today POD7 with dressing (5/28)  - per neurosurgery; MRI C and L spine w/wo contrast pending, d/t claustrophobia will get with anesthesia  - ID consulted 5/21; rec Vanc and Ceftriaxone (5/22- planned stop 7/16) for total 8 week atb course)-- per ID please reengage if MRI has areas concerning for infection   - Weekly CBC, CMP, CRP ordered outpt with results to be faxed to [phone] ATTN: Dr. Dyer once discharged per ID  - Supportive onc consulted 5/25 for uncontrolled pain, recs start ER Oxy 10mg BID and increased doris 300mg to BID, changed Miralax from daily to PRN, and Senna from 3tabs to 2tabs, added Zofran PRN  - PT/OT rec home care, PICC line placed 5/24     # Abdominal pain-- improved  - Reported abdominal pain above old PEG site on 5/22  - Likely 2/2 prone positioning after OR +/- developing ileus  - KUB (5/22): moderate colonic stool burden, possible post-op ileus  - having loose Bms now LBM 5/26, stopped lactulose. Bowel regimen with DocuSenna 2tabs BID, Miralax PRN, Dulcolax sup daily PRN     # Dysphasia  - SLP eval done no aspiration, rec soft/ thin liquid diet   - Dietician consulted with recs for Boost VHC (530 kcal, 22g pro) TID   - currently NPO (5/27) for poss MRI under anesthesia, restarted diet in after 5/27 and made NPO at midnight for MRI under anesthesia today 5/28     # Base of the tongue Ca  - Dx with 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 Gamez--last seen 12/18/2024: laryngeal structures are noted for grossly normal appearance; true vocal cords, false vocal cords, remaining structures, including epiglottis, piriform sinuses and base of tongue are all grossly normal; there is no evidence of gross mass nodule, polyp, tumor or other defect   - Follows yearly with  Dr. Lucero      # Prostate Ca  - s/p radical prostatectomy by Dr Lang Mcneil in 2018  - [id number], Gleasons 7, + right anterior margin  - PSMA-PET done on 12/6/2023 showed no PSMA uptake within the post-surgical bed; there is PSMA uptake of a left common iliac retroperitoneal lymph node SUV 5.6 and PSMA uptake of a right pelvic sidewall lymph node SUV 3.5  - Dr Rangel recommended radiation therapy. Patient instead went to [facility] and was treated by Dr Zackary Ely (4600 cGy in 23 fractions to the pelvis and LN followed by [place] to prostate bed and LN to 2500 cGy in 10 fractions)  - Since completing radiation, has been experiencing significant fatigue  - He is now following with urologist Dr. Boyd  - For pain management, follows with [facility] palliative care team   - PSA < 0.01 (2/19/25)--> PSA <0.10 (5/20)     # COPD  # Smoking Hx  # Lung Ca screening  - Follows with pulmonologist Dr Hutchison   - PFTs 03/2025: FEV1 72% FEV1/FVC 0.63  - Did not tolerate Breztri  - Continue home Spiriva 2 puffs daily     # HTN/HLD  - Continue home Metop Succs 25mg daily and Lipitor 80mg nightly  - ASA held per NSGY, can resume on POD7 (5/28) - now resumed     # Hypothyroidism  - Last TSH 4.49 (5/12)  - Continue home Synthroid 75mcg daily     # Anemia  - Likely anemia of chronic disease +/- post-op  - BL Hgb ~11 after cancer tx; Hgb 9.9 (5/20)--> 8.3 (5/22) post-op--> 8.1 (5/23)--> 7.6 (5/25)-->s/p 1 unit 5/25, hgb stable 9.0 (5/27)  - Ferritin 305, Folate 7.5, Iron 41, TIBC 269, %Sat 15, UIBC 228, Vit B12 1,342 (5/20)  - 5/25 Iron 30, TIBC 204, folate 3.9   - Stopped home Vit B12 sup 5/22 d/t elevated B12 level  - will hold off on folic acid/iron replacement at this time given acute phase with post-op period, plan for outpatient monitoring of hgb and anemia labs/ongoing workup     DVT prophy: Lovenox, was held for neurosurgery and resumed 5/22 PM per ortho     DISPO:  - Full code, confirmed with patient on admission   - NOK: Eleni Lofton  (wife) [phone]  - MO pending MRI   - NSGY FUV 6/9, Dr. Lucero (heme onc) FUV 8/8, NSGY FUV 8/11     I spent >60 minutes in the professional and overall care of this patient     Assessment and plan as above discussed with attending physician Dr. Pro Huertas PA-C

## 2025-05-28 NOTE — CARE PLAN
The patient's goals for the shift include Pain management    The clinical goals for the shift include Patieint will remain safe and free from falls throughout shift until 1900 on 5/28/2025.      Problem: Skin  Goal: Decreased wound size/increased tissue granulation at next dressing change  Outcome: Progressing  Goal: Participates in plan/prevention/treatment measures  5/28/2025 1941 by Marnie Mcneill RN  Flowsheets (Taken 5/28/2025 1941)  Participates in plan/prevention/treatment measures: Increase activity/out of bed for meals  5/28/2025 1941 by Marnie Mcneill RN  Outcome: Progressing  Flowsheets (Taken 5/28/2025 1941)  Participates in plan/prevention/treatment measures: Increase activity/out of bed for meals  Goal: Prevent/manage excess moisture  Outcome: Progressing  Goal: Prevent/minimize sheer/friction injuries  Outcome: Progressing  Goal: Promote/optimize nutrition  Outcome: Progressing  Goal: Promote skin healing  Outcome: Progressing

## 2025-05-28 NOTE — ASSESSMENT & PLAN NOTE
Miguel Lofton is a 69 y.o. male patient, with PMHx of prostate cancer, base of the tongue cancer, HTN, HLD, hypothyroidism, COPD, anxiety who had a CT chest for annual lung cancer screening on 5/13 that showed possible compression fx of T5 and T6. Imaging was reviewed at office visit 5/19 and was advised he present to ED for MRI. He presented to [facility] 5/19 where MRI T spine (5/19) showed acute discitis/osteomyelitis at T5-6 with marked surrounding phlegmon and circumferential epidural phlegmon resulting in moderate to severe spinal canal stenosis. No epidural abscess. He was transferred to [facility] 5/20 for NSGY eval. NSGY consulted 5/20, pt taken to the OR 5/21 for T5-6 laminectomy and T3-8 fusion. ID consulted 5/21 and rec Vanc and Ceftriaxone (5/22- planned stop 7/16 for total 8 week atb course). OR surg path (5/22) +osteomyelitis. OR tissue/wound culture (5/22) neg, final. OR fungal culture (5/22) NGTD. PT/OT consulted 5/22 and rec home care. S/p 1unit PRBC 5/25 for Hgb 7.6 - hgb incremented appropriately. MRI L&C spine under anesthesia pending per neuro recs. DC pending improvement in pain and MRI C/L spine completion.     Updates 5/28:  - no changes, awaiting MRI with anesthesia, pt currently NPO  - restart ASA and remove prevena dressing today (POD7)     # Acute on chronic back pain   # Compression fracture   # Vertebral osteomyelitis  # Phlegmonous tissue c/f infection  - CT Chest (OSH): considerable deformity of what is felt to be T5 which may be related to a compression fracture of the caudal endplate. There is some compression of the superior endplate of T6.  -  MRI T spine (5/19 at OSH): Acute discitis/osteomyelitis at T5-6 with marked surrounding phlegmon and circumferential epidural phlegmon resulting in moderate to severe spinal canal stenosis. No epidural abscess. Requested for image import from [facility]    - CRP 7.35, ESR 90 (5/20); CRP 5.94, ESR 74 (5/22)  - s/p OR 5/21/25 with  neurosurgery for T5-6 laminectomy and T3-8 fusion. Per neurosurgery, phlegmon appeared infectious without purulence  - OR surg path (5/22) +osteomyelitis  - OR tissue/wound culture/ fungal culture 5/22) neg, final  - 5/20 Blood cultures x2 NG, Urine culture negative  - +MRSA nares 5/24  - plan to remove Prevena today POD7 with dressing (5/28)  - per neurosurgery; MRI C and L spine w/wo contrast pending, d/t claustrophobia will get with anesthesia  - ID consulted 5/21; rec Vanc and Ceftriaxone (5/22- planned stop 7/16) for total 8 week atb course)-- per ID please reengage if MRI has areas concerning for infection   - Weekly CBC, CMP, CRP ordered outpt with results to be faxed to [phone] ATTN: Dr. Dyer once discharged per ID  - Supportive onc consulted 5/25 for uncontrolled pain, recs start ER Oxy 10mg BID and increased doris 300mg to BID, changed Miralax from daily to PRN, and Senna from 3tabs to 2tabs, added Zofran PRN  - PT/OT rec home care, PICC line placed 5/24     # Abdominal pain-- improved  - Reported abdominal pain above old PEG site on 5/22  - Likely 2/2 prone positioning after OR +/- developing ileus  - KUB (5/22): moderate colonic stool burden, possible post-op ileus  - having loose Bms now LBM 5/26, stopped lactulose. Bowel regimen with DocuSenna 2tabs BID, Miralax PRN, Dulcolax sup daily PRN     # Dysphasia  - SLP eval done no aspiration, rec soft/ thin liquid diet   - Dietician consulted with recs for Boost VHC (530 kcal, 22g pro) TID   - currently NPO (5/27) for poss MRI under anesthesia, restarted diet in after 5/27 and made NPO at midnight for MRI under anesthesia today 5/28     # Base of the tongue Ca  - Dx with 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 Gamez--last seen 12/18/2024: laryngeal structures are noted for grossly normal appearance; true vocal cords,  false vocal cords, remaining structures, including epiglottis, piriform sinuses and base of tongue are all grossly normal; there is no evidence of gross mass nodule, polyp, tumor or other defect   - Follows yearly with Dr. Lucero      # Prostate Ca  - s/p radical prostatectomy by Dr Lang Mcneil in 2018  - [id number], Gleasons 7, + right anterior margin  - PSMA-PET done on 12/6/2023 showed no PSMA uptake within the post-surgical bed; there is PSMA uptake of a left common iliac retroperitoneal lymph node SUV 5.6 and PSMA uptake of a right pelvic sidewall lymph node SUV 3.5  - Dr Rangel recommended radiation therapy. Patient instead went to [facility] and was treated by Dr Zackary Ely (4600 cGy in 23 fractions to the pelvis and LN followed by [place] to prostate bed and LN to 2500 cGy in 10 fractions)  - Since completing radiation, has been experiencing significant fatigue  - He is now following with urologist Dr. Boyd  - For pain management, follows with [facility] palliative care team   - PSA < 0.01 (2/19/25)--> PSA <0.10 (5/20)     # COPD  # Smoking Hx  # Lung Ca screening  - Follows with pulmonologist Dr Hutchison   - PFTs 03/2025: FEV1 72% FEV1/FVC 0.63  - Did not tolerate Breztri  - Continue home Spiriva 2 puffs daily     # HTN/HLD  - Continue home Metop Succs 25mg daily and Lipitor 80mg nightly  - ASA held per NSGY, can resume on POD7 (5/28) - now resumed     # Hypothyroidism  - Last TSH 4.49 (5/12)  - Continue home Synthroid 75mcg daily     # Anemia  - Likely anemia of chronic disease +/- post-op  - BL Hgb ~11 after cancer tx; Hgb 9.9 (5/20)--> 8.3 (5/22) post-op--> 8.1 (5/23)--> 7.6 (5/25)-->s/p 1 unit 5/25, hgb stable 9.0 (5/27)  - Ferritin 305, Folate 7.5, Iron 41, TIBC 269, %Sat 15, UIBC 228, Vit B12 1,342 (5/20)  - 5/25 Iron 30, TIBC 204, folate 3.9   - Stopped home Vit B12 sup 5/22 d/t elevated B12 level  - will hold off on folic acid/iron replacement at this time given acute phase with post-op period, plan for  outpatient monitoring of hgb and anemia labs/ongoing workup     DVT prophy: Lovenox, was held for neurosurgery and resumed 5/22 PM per ortho     DISPO:  - Full code, confirmed with patient on admission   - NOK: Eleni Lofton (wife) [phone]  - DC pending MRI   - NSGY FUV 6/9, Dr. Lucero (heme onc) FUV 8/8, NSGY FUV 8/11     I spent >60 minutes in the professional and overall care of this patient     Assessment and plan as above discussed with attending physician Dr. Pro Valles

## 2025-05-28 NOTE — TELEPHONE ENCOUNTER
Just an FYI    Patient wanted Dr. Akhtar to know that he had to cancel his appointment for 5/29/2025. At the time of the call he was with  cancer center and they had put rods in his back.    He is currently waiting to get an MRI for his lower back and is unsure when they are going to release him. Believes he will be in rehab once released.

## 2025-05-28 NOTE — TELEPHONE ENCOUNTER
Spoke to patient , he had a vertebrae collapse and bend his spinal cord, [place]. They placed rods and pins from T3-T8 .    Patient is still in the hospital, waiting on further MRI for another possible surgery.    Surgeon told him he would reach out to you to discuss the case.

## 2025-05-28 NOTE — CARE PLAN
The patient's goals for the shift include Pain management    The clinical goals for the shift include Patieint will remain safe and free from falls throughout shift until 1900 on 5/28/2025.      Problem: Skin  Goal: Decreased wound size/increased tissue granulation at next dressing change  Outcome: Progressing     Problem: Skin  Goal: Decreased wound size/increased tissue granulation at next dressing change  Outcome: Progressing  Goal: Participates in plan/prevention/treatment measures  Outcome: Progressing  Goal: Prevent/manage excess moisture  Outcome: Progressing  Goal: Prevent/minimize sheer/friction injuries  Outcome: Progressing  Goal: Promote/optimize nutrition  Outcome: Progressing  Goal: Promote skin healing  Outcome: Progressing     Problem: Fall/Injury  Goal: Not fall by end of shift  Outcome: Progressing  Goal: Be free from injury by end of the shift  Outcome: Progressing  Goal: Verbalize understanding of personal risk factors for fall in the hospital  Outcome: Progressing  Goal: Verbalize understanding of risk factor reduction measures to prevent injury from fall in the home  Outcome: Progressing  Goal: Use assistive devices by end of the shift  Outcome: Progressing  Goal: Pace activities to prevent fatigue by end of the shift  Outcome: Progressing     Problem: Safety - Adult  Goal: Free from fall injury  Outcome: Progressing

## 2025-05-28 NOTE — CARE PLAN
The patient's goals for the shift include Pain management    The clinical goals for the shift include Pain Management    Over the shift, the patient did make progress toward the following goals.         Problem: Skin  Goal: Decreased wound size/increased tissue granulation at next dressing change  5/27/2025 2125 by Landon Blackmon RN  Flowsheets (Taken 5/26/2025 2120)  Decreased wound size/increased tissue granulation at next dressing change: Promote sleep for wound healing  5/27/2025 2125 by Landon Blackmon RN  Outcome: Progressing  Goal: Participates in plan/prevention/treatment measures  5/27/2025 2125 by Landon Blackmon RN  Flowsheets (Taken 5/27/2025 2125)  Participates in plan/prevention/treatment measures: Elevate heels  5/27/2025 2125 by Landon Blackmon RN  Outcome: Progressing  Flowsheets (Taken 5/27/2025 2125)  Participates in plan/prevention/treatment measures: Elevate heels  Goal: Prevent/manage excess moisture  5/27/2025 2125 by Landon Blackmon RN  Flowsheets (Taken 5/27/2025 2125)  Prevent/manage excess moisture: Monitor for/manage infection if present  5/27/2025 2125 by Landon Blackmon RN  Outcome: Progressing  Flowsheets (Taken 5/27/2025 2125)  Prevent/manage excess moisture: Monitor for/manage infection if present  Goal: Prevent/minimize sheer/friction injuries  5/27/2025 2125 by Landon Blackmon RN  Flowsheets (Taken 5/27/2025 2125)  Prevent/minimize sheer/friction injuries: Use pull sheet  5/27/2025 2125 by Landon Blackmon RN  Outcome: Progressing  Flowsheets (Taken 5/27/2025 2125)  Prevent/minimize sheer/friction injuries: Use pull sheet  Goal: Promote/optimize nutrition  5/27/2025 2125 by Landon Blackmon RN  Flowsheets (Taken 5/27/2025 2125)  Promote/optimize nutrition: Offer water/supplements/favorite foods  5/27/2025 2125 by Landon Blackmon RN  Outcome: Progressing  Flowsheets (Taken 5/27/2025 2125)  Promote/optimize nutrition: Offer  water/supplements/favorite foods  Goal: Promote skin healing  5/27/2025 2125 by Landon Blackmon RN  Flowsheets (Taken 5/27/2025 2125)  Promote skin healing: Protective dressings over bony prominences  5/27/2025 2125 by Landon Blackmon RN  Outcome: Progressing  Flowsheets (Taken 5/27/2025 2125)  Promote skin healing: Protective dressings over bony prominences     Problem: Fall/Injury  Goal: Not fall by end of shift  Outcome: Progressing  Goal: Be free from injury by end of the shift  Outcome: Progressing  Goal: Verbalize understanding of personal risk factors for fall in the hospital  Outcome: Progressing  Goal: Verbalize understanding of risk factor reduction measures to prevent injury from fall in the home  Outcome: Progressing  Goal: Use assistive devices by end of the shift  Outcome: Progressing  Goal: Pace activities to prevent fatigue by end of the shift  Outcome: Progressing     Problem: Safety - Adult  Goal: Free from fall injury  Outcome: Progressing     Problem: Discharge Planning  Goal: Discharge to home or other facility with appropriate resources  Outcome: Progressing     Problem: Chronic Conditions and Co-morbidities  Goal: Patient's chronic conditions and co-morbidity symptoms are monitored and maintained or improved  Outcome: Progressing     Problem: Nutrition  Goal: Nutrient intake appropriate for maintaining nutritional needs  Outcome: Progressing     Problem: Pain  Goal: Takes deep breaths with improved pain control throughout the shift  Outcome: Progressing  Goal: Turns in bed with improved pain control throughout the shift  Outcome: Progressing  Goal: Walks with improved pain control throughout the shift  Outcome: Progressing  Goal: Performs ADL's with improved pain control throughout shift  Outcome: Progressing  Goal: Participates in PT with improved pain control throughout the shift  Outcome: Progressing  Goal: Free from opioid side effects throughout the shift  Outcome:  Progressing  Goal: Free from acute confusion related to pain meds throughout the shift  Outcome: Progressing

## 2025-05-29 ENCOUNTER — ANESTHESIA (OUTPATIENT)
Dept: RADIOLOGY | Facility: HOSPITAL | Age: 70
End: 2025-05-29
Payer: COMMERCIAL

## 2025-05-29 ENCOUNTER — ANESTHESIA EVENT (OUTPATIENT)
Dept: RADIOLOGY | Facility: HOSPITAL | Age: 70
End: 2025-05-29
Payer: COMMERCIAL

## 2025-05-29 ENCOUNTER — APPOINTMENT (OUTPATIENT)
Dept: RADIOLOGY | Facility: HOSPITAL | Age: 70
DRG: 447 | End: 2025-05-29
Payer: COMMERCIAL

## 2025-05-29 LAB
ALBUMIN SERPL BCP-MCNC: 2.5 G/DL (ref 3.4–5)
ALP SERPL-CCNC: 125 U/L (ref 33–136)
ALT SERPL W P-5'-P-CCNC: 18 U/L (ref 10–52)
ANION GAP SERPL CALC-SCNC: 11 MMOL/L (ref 10–20)
AST SERPL W P-5'-P-CCNC: 18 U/L (ref 9–39)
BASOPHILS # BLD AUTO: 0.12 X10*3/UL (ref 0–0.1)
BASOPHILS NFR BLD AUTO: 1.5 %
BILIRUB SERPL-MCNC: 0.4 MG/DL (ref 0–1.2)
BUN SERPL-MCNC: 6 MG/DL (ref 6–23)
CALCIUM SERPL-MCNC: 8 MG/DL (ref 8.6–10.6)
CHLORIDE SERPL-SCNC: 101 MMOL/L (ref 98–107)
CO2 SERPL-SCNC: 29 MMOL/L (ref 21–32)
CREAT SERPL-MCNC: 0.47 MG/DL (ref 0.5–1.3)
EGFRCR SERPLBLD CKD-EPI 2021: >90 ML/MIN/1.73M*2
EOSINOPHIL # BLD AUTO: 0.72 X10*3/UL (ref 0–0.7)
EOSINOPHIL NFR BLD AUTO: 8.9 %
ERYTHROCYTE [DISTWIDTH] IN BLOOD BY AUTOMATED COUNT: 19.4 % (ref 11.5–14.5)
GLUCOSE SERPL-MCNC: 91 MG/DL (ref 74–99)
HCT VFR BLD AUTO: 29.5 % (ref 41–52)
HGB BLD-MCNC: 8.7 G/DL (ref 13.5–17.5)
IMM GRANULOCYTES # BLD AUTO: 0.05 X10*3/UL (ref 0–0.7)
IMM GRANULOCYTES NFR BLD AUTO: 0.6 % (ref 0–0.9)
LYMPHOCYTES # BLD AUTO: 0.99 X10*3/UL (ref 1.2–4.8)
LYMPHOCYTES NFR BLD AUTO: 12.3 %
MAGNESIUM SERPL-MCNC: 1.62 MG/DL (ref 1.6–2.4)
MCH RBC QN AUTO: 25.1 PG (ref 26–34)
MCHC RBC AUTO-ENTMCNC: 29.5 G/DL (ref 32–36)
MCV RBC AUTO: 85 FL (ref 80–100)
MONOCYTES # BLD AUTO: 0.98 X10*3/UL (ref 0.1–1)
MONOCYTES NFR BLD AUTO: 12.1 %
NEUTROPHILS # BLD AUTO: 5.21 X10*3/UL (ref 1.2–7.7)
NEUTROPHILS NFR BLD AUTO: 64.6 %
NRBC BLD-RTO: 0 /100 WBCS (ref 0–0)
PLATELET # BLD AUTO: 378 X10*3/UL (ref 150–450)
POTASSIUM SERPL-SCNC: 3.8 MMOL/L (ref 3.5–5.3)
PROT SERPL-MCNC: 5.2 G/DL (ref 6.4–8.2)
RBC # BLD AUTO: 3.46 X10*6/UL (ref 4.5–5.9)
SODIUM SERPL-SCNC: 137 MMOL/L (ref 136–145)
VANCOMYCIN SERPL-MCNC: 22.4 UG/ML (ref 5–20)
WBC # BLD AUTO: 8.1 X10*3/UL (ref 4.4–11.3)

## 2025-05-29 PROCEDURE — 2550000001 HC RX 255 CONTRASTS: Mod: JZ | Performed by: HOSPITALIST

## 2025-05-29 PROCEDURE — 7100000001 HC RECOVERY ROOM TIME - INITIAL BASE CHARGE

## 2025-05-29 PROCEDURE — 2500000001 HC RX 250 WO HCPCS SELF ADMINISTERED DRUGS (ALT 637 FOR MEDICARE OP): Performed by: STUDENT IN AN ORGANIZED HEALTH CARE EDUCATION/TRAINING PROGRAM

## 2025-05-29 PROCEDURE — 2500000004 HC RX 250 GENERAL PHARMACY W/ HCPCS (ALT 636 FOR OP/ED): Mod: JZ

## 2025-05-29 PROCEDURE — 2500000004 HC RX 250 GENERAL PHARMACY W/ HCPCS (ALT 636 FOR OP/ED)

## 2025-05-29 PROCEDURE — 1170000001 HC PRIVATE ONCOLOGY ROOM DAILY

## 2025-05-29 PROCEDURE — 3700000002 HC GENERAL ANESTHESIA TIME - EACH INCREMENTAL 1 MINUTE

## 2025-05-29 PROCEDURE — 84155 ASSAY OF PROTEIN SERUM: CPT | Performed by: NURSE PRACTITIONER

## 2025-05-29 PROCEDURE — 94640 AIRWAY INHALATION TREATMENT: CPT

## 2025-05-29 PROCEDURE — 2500000004 HC RX 250 GENERAL PHARMACY W/ HCPCS (ALT 636 FOR OP/ED): Mod: JZ | Performed by: NURSE PRACTITIONER

## 2025-05-29 PROCEDURE — 7100000002 HC RECOVERY ROOM TIME - EACH INCREMENTAL 1 MINUTE

## 2025-05-29 PROCEDURE — 72156 MRI NECK SPINE W/O & W/DYE: CPT | Performed by: RADIOLOGY

## 2025-05-29 PROCEDURE — 72158 MRI LUMBAR SPINE W/O & W/DYE: CPT | Performed by: RADIOLOGY

## 2025-05-29 PROCEDURE — 85025 COMPLETE CBC W/AUTO DIFF WBC: CPT | Performed by: NURSE PRACTITIONER

## 2025-05-29 PROCEDURE — A72158 CHG MRI, LUMBAR SPINE COMBO: Performed by: STUDENT IN AN ORGANIZED HEALTH CARE EDUCATION/TRAINING PROGRAM

## 2025-05-29 PROCEDURE — 2500000001 HC RX 250 WO HCPCS SELF ADMINISTERED DRUGS (ALT 637 FOR MEDICARE OP): Performed by: NURSE PRACTITIONER

## 2025-05-29 PROCEDURE — 2500000001 HC RX 250 WO HCPCS SELF ADMINISTERED DRUGS (ALT 637 FOR MEDICARE OP)

## 2025-05-29 PROCEDURE — 3700000001 HC GENERAL ANESTHESIA TIME - INITIAL BASE CHARGE

## 2025-05-29 PROCEDURE — 72156 MRI NECK SPINE W/O & W/DYE: CPT

## 2025-05-29 PROCEDURE — 2500000005 HC RX 250 GENERAL PHARMACY W/O HCPCS

## 2025-05-29 PROCEDURE — 99233 SBSQ HOSP IP/OBS HIGH 50: CPT

## 2025-05-29 PROCEDURE — 72158 MRI LUMBAR SPINE W/O & W/DYE: CPT

## 2025-05-29 PROCEDURE — A9575 INJ GADOTERATE MEGLUMI 0.1ML: HCPCS | Mod: JZ | Performed by: HOSPITALIST

## 2025-05-29 PROCEDURE — 83735 ASSAY OF MAGNESIUM: CPT | Performed by: NURSE PRACTITIONER

## 2025-05-29 PROCEDURE — 2500000005 HC RX 250 GENERAL PHARMACY W/O HCPCS: Performed by: STUDENT IN AN ORGANIZED HEALTH CARE EDUCATION/TRAINING PROGRAM

## 2025-05-29 PROCEDURE — 80202 ASSAY OF VANCOMYCIN: CPT

## 2025-05-29 PROCEDURE — 80053 COMPREHEN METABOLIC PANEL: CPT | Performed by: NURSE PRACTITIONER

## 2025-05-29 PROCEDURE — 2500000002 HC RX 250 W HCPCS SELF ADMINISTERED DRUGS (ALT 637 FOR MEDICARE OP, ALT 636 FOR OP/ED): Performed by: STUDENT IN AN ORGANIZED HEALTH CARE EDUCATION/TRAINING PROGRAM

## 2025-05-29 RX ORDER — ONDANSETRON HYDROCHLORIDE 2 MG/ML
INJECTION, SOLUTION INTRAVENOUS AS NEEDED
Status: DISCONTINUED | OUTPATIENT
Start: 2025-05-29 | End: 2025-05-29

## 2025-05-29 RX ORDER — OXYCODONE HYDROCHLORIDE 10 MG/1
10 TABLET ORAL EVERY 4 HOURS PRN
Qty: 60 TABLET | Refills: 0 | Status: SHIPPED | OUTPATIENT
Start: 2025-05-29 | End: 2025-06-06

## 2025-05-29 RX ORDER — PROPOFOL 10 MG/ML
INJECTION, EMULSION INTRAVENOUS AS NEEDED
Status: DISCONTINUED | OUTPATIENT
Start: 2025-05-29 | End: 2025-05-29

## 2025-05-29 RX ORDER — LIDOCAINE 560 MG/1
2 PATCH PERCUTANEOUS; TOPICAL; TRANSDERMAL DAILY
Qty: 14 PATCH | Refills: 1 | Status: SHIPPED | OUTPATIENT
Start: 2025-05-30 | End: 2025-06-06

## 2025-05-29 RX ORDER — GADOTERATE MEGLUMINE 376.9 MG/ML
15 INJECTION INTRAVENOUS
Status: COMPLETED | OUTPATIENT
Start: 2025-05-29 | End: 2025-05-29

## 2025-05-29 RX ORDER — ROCURONIUM BROMIDE 10 MG/ML
INJECTION, SOLUTION INTRAVENOUS AS NEEDED
Status: DISCONTINUED | OUTPATIENT
Start: 2025-05-29 | End: 2025-05-29

## 2025-05-29 RX ORDER — GABAPENTIN 300 MG/1
300 CAPSULE ORAL 2 TIMES DAILY
Qty: 60 CAPSULE | Refills: 11 | Status: SHIPPED | OUTPATIENT
Start: 2025-05-29 | End: 2025-06-06 | Stop reason: HOSPADM

## 2025-05-29 RX ORDER — SIMETHICONE 80 MG
80 TABLET,CHEWABLE ORAL 4 TIMES DAILY PRN
Qty: 30 TABLET | Refills: 0 | Status: SHIPPED | OUTPATIENT
Start: 2025-05-29 | End: 2025-06-08

## 2025-05-29 RX ORDER — FENTANYL CITRATE 50 UG/ML
INJECTION, SOLUTION INTRAMUSCULAR; INTRAVENOUS AS NEEDED
Status: DISCONTINUED | OUTPATIENT
Start: 2025-05-29 | End: 2025-05-29

## 2025-05-29 RX ORDER — LIDOCAINE HYDROCHLORIDE 10 MG/ML
0.1 INJECTION, SOLUTION INFILTRATION; PERINEURAL ONCE
Status: DISCONTINUED | OUTPATIENT
Start: 2025-05-29 | End: 2025-06-03 | Stop reason: HOSPADM

## 2025-05-29 RX ORDER — LORAZEPAM 1 MG/1
1 TABLET ORAL NIGHTLY PRN
Start: 2025-05-29 | End: 2025-06-06

## 2025-05-29 RX ORDER — MIDAZOLAM HYDROCHLORIDE 1 MG/ML
INJECTION INTRAMUSCULAR; INTRAVENOUS AS NEEDED
Status: DISCONTINUED | OUTPATIENT
Start: 2025-05-29 | End: 2025-05-29

## 2025-05-29 RX ORDER — LIDOCAINE HCL/PF 100 MG/5ML
SYRINGE (ML) INTRAVENOUS AS NEEDED
Status: DISCONTINUED | OUTPATIENT
Start: 2025-05-29 | End: 2025-05-29

## 2025-05-29 RX ADMIN — CYCLOBENZAPRINE 10 MG: 10 TABLET, FILM COATED ORAL at 15:47

## 2025-05-29 RX ADMIN — ASPIRIN 81 MG: 81 TABLET, COATED ORAL at 09:30

## 2025-05-29 RX ADMIN — ENOXAPARIN SODIUM 40 MG: 100 INJECTION SUBCUTANEOUS at 15:47

## 2025-05-29 RX ADMIN — SODIUM CHLORIDE, SODIUM LACTATE, POTASSIUM CHLORIDE, AND CALCIUM CHLORIDE: 600; 310; 30; 20 INJECTION, SOLUTION INTRAVENOUS at 17:07

## 2025-05-29 RX ADMIN — OXYCODONE HYDROCHLORIDE 10 MG: 10 TABLET, FILM COATED, EXTENDED RELEASE ORAL at 20:45

## 2025-05-29 RX ADMIN — METOPROLOL SUCCINATE 25 MG: 25 TABLET, EXTENDED RELEASE ORAL at 09:30

## 2025-05-29 RX ADMIN — ACETAMINOPHEN 650 MG: 325 TABLET, FILM COATED ORAL at 09:30

## 2025-05-29 RX ADMIN — SENNOSIDES AND DOCUSATE SODIUM 2 TABLET: 50; 8.6 TABLET ORAL at 09:29

## 2025-05-29 RX ADMIN — ROCURONIUM BROMIDE 70 MG: 10 INJECTION INTRAVENOUS at 17:11

## 2025-05-29 RX ADMIN — ROCURONIUM BROMIDE 20 MG: 10 INJECTION INTRAVENOUS at 18:39

## 2025-05-29 RX ADMIN — GABAPENTIN 300 MG: 300 CAPSULE ORAL at 09:30

## 2025-05-29 RX ADMIN — MIDAZOLAM HYDROCHLORIDE 2 MG: 2 INJECTION, SOLUTION INTRAMUSCULAR; INTRAVENOUS at 17:07

## 2025-05-29 RX ADMIN — OXYCODONE HYDROCHLORIDE 10 MG: 10 TABLET ORAL at 21:19

## 2025-05-29 RX ADMIN — CEFTRIAXONE SODIUM 2 G: 2 INJECTION, SOLUTION INTRAVENOUS at 13:04

## 2025-05-29 RX ADMIN — LIDOCAINE 4% 2 PATCH: 40 PATCH TOPICAL at 09:29

## 2025-05-29 RX ADMIN — PROPOFOL 100 MCG/KG/MIN: 10 INJECTION, EMULSION INTRAVENOUS at 17:30

## 2025-05-29 RX ADMIN — ACETAMINOPHEN 650 MG: 325 TABLET, FILM COATED ORAL at 05:45

## 2025-05-29 RX ADMIN — Medication 2 L/MIN: at 20:00

## 2025-05-29 RX ADMIN — OXYCODONE HYDROCHLORIDE 10 MG: 10 TABLET ORAL at 01:55

## 2025-05-29 RX ADMIN — ATORVASTATIN CALCIUM 80 MG: 80 TABLET, FILM COATED ORAL at 09:30

## 2025-05-29 RX ADMIN — OXYCODONE HYDROCHLORIDE 10 MG: 10 TABLET, FILM COATED, EXTENDED RELEASE ORAL at 09:30

## 2025-05-29 RX ADMIN — PANTOPRAZOLE SODIUM 40 MG: 40 TABLET, DELAYED RELEASE ORAL at 09:39

## 2025-05-29 RX ADMIN — TIOTROPIUM BROMIDE INHALATION SPRAY 2 PUFF: 3.12 SPRAY, METERED RESPIRATORY (INHALATION) at 10:12

## 2025-05-29 RX ADMIN — VANCOMYCIN HYDROCHLORIDE 1250 MG: 1.25 INJECTION, POWDER, LYOPHILIZED, FOR SOLUTION INTRAVENOUS at 09:31

## 2025-05-29 RX ADMIN — SUGAMMADEX 200 MG: 100 INJECTION, SOLUTION INTRAVENOUS at 19:18

## 2025-05-29 RX ADMIN — FENTANYL CITRATE 100 MCG: 50 INJECTION, SOLUTION INTRAMUSCULAR; INTRAVENOUS at 17:11

## 2025-05-29 RX ADMIN — ONDANSETRON 4 MG: 2 INJECTION INTRAMUSCULAR; INTRAVENOUS at 18:53

## 2025-05-29 RX ADMIN — LEVOTHYROXINE SODIUM 75 MCG: 0.07 TABLET ORAL at 09:30

## 2025-05-29 RX ADMIN — VANCOMYCIN HYDROCHLORIDE 1250 MG: 1.25 INJECTION, POWDER, LYOPHILIZED, FOR SOLUTION INTRAVENOUS at 21:19

## 2025-05-29 RX ADMIN — CYCLOBENZAPRINE 10 MG: 10 TABLET, FILM COATED ORAL at 09:30

## 2025-05-29 RX ADMIN — LIDOCAINE HYDROCHLORIDE 100 MG: 20 INJECTION INTRAVENOUS at 17:11

## 2025-05-29 RX ADMIN — Medication 25 MCG: at 09:30

## 2025-05-29 RX ADMIN — GADOTERATE MEGLUMINE 15 ML: 376.9 INJECTION INTRAVENOUS at 18:46

## 2025-05-29 RX ADMIN — OXYCODONE HYDROCHLORIDE 10 MG: 10 TABLET ORAL at 05:44

## 2025-05-29 RX ADMIN — GABAPENTIN 300 MG: 300 CAPSULE ORAL at 20:45

## 2025-05-29 RX ADMIN — CYCLOBENZAPRINE 10 MG: 10 TABLET, FILM COATED ORAL at 20:45

## 2025-05-29 RX ADMIN — ACETAMINOPHEN 650 MG: 325 TABLET, FILM COATED ORAL at 13:04

## 2025-05-29 RX ADMIN — PROPOFOL 200 MG: 10 INJECTION, EMULSION INTRAVENOUS at 17:11

## 2025-05-29 ASSESSMENT — PAIN SCALES - GENERAL
PAINLEVEL_OUTOF10: 8
PAINLEVEL_OUTOF10: 5 - MODERATE PAIN
PAINLEVEL_OUTOF10: 8
PAINLEVEL_OUTOF10: 7
PAINLEVEL_OUTOF10: 4
PAINLEVEL_OUTOF10: 0 - NO PAIN
PAINLEVEL_OUTOF10: 8
PAINLEVEL_OUTOF10: 6
PAINLEVEL_OUTOF10: 0 - NO PAIN

## 2025-05-29 ASSESSMENT — PAIN - FUNCTIONAL ASSESSMENT
PAIN_FUNCTIONAL_ASSESSMENT: 0-10

## 2025-05-29 NOTE — PROGRESS NOTES
"Miguel Lofton is a 69 y.o. male on day 9 of admission presenting with Osteomyelitis.    Subjective   Miguel is doing fine this morning, states he had no acute events overnight. MRI was still not done yesterday due to the machine breaking. Plan for MRI today. We discussed that ID will look into his abx course and see if changes can be made however they stated it is unlikely. Still admits to the same lower back pain, improves with pain meds. Denies chest pain, SOB, N/V/DC, fever, chills, s/sx of bleeding, abdominal pain, dysuria, saddle anesthesia. ROS: A complete review of systems was performed and is negative except for as mentioned above in the HPI        Objective     Physical Exam  Vitals reviewed.   Constitutional:       Appearance: Normal appearance.   HENT:      Head: Normocephalic.   Cardiovascular:      Rate and Rhythm: Normal rate and regular rhythm.      Pulses: Normal pulses.   Pulmonary:      Effort: Pulmonary effort is normal.   Abdominal:      Palpations: Abdomen is soft.   Skin:     Capillary Refill: Capillary refill takes less than 2 seconds.      Comments: Dressing with prevena c/d/i   Neurological:      General: No focal deficit present.      Mental Status: He is alert and oriented to person, place, and time.   Psychiatric:         Mood and Affect: Mood normal.         Last Recorded Vitals  Blood pressure 105/58, pulse 85, temperature 36 °C (96.8 °F), temperature source Temporal, resp. rate 18, height 1.727 m (5' 8\"), weight 73.4 kg (161 lb 13.1 oz), SpO2 93%.  Intake/Output last 3 Shifts:  I/O last 3 completed shifts:  In: 625 (8.5 mL/kg) [P.O.:325; IV Piggyback:300]  Out: 815 (11.1 mL/kg) [Urine:815 (0.5 mL/kg/hr)]  Weight: 73.4 kg     Relevant Results    This patient has a central line   Reason for the central line remaining today? IV atb  Assessment & Plan  Osteomyelitis  Miguel Lofton is a 69 y.o. male patient, with PMHx of prostate cancer, base of the tongue cancer, HTN, HLD, " hypothyroidism, COPD, anxiety who had a CT chest for annual lung cancer screening on 5/13 that showed possible compression fx of T5 and T6. Imaging was reviewed at office visit 5/19 and was advised he present to ED for MRI. He presented to [facility] 5/19 where MRI T spine (5/19) showed acute discitis/osteomyelitis at T5-6 with marked surrounding phlegmon and circumferential epidural phlegmon resulting in moderate to severe spinal canal stenosis. No epidural abscess. He was transferred to [facility] 5/20 for NSGY eval. NSGY consulted 5/20, pt taken to the OR 5/21 for T5-6 laminectomy and T3-8 fusion. ID consulted 5/21 and rec Vanc and Ceftriaxone (5/22- planned stop 7/16 for total 8 week atb course). OR surg path (5/22) +osteomyelitis. OR tissue/wound culture (5/22) neg, final. OR fungal culture (5/22) NGTD. PT/OT consulted 5/22 and rec home care. S/p 1unit PRBC 5/25 for Hgb 7.6 - hgb incremented appropriately. MRI L&C spine under anesthesia pending per neuro recs. DC pending improvement in pain and MRI C/L spine completion.     Updates 5/29:  - no changes, awaiting MRI with anesthesia, pt currently NPO  - ID recs pending for poss abx change from a cost standpoint     # Acute on chronic back pain   # Compression fracture   # Vertebral osteomyelitis  # Phlegmonous tissue c/f infection  - CT Chest (OS[facility]): considerable deformity of what is felt to be T5 which may be related to a compression fracture of the caudal endplate. There is some compression of the superior endplate of T6.  -  MRI T spine (5/19 at OSH): Acute discitis/osteomyelitis at T5-6 with marked surrounding phlegmon and circumferential epidural phlegmon resulting in moderate to severe spinal canal stenosis. No epidural abscess. Requested for image import from [facility]    - CRP 7.35, ESR 90 (5/20); CRP 5.94, ESR 74 (5/22)  - s/p OR 5/21/25 with neurosurgery for T5-6 laminectomy and T3-8 fusion. Per neurosurgery, phlegmon appeared infectious without  purulence  - OR surg path (5/22) +osteomyelitis, neg for carcinoma  - OR tissue/wound culture/ fungal culture 5/22) neg, final  - 5/20 Blood cultures x2 NG, Urine culture negative  - +MRSA nares 5/24  - Prevena removed POD7 with dressing (5/28)  - per neurosurgery; MRI C and L spine w/wo contrast pending, d/t claustrophobia will get with anesthesia  - ID consulted 5/21; rec Vanc and Ceftriaxone (5/22- planned stop 7/16) for total 8 week atb course)-- per ID please reengage if MRI has areas concerning for infection   - Reached out to ID (5/28) about abx cost for patient (approx ~$1000/week), ID states they will look into the case but most likely the abx course cannot change due to osteomyelitis   - Weekly CBC, CMP, CRP ordered outpt with results to be faxed to [phone] ATTN: Dr. Dyer once discharged per ID  - Supportive onc consulted 5/25 for uncontrolled pain, recs start ER Oxy 10mg BID and increased doris 300mg to BID, changed Miralax from daily to PRN, and Senna from 3tabs to 2tabs, added Zofran PRN  - PT/OT rec home care, PICC line placed 5/24     # Abdominal pain-- improved  - Reported abdominal pain above old PEG site on 5/22  - Likely 2/2 prone positioning after OR +/- developing ileus  - KUB (5/22): moderate colonic stool burden, possible post-op ileus  - having loose Bms now LBM 5/26, stopped lactulose. 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 Gamez--last seen  12/18/2024: laryngeal structures are noted for grossly normal appearance; true vocal cords, false vocal cords, remaining structures, including epiglottis, piriform sinuses and base of tongue are all grossly normal; there is no evidence of gross mass nodule, polyp, tumor or other defect   - Follows yearly with Dr. Lucero      # Prostate Ca  - s/p radical prostatectomy by Dr Lang Mcneil in 2018  - [id number], [place] 7, + right anterior margin  - PSMA-PET done on 12/6/2023 showed no PSMA uptake within the post-surgical bed; there is PSMA uptake of a left common iliac retroperitoneal lymph node SUV 5.6 and PSMA uptake of a right pelvic sidewall lymph node SUV 3.5  - Dr Rangel recommended radiation therapy. Patient instead went to [facility] and was treated by Dr Zackary Ely (4600 cGy in 23 fractions to the pelvis and LN followed by [place] to prostate bed and LN to 2500 cGy in 10 fractions)  - Since completing radiation, has been experiencing significant fatigue  - He is now following with urologist Dr. Boyd  - For pain management, follows with [facility] palliative care team   - PSA < 0.01 (2/19/25)--> PSA <0.10 (5/20)     # COPD  # Smoking Hx  # Lung Ca screening  - Follows with pulmonologist Dr Hutchison   - PFTs 03/2025: FEV1 72% FEV1/FVC 0.63  - Did not tolerate Breztri  - Continue home Spiriva 2 puffs daily     # HTN/HLD  - Continue home Metop Succs 25mg daily and Lipitor 80mg nightly  - ASA held per NSGY, can resume on POD7 (5/28) - now resumed     # Hypothyroidism  - Last TSH 4.49 (5/12)  - Continue home Synthroid 75mcg daily     # Anemia  - Likely anemia of chronic disease +/- post-op  - BL Hgb ~11 after cancer tx; Hgb 9.9 (5/20)--> 8.3 (5/22) post-op--> 8.1 (5/23)--> 7.6 (5/25)-->s/p 1 unit 5/25, hgb stable 8.7 (5/29)  - Ferritin 305, Folate 7.5, Iron 41, TIBC 269, %Sat 15, UIBC 228, Vit B12 1,342 (5/20)  - 5/25 Iron 30, TIBC 204, folate 3.9   - Stopped home Vit B12 sup 5/22 d/t elevated B12 level  - will hold off on  folic acid/iron replacement at this time given acute phase with post-op period, plan for outpatient monitoring of hgb and anemia labs/ongoing workup     DVT prophy: Lovenox, was held for neurosurgery and resumed 5/22 PM per ortho     DISPO:  - Full code, confirmed with patient on admission   - NOK: Eleni Lofton (wife) [phone]  - DC pending MRI   - NSGY FUV 6/9, Dr. Lucero (heme onc) FUV 8/8, NSGY FUV 8/11     I spent >60 minutes in the professional and overall care of this patient     Assessment and plan as above discussed with attending physician Dr. Pro Huertas PA-C

## 2025-05-29 NOTE — ANESTHESIA PROCEDURE NOTES
Airway  Date/Time: 5/29/2025 5:15 PM  Reason: elective    Airway not difficult    Staffing  Performed: ARABELLA   Authorized by: Huber Knutson MD    Performed by: ARABELLA Bowman  Patient location during procedure: OR    Patient Condition  Indications for airway management: anesthesia  Patient position: sniffing  Planned trial extubation  Sedation level: deep     Final Airway Details   Preoxygenated: yes  Final airway type: endotracheal airway  Successful airway: ETT  Cuffed: yes   Successful intubation technique: video laryngoscopy  Adjuncts used in placement: intubating stylet  Endotracheal tube insertion site: oral  Blade: Kan  Blade size: #4  ETT size (mm): 7.5  Cormack-Lehane Classification: grade I - full view of glottis  Placement verified by: capnometry   Measured from: lips  Number of attempts at approach: 1

## 2025-05-29 NOTE — ANESTHESIA POSTPROCEDURE EVALUATION
Patient: Miguel Lofton    Procedure Summary       Date: 05/29/25 Room / Location: [facility]    Anesthesia Start: 1707 Anesthesia Stop: 1938    Procedure: MR LUMBAR SPINE W AND WO CONTRAST Diagnosis: (osetomyelitis, s/p spine surgery on 5/22, MRI requested by neurosurg)    Scheduled Providers:  Responsible Provider: Huber Knutson MD    Anesthesia Type: general ASA Status: 3            Anesthesia Type: general    Vitals Value Taken Time   /72 05/29/25 19:38   Temp 36 05/29/25 19:38   Pulse 78 05/29/25 19:38   Resp 13 05/29/25 19:38   SpO2 100 05/29/25 19:38       Anesthesia Post Evaluation    Patient location during evaluation: PACU  Patient participation: complete - patient participated  Level of consciousness: awake  Pain management: adequate  Airway patency: patent  Cardiovascular status: acceptable  Respiratory status: acceptable  Hydration status: acceptable  Postoperative Nausea and Vomiting: none        No notable events documented.

## 2025-05-29 NOTE — DOCUMENTATION CLARIFICATION NOTE
"    PATIENT:               DONAVON NASSAR  ACCT #:                  [id number]  MRN:                       [id number]  :                       1955  ADMIT DATE:       2025 2:03 AM  DISCH DATE:  RESPONDING PROVIDER #:        [id number]          PROVIDER RESPONSE TEXT:    Osteomyelitis is unrelated to Diabetes    CDI QUERY TEXT:    Clarification    Instruction:    Based on your assessment of the patient and the clinical information, please provide the requested documentation by clicking on the appropriate radio button and enter any additional information if prompted.    Question: Please further clarify the relationship between DM and osteomyelitis.    When answering this query, please exercise your independent professional judgment. The fact that a question is being asked, does not imply that any particular answer is desired or expected.    The patient's clinical indicators include:  Clinical Information:  69 y.o. male transferred [place] [facility] [place] management of compression fracture and possible thoracic vertebral osteomyelitis.    Clinical Indicators:  -H&P noted \"Reports that he has been having back pain since 2025, it started in the middle of his back, irradiating bilaterally to the right and the left. Afterwards he started having progressive lower back pain. The pain started to irradiate to both of his legs. He started using a walker 4 weeks ago.  -CT chest on  reviewed today in the office showed possible compression fracture on T5 and T6, with perivertebral soft tissue prominence, MRI recommended to R/O osteomyelitis.  -MRI thoracic spine showed acute discitis/osteomyelitis at T5-6 with marked surrounding phlegmon and circumferential epidural phlegmon resulting in moderate to severe spinal canal stenosis. No epidural abscess.    Treatment:  -thoracic fusion  -Vancocin 1250mg IV  -Rocephin 2g IV    Risk Factors: compression fx of thoracic vertebra, spinal stenosis, DM, smoker  Options provided:  -- " Osteomyelitis is related to Diabetes  -- Osteomyelitis is unrelated to Diabetes  -- Other - I will add my own diagnosis  -- Refer to Clinical Documentation Reviewer    Query created by: Zoya Davis on 5/27/2025 4:01 PM      Electronically signed by:  NATALIYA VÁSQUEZ PA-C 5/29/2025 4:53 PM

## 2025-05-29 NOTE — PROGRESS NOTES
05/24/25 1100   Discharge Planning   Living Arrangements Spouse/significant other   Support Systems Spouse/significant other   Type of Residence Private residence   Number of Stairs to Enter Residence 2   Number of Stairs Within Residence 0   Do you have animals or pets at home? Yes   Type of Animals or Pets cats 3   Home or Post Acute Services None   Expected Discharge Disposition Home H   Financial Resource Strain   How hard is it for you to pay for the very basics like food, housing, medical care, and heating? Somewhat   Housing Stability   In the last 12 months, was there a time when you were not able to pay the mortgage or rent on time? N   In the past 12 months, how many times have you moved where you were living? 1   At any time in the past 12 months, were you homeless or living in a shelter (including now)? N   Transportation Needs   In the past 12 months, has lack of transportation kept you from medical appointments or from getting medications? no   In the past 12 months, has lack of transportation kept you from meetings, work, or from getting things needed for daily living? No     05/24/2025: Per Kari, patient now will need MRI of the Spine per Neurosurgery. Patient will also need a PICC line for IV abx therapy at home. Referral sent to [facility] for review. Per ID recs, patient wlill require abx therapy until 07/16/2025. ADOD will be on 05/27/2025. TCC will continue to follow for discharge needs. Josee TREJO TCC     05/29/2025: Patient to be on IV abx therapy until 07/16/2025. Patient was contemplating SNF vs HomeCare. Patient decided 6 weeks was too long for a SNF stay so he will go home on IV abx therapy. Patient also still needs MRI with Anesthesia prior to discharge. TCC to continue to follow for any additional needs. Josee TREJO TCC

## 2025-05-29 NOTE — CARE PLAN
Problem: Skin  Goal: Decreased wound size/increased tissue granulation at next dressing change  5/29/2025 1033 by Марина Ace RN  Outcome: Progressing  Flowsheets (Taken 5/29/2025 1033)  Decreased wound size/increased tissue granulation at next dressing change: Promote sleep for wound healing  5/29/2025 1033 by Марина Ace RN  Outcome: Progressing  Flowsheets (Taken 5/29/2025 1033)  Decreased wound size/increased tissue granulation at next dressing change: Promote sleep for wound healing  Goal: Participates in plan/prevention/treatment measures  5/29/2025 1033 by Марина Ace RN  Outcome: Progressing  5/29/2025 1033 by Марина Ace RN  Outcome: Progressing  Goal: Prevent/manage excess moisture  5/29/2025 1033 by Марина Ace RN  Outcome: Progressing  5/29/2025 1033 by Марина Ace RN  Outcome: Progressing  Goal: Prevent/minimize sheer/friction injuries  5/29/2025 1033 by Марина Ace RN  Outcome: Progressing  5/29/2025 1033 by Марина Ace RN  Outcome: Progressing  Goal: Promote/optimize nutrition  5/29/2025 1033 by Марина Ace RN  Outcome: Progressing  5/29/2025 1033 by Марина Ace RN  Outcome: Progressing  Goal: Promote skin healing  5/29/2025 1033 by Марина Ace RN  Outcome: Progressing  5/29/2025 1033 by Марина Ace RN  Outcome: Progressing     Problem: Fall/Injury  Goal: Not fall by end of shift  5/29/2025 1033 by Марина Ace RN  Outcome: Progressing  5/29/2025 1033 by Марина Ace RN  Outcome: Progressing  Goal: Be free from injury by end of the shift  5/29/2025 1033 by Марина Ace RN  Outcome: Progressing  5/29/2025 1033 by Марина Ace RN  Outcome: Progressing  Goal: Verbalize understanding of personal risk factors for fall in the hospital  5/29/2025 1033 by Марина Ace RN  Outcome: Progressing  5/29/2025 1033 by Марина Ace RN  Outcome: Progressing  Goal: Verbalize understanding of risk factor reduction measures to prevent injury from fall in the  home  5/29/2025 1033 by Марина Ace RN  Outcome: Progressing  5/29/2025 1033 by Марина Ace RN  Outcome: Progressing  Goal: Use assistive devices by end of the shift  5/29/2025 1033 by Марина Ace RN  Outcome: Progressing  5/29/2025 1033 by Марина Ace RN  Outcome: Progressing  Goal: Pace activities to prevent fatigue by end of the shift  5/29/2025 1033 by Марина Ace RN  Outcome: Progressing  5/29/2025 1033 by Марина Ace RN  Outcome: Progressing     Problem: Safety - Adult  Goal: Free from fall injury  5/29/2025 1033 by Марина Ace RN  Outcome: Progressing  5/29/2025 1033 by Марина Ace RN  Outcome: Progressing     Problem: Discharge Planning  Goal: Discharge to home or other facility with appropriate resources  5/29/2025 1033 by Марина Ace RN  Outcome: Progressing  5/29/2025 1033 by Марина Ace RN  Outcome: Progressing     Problem: Chronic Conditions and Co-morbidities  Goal: Patient's chronic conditions and co-morbidity symptoms are monitored and maintained or improved  5/29/2025 1033 by Марина Ace RN  Outcome: Progressing  5/29/2025 1033 by Марина Ace RN  Outcome: Progressing     Problem: Nutrition  Goal: Nutrient intake appropriate for maintaining nutritional needs  5/29/2025 1033 by Марина Ace RN  Outcome: Progressing  5/29/2025 1033 by Марина Ace RN  Outcome: Progressing     Problem: Pain  Goal: Takes deep breaths with improved pain control throughout the shift  5/29/2025 1033 by Марина Ace RN  Outcome: Progressing  5/29/2025 1033 by Марина Ace RN  Outcome: Progressing  Goal: Turns in bed with improved pain control throughout the shift  5/29/2025 1033 by Марина Ace RN  Outcome: Progressing  5/29/2025 1033 by Марина Ace RN  Outcome: Progressing  Goal: Walks with improved pain control throughout the shift  5/29/2025 1033 by Марина Ace RN  Outcome: Progressing  5/29/2025 1033 by Марина Ace RN  Outcome: Progressing  Goal: Performs ADL's with  improved pain control throughout shift  5/29/2025 1033 by Марина Ace RN  Outcome: Progressing  5/29/2025 1033 by Марина Ace RN  Outcome: Progressing  Goal: Participates in PT with improved pain control throughout the shift  5/29/2025 1033 by Марина Ace RN  Outcome: Progressing  5/29/2025 1033 by Марина Ace RN  Outcome: Progressing  Goal: Free from opioid side effects throughout the shift  5/29/2025 1033 by Марина Ace RN  Outcome: Progressing  5/29/2025 1033 by Марина Ace RN  Outcome: Progressing  Goal: Free from acute confusion related to pain meds throughout the shift  5/29/2025 1033 by Марина Ace RN  Outcome: Progressing  5/29/2025 1033 by Марина Ace RN  Outcome: Progressing   The patient's goals for the shift include Pain management    The clinical goals for the shift include pt will remain free of injury

## 2025-05-29 NOTE — ANESTHESIA PREPROCEDURE EVALUATION
Patient: Miguel Lofton    Procedure Information       Anesthesia Start Date/Time: 05/29/25 9495    Procedure: MR LUMBAR SPINE W AND WO CONTRAST    Location: [facility]            Relevant Problems   Cardiac   (+) Mixed hyperlipidemia      Pulmonary   (+) Chronic obstructive pulmonary disease (Multi)   (+) Pneumonia of right middle lobe due to infectious organism      Neuro   (+) Anxiety      GI   (+) Oropharyngeal dysphagia      /Renal   (+) Prostate cancer (Multi)      Liver   (+) Cancer of base of tongue (Multi)      Endocrine   (+) Primary hypothyroidism      HEENT   (+) Bilateral sensorineural hearing loss      ID   (+) Osteomyelitis   (+) Pneumonia of right middle lobe due to infectious organism       Clinical information reviewed:   Tobacco  Allergies  Meds   Med Hx  Surg Hx   Fam Hx  Soc Hx        NPO Detail:  No data recorded     Physical Exam    Airway  Mallampati: III     Cardiovascular - normal exam   Dental - normal exam     Pulmonary - normal exam   Abdominal - normal exam         Anesthesia Plan    History of general anesthesia?: yes  History of complications of general anesthesia?: no    ASA 3     general     intravenous induction   Trial extubation is planned.  Anesthetic plan and risks discussed with patient.    Plan discussed with CAA and attending.

## 2025-05-29 NOTE — CARE PLAN
The patient's goals for the shift include Pain management    The clinical goals for the shift include pt will remain free of injury    Problem: Skin  Goal: Decreased wound size/increased tissue granulation at next dressing change  Outcome: Progressing  Goal: Participates in plan/prevention/treatment measures  Outcome: Progressing  Goal: Prevent/manage excess moisture  Outcome: Progressing  Goal: Prevent/minimize sheer/friction injuries  Outcome: Progressing  Goal: Promote/optimize nutrition  Outcome: Progressing  Goal: Promote skin healing  Outcome: Progressing     Problem: Fall/Injury  Goal: Not fall by end of shift  Outcome: Progressing  Goal: Be free from injury by end of the shift  Outcome: Progressing  Goal: Verbalize understanding of personal risk factors for fall in the hospital  Outcome: Progressing  Goal: Verbalize understanding of risk factor reduction measures to prevent injury from fall in the home  Outcome: Progressing  Goal: Use assistive devices by end of the shift  Outcome: Progressing  Goal: Pace activities to prevent fatigue by end of the shift  Outcome: Progressing     Problem: Safety - Adult  Goal: Free from fall injury  Outcome: Progressing     Problem: Discharge Planning  Goal: Discharge to home or other facility with appropriate resources  Outcome: Progressing     Problem: Chronic Conditions and Co-morbidities  Goal: Patient's chronic conditions and co-morbidity symptoms are monitored and maintained or improved  Outcome: Progressing     Problem: Nutrition  Goal: Nutrient intake appropriate for maintaining nutritional needs  Outcome: Progressing     Problem: Pain  Goal: Takes deep breaths with improved pain control throughout the shift  Outcome: Progressing  Goal: Turns in bed with improved pain control throughout the shift  Outcome: Progressing  Goal: Walks with improved pain control throughout the shift  Outcome: Progressing  Goal: Performs ADL's with improved pain control throughout  shift  Outcome: Progressing  Goal: Participates in PT with improved pain control throughout the shift  Outcome: Progressing  Goal: Free from opioid side effects throughout the shift  Outcome: Progressing  Goal: Free from acute confusion related to pain meds throughout the shift  Outcome: Progressing

## 2025-05-29 NOTE — PROGRESS NOTES
Acupuncture Visit:     Miguel Lofton was referred by Yanira Hamilton CNP  .  Session Information  Discipline: Acupuncture  Session Start Time: 0145  Session End Time: 0147  Visit Type: Follow-up visit  Conflict of Service : Declined service  History of Present Illness: Cancer sx support  Additional Assessment Detail: Pt. met at bedside.  He declined IO service for the time being noting he is awaiting an MRI and decision on his lower back.    Pre-treatment Assessment  Unable to Assess Reason: Patient declined to answer              Provider reviewed plan for the acupuncture session, precautions and contraindications. Patient/guardian/hospital staff has given consent to treat with full understanding of what to expect during thesession. Before acupuncture began, provider explained to the patient to communicate at any time if the procedure was causing discomfort past their tolerance level. Patient agreed to advise acupuncturist. The acupuncturist counseled the patient on the risks of acupuncture treatment including pain, infection, bleeding, and no relief of pain. The patient was positioned comfortably. There was no evidence of infection at the site of needle insertions.       No annotated images are attached to the encounter.         Post-treatment Assessment  Unable to Assess Reason: Did not provide intervention    Evaluation/Recommendation/Follow-up  Total Session Time (min): 2 minutes      Massage Therapy / Acupuncture Note:

## 2025-05-29 NOTE — ASSESSMENT & PLAN NOTE
Miguel Lofton is a 69 y.o. male patient, with PMHx of prostate cancer, base of the tongue cancer, HTN, HLD, hypothyroidism, COPD, anxiety who had a CT chest for annual lung cancer screening on 5/13 that showed possible compression fx of T5 and T6. Imaging was reviewed at office visit 5/19 and was advised he present to ED for MRI. He presented to [facility] 5/19 where MRI T spine (5/19) showed acute discitis/osteomyelitis at T5-6 with marked surrounding phlegmon and circumferential epidural phlegmon resulting in moderate to severe spinal canal stenosis. No epidural abscess. He was transferred to [facility] 5/20 for NSGY eval. NSGY consulted 5/20, pt taken to the OR 5/21 for T5-6 laminectomy and T3-8 fusion. ID consulted 5/21 and rec Vanc and Ceftriaxone (5/22- planned stop 7/16 for total 8 week atb course). OR surg path (5/22) +osteomyelitis. OR tissue/wound culture (5/22) neg, final. OR fungal culture (5/22) NGTD. PT/OT consulted 5/22 and rec home care. S/p 1unit PRBC 5/25 for Hgb 7.6 - hgb incremented appropriately. MRI L&C spine under anesthesia pending per neuro recs. DC pending improvement in pain and MRI C/L spine completion.     Updates 5/29:  - no changes, awaiting MRI with anesthesia, pt currently NPO  - ID recs pending for poss abx change from a cost standpoint     # Acute on chronic back pain   # Compression fracture   # Vertebral osteomyelitis  # Phlegmonous tissue c/f infection  - CT Chest (OSH): considerable deformity of what is felt to be T5 which may be related to a compression fracture of the caudal endplate. There is some compression of the superior endplate of T6.  -  MRI T spine (5/19 at OSH): Acute discitis/osteomyelitis at T5-6 with marked surrounding phlegmon and circumferential epidural phlegmon resulting in moderate to severe spinal canal stenosis. No epidural abscess. Requested for image import from [facility]    - CRP 7.35, ESR 90 (5/20); CRP 5.94, ESR 74 (5/22)  - s/p OR 5/21/25 with  neurosurgery for T5-6 laminectomy and T3-8 fusion. Per neurosurgery, phlegmon appeared infectious without purulence  - OR surg path (5/22) +osteomyelitis, neg for carcinoma  - OR tissue/wound culture/ fungal culture 5/22) neg, final  - 5/20 Blood cultures x2 NG, Urine culture negative  - +MRSA nares 5/24  - Prevena removed POD7 with dressing (5/28)  - per neurosurgery; MRI C and L spine w/wo contrast pending, d/t claustrophobia will get with anesthesia  - ID consulted 5/21; rec Vanc and Ceftriaxone (5/22- planned stop 7/16) for total 8 week atb course)-- per ID please reengage if MRI has areas concerning for infection   - Reached out to ID (5/28) about abx cost for patient (approx ~$1000/week), ID states they will look into the case but most likely the abx course cannot change due to osteomyelitis   - Weekly CBC, CMP, CRP ordered outpt with results to be faxed to [phone] ATTN: Dr. Dyer once discharged per ID  - Supportive onc consulted 5/25 for uncontrolled pain, recs start ER Oxy 10mg BID and increased doris 300mg to BID, changed Miralax from daily to PRN, and Senna from 3tabs to 2tabs, added Zofran PRN  - PT/OT rec home care, PICC line placed 5/24     # Abdominal pain-- improved  - Reported abdominal pain above old PEG site on 5/22  - Likely 2/2 prone positioning after OR +/- developing ileus  - KUB (5/22): moderate colonic stool burden, possible post-op ileus  - having loose Bms now LBM 5/26, stopped lactulose. 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 Gamez--last seen 12/18/2024: laryngeal structures are noted for grossly normal appearance; true vocal cords, false vocal cords, remaining structures, including epiglottis, piriform sinuses and base of tongue are all grossly normal; there is no evidence of gross mass nodule, polyp, tumor or other defect   - Follows yearly with Dr. Lucero      # Prostate Ca  - s/p radical prostatectomy by Dr Lang Mcneil in 2018  - [id number], [place] 7, + right anterior margin  - PSMA-PET done on 12/6/2023 showed no PSMA uptake within the post-surgical bed; there is PSMA uptake of a left common iliac retroperitoneal lymph node SUV 5.6 and PSMA uptake of a right pelvic sidewall lymph node SUV 3.5  - Dr Rangel recommended radiation therapy. Patient instead went to [facility] and was treated by Dr Zackary Ely (4600 cGy in 23 fractions to the pelvis and LN followed by [place] to prostate bed and LN to 2500 cGy in 10 fractions)  - Since completing radiation, has been experiencing significant fatigue  - He is now following with urologist Dr. Boyd  - For pain management, follows with [facility] palliative care team   - PSA < 0.01 (2/19/25)--> PSA <0.10 (5/20)     # COPD  # Smoking Hx  # Lung Ca screening  - Follows with pulmonologist Dr Hutchison   - PFTs 03/2025: FEV1 72% FEV1/FVC 0.63  - Did not tolerate Breztri  - Continue home Spiriva 2 puffs daily     # HTN/HLD  - Continue home Metop Succs 25mg daily and Lipitor 80mg nightly  - ASA held per NSGY, can resume on POD7 (5/28) - now resumed     # Hypothyroidism  - Last TSH 4.49 (5/12)  - Continue home Synthroid 75mcg daily     # Anemia  - Likely anemia of chronic disease +/- post-op  - BL Hgb ~11 after cancer tx; Hgb 9.9 (5/20)--> 8.3 (5/22) post-op--> 8.1 (5/23)--> 7.6 (5/25)-->s/p 1 unit 5/25, hgb stable 8.7 (5/29)  - Ferritin 305, Folate 7.5, Iron 41, TIBC 269, %Sat 15, UIBC 228, Vit B12 1,342 (5/20)  - 5/25 Iron  30, TIBC 204, folate 3.9   - Stopped home Vit B12 sup 5/22 d/t elevated B12 level  - will hold off on folic acid/iron replacement at this time given acute phase with post-op period, plan for outpatient monitoring of hgb and anemia labs/ongoing workup     DVT prophy: Lovenox, was held for neurosurgery and resumed 5/22 PM per ortho     DISPO:  - Full code, confirmed with patient on admission   - NOK: Eleni Lofton (wife) [phone]  - DC pending MRI   - NSGY FUV 6/9, Dr. Lucero (heme onc) FUV 8/8, NSGY FUV 8/11     I spent >60 minutes in the professional and overall care of this patient     Assessment and plan as above discussed with attending physician Dr. Pro Valles

## 2025-05-30 LAB
ALBUMIN SERPL BCP-MCNC: 2.5 G/DL (ref 3.4–5)
ALP SERPL-CCNC: 131 U/L (ref 33–136)
ALT SERPL W P-5'-P-CCNC: 16 U/L (ref 10–52)
ANION GAP SERPL CALC-SCNC: 12 MMOL/L (ref 10–20)
AST SERPL W P-5'-P-CCNC: 18 U/L (ref 9–39)
BASOPHILS # BLD AUTO: 0.09 X10*3/UL (ref 0–0.1)
BASOPHILS NFR BLD AUTO: 1.2 %
BILIRUB SERPL-MCNC: 0.3 MG/DL (ref 0–1.2)
BUN SERPL-MCNC: 7 MG/DL (ref 6–23)
CALCIUM SERPL-MCNC: 7.9 MG/DL (ref 8.6–10.6)
CHLORIDE SERPL-SCNC: 102 MMOL/L (ref 98–107)
CO2 SERPL-SCNC: 30 MMOL/L (ref 21–32)
CREAT SERPL-MCNC: 0.56 MG/DL (ref 0.5–1.3)
EGFRCR SERPLBLD CKD-EPI 2021: >90 ML/MIN/1.73M*2
EOSINOPHIL # BLD AUTO: 0.74 X10*3/UL (ref 0–0.7)
EOSINOPHIL NFR BLD AUTO: 9.9 %
ERYTHROCYTE [DISTWIDTH] IN BLOOD BY AUTOMATED COUNT: 19.4 % (ref 11.5–14.5)
GLUCOSE SERPL-MCNC: 87 MG/DL (ref 74–99)
HCT VFR BLD AUTO: 28.8 % (ref 41–52)
HGB BLD-MCNC: 8.7 G/DL (ref 13.5–17.5)
IMM GRANULOCYTES # BLD AUTO: 0.05 X10*3/UL (ref 0–0.7)
IMM GRANULOCYTES NFR BLD AUTO: 0.7 % (ref 0–0.9)
LYMPHOCYTES # BLD AUTO: 0.84 X10*3/UL (ref 1.2–4.8)
LYMPHOCYTES NFR BLD AUTO: 11.3 %
MAGNESIUM SERPL-MCNC: 1.66 MG/DL (ref 1.6–2.4)
MCH RBC QN AUTO: 25.4 PG (ref 26–34)
MCHC RBC AUTO-ENTMCNC: 30.2 G/DL (ref 32–36)
MCV RBC AUTO: 84 FL (ref 80–100)
MONOCYTES # BLD AUTO: 1.07 X10*3/UL (ref 0.1–1)
MONOCYTES NFR BLD AUTO: 14.4 %
NEUTROPHILS # BLD AUTO: 4.66 X10*3/UL (ref 1.2–7.7)
NEUTROPHILS NFR BLD AUTO: 62.5 %
NRBC BLD-RTO: 0 /100 WBCS (ref 0–0)
PLATELET # BLD AUTO: 349 X10*3/UL (ref 150–450)
POTASSIUM SERPL-SCNC: 3.8 MMOL/L (ref 3.5–5.3)
PROT SERPL-MCNC: 4.9 G/DL (ref 6.4–8.2)
RBC # BLD AUTO: 3.42 X10*6/UL (ref 4.5–5.9)
SODIUM SERPL-SCNC: 140 MMOL/L (ref 136–145)
WBC # BLD AUTO: 7.5 X10*3/UL (ref 4.4–11.3)

## 2025-05-30 PROCEDURE — 2500000004 HC RX 250 GENERAL PHARMACY W/ HCPCS (ALT 636 FOR OP/ED): Mod: JZ | Performed by: NURSE PRACTITIONER

## 2025-05-30 PROCEDURE — 94640 AIRWAY INHALATION TREATMENT: CPT

## 2025-05-30 PROCEDURE — 99233 SBSQ HOSP IP/OBS HIGH 50: CPT | Performed by: INTERNAL MEDICINE

## 2025-05-30 PROCEDURE — 2500000002 HC RX 250 W HCPCS SELF ADMINISTERED DRUGS (ALT 637 FOR MEDICARE OP, ALT 636 FOR OP/ED): Performed by: STUDENT IN AN ORGANIZED HEALTH CARE EDUCATION/TRAINING PROGRAM

## 2025-05-30 PROCEDURE — 2500000001 HC RX 250 WO HCPCS SELF ADMINISTERED DRUGS (ALT 637 FOR MEDICARE OP): Performed by: STUDENT IN AN ORGANIZED HEALTH CARE EDUCATION/TRAINING PROGRAM

## 2025-05-30 PROCEDURE — 99233 SBSQ HOSP IP/OBS HIGH 50: CPT | Performed by: NURSE PRACTITIONER

## 2025-05-30 PROCEDURE — 2500000005 HC RX 250 GENERAL PHARMACY W/O HCPCS: Performed by: STUDENT IN AN ORGANIZED HEALTH CARE EDUCATION/TRAINING PROGRAM

## 2025-05-30 PROCEDURE — 2500000001 HC RX 250 WO HCPCS SELF ADMINISTERED DRUGS (ALT 637 FOR MEDICARE OP): Performed by: NURSE PRACTITIONER

## 2025-05-30 PROCEDURE — 2500000001 HC RX 250 WO HCPCS SELF ADMINISTERED DRUGS (ALT 637 FOR MEDICARE OP)

## 2025-05-30 PROCEDURE — 2500000001 HC RX 250 WO HCPCS SELF ADMINISTERED DRUGS (ALT 637 FOR MEDICARE OP): Performed by: PHYSICIAN ASSISTANT

## 2025-05-30 PROCEDURE — 83735 ASSAY OF MAGNESIUM: CPT | Performed by: NURSE PRACTITIONER

## 2025-05-30 PROCEDURE — 2500000004 HC RX 250 GENERAL PHARMACY W/ HCPCS (ALT 636 FOR OP/ED)

## 2025-05-30 PROCEDURE — 2500000005 HC RX 250 GENERAL PHARMACY W/O HCPCS

## 2025-05-30 PROCEDURE — 85025 COMPLETE CBC W/AUTO DIFF WBC: CPT | Performed by: NURSE PRACTITIONER

## 2025-05-30 PROCEDURE — 80053 COMPREHEN METABOLIC PANEL: CPT | Performed by: NURSE PRACTITIONER

## 2025-05-30 PROCEDURE — 1170000001 HC PRIVATE ONCOLOGY ROOM DAILY

## 2025-05-30 PROCEDURE — 99233 SBSQ HOSP IP/OBS HIGH 50: CPT | Performed by: PHYSICIAN ASSISTANT

## 2025-05-30 RX ORDER — GABAPENTIN 300 MG/1
300 CAPSULE ORAL 3 TIMES DAILY
Status: DISCONTINUED | OUTPATIENT
Start: 2025-05-30 | End: 2025-06-08 | Stop reason: HOSPADM

## 2025-05-30 RX ADMIN — VANCOMYCIN HYDROCHLORIDE 1250 MG: 1.25 INJECTION, POWDER, LYOPHILIZED, FOR SOLUTION INTRAVENOUS at 10:49

## 2025-05-30 RX ADMIN — LORAZEPAM 1 MG: 1 TABLET ORAL at 00:04

## 2025-05-30 RX ADMIN — CYCLOBENZAPRINE 10 MG: 10 TABLET, FILM COATED ORAL at 14:05

## 2025-05-30 RX ADMIN — OXYCODONE HYDROCHLORIDE 10 MG: 10 TABLET ORAL at 01:25

## 2025-05-30 RX ADMIN — ENOXAPARIN SODIUM 40 MG: 100 INJECTION SUBCUTANEOUS at 14:05

## 2025-05-30 RX ADMIN — Medication 25 MCG: at 09:24

## 2025-05-30 RX ADMIN — GABAPENTIN 300 MG: 300 CAPSULE ORAL at 15:49

## 2025-05-30 RX ADMIN — CEFTRIAXONE SODIUM 2 G: 2 INJECTION, SOLUTION INTRAVENOUS at 09:22

## 2025-05-30 RX ADMIN — METOPROLOL SUCCINATE 25 MG: 25 TABLET, EXTENDED RELEASE ORAL at 09:24

## 2025-05-30 RX ADMIN — OXYCODONE HYDROCHLORIDE 10 MG: 10 TABLET, FILM COATED, EXTENDED RELEASE ORAL at 09:24

## 2025-05-30 RX ADMIN — SENNOSIDES AND DOCUSATE SODIUM 2 TABLET: 50; 8.6 TABLET ORAL at 09:24

## 2025-05-30 RX ADMIN — CYCLOBENZAPRINE 10 MG: 10 TABLET, FILM COATED ORAL at 21:07

## 2025-05-30 RX ADMIN — ACETAMINOPHEN 650 MG: 325 TABLET, FILM COATED ORAL at 05:49

## 2025-05-30 RX ADMIN — OXYCODONE HYDROCHLORIDE 10 MG: 10 TABLET ORAL at 10:49

## 2025-05-30 RX ADMIN — TIOTROPIUM BROMIDE INHALATION SPRAY 2 PUFF: 3.12 SPRAY, METERED RESPIRATORY (INHALATION) at 08:59

## 2025-05-30 RX ADMIN — PANTOPRAZOLE SODIUM 40 MG: 40 TABLET, DELAYED RELEASE ORAL at 09:40

## 2025-05-30 RX ADMIN — LEVOTHYROXINE SODIUM 75 MCG: 0.07 TABLET ORAL at 09:24

## 2025-05-30 RX ADMIN — GABAPENTIN 300 MG: 300 CAPSULE ORAL at 21:07

## 2025-05-30 RX ADMIN — Medication 2 L/MIN: at 21:10

## 2025-05-30 RX ADMIN — SENNOSIDES AND DOCUSATE SODIUM 2 TABLET: 50; 8.6 TABLET ORAL at 21:07

## 2025-05-30 RX ADMIN — VANCOMYCIN HYDROCHLORIDE 1250 MG: 1.25 INJECTION, POWDER, LYOPHILIZED, FOR SOLUTION INTRAVENOUS at 22:42

## 2025-05-30 RX ADMIN — LIDOCAINE 4% 2 PATCH: 40 PATCH TOPICAL at 09:22

## 2025-05-30 RX ADMIN — ACETAMINOPHEN 650 MG: 325 TABLET, FILM COATED ORAL at 01:25

## 2025-05-30 RX ADMIN — CYCLOBENZAPRINE 10 MG: 10 TABLET, FILM COATED ORAL at 09:24

## 2025-05-30 RX ADMIN — LORAZEPAM 1 MG: 1 TABLET ORAL at 22:42

## 2025-05-30 RX ADMIN — ACETAMINOPHEN 650 MG: 325 TABLET, FILM COATED ORAL at 09:23

## 2025-05-30 RX ADMIN — ACETAMINOPHEN 650 MG: 325 TABLET, FILM COATED ORAL at 17:39

## 2025-05-30 RX ADMIN — ACETAMINOPHEN 650 MG: 325 TABLET, FILM COATED ORAL at 14:05

## 2025-05-30 RX ADMIN — GABAPENTIN 300 MG: 300 CAPSULE ORAL at 09:24

## 2025-05-30 RX ADMIN — OXYCODONE HYDROCHLORIDE 10 MG: 10 TABLET ORAL at 05:49

## 2025-05-30 RX ADMIN — ACETAMINOPHEN 650 MG: 325 TABLET, FILM COATED ORAL at 22:42

## 2025-05-30 RX ADMIN — ASPIRIN 81 MG: 81 TABLET, COATED ORAL at 09:24

## 2025-05-30 RX ADMIN — ATORVASTATIN CALCIUM 80 MG: 80 TABLET, FILM COATED ORAL at 09:24

## 2025-05-30 RX ADMIN — OXYCODONE HYDROCHLORIDE 10 MG: 10 TABLET ORAL at 17:39

## 2025-05-30 RX ADMIN — OXYCODONE HYDROCHLORIDE 10 MG: 10 TABLET, FILM COATED, EXTENDED RELEASE ORAL at 21:07

## 2025-05-30 RX ADMIN — Medication 2 L/MIN: at 09:35

## 2025-05-30 ASSESSMENT — COGNITIVE AND FUNCTIONAL STATUS - GENERAL
MOVING TO AND FROM BED TO CHAIR: A LITTLE
TOILETING: A LITTLE
STANDING UP FROM CHAIR USING ARMS: A LITTLE
MOBILITY SCORE: 18
TURNING FROM BACK TO SIDE WHILE IN FLAT BAD: A LITTLE
DRESSING REGULAR LOWER BODY CLOTHING: A LOT
HELP NEEDED FOR BATHING: A LOT
WALKING IN HOSPITAL ROOM: A LITTLE
DRESSING REGULAR UPPER BODY CLOTHING: A LITTLE
DRESSING REGULAR LOWER BODY CLOTHING: A LOT
DAILY ACTIVITIY SCORE: 17
TOILETING: A LOT
TURNING FROM BACK TO SIDE WHILE IN FLAT BAD: A LITTLE
DAILY ACTIVITIY SCORE: 18
MOVING TO AND FROM BED TO CHAIR: A LITTLE
WALKING IN HOSPITAL ROOM: A LITTLE
DRESSING REGULAR UPPER BODY CLOTHING: A LITTLE
HELP NEEDED FOR BATHING: A LOT
STANDING UP FROM CHAIR USING ARMS: A LITTLE
CLIMB 3 TO 5 STEPS WITH RAILING: A LOT
CLIMB 3 TO 5 STEPS WITH RAILING: A LOT
MOBILITY SCORE: 18

## 2025-05-30 ASSESSMENT — PAIN SCALES - GENERAL
PAINLEVEL_OUTOF10: 8
PAINLEVEL_OUTOF10: 6
PAINLEVEL_OUTOF10: 5 - MODERATE PAIN
PAINLEVEL_OUTOF10: 8
PAINLEVEL_OUTOF10: 8
PAINLEVEL_OUTOF10: 6
PAINLEVEL_OUTOF10: 8
PAINLEVEL_OUTOF10: 8

## 2025-05-30 ASSESSMENT — PAIN - FUNCTIONAL ASSESSMENT
PAIN_FUNCTIONAL_ASSESSMENT: 0-10

## 2025-05-30 NOTE — PROGRESS NOTES
SUPPORTIVE AND PALLIATIVE ONCOLOGY INPATIENT FOLLOW-UP    SERVICE DATE: 5/27/25    Updates 05/30/25, recommended changes are bolded below:  Pain suboptimally-controlled, increase gabapentin to TID  Plan for MRI results pending    ASSESSMENT/PLAN:  Miguel Lofton is a 69 y.o. male diagnosed with compresson fractures of T5 and T6 during annual chest CT screening for lung cancer on 5/13. PMH significant for prostate cancer, base of tongue cancer, HTN, HLD, COPD, hypothyroidism, and anxiety. Admitted 5/20/2025 as a transfer from [facility] for further evaluation and management of compression fractures. S/p T5-T6 laminectomy and T3-T8 fusion on 5/21 with NSGY.  Course complicated by discitis osteomyelitis per OR surgical pathology. Supportive and Palliative Oncology is consulted for pain management.     Symptom Management Plan:  Recommended changes are bolded     Pain:  Acute back pain related to compression fractures T5-6, discitis osteomyelitis thoracic and lumbar L2-3 with extension to iliopsoas muscle, left iliopsoas abscess, somatic and neuropathic, well controlled  S/p T5-T6 laminectomy and T3-T8 fusion on 5/21 pathology showing osteomyelitis  Home regimen:  Vicodin 5/325 po q 4 hrs prn  Intolerances/previously tried: Meperidine  Risk factors:  none  Renal function WNL and Hepatic function WNL  Continue acetaminophen 650 mg po q 4 hrs scheduled  Increase gabapentin 300 mg po to TID to optimize neuropathic coverage  Continue oxycodone IR 5 mg po q 4 hrs prn for moderate pain  Continue oxycodone IR 10 mg po q 4 hrs prn for severe pain   Can increase frequency to q 3 hrs prn if 4 hours does not last  Continue hydromorphone 0.2 mg IV q 3 hrs prn for breakthrough pain  Can increase to 0.4 mg q 3 hrs prn if dose above is ineffective  Recommend discontinuing 24 hrs prior to expected discharge  Continue oxycodone ER 10 mg po bid due to chronic nature of pain (started 5/25)  Continue cyclobenzaprine 10 mg po  tid  Continue lidocaine 4% TD patches x 2 daily     Nausea:  At risk for nausea without vomiting related to opioids and anxiety , pain  Home regimen:  none  QTc:  within normal limits  Well-controlled  Continue ondansetron 4 mg IV/PO/ODT q 6 hrs prn nausea first line     Constipation  At risk for constipation related to medication side effects (including opioids), decreased oral intake, and prolonged immobility in the setting of hospitalization , currently not constipated  Home regimen: none  LBM 5/29/25  Continue Miralax 17 g po daily prn  Continue sennosides-docusate 2 tablets PO BID   Goal to have BM without straining q48-72h, adjust regimen as needed     Altered Mood:  Chronic anxiety related to health concerns   controlled with home regimen   Home regimen: lorazepam 1 mg q hs prn  Currently well controlled  Continue lorazepam 1 mg po q hs prn     Sleeping Difficulty:  Impaired sleep related to pain, anxiety, and hospital environment  Home regimen:  lorazepam  Reports sleeping well now that pain has improved with current regimen  See pain and anxiety recs     Disposition:  Please start the process of having prior authorization with meds to beds deliver medications to patient prior to discharge via [facility] pharmacy. Prescriptions will need to be sent 48-72 hours prior to discharge so that a prior authorization can be completed.   Prior auth completed and approved for Oxy ER.      Discharge date: unknown pending acute issues and pain control  Will assess if patient needs an appointment with Outpatient Supportive Oncology as appropriate-last seen by Dagmar CORREA 10/2022; per chart review now follows with pall care team at [facility]     SIGNATURE: MADELINE Nicole, DNP   PAGER/CONTACT:  Contact information:  Supportive and Palliative Oncology  Monday-Friday 8 AM-5 PM  Epic Secure chat or pager [phone].  After hours and weekends:  pager  [phone]  =================================================================  SUBJECTIVE:  Interval Events:  MRI with anesthesia 5/29 showing osteomyelitis L2-3, infection extending into bilateral iliopsoas muscle L>R with large abscess left iliopsoas muscle.   Pt reports pain continues to affect his mobility, sharp radiating pains lower back to thighs    Pain Assessment:  Location: mid to lower back radiates down anterior thighs to knee level bilaterally  Duration: Constant  Characteristics:   Rating: mild at rest, severe with sitting, standing   Descriptors: stretching fullness, sharp, burning, and shooting   Aggravating: movement    Relieving: Analgesics Oxycodone IR and ER, Positioning, and Modifying activity   Interference with Function: Somewhat    Opioid Requirements  Past 24 h opioid requirements (5/29/25 at 0800 to 05/30/25 at 0800):   Oxycodone ER 10 mg PO x 2 doses = 20 mg = 25 OME  Oxycodone IR 10 mg PO x 3 doses = 30 mg = 37.5 OME    Total 24h OME use:  62.5    Symptom Assessment:  Nausea none  Constipation none  Diarrhea none  Difficulty Sleeping none    Information obtained from: chart review, interview of patient, and discussion with primary team  _________________________________________________________________   OBJECTIVE:  Lab Results   Component Value Date    WBC 7.5 05/30/2025    HGB 8.7 (L) 05/30/2025    HCT 28.8 (L) 05/30/2025    MCV 84 05/30/2025     05/30/2025     Lab Results   Component Value Date    GLUCOSE 87 05/30/2025    CALCIUM 7.9 (L) 05/30/2025     05/30/2025    K 3.8 05/30/2025    CO2 30 05/30/2025     05/30/2025    BUN 7 05/30/2025    CREATININE 0.56 05/30/2025     Lab Results   Component Value Date    ALT 16 05/30/2025    AST 18 05/30/2025    ALKPHOS 131 05/30/2025    BILITOT 0.3 05/30/2025     Estimated Creatinine Clearance: 120.4 mL/min (by C-G formula based on SCr of 0.56 mg/dL).    Scheduled medications   Scheduled Medications[1]  Continuous  medications  Continuous Medications[2]  PRN medications  alteplase, 2 mg, PRN  bisacodyl, 10 mg, Daily PRN  HYDROmorphone, 0.2 mg, q3h PRN  LORazepam, 1 mg, Nightly PRN  ondansetron, 4 mg, q6h PRN  oxyCODONE, 10 mg, q4h PRN  oxyCODONE, 5 mg, q4h PRN  polyethylene glycol, 17 g, Daily PRN  simethicone, 80 mg, 4x daily PRN  vancomycin, , Daily PRN         PHYSICAL EXAMINATION:  Vital Signs:   Vital signs reviewed  Visit Vitals  BP 97/62 (BP Location: Right arm, Patient Position: Lying)   Pulse 80   Temp 35.9 °C (96.6 °F) (Temporal)   Resp 16      0-10 (Numeric) Pain Score: 8     Physical Exam  Vitals reviewed.   Constitutional:       General: He is not in acute distress.     Appearance: He is ill-appearing.   HENT:      Head: Normocephalic and atraumatic.      Mouth/Throat:      Mouth: Mucous membranes are moist.   Eyes:      Pupils: Pupils are equal, round, and reactive to light.   Pulmonary:      Effort: Pulmonary effort is normal. No respiratory distress.   Abdominal:      General: There is no distension.   Musculoskeletal:      Right lower leg: No edema.      Left lower leg: No edema.   Skin:     Coloration: Skin is pale.   Neurological:      Mental Status: He is alert and oriented to person, place, and time.   Psychiatric:         Mood and Affect: Mood normal.         Behavior: Behavior normal.         Thought Content: Thought content normal.         Judgment: Judgment normal.       PALLIATIVE CARE ENCOUNTER:  Supportive and Palliative Oncology encounter:  Spoke with patient at bedside  Emotional support provided  Coordination of care:  medication evaluation and management    Medical Decision Making/Goals of Care/Advance Care Planning:  DOUG Tapia CNP 5/26/25  Patient's current clinical condition, including diagnosis, prognosis, and management plan, and goals of care were discussed.   Oncologic diagnosis: compression fractures in setting of hx of prostate cancer, base of tongue cancer,  Family: Supportive wife,  "kids, grandkids  Unable to assess at time of consult d/t sleeping:  Performance status: Moderate impact on functional status secondary to pain   Joys/meaning/strength: family, luz elena, independence  Understanding of health: understands need to await pathology results to determine if compression fracture related to malignancy, need for 8 weeks of IV ATB for osteomyelitis  Information:Wants full disclosure  Goals: to get better, to feel better, and work toward treatment for \"whatever this is causing all of this\" and gain strength  Worries and fears now and future: that this is related to cancer; uncontrolled pain  Minimum acceptable outcome/QOL: to gain strength and independence  Code status discussion:  Full code (per chart review and discussion w/primary team)     Advance Directives  Existence of Advance Directives:None  Decision maker: Surrogate decision maker is wife Eleni Lofton [phone]   =================================================================  Signature and billing:  Medical complexity was high level due to due to complexity of problems, extensive data review, and high risk of management/treatment.    Data:   Diagnostic tests and information reviewed for today's visit:  Conversation with primary team, Most recent labs and imaging results, Medications    Some elements copied from DOUG Tapia CNP note on 5/27/25, the elements have been updated and all reflect current decision making from today, 5/30/25    Plan of Care discussed with: Provider, Patient    Thank you for asking Supportive and Palliative Oncology to assist with care of this patient.  Recommendations will be communicated back to the consulting service by way of shared electronic medical record/secure chat/email or face-to-face.   We will continue to follow  Please contact us for additional questions or concerns.    SIGNATURE: CHRISTINA Nicole-CNP, DNP   PAGER/CONTACT:  Contact information:  Supportive and Palliative Oncology  Monday-Friday " 8 AM-5 PM  Epic Secure chat or pager [phone].  After hours and weekends:  pager [phone]             [1] acetaminophen, 650 mg, oral, q4h  aspirin, 81 mg, oral, Daily  atorvastatin, 80 mg, oral, Daily  cefTRIAXone, 2 g, intravenous, q24h  cholecalciferol, 25 mcg, oral, Daily  cyclobenzaprine, 10 mg, oral, TID  enoxaparin, 40 mg, subcutaneous, q24h  gabapentin, 300 mg, oral, BID  levothyroxine, 75 mcg, oral, Daily  lidocaine, 0.1 mL, subcutaneous, Once  lidocaine, 5 mL, infiltration, Once  lidocaine, 2 patch, transdermal, Daily  metoprolol succinate XL, 25 mg, oral, Daily  oxyCODONE ER, 10 mg, oral, q12h ANA  oxygen, , inhalation, Continuous - Inhalation  pantoprazole, 40 mg, oral, Daily before breakfast  sennosides-docusate sodium, 2 tablet, oral, BID  tiotropium, 2 puff, inhalation, Daily  vancomycin, 1,250 mg, intravenous, q12h     [2]

## 2025-05-30 NOTE — PROGRESS NOTES
"Miguel Lofton is a 69 y.o. male on day 10 of admission presenting with Osteomyelitis.    Subjective   Since last seen by neurosurgery, low back pain with pain shooting down the front of the thighs bilaterally, worse in the right, has continued. Patient is unable to walk, and can only tolerate standing for a short period    Objective     Physical Exam  Awake Ox3  RUE D5 B5 T5 HG/IO 5  RLE HF4+ KE5 DF/PF 5  LUE D5 B5 T5 HG/IO 5  LLE HF4 KE5 DF/PF 5  Abd non tender  Spine incision is clean, dry, intact, healing well    Last Recorded Vitals  Blood pressure 97/62, pulse 80, temperature 35.9 °C (96.6 °F), temperature source Temporal, resp. rate 16, height 1.727 m (5' 8\"), weight 73.4 kg (161 lb 13.1 oz), SpO2 95%.  Intake/Output last 3 Shifts:  I/O last 3 completed shifts:  In: 1635 (22.3 mL/kg) [P.O.:335; IV Piggyback:1300]  Out: 1715 (23.4 mL/kg) [Urine:1715 (0.6 mL/kg/hr)]  Weight: 73.4 kg     Relevant Results  Lab Results   Component Value Date    WBC 7.5 05/30/2025    HGB 8.7 (L) 05/30/2025    HCT 28.8 (L) 05/30/2025    MCV 84 05/30/2025     05/30/2025     Lab Results   Component Value Date    GLUCOSE 87 05/30/2025    CALCIUM 7.9 (L) 05/30/2025     05/30/2025    K 3.8 05/30/2025    CO2 30 05/30/2025     05/30/2025    BUN 7 05/30/2025    CREATININE 0.56 05/30/2025       Assessment & Plan  Osteomyelitis    Miguel Lofton is a 69 year old male with h/o HTN,  HLD, CAD (on ASA), hypoT, COPD, anxiety, LBP (annual radioablations), prostate cancer (s/p prostatectomy 2018), base of tongue cancer (dx 2022, s/p radiation/chemo, in remission), diastolic CHF, p/w mid back pain x4 months, low back pain w BLE radiculopathy and weakness x3 weeks, 5/13 CT chest T5 compression fx, MRI TS T5-T6 compression fx w disc/osteo, epidural phlegmon w moderate canal stenosis   5/21 s/p T5-6 transpedicular decompression, T3-8 fusion, OR cx neg, 5/25 drain dc'd, 5/30 MRI C/L Spine moderate stenosis at C4-5, C5-6, L2-3 disc/osteo " w ventral phlegmon w moderate canal stenosis    Rommel primary  Please hold ASA  Please obtain lumbar xrays in multiple views with patient upright  Will discuss possible surgical intervention vs medical management of lumbar findings  Vanc, CTX per ID  SCDs, LVX          Puma Daniels MD

## 2025-05-30 NOTE — PROGRESS NOTES
Miguel Lofton is a 69 y.o. male on day 10 of admission presenting with Osteomyelitis.    Subjective   Interval History:   Last seen by the infectious disease service on 5/24/2025 for his acute discitis/osteomyelitis of T5-T6 status post T5-6 laminectomy and T3-T8 fusion.  His last surgical procedure pressure was on 5/21/2025.  All operative cultures were negative and a broad range PCR remains pending.  He was recommended a 6 to 8-week course of IV vancomycin and ceftriaxone empirically as all cultures have remained negative.  He has been having continued low back pain with increasingly difficult ability to stand for any period of time.  An MRI was performed to assess the source of his continued pain despite surgical debridement and he was noted to have bilateral iliopsoas muscle abscesses as well as extension of his infection.    He was seen today by neurosurgery who are planning to take him back to the operating room.  They stated that they will not be draining his iliopsoas abscesses thus IR was recommended.    He indicates that his pain has made it increasingly difficult to stand for any period of time.  He denies any issues with bowel or bladder habits.          Review of Systems    Objective   Range of Vitals (last 24 hours)  Heart Rate:  [65-80]   Temp:  [35.9 °C (96.6 °F)-36.9 °C (98.4 °F)]   Resp:  [10-16]   BP: ()/(61-72)   SpO2:  [93 %-100 %]   Daily Weight  05/21/25 : 73.4 kg (161 lb 13.1 oz)    Body mass index is 24.6 kg/m².    Physical Exam  CONSTITUTIONAL:  Chronically ill appearing, NAD, cooperative  SKIN:  No rashes or lesions.  EYES: PERRLA, EOMI, Sclera anicteric.  No conjunctival injection.  No petechial  or embolic lesions  ENMT: MMM, No oral lesions or thrush. Dentition in good repair.  No pharyngeal erythema  CVS: RRR, S1 & S2 normal.   Pedal pulses intact.  RESPIRATORY/CHEST: Clear in all lung fields.  No rales or rhonchi  GI: Soft, NT/ND.  No palpable hepatosplenomegaly.  No rebound or  guarding  EXT: No CCE.     Antibiotics  cefTRIAXone - 2 gram/50 mL, 2 gram/50 mL  vancomycin - 1250 mg/250 mL  VANCOMYCIN IVPB 250 ML NS 90 MIN    Relevant Results  Labs  Results from last 72 hours   Lab Units 05/30/25  0554 05/29/25  0545 05/28/25  0743   WBC AUTO x10*3/uL 7.5 8.1 6.7   HEMOGLOBIN g/dL 8.7* 8.7* 8.5*   HEMATOCRIT % 28.8* 29.5* 28.2*   PLATELETS AUTO x10*3/uL 349 378 360   NEUTROS PCT AUTO % 62.5 64.6 60.2   LYMPHS PCT AUTO % 11.3 12.3 14.2   MONOS PCT AUTO % 14.4 12.1 14.6   EOS PCT AUTO % 9.9 8.9 8.5     Results from last 72 hours   Lab Units 05/30/25  0554 05/29/25  0545 05/28/25  0743   SODIUM mmol/L 140 137 139   POTASSIUM mmol/L 3.8 3.8 3.7   CHLORIDE mmol/L 102 101 102   CO2 mmol/L 30 29 30   BUN mg/dL 7 6 7   CREATININE mg/dL 0.56 0.47* 0.46*   GLUCOSE mg/dL 87 91 86   CALCIUM mg/dL 7.9* 8.0* 7.9*   ANION GAP mmol/L 12 11 11   EGFR mL/min/1.73m*2 >90 >90 >90     Results from last 72 hours   Lab Units 05/30/25  0554 05/29/25  0545 05/28/25  0743   ALK PHOS U/L 131 125 122   BILIRUBIN TOTAL mg/dL 0.3 0.4 0.3   PROTEIN TOTAL g/dL 4.9* 5.2* 5.1*   ALT U/L 16 18 16   AST U/L 18 18 16   ALBUMIN g/dL 2.5* 2.5* 2.4*     Estimated Creatinine Clearance: 120.4 mL/min (by C-G formula based on SCr of 0.56 mg/dL).    C-Reactive Protein   Date Value Ref Range Status   05/22/2025 5.94 (H) <1.00 mg/dL Final   05/20/2025 7.35 (H) <1.00 mg/dL Final     Microbiology  Susceptibility data from last 14 days.  Collected Specimen Info Organism   05/24/25 Swab from Anterior Nares Methicillin Resistant Staphylococcus aureus (MRSA)     5/21/25:  BROAD RANGE PCR collected 5/21/25. Submitted 5/23/25: Result pending      Imaging  MR lumbar spine w and wo IV contrast   5/30/25  IMPRESSION:  Discitis osteomyelitis at L2-3 with involvement of the right pedicle  of L3 and right L2-3 facet joint. Epidural thickening ventral to the  thecal sac at L2 and L3 may represent a combination of prominent  venous plexus and epidural  phlegmon.      Extension of infection into the left-greater-than-right iliopsoas  muscles. Large abscess within the left iliopsoas muscle measuring up  to 9.6 cm in craniocaudal dimension. No evidence of intrathecal  extension of infection.      Enhancement of the inferior endplate of L1 is thought to be reactive  rather than infectious. Attention on follow-up imaging recommended. I  personally reviewed the images/study and agree with the findings as  stated by Tuut Cuevas MD (Resident Physician). This study was  interpreted at [facility],  [place], [place].     Assessment/Plan   69 y.o. male with PMHx of HTN, DM, hyothyroidism, COPD, prostate CA s/p radical prostatectomy 2018 and radiation and tongue CA 2022 s/p chemoradiation . Hx noted for 3 m hx of progressive mid back pain and radiculopathy with LE weakness in the last 3 weeks. Labs revealed leukocytosis 13.4, hb 10, . ESR 93, CRP 96 MRI showed Acute discitis/osteomyelitis at T5-6 with marked surrounding phlegmon and circumferential epidural phlegmon resulting in moderate to severe spinal canal stenosis. On 5/21, he underwent T5-6 laminectomy, T3-8 instrumented fusion , intra-op findings of phlegmonous appearing tissue in T5-T6 disc space/deep to PLL without extension past PL     Due to increasing LBP and increasing pain with standing limiting his ability to move well, an MRI was performed, revealing worsening and extension of his infection into his bilateral iliopsoas muscles, left greater than right.  The abscess on the left side measuring up to 9.6 cm.  Broad range PCR from his initial surgery remain pending at this time.  All cultures remain negative.    Will continue his empiric antibiotics pending results of surgery.    Recommendations:    NSG involved. I have informed them of new MRI findings as not mentioned in their note today.  Plans are for OR. Contacted team and they do not/will not be draining the psoas  abscesses during procedure thus  IR will need to be involved as this has developed  despite medical management  Continue ceftriaxone but increase dosing to 2 g IV every 12 hours.  Continue vancomycin as well as directed by pharmacy for dosing.  I have been in contact with the neurosurgical team.  I have asked if they would please send any bone, tissue, or fluid material for microbiologic testing, notably bacterial, fungal, and AFB.      Discussed with [facility] team on evening rounds.    This is a complex infectious disease issue and the following was performed today (for more details please see the above note):   Management decisions reflecting the added complexity (e.g., changes in antimicrobial therapy, infection control strategies).    I spent 45 minutes in the professional and overall care of this patient.    Nancy Blakely MD  (please reach through EPIC Chat)  Infectious Diseases, Senior Attending Physician

## 2025-05-30 NOTE — CARE PLAN
The patient's goals for the shift include Pain management    The clinical goals for the shift include patient will remain safe and free from injury this shift 5/30/25 0700      Problem: Skin  Goal: Decreased wound size/increased tissue granulation at next dressing change  Outcome: Progressing  Goal: Participates in plan/prevention/treatment measures  Outcome: Progressing  Goal: Prevent/manage excess moisture  Outcome: Progressing  Goal: Prevent/minimize sheer/friction injuries  Outcome: Progressing  Goal: Promote/optimize nutrition  Outcome: Progressing  Goal: Promote skin healing  Outcome: Progressing     Problem: Fall/Injury  Goal: Not fall by end of shift  Outcome: Progressing  Goal: Be free from injury by end of the shift  Outcome: Progressing  Goal: Verbalize understanding of personal risk factors for fall in the hospital  Outcome: Progressing  Goal: Verbalize understanding of risk factor reduction measures to prevent injury from fall in the home  Outcome: Progressing  Goal: Use assistive devices by end of the shift  Outcome: Progressing  Goal: Pace activities to prevent fatigue by end of the shift  Outcome: Progressing     Problem: Safety - Adult  Goal: Free from fall injury  Outcome: Progressing

## 2025-05-30 NOTE — PROGRESS NOTES
05/24/25 1100   Discharge Planning   Living Arrangements Spouse/significant other   Support Systems Spouse/significant other   Type of Residence Private residence   Number of Stairs to Enter Residence 2   Number of Stairs Within Residence 0   Do you have animals or pets at home? Yes   Type of Animals or Pets cats 3   Home or Post Acute Services None   Expected Discharge Disposition Home H   Financial Resource Strain   How hard is it for you to pay for the very basics like food, housing, medical care, and heating? Somewhat   Housing Stability   In the last 12 months, was there a time when you were not able to pay the mortgage or rent on time? N   In the past 12 months, how many times have you moved where you were living? 1   At any time in the past 12 months, were you homeless or living in a shelter (including now)? N   Transportation Needs   In the past 12 months, has lack of transportation kept you from medical appointments or from getting medications? no   In the past 12 months, has lack of transportation kept you from meetings, work, or from getting things needed for daily living? No     05/24/2025: Per Kari, patient now will need MRI of the Spine per Neurosurgery. Patient will also need a PICC line for IV abx therapy at home. Referral sent to [facility] for review. Per ID recs, patient wlill require abx therapy until 07/16/2025. ADOD will be on 05/27/2025. TCC will continue to follow for discharge needs. Josee TREJO TCC     05/29/2025: Patient to be on IV abx therapy until 07/16/2025. Patient was contemplating SNF vs HomeCare. Patient decided 6 weeks was too long for a SNF stay so he will go home on IV abx therapy. Patient also still needs MRI with Anesthesia prior to discharge. TCC to continue to follow for any additional needs. Josee TREJO TCC    05/30/2025 1419: Patient recently had a MRI Spine that indicated  moderate stenosis at C4-5, C5-6, L2-3 disc/osteo w ventral phlegmon w moderate canal stenosis.   Kari discussing plan of care which may or may not include surgery.  Previous plan of care consisted of home with home care for IV abx therapy. Patient now states that he would like to go to a SNF as he is having difficulty walking. TCC will continue to follow for any additional case management needs. Josee TREJO TCC

## 2025-05-30 NOTE — CARE PLAN
pt will remain safe and free from injury throughout this shift 5/30/25 @ 1900      Problem: Skin  Goal: Decreased wound size/increased tissue granulation at next dressing change  Outcome: Progressing  Goal: Participates in plan/prevention/treatment measures  Outcome: Progressing  Goal: Prevent/manage excess moisture  Outcome: Progressing  Goal: Prevent/minimize sheer/friction injuries  Outcome: Progressing  Goal: Promote/optimize nutrition  Outcome: Progressing  Goal: Promote skin healing  Outcome: Progressing     Problem: Fall/Injury  Goal: Not fall by end of shift  Outcome: Progressing  Goal: Be free from injury by end of the shift  Outcome: Progressing  Goal: Verbalize understanding of personal risk factors for fall in the hospital  Outcome: Progressing  Goal: Verbalize understanding of risk factor reduction measures to prevent injury from fall in the home  Outcome: Progressing  Goal: Use assistive devices by end of the shift  Outcome: Progressing  Goal: Pace activities to prevent fatigue by end of the shift  Outcome: Progressing     Problem: Safety - Adult  Goal: Free from fall injury  Outcome: Progressing     Problem: Discharge Planning  Goal: Discharge to home or other facility with appropriate resources  Outcome: Progressing     Problem: Chronic Conditions and Co-morbidities  Goal: Patient's chronic conditions and co-morbidity symptoms are monitored and maintained or improved  Outcome: Progressing     Problem: Nutrition  Goal: Nutrient intake appropriate for maintaining nutritional needs  Outcome: Progressing     Problem: Pain  Goal: Takes deep breaths with improved pain control throughout the shift  Outcome: Progressing  Goal: Turns in bed with improved pain control throughout the shift  Outcome: Progressing  Goal: Walks with improved pain control throughout the shift  Outcome: Progressing  Goal: Performs ADL's with improved pain control throughout shift  Outcome: Progressing  Goal: Participates in PT with  improved pain control throughout the shift  Outcome: Progressing  Goal: Free from opioid side effects throughout the shift  Outcome: Progressing  Goal: Free from acute confusion related to pain meds throughout the shift  Outcome: Progressing

## 2025-05-30 NOTE — ASSESSMENT & PLAN NOTE
Miguel Lofton is a 69 y.o. male patient, with PMHx of prostate cancer, base of the tongue cancer, HTN, HLD, hypothyroidism, COPD, anxiety who had a CT chest for annual lung cancer screening on 5/13 that showed possible compression fx of T5 and T6. Imaging was reviewed at office visit 5/19 and was advised he present to ED for MRI. He presented to [facility] 5/19 where MRI T spine (5/19) showed acute discitis/osteomyelitis at T5-6 with marked surrounding phlegmon and circumferential epidural phlegmon resulting in moderate to severe spinal canal stenosis. No epidural abscess. He was transferred to [facility] 5/20 for NSGY eval. NSGY consulted 5/20, pt taken to the OR 5/21 for T5-6 laminectomy and T3-8 fusion. ID consulted 5/21 and rec Vanc and Ceftriaxone (5/22- planned stop 7/16 for total 8 week atb course). OR surg path (5/22) +osteomyelitis. OR tissue/wound culture (5/22) neg, final. OR fungal culture (5/22) NGTD. PT/OT consulted 5/22 and rec home care. S/p 1unit PRBC 5/25 for Hgb 7.6 - hgb incremented appropriately. MRI L&C spine under anesthesia done which shows moderate stenosis at C4-5, C5-6, L2-3 disc/osteo w ventral phlegmon w moderate canal stenosis.      # Updates 5/30:  - MRI C/L Spine moderate stenosis at C4-5, C5-6, L2-3 disc/osteo w ventral phlegmon w moderate canal stenosis  - Neuro/surg rec lumbar xrays in multiple views with patient upright, Will discuss possible surgical intervention vs medical management of lumbar findings  - Holding ASA for possible procedure   - Cont Ceftriaxone and Vanco, ID re-engaged for new finding of Large abscess within the left iliopsoas muscle measuring up to 9.6 cm in craniocaudal dimension seen on MRI    # Acute on chronic back pain   # Compression fracture   # Vertebral osteomyelitis  # Phlegmonous tissue c/f infection  - CT Chest ([facility]): considerable deformity of what is felt to be T5 which may be related to a compression fracture of the caudal endplate. There is some  compression of the superior endplate of T6.  -  MRI T spine (5/19 at OSH): Acute discitis/osteomyelitis at T5-6 with marked surrounding phlegmon and circumferential epidural phlegmon resulting in moderate to severe spinal canal stenosis. No epidural abscess. Requested for image import from [facility]    - CRP 7.35, ESR 90 (5/20); CRP 5.94, ESR 74 (5/22)  - s/p OR 5/21/25 with neurosurgery for T5-6 laminectomy and T3-8 fusion. Per neurosurgery, phlegmon appeared infectious without purulence  - OR surg path (5/22) +osteomyelitis, neg for carcinoma  - OR tissue/wound culture/ fungal culture 5/22) neg, final  - 5/20 Blood cultures x2 NG, Urine culture negative  - +MRSA nares 5/24  - Prevena removed POD7 with dressing (5/28)  - ID consulted 5/21; rec Vanc and Ceftriaxone (5/22- planned stop 7/16) for total 8 week atb course)--5/30: ID re-engaged for new finding of Large abscess within the left iliopsoas muscle measuring up to 9.6 cm in craniocaudal dimension seen on MRI  - Reached out to ID (5/28) about abx cost for patient (approx ~$1000/week), ID states they will look into the case but most likely the abx course cannot change due to osteomyelitis   - Weekly CBC, CMP, CRP ordered outpt with results to be faxed to [phone] ATTN: Dr. Dyer once discharged per ID  - Supportive onc consulted 5/25 for uncontrolled pain, recs start ER Oxy 10mg BID and increased doris 300mg to TID, changed Miralax from daily to PRN, and Senna from 3tabs to 2tabs, added Zofran PRN  - PT/OT rec home care, PICC line placed 5/24  - 5/30 MRI C/L Spine moderate stenosis at C4-5, C5-6, L2-3 disc/osteo w ventral phlegmon w moderate canal stenosis  - Neuro/surg re-engaged for finding on MRI, rec lumbar xrays in multiple views with patient upright, Will discuss possible surgical intervention vs medical management of lumbar findings     # Abdominal pain-- improved  - Reported abdominal pain above old PEG site on 5/22  - Likely 2/2 prone  positioning after OR +/- developing ileus  - KUB (5/22): moderate colonic stool burden, possible post-op ileus  - having loose Bms now LBM 5/26, stopped lactulose. 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 Gamez--last seen 12/18/2024: laryngeal structures are noted for grossly normal appearance; true vocal cords, false vocal cords, remaining structures, including epiglottis, piriform sinuses and base of tongue are all grossly normal; there is no evidence of gross mass nodule, polyp, tumor or other defect   - Follows yearly with Dr. Lucero      # Prostate Ca  - s/p radical prostatectomy by Dr Lang Mcneil in 2018  - [id number], [place] 7, + right anterior margin  - PSMA-PET done on 12/6/2023 showed no PSMA uptake within the post-surgical bed; there is PSMA uptake of a left common iliac retroperitoneal lymph node SUV 5.6 and PSMA uptake of a right pelvic sidewall lymph node SUV 3.5  - Dr Rangel recommended radiation therapy. Patient instead went to [facility] and was treated by Dr Zackary Ely (4600 cGy in 23 fractions to the pelvis and LN followed by [place] to prostate bed and LN to 2500 cGy in 10 fractions)  - Since completing radiation, has been experiencing significant fatigue  - He is now following with urologist Dr. Boyd  - For pain management, follows with [facility] palliative care team   - PSA < 0.01 (2/19/25)--> PSA <0.10 (5/20)     # COPD  # Smoking Hx  # Lung Ca screening  - Follows with pulmonologist Dr Hutchison   - PFTs 03/2025: FEV1 72% FEV1/FVC 0.63  - Did not tolerate Breztri  - Continue home Spiriva 2 puffs daily     #  HTN/HLD  - Continue home Metop Succs 25mg daily and Lipitor 80mg nightly  - Hold ASA (5/30)  for possible OR      # Hypothyroidism  - Last TSH 4.49 (5/12)  - Continue home Synthroid 75mcg daily     # Anemia  - Likely anemia of chronic disease +/- post-op  - BL Hgb ~11 after cancer tx; Hgb 9.9 (5/20)--> 8.3 (5/22) post-op--> 8.1 (5/23)--> 7.6 (5/25)-->s/p 1 unit 5/25, hgb stable 8.7 (5/29)  - Ferritin 305, Folate 7.5, Iron 41, TIBC 269, %Sat 15, UIBC 228, Vit B12 1,342 (5/20)  - 5/25 Iron 30, TIBC 204, folate 3.9   - Stopped home Vit B12 sup 5/22 d/t elevated B12 level  - will hold off on folic acid/iron replacement at this time given acute phase with post-op period, plan for outpatient monitoring of hgb and anemia labs/ongoing workup     # DVT prophy:   - Lovenox, was held for neurosurgery and resumed 5/22 PM per ortho     # DISPO:  - Full code, confirmed with patient on admission   - NOK: Eleni Lofotn (wife) [phone]  - DC pending neuro/surg recs   - NSGY FUV 6/9, Dr. Lucero (heme onc) FUV 8/8, NSGY FUV 8/11     I spent >60 minutes in the professional and overall care of this patient     Assessment and plan as above discussed with attending physician Dr. Pro Valles

## 2025-05-30 NOTE — PROGRESS NOTES
"Miguel Lofton is a 69 y.o. male on day 10 of admission presenting with Osteomyelitis.    Subjective   Seen this morning at bedside, still in severe pain and cannot stand for extended periods, encouraged to take PRN pain meds, MRI results pending. Discussed dispo after MRI results and will yannick need a SNF.   Denies chest pain, SOB, N/V/DC, fever, chills, s/sx of bleeding, abdominal pain, dysuria, saddle anesthesia.   Objective     Physical Exam  Vitals reviewed.   Constitutional:       Appearance: Normal appearance.   HENT:      Head: Normocephalic.   Cardiovascular:      Rate and Rhythm: Normal rate and regular rhythm.      Pulses: Normal pulses.   Pulmonary:      Effort: Pulmonary effort is normal.   Abdominal:      Palpations: Abdomen is soft.   Skin:     Capillary Refill: Capillary refill takes less than 2 seconds.      Comments: Surgical site healing well, no drainage   Neurological:      General: No focal deficit present.      Mental Status: He is alert and oriented to person, place, and time.   Psychiatric:         Mood and Affect: Mood normal.         Last Recorded Vitals  Blood pressure 97/62, pulse 80, temperature 35.9 °C (96.6 °F), temperature source Temporal, resp. rate 16, height 1.727 m (5' 8\"), weight 73.4 kg (161 lb 13.1 oz), SpO2 95%.  Intake/Output last 3 Shifts:  I/O last 3 completed shifts:  In: 1400 (19.1 mL/kg) [P.O.:100; IV Piggyback:1300]  Out: 1200 (16.3 mL/kg) [Urine:1200 (0.7 mL/kg/hr)]  Weight: 73.4 kg     Relevant Results    This patient has a central line   Reason for the central line remaining today? IV atb  Assessment & Plan  Osteomyelitis  Miguel Lofton is a 69 y.o. male patient, with PMHx of prostate cancer, base of the tongue cancer, HTN, HLD, hypothyroidism, COPD, anxiety who had a CT chest for annual lung cancer screening on 5/13 that showed possible compression fx of T5 and T6. Imaging was reviewed at office visit 5/19 and was advised he present to ED for MRI. He presented to " [facility] 5/19 where MRI T spine (5/19) showed acute discitis/osteomyelitis at T5-6 with marked surrounding phlegmon and circumferential epidural phlegmon resulting in moderate to severe spinal canal stenosis. No epidural abscess. [facility]. NSGY consulted 5/20, pt taken to the OR 5/21 for T5-6 laminectomy and T3-8 fusion. ID consulted 5/21 and rec Vanc and Ceftriaxone (5/22- planned stop 7/16 for total 8 week atb course). OR surg path (5/22) +osteomyelitis. OR tissue/wound culture (5/22) neg, final. OR fungal culture (5/22) NGTD. PT/OT consulted 5/22 and rec home care. S/p 1unit PRBC 5/25 for Hgb 7.6 - hgb incremented appropriately. MRI L&C spine under anesthesia done which shows moderate stenosis at C4-5, C5-6, L2-3 disc/osteo w ventral phlegmon w moderate canal stenosis.      # Updates 5/30:  - MRI C/L Spine moderate stenosis at C4-5, C5-6, L2-3 disc/osteo w ventral phlegmon w moderate canal stenosis  - Neuro/surg rec lumbar xrays in multiple views with patient upright, Will discuss possible surgical intervention vs medical management of lumbar findings  - Holding ASA for possible procedure   - Cont Ceftriaxone and Vanco, ID re-engaged for new finding of Large abscess within the left iliopsoas muscle measuring up to 9.6 cm in craniocaudal dimension seen on MRI    # Acute on chronic back pain   # Compression fracture   # Vertebral osteomyelitis  # Phlegmonous tissue c/f infection  - CT Chest (OSH): considerable deformity of what is felt to be T5 which may be related to a compression fracture of the caudal endplate. There is some compression of the superior endplate of T6.  -  MRI T spine (5/19 at OSH): Acute discitis/osteomyelitis at T5-6 with marked surrounding phlegmon and circumferential epidural phlegmon resulting in moderate to severe spinal canal stenosis. No epidural abscess. Requested for image import from [facility]    - CRP 7.35, ESR 90 (5/20); CRP 5.94, ESR  74 (5/22)  - s/p OR 5/21/25 with neurosurgery for T5-6 laminectomy and T3-8 fusion. Per neurosurgery, phlegmon appeared infectious without purulence  - OR surg path (5/22) +osteomyelitis, neg for carcinoma  - OR tissue/wound culture/ fungal culture 5/22) neg, final  - 5/20 Blood cultures x2 NG, Urine culture negative  - +MRSA nares 5/24  - Prevena removed POD7 with dressing (5/28)  - ID consulted 5/21; rec Vanc and Ceftriaxone (5/22- planned stop 7/16) for total 8 week atb course)--5/30: ID re-engaged for new finding of Large abscess within the left iliopsoas muscle measuring up to 9.6 cm in craniocaudal dimension seen on MRI  - Reached out to ID (5/28) about abx cost for patient (approx ~$1000/week), ID states they will look into the case but most likely the abx course cannot change due to osteomyelitis   - Weekly CBC, CMP, CRP ordered outpt with results to be faxed to [phone] ATTN: Dr. Dyer once discharged per ID  - Supportive onc consulted 5/25 for uncontrolled pain, recs start ER Oxy 10mg BID and increased doris 300mg to TID, changed Miralax from daily to PRN, and Senna from 3tabs to 2tabs, added Zofran PRN  - PT/OT rec home care, PICC line placed 5/24  - 5/30 MRI C/L Spine moderate stenosis at C4-5, C5-6, L2-3 disc/osteo w ventral phlegmon w moderate canal stenosis  - Neuro/surg re-engaged for finding on MRI, rec lumbar xrays in multiple views with patient upright, Will discuss possible surgical intervention vs medical management of lumbar findings     # Abdominal pain-- improved  - Reported abdominal pain above old PEG site on 5/22  - Likely 2/2 prone positioning after OR +/- developing ileus  - KUB (5/22): moderate colonic stool burden, possible post-op ileus  - having loose Bms now LBM 5/26, stopped lactulose. 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 Gamez--last seen 12/18/2024: laryngeal structures are noted for grossly normal appearance; true vocal cords, false vocal cords, remaining structures, including epiglottis, piriform sinuses and base of tongue are all grossly normal; there is no evidence of gross mass nodule, polyp, tumor or other defect   - Follows yearly with Dr. Lucero      # Prostate Ca  - s/p radical prostatectomy by Dr Lang Mcneil in 2018  - [id number], Gleasons 7, + right anterior margin  - PSMA-PET done on 12/6/2023 showed no PSMA uptake within the post-surgical bed; there is PSMA uptake of a left common iliac retroperitoneal lymph node SUV 5.6 and PSMA uptake of a right pelvic sidewall lymph node SUV 3.5  - Dr Rangel recommended radiation therapy. Patient instead went to [facility] and was treated by Dr Zackary Ely (4600 cGy in 23 fractions to the pelvis and LN followed by [place] to prostate bed and LN to 2500 cGy in 10 fractions)  - Since completing radiation, has been experiencing significant fatigue  - He is now following with urologist Dr. Boyd  - For pain management, follows with [facility] palliative care team   - PSA < 0.01 (2/19/25)--> PSA <0.10 (5/20)     # COPD  # Smoking Hx  # Lung Ca screening  - Follows with pulmonologist Dr Hutchison   - PFTs 03/2025: FEV1 72% FEV1/FVC 0.63  - Did not tolerate Breztri  - Continue home Spiriva 2 puffs daily     # HTN/HLD  - Continue home Metop Succs 25mg daily and Lipitor 80mg nightly  - Hold ASA (5/30)  for possible OR      # Hypothyroidism  - Last TSH 4.49 (5/12)  - Continue home Synthroid 75mcg daily     # Anemia  - Likely anemia of chronic disease +/- post-op  - BL Hgb ~11 after cancer tx; Hgb 9.9 (5/20)--> 8.3 (5/22) post-op--> 8.1 (5/23)--> 7.6 (5/25)-->s/p 1 unit  5/25, hgb stable 8.7 (5/29)  - Ferritin 305, Folate 7.5, Iron 41, TIBC 269, %Sat 15, UIBC 228, Vit B12 1,342 (5/20)  - 5/25 Iron 30, TIBC 204, folate 3.9   - Stopped home Vit B12 sup 5/22 d/t elevated B12 level  - will hold off on folic acid/iron replacement at this time given acute phase with post-op period, plan for outpatient monitoring of hgb and anemia labs/ongoing workup     # DVT prophy:   - Lovenox, was held for neurosurgery and resumed 5/22 PM per ortho     # DISPO:  - Full code, confirmed with patient on admission   - NOK: Eleni Lofton (wife) [phone]  - DC pending neuro/surg recs   - NSGY FUV 6/9, Dr. Lucero (heme onc) FUV 8/8, NSGY FUV 8/11     I spent >60 minutes in the professional and overall care of this patient     Assessment and plan as above discussed with attending physician DAFNE HurstC

## 2025-05-31 ENCOUNTER — APPOINTMENT (OUTPATIENT)
Dept: RADIOLOGY | Facility: HOSPITAL | Age: 70
DRG: 447 | End: 2025-05-31
Payer: COMMERCIAL

## 2025-05-31 LAB
ALBUMIN SERPL BCP-MCNC: 2.5 G/DL (ref 3.4–5)
ALP SERPL-CCNC: 141 U/L (ref 33–136)
ALT SERPL W P-5'-P-CCNC: 15 U/L (ref 10–52)
ANION GAP SERPL CALC-SCNC: 14 MMOL/L (ref 10–20)
AST SERPL W P-5'-P-CCNC: 19 U/L (ref 9–39)
BASOPHILS # BLD AUTO: 0.1 X10*3/UL (ref 0–0.1)
BASOPHILS NFR BLD AUTO: 1.5 %
BILIRUB SERPL-MCNC: 0.3 MG/DL (ref 0–1.2)
BUN SERPL-MCNC: 5 MG/DL (ref 6–23)
CALCIUM SERPL-MCNC: 8.2 MG/DL (ref 8.6–10.6)
CHLORIDE SERPL-SCNC: 101 MMOL/L (ref 98–107)
CO2 SERPL-SCNC: 30 MMOL/L (ref 21–32)
CREAT SERPL-MCNC: 0.45 MG/DL (ref 0.5–1.3)
EGFRCR SERPLBLD CKD-EPI 2021: >90 ML/MIN/1.73M*2
EOSINOPHIL # BLD AUTO: 0.63 X10*3/UL (ref 0–0.7)
EOSINOPHIL NFR BLD AUTO: 9.4 %
ERYTHROCYTE [DISTWIDTH] IN BLOOD BY AUTOMATED COUNT: 19.2 % (ref 11.5–14.5)
GLUCOSE SERPL-MCNC: 87 MG/DL (ref 74–99)
HCT VFR BLD AUTO: 30.2 % (ref 41–52)
HGB BLD-MCNC: 8.6 G/DL (ref 13.5–17.5)
IMM GRANULOCYTES # BLD AUTO: 0.03 X10*3/UL (ref 0–0.7)
IMM GRANULOCYTES NFR BLD AUTO: 0.4 % (ref 0–0.9)
LYMPHOCYTES # BLD AUTO: 0.86 X10*3/UL (ref 1.2–4.8)
LYMPHOCYTES NFR BLD AUTO: 12.8 %
MCH RBC QN AUTO: 24.6 PG (ref 26–34)
MCHC RBC AUTO-ENTMCNC: 28.5 G/DL (ref 32–36)
MCV RBC AUTO: 86 FL (ref 80–100)
MONOCYTES # BLD AUTO: 0.86 X10*3/UL (ref 0.1–1)
MONOCYTES NFR BLD AUTO: 12.8 %
NEUTROPHILS # BLD AUTO: 4.22 X10*3/UL (ref 1.2–7.7)
NEUTROPHILS NFR BLD AUTO: 63.1 %
NRBC BLD-RTO: 0 /100 WBCS (ref 0–0)
PLATELET # BLD AUTO: 375 X10*3/UL (ref 150–450)
POTASSIUM SERPL-SCNC: 3.8 MMOL/L (ref 3.5–5.3)
PROT SERPL-MCNC: 5.3 G/DL (ref 6.4–8.2)
RBC # BLD AUTO: 3.5 X10*6/UL (ref 4.5–5.9)
SODIUM SERPL-SCNC: 141 MMOL/L (ref 136–145)
VANCOMYCIN SERPL-MCNC: 22.1 UG/ML (ref 5–20)
WBC # BLD AUTO: 6.7 X10*3/UL (ref 4.4–11.3)

## 2025-05-31 PROCEDURE — 2500000001 HC RX 250 WO HCPCS SELF ADMINISTERED DRUGS (ALT 637 FOR MEDICARE OP): Performed by: STUDENT IN AN ORGANIZED HEALTH CARE EDUCATION/TRAINING PROGRAM

## 2025-05-31 PROCEDURE — 1170000001 HC PRIVATE ONCOLOGY ROOM DAILY

## 2025-05-31 PROCEDURE — 85025 COMPLETE CBC W/AUTO DIFF WBC: CPT | Performed by: NURSE PRACTITIONER

## 2025-05-31 PROCEDURE — 99233 SBSQ HOSP IP/OBS HIGH 50: CPT | Performed by: PHYSICIAN ASSISTANT

## 2025-05-31 PROCEDURE — 2500000002 HC RX 250 W HCPCS SELF ADMINISTERED DRUGS (ALT 637 FOR MEDICARE OP, ALT 636 FOR OP/ED): Performed by: STUDENT IN AN ORGANIZED HEALTH CARE EDUCATION/TRAINING PROGRAM

## 2025-05-31 PROCEDURE — 2500000001 HC RX 250 WO HCPCS SELF ADMINISTERED DRUGS (ALT 637 FOR MEDICARE OP): Performed by: PHYSICIAN ASSISTANT

## 2025-05-31 PROCEDURE — 2500000004 HC RX 250 GENERAL PHARMACY W/ HCPCS (ALT 636 FOR OP/ED)

## 2025-05-31 PROCEDURE — 80053 COMPREHEN METABOLIC PANEL: CPT | Performed by: NURSE PRACTITIONER

## 2025-05-31 PROCEDURE — 80202 ASSAY OF VANCOMYCIN: CPT

## 2025-05-31 PROCEDURE — 2500000001 HC RX 250 WO HCPCS SELF ADMINISTERED DRUGS (ALT 637 FOR MEDICARE OP)

## 2025-05-31 PROCEDURE — 2500000001 HC RX 250 WO HCPCS SELF ADMINISTERED DRUGS (ALT 637 FOR MEDICARE OP): Performed by: NURSE PRACTITIONER

## 2025-05-31 PROCEDURE — 2500000004 HC RX 250 GENERAL PHARMACY W/ HCPCS (ALT 636 FOR OP/ED): Mod: JZ | Performed by: PHYSICIAN ASSISTANT

## 2025-05-31 PROCEDURE — 2500000004 HC RX 250 GENERAL PHARMACY W/ HCPCS (ALT 636 FOR OP/ED): Mod: JZ | Performed by: NURSE PRACTITIONER

## 2025-05-31 PROCEDURE — 94640 AIRWAY INHALATION TREATMENT: CPT

## 2025-05-31 PROCEDURE — 2500000005 HC RX 250 GENERAL PHARMACY W/O HCPCS: Performed by: STUDENT IN AN ORGANIZED HEALTH CARE EDUCATION/TRAINING PROGRAM

## 2025-05-31 RX ORDER — CEFTRIAXONE 2 G/50ML
2 INJECTION, SOLUTION INTRAVENOUS EVERY 12 HOURS
Status: DISCONTINUED | OUTPATIENT
Start: 2025-05-31 | End: 2025-06-08 | Stop reason: HOSPADM

## 2025-05-31 RX ADMIN — ACETAMINOPHEN 650 MG: 325 TABLET, FILM COATED ORAL at 09:24

## 2025-05-31 RX ADMIN — GABAPENTIN 300 MG: 300 CAPSULE ORAL at 14:20

## 2025-05-31 RX ADMIN — OXYCODONE HYDROCHLORIDE 10 MG: 10 TABLET, FILM COATED, EXTENDED RELEASE ORAL at 09:24

## 2025-05-31 RX ADMIN — TIOTROPIUM BROMIDE INHALATION SPRAY 2 PUFF: 3.12 SPRAY, METERED RESPIRATORY (INHALATION) at 10:07

## 2025-05-31 RX ADMIN — LEVOTHYROXINE SODIUM 75 MCG: 0.07 TABLET ORAL at 09:24

## 2025-05-31 RX ADMIN — PANTOPRAZOLE SODIUM 40 MG: 40 TABLET, DELAYED RELEASE ORAL at 06:07

## 2025-05-31 RX ADMIN — OXYCODONE HYDROCHLORIDE 10 MG: 10 TABLET ORAL at 23:34

## 2025-05-31 RX ADMIN — CYCLOBENZAPRINE 10 MG: 10 TABLET, FILM COATED ORAL at 14:20

## 2025-05-31 RX ADMIN — GABAPENTIN 300 MG: 300 CAPSULE ORAL at 09:24

## 2025-05-31 RX ADMIN — ACETAMINOPHEN 650 MG: 325 TABLET, FILM COATED ORAL at 14:20

## 2025-05-31 RX ADMIN — METOPROLOL SUCCINATE 25 MG: 25 TABLET, EXTENDED RELEASE ORAL at 09:24

## 2025-05-31 RX ADMIN — Medication 1 L/MIN: at 22:32

## 2025-05-31 RX ADMIN — ATORVASTATIN CALCIUM 80 MG: 80 TABLET, FILM COATED ORAL at 09:24

## 2025-05-31 RX ADMIN — CEFTRIAXONE SODIUM 2 G: 2 INJECTION, SOLUTION INTRAVENOUS at 23:35

## 2025-05-31 RX ADMIN — GABAPENTIN 300 MG: 300 CAPSULE ORAL at 22:23

## 2025-05-31 RX ADMIN — VANCOMYCIN HYDROCHLORIDE 1250 MG: 1.25 INJECTION, POWDER, LYOPHILIZED, FOR SOLUTION INTRAVENOUS at 22:23

## 2025-05-31 RX ADMIN — ACETAMINOPHEN 650 MG: 325 TABLET, FILM COATED ORAL at 17:53

## 2025-05-31 RX ADMIN — CYCLOBENZAPRINE 10 MG: 10 TABLET, FILM COATED ORAL at 22:23

## 2025-05-31 RX ADMIN — OXYCODONE HYDROCHLORIDE 10 MG: 10 TABLET ORAL at 00:08

## 2025-05-31 RX ADMIN — ACETAMINOPHEN 650 MG: 325 TABLET, FILM COATED ORAL at 02:37

## 2025-05-31 RX ADMIN — CEFTRIAXONE SODIUM 2 G: 2 INJECTION, SOLUTION INTRAVENOUS at 09:23

## 2025-05-31 RX ADMIN — CYCLOBENZAPRINE 10 MG: 10 TABLET, FILM COATED ORAL at 09:24

## 2025-05-31 RX ADMIN — Medication 25 MCG: at 09:24

## 2025-05-31 RX ADMIN — LORAZEPAM 1 MG: 1 TABLET ORAL at 22:23

## 2025-05-31 RX ADMIN — ACETAMINOPHEN 650 MG: 325 TABLET, FILM COATED ORAL at 06:07

## 2025-05-31 RX ADMIN — OXYCODONE HYDROCHLORIDE 10 MG: 10 TABLET, FILM COATED, EXTENDED RELEASE ORAL at 22:23

## 2025-05-31 RX ADMIN — OXYCODONE HYDROCHLORIDE 10 MG: 10 TABLET ORAL at 17:53

## 2025-05-31 RX ADMIN — OXYCODONE HYDROCHLORIDE 10 MG: 10 TABLET ORAL at 05:04

## 2025-05-31 RX ADMIN — ENOXAPARIN SODIUM 40 MG: 100 INJECTION SUBCUTANEOUS at 14:20

## 2025-05-31 RX ADMIN — VANCOMYCIN HYDROCHLORIDE 1250 MG: 1.25 INJECTION, POWDER, LYOPHILIZED, FOR SOLUTION INTRAVENOUS at 10:20

## 2025-05-31 RX ADMIN — Medication 1 L/MIN: at 09:34

## 2025-05-31 ASSESSMENT — COGNITIVE AND FUNCTIONAL STATUS - GENERAL
WALKING IN HOSPITAL ROOM: A LITTLE
DRESSING REGULAR UPPER BODY CLOTHING: A LITTLE
STANDING UP FROM CHAIR USING ARMS: A LITTLE
DRESSING REGULAR LOWER BODY CLOTHING: A LOT
DAILY ACTIVITIY SCORE: 18
TOILETING: A LITTLE
TURNING FROM BACK TO SIDE WHILE IN FLAT BAD: A LITTLE
CLIMB 3 TO 5 STEPS WITH RAILING: A LOT
MOBILITY SCORE: 18
MOVING TO AND FROM BED TO CHAIR: A LITTLE
HELP NEEDED FOR BATHING: A LOT

## 2025-05-31 ASSESSMENT — PAIN - FUNCTIONAL ASSESSMENT
PAIN_FUNCTIONAL_ASSESSMENT: 0-10
PAIN_FUNCTIONAL_ASSESSMENT: UNABLE TO SELF-REPORT
PAIN_FUNCTIONAL_ASSESSMENT: 0-10
PAIN_FUNCTIONAL_ASSESSMENT: 0-10

## 2025-05-31 ASSESSMENT — PAIN SCALES - GENERAL
PAINLEVEL_OUTOF10: 5 - MODERATE PAIN
PAINLEVEL_OUTOF10: 7
PAINLEVEL_OUTOF10: 8
PAINLEVEL_OUTOF10: 2
PAINLEVEL_OUTOF10: 7

## 2025-05-31 NOTE — PROGRESS NOTES
"Miguel Lofton is a 69 y.o. male on day 11 of admission presenting with Osteomyelitis.    Subjective   No acute event.    Objective     Physical Exam  Awake Ox3  RUE D5 B5 T5 HG/IO 5  RLE HF4+ KE5 DF/PF 5  LUE D5 B5 T5 HG/IO 5  LLE HF4 KE5 DF/PF 5  Abd non tender  Spine incision is clean, dry, intact, healing well    Last Recorded Vitals  Blood pressure 111/69, pulse 74, temperature 37 °C (98.6 °F), temperature source Temporal, resp. rate 16, height 1.727 m (5' 8\"), weight 73.4 kg (161 lb 13.1 oz), SpO2 93%.  Intake/Output last 3 Shifts:  I/O last 3 completed shifts:  In: 900 (12.3 mL/kg) [P.O.:600; IV Piggyback:300]  Out: 2050 (27.9 mL/kg) [Urine:2050 (0.8 mL/kg/hr)]  Weight: 73.4 kg     Relevant Results  Lab Results   Component Value Date    WBC 6.7 05/31/2025    HGB 8.6 (L) 05/31/2025    HCT 30.2 (L) 05/31/2025    MCV 86 05/31/2025     05/31/2025     Lab Results   Component Value Date    GLUCOSE 87 05/31/2025    CALCIUM 8.2 (L) 05/31/2025     05/31/2025    K 3.8 05/31/2025    CO2 30 05/31/2025     05/31/2025    BUN 5 (L) 05/31/2025    CREATININE 0.45 (L) 05/31/2025       Assessment & Plan  Osteomyelitis    Miguel Lofton is a 69 year old male with h/o HTN,  HLD, CAD (on ASA), hypoT, COPD, anxiety, LBP (annual radioablations), prostate cancer (s/p prostatectomy 2018), base of tongue cancer (dx 2022, s/p radiation/chemo, in remission), diastolic CHF, p/w mid back pain x4 months, low back pain w BLE radiculopathy and weakness x3 weeks, 5/13 CT chest T5 compression fx, MRI TS T5-T6 compression fx w disc/osteo, epidural phlegmon w moderate canal stenosis   5/21 s/p T5-6 transpedicular decompression, T3-8 fusion, OR cx neg, 5/25 drain dc'd, 5/30 MRI C/L Spine moderate stenosis at C4-5, C5-6, L2-3 disc/osteo w ventral phlegmon w moderate canal stenosis    Plan:  [facility] primary  Agree with IR drainage drainage of psoas abscess on Monday  We will follow closely to assess pt's clinical improvement  Further " plan for intervention pending IR biopsy/drainage  Appreciate ID recs          Ceferino Lockhart MD

## 2025-05-31 NOTE — CARE PLAN
The patient's goals for the shift include Pain management    The clinical goals for the shift include Patient will report decrease pain level < 8 throughout the shift 5/31/25 @ 0700      Problem: Skin  Goal: Decreased wound size/increased tissue granulation at next dressing change  Outcome: Progressing  Flowsheets (Taken 5/31/2025 0422)  Decreased wound size/increased tissue granulation at next dressing change: Promote sleep for wound healing  Goal: Participates in plan/prevention/treatment measures  Outcome: Progressing  Flowsheets (Taken 5/31/2025 0422)  Participates in plan/prevention/treatment measures: Increase activity/out of bed for meals  Goal: Prevent/manage excess moisture  Outcome: Progressing  Flowsheets (Taken 5/31/2025 0422)  Prevent/manage excess moisture: Monitor for/manage infection if present  Goal: Prevent/minimize sheer/friction injuries  Outcome: Progressing  Flowsheets (Taken 5/31/2025 0422)  Prevent/minimize sheer/friction injuries: Increase activity/out of bed for meals  Goal: Promote/optimize nutrition  Outcome: Progressing  Flowsheets (Taken 5/31/2025 0422)  Promote/optimize nutrition: Offer water/supplements/favorite foods  Goal: Promote skin healing  Outcome: Progressing  Flowsheets (Taken 5/31/2025 0422)  Promote skin healing: Assess skin/pad under line(s)/device(s)     Problem: Fall/Injury  Goal: Not fall by end of shift  Outcome: Progressing  Goal: Be free from injury by end of the shift  Outcome: Progressing  Goal: Verbalize understanding of personal risk factors for fall in the hospital  Outcome: Progressing  Goal: Verbalize understanding of risk factor reduction measures to prevent injury from fall in the home  Outcome: Progressing  Goal: Use assistive devices by end of the shift  Outcome: Progressing  Goal: Pace activities to prevent fatigue by end of the shift  Outcome: Progressing     Problem: Safety - Adult  Goal: Free from fall injury  Outcome: Progressing     Problem:  Discharge Planning  Goal: Discharge to home or other facility with appropriate resources  Outcome: Progressing     Problem: Chronic Conditions and Co-morbidities  Goal: Patient's chronic conditions and co-morbidity symptoms are monitored and maintained or improved  Outcome: Progressing     Problem: Nutrition  Goal: Nutrient intake appropriate for maintaining nutritional needs  Outcome: Progressing     Problem: Pain  Goal: Takes deep breaths with improved pain control throughout the shift  Outcome: Progressing  Goal: Turns in bed with improved pain control throughout the shift  Outcome: Progressing  Goal: Walks with improved pain control throughout the shift  Outcome: Progressing  Goal: Performs ADL's with improved pain control throughout shift  Outcome: Progressing  Goal: Participates in PT with improved pain control throughout the shift  Outcome: Progressing  Goal: Free from opioid side effects throughout the shift  Outcome: Progressing  Goal: Free from acute confusion related to pain meds throughout the shift  Outcome: Progressing

## 2025-05-31 NOTE — PROGRESS NOTES
"Miguel Lofton is a 69 y.o. male on day 11 of admission presenting with Osteomyelitis.    Subjective   Seen this morning at bedside, still in severe pain and cannot stand for extended periods, encouraged to take PRN pain meds, Discussed MRI results and plan for surgery and IR abscess drainage on Monday. Also discussed with patient that he will likely need to go to SNF on discharge. He also could not tolerate lumbar xray and discussed with him that surgical team was ok with him not getting the xray and it was DC.   Denies chest pain, SOB, N/V/DC, fever, chills, s/sx of bleeding, abdominal pain, dysuria, saddle anesthesia.   Objective     Physical Exam  Vitals reviewed.   Constitutional:       Appearance: Normal appearance.   HENT:      Head: Normocephalic.   Cardiovascular:      Rate and Rhythm: Normal rate and regular rhythm.      Pulses: Normal pulses.   Pulmonary:      Effort: Pulmonary effort is normal.   Abdominal:      Palpations: Abdomen is soft.   Skin:     Capillary Refill: Capillary refill takes less than 2 seconds.      Comments: Surgical site healing well, no drainage   Neurological:      General: No focal deficit present.      Mental Status: He is alert and oriented to person, place, and time.   Psychiatric:         Mood and Affect: Mood normal.       Last Recorded Vitals  Blood pressure 110/72, pulse 73, temperature 36.3 °C (97.3 °F), temperature source Temporal, resp. rate 18, height 1.727 m (5' 8\"), weight 73.4 kg (161 lb 13.1 oz), SpO2 92%.  Intake/Output last 3 Shifts:  I/O last 3 completed shifts:  In: 750 (10.2 mL/kg) [P.O.:500; IV Piggyback:250]  Out: 2050 (27.9 mL/kg) [Urine:2050 (1.2 mL/kg/hr)]  Weight: 73.4 kg     Relevant Results    This patient has a central line   Reason for the central line remaining today? IV atb  Assessment & Plan  Osteomyelitis  Miguel Lofton is a 69 y.o. male patient, with PMHx of prostate cancer, base of the tongue cancer, HTN, HLD, hypothyroidism, COPD, anxiety who " had a CT chest for annual lung cancer screening on 5/13 that showed possible compression fx of T5 and T6. Imaging was reviewed at office visit 5/19 and was advised he present to ED for MRI. He presented to [facility] 5/19 where MRI T spine (5/19) showed acute discitis/osteomyelitis at T5-6 with marked surrounding phlegmon and circumferential epidural phlegmon resulting in moderate to severe spinal canal stenosis. No epidural abscess. He was transferred to [facility] 5/20 for NSGY eval. NSGY consulted 5/20, pt taken to the OR 5/21 for T5-6 laminectomy and T3-8 fusion. ID consulted 5/21 and rec Vanc and Ceftriaxone (5/22- planned stop 7/16 for total 8 week atb course). OR surg path (5/22) +osteomyelitis. OR tissue/wound culture (5/22) neg, final. OR fungal culture (5/22) NGTD. PT/OT consulted 5/22 and rec home care. S/p 1unit PRBC 5/25 for Hgb 7.6 - hgb incremented appropriately. MRI L&C spine under anesthesia done which shows moderate stenosis at C4-5, C5-6, L2-3 disc/osteo w ventral phlegmon w moderate canal stenosis, neurosurgery re-engaged and plan to OR sometime next week. MRI spine also showed  Large abscess within the left iliopsoas muscle measuring up to 9.6 cm in craniocaudal dimension and ID was re-engaged who rec IR drainage of abscess, plan for Monday 6/2.      # Updates 5/31:  - MRI C/L Spine moderate stenosis at C4-5, C5-6, L2-3 disc/osteo w ventral phlegmon w moderate canal stenosis  - Neuro/surg following plan possible surgery next week   - Holding ASA for possible procedure   - ID rec to increase  Ceftriaxone 2g to q12hrs and cont Vanco  - Plan IR abscess drainage and culture on Monday 6/2     # Acute on chronic back pain   # Compression fracture   # Vertebral osteomyelitis  # Phlegmonous tissue c/f infection  - CT Chest (OSH): considerable deformity of what is felt to be T5 which may be related to a compression fracture of the caudal endplate. There is some compression of the superior endplate of  T6.  -  MRI T spine (5/19 at OSH): Acute discitis/osteomyelitis at T5-6 with marked surrounding phlegmon and circumferential epidural phlegmon resulting in moderate to severe spinal canal stenosis. No epidural abscess. Requested for image import from [facility]    - CRP 7.35, ESR 90 (5/20); CRP 5.94, ESR 74 (5/22)  - s/p OR 5/21/25 with neurosurgery for T5-6 laminectomy and T3-8 fusion. Per neurosurgery, phlegmon appeared infectious without purulence  - OR surg path (5/22) +osteomyelitis, neg for carcinoma  - OR tissue/wound culture/ fungal culture 5/22) neg, final  - 5/20 Blood cultures x2 NG, Urine culture negative  - +MRSA nares 5/24  - Prevena removed POD7 with dressing (5/28)  - ID consulted 5/21; rec Vanc and Ceftriaxone (5/22- planned stop 7/16) for total 8 week atb course)--5/30: ID re-engaged for new finding of Large abscess within the left iliopsoas muscle measuring up to 9.6 cm in craniocaudal dimension seen on MRI. Rec Ceftriaxone 2g q12hrs and Vanco----> tx course to be determined   - Reached out to ID (5/28) about abx cost for patient (approx ~$1000/week), ID states they will look into the case but most likely the abx course cannot change due to osteomyelitis   - Weekly CBC, CMP, CRP ordered outpt with results to be faxed to [phone] ATTN: Dr. Dyer once discharged per ID  - Supportive onc consulted 5/25 for uncontrolled pain, recs start ER Oxy 10mg BID and increased doris 300mg to TID, changed Miralax from daily to PRN, and Senna from 3tabs to 2tabs, added Zofran PRN  - PT/OT rec home care, PICC line placed 5/24  - 5/30 MRI C/L Spine moderate stenosis at C4-5, C5-6, L2-3 disc/osteo w ventral phlegmon w moderate canal stenosis  - Neuro/surg re-engaged for finding on MRI, rec lumbar xrays in multiple views with patient upright, Will discuss possible surgical intervention vs medical management of lumbar findings  - Plan IR abscess drainage on 6/2      # Abdominal pain-- improved  - Reported  abdominal pain above old PEG site on 5/22  - Likely 2/2 prone positioning after OR +/- developing ileus  - KUB (5/22): moderate colonic stool burden, possible post-op ileus  - having loose Bms now LBM 5/26, stopped lactulose. 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 Gamez--last seen 12/18/2024: laryngeal structures are noted for grossly normal appearance; true vocal cords, false vocal cords, remaining structures, including epiglottis, piriform sinuses and base of tongue are all grossly normal; there is no evidence of gross mass nodule, polyp, tumor or other defect   - Follows yearly with Dr. Lucero      # Prostate Ca  - s/p radical prostatectomy by Dr Lang Mcneil in 2018  - [id number], Gleasons 7, + right anterior margin  - PSMA-PET done on 12/6/2023 showed no PSMA uptake within the post-surgical bed; there is PSMA uptake of a left common iliac retroperitoneal lymph node SUV 5.6 and PSMA uptake of a right pelvic sidewall lymph node SUV 3.5  - Dr Rangel recommended radiation therapy. Patient instead went to [facility] and was treated by Dr Zackary Ely (4600 cGy in 23 fractions to the pelvis and LN followed by [place] to prostate bed and LN to 2500 cGy in 10 fractions)  - Since completing radiation, has been experiencing significant fatigue  - He is now following with urologist Dr. Boyd  - For pain management, follows with [facility] palliative care team   - PSA < 0.01 (2/19/25)--> PSA <0.10 (5/20)     # COPD  # Smoking Hx  # Lung Ca screening  - Follows with pulmonologist Dr Hutchison   - PFTs 03/2025: FEV1 72% FEV1/FVC 0.63  - Did not  tolerate Breztri  - Continue home Spiriva 2 puffs daily     # HTN/HLD  - Continue home Metop Succs 25mg daily and Lipitor 80mg nightly  - Hold ASA (5/30)  for possible OR      # Hypothyroidism  - Last TSH 4.49 (5/12)  - Continue home Synthroid 75mcg daily     # Anemia  - Likely anemia of chronic disease +/- post-op  - BL Hgb ~11 after cancer tx; Hgb 9.9 (5/20)--> 8.3 (5/22) post-op--> 8.1 (5/23)--> 7.6 (5/25)-->s/p 1 unit 5/25, hgb stable 8.7 (5/29)  - Ferritin 305, Folate 7.5, Iron 41, TIBC 269, %Sat 15, UIBC 228, Vit B12 1,342 (5/20)  - 5/25 Iron 30, TIBC 204, folate 3.9   - Stopped home Vit B12 sup 5/22 d/t elevated B12 level  - will hold off on folic acid/iron replacement at this time given acute phase with post-op period, plan for outpatient monitoring of hgb and anemia labs/ongoing workup     # DVT prophy:   - Lovenox, was held for neurosurgery and resumed 5/22 PM per ortho     # DISPO:  - Full code, confirmed with patient on admission   - NOK: Eleni Lofton (wife) [phone]  - DC pending neuro/surg, ID recs   - SHEN FUV 6/9, Dr. Lucero (heme onc) FUV 8/8, NSGY FUV 8/11     I spent >60 minutes in the professional and overall care of this patient  Assessment and plan as above discussed with attending physician Dr. Pro Collins PA-C

## 2025-05-31 NOTE — ASSESSMENT & PLAN NOTE
Miguel Lofton is a 69 y.o. male patient, with PMHx of prostate cancer, base of the tongue cancer, HTN, HLD, hypothyroidism, COPD, anxiety who had a CT chest for annual lung cancer screening on 5/13 that showed possible compression fx of T5 and T6. Imaging was reviewed at office visit 5/19 and was advised he present to ED for MRI. He presented to [facility] 5/19 where MRI T spine (5/19) showed acute discitis/osteomyelitis at T5-6 with marked surrounding phlegmon and circumferential epidural phlegmon resulting in moderate to severe spinal canal stenosis. No epidural abscess. He was transferred to [facility] 5/20 for NSGY eval. NSGY consulted 5/20, pt taken to the OR 5/21 for T5-6 laminectomy and T3-8 fusion. ID consulted 5/21 and rec Vanc and Ceftriaxone (5/22- planned stop 7/16 for total 8 week atb course). OR surg path (5/22) +osteomyelitis. OR tissue/wound culture (5/22) neg, final. OR fungal culture (5/22) NGTD. PT/OT consulted 5/22 and rec home care. S/p 1unit PRBC 5/25 for Hgb 7.6 - hgb incremented appropriately. MRI L&C spine under anesthesia done which shows moderate stenosis at C4-5, C5-6, L2-3 disc/osteo w ventral phlegmon w moderate canal stenosis, neurosurgery re-engaged and plan to OR sometime next week. MRI spine also showed  Large abscess within the left iliopsoas muscle measuring up to 9.6 cm in craniocaudal dimension and ID was re-engaged who rec IR drainage of abscess, plan for Monday 6/2.      # Updates 5/31:  - MRI C/L Spine moderate stenosis at C4-5, C5-6, L2-3 disc/osteo w ventral phlegmon w moderate canal stenosis  - Neuro/surg following plan possible surgery next week   - Holding ASA for possible procedure   - ID rec to increase  Ceftriaxone 2g to q12hrs and cont Vanco  - Plan IR abscess drainage and culture on Monday 6/2     # Acute on chronic back pain   # Compression fracture   # Vertebral osteomyelitis  # Phlegmonous tissue c/f infection  - CT Chest (OSH): considerable deformity of what is  felt to be T5 which may be related to a compression fracture of the caudal endplate. There is some compression of the superior endplate of T6.  -  MRI T spine (5/19 at OSH): Acute discitis/osteomyelitis at T5-6 with marked surrounding phlegmon and circumferential epidural phlegmon resulting in moderate to severe spinal canal stenosis. No epidural abscess. Requested for image import from [facility]    - CRP 7.35, ESR 90 (5/20); CRP 5.94, ESR 74 (5/22)  - s/p OR 5/21/25 with neurosurgery for T5-6 laminectomy and T3-8 fusion. Per neurosurgery, phlegmon appeared infectious without purulence  - OR surg path (5/22) +osteomyelitis, neg for carcinoma  - OR tissue/wound culture/ fungal culture 5/22) neg, final  - 5/20 Blood cultures x2 NG, Urine culture negative  - +MRSA nares 5/24  - Prevena removed POD7 with dressing (5/28)  - ID consulted 5/21; rec Vanc and Ceftriaxone (5/22- planned stop 7/16) for total 8 week atb course)--5/30: ID re-engaged for new finding of Large abscess within the left iliopsoas muscle measuring up to 9.6 cm in craniocaudal dimension seen on MRI. Rec Ceftriaxone 2g q12hrs and Vanco----> tx course to be determined   - Reached out to ID (5/28) about abx cost for patient (approx ~$1000/week), ID states they will look into the case but most likely the abx course cannot change due to osteomyelitis   - Weekly CBC, CMP, CRP ordered outpt with results to be faxed to [phone] ATTN: Dr. Dyer once discharged per ID  - Supportive onc consulted 5/25 for uncontrolled pain, recs start ER Oxy 10mg BID and increased doris 300mg to TID, changed Miralax from daily to PRN, and Senna from 3tabs to 2tabs, added Zofran PRN  - PT/OT rec home care, PICC line placed 5/24  - 5/30 MRI C/L Spine moderate stenosis at C4-5, C5-6, L2-3 disc/osteo w ventral phlegmon w moderate canal stenosis  - Neuro/surg re-engaged for finding on MRI, rec lumbar xrays in multiple views with patient upright, Will discuss possible surgical  intervention vs medical management of lumbar findings  - Plan IR abscess drainage on 6/2      # Abdominal pain-- improved  - Reported abdominal pain above old PEG site on 5/22  - Likely 2/2 prone positioning after OR +/- developing ileus  - KUB (5/22): moderate colonic stool burden, possible post-op ileus  - having loose Bms now LBM 5/26, stopped lactulose. 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 Gamez--last seen 12/18/2024: laryngeal structures are noted for grossly normal appearance; true vocal cords, false vocal cords, remaining structures, including epiglottis, piriform sinuses and base of tongue are all grossly normal; there is no evidence of gross mass nodule, polyp, tumor or other defect   - Follows yearly with Dr. Lucero      # Prostate Ca  - s/p radical prostatectomy by Dr Lang Mcneil in 2018  - [id number], Gleasons 7, + right anterior margin  - PSMA-PET done on 12/6/2023 showed no PSMA uptake within the post-surgical bed; there is PSMA uptake of a left common iliac retroperitoneal lymph node SUV 5.6 and PSMA uptake of a right pelvic sidewall lymph node SUV 3.5  - Dr Rangel recommended radiation therapy. Patient instead went to [facility] and was treated by Dr Zackary Ely (4600 cGy in 23 fractions to the pelvis and LN followed by [place] to prostate bed and LN to 2500 cGy in 10 fractions)  - Since completing radiation, has been experiencing significant fatigue  - He is now following with urologist Dr. Boyd  - For pain management, follows with [facility] palliative care team   - PSA < 0.01 (2/19/25)--> PSA <0.10 (5/20)     #  COPD  # Smoking Hx  # Lung Ca screening  - Follows with pulmonologist Dr Hutchison   - PFTs 03/2025: FEV1 72% FEV1/FVC 0.63  - Did not tolerate Breztri  - Continue home Spiriva 2 puffs daily     # HTN/HLD  - Continue home Metop Succs 25mg daily and Lipitor 80mg nightly  - Hold ASA (5/30)  for possible OR      # Hypothyroidism  - Last TSH 4.49 (5/12)  - Continue home Synthroid 75mcg daily     # Anemia  - Likely anemia of chronic disease +/- post-op  - BL Hgb ~11 after cancer tx; Hgb 9.9 (5/20)--> 8.3 (5/22) post-op--> 8.1 (5/23)--> 7.6 (5/25)-->s/p 1 unit 5/25, hgb stable 8.7 (5/29)  - Ferritin 305, Folate 7.5, Iron 41, TIBC 269, %Sat 15, UIBC 228, Vit B12 1,342 (5/20)  - 5/25 Iron 30, TIBC 204, folate 3.9   - Stopped home Vit B12 sup 5/22 d/t elevated B12 level  - will hold off on folic acid/iron replacement at this time given acute phase with post-op period, plan for outpatient monitoring of hgb and anemia labs/ongoing workup     # DVT prophy:   - Lovenox, was held for neurosurgery and resumed 5/22 PM per ortho     # DISPO:  - Full code, confirmed with patient on admission   - NOK: Eleni Lofton (wife) [phone]  - DC pending neuro/surg, ID recs   - NSGY FUV 6/9, Dr. Lucero (heme onc) FUV 8/8, NSGY FUV 8/11     I spent >60 minutes in the professional and overall care of this patient  Assessment and plan as above discussed with attending physician Dr. Pro Valles

## 2025-05-31 NOTE — PROGRESS NOTES
Vancomycin Dosing by Pharmacy- FOLLOW UP    Miguel Lofton is a 69 y.o. year old male who Pharmacy has been consulted for vancomycin dosing for osteomyelitis/septic arthritis. Based on the patient's indication and renal status this patient is being dosed based on a goal AUC of 400-600.     Renal function is currently stable.    Current vancomycin dose: 1250 mg given every 12 hours    Estimated vancomycin AUC on current dose: 540 mg/L.hr     Visit Vitals  /66 (BP Location: Right arm, Patient Position: Lying)   Pulse 74   Temp 36.1 °C (97 °F) (Temporal)   Resp 16        Lab Results   Component Value Date    CREATININE 0.45 (L) 2025    CREATININE 0.56 2025    CREATININE 0.47 (L) 2025    CREATININE 0.46 (L) 2025        Patient weight is as follows:   Vitals:    25 0736   Weight: 73.4 kg (161 lb 13.1 oz)       Cultures:  Susceptibility data for the encounter in last 14 days.  Collected Specimen Info Organism   25 Swab from Anterior Nares Methicillin Resistant Staphylococcus aureus (MRSA)        I/O last 3 completed shifts:  In: 1500 (20.4 mL/kg) [P.O.:500; IV Piggyback:1000]  Out: 2850 (38.8 mL/kg) [Urine:2850 (1.1 mL/kg/hr)]  Weight: 73.4 kg   I/O during current shift:  No intake/output data recorded.    Temp (24hrs), Av.3 °C (97.3 °F), Min:35.9 °C (96.6 °F), Max:36.9 °C (98.4 °F)      Assessment/Plan    Within goal AUC range. Continue current vancomycin regimen.    This dosing regimen is predicted by [employer] to result in the following pharmacokinetic parameters:  Loading dose: N/A  Regimen: 1250 mg IV every 12 hours.  Start time: 10:42 on 2025  Exposure target: AUC24 (range) 400-600 mg/L.hr   [id number]: 542 mg/L.hr  AUC24,ss: 540 mg/L.hr  Probability of AUC24 > 400: 100 %  Ctrough,ss: 16.1 mg/L  Probability of Ctrough,ss > 20: 5 %      The next level will be obtained on 6/3 at 0500. May be obtained sooner if clinically indicated.   Will continue to monitor renal  function daily while on vancomycin and order serum creatinine at least every 48 hours if not already ordered.  Follow for continued vancomycin needs, clinical response, and signs/symptoms of toxicity.       Carolann Ruelas, PharmD

## 2025-06-01 LAB
ALBUMIN SERPL BCP-MCNC: 2.5 G/DL (ref 3.4–5)
ALP SERPL-CCNC: 142 U/L (ref 33–136)
ALT SERPL W P-5'-P-CCNC: 13 U/L (ref 10–52)
ANION GAP SERPL CALC-SCNC: 13 MMOL/L (ref 10–20)
AST SERPL W P-5'-P-CCNC: 16 U/L (ref 9–39)
BASOPHILS # BLD AUTO: 0.04 X10*3/UL (ref 0–0.1)
BASOPHILS NFR BLD AUTO: 0.5 %
BILIRUB SERPL-MCNC: 0.2 MG/DL (ref 0–1.2)
BUN SERPL-MCNC: 8 MG/DL (ref 6–23)
CALCIUM SERPL-MCNC: 7.8 MG/DL (ref 8.6–10.6)
CHLORIDE SERPL-SCNC: 100 MMOL/L (ref 98–107)
CO2 SERPL-SCNC: 27 MMOL/L (ref 21–32)
CREAT SERPL-MCNC: 0.42 MG/DL (ref 0.5–1.3)
EGFRCR SERPLBLD CKD-EPI 2021: >90 ML/MIN/1.73M*2
EOSINOPHIL # BLD AUTO: 0.52 X10*3/UL (ref 0–0.7)
EOSINOPHIL NFR BLD AUTO: 6.7 %
ERYTHROCYTE [DISTWIDTH] IN BLOOD BY AUTOMATED COUNT: 19 % (ref 11.5–14.5)
GLUCOSE SERPL-MCNC: 93 MG/DL (ref 74–99)
HCT VFR BLD AUTO: 31.2 % (ref 41–52)
HGB BLD-MCNC: 9 G/DL (ref 13.5–17.5)
IMM GRANULOCYTES # BLD AUTO: 0.03 X10*3/UL (ref 0–0.7)
IMM GRANULOCYTES NFR BLD AUTO: 0.4 % (ref 0–0.9)
LYMPHOCYTES # BLD AUTO: 0.63 X10*3/UL (ref 1.2–4.8)
LYMPHOCYTES NFR BLD AUTO: 8.1 %
MCH RBC QN AUTO: 25 PG (ref 26–34)
MCHC RBC AUTO-ENTMCNC: 28.8 G/DL (ref 32–36)
MCV RBC AUTO: 87 FL (ref 80–100)
MONOCYTES # BLD AUTO: 0.43 X10*3/UL (ref 0.1–1)
MONOCYTES NFR BLD AUTO: 5.5 %
NEUTROPHILS # BLD AUTO: 6.12 X10*3/UL (ref 1.2–7.7)
NEUTROPHILS NFR BLD AUTO: 78.8 %
NRBC BLD-RTO: 0 /100 WBCS (ref 0–0)
PLATELET # BLD AUTO: 380 X10*3/UL (ref 150–450)
POTASSIUM SERPL-SCNC: 3.7 MMOL/L (ref 3.5–5.3)
PROT SERPL-MCNC: 5.4 G/DL (ref 6.4–8.2)
RBC # BLD AUTO: 3.6 X10*6/UL (ref 4.5–5.9)
SODIUM SERPL-SCNC: 136 MMOL/L (ref 136–145)
WBC # BLD AUTO: 7.8 X10*3/UL (ref 4.4–11.3)

## 2025-06-01 PROCEDURE — 2500000005 HC RX 250 GENERAL PHARMACY W/O HCPCS: Performed by: STUDENT IN AN ORGANIZED HEALTH CARE EDUCATION/TRAINING PROGRAM

## 2025-06-01 PROCEDURE — 2500000001 HC RX 250 WO HCPCS SELF ADMINISTERED DRUGS (ALT 637 FOR MEDICARE OP)

## 2025-06-01 PROCEDURE — 2500000004 HC RX 250 GENERAL PHARMACY W/ HCPCS (ALT 636 FOR OP/ED): Mod: JZ | Performed by: PHYSICIAN ASSISTANT

## 2025-06-01 PROCEDURE — 2500000001 HC RX 250 WO HCPCS SELF ADMINISTERED DRUGS (ALT 637 FOR MEDICARE OP): Performed by: PHYSICIAN ASSISTANT

## 2025-06-01 PROCEDURE — 2500000001 HC RX 250 WO HCPCS SELF ADMINISTERED DRUGS (ALT 637 FOR MEDICARE OP): Performed by: STUDENT IN AN ORGANIZED HEALTH CARE EDUCATION/TRAINING PROGRAM

## 2025-06-01 PROCEDURE — 1170000001 HC PRIVATE ONCOLOGY ROOM DAILY

## 2025-06-01 PROCEDURE — 94640 AIRWAY INHALATION TREATMENT: CPT

## 2025-06-01 PROCEDURE — 2500000001 HC RX 250 WO HCPCS SELF ADMINISTERED DRUGS (ALT 637 FOR MEDICARE OP): Performed by: NURSE PRACTITIONER

## 2025-06-01 PROCEDURE — 80053 COMPREHEN METABOLIC PANEL: CPT | Performed by: NURSE PRACTITIONER

## 2025-06-01 PROCEDURE — 85025 COMPLETE CBC W/AUTO DIFF WBC: CPT | Performed by: NURSE PRACTITIONER

## 2025-06-01 PROCEDURE — 2500000004 HC RX 250 GENERAL PHARMACY W/ HCPCS (ALT 636 FOR OP/ED)

## 2025-06-01 PROCEDURE — 2500000002 HC RX 250 W HCPCS SELF ADMINISTERED DRUGS (ALT 637 FOR MEDICARE OP, ALT 636 FOR OP/ED): Performed by: STUDENT IN AN ORGANIZED HEALTH CARE EDUCATION/TRAINING PROGRAM

## 2025-06-01 PROCEDURE — 2500000004 HC RX 250 GENERAL PHARMACY W/ HCPCS (ALT 636 FOR OP/ED): Mod: JZ | Performed by: NURSE PRACTITIONER

## 2025-06-01 PROCEDURE — 99233 SBSQ HOSP IP/OBS HIGH 50: CPT | Performed by: PHYSICIAN ASSISTANT

## 2025-06-01 RX ADMIN — Medication 2 L/MIN: at 09:25

## 2025-06-01 RX ADMIN — Medication 2 L/MIN: at 21:50

## 2025-06-01 RX ADMIN — SENNOSIDES AND DOCUSATE SODIUM 2 TABLET: 50; 8.6 TABLET ORAL at 21:44

## 2025-06-01 RX ADMIN — ACETAMINOPHEN 650 MG: 325 TABLET, FILM COATED ORAL at 18:20

## 2025-06-01 RX ADMIN — OXYCODONE HYDROCHLORIDE 10 MG: 10 TABLET ORAL at 22:40

## 2025-06-01 RX ADMIN — CEFTRIAXONE SODIUM 2 G: 2 INJECTION, SOLUTION INTRAVENOUS at 22:40

## 2025-06-01 RX ADMIN — ATORVASTATIN CALCIUM 80 MG: 80 TABLET, FILM COATED ORAL at 09:27

## 2025-06-01 RX ADMIN — GABAPENTIN 300 MG: 300 CAPSULE ORAL at 21:44

## 2025-06-01 RX ADMIN — OXYCODONE HYDROCHLORIDE 10 MG: 10 TABLET, FILM COATED, EXTENDED RELEASE ORAL at 21:44

## 2025-06-01 RX ADMIN — CYCLOBENZAPRINE 10 MG: 10 TABLET, FILM COATED ORAL at 21:44

## 2025-06-01 RX ADMIN — VANCOMYCIN HYDROCHLORIDE 1250 MG: 1.25 INJECTION, POWDER, LYOPHILIZED, FOR SOLUTION INTRAVENOUS at 10:05

## 2025-06-01 RX ADMIN — ACETAMINOPHEN 650 MG: 325 TABLET, FILM COATED ORAL at 22:40

## 2025-06-01 RX ADMIN — ACETAMINOPHEN 650 MG: 325 TABLET, FILM COATED ORAL at 03:00

## 2025-06-01 RX ADMIN — OXYCODONE HYDROCHLORIDE 5 MG: 5 TABLET ORAL at 12:12

## 2025-06-01 RX ADMIN — LEVOTHYROXINE SODIUM 75 MCG: 0.07 TABLET ORAL at 09:26

## 2025-06-01 RX ADMIN — GABAPENTIN 300 MG: 300 CAPSULE ORAL at 09:27

## 2025-06-01 RX ADMIN — METOPROLOL SUCCINATE 25 MG: 25 TABLET, EXTENDED RELEASE ORAL at 09:26

## 2025-06-01 RX ADMIN — VANCOMYCIN HYDROCHLORIDE 1250 MG: 1.25 INJECTION, POWDER, LYOPHILIZED, FOR SOLUTION INTRAVENOUS at 22:40

## 2025-06-01 RX ADMIN — OXYCODONE HYDROCHLORIDE 10 MG: 10 TABLET, FILM COATED, EXTENDED RELEASE ORAL at 09:26

## 2025-06-01 RX ADMIN — LORAZEPAM 1 MG: 1 TABLET ORAL at 21:48

## 2025-06-01 RX ADMIN — PANTOPRAZOLE SODIUM 40 MG: 40 TABLET, DELAYED RELEASE ORAL at 09:27

## 2025-06-01 RX ADMIN — ENOXAPARIN SODIUM 40 MG: 100 INJECTION SUBCUTANEOUS at 15:27

## 2025-06-01 RX ADMIN — CEFTRIAXONE SODIUM 2 G: 2 INJECTION, SOLUTION INTRAVENOUS at 09:26

## 2025-06-01 RX ADMIN — TIOTROPIUM BROMIDE INHALATION SPRAY 2 PUFF: 3.12 SPRAY, METERED RESPIRATORY (INHALATION) at 11:09

## 2025-06-01 RX ADMIN — ACETAMINOPHEN 650 MG: 325 TABLET, FILM COATED ORAL at 09:26

## 2025-06-01 RX ADMIN — OXYCODONE HYDROCHLORIDE 10 MG: 10 TABLET ORAL at 03:55

## 2025-06-01 RX ADMIN — CYCLOBENZAPRINE 10 MG: 10 TABLET, FILM COATED ORAL at 09:26

## 2025-06-01 RX ADMIN — CYCLOBENZAPRINE 10 MG: 10 TABLET, FILM COATED ORAL at 15:27

## 2025-06-01 RX ADMIN — Medication 25 MCG: at 09:27

## 2025-06-01 RX ADMIN — ACETAMINOPHEN 650 MG: 325 TABLET, FILM COATED ORAL at 15:27

## 2025-06-01 RX ADMIN — GABAPENTIN 300 MG: 300 CAPSULE ORAL at 15:27

## 2025-06-01 ASSESSMENT — PAIN SCALES - GENERAL
PAINLEVEL_OUTOF10: 8
PAINLEVEL_OUTOF10: 9
PAINLEVEL_OUTOF10: 0 - NO PAIN
PAINLEVEL_OUTOF10: 6
PAINLEVEL_OUTOF10: 8
PAINLEVEL_OUTOF10: 9
PAINLEVEL_OUTOF10: 10 - WORST POSSIBLE PAIN
PAINLEVEL_OUTOF10: 8

## 2025-06-01 ASSESSMENT — PAIN - FUNCTIONAL ASSESSMENT
PAIN_FUNCTIONAL_ASSESSMENT: 0-10
PAIN_FUNCTIONAL_ASSESSMENT: UNABLE TO SELF-REPORT
PAIN_FUNCTIONAL_ASSESSMENT: 0-10

## 2025-06-01 ASSESSMENT — COGNITIVE AND FUNCTIONAL STATUS - GENERAL
CLIMB 3 TO 5 STEPS WITH RAILING: A LOT
HELP NEEDED FOR BATHING: A LOT
MOBILITY SCORE: 20
WALKING IN HOSPITAL ROOM: A LITTLE
STANDING UP FROM CHAIR USING ARMS: A LITTLE
DRESSING REGULAR UPPER BODY CLOTHING: A LITTLE
DAILY ACTIVITIY SCORE: 19
DRESSING REGULAR LOWER BODY CLOTHING: A LOT

## 2025-06-01 NOTE — PROGRESS NOTES
"Miguel Lofton is a 69 y.o. male on day 12 of admission presenting with Osteomyelitis.    Subjective   No acute event.    Objective     Physical Exam  Awake Ox3  RUE D5 B5 T5 HG/IO 5  RLE HF4+ KE5 DF/PF 5  LUE D5 B5 T5 HG/IO 5  LLE HF4 KE5 DF/PF 5  Abd non tender  Spine incision is clean, dry, intact, healing well    Last Recorded Vitals  Blood pressure 103/63, pulse 97, temperature 37.1 °C (98.8 °F), temperature source Temporal, resp. rate 16, height 1.727 m (5' 8\"), weight 73.4 kg (161 lb 13.1 oz), SpO2 93%.  Intake/Output last 3 Shifts:  I/O last 3 completed shifts:  In: 150 (2 mL/kg) [P.O.:100; IV Piggyback:50]  Out: 1500 (20.4 mL/kg) [Urine:1500 (0.6 mL/kg/hr)]  Weight: 73.4 kg     Relevant Results  Lab Results   Component Value Date    WBC 7.8 06/01/2025    HGB 9.0 (L) 06/01/2025    HCT 31.2 (L) 06/01/2025    MCV 87 06/01/2025     06/01/2025     Lab Results   Component Value Date    GLUCOSE 93 06/01/2025    CALCIUM 7.8 (L) 06/01/2025     06/01/2025    K 3.7 06/01/2025    CO2 27 06/01/2025     06/01/2025    BUN 8 06/01/2025    CREATININE 0.42 (L) 06/01/2025       Assessment & Plan  Osteomyelitis    Miguel Lofton is a 69 year old male with h/o HTN,  HLD, CAD (on ASA), hypoT, COPD, anxiety, LBP (annual radioablations), prostate cancer (s/p prostatectomy 2018), base of tongue cancer (dx 2022, s/p radiation/chemo, in remission), diastolic CHF, p/w mid back pain x4 months, low back pain w BLE radiculopathy and weakness x3 weeks, 5/13 CT chest T5 compression fx, MRI TS T5-T6 compression fx w disc/osteo, epidural phlegmon w moderate canal stenosis   5/21 s/p T5-6 transpedicular decompression, T3-8 fusion, OR cx neg, 5/25 drain dc'd, 5/30 MRI C/L Spine moderate stenosis at C4-5, C5-6, L2-3 disc/osteo w ventral phlegmon w moderate canal stenosis    Plan:  Rommel primary  Agree with IR drainage drainage of psoas abscess on Monday  We will follow closely to assess pt's clinical improvement  Further plan " for intervention pending IR biopsy/drainage  Appreciate ID recs          Ceferino Lockhart MD

## 2025-06-01 NOTE — CARE PLAN
The patient's goals for the shift include Pain management    The clinical goals for the shift include Patient will report decrease pain < 8 throughout the shift 6/1/25 @      Problem: Skin  Goal: Decreased wound size/increased tissue granulation at next dressing change  Outcome: Progressing  Flowsheets (Taken 6/1/2025 0418)  Decreased wound size/increased tissue granulation at next dressing change: Promote sleep for wound healing  Goal: Participates in plan/prevention/treatment measures  Outcome: Progressing  Flowsheets (Taken 6/1/2025 0418)  Participates in plan/prevention/treatment measures: Increase activity/out of bed for meals  Goal: Prevent/manage excess moisture  Outcome: Progressing  Flowsheets (Taken 6/1/2025 0418)  Prevent/manage excess moisture: Monitor for/manage infection if present  Goal: Prevent/minimize sheer/friction injuries  Outcome: Progressing  Flowsheets (Taken 6/1/2025 0418)  Prevent/minimize sheer/friction injuries: Increase activity/out of bed for meals  Goal: Promote/optimize nutrition  Outcome: Progressing  Flowsheets (Taken 6/1/2025 0418)  Promote/optimize nutrition: Offer water/supplements/favorite foods  Goal: Promote skin healing  Outcome: Progressing  Flowsheets (Taken 6/1/2025 0418)  Promote skin healing: Assess skin/pad under line(s)/device(s)     Problem: Fall/Injury  Goal: Not fall by end of shift  Outcome: Progressing  Goal: Be free from injury by end of the shift  Outcome: Progressing  Goal: Verbalize understanding of personal risk factors for fall in the hospital  Outcome: Progressing  Goal: Verbalize understanding of risk factor reduction measures to prevent injury from fall in the home  Outcome: Progressing  Goal: Use assistive devices by end of the shift  Outcome: Progressing  Goal: Pace activities to prevent fatigue by end of the shift  Outcome: Progressing     Problem: Safety - Adult  Goal: Free from fall injury  Outcome: Progressing     Problem: Discharge Planning  Goal:  Discharge to home or other facility with appropriate resources  Outcome: Progressing     Problem: Chronic Conditions and Co-morbidities  Goal: Patient's chronic conditions and co-morbidity symptoms are monitored and maintained or improved  Outcome: Progressing     Problem: Nutrition  Goal: Nutrient intake appropriate for maintaining nutritional needs  Outcome: Progressing     Problem: Pain  Goal: Takes deep breaths with improved pain control throughout the shift  Outcome: Progressing  Goal: Turns in bed with improved pain control throughout the shift  Outcome: Progressing  Goal: Walks with improved pain control throughout the shift  Outcome: Progressing  Goal: Performs ADL's with improved pain control throughout shift  Outcome: Progressing  Goal: Participates in PT with improved pain control throughout the shift  Outcome: Progressing  Goal: Free from opioid side effects throughout the shift  Outcome: Progressing  Goal: Free from acute confusion related to pain meds throughout the shift  Outcome: Progressing

## 2025-06-01 NOTE — ASSESSMENT & PLAN NOTE
Miguel Lofton is a 69 y.o. male patient, with PMHx of prostate cancer, base of the tongue cancer, HTN, HLD, hypothyroidism, COPD, anxiety who had a CT chest for annual lung cancer screening on 5/13 that showed possible compression fx of T5 and T6. Imaging was reviewed at office visit 5/19 and was advised he present to ED for MRI. He presented to [facility] 5/19 where MRI T spine (5/19) showed acute discitis/osteomyelitis at T5-6 with marked surrounding phlegmon and circumferential epidural phlegmon resulting in moderate to severe spinal canal stenosis. No epidural abscess. He was transferred to [facility] 5/20 for NSGY eval. NSGY consulted 5/20, pt taken to the OR 5/21 for T5-6 laminectomy and T3-8 fusion. ID consulted 5/21 and rec Vanc and Ceftriaxone (5/22- planned stop 7/16 for total 8 week atb course). OR surg path (5/22) +osteomyelitis. OR tissue/wound culture (5/22) neg, final. OR fungal culture (5/22) NGTD. PT/OT consulted 5/22 and rec home care. S/p 1unit PRBC 5/25 for Hgb 7.6 - hgb incremented appropriately. MRI L&C spine under anesthesia done which shows moderate stenosis at C4-5, C5-6, L2-3 disc/osteo w ventral phlegmon w moderate canal stenosis, neurosurgery re-engaged and plan to OR sometime next week. MRI spine also showed  Large abscess within the left iliopsoas muscle measuring up to 9.6 cm in craniocaudal dimension and ID was re-engaged who rec IR drainage of abscess, plan for Monday 6/2.      # Updates 6/1:  - MRI C/L Spine moderate stenosis at C4-5, C5-6, L2-3 disc/osteo w ventral phlegmon w moderate canal stenosis  - Neuro/surg following plan to follow closely for clinical improvement   - Holding ASA for possible procedure   - Cont Ceftriaxone 2g to q12hrs and cont Vanco, ID following   - Plan IR abscess drainage and culture on Monday 6/2, NPO after MN, abscess cult ordered      # Acute on chronic back pain   # Compression fracture   # Vertebral osteomyelitis  # Phlegmonous tissue c/f  infection  - CT Chest (OSH): considerable deformity of what is felt to be T5 which may be related to a compression fracture of the caudal endplate. There is some compression of the superior endplate of T6.  -  MRI T spine (5/19 at OSH): Acute discitis/osteomyelitis at T5-6 with marked surrounding phlegmon and circumferential epidural phlegmon resulting in moderate to severe spinal canal stenosis. No epidural abscess. Requested for image import from [facility]    - CRP 7.35, ESR 90 (5/20); CRP 5.94, ESR 74 (5/22)  - s/p OR 5/21/25 with neurosurgery for T5-6 laminectomy and T3-8 fusion. Per neurosurgery, phlegmon appeared infectious without purulence  - OR surg path (5/22) +osteomyelitis, neg for carcinoma  - OR tissue/wound culture/ fungal culture 5/22) neg, final  - 5/20 Blood cultures x2 NG, Urine culture negative  - +MRSA nares 5/24  - Prevena removed POD7 with dressing (5/28)  - ID consulted 5/21; rec Vanc and Ceftriaxone (5/22- planned stop 7/16) for total 8 week atb course)--5/30: ID re-engaged for new finding of Large abscess within the left iliopsoas muscle measuring up to 9.6 cm in craniocaudal dimension seen on MRI. Rec Ceftriaxone 2g q12hrs and Vanco----> tx course to be determined   - Reached out to ID (5/28) about abx cost for patient (approx ~$1000/week), ID states they will look into the case but most likely the abx course cannot change due to osteomyelitis   - Weekly CBC, CMP, CRP ordered outpt with results to be faxed to [phone] ATTN: Dr. Dyer once discharged per ID  - Supportive onc consulted 5/25 for uncontrolled pain, recs start ER Oxy 10mg BID and increased doris 300mg to TID, changed Miralax from daily to PRN, and Senna from 3tabs to 2tabs, added Zofran PRN  - PT/OT rec home care, PICC line placed 5/24  - 5/30 MRI C/L Spine moderate stenosis at C4-5, C5-6, L2-3 disc/osteo w ventral phlegmon w moderate canal stenosis  - Neuro/surg re-engaged for finding on MRI, Neuro/surg following plan  to follow closely for clinical improvement   - Plan IR abscess drainage on 6/2, cults ordered       # Abdominal pain-- resolved   - Reported abdominal pain above old PEG site on 5/22  - Likely 2/2 prone positioning after OR +/- developing ileus  - KUB (5/22): moderate colonic stool burden, possible post-op ileus  - having loose Bms now LBM 5/26, stopped lactulose. Bowel regimen with DocuSenna 2tabs BID, Miralax PRN, Dulcolax sup daily PRN    # Base of the tongue Ca/Dysphasia   - Dx with 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 Gamez--last seen 12/18/2024: laryngeal structures are noted for grossly normal appearance; true vocal cords, false vocal cords, remaining structures, including epiglottis, piriform sinuses and base of tongue are all grossly normal; there is no evidence of gross mass nodule, polyp, tumor or other defect   - SLP eval done no aspiration, rec soft/ thin liquid diet   - Follows yearly with Dr. Lucero      # Prostate Ca  - s/p radical prostatectomy by Dr Lang Mcneil in 2018  - [id number], Gleasons 7, + right anterior margin  - PSMA-PET done on 12/6/2023 showed no PSMA uptake within the post-surgical bed; there is PSMA uptake of a left common iliac retroperitoneal lymph node SUV 5.6 and PSMA uptake of a right pelvic sidewall lymph node SUV 3.5  - Dr Rangel recommended radiation therapy. Patient instead went to [facility] and was treated by Dr Zackary Ely (4600 cGy in 23 fractions to the pelvis and LN followed by [place] to prostate bed and LN to 2500 cGy in 10 fractions)  - Since completing radiation, has been experiencing significant fatigue  - He is now following with urologist Dr. Boyd  - For pain management, follows with [facility] palliative care team   - PSA < 0.01 (2/19/25)--> PSA <0.10 (5/20)     # COPD  # Smoking Hx  # Lung Ca screening  - Follows with pulmonologist   Foster   - PFTs 03/2025: FEV1 72% FEV1/FVC 0.63  - Did not tolerate Breztri  - Continue home Spiriva 2 puffs daily     # HTN/HLD  - Continue home Metop Succs 25mg daily and Lipitor 80mg nightly  - Hold ASA (5/30)  for possible OR      # Hypothyroidism  - Last TSH 4.49 (5/12)  - Continue home Synthroid 75mcg daily     # Anemia  - Likely anemia of chronic disease +/- post-op  - BL Hgb ~11 after cancer tx; Hgb 9.9 (5/20)--> 8.3 (5/22) post-op--> 8.1 (5/23)--> 7.6 (5/25)-->s/p 1 unit 5/25, hgb stable 8.7 (5/29)  - Ferritin 305, Folate 7.5, Iron 41, TIBC 269, %Sat 15, UIBC 228, Vit B12 1,342 (5/20)  - 5/25 Iron 30, TIBC 204, folate 3.9   - Stopped home Vit B12 sup 5/22 d/t elevated B12 level  - will hold off on folic acid/iron replacement at this time given acute phase with post-op period, plan for outpatient monitoring of hgb and anemia labs/ongoing workup     # DVT prophy:   - Lovenox, was held for neurosurgery and resumed 5/22 PM per ortho     # DISPO:  - Full code, confirmed with patient on admission   - NOK: Eleni Lofton (wife) [phone]  - DC pending neuro/surg, ID recs   - SHEN FUV 6/9, Dr. Lucero (heme onc) FUV 8/8, NSGY FUV 8/11     I spent >60 minutes in the professional and overall care of this patient  Assessment and plan as above discussed with attending physician Dr. Pro Valles

## 2025-06-01 NOTE — PROGRESS NOTES
"Miguel Lofton is a 69 y.o. male on day 12 of admission presenting with Osteomyelitis.    Subjective   Seen this morning at bedside, pain is better controlled today, only have pain when he weight bears, encouraged to take PRN pain meds. Discussed  plan  IR abscess drainage on Monday. Also discussed with patient that he will likely need to go to SNF on discharge.    Denies chest pain, SOB, N/V/DC, fever, chills, s/sx of bleeding, abdominal pain, dysuria, saddle anesthesia.   Objective     Physical Exam  Vitals reviewed.   Constitutional:       Appearance: Normal appearance.   HENT:      Head: Normocephalic.   Cardiovascular:      Rate and Rhythm: Normal rate and regular rhythm.      Pulses: Normal pulses.   Pulmonary:      Effort: Pulmonary effort is normal.   Abdominal:      Palpations: Abdomen is soft.   Skin:     Capillary Refill: Capillary refill takes less than 2 seconds.      Comments: Surgical site healing well, no drainage   Neurological:      General: No focal deficit present.      Mental Status: He is alert and oriented to person, place, and time.   Psychiatric:         Mood and Affect: Mood normal.       Last Recorded Vitals  Blood pressure 105/63, pulse 86, temperature 37.3 °C (99.1 °F), temperature source Temporal, resp. rate 18, height 1.727 m (5' 8\"), weight 73.4 kg (161 lb 13.1 oz), SpO2 95%.  Intake/Output last 3 Shifts:  I/O last 3 completed shifts:  In: 400 (5.4 mL/kg) [P.O.:400]  Out: 800 (10.9 mL/kg) [Urine:800 (0.5 mL/kg/hr)]  Weight: 73.4 kg     Relevant Results    This patient has a central line   Reason for the central line remaining today? IV atb  Assessment & Plan  Osteomyelitis  Miguel Lofton is a 69 y.o. male patient, with PMHx of prostate cancer, base of the tongue cancer, HTN, HLD, hypothyroidism, COPD, anxiety who had a CT chest for annual lung cancer screening on 5/13 that showed possible compression fx of T5 and T6. Imaging was reviewed at office visit 5/19 and was advised he " present to ED for MRI. He presented to [facility] 5/19 where MRI T spine (5/19) showed acute discitis/osteomyelitis at T5-6 with marked surrounding phlegmon and circumferential epidural phlegmon resulting in moderate to severe spinal canal stenosis. No epidural abscess. He was transferred to [facility] 5/20 for NSGY eval. NSGY consulted 5/20, pt taken to the OR 5/21 for T5-6 laminectomy and T3-8 fusion. ID consulted 5/21 and rec Vanc and Ceftriaxone (5/22- planned stop 7/16 for total 8 week atb course). OR surg path (5/22) +osteomyelitis. OR tissue/wound culture (5/22) neg, final. OR fungal culture (5/22) NGTD. PT/OT consulted 5/22 and rec home care. S/p 1unit PRBC 5/25 for Hgb 7.6 - hgb incremented appropriately. MRI L&C spine under anesthesia done which shows moderate stenosis at C4-5, C5-6, L2-3 disc/osteo w ventral phlegmon w moderate canal stenosis, neurosurgery re-engaged and plan to OR sometime next week. MRI spine also showed  Large abscess within the left iliopsoas muscle measuring up to 9.6 cm in craniocaudal dimension and ID was re-engaged who rec IR drainage of abscess, plan for Monday 6/2.      # Updates 6/1:  - MRI C/L Spine moderate stenosis at C4-5, C5-6, L2-3 disc/osteo w ventral phlegmon w moderate canal stenosis  - Neuro/surg following plan to follow closely for clinical improvement   - Holding ASA for possible procedure   - Cont Ceftriaxone 2g to q12hrs and cont Vanco, ID following   - Plan IR abscess drainage and culture on Monday 6/2, NPO after MN, abscess cult ordered      # Acute on chronic back pain   # Compression fracture   # Vertebral osteomyelitis  # Phlegmonous tissue c/f infection  - CT Chest (OSH): considerable deformity of what is felt to be T5 which may be related to a compression fracture of the caudal endplate. There is some compression of the superior endplate of T6.  -  MRI T spine (5/19 at OSH): Acute discitis/osteomyelitis at T5-6 with marked surrounding phlegmon and  circumferential epidural phlegmon resulting in moderate to severe spinal canal stenosis. No epidural abscess. Requested for image import from [facility]    - CRP 7.35, ESR 90 (5/20); CRP 5.94, ESR 74 (5/22)  - s/p OR 5/21/25 with neurosurgery for T5-6 laminectomy and T3-8 fusion. Per neurosurgery, phlegmon appeared infectious without purulence  - OR surg path (5/22) +osteomyelitis, neg for carcinoma  - OR tissue/wound culture/ fungal culture 5/22) neg, final  - 5/20 Blood cultures x2 NG, Urine culture negative  - +MRSA nares 5/24  - Prevena removed POD7 with dressing (5/28)  - ID consulted 5/21; rec Vanc and Ceftriaxone (5/22- planned stop 7/16) for total 8 week atb course)--5/30: ID re-engaged for new finding of Large abscess within the left iliopsoas muscle measuring up to 9.6 cm in craniocaudal dimension seen on MRI. Rec Ceftriaxone 2g q12hrs and Vanco----> tx course to be determined   - Reached out to ID (5/28) about abx cost for patient (approx ~$1000/week), ID states they will look into the case but most likely the abx course cannot change due to osteomyelitis   - Weekly CBC, CMP, CRP ordered outpt with results to be faxed to [phone] ATTN: Dr. Dyer once discharged per ID  - Supportive onc consulted 5/25 for uncontrolled pain, recs start ER Oxy 10mg BID and increased doris 300mg to TID, changed Miralax from daily to PRN, and Senna from 3tabs to 2tabs, added Zofran PRN  - PT/OT rec home care, PICC line placed 5/24  - 5/30 MRI C/L Spine moderate stenosis at C4-5, C5-6, L2-3 disc/osteo w ventral phlegmon w moderate canal stenosis  - Neuro/surg re-engaged for finding on MRI, Neuro/surg following plan to follow closely for clinical improvement   - Plan IR abscess drainage on 6/2, cults ordered       # Abdominal pain-- resolved   - Reported abdominal pain above old PEG site on 5/22  - Likely 2/2 prone positioning after OR +/- developing ileus  - KUB (5/22): moderate colonic stool burden, possible post-op  ileus  - having loose Bms now LBM 5/26, stopped lactulose. Bowel regimen with DocuSenna 2tabs BID, Miralax PRN, Dulcolax sup daily PRN    # Base of the tongue Ca/Dysphasia   - Dx with 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 Gamez--last seen 12/18/2024: laryngeal structures are noted for grossly normal appearance; true vocal cords, false vocal cords, remaining structures, including epiglottis, piriform sinuses and base of tongue are all grossly normal; there is no evidence of gross mass nodule, polyp, tumor or other defect   - SLP eval done no aspiration, rec soft/ thin liquid diet   - Follows yearly with Dr. Lucero      # Prostate Ca  - s/p radical prostatectomy by Dr Lang Mcneil in 2018  - [id number], Gleasons 7, + right anterior margin  - PSMA-PET done on 12/6/2023 showed no PSMA uptake within the post-surgical bed; there is PSMA uptake of a left common iliac retroperitoneal lymph node SUV 5.6 and PSMA uptake of a right pelvic sidewall lymph node SUV 3.5  - Dr Rangel recommended radiation therapy. Patient instead went to [facility] and was treated by Dr Zackary Ely (4600 cGy in 23 fractions to the pelvis and LN followed by [place] to prostate bed and LN to 2500 cGy in 10 fractions)  - Since completing radiation, has been experiencing significant fatigue  - He is now following with urologist Dr. Boyd  - For pain management, follows with [facility] palliative care team   - PSA < 0.01 (2/19/25)--> PSA <0.10 (5/20)     # COPD  # Smoking Hx  # Lung Ca screening  - Follows with pulmonologist Dr Hutchison   - PFTs 03/2025: FEV1 72% FEV1/FVC 0.63  - Did not tolerate Breztri  - Continue home Spiriva 2 puffs daily     # HTN/HLD  - Continue home Metop Succs 25mg daily and Lipitor 80mg nightly  - Hold ASA (5/30)  for possible OR      # Hypothyroidism  - Last TSH 4.49 (5/12)  - Continue home Synthroid 75mcg  daily     # Anemia  - Likely anemia of chronic disease +/- post-op  - BL Hgb ~11 after cancer tx; Hgb 9.9 (5/20)--> 8.3 (5/22) post-op--> 8.1 (5/23)--> 7.6 (5/25)-->s/p 1 unit 5/25, hgb stable 8.7 (5/29)  - Ferritin 305, Folate 7.5, Iron 41, TIBC 269, %Sat 15, UIBC 228, Vit B12 1,342 (5/20)  - 5/25 Iron 30, TIBC 204, folate 3.9   - Stopped home Vit B12 sup 5/22 d/t elevated B12 level  - will hold off on folic acid/iron replacement at this time given acute phase with post-op period, plan for outpatient monitoring of hgb and anemia labs/ongoing workup     # DVT prophy:   - Lovenox, was held for neurosurgery and resumed 5/22 PM per ortho     # DISPO:  - Full code, confirmed with patient on admission   - NOK: Eleni Lofton (wife) [phone]  - DC pending neuro/surg, ID recs   - NSGY FUV 6/9, Dr. Lucero (heme onc) FUV 8/8, NSGY FUV 8/11     I spent >60 minutes in the professional and overall care of this patient  Assessment and plan as above discussed with attending physician Dr. Pro Collins PA-C

## 2025-06-02 ENCOUNTER — APPOINTMENT (OUTPATIENT)
Dept: RADIOLOGY | Facility: HOSPITAL | Age: 70
DRG: 447 | End: 2025-06-02
Payer: COMMERCIAL

## 2025-06-02 LAB
ALBUMIN SERPL BCP-MCNC: 2.4 G/DL (ref 3.4–5)
ALP SERPL-CCNC: 136 U/L (ref 33–136)
ALT SERPL W P-5'-P-CCNC: 12 U/L (ref 10–52)
ANION GAP SERPL CALC-SCNC: 11 MMOL/L (ref 10–20)
APTT PPP: 31 SECONDS (ref 26–36)
AST SERPL W P-5'-P-CCNC: 17 U/L (ref 9–39)
BASOPHILS # BLD AUTO: 0.03 X10*3/UL (ref 0–0.1)
BASOPHILS NFR BLD AUTO: 0.4 %
BILIRUB SERPL-MCNC: 0.2 MG/DL (ref 0–1.2)
BUN SERPL-MCNC: 7 MG/DL (ref 6–23)
CALCIUM SERPL-MCNC: 7.9 MG/DL (ref 8.6–10.6)
CHLORIDE SERPL-SCNC: 101 MMOL/L (ref 98–107)
CO2 SERPL-SCNC: 30 MMOL/L (ref 21–32)
CREAT SERPL-MCNC: 0.45 MG/DL (ref 0.5–1.3)
EGFRCR SERPLBLD CKD-EPI 2021: >90 ML/MIN/1.73M*2
EOSINOPHIL # BLD AUTO: 0.56 X10*3/UL (ref 0–0.7)
EOSINOPHIL NFR BLD AUTO: 8.1 %
ERYTHROCYTE [DISTWIDTH] IN BLOOD BY AUTOMATED COUNT: 19.4 % (ref 11.5–14.5)
GLUCOSE SERPL-MCNC: 87 MG/DL (ref 74–99)
HCT VFR BLD AUTO: 29.3 % (ref 41–52)
HGB BLD-MCNC: 8.1 G/DL (ref 13.5–17.5)
IMM GRANULOCYTES # BLD AUTO: 0.02 X10*3/UL (ref 0–0.7)
IMM GRANULOCYTES NFR BLD AUTO: 0.3 % (ref 0–0.9)
INR PPP: 1 (ref 0.9–1.1)
LYMPHOCYTES # BLD AUTO: 0.73 X10*3/UL (ref 1.2–4.8)
LYMPHOCYTES NFR BLD AUTO: 10.6 %
MCH RBC QN AUTO: 24.3 PG (ref 26–34)
MCHC RBC AUTO-ENTMCNC: 27.6 G/DL (ref 32–36)
MCV RBC AUTO: 88 FL (ref 80–100)
MONOCYTES # BLD AUTO: 0.4 X10*3/UL (ref 0.1–1)
MONOCYTES NFR BLD AUTO: 5.8 %
NEUTROPHILS # BLD AUTO: 5.17 X10*3/UL (ref 1.2–7.7)
NEUTROPHILS NFR BLD AUTO: 74.8 %
NRBC BLD-RTO: 0 /100 WBCS (ref 0–0)
PLATELET # BLD AUTO: 325 X10*3/UL (ref 150–450)
POTASSIUM SERPL-SCNC: 3.7 MMOL/L (ref 3.5–5.3)
PROT SERPL-MCNC: 5.2 G/DL (ref 6.4–8.2)
PROTHROMBIN TIME: 11.2 SECONDS (ref 9.8–12.4)
RBC # BLD AUTO: 3.33 X10*6/UL (ref 4.5–5.9)
SODIUM SERPL-SCNC: 138 MMOL/L (ref 136–145)
WBC # BLD AUTO: 6.9 X10*3/UL (ref 4.4–11.3)

## 2025-06-02 PROCEDURE — 2500000002 HC RX 250 W HCPCS SELF ADMINISTERED DRUGS (ALT 637 FOR MEDICARE OP, ALT 636 FOR OP/ED): Performed by: STUDENT IN AN ORGANIZED HEALTH CARE EDUCATION/TRAINING PROGRAM

## 2025-06-02 PROCEDURE — 2500000001 HC RX 250 WO HCPCS SELF ADMINISTERED DRUGS (ALT 637 FOR MEDICARE OP): Performed by: NURSE PRACTITIONER

## 2025-06-02 PROCEDURE — 2500000004 HC RX 250 GENERAL PHARMACY W/ HCPCS (ALT 636 FOR OP/ED): Performed by: PHYSICIAN ASSISTANT

## 2025-06-02 PROCEDURE — 72128 CT CHEST SPINE W/O DYE: CPT | Performed by: STUDENT IN AN ORGANIZED HEALTH CARE EDUCATION/TRAINING PROGRAM

## 2025-06-02 PROCEDURE — 72131 CT LUMBAR SPINE W/O DYE: CPT | Performed by: STUDENT IN AN ORGANIZED HEALTH CARE EDUCATION/TRAINING PROGRAM

## 2025-06-02 PROCEDURE — 7100000010 HC PHASE TWO TIME - EACH INCREMENTAL 1 MINUTE

## 2025-06-02 PROCEDURE — 72131 CT LUMBAR SPINE W/O DYE: CPT

## 2025-06-02 PROCEDURE — 2500000004 HC RX 250 GENERAL PHARMACY W/ HCPCS (ALT 636 FOR OP/ED): Performed by: RADIOLOGY

## 2025-06-02 PROCEDURE — 2720000007 HC OR 272 NO HCPCS

## 2025-06-02 PROCEDURE — 99232 SBSQ HOSP IP/OBS MODERATE 35: CPT

## 2025-06-02 PROCEDURE — 99152 MOD SED SAME PHYS/QHP 5/>YRS: CPT

## 2025-06-02 PROCEDURE — 2500000001 HC RX 250 WO HCPCS SELF ADMINISTERED DRUGS (ALT 637 FOR MEDICARE OP)

## 2025-06-02 PROCEDURE — 49406 IMAGE CATH FLUID PERI/RETRO: CPT

## 2025-06-02 PROCEDURE — 49406 IMAGE CATH FLUID PERI/RETRO: CPT | Performed by: RADIOLOGY

## 2025-06-02 PROCEDURE — 2500000004 HC RX 250 GENERAL PHARMACY W/ HCPCS (ALT 636 FOR OP/ED)

## 2025-06-02 PROCEDURE — 2500000001 HC RX 250 WO HCPCS SELF ADMINISTERED DRUGS (ALT 637 FOR MEDICARE OP): Performed by: PHYSICIAN ASSISTANT

## 2025-06-02 PROCEDURE — 2500000001 HC RX 250 WO HCPCS SELF ADMINISTERED DRUGS (ALT 637 FOR MEDICARE OP): Performed by: STUDENT IN AN ORGANIZED HEALTH CARE EDUCATION/TRAINING PROGRAM

## 2025-06-02 PROCEDURE — 87070 CULTURE OTHR SPECIMN AEROBIC: CPT | Performed by: PHYSICIAN ASSISTANT

## 2025-06-02 PROCEDURE — 85610 PROTHROMBIN TIME: CPT | Performed by: PHYSICIAN ASSISTANT

## 2025-06-02 PROCEDURE — 99152 MOD SED SAME PHYS/QHP 5/>YRS: CPT | Performed by: RADIOLOGY

## 2025-06-02 PROCEDURE — 2500000004 HC RX 250 GENERAL PHARMACY W/ HCPCS (ALT 636 FOR OP/ED): Mod: TB | Performed by: STUDENT IN AN ORGANIZED HEALTH CARE EDUCATION/TRAINING PROGRAM

## 2025-06-02 PROCEDURE — 85025 COMPLETE CBC W/AUTO DIFF WBC: CPT | Performed by: NURSE PRACTITIONER

## 2025-06-02 PROCEDURE — 1170000001 HC PRIVATE ONCOLOGY ROOM DAILY

## 2025-06-02 PROCEDURE — 94640 AIRWAY INHALATION TREATMENT: CPT

## 2025-06-02 PROCEDURE — C1769 GUIDE WIRE: HCPCS

## 2025-06-02 PROCEDURE — 84075 ASSAY ALKALINE PHOSPHATASE: CPT | Performed by: NURSE PRACTITIONER

## 2025-06-02 PROCEDURE — 7100000009 HC PHASE TWO TIME - INITIAL BASE CHARGE

## 2025-06-02 PROCEDURE — C1729 CATH, DRAINAGE: HCPCS

## 2025-06-02 PROCEDURE — 2500000005 HC RX 250 GENERAL PHARMACY W/O HCPCS: Performed by: STUDENT IN AN ORGANIZED HEALTH CARE EDUCATION/TRAINING PROGRAM

## 2025-06-02 PROCEDURE — 72128 CT CHEST SPINE W/O DYE: CPT

## 2025-06-02 RX ORDER — FENTANYL CITRATE 50 UG/ML
INJECTION, SOLUTION INTRAMUSCULAR; INTRAVENOUS
Status: COMPLETED | OUTPATIENT
Start: 2025-06-02 | End: 2025-06-02

## 2025-06-02 RX ORDER — SODIUM CHLORIDE, SODIUM LACTATE, POTASSIUM CHLORIDE, CALCIUM CHLORIDE 600; 310; 30; 20 MG/100ML; MG/100ML; MG/100ML; MG/100ML
75 INJECTION, SOLUTION INTRAVENOUS CONTINUOUS
Status: ACTIVE | OUTPATIENT
Start: 2025-06-02 | End: 2025-06-03

## 2025-06-02 RX ORDER — MIDAZOLAM HYDROCHLORIDE 1 MG/ML
INJECTION INTRAMUSCULAR; INTRAVENOUS
Status: COMPLETED | OUTPATIENT
Start: 2025-06-02 | End: 2025-06-02

## 2025-06-02 RX ADMIN — CEFTRIAXONE SODIUM 2 G: 2 INJECTION, SOLUTION INTRAVENOUS at 21:23

## 2025-06-02 RX ADMIN — VANCOMYCIN HYDROCHLORIDE 1250 MG: 1.25 INJECTION, POWDER, LYOPHILIZED, FOR SOLUTION INTRAVENOUS at 22:00

## 2025-06-02 RX ADMIN — PANTOPRAZOLE SODIUM 40 MG: 40 TABLET, DELAYED RELEASE ORAL at 06:02

## 2025-06-02 RX ADMIN — OXYCODONE HYDROCHLORIDE 10 MG: 10 TABLET ORAL at 02:51

## 2025-06-02 RX ADMIN — ACETAMINOPHEN 650 MG: 325 TABLET, FILM COATED ORAL at 17:07

## 2025-06-02 RX ADMIN — FENTANYL CITRATE 50 MCG: 50 INJECTION, SOLUTION INTRAMUSCULAR; INTRAVENOUS at 16:24

## 2025-06-02 RX ADMIN — LEVOTHYROXINE SODIUM 75 MCG: 0.07 TABLET ORAL at 08:20

## 2025-06-02 RX ADMIN — OXYCODONE HYDROCHLORIDE 10 MG: 10 TABLET, FILM COATED, EXTENDED RELEASE ORAL at 21:09

## 2025-06-02 RX ADMIN — GABAPENTIN 300 MG: 300 CAPSULE ORAL at 17:06

## 2025-06-02 RX ADMIN — OXYCODONE HYDROCHLORIDE 10 MG: 10 TABLET, FILM COATED, EXTENDED RELEASE ORAL at 08:20

## 2025-06-02 RX ADMIN — OXYCODONE HYDROCHLORIDE 10 MG: 10 TABLET ORAL at 17:07

## 2025-06-02 RX ADMIN — OXYCODONE HYDROCHLORIDE 5 MG: 5 TABLET ORAL at 08:19

## 2025-06-02 RX ADMIN — MIDAZOLAM HYDROCHLORIDE 1 MG: 1 INJECTION, SOLUTION INTRAMUSCULAR; INTRAVENOUS at 16:18

## 2025-06-02 RX ADMIN — SODIUM CHLORIDE, POTASSIUM CHLORIDE, SODIUM LACTATE AND CALCIUM CHLORIDE 75 ML/HR: 600; 310; 30; 20 INJECTION, SOLUTION INTRAVENOUS at 17:06

## 2025-06-02 RX ADMIN — CYCLOBENZAPRINE 10 MG: 10 TABLET, FILM COATED ORAL at 08:20

## 2025-06-02 RX ADMIN — Medication 25 MCG: at 08:20

## 2025-06-02 RX ADMIN — OXYCODONE HYDROCHLORIDE 10 MG: 10 TABLET ORAL at 21:09

## 2025-06-02 RX ADMIN — ATORVASTATIN CALCIUM 80 MG: 80 TABLET, FILM COATED ORAL at 08:20

## 2025-06-02 RX ADMIN — Medication 2 L/MIN: at 21:31

## 2025-06-02 RX ADMIN — CYCLOBENZAPRINE 10 MG: 10 TABLET, FILM COATED ORAL at 21:09

## 2025-06-02 RX ADMIN — MIDAZOLAM HYDROCHLORIDE 1 MG: 1 INJECTION, SOLUTION INTRAMUSCULAR; INTRAVENOUS at 16:13

## 2025-06-02 RX ADMIN — VANCOMYCIN HYDROCHLORIDE 1250 MG: 1.25 INJECTION, POWDER, LYOPHILIZED, FOR SOLUTION INTRAVENOUS at 10:19

## 2025-06-02 RX ADMIN — ACETAMINOPHEN 650 MG: 325 TABLET, FILM COATED ORAL at 10:18

## 2025-06-02 RX ADMIN — ACETAMINOPHEN 650 MG: 325 TABLET, FILM COATED ORAL at 21:09

## 2025-06-02 RX ADMIN — METOPROLOL SUCCINATE 25 MG: 25 TABLET, EXTENDED RELEASE ORAL at 08:20

## 2025-06-02 RX ADMIN — GABAPENTIN 300 MG: 300 CAPSULE ORAL at 21:09

## 2025-06-02 RX ADMIN — MIDAZOLAM HYDROCHLORIDE 1 MG: 1 INJECTION, SOLUTION INTRAMUSCULAR; INTRAVENOUS at 16:24

## 2025-06-02 RX ADMIN — FENTANYL CITRATE 50 MCG: 50 INJECTION, SOLUTION INTRAMUSCULAR; INTRAVENOUS at 16:13

## 2025-06-02 RX ADMIN — GABAPENTIN 300 MG: 300 CAPSULE ORAL at 08:20

## 2025-06-02 RX ADMIN — LORAZEPAM 1 MG: 1 TABLET ORAL at 21:08

## 2025-06-02 RX ADMIN — TIOTROPIUM BROMIDE INHALATION SPRAY 2 PUFF: 3.12 SPRAY, METERED RESPIRATORY (INHALATION) at 09:47

## 2025-06-02 RX ADMIN — HYDROMORPHONE HYDROCHLORIDE 0.2 MG: 1 INJECTION, SOLUTION INTRAMUSCULAR; INTRAVENOUS; SUBCUTANEOUS at 22:48

## 2025-06-02 RX ADMIN — CYCLOBENZAPRINE 10 MG: 10 TABLET, FILM COATED ORAL at 17:07

## 2025-06-02 RX ADMIN — CEFTRIAXONE SODIUM 2 G: 2 INJECTION, SOLUTION INTRAVENOUS at 10:18

## 2025-06-02 RX ADMIN — ACETAMINOPHEN 650 MG: 325 TABLET, FILM COATED ORAL at 02:50

## 2025-06-02 RX ADMIN — FENTANYL CITRATE 50 MCG: 50 INJECTION, SOLUTION INTRAMUSCULAR; INTRAVENOUS at 16:18

## 2025-06-02 ASSESSMENT — COGNITIVE AND FUNCTIONAL STATUS - GENERAL
HELP NEEDED FOR BATHING: A LITTLE
DAILY ACTIVITIY SCORE: 20
TOILETING: A LITTLE
STANDING UP FROM CHAIR USING ARMS: A LITTLE
CLIMB 3 TO 5 STEPS WITH RAILING: A LOT
MOBILITY SCORE: 18
DAILY ACTIVITIY SCORE: 20
TURNING FROM BACK TO SIDE WHILE IN FLAT BAD: A LITTLE
TOILETING: A LITTLE
DRESSING REGULAR LOWER BODY CLOTHING: A LITTLE
MOVING TO AND FROM BED TO CHAIR: A LITTLE
WALKING IN HOSPITAL ROOM: A LITTLE
MOVING TO AND FROM BED TO CHAIR: A LITTLE
HELP NEEDED FOR BATHING: A LITTLE
DRESSING REGULAR UPPER BODY CLOTHING: A LITTLE
DRESSING REGULAR UPPER BODY CLOTHING: A LITTLE
TURNING FROM BACK TO SIDE WHILE IN FLAT BAD: A LITTLE
MOBILITY SCORE: 18
DRESSING REGULAR LOWER BODY CLOTHING: A LITTLE
CLIMB 3 TO 5 STEPS WITH RAILING: A LOT
STANDING UP FROM CHAIR USING ARMS: A LITTLE
WALKING IN HOSPITAL ROOM: A LITTLE

## 2025-06-02 ASSESSMENT — PAIN DESCRIPTION - DESCRIPTORS: DESCRIPTORS: ACHING

## 2025-06-02 ASSESSMENT — PAIN SCALES - GENERAL
PAINLEVEL_OUTOF10: 5 - MODERATE PAIN
PAINLEVEL_OUTOF10: 3
PAINLEVEL_OUTOF10: 0 - NO PAIN
PAINLEVEL_OUTOF10: 9
PAINLEVEL_OUTOF10: 6
PAINLEVEL_OUTOF10: 0 - NO PAIN
PAINLEVEL_OUTOF10: 8
PAINLEVEL_OUTOF10: 5 - MODERATE PAIN
PAINLEVEL_OUTOF10: 7
PAINLEVEL_OUTOF10: 9
PAINLEVEL_OUTOF10: 0 - NO PAIN
PAINLEVEL_OUTOF10: 8
PAINLEVEL_OUTOF10: 8
PAINLEVEL_OUTOF10: 3
PAINLEVEL_OUTOF10: 3

## 2025-06-02 ASSESSMENT — PAIN - FUNCTIONAL ASSESSMENT
PAIN_FUNCTIONAL_ASSESSMENT: 0-10

## 2025-06-02 ASSESSMENT — PAIN DESCRIPTION - LOCATION
LOCATION: BACK
LOCATION: BACK

## 2025-06-02 NOTE — ASSESSMENT & PLAN NOTE
Miguel Lofton is a 69 y.o. male patient, with PMHx of prostate cancer, base of the tongue cancer, HTN, HLD, hypothyroidism, COPD, anxiety who had a CT chest for annual lung cancer screening on 5/13 that showed possible compression fx of T5 and T6. Imaging was reviewed at office visit 5/19 and was advised he present to ED for MRI. He presented to [facility] 5/19 where MRI T spine (5/19) showed acute discitis/osteomyelitis at T5-6 with marked surrounding phlegmon and circumferential epidural phlegmon resulting in moderate to severe spinal canal stenosis. No epidural abscess. He was transferred to [facility] 5/20 for NSGY eval. NSGY consulted 5/20, pt taken to the OR 5/21 for T5-6 laminectomy and T3-8 fusion. ID consulted 5/21 and rec Vanc and Ceftriaxone (5/22- planned stop 7/16 for total 8 week atb course). OR surg path (5/22) +osteomyelitis. OR tissue/wound culture (5/22) neg, final. OR fungal culture (5/22) NGTD. PT/OT consulted 5/22 and rec home care. S/p 1unit PRBC 5/25 for Hgb 7.6 - hgb incremented appropriately. MRI L&C spine under anesthesia done which shows moderate stenosis at C4-5, C5-6, L2-3 disc/osteo w ventral phlegmon w moderate canal stenosis, neurosurgery re-engaged and plan to OR sometime next week. MRI spine also showed  Large abscess within the left iliopsoas muscle measuring up to 9.6 cm in craniocaudal dimension and ID was re-engaged who rec IR drainage of abscess on 6/2. Neurosurgery plans to take to OR 6/3 for lumbar decompression.     # Updates 6/2:  - MRI C/L Spine moderate stenosis at C4-5, C5-6, L2-3 disc/osteo w ventral phlegmon w moderate canal stenosis  - Neurosurgery following, plan for lumbar decompression 6/3  - Holding ASA and lovenox for OR 6/3  - Cont Ceftriaxone 2g to q12hrs and cont Vanco, ID following   - CT lumbar and thoracic spine ordered by neurosurgery; thoracic imaging shows cortical irregularity of of T6-T7 endplates, moderate T5-T6 vertebral body height loss as previous  likely 2/2 osteomyelitis; mild multilevel degenerative changes of thoracic spine especially at T7-T8, moderate bilateral neural foraminal narrowing, no significant spinal canal stenosis. Lumbar imaging shows redemonstrated discitis osteomyelitis at L2-L3 level, previously seen iliopsoas abscesses (L>R), L2-L3 moderate bilateral neural foraminal narrowing and spinal canal stenosis; moderate right and mild to moderate left neural foraminal narrowing with mild spinal canal stenosis of L3-L4; L4-L5 Moderate bilateral neural foramina narrowing and moderate with narrowing of the lateral recesses and Mild spinal canal stenosis  - T & S ordered for AM, NPO at midnight, started gentle fluids LR 75 mL/hr while NPO, holding anticoagulation for OR    # Acute on chronic back pain   # Compression fracture   # Vertebral osteomyelitis  # Phlegmonous tissue c/f infection  - CT Chest (OSH): considerable deformity of what is felt to be T5 which may be related to a compression fracture of the caudal endplate. There is some compression of the superior endplate of T6.  -  MRI T spine (5/19 at OSH): Acute discitis/osteomyelitis at T5-6 with marked surrounding phlegmon and circumferential epidural phlegmon resulting in moderate to severe spinal canal stenosis. No epidural abscess. Requested for image import from [facility]    - CRP 7.35, ESR 90 (5/20); CRP 5.94, ESR 74 (5/22)  - s/p OR 5/21/25 with neurosurgery for T5-6 laminectomy and T3-8 fusion. Per neurosurgery, phlegmon appeared infectious without purulence  - OR surg path (5/22) +osteomyelitis, neg for carcinoma  - OR tissue/wound culture/ fungal culture 5/22) neg, final  - 5/20 Blood cultures x2 NG, Urine culture negative  - +MRSA nares 5/24  - Prevena removed POD7 with dressing (5/28)  - ID consulted 5/21; rec Vanc and Ceftriaxone (5/22- planned stop 7/16) for total 8 week atb course)--5/30: ID re-engaged for new finding of Large abscess within the left iliopsoas muscle  measuring up to 9.6 cm in craniocaudal dimension seen on MRI. Rec Ceftriaxone 2g q12hrs and Vanco----> tx course to be determined   - Reached out to ID (5/28) about abx cost for patient (approx ~$1000/week), ID states they will look into the case but most likely the abx course cannot change due to osteomyelitis   - Weekly CBC, CMP, CRP ordered outpt with results to be faxed to [phone] ATTN: Dr. Dyer once discharged per ID  - Supportive onc consulted 5/25 for uncontrolled pain, recs start ER Oxy 10mg BID and increased doris 300mg to TID, changed Miralax from daily to PRN, and Senna from 3tabs to 2tabs, added Zofran PRN  - PT/OT rec home care, PICC line placed 5/24  - 5/30 MRI C/L Spine moderate stenosis at C4-5, C5-6, L2-3 disc/osteo w ventral phlegmon w moderate canal stenosis  - Neuro/surg re-engaged for finding on MRI, plan for lumbar decompression 6/3   - s/p IR abscess drainage on 6/2, cults ordered       # Abdominal pain-- resolved   - Reported abdominal pain above old PEG site on 5/22  - Likely 2/2 prone positioning after OR +/- developing ileus  - KUB (5/22): moderate colonic stool burden, possible post-op ileus  - having loose Bms now LBM 5/26, stopped lactulose. 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 Gamez--last seen 12/18/2024: laryngeal structures are noted for grossly normal appearance; true vocal cords, false vocal cords, remaining structures, including epiglottis, piriform sinuses and base of tongue are all grossly normal; there is no evidence of gross mass nodule, polyp, tumor or other defect   - SLP eval done no aspiration, rec soft/ thin liquid diet   - Follows yearly with Dr. Lucero   - Dietitian saw pt 5/22, rec  Boost [facility] TID     # Prostate Ca  - s/p radical prostatectomy by Dr Lang Mcneil in 2018  - [id number], Gleasons 7, + right anterior margin  - PSMA-PET done on 12/6/2023 showed no PSMA uptake within the post-surgical bed; there is PSMA uptake of a left common iliac retroperitoneal lymph node SUV 5.6 and PSMA uptake of a right pelvic sidewall lymph node SUV 3.5  - Dr Rangel recommended radiation therapy. Patient instead went to [facility] and was treated by Dr Zackary Ely (4600 cGy in 23 fractions to the pelvis and LN followed by [place] to prostate bed and LN to 2500 cGy in 10 fractions)  - Since completing radiation, has been experiencing significant fatigue  - He is now following with urologist Dr. Boyd  - For pain management, follows with [facility] palliative care team   - PSA < 0.01 (2/19/25)--> PSA <0.10 (5/20)     # COPD  # Smoking Hx  # Lung Ca screening  - Follows with pulmonologist Dr Hutchison   - PFTs 03/2025: FEV1 72% FEV1/FVC 0.63  - Did not tolerate Breztri  - Continue home Spiriva 2 puffs daily     # HTN/HLD  - Continue home Metop Succs 25mg daily and Lipitor 80mg nightly  - Hold ASA (5/30)  for possible OR      # Hypothyroidism  - Last TSH 4.49 (5/12)  - Continue home Synthroid 75mcg daily     # Anemia  - Likely anemia of chronic disease +/- post-op  - BL Hgb ~11 after cancer tx; Hgb 9.9 (5/20)--> 8.3 (5/22) post-op--> 8.1 (5/23)--> 7.6 (5/25)-->s/p 1 unit 5/25, hgb stable 8.7 (5/29)-> 8.1 (6/2)  - Ferritin 305, Folate 7.5, Iron 41, TIBC 269, %Sat 15, UIBC 228, Vit B12 1,342 (5/20)  - 5/25 Iron 30, TIBC 204, folate 3.9   - Stopped home Vit B12 sup 5/22 d/t elevated B12 level  - will hold off on folic acid/iron replacement at this time given acute phase with post-op period, plan for outpatient monitoring of hgb and anemia labs/ongoing workup     # DVT prophy:   - Lovenox, was held for neurosurgery and resumed 5/22 PM per ortho, now held on 6/2 for OR on 6/3  - SCDs      # DISPO:  - Full code, confirmed with patient  on admission   - NOK: Eleni Lofton (wife) [phone]  - DC pending neuro/surg, ID recs   - NSGY FUV 6/9, Dr. Lucero (heme onc) FUV 8/8, NSGY FUV 8/11     Assessment and plan as above discussed with attending physician Dr. Chávez

## 2025-06-02 NOTE — PROGRESS NOTES
05/24/25 1100   Discharge Planning   Living Arrangements Spouse/significant other   Support Systems Spouse/significant other   Type of Residence Private residence   Number of Stairs to Enter Residence 2   Number of Stairs Within Residence 0   Do you have animals or pets at home? Yes   Type of Animals or Pets cats 3   Home or Post Acute Services None   Expected Discharge Disposition Home H   Financial Resource Strain   How hard is it for you to pay for the very basics like food, housing, medical care, and heating? Somewhat   Housing Stability   In the last 12 months, was there a time when you were not able to pay the mortgage or rent on time? N   In the past 12 months, how many times have you moved where you were living? 1   At any time in the past 12 months, were you homeless or living in a shelter (including now)? N   Transportation Needs   In the past 12 months, has lack of transportation kept you from medical appointments or from getting medications? no   In the past 12 months, has lack of transportation kept you from meetings, work, or from getting things needed for daily living? No     05/24/2025: Per Kari, patient now will need MRI of the Spine per Neurosurgery. Patient will also need a PICC line for IV abx therapy at home. Referral sent to [facility] for review. Per ID recs, patient wlill require abx therapy until 07/16/2025. ADOD will be on 05/27/2025. TCC will continue to follow for discharge needs. Josee TREJO TCC     05/29/2025: Patient to be on IV abx therapy until 07/16/2025. Patient was contemplating SNF vs HomeCare. Patient decided 6 weeks was too long for a SNF stay so he will go home on IV abx therapy. Patient also still needs MRI with Anesthesia prior to discharge. TCC to continue to follow for any additional needs. Josee TREJO TCC    05/30/2025 1419: Patient recently had a MRI Spine that indicated  moderate stenosis at C4-5, C5-6, L2-3 disc/osteo w ventral phlegmon w moderate canal stenosis.   Kari discussing plan of care which may or may not include surgery.  Previous plan of care consisted of home with home care for IV abx therapy. Patient now states that he would like to go to a SNF as he is having difficulty walking. TCC will continue to follow for any additional case management needs. Josee TREJO [facility]     06/02/2025 1526: TCC spoke with Kari that states patient will go to IR today for abscess drainage and then will go to OR in the morning for a Laminectomy. Patient will need to be seen by PT/OT post op. Patient states that is he goes to a SNF, he would like a referral sent to [facility]. TCC will continue to monitor regarding discharge plans.  Josee STEPHENSON

## 2025-06-02 NOTE — PRE-PROCEDURE NOTE
Interventional Radiology Preprocedure Note      Procedure: CT-guided left psoas muscle fluid collection     Indication for procedure: The primary encounter diagnosis was Osteomyelitis. Diagnoses of Acute hematogenous osteomyelitis, unspecified site (Multi), Bacteremia due to other bacteria, Hypothyroidism, unspecified type, and Cancer associated pain were also pertinent to this visit.    Relevant review of systems: NA    Relevant Labs:   Lab Results   Component Value Date    CREATININE 0.45 (L) 06/02/2025    EGFR >90 06/02/2025    INR 1.0 06/02/2025    PROTIME 11.2 06/02/2025       Planned Sedation/Anesthesia: Moderate    Airway assessment: normal    Directed physical examination:    A&Ox3     Mallampati: III (soft and hard palate and base of uvula visible)    ASA Score: ASA 3 - Patient with moderate systemic disease with functional limitations    Benefits, risks and alternatives of procedure and planned sedation have been discussed with the patient and/or their representative. All questions answered and they agree to proceed.     Carl Pineda DO, PGY-3   Diagnostic Radiology   [facility]

## 2025-06-02 NOTE — PROGRESS NOTES
"Miguel Lofton is a 69 y.o. male on day 13 of admission presenting with Osteomyelitis.    Subjective   No acute event.    Objective     Physical Exam  Awake Ox3  RUE D5 B5 T5 HG/IO 5  RLE HF4+ KE5 DF/PF 5  LUE D5 B5 T5 HG/IO 5  LLE HF4 KE5 DF/PF 5  Abd non tender  Spine incision is clean, dry, intact, healing well    Last Recorded Vitals  Blood pressure 104/63, pulse 79, temperature 36.5 °C (97.7 °F), temperature source Temporal, resp. rate 18, height 1.727 m (5' 8\"), weight 73.4 kg (161 lb 13.1 oz), SpO2 98%.  Intake/Output last 3 Shifts:  I/O last 3 completed shifts:  In: 700 (9.5 mL/kg) [P.O.:700]  Out: 2800 (38.1 mL/kg) [Urine:2800 (1.1 mL/kg/hr)]  Weight: 73.4 kg     Relevant Results  Lab Results   Component Value Date    WBC 6.9 06/02/2025    HGB 8.1 (L) 06/02/2025    HCT 29.3 (L) 06/02/2025    MCV 88 06/02/2025     06/02/2025     Lab Results   Component Value Date    GLUCOSE 87 06/02/2025    CALCIUM 7.9 (L) 06/02/2025     06/02/2025    K 3.7 06/02/2025    CO2 30 06/02/2025     06/02/2025    BUN 7 06/02/2025    CREATININE 0.45 (L) 06/02/2025       Assessment & Plan  Osteomyelitis    Miguel Lofton is a 69 year old male with h/o HTN,  HLD, CAD (on ASA), hypoT, COPD, anxiety, LBP (annual radioablations), prostate cancer (s/p prostatectomy 2018), base of tongue cancer (dx 2022, s/p radiation/chemo, in remission), diastolic CHF, p/w mid back pain x4 months, low back pain w BLE radiculopathy and weakness x3 weeks, 5/13 CT chest T5 compression fx, MRI TS T5-T6 compression fx w disc/osteo, epidural phlegmon w moderate canal stenosis   5/21 s/p T5-6 transpedicular decompression, T3-8 fusion, OR cx neg, 5/25 drain dc'd, 5/30 MRI C/L Spine moderate stenosis at C4-5, C5-6, L2-3 disc/osteo w ventral phlegmon w moderate canal stenosis    Plan:  [facility] primary  Agree with IR drainage drainage of psoas abscess today  Plan for OR Wednesday for lumbar decompression  Please obtain CT T and L spine  Please " ensure that CBC, RFP, coags, type and screen, are collected within 72 hours of OR  Appreciate ID recs          Puma Daniels MD

## 2025-06-02 NOTE — POST-PROCEDURE NOTE
Interventional Radiology Brief Postprocedure Note     Attending: Dr. Palmer     Diagnosis: Left RP/psoas muscle fluid collection/abscess       Description of procedure: CT-guided left psoas muscle 10 F percutaneous drain placement      Anesthesia:  MAC Moderate     Complications: None     Estimated Blood Loss: minimal     4 ml purulent drainage  was collected and sent to the lab.      See detailed result report with images in PACS.     The patient tolerated the procedure well without incident or complication and is in stable condition.      Carl Pineda DO, PGY-3   Diagnostic Radiology   [facility]

## 2025-06-02 NOTE — PROGRESS NOTES
Miguel Lofton is a 69 y.o. male on day 13 of admission presenting with Osteomyelitis.    Subjective   Patient resting in bed, states pain in lower back radiates around sides to thighs, 6/10. Discussed plan for drainage of left iliopsoas abscess today, patient in agreement. States has continuing weakness in bilateral lower extremities, no new weakness, no loss of bowel or bladder control. Has chronic neuropathy in bilateral feet, no change. Discussed plan for discharge to SNF, patient in agreement. States has been having soft/loose bowel movements, once daily, no diarrhea, no blood in stool, no abdominal pain, n/v, headache, fevers, chills, dizziness, lightheadedness.        Objective     Physical Exam  Constitutional:       Appearance: He is ill-appearing.   HENT:      Mouth/Throat:      Mouth: Mucous membranes are moist.      Pharynx: Oropharynx is clear.   Eyes:      Extraocular Movements: Extraocular movements intact.      Conjunctiva/sclera: Conjunctivae normal.      Pupils: Pupils are equal, round, and reactive to light.   Cardiovascular:      Rate and Rhythm: Normal rate and regular rhythm.      Pulses: Normal pulses.      Heart sounds: Normal heart sounds.   Pulmonary:      Effort: Pulmonary effort is normal.      Breath sounds: Normal breath sounds.   Abdominal:      General: Abdomen is flat. Bowel sounds are normal.      Palpations: Abdomen is soft.   Musculoskeletal:      Right hip: Decreased range of motion.      Left hip: Decreased range of motion.      Comments: Bilateral lower extremity weakness, 4/5 strength   Skin:     General: Skin is warm and dry.      Capillary Refill: Capillary refill takes less than 2 seconds.      Comments: Healing incision on thoracic spine, no discoloration or tenderness of spine   Neurological:      General: No focal deficit present.      Mental Status: He is alert.      Motor: Weakness present.   Psychiatric:         Mood and Affect: Mood normal.         Behavior: Behavior  "normal.       Last Recorded Vitals  Blood pressure 99/69, pulse 80, temperature 36.6 °C (97.9 °F), temperature source Temporal, resp. rate 16, height 1.727 m (5' 8\"), weight 73.4 kg (161 lb 13.1 oz), SpO2 100%.  Intake/Output last 3 Shifts:  I/O last 3 completed shifts:  In: 700 (9.5 mL/kg) [P.O.:700]  Out: 2800 (38.1 mL/kg) [Urine:2800 (1.1 mL/kg/hr)]  Weight: 73.4 kg     Relevant Results          Scheduled medications  Scheduled Medications[1]  Continuous medications  Continuous Medications[2]  PRN medications  PRN Medications[3]  Results for orders placed or performed during the hospital encounter of 05/20/25 (from the past 24 hours)   CBC and Auto Differential   Result Value Ref Range    WBC 6.9 4.4 - 11.3 x10*3/uL    nRBC 0.0 0.0 - 0.0 /100 WBCs    RBC 3.33 (L) 4.50 - 5.90 x10*6/uL    Hemoglobin 8.1 (L) 13.5 - 17.5 g/dL    Hematocrit 29.3 (L) 41.0 - 52.0 %    MCV 88 80 - 100 fL    MCH 24.3 (L) 26.0 - 34.0 pg    MCHC 27.6 (L) 32.0 - 36.0 g/dL    RDW 19.4 (H) 11.5 - 14.5 %    Platelets 325 150 - 450 x10*3/uL    Neutrophils % 74.8 40.0 - 80.0 %    Immature Granulocytes %, Automated 0.3 0.0 - 0.9 %    Lymphocytes % 10.6 13.0 - 44.0 %    Monocytes % 5.8 2.0 - 10.0 %    Eosinophils % 8.1 0.0 - 6.0 %    Basophils % 0.4 0.0 - 2.0 %    Neutrophils Absolute 5.17 1.20 - 7.70 x10*3/uL    Immature Granulocytes Absolute, Automated 0.02 0.00 - 0.70 x10*3/uL    Lymphocytes Absolute 0.73 (L) 1.20 - 4.80 x10*3/uL    Monocytes Absolute 0.40 0.10 - 1.00 x10*3/uL    Eosinophils Absolute 0.56 0.00 - 0.70 x10*3/uL    Basophils Absolute 0.03 0.00 - 0.10 x10*3/uL   Comprehensive Metabolic Panel   Result Value Ref Range    Glucose 87 74 - 99 mg/dL    Sodium 138 136 - 145 mmol/L    Potassium 3.7 3.5 - 5.3 mmol/L    Chloride 101 98 - 107 mmol/L    Bicarbonate 30 21 - 32 mmol/L    Anion Gap 11 10 - 20 mmol/L    Urea Nitrogen 7 6 - 23 mg/dL    Creatinine 0.45 (L) 0.50 - 1.30 mg/dL    eGFR >90 >60 mL/min/1.73m*2    Calcium 7.9 (L) 8.6 - 10.6 " mg/dL    Albumin 2.4 (L) 3.4 - 5.0 g/dL    Alkaline Phosphatase 136 33 - 136 U/L    Total Protein 5.2 (L) 6.4 - 8.2 g/dL    AST 17 9 - 39 U/L    Bilirubin, Total 0.2 0.0 - 1.2 mg/dL    ALT 12 10 - 52 U/L   Coagulation Screen   Result Value Ref Range    Protime 11.2 9.8 - 12.4 seconds    INR 1.0 0.9 - 1.1    aPTT 31 26 - 36 seconds     CT lumbar spine wo IV contrast  Result Date: 6/2/2025  Interpreted By:  Varun Stallings, STUDY: CT LUMBAR SPINE WO IV CONTRAST  6/2/2025 2:53 pm   INDICATION: Signs/Symptoms:L2-3 disc osteo     COMPARISON: MRI lumbar spine dated 05/29/2025. Concurrent CT thoracic spine.   ACCESSION NUMBER(S): [id number]   ORDERING CLINICIAN: NETTIE GRIFFIN   TECHNIQUE: Axial CT images of the lumbar spine are obtained. Axial, coronal and sagittal reconstructions are provided for review.   FINDINGS: Alignment: Normal lumbar lordosis.   Vertebrae/Disc Spaces: Redemonstrated cortical irregularity along the L2-L3 endplates with mild vertebral body height loss, compatible with known osteomyelitis.   Soft tissues: Redemonstrated asymmetrically enlarged left greater than right ileo psoas musculature, better evaluated on prior MRI and compatible with iliopsoas abscesses.   T12-L1: Shallow posterior disc bulge, indenting the anterior thecal sac. Mild-to-moderate bilateral neural foraminal narrowing. Mild spinal canal stenosis.   L2-L3: 6-7 mm posterior disc osteophyte complex, in combination with moderate bilateral facet arthropathy, resulting in moderate bilateral neural foraminal narrowing and moderate spinal canal stenosis.   L3-L4: Shallow posterior disc bulge with superimposed 6 mm bilateral foraminal disc protrusion. Mild-to-moderate bilateral facet arthropathy. Moderate right and mild-to-moderate left neural foraminal narrowing. Mild spinal canal stenosis.   L4-L5: 4 mm posterior disc bulge going combination with moderate bilateral facet arthropathy, resulting in moderate bilateral neural foramina  narrowing and moderate narrowing of the lateral recesses. Mild spinal canal stenosis.   L5-S1: No posterior disc bulge. Mild bilateral facet arthropathy. No significant spinal canal stenosis.       1. Redemonstrated discitis osteomyelitis at L2-L3 level with left-greater-than-right iliopsoas abscesses as described above, suboptimally evaluated on this study due to lack of IV contrast. MR of the lumbar spine with and without contrast can be obtained for follow-up imaging.     2. L2-L3: Moderate bilateral neural foraminal narrowing and moderate spinal canal stenosis.   3. L3-L4: Moderate right and mild-to-moderate left neural foraminal narrowing. Mild spinal canal stenosis.   4. L4-L5: Moderate bilateral neural foramina narrowing and moderate narrowing of the lateral recesses. Mild spinal canal stenosis.   MACRO: None   Signed by: Varun Stallings 6/2/2025 3:16 PM Dictation workstation:   [id number]    CT thoracic spine wo IV contrast  Result Date: 6/2/2025  Interpreted By:  Varun Stallings, STUDY: CT THORACIC SPINE WO IV CONTRAST;  6/2/2025 2:54 pm   INDICATION: Signs/Symptoms:L2-3 disc osteo - OR planning.     COMPARISON: MR of the thoracic spine dated 05/19/2025. Concurrent CT lumbar spine.   ACCESSION NUMBER(S): [id number]   ORDERING CLINICIAN: NETTIE GRIFFIN   TECHNIQUE: Axial CT images of the thoracic spine are obtained. Axial, coronal and sagittal reconstructions are submitted for review.   FINDINGS:   Alignment: Within normal limits.   Vertebrae/Intervertebral Discs:  There is postsurgical changes of instrumented posterior fusion of the thoracic spine from T3-T8 with laminectomy. No evidence of acute hardware failure. There is cortical irregularity of the T6-T7 endplates with moderate T5 and T6 vertebral body height loss as before, likely sequela of osteomyelitis. No additional vertebral body height loss in the thoracic spine   Mild multilevel degenerative changes of the thoracic spine, most pronounced at  T7-T8, resulting in moderate bilateral neural foraminal narrowing. No significant spinal canal stenosis.   This study is not tailored to evaluate contents within the spinal canal.   Paraspinous Soft Tissues: Within normal limits.         1. Postsurgical changes of instrumented posterior fusion of the thoracic spine from T3-T8 with laminectomy. No evidence of acute hardware failure. There is cortical irregularity of the T6-T7 endplates with moderate T5 and T6 vertebral body height loss as before, likely sequela of osteomyelitis.   2. Mild multilevel degenerative changes of the thoracic spine, most pronounced at T7-T8, resulting in moderate bilateral neural foraminal narrowing. No significant spinal canal stenosis.   3. This study is not tailored to evaluate contents within the spinal canal. MR of the thoracic spine can be obtained for further assessment if clinically warranted.   Please refer to concurrent CT lumbar spine for additional findings.   MACRO: None   Signed by: Varun Stallings 6/2/2025 3:05 PM Dictation workstation:   [id number]                   Malnutrition Diagnosis Status: New  Malnutrition Diagnosis: Severe malnutrition related to chronic disease or condition     As Evidenced by: significant wt loss of 19% x4 months, PO intake meeting <75% of nutrient needs for >1 month, mild-moderate muscle and adipose tissue loss.  I agree with the dietitian's malnutrition diagnosis.      Assessment & Plan  Osteomyelitis  Miguel Lofton is a 69 y.o. male patient, with PMHx of prostate cancer, base of the tongue cancer, HTN, HLD, hypothyroidism, COPD, anxiety who had a CT chest for annual lung cancer screening on 5/13 that showed possible compression fx of T5 and T6. Imaging was reviewed at office visit 5/19 and was advised he present to ED for MRI. He presented to [facility] 5/19 where MRI T spine (5/19) showed acute discitis/osteomyelitis at T5-6 with marked surrounding phlegmon and circumferential epidural  phlegmon resulting in moderate to severe spinal canal stenosis. No epidural abscess. He was transferred to [facility] 5/20 for NSGY eval. NSGY consulted 5/20, pt taken to the OR 5/21 for T5-6 laminectomy and T3-8 fusion. ID consulted 5/21 and rec Vanc and Ceftriaxone (5/22- planned stop 7/16 for total 8 week atb course). OR surg path (5/22) +osteomyelitis. OR tissue/wound culture (5/22) neg, final. OR fungal culture (5/22) NGTD. PT/OT consulted 5/22 and rec home care. S/p 1unit PRBC 5/25 for Hgb 7.6 - hgb incremented appropriately. MRI L&C spine under anesthesia done which shows moderate stenosis at C4-5, C5-6, L2-3 disc/osteo w ventral phlegmon w moderate canal stenosis, neurosurgery re-engaged and plan to OR sometime next week. MRI spine also showed  Large abscess within the left iliopsoas muscle measuring up to 9.6 cm in craniocaudal dimension and ID was re-engaged who rec IR drainage of abscess on 6/2. Neurosurgery plans to take to OR 6/3 for lumbar decompression.     # Updates 6/2:  - MRI C/L Spine moderate stenosis at C4-5, C5-6, L2-3 disc/osteo w ventral phlegmon w moderate canal stenosis  - Neurosurgery following, plan for lumbar decompression 6/3  - Holding ASA and lovenox for OR 6/3  - Cont Ceftriaxone 2g to q12hrs and cont Vanco, ID following   - CT lumbar and thoracic spine ordered by neurosurgery; thoracic imaging shows cortical irregularity of of T6-T7 endplates, moderate T5-T6 vertebral body height loss as previous likely 2/2 osteomyelitis; mild multilevel degenerative changes of thoracic spine especially at T7-T8, moderate bilateral neural foraminal narrowing, no significant spinal canal stenosis. Lumbar imaging shows redemonstrated discitis osteomyelitis at L2-L3 level, previously seen iliopsoas abscesses (L>R), L2-L3 moderate bilateral neural foraminal narrowing and spinal canal stenosis; moderate right and mild to moderate left neural foraminal narrowing with mild spinal canal stenosis of L3-L4; L4-L5  Moderate bilateral neural foramina narrowing and moderate with narrowing of the lateral recesses and Mild spinal canal stenosis  - T & S ordered for AM, NPO at midnight, started gentle fluids LR 75 mL/hr while NPO, holding anticoagulation for OR    # Acute on chronic back pain   # Compression fracture   # Vertebral osteomyelitis  # Phlegmonous tissue c/f infection  - CT Chest ([facility]): considerable deformity of what is felt to be T5 which may be related to a compression fracture of the caudal endplate. There is some compression of the superior endplate of T6.  -  MRI T spine (5/19 at OSH): Acute discitis/osteomyelitis at T5-6 with marked surrounding phlegmon and circumferential epidural phlegmon resulting in moderate to severe spinal canal stenosis. No epidural abscess. Requested for image import from [facility]    - CRP 7.35, ESR 90 (5/20); CRP 5.94, ESR 74 (5/22)  - s/p OR 5/21/25 with neurosurgery for T5-6 laminectomy and T3-8 fusion. Per neurosurgery, phlegmon appeared infectious without purulence  - OR surg path (5/22) +osteomyelitis, neg for carcinoma  - OR tissue/wound culture/ fungal culture 5/22) neg, final  - 5/20 Blood cultures x2 NG, Urine culture negative  - +MRSA nares 5/24  - Prevena removed POD7 with dressing (5/28)  - ID consulted 5/21; rec Vanc and Ceftriaxone (5/22- planned stop 7/16) for total 8 week atb course)--5/30: ID re-engaged for new finding of Large abscess within the left iliopsoas muscle measuring up to 9.6 cm in craniocaudal dimension seen on MRI. Rec Ceftriaxone 2g q12hrs and Vanco----> tx course to be determined   - Reached out to ID (5/28) about abx cost for patient (approx ~$1000/week), ID states they will look into the case but most likely the abx course cannot change due to osteomyelitis   - Weekly CBC, CMP, CRP ordered outpt with results to be faxed to [phone] ATTN: Dr. Dyer once discharged per ID  - Supportive onc consulted 5/25 for uncontrolled pain, recs start ER Oxy  10mg BID and increased doris 300mg to TID, changed Miralax from daily to PRN, and Senna from 3tabs to 2tabs, added Zofran PRN  - PT/OT rec home care, PICC line placed 5/24  - 5/30 MRI C/L Spine moderate stenosis at C4-5, C5-6, L2-3 disc/osteo w ventral phlegmon w moderate canal stenosis  - Neuro/surg re-engaged for finding on MRI, plan for lumbar decompression 6/3   - s/p IR abscess drainage on 6/2, cults ordered       # Abdominal pain-- resolved   - Reported abdominal pain above old PEG site on 5/22  - Likely 2/2 prone positioning after OR +/- developing ileus  - KUB (5/22): moderate colonic stool burden, possible post-op ileus  - having loose Bms now LBM 5/26, stopped lactulose. 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 Gamez--last seen 12/18/2024: laryngeal structures are noted for grossly normal appearance; true vocal cords, false vocal cords, remaining structures, including epiglottis, piriform sinuses and base of tongue are all grossly normal; there is no evidence of gross mass nodule, polyp, tumor or other defect   - SLP eval done no aspiration, rec soft/ thin liquid diet   - Follows yearly with Dr. Lucero   - Dietitian saw pt 5/22, rec [facility] TID     # Prostate Ca  - s/p radical prostatectomy by Dr Lang Mcneil in 2018  - [id number], [place] 7, + right anterior margin  - PSMA-PET done on 12/6/2023 showed no PSMA uptake within the post-surgical bed; there is PSMA uptake of a left common iliac retroperitoneal lymph node SUV 5.6 and PSMA uptake of a right pelvic sidewall lymph node SUV 3.5  - Dr Rangel recommended radiation therapy. Patient instead went to [facility] and was treated by Dr Zackary Ely (4600 cGy in 23 fractions to the pelvis and LN  followed by [place] to prostate bed and LN to 2500 cGy in 10 fractions)  - Since completing radiation, has been experiencing significant fatigue  - He is now following with urologist Dr. Boyd  - For pain management, follows with [facility] palliative care team   - PSA < 0.01 (2/19/25)--> PSA <0.10 (5/20)     # COPD  # Smoking Hx  # Lung Ca screening  - Follows with pulmonologist Dr Hutchison   - PFTs 03/2025: FEV1 72% FEV1/FVC 0.63  - Did not tolerate Breztri  - Continue home Spiriva 2 puffs daily     # HTN/HLD  - Continue home Metop Succs 25mg daily and Lipitor 80mg nightly  - Hold ASA (5/30)  for possible OR      # Hypothyroidism  - Last TSH 4.49 (5/12)  - Continue home Synthroid 75mcg daily     # Anemia  - Likely anemia of chronic disease +/- post-op  - BL Hgb ~11 after cancer tx; Hgb 9.9 (5/20)--> 8.3 (5/22) post-op--> 8.1 (5/23)--> 7.6 (5/25)-->s/p 1 unit 5/25, hgb stable 8.7 (5/29)-> 8.1 (6/2)  - Ferritin 305, Folate 7.5, Iron 41, TIBC 269, %Sat 15, UIBC 228, Vit B12 1,342 (5/20)  - 5/25 Iron 30, TIBC 204, folate 3.9   - Stopped home Vit B12 sup 5/22 d/t elevated B12 level  - will hold off on folic acid/iron replacement at this time given acute phase with post-op period, plan for outpatient monitoring of hgb and anemia labs/ongoing workup     # DVT prophy:   - Lovenox, was held for neurosurgery and resumed 5/22 PM per ortho, now held on 6/2 for OR on 6/3  - SCDs      # DISPO:  - Full code, confirmed with patient on admission   - NOK: Eleni Lofton (wife) [phone]  - DC pending neuro/surg, ID recs   - NSGY FUV 6/9, Dr. Lucero (heme onc) FUV 8/8, NSGY FUV 8/11     Assessment and plan as above discussed with attending physician Dr. Chávez        I spent 90 minutes in the professional and overall care of this patient.      Donna Cagle, APRN-CNP         [1] acetaminophen, 650 mg, oral, q4h  [Held by provider] aspirin, 81 mg, oral, Daily  atorvastatin, 80 mg, oral, Daily  cefTRIAXone, 2 g, intravenous,  q12h  cholecalciferol, 25 mcg, oral, Daily  cyclobenzaprine, 10 mg, oral, TID  [Held by provider] enoxaparin, 40 mg, subcutaneous, q24h  gabapentin, 300 mg, oral, TID  HYDROmorphone, 0.4 mg, intravenous, Once  levothyroxine, 75 mcg, oral, Daily  lidocaine, 0.1 mL, subcutaneous, Once  lidocaine, 5 mL, infiltration, Once  lidocaine, 2 patch, transdermal, Daily  metoprolol succinate XL, 25 mg, oral, Daily  oxyCODONE ER, 10 mg, oral, q12h ANA  oxygen, , inhalation, Continuous - Inhalation  pantoprazole, 40 mg, oral, Daily before breakfast  sennosides-docusate sodium, 2 tablet, oral, BID  tiotropium, 2 puff, inhalation, Daily  vancomycin, 1,250 mg, intravenous, q12h  [2] lactated Ringer's, 75 mL/hr  [3] PRN medications: alteplase, bisacodyl, HYDROmorphone, LORazepam, ondansetron, oxyCODONE, oxyCODONE, polyethylene glycol, simethicone, vancomycin

## 2025-06-02 NOTE — CARE PLAN
The patient's goals for the shift include Pain management    The clinical goals for the shift include pt will be HDS with VSS

## 2025-06-02 NOTE — CARE PLAN
The patient's goals for the shift include Pain management    The clinical goals for the shift include Pt will remain HDS and VSS by 6/1/25 at 1900      Problem: Skin  Goal: Decreased wound size/increased tissue granulation at next dressing change  Outcome: Progressing  Flowsheets (Taken 6/2/2025 0456)  Decreased wound size/increased tissue granulation at next dressing change: Promote sleep for wound healing  Goal: Participates in plan/prevention/treatment measures  Outcome: Progressing  Flowsheets (Taken 6/2/2025 0456)  Participates in plan/prevention/treatment measures: Increase activity/out of bed for meals  Goal: Prevent/manage excess moisture  Outcome: Progressing  Flowsheets (Taken 6/2/2025 0456)  Prevent/manage excess moisture: Monitor for/manage infection if present  Goal: Prevent/minimize sheer/friction injuries  Outcome: Progressing  Flowsheets (Taken 6/2/2025 0456)  Prevent/minimize sheer/friction injuries: Increase activity/out of bed for meals  Goal: Promote/optimize nutrition  Outcome: Progressing  Flowsheets (Taken 6/2/2025 0456)  Promote/optimize nutrition: Offer water/supplements/favorite foods  Goal: Promote skin healing  Outcome: Progressing  Flowsheets (Taken 6/2/2025 0522)  Promote skin healing: Assess skin/pad under line(s)/device(s)     Problem: Fall/Injury  Goal: Not fall by end of shift  Outcome: Progressing  Goal: Be free from injury by end of the shift  Outcome: Progressing  Goal: Verbalize understanding of personal risk factors for fall in the hospital  Outcome: Progressing  Goal: Verbalize understanding of risk factor reduction measures to prevent injury from fall in the home  Outcome: Progressing  Goal: Use assistive devices by end of the shift  Outcome: Progressing  Goal: Pace activities to prevent fatigue by end of the shift  Outcome: Progressing     Problem: Safety - Adult  Goal: Free from fall injury  Outcome: Progressing     Problem: Discharge Planning  Goal: Discharge to home or  other facility with appropriate resources  Outcome: Progressing     Problem: Chronic Conditions and Co-morbidities  Goal: Patient's chronic conditions and co-morbidity symptoms are monitored and maintained or improved  Outcome: Progressing     Problem: Nutrition  Goal: Nutrient intake appropriate for maintaining nutritional needs  Outcome: Progressing     Problem: Pain  Goal: Takes deep breaths with improved pain control throughout the shift  Outcome: Progressing  Goal: Turns in bed with improved pain control throughout the shift  Outcome: Progressing  Goal: Walks with improved pain control throughout the shift  Outcome: Progressing  Goal: Performs ADL's with improved pain control throughout shift  Outcome: Progressing  Goal: Participates in PT with improved pain control throughout the shift  Outcome: Progressing  Goal: Free from opioid side effects throughout the shift  Outcome: Progressing  Goal: Free from acute confusion related to pain meds throughout the shift  Outcome: Progressing

## 2025-06-02 NOTE — POST-PROCEDURE NOTE
Interventional Radiology Brief Postprocedure Note    Attending: Dr. Palmer    Diagnosis: Left RP/psoas muscle fluid collection/abscess      Description of procedure: CT-guided left psoas muscle 10 F percutaneous drain placement     Anesthesia:  MAC Moderate    Complications: None    Estimated Blood Loss: minimal    10 ml purulent drainage  was collected and sent to the lab.     See detailed result report with images in PACS.    The patient tolerated the procedure well without incident or complication and is in stable condition.     Carl Pineda DO, PGY-3   Diagnostic Radiology   [facility]

## 2025-06-02 NOTE — PROGRESS NOTES
"Nutrition Follow Up Assessment:   Nutrition Assessment    The patient is a 69 y.o. male who is hospital day #13.  Pt admitted due to imaging showing possible compression fx of T5 to T6. Pt is now s/p T5-6 laminectomy and T3-8 fusion w/ NSGY on 05/21. ID following for abx management, rec IR drainage of abscess. MRI C/L Spine moderate stenosis at C4-5, C5-6, L2-3 disc/osteo w ventral phlegmon w moderate canal stenosis >> NSGY planning OR for lumbar decompression.      PMHx: prostate cancer, base of the tongue cancer, HTN, HLD, hypothyroidism, COPD, anxiety         Nutrition History:  Energy Intake: Fair 50-75 %  Food and Nutrient History: Pt reports PTA he was barely eating d/t ongoing pain and changes in taste from previous treatment for H&N ca. Thinks he is eating much better since start of admission as pain is decreased/gone. Reports eating 1 meal a day - which is his baseline. Doing soup, pudding, and sometimes applesauce. Pt also reports drinking 2- 3 ONS during admission. Mix of Ensure and Boost as he has both ordered. Prefers Boost. Reports issues with getting ONS TID. Was not drinking ONS PTA d/t pain. Reports previously insurance has covered ONS for him. No GI symptoms. No food allergies.       Anthropometrics:  Start of admission anthropometrics:  Height: 163.4 cm (5' 4.33\")  Weight: 73.4 kg (161 lb 13.1 oz)  BMI (Calculated): 27.49  IBW/kg (Dietitian Calculated): 70 kg  Percent of IBW: 105 %     No new wt to assess.     Nutrition Focused Physical Exam Findings:  Subcutaneous Fat Loss   Orbital Fat Pads: Mild-Moderate (slight dark circles and slight hollowing)  Buccal Fat Pads: Mild-Moderate (flat cheeks, minimal bounce)  Triceps: Mild-Moderate (less than ample fat tissue)  Muscle Wasting  Temporalis: Mild-Moderate (slight depression)  Pectoralis (Clavicular Region): Mild-Moderate (some protrusion of clavicle)  Deltoid/Trapezius: Mild-Moderate (slight protrusion of acromion process)  Interosseous: " Mild-Moderate (slightly depressed area between thumb and forefinger)  Quadriceps: Mild-Moderate (mild depression on inner and outer thigh)  Gastrocnemius: Mild-Moderate (not well developed muscle)  Edema  Edema: none  Physical Findings   Hair: Negative  Eyes: Negative  Nails: Negative  Skin:  (surgical incision)      Last BM Date: 05/31/25    Nutrition Significant Labs:    Results from last 7 days   Lab Units 06/02/25  0436 06/01/25  0359 05/31/25  0508 05/30/25  0554   HEMOGLOBIN g/dL 8.1* 9.0* 8.6* 8.7*   MCV fL 88 87 86 84   GLUCOSE mg/dL 87 93 87 87   POTASSIUM mmol/L 3.7 3.7 3.8 3.8   SODIUM mmol/L 138 136 141 140   MAGNESIUM mg/dL  --   --   --  1.66   CREATININE mg/dL 0.45* 0.42* 0.45* 0.56   BUN mg/dL 7 8 5* 7   ALT U/L 12 13 15 16   AST U/L 17 16 19 18   ALK PHOS U/L 136 142* 141* 131       Nutrition Specific Medications:  cefTRIAXone, 2 g, intravenous, q12h  cholecalciferol, 25 mcg, oral, Daily  HYDROmorphone, 0.4 mg, intravenous, Once  levothyroxine, 75 mcg, oral, Daily  oxyCODONE ER, 10 mg, oral, q12h ANA  pantoprazole, 40 mg, oral, Daily before breakfast  sennosides-docusate sodium, 2 tablet, oral, BID  vancomycin, 1,250 mg, intravenous, q12h     I/O:   I/O last 2 completed shifts:  In: 700 (9.5 mL/kg) [P.O.:700]  Out: 2400 (32.7 mL/kg) [Urine:2400 (1.4 mL/kg/hr)]  Weight: 73.4 kg     Dietary Orders (From admission, onward)       Start     Ordered    06/02/25 0001  NPO Diet; Effective midnight  Diet effective midnight         06/01/25 0823    05/23/25 1416  Oral nutritional supplements  Until discontinued        Comments: Vanilla only   Question Answer Comment   Deliver with Breakfast    Deliver with Lunch    Select supplement: Ensure Plus        05/23/25 1416    05/22/25 1333  Oral nutritional supplements  Until discontinued        Comments: Vanilla   Question Answer Comment   Deliver with All meals    Select supplement: Boost [facility]        05/22/25 1332    05/20/25 0223  May Participate in Room Service   ( ROOM SERVICE MAY PARTICIPATE)  Once        Question:  .  Answer:  Yes    05/20/25 0222                     Estimated Needs:   Total Energy Estimated Needs in 24 hours (kCal): 2050 kCal  Method for Estimating Needs: 28 kcal/kg * 73 kg    Total Protein Estimated Needs in 24 Hours (g): 95 g  Method for Estimating 24 Hour Protein Needs: 1.3+ g/kg * 73 kg    Method for Estimating 24 Hour Fluid Needs: 1 ml/kcal or per team          Nutrition Diagnosis   Malnutrition Diagnosis  Patient has Malnutrition Diagnosis: Yes  Diagnosis Status: New  Malnutrition Diagnosis: Severe malnutrition related to chronic disease or condition  As Evidenced by: significant wt loss of 19% x4 months, PO intake meeting <75% of nutrient needs for >1 month, mild-moderate muscle and adipose tissue loss.    Nutrition Diagnosis  Patient has Nutrition Diagnosis: Yes  Diagnosis Status (1): New  Nutrition Diagnosis 1: Unintended weight loss  Related to (1): changes in PO intake  As Evidenced by (1): 19% wt loss s1xrycap; limited diet of ONS and applesauce       Nutrition Interventions/Recommendations   Nutrition prescription for oral nutrition    Nutrition Recommendations:  Supplement order changed to Boost [facility]HC (530 kcal, 22g pro) TID.  Ensure Plus discontinued.  Orders updated in [facility]    Nutrition Goals:   Medical Food Supplement: Commercial beverage medical food supplement therapy  Goal: Drink at least 3 ONS /d         Nutrition Monitoring and Evaluation   Monitoring and Evaluation Plan: Estimated Energy Intake  Estimated Energy Intake: Energy intake greater or equal to 75% of estimated energy needs    Monitoring and Evaluation Plan: Body weight  Body Weight: Body weight - Maintain stable weight              Some progress toward goal(s)         Time Spent (min): 45 minutes

## 2025-06-03 ENCOUNTER — APPOINTMENT (OUTPATIENT)
Dept: RADIOLOGY | Facility: HOSPITAL | Age: 70
DRG: 447 | End: 2025-06-03
Payer: COMMERCIAL

## 2025-06-03 ENCOUNTER — ANESTHESIA (OUTPATIENT)
Dept: OPERATING ROOM | Facility: HOSPITAL | Age: 70
End: 2025-06-03
Payer: COMMERCIAL

## 2025-06-03 ENCOUNTER — ANESTHESIA EVENT (OUTPATIENT)
Dept: OPERATING ROOM | Facility: HOSPITAL | Age: 70
End: 2025-06-03
Payer: COMMERCIAL

## 2025-06-03 PROBLEM — K21.9 GASTROESOPHAGEAL REFLUX DISEASE WITHOUT ESOPHAGITIS: Status: ACTIVE | Noted: 2025-06-03

## 2025-06-03 PROBLEM — F32.A DEPRESSION: Status: ACTIVE | Noted: 2025-06-03

## 2025-06-03 PROBLEM — M51.9 LUMBAR DISC DISEASE: Status: ACTIVE | Noted: 2025-06-03

## 2025-06-03 PROBLEM — I21.9 MI (MYOCARDIAL INFARCTION) (MULTI): Status: ACTIVE | Noted: 2025-06-03

## 2025-06-03 PROBLEM — C26.9 GI CANCER: Status: ACTIVE | Noted: 2025-06-03

## 2025-06-03 PROBLEM — M48.05 SPINAL STENOSIS OF THORACOLUMBAR REGION: Status: ACTIVE | Noted: 2025-06-03

## 2025-06-03 PROBLEM — I50.9 CHF (CONGESTIVE HEART FAILURE): Status: ACTIVE | Noted: 2025-06-03

## 2025-06-03 PROBLEM — D64.9 ANEMIA: Status: ACTIVE | Noted: 2025-06-03

## 2025-06-03 LAB
ABO GROUP (TYPE) IN BLOOD: NORMAL
ALBUMIN SERPL BCP-MCNC: 2.4 G/DL (ref 3.4–5)
ALP SERPL-CCNC: 136 U/L (ref 33–136)
ALT SERPL W P-5'-P-CCNC: 13 U/L (ref 10–52)
ANION GAP SERPL CALC-SCNC: 11 MMOL/L (ref 10–20)
ANTIBODY SCREEN: NORMAL
APTT PPP: 30 SECONDS (ref 26–36)
AST SERPL W P-5'-P-CCNC: 17 U/L (ref 9–39)
BASOPHILS # BLD AUTO: 0.04 X10*3/UL (ref 0–0.1)
BASOPHILS NFR BLD AUTO: 0.5 %
BILIRUB SERPL-MCNC: 0.2 MG/DL (ref 0–1.2)
BUN SERPL-MCNC: 7 MG/DL (ref 6–23)
CALCIUM SERPL-MCNC: 8 MG/DL (ref 8.6–10.6)
CHLORIDE SERPL-SCNC: 102 MMOL/L (ref 98–107)
CO2 SERPL-SCNC: 32 MMOL/L (ref 21–32)
CREAT SERPL-MCNC: 0.45 MG/DL (ref 0.5–1.3)
EGFRCR SERPLBLD CKD-EPI 2021: >90 ML/MIN/1.73M*2
EOSINOPHIL # BLD AUTO: 0.81 X10*3/UL (ref 0–0.7)
EOSINOPHIL NFR BLD AUTO: 10.6 %
ERYTHROCYTE [DISTWIDTH] IN BLOOD BY AUTOMATED COUNT: 19.2 % (ref 11.5–14.5)
GLUCOSE SERPL-MCNC: 85 MG/DL (ref 74–99)
HCT VFR BLD AUTO: 28 % (ref 41–52)
HGB BLD-MCNC: 8.2 G/DL (ref 13.5–17.5)
IMM GRANULOCYTES # BLD AUTO: 0.07 X10*3/UL (ref 0–0.7)
IMM GRANULOCYTES NFR BLD AUTO: 0.9 % (ref 0–0.9)
INR PPP: 1 (ref 0.9–1.1)
LYMPHOCYTES # BLD AUTO: 0.8 X10*3/UL (ref 1.2–4.8)
LYMPHOCYTES NFR BLD AUTO: 10.5 %
MCH RBC QN AUTO: 25.7 PG (ref 26–34)
MCHC RBC AUTO-ENTMCNC: 29.3 G/DL (ref 32–36)
MCV RBC AUTO: 88 FL (ref 80–100)
MONOCYTES # BLD AUTO: 0.72 X10*3/UL (ref 0.1–1)
MONOCYTES NFR BLD AUTO: 9.4 %
NEUTROPHILS # BLD AUTO: 5.21 X10*3/UL (ref 1.2–7.7)
NEUTROPHILS NFR BLD AUTO: 68.1 %
NRBC BLD-RTO: 0 /100 WBCS (ref 0–0)
PLATELET # BLD AUTO: 358 X10*3/UL (ref 150–450)
POTASSIUM SERPL-SCNC: 3.5 MMOL/L (ref 3.5–5.3)
PROT SERPL-MCNC: 4.9 G/DL (ref 6.4–8.2)
PROTHROMBIN TIME: 11.4 SECONDS (ref 9.8–12.4)
RBC # BLD AUTO: 3.19 X10*6/UL (ref 4.5–5.9)
RH FACTOR (ANTIGEN D): NORMAL
SCAN RESULT: NORMAL
SODIUM SERPL-SCNC: 141 MMOL/L (ref 136–145)
VANCOMYCIN SERPL-MCNC: 20.1 UG/ML (ref 5–20)
WBC # BLD AUTO: 7.7 X10*3/UL (ref 4.4–11.3)

## 2025-06-03 PROCEDURE — 2500000004 HC RX 250 GENERAL PHARMACY W/ HCPCS (ALT 636 FOR OP/ED)

## 2025-06-03 PROCEDURE — 85610 PROTHROMBIN TIME: CPT

## 2025-06-03 PROCEDURE — 86901 BLOOD TYPING SEROLOGIC RH(D): CPT

## 2025-06-03 PROCEDURE — 80202 ASSAY OF VANCOMYCIN: CPT

## 2025-06-03 PROCEDURE — 2500000001 HC RX 250 WO HCPCS SELF ADMINISTERED DRUGS (ALT 637 FOR MEDICARE OP)

## 2025-06-03 PROCEDURE — 71045 X-RAY EXAM CHEST 1 VIEW: CPT

## 2025-06-03 PROCEDURE — 1170000001 HC PRIVATE ONCOLOGY ROOM DAILY

## 2025-06-03 PROCEDURE — 99232 SBSQ HOSP IP/OBS MODERATE 35: CPT

## 2025-06-03 PROCEDURE — 85025 COMPLETE CBC W/AUTO DIFF WBC: CPT | Performed by: NURSE PRACTITIONER

## 2025-06-03 PROCEDURE — 80053 COMPREHEN METABOLIC PANEL: CPT | Performed by: NURSE PRACTITIONER

## 2025-06-03 PROCEDURE — 2500000001 HC RX 250 WO HCPCS SELF ADMINISTERED DRUGS (ALT 637 FOR MEDICARE OP): Performed by: STUDENT IN AN ORGANIZED HEALTH CARE EDUCATION/TRAINING PROGRAM

## 2025-06-03 PROCEDURE — 94640 AIRWAY INHALATION TREATMENT: CPT

## 2025-06-03 RX ORDER — FENTANYL CITRATE 50 UG/ML
INJECTION, SOLUTION INTRAMUSCULAR; INTRAVENOUS CONTINUOUS PRN
Status: DISCONTINUED | OUTPATIENT
Start: 2025-06-03 | End: 2025-06-04 | Stop reason: HOSPADM

## 2025-06-03 RX ORDER — GUAIFENESIN 100 MG/5ML
200 LIQUID ORAL EVERY 4 HOURS PRN
Status: DISCONTINUED | OUTPATIENT
Start: 2025-06-03 | End: 2025-06-08 | Stop reason: HOSPADM

## 2025-06-03 RX ORDER — PROPOFOL 10 MG/ML
INJECTION, EMULSION INTRAVENOUS CONTINUOUS PRN
Status: DISCONTINUED | OUTPATIENT
Start: 2025-06-03 | End: 2025-06-04 | Stop reason: HOSPADM

## 2025-06-03 RX ADMIN — TIOTROPIUM BROMIDE INHALATION SPRAY 2 PUFF: 3.12 SPRAY, METERED RESPIRATORY (INHALATION) at 11:10

## 2025-06-03 RX ADMIN — CYCLOBENZAPRINE 10 MG: 10 TABLET, FILM COATED ORAL at 09:53

## 2025-06-03 RX ADMIN — OXYCODONE HYDROCHLORIDE 10 MG: 10 TABLET ORAL at 01:19

## 2025-06-03 RX ADMIN — VANCOMYCIN HYDROCHLORIDE 1250 MG: 1.25 INJECTION, POWDER, LYOPHILIZED, FOR SOLUTION INTRAVENOUS at 22:45

## 2025-06-03 RX ADMIN — ATORVASTATIN CALCIUM 80 MG: 80 TABLET, FILM COATED ORAL at 09:54

## 2025-06-03 RX ADMIN — CYCLOBENZAPRINE 10 MG: 10 TABLET, FILM COATED ORAL at 14:31

## 2025-06-03 RX ADMIN — OXYCODONE HYDROCHLORIDE 5 MG: 5 TABLET ORAL at 14:30

## 2025-06-03 RX ADMIN — GABAPENTIN 300 MG: 300 CAPSULE ORAL at 09:55

## 2025-06-03 RX ADMIN — CEFTRIAXONE SODIUM 2 G: 2 INJECTION, SOLUTION INTRAVENOUS at 21:59

## 2025-06-03 RX ADMIN — OXYCODONE HYDROCHLORIDE 10 MG: 10 TABLET, FILM COATED, EXTENDED RELEASE ORAL at 09:56

## 2025-06-03 RX ADMIN — OXYCODONE HYDROCHLORIDE 5 MG: 5 TABLET ORAL at 09:52

## 2025-06-03 RX ADMIN — GABAPENTIN 300 MG: 300 CAPSULE ORAL at 21:59

## 2025-06-03 RX ADMIN — ACETAMINOPHEN 650 MG: 325 TABLET, FILM COATED ORAL at 01:19

## 2025-06-03 RX ADMIN — CYCLOBENZAPRINE 10 MG: 10 TABLET, FILM COATED ORAL at 21:59

## 2025-06-03 RX ADMIN — ACETAMINOPHEN 650 MG: 325 TABLET, FILM COATED ORAL at 14:31

## 2025-06-03 RX ADMIN — METOPROLOL SUCCINATE 25 MG: 25 TABLET, EXTENDED RELEASE ORAL at 09:56

## 2025-06-03 RX ADMIN — SODIUM CHLORIDE, POTASSIUM CHLORIDE, SODIUM LACTATE AND CALCIUM CHLORIDE 75 ML/HR: 600; 310; 30; 20 INJECTION, SOLUTION INTRAVENOUS at 09:57

## 2025-06-03 RX ADMIN — ACETAMINOPHEN 650 MG: 325 TABLET, FILM COATED ORAL at 21:59

## 2025-06-03 RX ADMIN — VANCOMYCIN HYDROCHLORIDE 1250 MG: 1.25 INJECTION, POWDER, LYOPHILIZED, FOR SOLUTION INTRAVENOUS at 11:58

## 2025-06-03 RX ADMIN — SENNOSIDES AND DOCUSATE SODIUM 2 TABLET: 50; 8.6 TABLET ORAL at 21:59

## 2025-06-03 RX ADMIN — CEFTRIAXONE SODIUM 2 G: 2 INJECTION, SOLUTION INTRAVENOUS at 09:57

## 2025-06-03 RX ADMIN — ACETAMINOPHEN 650 MG: 325 TABLET, FILM COATED ORAL at 18:49

## 2025-06-03 RX ADMIN — ENOXAPARIN SODIUM 40 MG: 100 INJECTION SUBCUTANEOUS at 14:31

## 2025-06-03 RX ADMIN — LORAZEPAM 1 MG: 1 TABLET ORAL at 22:18

## 2025-06-03 RX ADMIN — OXYCODONE HYDROCHLORIDE 10 MG: 10 TABLET ORAL at 22:16

## 2025-06-03 RX ADMIN — OXYCODONE HYDROCHLORIDE 10 MG: 10 TABLET, FILM COATED, EXTENDED RELEASE ORAL at 22:17

## 2025-06-03 RX ADMIN — GABAPENTIN 300 MG: 300 CAPSULE ORAL at 14:30

## 2025-06-03 RX ADMIN — ACETAMINOPHEN 650 MG: 325 TABLET, FILM COATED ORAL at 09:51

## 2025-06-03 SDOH — HEALTH STABILITY: MENTAL HEALTH: CURRENT SMOKER: 0

## 2025-06-03 ASSESSMENT — COGNITIVE AND FUNCTIONAL STATUS - GENERAL
CLIMB 3 TO 5 STEPS WITH RAILING: A LOT
WALKING IN HOSPITAL ROOM: A LITTLE
TOILETING: A LITTLE
MOVING TO AND FROM BED TO CHAIR: A LITTLE
TURNING FROM BACK TO SIDE WHILE IN FLAT BAD: A LITTLE
TURNING FROM BACK TO SIDE WHILE IN FLAT BAD: A LITTLE
STANDING UP FROM CHAIR USING ARMS: A LITTLE
DAILY ACTIVITIY SCORE: 20
DRESSING REGULAR LOWER BODY CLOTHING: A LITTLE
MOBILITY SCORE: 18
MOBILITY SCORE: 18
CLIMB 3 TO 5 STEPS WITH RAILING: A LOT
HELP NEEDED FOR BATHING: A LITTLE
DAILY ACTIVITIY SCORE: 20
DRESSING REGULAR UPPER BODY CLOTHING: A LITTLE
TOILETING: A LITTLE
DRESSING REGULAR UPPER BODY CLOTHING: A LITTLE
HELP NEEDED FOR BATHING: A LITTLE
WALKING IN HOSPITAL ROOM: A LITTLE
MOVING TO AND FROM BED TO CHAIR: A LITTLE
DRESSING REGULAR LOWER BODY CLOTHING: A LITTLE
STANDING UP FROM CHAIR USING ARMS: A LITTLE

## 2025-06-03 ASSESSMENT — PAIN - FUNCTIONAL ASSESSMENT
PAIN_FUNCTIONAL_ASSESSMENT: 0-10

## 2025-06-03 ASSESSMENT — PAIN SCALES - GENERAL
PAINLEVEL_OUTOF10: 2
PAINLEVEL_OUTOF10: 6
PAINLEVEL_OUTOF10: 8
PAINLEVEL_OUTOF10: 4
PAINLEVEL_OUTOF10: 7
PAINLEVEL_OUTOF10: 6
PAINLEVEL_OUTOF10: 6
PAINLEVEL_OUTOF10: 5 - MODERATE PAIN
PAINLEVEL_OUTOF10: 2
PAINLEVEL_OUTOF10: 5 - MODERATE PAIN

## 2025-06-03 ASSESSMENT — COLUMBIA-SUICIDE SEVERITY RATING SCALE - C-SSRS
1. IN THE PAST MONTH, HAVE YOU WISHED YOU WERE DEAD OR WISHED YOU COULD GO TO SLEEP AND NOT WAKE UP?: NO
2. HAVE YOU ACTUALLY HAD ANY THOUGHTS OF KILLING YOURSELF?: NO
6. HAVE YOU EVER DONE ANYTHING, STARTED TO DO ANYTHING, OR PREPARED TO DO ANYTHING TO END YOUR LIFE?: NO

## 2025-06-03 NOTE — INTERVAL H&P NOTE
Patient seen and examined, s/p drainage of psoas abscess.     Pain improved dramatically since then. Able to stand this AM unassisted and transfer. Sit in chair.    I believe his pain is much better now.     I will hold off on lumbar fixation surgery.        Timoteo Quintanilla MD, [facility]  Spine , [facility]  Jonathan Gutierrez and Liyah Gutierrez Chair in Spinal Neurosurgery  Neurosurgery , [facility] and [facility]  Complex Spine Surgery Fellowship Director   of Neurological Surgery  [employer]  Office: [phone]  Fax: [phone]

## 2025-06-03 NOTE — PROGRESS NOTES
Vancomycin Dosing by Pharmacy- FOLLOW UP    Miguel Lofton is a 69 y.o. year old male who Pharmacy has been consulted for vancomycin dosing for osteomyelitis/septic arthritis. Based on the patient's indication and renal status this patient is being dosed based on a goal AUC of 400-600.     Renal function is currently stable.    Current vancomycin dose: 1250 mg given every 12 hours    Estimated vancomycin AUC on current dose: 511 mg/L.hr     Visit Vitals  /67   Pulse 84   Temp 36.8 °C (98.2 °F) (Temporal)   Resp 16        Lab Results   Component Value Date    CREATININE 0.45 (L) 2025    CREATININE 0.45 (L) 2025    CREATININE 0.42 (L) 2025    CREATININE 0.45 (L) 2025        Patient weight is as follows:   Vitals:    25 0736   Weight: 73.4 kg (161 lb 13.1 oz)       Cultures:  Susceptibility data for the encounter in last 14 days.  Collected Specimen Info Organism   25 Swab from Anterior Nares Methicillin Resistant Staphylococcus aureus (MRSA)        I/O last 3 completed shifts:  In: - (0 mL/kg)   Out: 1685 (23 mL/kg) [Urine:1600 (0.6 mL/kg/hr); Drains:85]  Weight: 73.4 kg   I/O during current shift:  No intake/output data recorded.    Temp (24hrs), Av.4 °C (97.6 °F), Min:36 °C (96.8 °F), Max:36.9 °C (98.4 °F)      Assessment/Plan    Within goal AUC range. Continue current vancomycin regimen.    This dosing regimen is predicted by [employer] to result in the following pharmacokinetic parameters:  Regimen: 1250 mg IV every 12 hours.  Start time: 10:00 on 2025  Exposure target: AUC24 (range) 400-600 mg/L.hr   [id number]: 511 mg/L.hr  AUC24,ss: 511 mg/L.hr  Probability of AUC24 > 400: 99 %  Ctrough,ss: 14.8 mg/L  Probability of Ctrough,ss > 20: 1 %    The next level will be obtained on  with AM labs. May be obtained sooner if clinically indicated.   Will continue to monitor renal function daily while on vancomycin and order serum creatinine at least every 48 hours if not  already ordered.  Follow for continued vancomycin needs, clinical response, and signs/symptoms of toxicity.       Derik Quintanilla, PharmD, BCPS

## 2025-06-03 NOTE — PROGRESS NOTES
Acupuncture Visit:     Miguel Lofton was referred by Yanira Hamilton CNP  .  Session Information  Discipline: Acupuncture  Session Start Time: 1100  Session End Time: 1105  Conflict of Service : Declined service  History of Present Illness: Cancer related sx support  Additional Assessment Detail: Patient met at bedside.  He declined IO services today noting he is still waiting on tx plan regarding hip infection.    Pre-treatment Assessment  Unable to Assess Reason: Patient declined to answer              Provider reviewed plan for the acupuncture session, precautions and contraindications. Patient/guardian/hospital staff has given consent to treat with full understanding of what to expect during thesession. Before acupuncture began, provider explained to the patient to communicate at any time if the procedure was causing discomfort past their tolerance level. Patient agreed to advise acupuncturist. The acupuncturist counseled the patient on the risks of acupuncture treatment including pain, infection, bleeding, and no relief of pain. The patient was positioned comfortably. There was no evidence of infection at the site of needle insertions.       No annotated images are attached to the encounter.         Post-treatment Assessment  Unable to Assess Reason: Did not provide intervention  0-10 (Numeric) Pain Score: 6    Evaluation/Recommendation/Follow-up  Total Session Time (min): 5 minutes      Massage Therapy / Acupuncture Note:

## 2025-06-03 NOTE — PROGRESS NOTES
"Miguel Lofton is a 69 y.o. male on day 14 of admission presenting with Osteomyelitis.    Subjective   Patient resting in bed, states was taken down for lumbar decompression today, however when he got down there, his pain in back and legs and mobility had improved to the point that the neurosurgery decided to hold off on lumbar decompression at this time. Patient states pain is much improved in back and legs, able to move around bed and sit comfortably. States after having ANGELICA drain placed yesterday is feeling much improved. Denies chest pain, SOB, fevers, chills, n/v/c/d, abdominal pain, numbness, tingling, headache, swelling.        Objective     Physical Exam  Constitutional:       Appearance: Normal appearance.   HENT:      Mouth/Throat:      Mouth: Mucous membranes are moist.   Eyes:      Extraocular Movements: Extraocular movements intact.      Conjunctiva/sclera: Conjunctivae normal.      Pupils: Pupils are equal, round, and reactive to light.   Cardiovascular:      Rate and Rhythm: Normal rate and regular rhythm.      Pulses: Normal pulses.      Heart sounds: Normal heart sounds.   Pulmonary:      Effort: Pulmonary effort is normal.      Breath sounds: Normal breath sounds.   Abdominal:      General: Abdomen is flat. Bowel sounds are normal.      Palpations: Abdomen is soft.      Comments: ANGELICA drain left lower abdomen draining red fluid   Musculoskeletal:         General: No tenderness. Normal range of motion.      Cervical back: Normal range of motion.   Skin:     General: Skin is warm and dry.   Neurological:      General: No focal deficit present.      Mental Status: He is alert and oriented to person, place, and time.   Psychiatric:         Mood and Affect: Mood normal.         Behavior: Behavior normal.         Last Recorded Vitals  Blood pressure 111/65, pulse 73, temperature 36.5 °C (97.7 °F), temperature source Temporal, resp. rate 16, height 1.727 m (5' 8\"), weight 73.4 kg (161 lb 13.1 oz), SpO2 " 96%.  Intake/Output last 3 Shifts:  I/O last 3 completed shifts:  In: - (0 mL/kg)   Out: 1685 (23 mL/kg) [Urine:1600 (0.6 mL/kg/hr); Drains:85]  Weight: 73.4 kg     Relevant Results               This patient has a central line   Reason for the central line remaining today? Parenteral medication                 Assessment & Plan  Osteomyelitis  Miguel Lofton is a 69 y.o. male patient, with PMHx of prostate cancer, base of the tongue cancer, HTN, HLD, hypothyroidism, COPD, anxiety who had a CT chest for annual lung cancer screening on 5/13 that showed possible compression fx of T5 and T6. Imaging was reviewed at office visit 5/19 and was advised he present to ED for MRI. He presented to [facility] 5/19 where MRI T spine (5/19) showed acute discitis/osteomyelitis at T5-6 with marked surrounding phlegmon and circumferential epidural phlegmon resulting in moderate to severe spinal canal stenosis. No epidural abscess. He was transferred to [facility] 5/20 for NSGY eval. NSGY consulted 5/20, pt taken to the OR 5/21 for T5-6 laminectomy and T3-8 fusion. ID consulted 5/21 and rec Vanc and Ceftriaxone (5/22- planned stop 7/16 for total 8 week atb course). OR surg path (5/22) +osteomyelitis. OR tissue/wound culture (5/22) neg, final. OR fungal culture (5/22) NGTD. PT/OT consulted 5/22 and rec home care. S/p 1unit PRBC 5/25 for Hgb 7.6 - hgb incremented appropriately. MRI L&C spine under anesthesia done which shows moderate stenosis at C4-5, C5-6, L2-3 disc/osteo w ventral phlegmon w moderate canal stenosis, neurosurgery re-engaged, tentative plan to take to OR sometime next week. MRI spine also showed  Large abscess within the left iliopsoas muscle measuring up to 9.6 cm in craniocaudal dimension and ID was re-engaged who rec IR drainage of abscess on 6/2. CT T/L 6/2:  mild multilevel degenerative changes of thoracic spine especially at T7-T8, moderate bilateral neural foraminal narrowing, no significant spinal canal  stenosis. redemonstrated discitis osteomyelitis at L2-L3 level, previously seen iliopsoas abscesses (L>R), L2-L3 moderate bilateral neural foraminal narrowing and spinal canal stenosis; moderate right and mild to moderate left neural foraminal narrowing with mild spinal canal stenosis of L3-L4; L4-L5 Moderate bilateral neural foramina narrowing and moderate with narrowing of the lateral recesses and Mild spinal canal stenosis. Neurosurgery originally planned to take to OR 6/3 for lumbar decompression, however following abscess drainage on 6/2, patient showing improvement of pain and movement, surgery canceled. Discharge to SNF pending PT/OT evaluation, iliopsoas cultures, and ID recs.     # Updates 6/3:  - Neurosurgery following, originally planned for lumbar decompression 6/3, however surgery canceled for improvement of symptoms following iliopsoas abscess drainage  - ANGELICA drain in place at site of abscess, cultures pending  - Cont Ceftriaxone 2g to q12hrs and cont Vanco, ID following   - Reached out to PT/OT to reengage for discharge recs    # Acute on chronic back pain   # Compression fracture   # Vertebral osteomyelitis  # Phlegmonous tissue c/f infection  - CT Chest ([facility]): considerable deformity of what is felt to be T5 which may be related to a compression fracture of the caudal endplate. There is some compression of the superior endplate of T6.  -  MRI T spine (5/19 at OSH): Acute discitis/osteomyelitis at T5-6 with marked surrounding phlegmon and circumferential epidural phlegmon resulting in moderate to severe spinal canal stenosis. No epidural abscess. Requested for image import from [facility]    - CRP 7.35, ESR 90 (5/20); CRP 5.94, ESR 74 (5/22)  - s/p OR 5/21/25 with neurosurgery for T5-6 laminectomy and T3-8 fusion. Per neurosurgery, phlegmon appeared infectious without purulence  - OR surg path (5/22) +osteomyelitis, neg for carcinoma  - OR tissue/wound culture/ fungal culture 5/22) neg, final  -  5/20 Blood cultures x2 NG, Urine culture negative  - +MRSA nares 5/24  - Prevena removed POD7 with dressing (5/28)  - ID consulted 5/21; rec Vanc and Ceftriaxone (5/22- planned stop 7/16) for total 8 week atb course)--5/30: ID re-engaged for new finding of Large abscess within the left iliopsoas muscle measuring up to 9.6 cm in craniocaudal dimension seen on MRI. Rec Ceftriaxone 2g q12hrs and Vanco----> tx course to be determined   - Reached out to ID (5/28) about abx cost for patient (approx ~$1000/week), ID states they will look into the case but most likely the abx course cannot change due to osteomyelitis   - Weekly CBC, CMP, CRP ordered outpt with results to be faxed to [phone] ATTN: Dr. Dyer once discharged per ID  - Supportive onc consulted 5/25 for uncontrolled pain, recs start ER Oxy 10mg BID and increased doris 300mg to TID, changed Miralax from daily to PRN, and Senna from 3tabs to 2tabs, added Zofran PRN  - PT/OT rec home care, PICC line placed 5/24  - 5/30 MRI C/L Spine moderate stenosis at C4-5, C5-6, L2-3 disc/osteo w ventral phlegmon w moderate canal stenosis  - CT T/L 6/2:  mild multilevel degenerative changes of thoracic spine especially at T7-T8, moderate bilateral neural foraminal narrowing, no significant spinal canal stenosis. redemonstrated discitis osteomyelitis at L2-L3 level, previously seen iliopsoas abscesses (L>R), L2-L3 moderate bilateral neural foraminal narrowing and spinal canal stenosis; moderate right and mild to moderate left neural foraminal narrowing with mild spinal canal stenosis of L3-L4; L4-L5 Moderate bilateral neural foramina narrowing and moderate with narrowing of the lateral recesses and Mild spinal canal stenosis.   - Neuro/surg re-engaged for finding on MRI, originally planned for lumbar decompression 6/3, however patient with improved pain and movement following abscess drainage, surgery canceled  - s/p IR abscess drainage on 6/2, culture pending  - PT/OT  reengaged 6/3 for recs     # Abdominal pain-- resolved   - Reported abdominal pain above old PEG site on 5/22  - Likely 2/2 prone positioning after OR +/- developing ileus  - KUB (5/22): moderate colonic stool burden, possible post-op ileus  - having loose Bms now LBM 5/26, stopped lactulose. 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 Gamez--last seen 12/18/2024: laryngeal structures are noted for grossly normal appearance; true vocal cords, false vocal cords, remaining structures, including epiglottis, piriform sinuses and base of tongue are all grossly normal; there is no evidence of gross mass nodule, polyp, tumor or other defect   - SLP eval done no aspiration, rec soft/ thin liquid diet   - Follows yearly with Dr. Lucero   - Dietitian saw pt 5/22, rec Boost [facility] TID     # Prostate Ca  - s/p radical prostatectomy by Dr Lang Mcneil in 2018  - [id number], [place] 7, + right anterior margin  - PSMA-PET done on 12/6/2023 showed no PSMA uptake within the post-surgical bed; there is PSMA uptake of a left common iliac retroperitoneal lymph node SUV 5.6 and PSMA uptake of a right pelvic sidewall lymph node SUV 3.5  - Dr Rangel recommended radiation therapy. Patient instead went to [facility] and was treated by Dr Zackary Ely (4600 cGy in 23 fractions to the pelvis and LN followed by [place] to prostate bed and LN to 2500 cGy in 10 fractions)  - Since completing radiation, has been experiencing significant fatigue  - He is now following with urologist Dr. Boyd  - For pain management, follows with [facility] palliative care team   - PSA < 0.01 (2/19/25)--> PSA <0.10 (5/20)     # COPD  # Smoking Hx  # Lung Ca screening  - Follows with pulmonologist Dr Hutchison   -  PFTs 03/2025: FEV1 72% FEV1/FVC 0.63  - Did not tolerate Breztri  - Continue home Spiriva 2 puffs daily     # HTN/HLD  - Continue home Metop Succs 25mg daily and Lipitor 80mg nightly  - Hold ASA (5/30)  for possible OR      # Hypothyroidism  - Last TSH 4.49 (5/12)  - Continue home Synthroid 75mcg daily     # Anemia  - Likely anemia of chronic disease +/- post-op  - BL Hgb ~11 after cancer tx; Hgb 9.9 (5/20)--> 8.3 (5/22) post-op--> 8.1 (5/23)--> 7.6 (5/25)-->s/p 1 unit 5/25, hgb stable 8.7 (5/29)-> 8.1 (6/2)  - Ferritin 305, Folate 7.5, Iron 41, TIBC 269, %Sat 15, UIBC 228, Vit B12 1,342 (5/20)  - 5/25 Iron 30, TIBC 204, folate 3.9   - Stopped home Vit B12 sup 5/22 d/t elevated B12 level  - will hold off on folic acid/iron replacement at this time given acute phase with post-op period, plan for outpatient monitoring of hgb and anemia labs/ongoing workup     # DVT prophy:   - Lovenox, was held for neurosurgery and resumed 5/22 PM per ortho, now held on 6/2 for OR on 6/3; resumed 6/3  - SCDs      # DISPO:  - Full code, confirmed with patient on admission   - NOK: Eleni Lofton (wife) [phone]  - DC pending neuro/surg, ID, and PT/OT recs  - NSGY FUV 6/9, Dr. Lucero (heme onc) FUV 8/8, NSGY FUV 8/11     Assessment and plan as above discussed with attending physician Dr. Chávez  GI cancer    Depression    MI (myocardial infarction) (Multi)    Lumbar disc disease    Spinal stenosis of thoracolumbar region    CHF (congestive heart failure)    Gastroesophageal reflux disease without esophagitis    Anemia      Assessment and plan as above discussed with attending physician Dr. Chávez.      I spent 60 minutes in the professional and overall care of this patient.      Donna Cagle, APRN-CNP

## 2025-06-03 NOTE — ANESTHESIA PREPROCEDURE EVALUATION
Patient: Miguel Lofton    Procedure Information       Date/Time: 06/03/25 0715   Procedure: L1-L5 Laminectomy Evacuation of Epidural Abscess and L1-5 instrumented Fusion with Navigation   Location: [facility] OR 24 / Virtual [facility] OR   Surgeons: Timoteo Quintanilla MD      Patient is a 69 y.o. male patient, with PMHx of prostate cancer, base of the tongue cancer, HTN, HLD, hypothyroidism, COPD, anxiety who had a CT chest for annual lung cancer screening on 5/13 that showed possible compression fx of T5 and T6. Imaging was reviewed at office visit 5/19 and was advised he present to ED for MRI. He presented to [facility] 5/19 where MRI T spine (5/19) showed acute discitis/osteomyelitis at T5-6 with marked surrounding phlegmon and circumferential epidural phlegmon resulting in moderate to severe spinal canal stenosis. No epidural abscess. He was transferred to [facility] 5/20 for NSGY eval. NSGY consulted 5/20, pt taken to the OR 5/21 for T5-6 laminectomy and T3-8 fusion. ID consulted 5/21 and rec Vanc and Ceftriaxone (5/22- planned stop 7/16 for total 8 week atb course). OR surg path (5/22) +osteomyelitis. OR tissue/wound culture (5/22) neg, final. OR fungal culture (5/22) NGTD. PT/OT consulted 5/22 and rec home care. S/p 1unit PRBC 5/25 for Hgb 7.6 - hgb incremented appropriately. MRI L&C spine under anesthesia done which shows moderate stenosis at C4-5, C5-6, L2-3 disc/osteo w ventral phlegmon w moderate canal stenosis, neurosurgery re-engaged and plan to OR sometime next week. MRI spine also showed  Large abscess within the left iliopsoas muscle measuring up to 9.6 cm in craniocaudal dimension and ID was re-engaged who rec IR drainage of abscess on 6/2. Neurosurgery plans to take to OR 6/3 for lumbar decompression.       Relevant Problems   Anesthesia (within normal limits)      Cardiac   (+) CHF (congestive heart failure) (Pt states he had large lower extremity edema in past, now resolved)   (+) MI (myocardial  infarction) (Multi) (Pt states he had cardiac cath ~2018, was told he had 100% blockage in one vessel -- never had any chest pain or other signs/sx of MI)   (+) Mixed hyperlipidemia      Pulmonary  Longtime smoker, quit 2018;  Chronic cough, on inhaler   (+) Chronic obstructive pulmonary disease (Multi)   (+) Pneumonia of right middle lobe due to infectious organism      Neuro   (+) Anxiety   (+) Depression      GI  Pt states he had separate umbilical hernia and ventral hernia repairs, unknown if mesh placed   (+) GI cancer (Hx of cancer (SCC of base of tongue, s/p chemo/XRT))   (+) Gastroesophageal reflux disease without esophagitis (On meds, well-controlled)   (+) Oropharyngeal dysphagia      /Renal   (+) Prostate cancer (Multi) (S/p prostatectomy)      Liver  Prior alcohol use, quit many years ago   (+) Cancer of base of tongue (Multi)   (+) GI cancer (Hx of cancer (SCC of base of tongue, s/p chemo/XRT))      Endocrine   (+) Primary hypothyroidism      Hematology   (+) Anemia (Current Hgb=8.2)      Musculoskeletal   (+) Lumbar disc disease   (+) Spinal stenosis of thoracolumbar region      HEENT   (+) Bilateral sensorineural hearing loss      ID   (+) Osteomyelitis (Of spine s/p abscess drainage)   (+) Pneumonia of right middle lobe due to infectious organism      Skin (within normal limits)       Clinical information reviewed:   Tobacco  Allergies  Meds   Med Hx  Surg Hx   Fam Hx  Soc Hx        NPO Detail:  NPO/Void Status  Date of Last Liquid: 06/02/25  Time of Last Liquid: 2359  Date of Last Solid: 06/02/25  Time of Last Solid: 2359      Vitals:    06/03/25 0610   BP: 132/67   Pulse: 84   Resp: 16   Temp: 36.8 °C (98.2 °F)   SpO2: 95%       Surgical History[1]  Medical History[2]  Current Medications[3]  Prior to Admission medications    Medication Sig Start Date End Date Taking? Authorizing Provider   cyclobenzaprine (Flexeril) 10 mg tablet Take 1 tablet (10 mg) by mouth 3 times a day as needed for  muscle spasms. 5/1/25 5/31/25 Yes Historical Provider, MD   levothyroxine (Synthroid, Levoxyl) 88 mcg tablet Take 1 tablet (88 mcg) by mouth early in the morning.. 5/2/25  Yes Historical Provider, MD   acetaminophen (Tylenol) 500 mg tablet Take 2 tablets (1,000 mg) by mouth.    Historical Provider, MD   ascorbic acid (Vitamin C) 500 mg tablet Take 1 tablet (500 mg) by mouth once daily.    Historical Provider, MD   aspirin 81 mg EC tablet Take 1 tablet (81 mg) by mouth once daily.    Historical Provider, MD   atorvastatin (Lipitor) 80 mg tablet Take 1 tablet (80 mg) by mouth once daily. 9/13/22   Historical Provider, MD   cefTRIAXone (Rocephin) 2 gram/50 mL IV Infuse 50 mL (2 g) at 100 mL/hr over 30 minutes into a venous catheter once every 24 hours. 5/25/25 7/16/25  MADELINE Barrientos   cholecalciferol (Vitamin D-3) 25 MCG (1000 UT) tablet Take 1 tablet (25 mcg) by mouth once daily.    Historical Provider, MD   cyanocobalamin (Vitamin B-12) 1,000 mcg tablet Take 1 tablet (1,000 mcg) by mouth once daily.    Historical Provider, MD   diclofenac sodium (Voltaren) 1 % gel gel Apply topically.    Historical Provider, MD   gabapentin (Neurontin) 300 mg capsule Take 1 capsule (300 mg) by mouth 2 times a day. 5/29/25 5/29/26  Cynthia Maddox PA-C   HYDROcodone-acetaminophen (Norco) 5-325 mg tablet Take 1 tablet by mouth every 6 hours if needed. 10/18/22   Historical Provider, MD   levothyroxine (Synthroid, Levoxyl) 75 mcg tablet Take 1 tablet (75 mcg) by mouth once daily. 5/25/25   MADELINE Barrientos   lidocaine 4 % patch Place 2 patches over 12 hours on the skin once daily for 7 days. Remove & discard patch within 12 hours or as directed by MD. 5/30/25 6/6/25  Cynthia Maddox PA-C   LORazepam (Ativan) 1 mg tablet Take 1 tablet (1 mg) by mouth as needed at bedtime for anxiety for up to 7 days. 5/29/25 6/5/25  Cynthia Maddox PA-C   metoprolol succinate XL (Toprol-XL) 25 mg 24 hr tablet Take 1 tablet  (25 mg) by mouth once daily. Do not crush or chew.    Historical Provider, MD   omeprazole (PriLOSEC) 20 mg DR capsule Take 1 capsule (20 mg) by mouth. 8/5/22   Historical Provider, MD   oxyCODONE (Roxicodone) 10 mg immediate release tablet Take 1 tablet (10 mg) by mouth every 4 hours if needed for severe pain (7 - 10) for up to 10 days. 5/29/25 6/8/25  Cynthia Maddox PA-C   oxyCODONE ER (OxyCONTIN) 10 mg 12 hr tablet Take 1 tablet (10 mg) by mouth every 12 hours for 7 days. Do not crush, chew, or split. 5/26/25 6/2/25  MADELINE Vasquez   sildenafil (Viagra) 100 mg tablet Take 1 tablet (100 mg) by mouth once daily as needed. 6/8/23   Historical Provider, MD   simethicone (Mylicon) 80 mg chewable tablet Chew 1 tablet (80 mg) 4 times a day as needed for flatulence for up to 10 days. 5/29/25 6/8/25  Cynthia Maddox PA-C   sodium chloride 0.9% parenteral solution 250 mL with vancomycin 1,000 mg recon soln 1.25 g Infuse 1.25 g at 166.7 mL/hr over 90 minutes into a venous catheter every 12 hours. 5/25/25 7/16/25  MADELINE Barrientos   tiotropium (Spiriva with HandiHaler) 18 mcg inhalation capsule Place into inhaler and inhale.  Patient not taking: Reported on 5/20/2025    Historical Provider, MD   LORazepam (Ativan) 1 mg tablet TAKE ONE TABLET BY MOUTH EVERY DAY NIGHTLY AS NEEDED FOR ANXIETY 11/9/23 5/29/25  Historical Provider, MD   LORazepam (Ativan) 1 mg tablet Take 1 tablet (1 mg) by mouth 2 times a day as needed (Anxiety prior to radiation therapy). 11/29/23 5/29/25  Britney Rangel MD     RX Allergies[4]  Social History     Tobacco Use    Smoking status: Former     Types: Cigarettes    Smokeless tobacco: Never   Substance Use Topics    Alcohol use: Not Currently         Chemistry    Lab Results   Component Value Date/Time     06/03/2025 0447    K 3.5 06/03/2025 0447     06/03/2025 0447    CO2 32 06/03/2025 0447    BUN 7 06/03/2025 0447    CREATININE 0.45 (L) 06/03/2025 0447    Lab  Results   Component Value Date/Time    CALCIUM 8.0 (L) 06/03/2025 0447    ALKPHOS 136 06/03/2025 0447    AST 17 06/03/2025 0447    ALT 13 06/03/2025 0447    BILITOT 0.2 06/03/2025 0447          Lab Results   Component Value Date/Time    WBC 7.7 06/03/2025 0447    HGB 8.2 (L) 06/03/2025 0447    HCT 28.0 (L) 06/03/2025 0447     06/03/2025 0447     Lab Results   Component Value Date/Time    PROTIME 11.4 06/03/2025 0447    INR 1.0 06/03/2025 0447     Encounter Date: 05/20/25   ECG 12 lead   Result Value    Ventricular Rate 73    Atrial Rate 73    ME Interval 134    QRS Duration 100    QT Interval 410    QTC Calculation(Bazett) 451    P Axis 35    R Axis -4    T Axis 27    QRS Count 12    Q Onset 210    P Onset 143    P Offset 198    T Offset 415    QTC Fredericia 438    Narrative    Normal sinus rhythm  Normal ECG  When compared with ECG of 17-AUG-2023 08:04,  T wave amplitude has decreased in Anterior leads  Confirmed by Eddi Martínez ([id number]) on 5/23/2025 4:27:41 PM     No results found for this or any previous visit from the past 1095 days.       Physical Exam    Airway  Mallampati: II  TM distance: >3 FB  Neck ROM: full  Mouth opening: 3 or more finger widths     Cardiovascular   Rhythm: regular  Rate: abnormal  Comments: Pt currently in sinus tachycardia   Dental     (+) upper dentures, lower dentures     Pulmonary    Abdominal - normal examAbdomen: soft  Bowel sounds: normal     Other findings: Patient is edentulous; Hx grade 1 video scope        Anesthesia Plan    History of general anesthesia?: yes  History of complications of general anesthesia?: no    ASA 3     general   (PATIENT HAD DRAINAGE OF RETROPERITONEAL ABSCESS ON 6/2/2025 -- SEEMED TO BE IMPROVING AFTERWARDS, SURGERY STAFF MADE DECISION TO HOLD OFF ON SURGERY BECAUSE PATIENT MAY NO LONGER NEED IT)  The patient is not a current smoker. (Quit 2018)  Patient was previously instructed to abstain from smoking on day of procedure.  Patient did not smoke  on day of procedure.  Education provided regarding risk of obstructive sleep apnea.  intravenous induction   Postoperative pain plan includes opioids.  Trial extubation is planned.  Anesthetic plan and risks discussed with patient.  Use of blood products discussed with patient who consented to blood products.    Plan discussed with CAA and attending.           [1]   Past Surgical History:  Procedure Laterality Date    CARDIAC CATHETERIZATION      HERNIA REPAIR  08/29/2018    Hernia Repair    VASECTOMY  08/29/2018    Surgery Vas Deferens Vasectomy   [2]   Past Medical History:  Diagnosis Date    Hypothyroidism, unspecified     Hypothyroidism, adult    Myocardial infarction (Multi)     Personal history of other endocrine, nutritional and metabolic disease     History of high cholesterol    Personal history of other mental and behavioral disorders     History of anxiety   [3]   Current Facility-Administered Medications:     [Transfer Hold] acetaminophen (Tylenol) tablet 650 mg, 650 mg, oral, q4h, MADELINE Craig, 650 mg at 06/03/25 0119    [Transfer Hold] alteplase (Cathflo Activase) injection 2 mg, 2 mg, intra-catheter, PRN, MADELINE Mccarthy    [Held by provider] aspirin EC tablet 81 mg, 81 mg, oral, Daily, Cynthia Maddox PA-C, 81 mg at 05/30/25 0924    [Transfer Hold] atorvastatin (Lipitor) tablet 80 mg, 80 mg, oral, Daily, Asif Katz MD, 80 mg at 06/02/25 0820    [Transfer Hold] bisacodyl (Dulcolax) EC tablet 10 mg, 10 mg, oral, Daily PRN, MADELINE Craig, 10 mg at 05/22/25 0339    [Transfer Hold] cefTRIAXone (Rocephin) 2 g in dextrose (iso) IV 50 mL, 2 g, intravenous, q12h, Manolo Collins PA-C, Stopped at 06/02/25 2155    [Transfer Hold] cholecalciferol (Vitamin D-3) tablet 25 mcg, 25 mcg, oral, Daily, Asif Katz MD, 25 mcg at 06/02/25 0820    [Transfer Hold] cyclobenzaprine (Flexeril) tablet 10 mg, 10 mg, oral, TID, MADELINE Craig, 10 mg at 06/02/25 2109    [Held  by provider] enoxaparin (Lovenox) syringe 40 mg, 40 mg, subcutaneous, q24h, Rachelle Tsai, CHRISTINA-CNP, 40 mg at 06/01/25 1527    [Transfer Hold] gabapentin (Neurontin) capsule 300 mg, 300 mg, oral, TID, Manolo Collins PA-C, 300 mg at 06/02/25 2109    [Transfer Hold] HYDROmorphone (Dilaudid) injection 0.2 mg, 0.2 mg, intravenous, q3h PRN, Asif Katz MD, 0.2 mg at 06/02/25 2248    [Transfer Hold] HYDROmorphone (Dilaudid) injection 0.4 mg, 0.4 mg, intravenous, Once, Manolo Collins PA-C    lactated Ringer's infusion, 75 mL/hr, intravenous, Continuous, CHRISTINA Pate-CNP, Last Rate: 75 mL/hr at 06/02/25 1706, 75 mL/hr at 06/02/25 1706    [Transfer Hold] levothyroxine (Synthroid, Levoxyl) tablet 75 mcg, 75 mcg, oral, Daily, Asif Katz MD, 75 mcg at 06/02/25 0820    lidocaine (Xylocaine) 10 mg/mL (1 %) injection 0.1 mL, 0.1 mL, subcutaneous, Once, Huber Knutson MD    [Transfer Hold] lidocaine (Xylocaine) 10 mg/mL (1 %) injection 5 mL, 5 mL, infiltration, Once, CHRISTINA Mccarthy-CNP    [Transfer Hold] lidocaine 4 % patch 2 patch, 2 patch, transdermal, Daily, CHRISTINA Craig-CNP, 2 patch at 05/30/25 0922    [Transfer Hold] LORazepam (Ativan) tablet 1 mg, 1 mg, oral, Nightly PRN, Asif Katz MD, 1 mg at 06/02/25 2108    [Transfer Hold] metoprolol succinate XL (Toprol-XL) 24 hr tablet 25 mg, 25 mg, oral, Daily, Asif Katz MD, 25 mg at 06/02/25 0820    [Transfer Hold] ondansetron (Zofran) tablet 4 mg, 4 mg, oral, q6h PRN, MADELIEN Barrientos    [Transfer Hold] oxyCODONE (Roxicodone) immediate release tablet 10 mg, 10 mg, oral, q4h PRN, Asif Katz MD, 10 mg at 06/03/25 0119    [Transfer Hold] oxyCODONE (Roxicodone) immediate release tablet 5 mg, 5 mg, oral, q4h PRN, MADELINE Barrientos, 5 mg at 06/02/25 0819    [Transfer Hold] oxyCODONE ER (OxyCONTIN) 12 hr tablet 10 mg, 10 mg, oral, q12h ANA, MADELINE Barrientos, 10 mg at 06/02/25 2109    oxygen (O2) therapy, ,  inhalation, Continuous - Inhalation, Huber Knutson MD, 2 L/min at 06/02/25 2131    [Transfer Hold] pantoprazole (ProtoNix) EC tablet 40 mg, 40 mg, oral, Daily before breakfast, Asif Katz MD, 40 mg at 06/02/25 0602    [Transfer Hold] polyethylene glycol (Glycolax, Miralax) packet 17 g, 17 g, oral, Daily PRN, MADELINE Barrientos    [Transfer Hold] sennosides-docusate sodium (Belia-Colace) 8.6-50 mg per tablet 2 tablet, 2 tablet, oral, BID, MADELINE Barrientos, 2 tablet at 06/01/25 2144    [Transfer Hold] simethicone (Mylicon) chewable tablet 80 mg, 80 mg, oral, 4x daily PRN, Nati Cuevas, FANTASMACNP, 80 mg at 05/26/25 1025    [Transfer Hold] tiotropium (Spiriva Respimat) 2.5 mcg/actuation inhaler 2 puff, 2 puff, inhalation, Daily, Asif Katz MD, 2 puff at 06/02/25 0947    [Transfer Hold] vancomycin (Vancocin) 1,250 mg in sodium chloride 0.9%  mL, 1,250 mg, intravenous, q12h, Maldonado Hale, [facility], Stopped at 06/02/25 2329    [Transfer Hold] vancomycin (Vancocin) pharmacy to dose - pharmacy monitoring, , miscellaneous, Daily PRN, CHRISTINA Barrientos-CNP  [4]   Allergies  Allergen Reactions    Diazepam Other     MIGRAINES    Meperidine Nausea Only     migraines

## 2025-06-03 NOTE — PROGRESS NOTES
"Miguel Lofton is a 69 y.o. male on day 14 of admission presenting with Osteomyelitis.    Subjective   No acute event.    Objective     Physical Exam  Awake Ox3  RUE D5 B5 T5 HG/IO 5  RLE HF4+ KE5 DF/PF 5  LUE D5 B5 T5 HG/IO 5  LLE HF4 KE5 DF/PF 5  Abd non tender  Spine incision is clean, dry, intact, healing well    Last Recorded Vitals  Blood pressure 132/67, pulse 84, temperature 36.8 °C (98.2 °F), temperature source Temporal, resp. rate 16, height 1.727 m (5' 8\"), weight 73.4 kg (161 lb 13.1 oz), SpO2 95%.  Intake/Output last 3 Shifts:  I/O last 3 completed shifts:  In: - (0 mL/kg)   Out: 1685 (23 mL/kg) [Urine:1600 (0.6 mL/kg/hr); Drains:85]  Weight: 73.4 kg     Relevant Results  Lab Results   Component Value Date    WBC 7.7 06/03/2025    HGB 8.2 (L) 06/03/2025    HCT 28.0 (L) 06/03/2025    MCV 88 06/03/2025     06/03/2025     Lab Results   Component Value Date    GLUCOSE 85 06/03/2025    CALCIUM 8.0 (L) 06/03/2025     06/03/2025    K 3.5 06/03/2025    CO2 32 06/03/2025     06/03/2025    BUN 7 06/03/2025    CREATININE 0.45 (L) 06/03/2025       Assessment & Plan  Osteomyelitis    GI cancer    Depression    MI (myocardial infarction) (Multi)    Lumbar disc disease    Spinal stenosis of thoracolumbar region    CHF (congestive heart failure)    Gastroesophageal reflux disease without esophagitis    Anemia    Miguel Lofton is a 69 year old male with h/o HTN,  HLD, CAD (on ASA), hypoT, COPD, anxiety, LBP (annual radioablations), prostate cancer (s/p prostatectomy 2018), base of tongue cancer (dx 2022, s/p radiation/chemo, in remission), diastolic CHF, p/w mid back pain x4 months, low back pain w BLE radiculopathy and weakness x3 weeks, 5/13 CT chest T5 compression fx, MRI TS T5-T6 compression fx w disc/osteo, epidural phlegmon w moderate canal stenosis   5/21 s/p T5-6 transpedicular decompression, T3-8 fusion, OR cx neg, 5/25 drain dc'd, 5/30 MRI C/L Spine moderate stenosis at C4-5, C5-6, L2-3 " disc/osteo w ventral phlegmon w moderate canal stenosis  6/2 CT T/L spine L2-3 errosive end plate changes c/w discitis/OM, s/p psoas abscess drainage with IR     Plan:  [facility] primary  OR Today 6/3 for lumbar decompression  Please ensure that CBC, RFP, coags, type and screen, are collected within 72 hours of OR  Appreciate ID recs          Oz Mckenzie MD

## 2025-06-03 NOTE — ASSESSMENT & PLAN NOTE
Miguel Lofton is a 69 y.o. male patient, with PMHx of prostate cancer, base of the tongue cancer, HTN, HLD, hypothyroidism, COPD, anxiety who had a CT chest for annual lung cancer screening on 5/13 that showed possible compression fx of T5 and T6. Imaging was reviewed at office visit 5/19 and was advised he present to ED for MRI. He presented to [facility] 5/19 where MRI T spine (5/19) showed acute discitis/osteomyelitis at T5-6 with marked surrounding phlegmon and circumferential epidural phlegmon resulting in moderate to severe spinal canal stenosis. No epidural abscess. He was transferred to [facility] [address] 5/20 for NSGY eval. NSGY consulted 5/20, pt taken to the OR 5/21 for T5-6 laminectomy and T3-8 fusion. ID consulted 5/21 and rec Vanc and Ceftriaxone (5/22- planned stop 7/16 for total 8 week atb course). OR surg path (5/22) +osteomyelitis. OR tissue/wound culture (5/22) neg, final. OR fungal culture (5/22) NGTD. PT/OT consulted 5/22 and rec home care. S/p 1unit PRBC 5/25 for Hgb 7.6 - hgb incremented appropriately. MRI L&C spine under anesthesia done which shows moderate stenosis at C4-5, C5-6, L2-3 disc/osteo w ventral phlegmon w moderate canal stenosis, neurosurgery re-engaged, tentative plan to take to OR sometime next week. MRI spine also showed  Large abscess within the left iliopsoas muscle measuring up to 9.6 cm in craniocaudal dimension and ID was re-engaged who rec IR drainage of abscess on 6/2. CT T/L 6/2:  mild multilevel degenerative changes of thoracic spine especially at T7-T8, moderate bilateral neural foraminal narrowing, no significant spinal canal stenosis. redemonstrated discitis osteomyelitis at L2-L3 level, previously seen iliopsoas abscesses (L>R), L2-L3 moderate bilateral neural foraminal narrowing and spinal canal stenosis; moderate right and mild to moderate left neural foraminal narrowing with mild spinal canal stenosis of L3-L4; L4-L5 Moderate bilateral neural foramina narrowing and  moderate with narrowing of the lateral recesses and Mild spinal canal stenosis. Neurosurgery originally planned to take to OR 6/3 for lumbar decompression, however following abscess drainage on 6/2, patient showing improvement of pain and movement, surgery canceled. Discharge to SNF pending PT/OT evaluation, iliopsoas cultures, and ID recs.     # Updates 6/3:  - Neurosurgery following, originally planned for lumbar decompression 6/3, however surgery canceled for improvement of symptoms following iliopsoas abscess drainage  - ANGELICA drain in place at site of abscess, cultures pending  - Cont Ceftriaxone 2g to q12hrs and cont Vanco, ID following   - Reached out to PT/OT to reengage for discharge recs    # Acute on chronic back pain   # Compression fracture   # Vertebral osteomyelitis  # Phlegmonous tissue c/f infection  - CT Chest ([facility]): considerable deformity of what is felt to be T5 which may be related to a compression fracture of the caudal endplate. There is some compression of the superior endplate of T6.  -  MRI T spine (5/19 at OSH): Acute discitis/osteomyelitis at T5-6 with marked surrounding phlegmon and circumferential epidural phlegmon resulting in moderate to severe spinal canal stenosis. No epidural abscess. Requested for image import from [facility]    - CRP 7.35, ESR 90 (5/20); CRP 5.94, ESR 74 (5/22)  - s/p OR 5/21/25 with neurosurgery for T5-6 laminectomy and T3-8 fusion. Per neurosurgery, phlegmon appeared infectious without purulence  - OR surg path (5/22) +osteomyelitis, neg for carcinoma  - OR tissue/wound culture/ fungal culture 5/22) neg, final  - 5/20 Blood cultures x2 NG, Urine culture negative  - +MRSA nares 5/24  - Prevena removed POD7 with dressing (5/28)  - ID consulted 5/21; rec Vanc and Ceftriaxone (5/22- planned stop 7/16) for total 8 week atb course)--5/30: ID re-engaged for new finding of Large abscess within the left iliopsoas muscle measuring up to 9.6 cm in craniocaudal dimension  seen on MRI. Rec Ceftriaxone 2g q12hrs and Vanco----> tx course to be determined   - Reached out to ID (5/28) about abx cost for patient (approx ~$1000/week), ID states they will look into the case but most likely the abx course cannot change due to osteomyelitis   - Weekly CBC, CMP, CRP ordered outpt with results to be faxed to [phone] ATTN: Dr. Dyer once discharged per ID  - Supportive onc consulted 5/25 for uncontrolled pain, recs start ER Oxy 10mg BID and increased doris 300mg to TID, changed Miralax from daily to PRN, and Senna from 3tabs to 2tabs, added Zofran PRN  - PT/OT rec home care, PICC line placed 5/24  - 5/30 MRI C/L Spine moderate stenosis at C4-5, C5-6, L2-3 disc/osteo w ventral phlegmon w moderate canal stenosis  - CT T/L 6/2:  mild multilevel degenerative changes of thoracic spine especially at T7-T8, moderate bilateral neural foraminal narrowing, no significant spinal canal stenosis. redemonstrated discitis osteomyelitis at L2-L3 level, previously seen iliopsoas abscesses (L>R), L2-L3 moderate bilateral neural foraminal narrowing and spinal canal stenosis; moderate right and mild to moderate left neural foraminal narrowing with mild spinal canal stenosis of L3-L4; L4-L5 Moderate bilateral neural foramina narrowing and moderate with narrowing of the lateral recesses and Mild spinal canal stenosis.   - Neuro/surg re-engaged for finding on MRI, originally planned for lumbar decompression 6/3, however patient with improved pain and movement following abscess drainage, surgery canceled  - s/p IR abscess drainage on 6/2, culture pending  - PT/OT reengaged 6/3 for recs     # Abdominal pain-- resolved   - Reported abdominal pain above old PEG site on 5/22  - Likely 2/2 prone positioning after OR +/- developing ileus  - KUB (5/22): moderate colonic stool burden, possible post-op ileus  - having loose Bms now LBM 5/26, stopped lactulose. 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 Gamez--last seen 12/18/2024: laryngeal structures are noted for grossly normal appearance; true vocal cords, false vocal cords, remaining structures, including epiglottis, piriform sinuses and base of tongue are all grossly normal; there is no evidence of gross mass nodule, polyp, tumor or other defect   - SLP eval done no aspiration, rec soft/ thin liquid diet   - Follows yearly with Dr. Lucero   - Dietitian saw pt 5/22, rec [facility] TID     # Prostate Ca  - s/p radical prostatectomy by Dr Lang Mcneil in 2018  - [id number], [place] 7, + right anterior margin  - PSMA-PET done on 12/6/2023 showed no PSMA uptake within the post-surgical bed; there is PSMA uptake of a left common iliac retroperitoneal lymph node SUV 5.6 and PSMA uptake of a right pelvic sidewall lymph node SUV 3.5  - Dr Rangel recommended radiation therapy. Patient instead went to [facility] and was treated by Dr Zackary Ely (4600 cGy in 23 fractions to the pelvis and LN followed by [place] to prostate bed and LN to 2500 cGy in 10 fractions)  - Since completing radiation, has been experiencing significant fatigue  - He is now following with urologist Dr. Boyd  - For pain management, follows with [facility] palliative care team   - PSA < 0.01 (2/19/25)--> PSA <0.10 (5/20)     # COPD  # Smoking Hx  # Lung Ca screening  - Follows with pulmonologist Dr Hutchison   - PFTs 03/2025: FEV1 72% FEV1/FVC 0.63  - Did not tolerate Breztri  - Continue home Spiriva 2 puffs daily     # HTN/HLD  - Continue home Metop Succs 25mg daily and Lipitor 80mg nightly  - Hold ASA (5/30)  for possible OR      # Hypothyroidism  - Last TSH 4.49 (5/12)  - Continue home Synthroid 75mcg daily     # Anemia  - Likely anemia of chronic disease  +/- post-op  - BL Hgb ~11 after cancer tx; Hgb 9.9 (5/20)--> 8.3 (5/22) post-op--> 8.1 (5/23)--> 7.6 (5/25)-->s/p 1 unit 5/25, hgb stable 8.7 (5/29)-> 8.1 (6/2)  - Ferritin 305, Folate 7.5, Iron 41, TIBC 269, %Sat 15, UIBC 228, Vit B12 1,342 (5/20)  - 5/25 Iron 30, TIBC 204, folate 3.9   - Stopped home Vit B12 sup 5/22 d/t elevated B12 level  - will hold off on folic acid/iron replacement at this time given acute phase with post-op period, plan for outpatient monitoring of hgb and anemia labs/ongoing workup     # DVT prophy:   - Lovenox, was held for neurosurgery and resumed 5/22 PM per ortho, now held on 6/2 for OR on 6/3; resumed 6/3  - SCDs      # DISPO:  - Full code, confirmed with patient on admission   - NOK: Eleni Lofton (wife) [phone]  - DC pending neuro/surg, ID, and PT/OT recs  - NSGY FUV 6/9, Dr. Lucero (heme onc) FUV 8/8, NSGY FUV 8/11     Assessment and plan as above discussed with attending physician Dr. Chávez

## 2025-06-04 LAB
ALBUMIN SERPL BCP-MCNC: 2.4 G/DL (ref 3.4–5)
ALP SERPL-CCNC: 131 U/L (ref 33–136)
ALT SERPL W P-5'-P-CCNC: 12 U/L (ref 10–52)
ANION GAP SERPL CALC-SCNC: 13 MMOL/L (ref 10–20)
AST SERPL W P-5'-P-CCNC: 16 U/L (ref 9–39)
BASOPHILS # BLD AUTO: 0.05 X10*3/UL (ref 0–0.1)
BASOPHILS NFR BLD AUTO: 0.5 %
BILIRUB SERPL-MCNC: 0.3 MG/DL (ref 0–1.2)
BUN SERPL-MCNC: 6 MG/DL (ref 6–23)
CALCIUM SERPL-MCNC: 8 MG/DL (ref 8.6–10.6)
CHLORIDE SERPL-SCNC: 101 MMOL/L (ref 98–107)
CO2 SERPL-SCNC: 30 MMOL/L (ref 21–32)
CREAT SERPL-MCNC: 0.43 MG/DL (ref 0.5–1.3)
CRP SERPL-MCNC: 10.16 MG/DL
EGFRCR SERPLBLD CKD-EPI 2021: >90 ML/MIN/1.73M*2
EOSINOPHIL # BLD AUTO: 0.62 X10*3/UL (ref 0–0.7)
EOSINOPHIL NFR BLD AUTO: 5.9 %
ERYTHROCYTE [DISTWIDTH] IN BLOOD BY AUTOMATED COUNT: 19.2 % (ref 11.5–14.5)
GLUCOSE SERPL-MCNC: 87 MG/DL (ref 74–99)
HCT VFR BLD AUTO: 28.5 % (ref 41–52)
HGB BLD-MCNC: 8.2 G/DL (ref 13.5–17.5)
IMM GRANULOCYTES # BLD AUTO: 0.04 X10*3/UL (ref 0–0.7)
IMM GRANULOCYTES NFR BLD AUTO: 0.4 % (ref 0–0.9)
LYMPHOCYTES # BLD AUTO: 0.96 X10*3/UL (ref 1.2–4.8)
LYMPHOCYTES NFR BLD AUTO: 9.1 %
MCH RBC QN AUTO: 24.9 PG (ref 26–34)
MCHC RBC AUTO-ENTMCNC: 28.8 G/DL (ref 32–36)
MCV RBC AUTO: 87 FL (ref 80–100)
MONOCYTES # BLD AUTO: 0.76 X10*3/UL (ref 0.1–1)
MONOCYTES NFR BLD AUTO: 7.2 %
NEUTROPHILS # BLD AUTO: 8.1 X10*3/UL (ref 1.2–7.7)
NEUTROPHILS NFR BLD AUTO: 76.9 %
NRBC BLD-RTO: 0 /100 WBCS (ref 0–0)
PLATELET # BLD AUTO: 397 X10*3/UL (ref 150–450)
POTASSIUM SERPL-SCNC: 3.7 MMOL/L (ref 3.5–5.3)
PROT SERPL-MCNC: 5 G/DL (ref 6.4–8.2)
RBC # BLD AUTO: 3.29 X10*6/UL (ref 4.5–5.9)
SODIUM SERPL-SCNC: 140 MMOL/L (ref 136–145)
WBC # BLD AUTO: 10.5 X10*3/UL (ref 4.4–11.3)

## 2025-06-04 PROCEDURE — 2500000004 HC RX 250 GENERAL PHARMACY W/ HCPCS (ALT 636 FOR OP/ED)

## 2025-06-04 PROCEDURE — 99232 SBSQ HOSP IP/OBS MODERATE 35: CPT

## 2025-06-04 PROCEDURE — 97116 GAIT TRAINING THERAPY: CPT | Mod: GP,CQ

## 2025-06-04 PROCEDURE — 2500000001 HC RX 250 WO HCPCS SELF ADMINISTERED DRUGS (ALT 637 FOR MEDICARE OP)

## 2025-06-04 PROCEDURE — 2500000002 HC RX 250 W HCPCS SELF ADMINISTERED DRUGS (ALT 637 FOR MEDICARE OP, ALT 636 FOR OP/ED)

## 2025-06-04 PROCEDURE — 85025 COMPLETE CBC W/AUTO DIFF WBC: CPT | Performed by: NURSE PRACTITIONER

## 2025-06-04 PROCEDURE — 86140 C-REACTIVE PROTEIN: CPT | Performed by: INTERNAL MEDICINE

## 2025-06-04 PROCEDURE — 99232 SBSQ HOSP IP/OBS MODERATE 35: CPT | Performed by: INTERNAL MEDICINE

## 2025-06-04 PROCEDURE — 1170000001 HC PRIVATE ONCOLOGY ROOM DAILY

## 2025-06-04 PROCEDURE — 97530 THERAPEUTIC ACTIVITIES: CPT | Mod: GP,CQ

## 2025-06-04 PROCEDURE — 84075 ASSAY ALKALINE PHOSPHATASE: CPT | Performed by: NURSE PRACTITIONER

## 2025-06-04 PROCEDURE — 99233 SBSQ HOSP IP/OBS HIGH 50: CPT | Performed by: NURSE PRACTITIONER

## 2025-06-04 PROCEDURE — 97110 THERAPEUTIC EXERCISES: CPT | Mod: GP,CQ

## 2025-06-04 PROCEDURE — G0545 PR INHERENT VISIT TO INPT: HCPCS | Performed by: INTERNAL MEDICINE

## 2025-06-04 RX ORDER — ACETAMINOPHEN 325 MG/1
650 TABLET ORAL EVERY 6 HOURS PRN
Status: DISCONTINUED | OUTPATIENT
Start: 2025-06-04 | End: 2025-06-08 | Stop reason: HOSPADM

## 2025-06-04 RX ADMIN — OXYCODONE HYDROCHLORIDE 10 MG: 10 TABLET ORAL at 22:06

## 2025-06-04 RX ADMIN — CYCLOBENZAPRINE 10 MG: 10 TABLET, FILM COATED ORAL at 09:59

## 2025-06-04 RX ADMIN — GABAPENTIN 300 MG: 300 CAPSULE ORAL at 20:10

## 2025-06-04 RX ADMIN — ENOXAPARIN SODIUM 40 MG: 100 INJECTION SUBCUTANEOUS at 15:38

## 2025-06-04 RX ADMIN — TIOTROPIUM BROMIDE INHALATION SPRAY 2 PUFF: 3.12 SPRAY, METERED RESPIRATORY (INHALATION) at 08:51

## 2025-06-04 RX ADMIN — CYCLOBENZAPRINE 10 MG: 10 TABLET, FILM COATED ORAL at 15:38

## 2025-06-04 RX ADMIN — Medication 25 MCG: at 09:59

## 2025-06-04 RX ADMIN — CEFTRIAXONE SODIUM 2 G: 2 INJECTION, SOLUTION INTRAVENOUS at 09:58

## 2025-06-04 RX ADMIN — ATORVASTATIN CALCIUM 80 MG: 80 TABLET, FILM COATED ORAL at 09:58

## 2025-06-04 RX ADMIN — OXYCODONE HYDROCHLORIDE 10 MG: 10 TABLET, FILM COATED, EXTENDED RELEASE ORAL at 20:10

## 2025-06-04 RX ADMIN — CEFTRIAXONE SODIUM 2 G: 2 INJECTION, SOLUTION INTRAVENOUS at 21:33

## 2025-06-04 RX ADMIN — ACETAMINOPHEN 650 MG: 325 TABLET, FILM COATED ORAL at 09:59

## 2025-06-04 RX ADMIN — VANCOMYCIN HYDROCHLORIDE 1250 MG: 1.25 INJECTION, POWDER, LYOPHILIZED, FOR SOLUTION INTRAVENOUS at 10:07

## 2025-06-04 RX ADMIN — CYCLOBENZAPRINE 10 MG: 10 TABLET, FILM COATED ORAL at 20:10

## 2025-06-04 RX ADMIN — OXYCODONE HYDROCHLORIDE 10 MG: 10 TABLET, FILM COATED, EXTENDED RELEASE ORAL at 09:58

## 2025-06-04 RX ADMIN — METOPROLOL SUCCINATE 25 MG: 25 TABLET, EXTENDED RELEASE ORAL at 09:58

## 2025-06-04 RX ADMIN — ACETAMINOPHEN 650 MG: 325 TABLET, FILM COATED ORAL at 06:21

## 2025-06-04 RX ADMIN — VANCOMYCIN HYDROCHLORIDE 1250 MG: 1.25 INJECTION, POWDER, LYOPHILIZED, FOR SOLUTION INTRAVENOUS at 22:10

## 2025-06-04 RX ADMIN — GABAPENTIN 300 MG: 300 CAPSULE ORAL at 15:38

## 2025-06-04 RX ADMIN — LEVOTHYROXINE SODIUM 75 MCG: 0.07 TABLET ORAL at 09:58

## 2025-06-04 RX ADMIN — GABAPENTIN 300 MG: 300 CAPSULE ORAL at 09:59

## 2025-06-04 RX ADMIN — PANTOPRAZOLE SODIUM 40 MG: 40 TABLET, DELAYED RELEASE ORAL at 06:21

## 2025-06-04 RX ADMIN — LORAZEPAM 1 MG: 1 TABLET ORAL at 22:06

## 2025-06-04 RX ADMIN — ACETAMINOPHEN 650 MG: 325 TABLET, FILM COATED ORAL at 15:38

## 2025-06-04 ASSESSMENT — COGNITIVE AND FUNCTIONAL STATUS - GENERAL
MOBILITY SCORE: 18
MOVING FROM LYING ON BACK TO SITTING ON SIDE OF FLAT BED WITH BEDRAILS: A LITTLE
TOILETING: A LITTLE
TURNING FROM BACK TO SIDE WHILE IN FLAT BAD: A LOT
CLIMB 3 TO 5 STEPS WITH RAILING: A LOT
CLIMB 3 TO 5 STEPS WITH RAILING: TOTAL
DAILY ACTIVITIY SCORE: 21
MOVING TO AND FROM BED TO CHAIR: A LITTLE
WALKING IN HOSPITAL ROOM: A LITTLE
TURNING FROM BACK TO SIDE WHILE IN FLAT BAD: A LITTLE
STANDING UP FROM CHAIR USING ARMS: A LITTLE
HELP NEEDED FOR BATHING: A LITTLE
WALKING IN HOSPITAL ROOM: A LITTLE
DRESSING REGULAR LOWER BODY CLOTHING: A LITTLE
MOVING TO AND FROM BED TO CHAIR: A LITTLE
STANDING UP FROM CHAIR USING ARMS: A LITTLE
MOBILITY SCORE: 15

## 2025-06-04 ASSESSMENT — PAIN SCALES - GENERAL
PAINLEVEL_OUTOF10: 4
PAINLEVEL_OUTOF10: 4
PAINLEVEL_OUTOF10: 5 - MODERATE PAIN
PAINLEVEL_OUTOF10: 2
PAINLEVEL_OUTOF10: 2
PAINLEVEL_OUTOF10: 7

## 2025-06-04 ASSESSMENT — PAIN DESCRIPTION - LOCATION: LOCATION: BACK

## 2025-06-04 ASSESSMENT — PAIN - FUNCTIONAL ASSESSMENT
PAIN_FUNCTIONAL_ASSESSMENT: 0-10
PAIN_FUNCTIONAL_ASSESSMENT: 0-10

## 2025-06-04 NOTE — PROGRESS NOTES
Miguel Lofton is a 69 y.o. male on day 15 of admission presenting with Osteomyelitis.    Subjective   Patient seen resting in bed, states had mild cough overnight, no chest pain or shortness of breath. No fevers, chills. Encouraged incentive spirometer use, patient verbalized understanding. Patient states pain is currently stable in back, 4/10, continues to be free of pain in legs, is able to move legs in bed well and get out of bed with assistance with minimal pain. Discussed plan to watch for cultures of iliopsoas to decide if antibiotics need adjusting. Plan to meet with PT/OT today for updated plan of care and recs. Denies abdominal pain, n/v/c/d, headache, dizziness, lightheadedness, numbness, tingling, vision change.       Objective     Physical Exam  Constitutional:       Appearance: Normal appearance.   HENT:      Head: Normocephalic.      Mouth/Throat:      Mouth: Mucous membranes are moist.      Pharynx: Oropharynx is clear.   Eyes:      Extraocular Movements: Extraocular movements intact.      Conjunctiva/sclera: Conjunctivae normal.      Pupils: Pupils are equal, round, and reactive to light.   Cardiovascular:      Rate and Rhythm: Normal rate and regular rhythm.      Pulses: Normal pulses.      Heart sounds: Normal heart sounds.   Pulmonary:      Effort: Pulmonary effort is normal.      Breath sounds: Normal breath sounds.   Abdominal:      General: Abdomen is flat. Bowel sounds are normal.      Palpations: Abdomen is soft.      Comments: ANGELICA drain to LLQ, dressing site intact, no tenderness, erythema, leaking at site   Musculoskeletal:      Comments: Bilateral 4/5 lower extremity strength, sensation intact   Skin:     General: Skin is warm and dry.   Neurological:      General: No focal deficit present.      Mental Status: He is alert and oriented to person, place, and time.   Psychiatric:         Mood and Affect: Mood normal.         Behavior: Behavior normal.         Last Recorded Vitals  Blood  "pressure 100/64, pulse 85, temperature 37.4 °C (99.3 °F), temperature source Temporal, resp. rate 18, height 1.727 m (5' 8\"), weight 73.4 kg (161 lb 13.1 oz), SpO2 97%.  Intake/Output last 3 Shifts:  I/O last 3 completed shifts:  In: 1537.5 (20.9 mL/kg) [P.O.:800; I.V.:687.5 (9.4 mL/kg); IV Piggyback:50]  Out: 1597.5 (21.8 mL/kg) [Urine:1500 (0.6 mL/kg/hr); Drains:97.5]  Weight: 73.4 kg     Relevant Results               Scheduled medications  Scheduled Medications[1]  Continuous medications  Continuous Medications[2]  PRN medications  PRN Medications[3]  Results for orders placed or performed during the hospital encounter of 05/20/25 (from the past 24 hours)   CBC and Auto Differential   Result Value Ref Range    WBC 10.5 4.4 - 11.3 x10*3/uL    nRBC 0.0 0.0 - 0.0 /100 WBCs    RBC 3.29 (L) 4.50 - 5.90 x10*6/uL    Hemoglobin 8.2 (L) 13.5 - 17.5 g/dL    Hematocrit 28.5 (L) 41.0 - 52.0 %    MCV 87 80 - 100 fL    MCH 24.9 (L) 26.0 - 34.0 pg    MCHC 28.8 (L) 32.0 - 36.0 g/dL    RDW 19.2 (H) 11.5 - 14.5 %    Platelets 397 150 - 450 x10*3/uL    Neutrophils % 76.9 40.0 - 80.0 %    Immature Granulocytes %, Automated 0.4 0.0 - 0.9 %    Lymphocytes % 9.1 13.0 - 44.0 %    Monocytes % 7.2 2.0 - 10.0 %    Eosinophils % 5.9 0.0 - 6.0 %    Basophils % 0.5 0.0 - 2.0 %    Neutrophils Absolute 8.10 (H) 1.20 - 7.70 x10*3/uL    Immature Granulocytes Absolute, Automated 0.04 0.00 - 0.70 x10*3/uL    Lymphocytes Absolute 0.96 (L) 1.20 - 4.80 x10*3/uL    Monocytes Absolute 0.76 0.10 - 1.00 x10*3/uL    Eosinophils Absolute 0.62 0.00 - 0.70 x10*3/uL    Basophils Absolute 0.05 0.00 - 0.10 x10*3/uL   Comprehensive Metabolic Panel   Result Value Ref Range    Glucose 87 74 - 99 mg/dL    Sodium 140 136 - 145 mmol/L    Potassium 3.7 3.5 - 5.3 mmol/L    Chloride 101 98 - 107 mmol/L    Bicarbonate 30 21 - 32 mmol/L    Anion Gap 13 10 - 20 mmol/L    Urea Nitrogen 6 6 - 23 mg/dL    Creatinine 0.43 (L) 0.50 - 1.30 mg/dL    eGFR >90 >60 mL/min/1.73m*2    " Calcium 8.0 (L) 8.6 - 10.6 mg/dL    Albumin 2.4 (L) 3.4 - 5.0 g/dL    Alkaline Phosphatase 131 33 - 136 U/L    Total Protein 5.0 (L) 6.4 - 8.2 g/dL    AST 16 9 - 39 U/L    Bilirubin, Total 0.3 0.0 - 1.2 mg/dL    ALT 12 10 - 52 U/L     XR chest 1 view  Result Date: 6/4/2025  Interpreted By:  Lamonte Saxena  and Kavon Christian STUDY: XR CHEST 1 VIEW;  6/3/2025 10:58 pm   INDICATION: Signs/Symptoms:new onset cough.   COMPARISON: Chest radiograph 05/20/2025, 1:51 p.m.. CT chest 02/05/2025.   ACCESSION NUMBER(S): [id number]   ORDERING CLINICIAN: GWENDOLYN CHASE   FINDINGS: AP radiograph of the chest was provided.   CARDIOMEDIASTINAL SILHOUETTE: Cardiomediastinal silhouette is normal in size and configuration.   LUNGS: No new consolidation, pleural effusion, or pneumothorax.   ABDOMEN: No remarkable upper abdominal findings.   BONES: Postsurgical changes of thoracic spinal fusion hardware without hardware fracture. No acute osseous changes.       1.  No new airspace disease, pleural effusion, or pneumothorax.   I personally reviewed the images/study and I agree with the findings as stated by resident Gino Jacobson. This study was interpreted at [place], [place].   MACRO: None   Signed by: Lamonte Saxena 6/4/2025 7:51 AM Dictation workstation:   [id number]    CT guided percutaneous peritoneal or retroperitoneal fluid collection drainage  Result Date: 6/3/2025  Interpreted By:  Tosin Palmer and Ogievich Taessa STUDY: CT GUIDED PERCUTANEOUS PERITONEAL OR RETROPERITONEAL FLUID COLLECTION DRAINAGE;  6/2/2025 4:48 pm   INDICATION: Signs/Symptoms:Drainage of left iliopsoas muscle abscess.   COMPARISON: CT THORACIC SPINE WO IV CONTRAST 6/2/2025, MR LUMBAR SPINE W AND WO IV CONTRAST 5/29/2025   ACCESSION NUMBER(S): [id number]   ORDERING CLINICIAN: EMILIANO HINDS   TECHNIQUE: INTERVENTIONALIST(S): Dr. Palmer   CONSENT: The patient/patient's POA/next of kin was informed of the nature  of the proposed procedure. The purposes, alternatives, risks, and benefits were explained and discussed. All questions were answered and consent was obtained.   RADIATION EXPOSURE: DLP: 769.9 mGy*cm   SEDATION: Moderate conscious IV sedation services (supervision of administration, induction, and maintenance) were provided by the physician performing the procedure with intravenous fentanyl 150mcg and versed 3 mg over 20 minutes. The physician was assisted by an independent trained observer, an interventional radiology nurse, in the continuous monitoring of patient level of consciousness and physiologic status.   MEDICATION/CONTRAST: No additional   TIME OUT: A time out was performed immediately prior to procedure start with the interventional team, correctly identifying the patient name, date of birth, MRN, procedure, anatomy (including marking of site and side), patient position, procedure consent form, relevant laboratory and imaging test results, antibiotic administration, safety precautions, and procedure-specific equipment needs.   COMPLICATIONS: No immediate adverse events identified.   FINDINGS: Limited axial CT images were obtained through the abdomen at 5 mm slice thickness. The images demonstrated a hypodense fluid collection in the left psoas muscle measuring 4.9 x 3.5 cm (series 2, image 29).. The patient was repositioned into right lateral oblique position and prepped in the usual sterile manner. 1% lidocaine was used for local anesthesia at the planned skin insertion site.   Under direct CT guidance, a 5 Greenlandic Yueh needle/catheter was placed into the left psoas fluid collection via left lateral lower abdominal wall approach. After confirmation of position of the needle within the fluid collection, the inner needle/stylette was withdrawn. Approximately, 4 mL of purulent fluid was aspirated and sent for analysis. A 0.035 Amplatz guidewire was then placed through the catheter and into the fluid  collection. The catheter was then removed over guidewire and the tract sequentially dilated with 6 and 8 Kinyarwanda dilators. Subsequently, an 10 Kinyarwanda Kinyarwanda pigtail drainage catheter was placed over the guidewire and into the fluid collection. After confirmation of position of the catheter within the collection, the guidewire was removed, the pigtail fixed and the catheter secured to the skin. The catheter was then set to gravity drainage.   Postprocedure images demonstrated no evidence of hemorrhage. Pigtail distal tip catheter is within the left psoas fluid collection. Small volume subcutaneous and left lateral abdominal wall muscular emphysema along the tract of the needle, expected postsurgical change.   Patient tolerated the procedure without immediate complication. Fluid was sent to the laboratory for further analysis.       1. Status post ultrasound guided drain placement into left psoas fluid collection. Fluid was sent to pathology for analysis.   I was present for and/or performed the critical portions of the procedure and immediately available throughout the entire procedure.   I personally reviewed the image(s)/study and interpretation. I agree with the findings as stated.   Performed and dictated at [facility].   MACRO: None.   Signed by: Tosin Palmer 6/3/2025 8:36 PM Dictation workstation:   [id number]    CT lumbar spine wo IV contrast  Result Date: 6/2/2025  Interpreted By:  Varun Stallings, STUDY: CT LUMBAR SPINE WO IV CONTRAST  6/2/2025 2:53 pm   INDICATION: Signs/Symptoms:L2-3 disc osteo     COMPARISON: MRI lumbar spine dated 05/29/2025. Concurrent CT thoracic spine.   ACCESSION NUMBER(S): [id number]   ORDERING CLINICIAN: NETTIE GRIFFIN   TECHNIQUE: Axial CT images of the lumbar spine are obtained. Axial, coronal and sagittal reconstructions are provided for review.   FINDINGS: Alignment: Normal lumbar lordosis.   Vertebrae/Disc Spaces: Redemonstrated cortical  irregularity along the L2-L3 endplates with mild vertebral body height loss, compatible with known osteomyelitis.   Soft tissues: Redemonstrated asymmetrically enlarged left greater than right ileo psoas musculature, better evaluated on prior MRI and compatible with iliopsoas abscesses.   T12-L1: Shallow posterior disc bulge, indenting the anterior thecal sac. Mild-to-moderate bilateral neural foraminal narrowing. Mild spinal canal stenosis.   L2-L3: 6-7 mm posterior disc osteophyte complex, in combination with moderate bilateral facet arthropathy, resulting in moderate bilateral neural foraminal narrowing and moderate spinal canal stenosis.   L3-L4: Shallow posterior disc bulge with superimposed 6 mm bilateral foraminal disc protrusion. Mild-to-moderate bilateral facet arthropathy. Moderate right and mild-to-moderate left neural foraminal narrowing. Mild spinal canal stenosis.   L4-L5: 4 mm posterior disc bulge going combination with moderate bilateral facet arthropathy, resulting in moderate bilateral neural foramina narrowing and moderate narrowing of the lateral recesses. Mild spinal canal stenosis.   L5-S1: No posterior disc bulge. Mild bilateral facet arthropathy. No significant spinal canal stenosis.       1. Redemonstrated discitis osteomyelitis at L2-L3 level with left-greater-than-right iliopsoas abscesses as described above, suboptimally evaluated on this study due to lack of IV contrast. MR of the lumbar spine with and without contrast can be obtained for follow-up imaging.     2. L2-L3: Moderate bilateral neural foraminal narrowing and moderate spinal canal stenosis.   3. L3-L4: Moderate right and mild-to-moderate left neural foraminal narrowing. Mild spinal canal stenosis.   4. L4-L5: Moderate bilateral neural foramina narrowing and moderate narrowing of the lateral recesses. Mild spinal canal stenosis.   MACRO: None   Signed by: Varun Stallings 6/2/2025 3:16 PM Dictation workstation:    [id number]    CT thoracic spine wo IV contrast  Result Date: 6/2/2025  Interpreted By:  Varun Stallings, STUDY: CT THORACIC SPINE WO IV CONTRAST;  6/2/2025 2:54 pm   INDICATION: Signs/Symptoms:L2-3 disc osteo - OR planning.     COMPARISON: MR of the thoracic spine dated 05/19/2025. Concurrent CT lumbar spine.   ACCESSION NUMBER(S): [id number]   ORDERING CLINICIAN: NETTIE GRIFFIN   TECHNIQUE: Axial CT images of the thoracic spine are obtained. Axial, coronal and sagittal reconstructions are submitted for review.   FINDINGS:   Alignment: Within normal limits.   Vertebrae/Intervertebral Discs:  There is postsurgical changes of instrumented posterior fusion of the thoracic spine from T3-T8 with laminectomy. No evidence of acute hardware failure. There is cortical irregularity of the T6-T7 endplates with moderate T5 and T6 vertebral body height loss as before, likely sequela of osteomyelitis. No additional vertebral body height loss in the thoracic spine   Mild multilevel degenerative changes of the thoracic spine, most pronounced at T7-T8, resulting in moderate bilateral neural foraminal narrowing. No significant spinal canal stenosis.   This study is not tailored to evaluate contents within the spinal canal.   Paraspinous Soft Tissues: Within normal limits.         1. Postsurgical changes of instrumented posterior fusion of the thoracic spine from T3-T8 with laminectomy. No evidence of acute hardware failure. There is cortical irregularity of the T6-T7 endplates with moderate T5 and T6 vertebral body height loss as before, likely sequela of osteomyelitis.   2. Mild multilevel degenerative changes of the thoracic spine, most pronounced at T7-T8, resulting in moderate bilateral neural foraminal narrowing. No significant spinal canal stenosis.   3. This study is not tailored to evaluate contents within the spinal canal. MR of the thoracic spine can be obtained for further assessment if clinically warranted.   Please  refer to concurrent CT lumbar spine for additional findings.   MACRO: None   Signed by: Varun Stallings 6/2/2025 3:05 PM Dictation workstation:   [id number]                   Assessment & Plan  Osteomyelitis  Miguel Lofton is a 69 y.o. male patient, with PMHx of prostate cancer, base of the tongue cancer, HTN, HLD, hypothyroidism, COPD, anxiety who had a CT chest for annual lung cancer screening on 5/13 that showed possible compression fx of T5 and T6. Imaging was reviewed at office visit 5/19 and was advised he present to ED for MRI. He presented to [facility] 5/19 where MRI T spine (5/19) showed acute discitis/osteomyelitis at T5-6 with marked surrounding phlegmon and circumferential epidural phlegmon resulting in moderate to severe spinal canal stenosis. No epidural abscess. He was transferred to [facility] 5/20 for NSGY eval. NSGY consulted 5/20, pt taken to the OR 5/21 for T5-6 laminectomy and T3-8 fusion. ID consulted 5/21 and rec Vanc and Ceftriaxone (5/22- planned stop 7/16 for total 8 week atb course). OR surg path (5/22) +osteomyelitis. OR tissue/wound culture (5/22) neg, final. OR fungal culture (5/22) NGTD. PT/OT consulted 5/22 and rec home care. S/p 1unit PRBC 5/25 for Hgb 7.6 - hgb incremented appropriately. MRI L&C spine (5/29) shows moderate stenosis at C4-5, C5-6, L2-3 disc/osteo w ventral phlegmon w moderate canal stenosis, neurosurgery re-engaged, tentative plan to take to OR sometime next week. MRI spine also showed Large abscess within the left iliopsoas muscle measuring up to 9.6 cm in craniocaudal dimension and ID was re-engaged who rec IR drainage of abscess on 6/2. CT T/L 6/2: mild multilevel degenerative changes of thoracic spine especially at T7-T8, moderate bilateral neural foraminal narrowing, no significant spinal canal stenosis. redemonstrated discitis osteomyelitis at L2-L3 level, previously seen iliopsoas abscesses (L>R), L2-L3 moderate bilateral neural foraminal narrowing and  spinal canal stenosis; moderate right and mild to moderate left neural foraminal narrowing with mild spinal canal stenosis of L3-L4; L4-L5 Moderate bilateral neural foramina narrowing and moderate with narrowing of the lateral recesses and Mild spinal canal stenosis. Neurosurgery originally planned to take to OR 6/3 for lumbar decompression, however following abscess drainage on 6/2, patient showing improvement of pain and movement, surgery canceled. Discharge to SNF pending placement and ID recs.     # Updates 6/4:  - Neurosurgery following, originally planned for lumbar decompression 6/3, however surgery canceled for improvement of symptoms following iliopsoas abscess drainage; saw patient 6/4, will follow up outpatient for possible lumbar intervention in outpatient setting, signed off   - ANGELICA drain in place at site of abscess, cultures pending; reached out to IR about pulling drain, state will assess tomorrow  - Cont Ceftriaxone 2g to q12hrs and cont Vanco  - PT/OT rec low intensity rehab  - ID following, preliminary recs to continue Vancomycin BID and Ceftriaxone BID, tentative stop date 7/30/25, weekly labs: CBCD, CMP, quant CRP, vanco trough, fax results to [phone] attn. Dr. Tamar Dyer, follow up scheduled for 7/9/25.  - Developed dry cough overnight 6/3, CXR negative for acute cardiopulmonary process, encouraged IS    # Acute on chronic back pain   # Compression fracture   # Vertebral osteomyelitis  # Phlegmonous tissue c/f infection  # Iliopsoas muscle abscess  - CT Chest ([facility]): considerable deformity of what is felt to be T5 which may be related to a compression fracture of the caudal endplate. There is some compression of the superior endplate of T6.  -  MRI T spine (5/19 at OSH): Acute discitis/osteomyelitis at T5-6 with marked surrounding phlegmon and circumferential epidural phlegmon resulting in moderate to severe spinal canal stenosis. No epidural abscess. Requested for image import from [facility]    - CRP 7.35, ESR 90 (5/20); CRP 5.94, ESR 74 (5/22)  - s/p OR 5/21/25 with neurosurgery for T5-6 laminectomy and T3-8 fusion. Per neurosurgery, phlegmon appeared infectious without purulence  - OR surg path (5/22) +osteomyelitis, neg for carcinoma  - OR tissue/wound culture/ fungal culture 5/22) neg, final  - 5/20 Blood cultures x2 NG, Urine culture negative  - +MRSA nares 5/24  - Prevena removed POD7 with dressing (5/28)  - ID consulted 5/21; rec Vanc and Ceftriaxone (5/22- planned stop 7/16) for total 8 week atb course)--5/30: ID re-engaged for new finding of Large abscess within the left iliopsoas muscle measuring up to 9.6 cm in craniocaudal dimension seen on MRI. Rec Ceftriaxone 2g q12hrs and Vanco q12 hr, has PICC  - Reached out to ID (5/28) about abx cost for patient (approx ~$1000/week), ID states they will look into the case but most likely the abx course cannot change due to osteomyelitis   - Weekly CBC, CMP, CRP ordered outpt with results to be faxed to [phone] ATTN: Dr. Dyer once discharged per ID  - Supportive onc consulted 5/25 for uncontrolled pain, recs start ER Oxy 10mg BID and increased doris 300mg to TID, changed Miralax from daily to PRN, and Senna from 3tabs to 2tabs, added Zofran PRN  - 5/30 MRI C/L Spine moderate stenosis at C4-5, C5-6, L2-3 disc/osteo w ventral phlegmon w moderate canal stenosis  - CT T/L 6/2:  mild multilevel degenerative changes of thoracic spine especially at T7-T8, moderate bilateral neural foraminal narrowing, no significant spinal canal stenosis. redemonstrated discitis osteomyelitis at L2-L3 level, previously seen iliopsoas abscesses (L>R), L2-L3 moderate bilateral neural foraminal narrowing and spinal canal stenosis; moderate right and mild to moderate left neural foraminal narrowing with mild spinal canal stenosis of L3-L4; L4-L5 Moderate bilateral neural foramina narrowing and moderate with narrowing of the lateral recesses and Mild spinal canal  stenosis.   - Neuro/surg re-engaged for finding on MRI, originally planned for lumbar decompression 6/3, however patient with improved pain and movement following abscess drainage, surgery canceled, will follow up outpatient  - s/p IR abscess drainage on 6/2, culture pending, NGTD; ANGELICA drain in place, minimal output, IR to assess for removal on 6/5  - PT/OT rec SNF     # Abdominal pain-- resolved   - Reported abdominal pain above old PEG site on 5/22  - Likely 2/2 prone positioning after OR +/- developing ileus  - KUB (5/22): moderate colonic stool burden, possible post-op ileus  - having loose Bms now LBM 5/26, stopped lactulose. 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 Gamez--last seen 12/18/2024: laryngeal structures are noted for grossly normal appearance; true vocal cords, false vocal cords, remaining structures, including epiglottis, piriform sinuses and base of tongue are all grossly normal; there is no evidence of gross mass nodule, polyp, tumor or other defect   - SLP eval done no aspiration, rec soft/ thin liquid diet   - Follows yearly with Dr. Lucero   - Dietitian saw pt 5/22, rec [facility] TID     # Prostate Ca  - s/p radical prostatectomy by Dr Lang Mcneil in 2018  - [id number], Gleasons 7, + right anterior margin  - PSMA-PET done on 12/6/2023 showed no PSMA uptake within the post-surgical bed; there is PSMA uptake of a left common iliac retroperitoneal lymph node SUV 5.6 and PSMA uptake of a right pelvic sidewall lymph node SUV 3.5  - Dr Rangel recommended radiation therapy. Patient instead went to [facility] and was treated by Dr Zackayr Ely (4600 cGy in 23 fractions to the pelvis and LN followed by [place] to prostate bed and LN to  2500 cGy in 10 fractions)  - Since completing radiation, has been experiencing significant fatigue  - He is now following with urologist Dr. Boyd  - For pain management, follows with [facility] palliative care team   - PSA < 0.01 (2/19/25)--> PSA <0.10 (5/20)     # COPD  # Smoking Hx  # Lung Ca screening  - Follows with pulmonologist Dr Hutchison   - PFTs 03/2025: FEV1 72% FEV1/FVC 0.63  - Did not tolerate Breztri  - Continue home Spiriva 2 puffs daily  - Developed dry cough overnight 6/3, CXR negative for acute cardiopulmonary process, encouraged IS     # HTN/HLD  - Continue home Metop Succs 25mg daily and Lipitor 80mg nightly  - Hold ASA (5/30)  for possible OR, resumed 6/4     # Hypothyroidism  - Last TSH 4.49 (5/12)  - Continue home Synthroid 75mcg daily     # Anemia  - Likely anemia of chronic disease +/- post-op  - BL Hgb ~11 after cancer tx; Hgb 9.9 (5/20)--> 8.3 (5/22) post-op--> 8.1 (5/23)--> 7.6 (5/25)-->s/p 1 unit 5/25, hgb stable 8.7 (5/29)-> 8.1 (6/2)->8.2 (6/4)  - Ferritin 305, Folate 7.5, Iron 41, TIBC 269, %Sat 15, UIBC 228, Vit B12 1,342 (5/20)  - 5/25 Iron 30, TIBC 204, folate 3.9   - Stopped home Vit B12 sup 5/22 d/t elevated B12 level  - will hold off on folic acid/iron replacement at this time given acute phase with post-op period, plan for outpatient monitoring of hgb and anemia labs/ongoing workup     # DVT prophy:   - Lovenox, was held for neurosurgery and resumed 5/22 PM per ortho, now held on 6/2 for OR on 6/3; resumed 6/3  - SCDs      # DISPO:  - Full code, confirmed with patient on admission   - NOK: Eleni Lofton (wife) [phone]  - DC pending neuro/surg, ID, and PT/OT recs  - NSGY FUV 6/9, ID Dr. Dyer 7/9, GI ([facility]) 8/1, Dr. Lucero (heme onc) FUV 8/8, NSGY FUV 8/11, urology ([facility] 9/23), Cardiology ([facility] 11/18), Rad Onc ([facility]) 3/6     Assessment and plan as above discussed with attending physician Dr. Chávez  GI cancer    Depression    MI (myocardial  infarction) (Multi)    Lumbar disc disease    Spinal stenosis of thoracolumbar region    CHF (congestive heart failure)    Gastroesophageal reflux disease without esophagitis    Anemia    Assessment and plan as above discussed with attending physician Kyler.        I spent 60 minutes in the professional and overall care of this patient.      Donna Cagle, APRN-CNP         [1] acetaminophen, 650 mg, oral, q4h  [Held by provider] aspirin, 81 mg, oral, Daily  atorvastatin, 80 mg, oral, Daily  cefTRIAXone, 2 g, intravenous, q12h  cholecalciferol, 25 mcg, oral, Daily  cyclobenzaprine, 10 mg, oral, TID  enoxaparin, 40 mg, subcutaneous, q24h  gabapentin, 300 mg, oral, TID  levothyroxine, 75 mcg, oral, Daily  lidocaine, 2 patch, transdermal, Daily  metoprolol succinate XL, 25 mg, oral, Daily  oxyCODONE ER, 10 mg, oral, q12h ANA  pantoprazole, 40 mg, oral, Daily before breakfast  sennosides-docusate sodium, 2 tablet, oral, BID  tiotropium, 2 puff, inhalation, Daily  vancomycin, 1,250 mg, intravenous, q12h  [2]    [3] PRN medications: alteplase, bisacodyl, guaiFENesin, HYDROmorphone, LORazepam, ondansetron, oxyCODONE, oxyCODONE, polyethylene glycol, simethicone, vancomycin

## 2025-06-04 NOTE — ASSESSMENT & PLAN NOTE
Miguel Lofton is a 69 y.o. male patient, with PMHx of prostate cancer, base of the tongue cancer, HTN, HLD, hypothyroidism, COPD, anxiety who had a CT chest for annual lung cancer screening on 5/13 that showed possible compression fx of T5 and T6. Imaging was reviewed at office visit 5/19 and was advised he present to ED for MRI. He presented to [facility] 5/19 where MRI T spine (5/19) showed acute discitis/osteomyelitis at T5-6 with marked surrounding phlegmon and circumferential epidural phlegmon resulting in moderate to severe spinal canal stenosis. No epidural abscess. He was transferred to [facility] 5/20 for NSGY eval. NSGY consulted 5/20, pt taken to the OR 5/21 for T5-6 laminectomy and T3-8 fusion. ID consulted 5/21 and rec Vanc and Ceftriaxone (5/22- planned stop 7/16 for total 8 week atb course). OR surg path (5/22) +osteomyelitis. OR tissue/wound culture (5/22) neg, final. OR fungal culture (5/22) NGTD. PT/OT consulted 5/22 and rec home care. S/p 1unit PRBC 5/25 for Hgb 7.6 - hgb incremented appropriately. MRI L&C spine (5/29) shows moderate stenosis at C4-5, C5-6, L2-3 disc/osteo w ventral phlegmon w moderate canal stenosis, neurosurgery re-engaged, tentative plan to take to OR sometime next week. MRI spine also showed Large abscess within the left iliopsoas muscle measuring up to 9.6 cm in craniocaudal dimension and ID was re-engaged who rec IR drainage of abscess on 6/2. CT T/L 6/2: mild multilevel degenerative changes of thoracic spine especially at T7-T8, moderate bilateral neural foraminal narrowing, no significant spinal canal stenosis. redemonstrated discitis osteomyelitis at L2-L3 level, previously seen iliopsoas abscesses (L>R), L2-L3 moderate bilateral neural foraminal narrowing and spinal canal stenosis; moderate right and mild to moderate left neural foraminal narrowing with mild spinal canal stenosis of L3-L4; L4-L5 Moderate bilateral neural foramina narrowing and moderate with narrowing of  the lateral recesses and Mild spinal canal stenosis. Neurosurgery originally planned to take to OR 6/3 for lumbar decompression, however following abscess drainage on 6/2, patient showing improvement of pain and movement, surgery canceled. Discharge to SNF pending placement and ID recs.     # Updates 6/4:  - Neurosurgery following, originally planned for lumbar decompression 6/3, however surgery canceled for improvement of symptoms following iliopsoas abscess drainage; saw patient 6/4, will follow up outpatient for possible lumbar intervention in outpatient setting, signed off   - ANGELICA drain in place at site of abscess, cultures pending; reached out to IR about pulling drain, state will assess tomorrow  - Cont Ceftriaxone 2g to q12hrs and cont Vanco  - PT/OT rec low intensity rehab  - ID following, preliminary recs to continue Vancomycin BID and Ceftriaxone BID, tentative stop date 7/30/25, weekly labs: CBCD, CMP, quant CRP, vanco trough, fax results to [phone] attn. Dr. Tamar Dyer, follow up scheduled for 7/9/25.  - Developed dry cough overnight 6/3, CXR negative for acute cardiopulmonary process, encouraged IS    # Acute on chronic back pain   # Compression fracture   # Vertebral osteomyelitis  # Phlegmonous tissue c/f infection  # Iliopsoas muscle abscess  - CT Chest ([facility]): considerable deformity of what is felt to be T5 which may be related to a compression fracture of the caudal endplate. There is some compression of the superior endplate of T6.  -  MRI T spine (5/19 at OSH): Acute discitis/osteomyelitis at T5-6 with marked surrounding phlegmon and circumferential epidural phlegmon resulting in moderate to severe spinal canal stenosis. No epidural abscess. Requested for image import from [facility]    - CRP 7.35, ESR 90 (5/20); CRP 5.94, ESR 74 (5/22)  - s/p OR 5/21/25 with neurosurgery for T5-6 laminectomy and T3-8 fusion. Per neurosurgery, phlegmon appeared infectious without purulence  - OR surg  path (5/22) +osteomyelitis, neg for carcinoma  - OR tissue/wound culture/ fungal culture 5/22) neg, final  - 5/20 Blood cultures x2 NG, Urine culture negative  - +MRSA nares 5/24  - Prevena removed POD7 with dressing (5/28)  - ID consulted 5/21; rec Vanc and Ceftriaxone (5/22- planned stop 7/16) for total 8 week atb course)--5/30: ID re-engaged for new finding of Large abscess within the left iliopsoas muscle measuring up to 9.6 cm in craniocaudal dimension seen on MRI. Rec Ceftriaxone 2g q12hrs and Vanco q12 hr, has PICC  - Reached out to ID (5/28) about abx cost for patient (approx ~$1000/week), ID states they will look into the case but most likely the abx course cannot change due to osteomyelitis   - Weekly CBC, CMP, CRP ordered outpt with results to be faxed to [phone] ATTN: Dr. Dyer once discharged per ID  - Supportive onc consulted 5/25 for uncontrolled pain, recs start ER Oxy 10mg BID and increased doris 300mg to TID, changed Miralax from daily to PRN, and Senna from 3tabs to 2tabs, added Zofran PRN  - 5/30 MRI C/L Spine moderate stenosis at C4-5, C5-6, L2-3 disc/osteo w ventral phlegmon w moderate canal stenosis  - CT T/L 6/2:  mild multilevel degenerative changes of thoracic spine especially at T7-T8, moderate bilateral neural foraminal narrowing, no significant spinal canal stenosis. redemonstrated discitis osteomyelitis at L2-L3 level, previously seen iliopsoas abscesses (L>R), L2-L3 moderate bilateral neural foraminal narrowing and spinal canal stenosis; moderate right and mild to moderate left neural foraminal narrowing with mild spinal canal stenosis of L3-L4; L4-L5 Moderate bilateral neural foramina narrowing and moderate with narrowing of the lateral recesses and Mild spinal canal stenosis.   - Neuro/surg re-engaged for finding on MRI, originally planned for lumbar decompression 6/3, however patient with improved pain and movement following abscess drainage, surgery canceled, will follow up  outpatient  - s/p IR abscess drainage on 6/2, culture pending, NGTD; ANGELICA drain in place, minimal output, IR to assess for removal on 6/5  - PT/OT rec SNF     # Abdominal pain-- resolved   - Reported abdominal pain above old PEG site on 5/22  - Likely 2/2 prone positioning after OR +/- developing ileus  - KUB (5/22): moderate colonic stool burden, possible post-op ileus  - having loose Bms now LBM 5/26, stopped lactulose. 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 Gamez--last seen 12/18/2024: laryngeal structures are noted for grossly normal appearance; true vocal cords, false vocal cords, remaining structures, including epiglottis, piriform sinuses and base of tongue are all grossly normal; there is no evidence of gross mass nodule, polyp, tumor or other defect   - SLP eval done no aspiration, rec soft/ thin liquid diet   - Follows yearly with Dr. Lucero   - Dietitian saw pt 5/22, rec [facility] TID     # Prostate Ca  - s/p radical prostatectomy by Dr Lang Mcneil in 2018  - [id number], [place] 7, + right anterior margin  - PSMA-PET done on 12/6/2023 showed no PSMA uptake within the post-surgical bed; there is PSMA uptake of a left common iliac retroperitoneal lymph node SUV 5.6 and PSMA uptake of a right pelvic sidewall lymph node SUV 3.5  - Dr Rangel recommended radiation therapy. Patient instead went to [facility] [place] and was treated by Dr Zackary Ely (4600 cGy in 23 fractions to the pelvis and LN followed by [place] to prostate bed and LN to 2500 cGy in 10 fractions)  - Since completing radiation, has been experiencing significant fatigue  - He is now following with urologist Dr. Boyd  - For pain management, follows with [facility] palliative care team   - PSA  < 0.01 (2/19/25)--> PSA <0.10 (5/20)     # COPD  # Smoking Hx  # Lung Ca screening  - Follows with pulmonologist Dr Hutchison   - PFTs 03/2025: FEV1 72% FEV1/FVC 0.63  - Did not tolerate Breztri  - Continue home Spiriva 2 puffs daily  - Developed dry cough overnight 6/3, CXR negative for acute cardiopulmonary process, encouraged IS     # HTN/HLD  - Continue home Metop Succs 25mg daily and Lipitor 80mg nightly  - Hold ASA (5/30)  for possible OR, resumed 6/4     # Hypothyroidism  - Last TSH 4.49 (5/12)  - Continue home Synthroid 75mcg daily     # Anemia  - Likely anemia of chronic disease +/- post-op  - BL Hgb ~11 after cancer tx; Hgb 9.9 (5/20)--> 8.3 (5/22) post-op--> 8.1 (5/23)--> 7.6 (5/25)-->s/p 1 unit 5/25, hgb stable 8.7 (5/29)-> 8.1 (6/2)->8.2 (6/4)  - Ferritin 305, Folate 7.5, Iron 41, TIBC 269, %Sat 15, UIBC 228, Vit B12 1,342 (5/20)  - 5/25 Iron 30, TIBC 204, folate 3.9   - Stopped home Vit B12 sup 5/22 d/t elevated B12 level  - will hold off on folic acid/iron replacement at this time given acute phase with post-op period, plan for outpatient monitoring of hgb and anemia labs/ongoing workup     # DVT prophy:   - Lovenox, was held for neurosurgery and resumed 5/22 PM per ortho, now held on 6/2 for OR on 6/3; resumed 6/3  - SCDs      # DISPO:  - Full code, confirmed with patient on admission   - NOK: Eleni Lofton (wife) [phone]  - DC pending neuro/surg, ID, and PT/OT recs  - NSGY FUV 6/9, ID Dr. Dyer 7/9, GI ([facility]) 8/1, Dr. Lucero (heme onc) FUV 8/8, NSGY FUV 8/11, urology ([facility] 9/23), Cardiology ([facility] 11/18), Rad Onc ([facility]) 3/6     Assessment and plan as above discussed with attending physician Dr. Chávez

## 2025-06-04 NOTE — PROGRESS NOTES
06/04/25 1500   Discharge Planning   Living Arrangements Spouse/significant other   Support Systems Spouse/significant other   Type of Residence Private residence   Expected Discharge Disposition SNF   Does the patient need discharge transport arranged? Yes     Social Work continuing to follow to assist with discharge planning.  Pt recommended for SNF and per [facility] pt/family requesting [facility].  SW initiated referral to [facility].  Awaiting final ID recs to provide SNF.  Will continue to follow and advise of updates.  Elizabeth Gunsalus LISW-S  Care Transitions Supervisor

## 2025-06-04 NOTE — PROGRESS NOTES
Miguel Lofton is a 69 y.o. male on day 15 of admission presenting with Osteomyelitis.    Subjective   Interval History:       Per patient, was sent back from preoperative holding area as he was able to ambulate to chair without issue.  He noted that pain had been much reduced after placement of his left sided iliopsoas drain.    He states that he has not been able to ambulate to the bathroom, sit in a chair and up at the bedside.  His drain continues to be stagnant with material only in the tubing.  I had stripped this tubing last evening with prevention of drainage into the ANGELICA bulb by a thick buffy coat plug.  It is still present in the tubing.  He does endorse that he has some slight thigh pain today but mainly after getting up and moving around quite a lot which she had not been doing prior to admission.  He states the pain is not the same.    Review of Systems  Denies fever, chills, night sweats.  Denies nausea, vomiting, diarrhea, or constipation.  He describes stools as soft.  Denies urinary retention.  He has a good urine stream.  He has no hesitancy or urgency.    Objective   Range of Vitals (last 24 hours)  Heart Rate:  [78-89]   Temp:  [35.8 °C (96.4 °F)-37.4 °C (99.3 °F)]   Resp:  [16-18]   BP: (100-118)/(64-77)   SpO2:  [92 %-97 %]   Daily Weight  05/21/25 : 73.4 kg (161 lb 13.1 oz)    Body mass index is 24.6 kg/m².    Physical Exam  CONSTITUTIONAL: Chronically ill appearing, NAD, cooperative  SKIN:  No rashes or lesions.  EYES: PERRLA, EOMI, Sclera anicteric.  No conjunctival injection.  No petechial  or embolic lesions  ENMT: MMM, No oral lesions or thrush.  No pharyngeal erythema  CVS: RRR, S1 & S2 normal.    RESPIRATORY/CHEST: Clear in all lung fields.  No rales or rhonchi  GI: Soft, NT/ND.  No palpable hepatosplenomegaly.  No rebound or guarding  EXT: Did not participate in examination as had just had PT.     Antibiotics  cefTRIAXone - 2 gram/50 mL, 2 gram/50 mL  vancomycin - 1250 mg/250  mL  VANCOMYCIN IVPB 250 ML NS 90 MIN    Relevant Results  Labs  Results from last 72 hours   Lab Units 06/04/25 0627 06/03/25 0447 06/02/25 0436   WBC AUTO x10*3/uL 10.5 7.7 6.9   HEMOGLOBIN g/dL 8.2* 8.2* 8.1*   HEMATOCRIT % 28.5* 28.0* 29.3*   PLATELETS AUTO x10*3/uL 397 358 325   NEUTROS PCT AUTO % 76.9 68.1 74.8   LYMPHS PCT AUTO % 9.1 10.5 10.6   MONOS PCT AUTO % 7.2 9.4 5.8   EOS PCT AUTO % 5.9 10.6 8.1     Results from last 72 hours   Lab Units 06/04/25 0627 06/03/25 0447 06/02/25 0436   SODIUM mmol/L 140 141 138   POTASSIUM mmol/L 3.7 3.5 3.7   CHLORIDE mmol/L 101 102 101   CO2 mmol/L 30 32 30   BUN mg/dL 6 7 7   CREATININE mg/dL 0.43* 0.45* 0.45*   GLUCOSE mg/dL 87 85 87   CALCIUM mg/dL 8.0* 8.0* 7.9*   ANION GAP mmol/L 13 11 11   EGFR mL/min/1.73m*2 >90 >90 >90     Results from last 72 hours   Lab Units 06/04/25 0627 06/03/25 0447 06/02/25 0436   ALK PHOS U/L 131 136 136   BILIRUBIN TOTAL mg/dL 0.3 0.2 0.2   PROTEIN TOTAL g/dL 5.0* 4.9* 5.2*   ALT U/L 12 13 12   AST U/L 16 17 17   ALBUMIN g/dL 2.4* 2.4* 2.4*     Estimated Creatinine Clearance: 125 mL/min (A) (by C-G formula based on SCr of 0.43 mg/dL (L)).  C-Reactive Protein   Date Value Ref Range Status   05/22/2025 5.94 (H) <1.00 mg/dL Final   05/20/2025 7.35 (H) <1.00 mg/dL Final     Microbiology  Susceptibility data from last 14 days.  Collected Specimen Info Organism   05/24/25 Swab from Anterior Nares Methicillin Resistant Staphylococcus aureus (MRSA)       Imaging    CT thoracic spine  wo IV contrast   6/2/25   IMPRESSION:  1. Postsurgical changes of instrumented posterior fusion of the  thoracic spine from T3-T8 with laminectomy. No evidence of acute  hardware failure. There is cortical irregularity of the T6-T7  endplates with moderate T5 and T6 vertebral body height loss as  before, likely sequela of osteomyelitis.      2. Mild multilevel degenerative changes of the thoracic spine, most  pronounced at T7-T8, resulting in moderate  bilateral neural foraminal  narrowing. No significant spinal canal stenosis.      3. This study is not tailored to evaluate contents within the spinal  canal. MR of the thoracic spine can be obtained for further  assessment if clinically warranted.     CT lumbar spine wo IV contrast   6/2/25  IMPRESSION:  1. Redemonstrated discitis osteomyelitis at L2-L3 level with  left-greater-than-right iliopsoas abscesses as described above,  suboptimally evaluated on this study due to lack of IV contrast. MR  of the lumbar spine with and without contrast can be obtained for  follow-up imaging.     2. L2-L3: Moderate bilateral neural foraminal narrowing and moderate  spinal canal stenosis.      3. L3-L4: Moderate right and mild-to-moderate left neural foraminal  narrowing. Mild spinal canal stenosis.      4. L4-L5: Moderate bilateral neural foramina narrowing and moderate  narrowing of the lateral recesses. Mild spinal canal stenosis.      Please refer to concurrent CT lumbar spine for additional findings.  ~~~~~~~~~~~~~~~~~~~~~~~~~~~~~~~~~~~~~~~    CT guided percutaneous peritoneal or retroperitoneal fluid collection drainage   6/3/25  IMPRESSION:  1. Status post ultrasound guided drain placement into left psoas  fluid collection (4.9 x 3.5 cm). Fluid was sent to pathology for analysis.    Assessment/Plan     acute discitis/osteomyelitis of T5-T6 status post T5-6 laminectomy and T3-T8 fusion.  His last surgical procedure pressure was on 5/21/2025.  All operative cultures were negative and a broad range PCR collected 5/21/25 returned negative for Bacterial neucleic acid detection  (on 6/3/25)   He was recommended a 6 to 8-week course of IV vancomycin and ceftriaxone empirically as all cultures have remained negative.  He has been having continued low back pain with increasingly difficult ability to stand for any period of time.  An MRI was performed to assess the source of his continued pain despite surgical debridement and he  was noted to have bilateral iliopsoas muscle abscesses as well as extension of his infection.     He underwent drain placement into left iliopsoas 6/1/25 with resolution of severe leg/thigh pain and marked improvement in ambulation.  NSGY aborted plans for spinal surgery later in day.  Medical management only.     RECOMMENDATIONS:     Continue Vancomycin goal  - 600.  Continue Ceftriaxone  2gm IV q 12 hours.  TENTATIVE STOP DATE: 7/30/25  After discharge, the following labs will need to be collected weekly:  CBC with diff, comprehensive metabolic panel, quantitative CRP, and Vancomycin trough.  Fax all results to [phone], attn. Dr. Tamar Dyer  Patient has an appointment for Infectious Disease follow-up with Dr. Tamar Dyer at [facility], on 7/9/25.  Please instruct patient to arrive 15 minutes before scheduled appointment for  check-in procedure and nursing intake.      I spent 45 minutes in the professional and overall care of this patient.    This is a complex infectious disease issue and the following was performed today (for more details please see the above note):   Management decisions reflecting the added complexity (e.g., changes in antimicrobial therapy, infection control strategies).    Thank-you for allowing us to assist in your patient's management.  We are signing off.  Call if any further issues should arise or you should have any questions.     Nancy Blakely MD  (please reach through EPIC Chat)  Infectious Diseases, Senior Attending Physician

## 2025-06-04 NOTE — SIGNIFICANT EVENT
Patient examined and continues to have improvement in pain. Will defer lumbar surgery at this time. Patient has scheduled follow up; we will discuss possible lumbar intervention as outpatient. We will sign off at this time. Please page with any questions or concerns.

## 2025-06-04 NOTE — PROGRESS NOTES
SUPPORTIVE AND PALLIATIVE ONCOLOGY INPATIENT FOLLOW-UP    SERVICE DATE: 5/27/25    Updates 06/04/25, recommended changes are bolded below:  Symptoms controlled, no changes, consider changing acetaminophen to PRN    ASSESSMENT/PLAN:  Miguel Lofton is a 69 y.o. male diagnosed with compresson fractures of T5 and T6 during annual chest CT screening for lung cancer on 5/13. PMH significant for prostate cancer, base of tongue cancer, HTN, HLD, COPD, hypothyroidism, and anxiety. Admitted 5/20/2025 as a transfer from [facility] for further evaluation and management of compression fractures. S/p T5-T6 laminectomy and T3-T8 fusion on 5/21 with NSGY.  Course complicated by discitis osteomyelitis per OR surgical pathology, iliopsoas muscle abscess. Supportive and Palliative Oncology is consulted for pain management.     Symptom Management Plan:  Recommended changes are bolded     Pain:  Acute back pain related to compression fractures T5-6, discitis osteomyelitis thoracic and lumbar L2-3 with extension to iliopsoas muscle, left iliopsoas abscess, somatic and neuropathic, well controlled  S/p T5-T6 laminectomy and T3-T8 fusion on 5/21 pathology showing osteomyelitis  Home regimen:  Vicodin 5/325 po q 4 hrs prn  Intolerances/previously tried: Meperidine  Risk factors:  none  Renal function WNL and Hepatic function WNL  Consider changing acetaminophen 650 mg po q 4 hrs from scheduled to PRN  Continue gabapentin 300 mg po TID   Continue oxycodone IR 5 mg po q 4 hrs prn for moderate pain  Continue oxycodone IR 10 mg po q 4 hrs prn for severe pain   Continue hydromorphone 0.2 mg IV q 3 hrs prn for breakthrough pain  Recommend discontinuing 24 hrs prior to expected discharge  Continue oxycodone ER 10 mg po bid due to chronic nature of pain (started 5/25)  Continue cyclobenzaprine 10 mg po tid  Continue lidocaine 4% TD patches x 2 daily     Nausea:  At risk for nausea without vomiting related to opioids and anxiety , pain  Home  regimen:  none  QTc:  within normal limits  Well-controlled  Continue ondansetron 4 mg IV/PO/ODT q 6 hrs prn nausea first line     Constipation  At risk for constipation related to medication side effects (including opioids), decreased oral intake, and prolonged immobility in the setting of hospitalization , currently not constipated  Home regimen: none  LBM 6/3/25  Continue Miralax 17 g po daily prn  Continue sennosides-docusate 2 tablets PO BID   Goal to have BM without straining q48-72h, adjust regimen as needed     Altered Mood:  Chronic anxiety related to health concerns   controlled with home regimen   Home regimen: lorazepam 1 mg q hs prn  Currently well controlled  Continue lorazepam 1 mg po q hs prn     Sleeping Difficulty:  Impaired sleep related to pain, anxiety, and hospital environment  Home regimen:  lorazepam  Reports sleeping well now that pain has improved with current regimen  See pain and anxiety recs     Disposition:  Please start the process of having prior authorization with meds to beds deliver medications to patient prior to discharge via [facility] pharmacy. Prescriptions will need to be sent 48-72 hours prior to discharge so that a prior authorization can be completed.   Prior auth completed and approved for Oxy ER.      Discharge date: unknown pending acute issues and pain control  Will assess if patient needs an appointment with Outpatient Supportive Oncology as appropriate-last seen by Dagmar CORREA 10/2022;   per chart review [facility] up after [facility].     SIGNATURE: MADELINE Nicole, NANDO   PAGER/CONTACT:  Contact information:  Supportive and Palliative Oncology  Monday-Friday 8 AM-5 PM  Epic Secure chat or pager [phone].  After hours and weekends:  pager [phone]  =================================================================  SUBJECTIVE:  Interval Events:  Pain improved s/p IR drain  iliopsoas muscle abscess, Neurosurgery holding off on lumbar surgery  Reports pain is well controlled.    Pain Assessment:  Location: mid to lower back radiates down anterior thighs to knee level bilaterally  Duration: Constant  Characteristics:   Rating: mild at rest, moderate with sitting, standing   Descriptors: stretching fullness, sharp, burning, and shooting   Aggravating: movement    Relieving: Analgesics Oxycodone IR and ER, Positioning, and Modifying activity   Interference with Function: Somewhat    Opioid Requirements  Past 24 h opioid requirements (6/3/25 at 0800 to 06/04/25 at 0800):   Oxycodone ER 10 mg PO x 2 doses = 20 mg = 25 OME  Oxycodone IR 5 mg PO x 2 doses = 10 mg = 12.5 OME  Oxycodone IR 10 mg PO x 1 doses = 10 mg = 12.5 OME    Total 24h OME use:  50     Symptom Assessment:  Nausea none  Constipation none  Diarrhea none, reports stools are loose but denies diarrhea    Information obtained from: chart review and interview of patient  _________________________________________________________________   OBJECTIVE:  Lab Results   Component Value Date    WBC 10.5 06/04/2025    HGB 8.2 (L) 06/04/2025    HCT 28.5 (L) 06/04/2025    MCV 87 06/04/2025     06/04/2025     Lab Results   Component Value Date    GLUCOSE 87 06/04/2025    CALCIUM 8.0 (L) 06/04/2025     06/04/2025    K 3.7 06/04/2025    CO2 30 06/04/2025     06/04/2025    BUN 6 06/04/2025    CREATININE 0.43 (L) 06/04/2025     Lab Results   Component Value Date    ALT 12 06/04/2025    AST 16 06/04/2025    ALKPHOS 131 06/04/2025    BILITOT 0.3 06/04/2025     Estimated Creatinine Clearance: 125 mL/min (A) (by C-G formula based on SCr of 0.43 mg/dL (L)).    Scheduled medications   Scheduled Medications[1]  Continuous medications  Continuous Medications[2]  PRN medications  alteplase, 2 mg, PRN  bisacodyl, 10 mg, Daily PRN  guaiFENesin, 200 mg, q4h PRN  HYDROmorphone, 0.2 mg, q3h PRN  LORazepam, 1 mg, Nightly PRN  ondansetron, 4  mg, q6h PRN  oxyCODONE, 10 mg, q4h PRN  oxyCODONE, 5 mg, q4h PRN  polyethylene glycol, 17 g, Daily PRN  simethicone, 80 mg, 4x daily PRN  vancomycin, , Daily PRN         PHYSICAL EXAMINATION:  Vital Signs:   Vital signs reviewed  Visit Vitals  /64 (BP Location: Right arm, Patient Position: Lying)   Pulse 85   Temp 37.4 °C (99.3 °F) (Temporal)   Resp 18      0-10 (Numeric) Pain Score: 4     Physical Exam  Vitals reviewed.   Constitutional:       General: He is not in acute distress.  HENT:      Head: Normocephalic and atraumatic.      Mouth/Throat:      Mouth: Mucous membranes are moist.   Pulmonary:      Effort: Pulmonary effort is normal. No respiratory distress.   Abdominal:      General: There is no distension.   Musculoskeletal:      Right lower leg: No edema.      Left lower leg: No edema.   Skin:     Coloration: Skin is pale.   Neurological:      Mental Status: He is alert and oriented to person, place, and time.   Psychiatric:         Mood and Affect: Mood normal.         Behavior: Behavior normal.         Thought Content: Thought content normal.         Judgment: Judgment normal.       PALLIATIVE CARE ENCOUNTER:  Supportive and Palliative Oncology encounter:  Spoke with patient at bedside  Emotional support provided  Coordination of care:  medication evaluation and management    Medical Decision Making/Goals of Care/Advance Care Planning:  DOUG Tapia CNP 5/26/25  Patient's current clinical condition, including diagnosis, prognosis, and management plan, and goals of care were discussed.   Oncologic diagnosis: compression fractures in setting of hx of prostate cancer, base of tongue cancer,  Family: Supportive wife, kids, grandkids  Unable to assess at time of consult d/t sleeping:  Performance status: Moderate impact on functional status secondary to pain   Joys/meaning/strength: family, luz elena, independence  Understanding of health: understands need to await pathology results to determine if compression  "fracture related to malignancy, need for 8 weeks of IV ATB for osteomyelitis  Information:Wants full disclosure  Goals: to get better, to feel better, and work toward treatment for \"whatever this is causing all of this\" and gain strength  Worries and fears now and future: that this is related to cancer; uncontrolled pain  Minimum acceptable outcome/QOL: to gain strength and independence  Code status discussion:  Full code (per chart review and discussion w/primary team)     Advance Directives  Existence of Advance Directives:None  Decision maker: Surrogate decision maker is wife Eleni Lofton [phone]   =================================================================  Signature and billing:  Medical complexity was high level due to due to complexity of problems, extensive data review, and high risk of management/treatment.    Data:   Diagnostic tests and information reviewed for today's visit:  Conversation with primary team, Most recent labs, Medications    Some elements copied from my note on 5/30/25, the elements have been updated and all reflect current decision making from today, 6/4/25.    Plan of Care discussed with: Provider, Patient    Thank you for asking Supportive and Palliative Oncology to assist with care of this patient.  Recommendations will be communicated back to the consulting service by way of shared electronic medical record/secure chat/email or face-to-face.   We will continue to follow  Please contact us for additional questions or concerns.    SIGNATURE: CHRISTINA Nicole-CNP, DNP   PAGER/CONTACT:  Contact information:  Supportive and Palliative Oncology  Monday-Friday 8 AM-5 PM  Epic Secure chat or pager [phone].  After hours and weekends:  pager [phone]             [1] acetaminophen, 650 mg, oral, q4h  [Held by provider] aspirin, 81 mg, oral, Daily  atorvastatin, 80 mg, oral, Daily  cefTRIAXone, 2 g, intravenous, q12h  cholecalciferol, 25 mcg, oral, Daily  cyclobenzaprine, 10 mg, oral, " TID  enoxaparin, 40 mg, subcutaneous, q24h  gabapentin, 300 mg, oral, TID  levothyroxine, 75 mcg, oral, Daily  lidocaine, 2 patch, transdermal, Daily  metoprolol succinate XL, 25 mg, oral, Daily  oxyCODONE ER, 10 mg, oral, q12h ANA  pantoprazole, 40 mg, oral, Daily before breakfast  sennosides-docusate sodium, 2 tablet, oral, BID  tiotropium, 2 puff, inhalation, Daily  vancomycin, 1,250 mg, intravenous, q12h     [2]

## 2025-06-04 NOTE — PROGRESS NOTES
05/24/25 1100   Discharge Planning   Living Arrangements Spouse/significant other   Support Systems Spouse/significant other   Type of Residence Private residence   Number of Stairs to Enter Residence 2   Number of Stairs Within Residence 0   Do you have animals or pets at home? Yes   Type of Animals or Pets cats 3   Home or Post Acute Services None   Expected Discharge Disposition Home H   Financial Resource Strain   How hard is it for you to pay for the very basics like food, housing, medical care, and heating? Somewhat   Housing Stability   In the last 12 months, was there a time when you were not able to pay the mortgage or rent on time? N   In the past 12 months, how many times have you moved where you were living? 1   At any time in the past 12 months, were you homeless or living in a shelter (including now)? N   Transportation Needs   In the past 12 months, has lack of transportation kept you from medical appointments or from getting medications? no   In the past 12 months, has lack of transportation kept you from meetings, work, or from getting things needed for daily living? No     05/24/2025: Per Kari, patient now will need MRI of the Spine per Neurosurgery. Patient will also need a PICC line for IV abx therapy at home. Referral sent to [facility] for review. Per ID recs, patient wlill require abx therapy until 07/16/2025. ADOD will be on 05/27/2025. TCC will continue to follow for discharge needs. Josee TREJO TCC     05/29/2025: Patient to be on IV abx therapy until 07/16/2025. Patient was contemplating SNF vs HomeCare. Patient decided 6 weeks was too long for a SNF stay so he will go home on IV abx therapy. Patient also still needs MRI with Anesthesia prior to discharge. TCC to continue to follow for any additional needs. Josee TREJO TCC    05/30/2025 1419: Patient recently had a MRI Spine that indicated  moderate stenosis at C4-5, C5-6, L2-3 disc/osteo w ventral phlegmon w moderate canal stenosis.   CareTeam discussing plan of care which may or may not include surgery.  Previous plan of care consisted of home with home care for IV abx therapy. Patient now states that he would like to go to a SNF as he is having difficulty walking. TCC will continue to follow for any additional case management needs. Josee TREJO TCC     06/04/2025 1402: Patient states that he would like a referral sent to [facility] for SNF placement. Patient will need IV abx therapy until 07/16/2025. Awaiting final recommendations from ID after drainage of abscess. TCC to continue to follow for additional needs. Josee TREJO TCC

## 2025-06-04 NOTE — PROGRESS NOTES
Physical Therapy    Physical Therapy Treatment    Patient Name: Miguel Lofton  MRN: [id number]  Department: [facility]  Room: [facility]1402-A  Today's Date: 6/4/2025  Time Calculation  Start Time: 1419  Stop Time: 1457  Time Calculation (min): 38 min         Assessment/Plan   PT Assessment  PT Assessment Results: Decreased strength, Decreased endurance, Impaired balance, Decreased mobility, Orthopedic restrictions  Rehab Prognosis: Fair  Barriers to Discharge Home: Physical needs  Physical Needs: Stair navigation into home limited by function/safety, 24hr mobility assistance needed, High falls risk due to function or environment  Evaluation/Treatment Tolerance: Patient tolerated treatment well  End of Session Communication: Bedside nurse  Assessment Comment: Pt. tolerated most of session however pt. unable to remain in bedside chair despite setting pt. up with stool and bedside table. Pt. stated that he would only be able to remain  in chair for 15 min - Due to pt. worried about being stuck in chair with increasing pain, returned pt. back to bed.  Due to decreased progress with mobility, pt will need continued therapy at a mod intensity level post acute - Spoke with supervising therapist in regards to this update.  End of Session Patient Position: Bed, 3 rail up, Alarm on  PT Plan  Inpatient/Swing Bed or Outpatient: Inpatient      PT Visit Info:  PT Received On: 06/04/25     General Visit Information:   General  Reason for Referral: Osteomyelitis, LBP with radiculopathy and weakness x3 weeks, 5/13 CT chest T5 compression fx, MRI TS T5-T6 compression fx w disc/osteo, epidural phlegmon w moderate canal stenosis, s/p T5-6 laminectomy, T6 transpedicular decompression, T3-8 fusion on 5/21/25  Past Medical History Relevant to Rehab: Per chart, PMH includes HTN,  HLD, CAD (on ASA), hypoT, COPD, anxiety, LBP (annual radioablations), prostate cancer (s/p prostatectomy 2018), base of tongue cancer (dx 2022, s/p radiation/chemo, in  remission), diastolic CHF  Family/Caregiver Present: No  Prior to Session Communication: Bedside nurse  Patient Position Received: Bed, 3 rail up, Alarm off, not on at start of session  General Comment: Pt. supine in bed upon arrival. Pt is on 2l of 02. PIV/ANGELICA drain on left side. Pt. willing to participate. Pt. reports feeling better than before the drain was placed.    Subjective   Precautions:  Precautions  Medical Precautions: Fall precautions  Post-Surgical Precautions: Spinal precautions     Date/Time Vitals Session Patient Position Pulse Resp SpO2 BP MAP (mmHg)    06/04/25 1419 --  --  89  --  96 %  --  --                 Objective   Pain:  Pain Assessment  Pain Assessment: 0-10  0-10 (Numeric) Pain Score:  (Increasing  pain in right thigh lateral aspect after ther ex /amb.)  Cognition:  Cognition  Overall Cognitive Status: Within Functional Limits  Coordination:  Movements are Fluid and Coordinated: Yes  Postural Control:  Static Sitting Balance  Static Sitting-Balance Support: Bilateral upper extremity supported  Static Sitting-Level of Assistance:  (supervision for safety)  Static Sitting-Comment/Number of Minutes: Improved sitting tolerance.  Static Standing Balance  Static Standing-Balance Support: Bilateral upper extremity supported (ww)  Static Standing-Level of Assistance: Contact guard  Extremity/Trunk Assessments:                      Activity Tolerance:  Activity Tolerance  Endurance: Decreased tolerance for upright activites  Treatments:  Therapeutic Exercise  Therapeutic Exercise Performed: Yes  Therapeutic Exercise Activity 1: Supine bilat le ex AP'S, QS, SAQ'S, HS, AND ABD X 15 REPS.    Therapeutic Activity  Therapeutic Activity Performed: Yes  Therapeutic Activity 1: Sat EOB prior to standing to get used to change in position.    Bed Mobility  Bed Mobility: Yes  Bed Mobility 1  Bed Mobility 1:  (Instructed on log roll- Pt. required cues to avoid twisting and utilized bed rail to roll -  Sidelying to sit with min assist.)  Bed Mobility 2  Bed Mobility  2: Sitting to supine  Level of Assistance 2: Minimum assistance  Bed Mobility Comments 2: Min assist for bilat le's.    Ambulation/Gait Training  Ambulation/Gait Training Performed: Yes  Ambulation/Gait Training 1  Surface 1:  (Pt. amb. 15' using a wh. walker and cga. Pt. reports increasing discomfort in bilat le's especially right lateral thigh and needed to turn around. Pt. willing to sit up in chair)  Transfers  Transfer: Yes  Transfer 1  Transfer From 1: Bed to  Transfer to 1: Stand  Transfer Level of Assistance 1: Minimum assistance  Trials/Comments 1: Min assist to guide and boost- Delayed knee/hip extension.  Transfers 2  Transfer From 2: Stand to  Transfer to 2: Sit  Transfer Level of Assistance 2:  (CGA to guide- max cues to reach back for chair arms.)    Outcome Measures:  [facility] Basic Mobility  Turning from your back to your side while in a flat bed without using bedrails: A little  Moving from lying on your back to sitting on the side of a flat bed without using bedrails: A lot  Moving to and from bed to chair (including a wheelchair): A little  Standing up from a chair using your arms (e.g. wheelchair or bedside chair): A little  To walk in hospital room: A little  Climbing 3-5 steps with railing: Total  Basic Mobility - Total Score: 15    Education Documentation  Precautions, taught by Kajal Sears PTA at 6/4/2025  3:20 PM.  Learner: Patient  Readiness: Acceptance  Method: Explanation, Demonstration  Response: Needs Reinforcement  Comment: Reminders for spine precautions. Pt. given cues for proper ther ex technique.    Home Exercise Program, taught by Kajal Sears PTA at 6/4/2025  3:20 PM.  Learner: Patient  Readiness: Acceptance  Method: Explanation, Demonstration  Response: Needs Reinforcement  Comment: Reminders for spine precautions. Pt. given cues for proper ther ex technique.    Mobility Training, taught by Kajal Sears PTA at  6/4/2025  3:20 PM.  Learner: Patient  Readiness: Acceptance  Method: Explanation, Demonstration  Response: Needs Reinforcement  Comment: Reminders for spine precautions. Pt. given cues for proper ther ex technique.    Education Comments  No comments found.        OP EDUCATION:       Encounter Problems       Encounter Problems (Active)       Balance       Pt will maintain static/dynamic standing balance x4 minutes with RW and supervision to demonstrate improved balance  (Not Progressing)       Start:  05/22/25    Expected End:  06/05/25               Mobility       Pt will complete bed mobility independently via log roll technique with HOB flat and no use of bed rails (Progressing)       Start:  05/22/25    Expected End:  06/05/25            Pt will amb >250ft with LRAD and supervision in prep for safe discharge home  (Progressing)       Start:  05/22/25    Expected End:  06/05/25            Pt will a/descend 2 steps with unliat rail (either side) and CGA in prep for safe home entry  (Not Progressing)       Start:  05/22/25    Expected End:  06/05/25               PT Transfers       Pt will transfer sit to stand with LRAD and supervision in prep for out of bed mobility  (Progressing)       Start:  05/22/25    Expected End:  06/05/25

## 2025-06-05 ENCOUNTER — APPOINTMENT (OUTPATIENT)
Dept: RADIOLOGY | Facility: HOSPITAL | Age: 70
DRG: 447 | End: 2025-06-05
Payer: COMMERCIAL

## 2025-06-05 LAB
ALBUMIN SERPL BCP-MCNC: 2.5 G/DL (ref 3.4–5)
ALP SERPL-CCNC: 128 U/L (ref 33–136)
ALT SERPL W P-5'-P-CCNC: 11 U/L (ref 10–52)
ANION GAP SERPL CALC-SCNC: 10 MMOL/L (ref 10–20)
AST SERPL W P-5'-P-CCNC: 14 U/L (ref 9–39)
BASOPHILS # BLD AUTO: 0.05 X10*3/UL (ref 0–0.1)
BASOPHILS NFR BLD AUTO: 0.8 %
BILIRUB SERPL-MCNC: 0.3 MG/DL (ref 0–1.2)
BUN SERPL-MCNC: 6 MG/DL (ref 6–23)
CALCIUM SERPL-MCNC: 8.6 MG/DL (ref 8.6–10.6)
CHLORIDE SERPL-SCNC: 101 MMOL/L (ref 98–107)
CO2 SERPL-SCNC: 32 MMOL/L (ref 21–32)
CREAT SERPL-MCNC: 0.44 MG/DL (ref 0.5–1.3)
EGFRCR SERPLBLD CKD-EPI 2021: >90 ML/MIN/1.73M*2
EOSINOPHIL # BLD AUTO: 0.71 X10*3/UL (ref 0–0.7)
EOSINOPHIL NFR BLD AUTO: 10.9 %
ERYTHROCYTE [DISTWIDTH] IN BLOOD BY AUTOMATED COUNT: 19.4 % (ref 11.5–14.5)
ERYTHROCYTE [SEDIMENTATION RATE] IN BLOOD BY WESTERGREN METHOD: 38 MM/H (ref 0–20)
GLUCOSE SERPL-MCNC: 81 MG/DL (ref 74–99)
HCT VFR BLD AUTO: 26.4 % (ref 41–52)
HGB BLD-MCNC: 7.8 G/DL (ref 13.5–17.5)
IMM GRANULOCYTES # BLD AUTO: 0.04 X10*3/UL (ref 0–0.7)
IMM GRANULOCYTES NFR BLD AUTO: 0.6 % (ref 0–0.9)
LYMPHOCYTES # BLD AUTO: 0.88 X10*3/UL (ref 1.2–4.8)
LYMPHOCYTES NFR BLD AUTO: 13.6 %
MCH RBC QN AUTO: 25.3 PG (ref 26–34)
MCHC RBC AUTO-ENTMCNC: 29.5 G/DL (ref 32–36)
MCV RBC AUTO: 86 FL (ref 80–100)
MONOCYTES # BLD AUTO: 0.77 X10*3/UL (ref 0.1–1)
MONOCYTES NFR BLD AUTO: 11.9 %
NEUTROPHILS # BLD AUTO: 4.04 X10*3/UL (ref 1.2–7.7)
NEUTROPHILS NFR BLD AUTO: 62.2 %
NRBC BLD-RTO: 0 /100 WBCS (ref 0–0)
PLATELET # BLD AUTO: 357 X10*3/UL (ref 150–450)
POTASSIUM SERPL-SCNC: 3.4 MMOL/L (ref 3.5–5.3)
PROT SERPL-MCNC: 5.5 G/DL (ref 6.4–8.2)
RBC # BLD AUTO: 3.08 X10*6/UL (ref 4.5–5.9)
SODIUM SERPL-SCNC: 140 MMOL/L (ref 136–145)
WBC # BLD AUTO: 6.5 X10*3/UL (ref 4.4–11.3)

## 2025-06-05 PROCEDURE — 2500000004 HC RX 250 GENERAL PHARMACY W/ HCPCS (ALT 636 FOR OP/ED)

## 2025-06-05 PROCEDURE — 2500000001 HC RX 250 WO HCPCS SELF ADMINISTERED DRUGS (ALT 637 FOR MEDICARE OP)

## 2025-06-05 PROCEDURE — 99221 1ST HOSP IP/OBS SF/LOW 40: CPT | Performed by: NURSE PRACTITIONER

## 2025-06-05 PROCEDURE — 74177 CT ABD & PELVIS W/CONTRAST: CPT | Performed by: RADIOLOGY

## 2025-06-05 PROCEDURE — 85025 COMPLETE CBC W/AUTO DIFF WBC: CPT | Performed by: NURSE PRACTITIONER

## 2025-06-05 PROCEDURE — 74177 CT ABD & PELVIS W/CONTRAST: CPT

## 2025-06-05 PROCEDURE — 80053 COMPREHEN METABOLIC PANEL: CPT | Performed by: NURSE PRACTITIONER

## 2025-06-05 PROCEDURE — 99233 SBSQ HOSP IP/OBS HIGH 50: CPT

## 2025-06-05 PROCEDURE — 1170000001 HC PRIVATE ONCOLOGY ROOM DAILY

## 2025-06-05 PROCEDURE — 94640 AIRWAY INHALATION TREATMENT: CPT

## 2025-06-05 PROCEDURE — 2500000002 HC RX 250 W HCPCS SELF ADMINISTERED DRUGS (ALT 637 FOR MEDICARE OP, ALT 636 FOR OP/ED)

## 2025-06-05 PROCEDURE — 2550000001 HC RX 255 CONTRASTS

## 2025-06-05 PROCEDURE — 2500000004 HC RX 250 GENERAL PHARMACY W/ HCPCS (ALT 636 FOR OP/ED): Mod: JZ,TB

## 2025-06-05 PROCEDURE — 99231 SBSQ HOSP IP/OBS SF/LOW 25: CPT

## 2025-06-05 PROCEDURE — 85652 RBC SED RATE AUTOMATED: CPT

## 2025-06-05 RX ORDER — POTASSIUM CHLORIDE 20 MEQ/1
20 TABLET, EXTENDED RELEASE ORAL ONCE
Status: COMPLETED | OUTPATIENT
Start: 2025-06-05 | End: 2025-06-05

## 2025-06-05 RX ADMIN — LEVOTHYROXINE SODIUM 75 MCG: 0.07 TABLET ORAL at 08:56

## 2025-06-05 RX ADMIN — IOHEXOL 75 ML: 350 INJECTION, SOLUTION INTRAVENOUS at 18:02

## 2025-06-05 RX ADMIN — TIOTROPIUM BROMIDE INHALATION SPRAY 2 PUFF: 3.12 SPRAY, METERED RESPIRATORY (INHALATION) at 10:47

## 2025-06-05 RX ADMIN — CEFTRIAXONE SODIUM 2 G: 2 INJECTION, SOLUTION INTRAVENOUS at 22:04

## 2025-06-05 RX ADMIN — GABAPENTIN 300 MG: 300 CAPSULE ORAL at 14:14

## 2025-06-05 RX ADMIN — ASPIRIN 81 MG: 81 TABLET, COATED ORAL at 08:56

## 2025-06-05 RX ADMIN — ALTEPLASE 2 MG: 2.2 INJECTION, POWDER, LYOPHILIZED, FOR SOLUTION INTRAVENOUS at 05:51

## 2025-06-05 RX ADMIN — VANCOMYCIN HYDROCHLORIDE 1250 MG: 1.25 INJECTION, POWDER, LYOPHILIZED, FOR SOLUTION INTRAVENOUS at 22:43

## 2025-06-05 RX ADMIN — LORAZEPAM 1 MG: 1 TABLET ORAL at 22:04

## 2025-06-05 RX ADMIN — GABAPENTIN 300 MG: 300 CAPSULE ORAL at 20:56

## 2025-06-05 RX ADMIN — OXYCODONE HYDROCHLORIDE 10 MG: 10 TABLET ORAL at 09:03

## 2025-06-05 RX ADMIN — METOPROLOL SUCCINATE 25 MG: 25 TABLET, EXTENDED RELEASE ORAL at 08:56

## 2025-06-05 RX ADMIN — CYCLOBENZAPRINE 10 MG: 10 TABLET, FILM COATED ORAL at 08:56

## 2025-06-05 RX ADMIN — CEFTRIAXONE SODIUM 2 G: 2 INJECTION, SOLUTION INTRAVENOUS at 11:22

## 2025-06-05 RX ADMIN — OXYCODONE HYDROCHLORIDE 10 MG: 10 TABLET ORAL at 18:16

## 2025-06-05 RX ADMIN — OXYCODONE HYDROCHLORIDE 10 MG: 10 TABLET, FILM COATED, EXTENDED RELEASE ORAL at 08:56

## 2025-06-05 RX ADMIN — OXYCODONE HYDROCHLORIDE 10 MG: 10 TABLET ORAL at 02:07

## 2025-06-05 RX ADMIN — GABAPENTIN 300 MG: 300 CAPSULE ORAL at 08:56

## 2025-06-05 RX ADMIN — Medication 25 MCG: at 08:56

## 2025-06-05 RX ADMIN — POTASSIUM CHLORIDE 20 MEQ: 1500 TABLET, EXTENDED RELEASE ORAL at 18:17

## 2025-06-05 RX ADMIN — OXYCODONE HYDROCHLORIDE 10 MG: 10 TABLET, FILM COATED, EXTENDED RELEASE ORAL at 20:56

## 2025-06-05 RX ADMIN — VANCOMYCIN HYDROCHLORIDE 1250 MG: 1.25 INJECTION, POWDER, LYOPHILIZED, FOR SOLUTION INTRAVENOUS at 11:23

## 2025-06-05 RX ADMIN — OXYCODONE HYDROCHLORIDE 10 MG: 10 TABLET ORAL at 22:04

## 2025-06-05 RX ADMIN — ENOXAPARIN SODIUM 40 MG: 100 INJECTION SUBCUTANEOUS at 14:15

## 2025-06-05 RX ADMIN — CYCLOBENZAPRINE 10 MG: 10 TABLET, FILM COATED ORAL at 14:14

## 2025-06-05 RX ADMIN — CYCLOBENZAPRINE 10 MG: 10 TABLET, FILM COATED ORAL at 20:56

## 2025-06-05 RX ADMIN — ATORVASTATIN CALCIUM 80 MG: 80 TABLET, FILM COATED ORAL at 08:56

## 2025-06-05 RX ADMIN — PANTOPRAZOLE SODIUM 40 MG: 40 TABLET, DELAYED RELEASE ORAL at 09:04

## 2025-06-05 ASSESSMENT — COGNITIVE AND FUNCTIONAL STATUS - GENERAL
TOILETING: A LITTLE
CLIMB 3 TO 5 STEPS WITH RAILING: A LOT
DRESSING REGULAR LOWER BODY CLOTHING: A LITTLE
DAILY ACTIVITIY SCORE: 21
STANDING UP FROM CHAIR USING ARMS: A LITTLE
WALKING IN HOSPITAL ROOM: A LITTLE
MOBILITY SCORE: 18
TURNING FROM BACK TO SIDE WHILE IN FLAT BAD: A LITTLE
HELP NEEDED FOR BATHING: A LITTLE
MOVING TO AND FROM BED TO CHAIR: A LITTLE

## 2025-06-05 ASSESSMENT — PAIN SCALES - GENERAL
PAINLEVEL_OUTOF10: 8
PAINLEVEL_OUTOF10: 9
PAINLEVEL_OUTOF10: 6
PAINLEVEL_OUTOF10: 8
PAINLEVEL_OUTOF10: 9
PAINLEVEL_OUTOF10: 9
PAINLEVEL_OUTOF10: 7
PAINLEVEL_OUTOF10: 5 - MODERATE PAIN
PAINLEVEL_OUTOF10: 10 - WORST POSSIBLE PAIN
PAINLEVEL_OUTOF10: 10 - WORST POSSIBLE PAIN

## 2025-06-05 ASSESSMENT — PAIN - FUNCTIONAL ASSESSMENT: PAIN_FUNCTIONAL_ASSESSMENT: 0-10

## 2025-06-05 ASSESSMENT — PAIN DESCRIPTION - LOCATION: LOCATION: LEG

## 2025-06-05 NOTE — CONSULTS
Subjective   Interval History: has complaints decreased output from psoas drain.     Objective   Vital signs in last 24 hours:  /67 (BP Location: Left arm, Patient Position: Lying)   Pulse 80   Temp 36.6 °C (97.9 °F) (Temporal)   Resp 16   Wt 73.4 kg (161 lb 13.1 oz)   SpO2 93%     Intake/Output last 3 shifts:  I/O last 3 completed shifts:  In: 300 (4.1 mL/kg) [IV Piggyback:300]  Out: 1350 (18.4 mL/kg) [Urine:1350 (0.5 mL/kg/hr)]  Weight: 73.4 kg   Intake/Output this shift:  No intake/output data recorded.    Physical Exam  Skin:     General: Skin is warm and dry.      Comments: L lateral abscess drain site c/d/I with serosanguineous output in drain.       Neuro: oriented to person, place, time, and general circumstances  HEENT: normocephalic, atraumatic  Pulm: clear to auscultation bilaterally, no wheezes, good air entry  Cardiac: Regular rate and rhythm or without murmur or extra heart sounds  Abdomen: soft, nontender, normal bowel sounds  Pulses: 2+ and symmetric    Relevant Results  LABS:  Lab Results   Component Value Date    WBC 6.5 06/05/2025    HGB 7.8 (L) 06/05/2025    HCT 26.4 (L) 06/05/2025    MCV 86 06/05/2025     06/05/2025      Results from last 72 hours   Lab Units 06/05/25  0621   SODIUM mmol/L 140   POTASSIUM mmol/L 3.4*   CHLORIDE mmol/L 101   CO2 mmol/L 32   BUN mg/dL 6   CREATININE mg/dL 0.44*   GLUCOSE mg/dL 81   CALCIUM mg/dL 8.6   ANION GAP mmol/L 10   EGFR mL/min/1.73m*2 >90     Results from last 72 hours   Lab Units 06/05/25  0621   ALK PHOS U/L 128   BILIRUBIN TOTAL mg/dL 0.3   PROTEIN TOTAL g/dL 5.5*   ALT U/L 11   AST U/L 14   ALBUMIN g/dL 2.5*     Results from last 72 hours   Lab Units 06/03/25  0447   INR  1.0       MICRO:  Susceptibility data from last 14 days.  Collected Specimen Info Organism   05/24/25 Swab from Anterior Nares Methicillin Resistant Staphylococcus aureus (MRSA)       IMAGING:  XR chest 1 view   Final Result   1.  No new airspace disease, pleural  effusion, or pneumothorax.        I personally reviewed the images/study and I agree with the findings   as stated by resident Gino Jacobson. This study was interpreted   at [facility], [place], [place].        MACRO:   None        Signed by: Lamonte Saxena 6/4/2025 7:51 AM   Dictation workstation:   [id number]      CT guided percutaneous peritoneal or retroperitoneal fluid collection drainage   Final Result   1. Status post ultrasound guided drain placement into left psoas   fluid collection. Fluid was sent to pathology for analysis.        I was present for and/or performed the critical portions of the   procedure and immediately available throughout the entire procedure.        I personally reviewed the image(s)/study and interpretation. I agree   with the findings as stated.        Performed and dictated at [facility].        MACRO:   None.        Signed by: Tosin Palmer 6/3/2025 8:36 PM   Dictation workstation:   [id number]      CT thoracic spine wo IV contrast   Final Result   1. Postsurgical changes of instrumented posterior fusion of the   thoracic spine from T3-T8 with laminectomy. No evidence of acute   hardware failure. There is cortical irregularity of the T6-T7   endplates with moderate T5 and T6 vertebral body height loss as   before, likely sequela of osteomyelitis.        2. Mild multilevel degenerative changes of the thoracic spine, most   pronounced at T7-T8, resulting in moderate bilateral neural foraminal   narrowing. No significant spinal canal stenosis.        3. This study is not tailored to evaluate contents within the spinal   canal. MR of the thoracic spine can be obtained for further   assessment if clinically warranted.        Please refer to concurrent CT lumbar spine for additional findings.        MACRO:   None        Signed by: Varun Stallings 6/2/2025 3:05 PM   Dictation workstation:   [id number]      CT  lumbar spine wo IV contrast   Final Result   1. Redemonstrated discitis osteomyelitis at L2-L3 level with   left-greater-than-right iliopsoas abscesses as described above,   suboptimally evaluated on this study due to lack of IV contrast. MR   of the lumbar spine with and without contrast can be obtained for   follow-up imaging.             2. L2-L3: Moderate bilateral neural foraminal narrowing and moderate   spinal canal stenosis.        3. L3-L4: Moderate right and mild-to-moderate left neural foraminal   narrowing. Mild spinal canal stenosis.        4. L4-L5: Moderate bilateral neural foramina narrowing and moderate   narrowing of the lateral recesses. Mild spinal canal stenosis.        MACRO:   None        Signed by: Varun Stallings 6/2/2025 3:16 PM   Dictation workstation:   [id number]      MR lumbar spine w and wo IV contrast   Final Result   Discitis osteomyelitis at L2-3 with involvement of the right pedicle   of L3 and right L2-3 facet joint. Epidural thickening ventral to the   thecal sac at L2 and L3 may represent a combination of prominent   venous plexus and epidural phlegmon.        Extension of infection into the left-greater-than-right iliopsoas   muscles. Large abscess within the left iliopsoas muscle measuring up   to 9.6 cm in craniocaudal dimension. No evidence of intrathecal   extension of infection.        Enhancement of the inferior endplate of L1 is thought to be reactive   rather than infectious. Attention on follow-up imaging recommended. I   personally reviewed the images/study and agree with the findings as   stated by Tutu Cuevas MD (Resident Physician). This study was   interpreted at [facility],   [place], [place].        Signed by: Miguel Aldridge 5/30/2025 10:35 AM   Dictation workstation:   [id number]      MR cervical spine w and wo IV contrast   Final Result   No evidence of infectious process within the cervical spine.        Partially  visualized rim enhancing fluid collection within the upper   thoracic spine and edema within the left-greater-than-right upper   thoracic posterior paraspinal musculature. Findings likely represent   seroma/postsurgical change; infectious process can not be excluded.        Multilevel degenerative changes of the cervical spine most notable at   C5-6 where there is moderate narrowing of the spinal canal as well as   severe right foraminal stenosis at C4-5 and C5-6.        Signed by: Miguel Aldridge 5/30/2025 10:41 AM   Dictation workstation:   [id number]      Bedside PICC Imaging   Final Result      XR shoulder left 2+ views   Final Result   Limited shoulder radiographs without osseous injury evident. If there   remains concern for osseous injury, CT can be considered.        MACRO:   None        Signed by: Iván Iverson 5/22/2025 2:18 PM   Dictation workstation:   [id number]      XR thoracic spine 3 views   Final Result   Surgical and degenerative change as above with findings of the T5-6   level which may represent sequelae of discitis/osteomyelitis.   Correlation with any cross-sectional imaging may be helpful.        Signed by: Iván Iverson 5/22/2025 11:16 AM   Dictation workstation:   [id number]      XR abdomen 1 view   Final Result   Moderate-to-marked colonic stool burden. Nonobstructive bowel gas   pattern with mild distention of the stomach and the loops of the   small bowel. A degree of the gaseous distention could be related to   postoperative ileus.        I personally reviewed the images/study and I agree with the findings   as stated by Ha Demarco MD. This study was interpreted at   [facility], [place], [place].        MACRO:   None        Signed by: Iván Iverson 5/22/2025 11:17 AM   Dictation workstation:   [id number]      FL fluoro images no charge   Final Result      FL fluoro images no charge   Final Result      XR chest 1 view   Final Result   1. Coarse  interstitial lung markings bilaterally and peribronchial   thickening within right middle lobe and right lower lobe.   2. Subsegmental atelectasis within right middle lobe noted on prior   CT examination appears improved.        I personally reviewed the images/study and I agree with the findings   as stated by resident physician Ayad Cadet MD. This study was   interpreted at [facility],   [place], [place].        MACRO:   None        Signed by: Jason Phan 5/20/2025 2:50 PM   Dictation workstation:   [id number]      CT abdomen pelvis w IV contrast    (Results Pending)       Assessment/Plan   At bedside the drain was flushed and small amount of clot and particulate aspirated. Given size of abscess prior to drain placement recommend CT abd/pel for evaluation of psoas abscess to correlate with minimal output. Pending results of CT the drain can be pulled prior to discharge.     LOS: 16 days     Taqueria Ocampo, CHRISTINA-CNP      I personally spent over half of a total 35 minutes in counseling and discussion with the patient and coordination of care as described above.

## 2025-06-05 NOTE — PROGRESS NOTES
06/04/25 1500   Discharge Planning   Living Arrangements Spouse/significant other   Support Systems Spouse/significant other   Type of Residence Private residence   Expected Discharge Disposition SNF   Does the patient need discharge transport arranged? Yes     6/4/25  Social Work continuing to follow to assist with discharge planning.  Pt recommended for SNF and per [facility] pt/family requesting [facility].  SW initiated referral to [facility].  Awaiting final ID recs to provide SNF.  Will continue to follow and advise of updates.  Elizabeth Gunsalus LISW-S  Care Transitions Supervisor    6/5/25  [facility] is able to accept and is submitting for precert. Updates sent.  SW to continue to follow and advise of updates.  Elizabeth Gunsalus LISW-S  Care Transitions Supervisor

## 2025-06-05 NOTE — PROGRESS NOTES
"Miguel Lofton is a 69 y.o. male on day 16 of admission presenting with Osteomyelitis.    Subjective   Pt resting in bed. Pt declined integrative services    Objective     Physical Exam  Vitals reviewed.   Constitutional:       General: He is not in acute distress.     Appearance: Normal appearance. He is normal weight. He is ill-appearing.   HENT:      Head: Normocephalic and atraumatic.      Nose: Nose normal.      Mouth/Throat:      Mouth: Mucous membranes are moist.   Eyes:      Extraocular Movements: Extraocular movements intact.      Pupils: Pupils are equal, round, and reactive to light.   Cardiovascular:      Rate and Rhythm: Normal rate.   Pulmonary:      Effort: Pulmonary effort is normal.   Abdominal:      General: Abdomen is flat.      Palpations: Abdomen is soft.   Skin:     General: Skin is warm and dry.   Neurological:      General: No focal deficit present.      Mental Status: He is alert and oriented to person, place, and time. Mental status is at baseline.   Psychiatric:         Mood and Affect: Mood normal.         Behavior: Behavior normal.         Thought Content: Thought content normal.         Judgment: Judgment normal.         Last Recorded Vitals  Blood pressure 107/67, pulse 80, temperature 36.6 °C (97.9 °F), temperature source Temporal, resp. rate 16, height 1.727 m (5' 8\"), weight 73.4 kg (161 lb 13.1 oz), SpO2 93%.  Intake/Output last 3 Shifts:  I/O last 3 completed shifts:  In: 300 (4.1 mL/kg) [IV Piggyback:300]  Out: 1350 (18.4 mL/kg) [Urine:1350 (0.5 mL/kg/hr)]  Weight: 73.4 kg     Relevant Results  Scheduled medications  Scheduled Medications[1]  Continuous medications  Continuous Medications[2]  PRN medications  PRN Medications[3]    Results for orders placed or performed during the hospital encounter of 05/20/25 (from the past 24 hours)   C-reactive protein   Result Value Ref Range    C-Reactive Protein 10.16 (H) <1.00 mg/dL   CBC and Auto Differential   Result Value Ref Range    " WBC 6.5 4.4 - 11.3 x10*3/uL    nRBC 0.0 0.0 - 0.0 /100 WBCs    RBC 3.08 (L) 4.50 - 5.90 x10*6/uL    Hemoglobin 7.8 (L) 13.5 - 17.5 g/dL    Hematocrit 26.4 (L) 41.0 - 52.0 %    MCV 86 80 - 100 fL    MCH 25.3 (L) 26.0 - 34.0 pg    MCHC 29.5 (L) 32.0 - 36.0 g/dL    RDW 19.4 (H) 11.5 - 14.5 %    Platelets 357 150 - 450 x10*3/uL    Neutrophils % 62.2 40.0 - 80.0 %    Immature Granulocytes %, Automated 0.6 0.0 - 0.9 %    Lymphocytes % 13.6 13.0 - 44.0 %    Monocytes % 11.9 2.0 - 10.0 %    Eosinophils % 10.9 0.0 - 6.0 %    Basophils % 0.8 0.0 - 2.0 %    Neutrophils Absolute 4.04 1.20 - 7.70 x10*3/uL    Immature Granulocytes Absolute, Automated 0.04 0.00 - 0.70 x10*3/uL    Lymphocytes Absolute 0.88 (L) 1.20 - 4.80 x10*3/uL    Monocytes Absolute 0.77 0.10 - 1.00 x10*3/uL    Eosinophils Absolute 0.71 (H) 0.00 - 0.70 x10*3/uL    Basophils Absolute 0.05 0.00 - 0.10 x10*3/uL   Comprehensive Metabolic Panel   Result Value Ref Range    Glucose 81 74 - 99 mg/dL    Sodium 140 136 - 145 mmol/L    Potassium 3.4 (L) 3.5 - 5.3 mmol/L    Chloride 101 98 - 107 mmol/L    Bicarbonate 32 21 - 32 mmol/L    Anion Gap 10 10 - 20 mmol/L    Urea Nitrogen 6 6 - 23 mg/dL    Creatinine 0.44 (L) 0.50 - 1.30 mg/dL    eGFR >90 >60 mL/min/1.73m*2    Calcium 8.6 8.6 - 10.6 mg/dL    Albumin 2.5 (L) 3.4 - 5.0 g/dL    Alkaline Phosphatase 128 33 - 136 U/L    Total Protein 5.5 (L) 6.4 - 8.2 g/dL    AST 14 9 - 39 U/L    Bilirubin, Total 0.3 0.0 - 1.2 mg/dL    ALT 11 10 - 52 U/L   Sedimentation rate, automated   Result Value Ref Range    Sedimentation Rate 38 (H) 0 - 20 mm/h     XR chest 1 view  Result Date: 6/4/2025  Interpreted By:  Lamonte Saxena and Ritchie Brandon STUDY: XR CHEST 1 VIEW;  6/3/2025 10:58 pm   INDICATION: Signs/Symptoms:new onset cough.   COMPARISON: Chest radiograph 05/20/2025, 1:51 p.m.. CT chest 02/05/2025.   ACCESSION NUMBER(S): [id number]   ORDERING CLINICIAN: GWENDOLYN CHASE   FINDINGS: AP radiograph of the chest was provided.    CARDIOMEDIASTINAL SILHOUETTE: Cardiomediastinal silhouette is normal in size and configuration.   LUNGS: No new consolidation, pleural effusion, or pneumothorax.   ABDOMEN: No remarkable upper abdominal findings.   BONES: Postsurgical changes of thoracic spinal fusion hardware without hardware fracture. No acute osseous changes.       1.  No new airspace disease, pleural effusion, or pneumothorax.   I personally reviewed the images/study and I agree with the findings as stated by resident Gino Jacobson. This study was interpreted at [place], [place].   MACRO: None   Signed by: Lamonte Saxena 6/4/2025 7:51 AM Dictation workstation:   [id number]    CT guided percutaneous peritoneal or retroperitoneal fluid collection drainage  Result Date: 6/3/2025  Interpreted By:  Tosin Palmer and Ogievich Taessa STUDY: CT GUIDED PERCUTANEOUS PERITONEAL OR RETROPERITONEAL FLUID COLLECTION DRAINAGE;  6/2/2025 4:48 pm   INDICATION: Signs/Symptoms:Drainage of left iliopsoas muscle abscess.   COMPARISON: CT THORACIC SPINE WO IV CONTRAST 6/2/2025, MR LUMBAR SPINE W AND WO IV CONTRAST 5/29/2025   ACCESSION NUMBER(S): [id number]   ORDERING CLINICIAN: EMILIANO HINDS   TECHNIQUE: INTERVENTIONALIST(S): Dr. Palmer   CONSENT: The patient/patient's POA/next of kin was informed of the nature of the proposed procedure. The purposes, alternatives, risks, and benefits were explained and discussed. All questions were answered and consent was obtained.   RADIATION EXPOSURE: DLP: 769.9 mGy*cm   SEDATION: Moderate conscious IV sedation services (supervision of administration, induction, and maintenance) were provided by the physician performing the procedure with intravenous fentanyl 150mcg and versed 3 mg over 20 minutes. The physician was assisted by an independent trained observer, an interventional radiology nurse, in the continuous monitoring of patient level of consciousness and physiologic status.    MEDICATION/CONTRAST: No additional   TIME OUT: A time out was performed immediately prior to procedure start with the interventional team, correctly identifying the patient name, date of birth, MRN, procedure, anatomy (including marking of site and side), patient position, procedure consent form, relevant laboratory and imaging test results, antibiotic administration, safety precautions, and procedure-specific equipment needs.   COMPLICATIONS: No immediate adverse events identified.   FINDINGS: Limited axial CT images were obtained through the abdomen at 5 mm slice thickness. The images demonstrated a hypodense fluid collection in the left psoas muscle measuring 4.9 x 3.5 cm (series 2, image 29).. The patient was repositioned into right lateral oblique position and prepped in the usual sterile manner. 1% lidocaine was used for local anesthesia at the planned skin insertion site.   Under direct CT guidance, a 5 Slovenian LaunchLabeh needle/catheter was placed into the left psoas fluid collection via left lateral lower abdominal wall approach. After confirmation of position of the needle within the fluid collection, the inner needle/stylette was withdrawn. Approximately, 4 mL of purulent fluid was aspirated and sent for analysis. A 0.035 Amplatz guidewire was then placed through the catheter and into the fluid collection. The catheter was then removed over guidewire and the tract sequentially dilated with 6 and 8 Slovenian dilators. Subsequently, an 10 Slovenian Slovenian pigtail drainage catheter was placed over the guidewire and into the fluid collection. After confirmation of position of the catheter within the collection, the guidewire was removed, the pigtail fixed and the catheter secured to the skin. The catheter was then set to gravity drainage.   Postprocedure images demonstrated no evidence of hemorrhage. Pigtail distal tip catheter is within the left psoas fluid collection. Small volume subcutaneous and left lateral abdominal  wall muscular emphysema along the tract of the needle, expected postsurgical change.   Patient tolerated the procedure without immediate complication. Fluid was sent to the laboratory for further analysis.       1. Status post ultrasound guided drain placement into left psoas fluid collection. Fluid was sent to pathology for analysis.   I was present for and/or performed the critical portions of the procedure and immediately available throughout the entire procedure.   I personally reviewed the image(s)/study and interpretation. I agree with the findings as stated.   Performed and dictated at [facility].   MACRO: None.   Signed by: Tosin Palmer 6/3/2025 8:36 PM Dictation workstation:   [id number]    CT lumbar spine wo IV contrast  Result Date: 6/2/2025  Interpreted By:  Varun Stallings, STUDY: CT LUMBAR SPINE WO IV CONTRAST  6/2/2025 2:53 pm   INDICATION: Signs/Symptoms:L2-3 disc osteo     COMPARISON: MRI lumbar spine dated 05/29/2025. Concurrent CT thoracic spine.   ACCESSION NUMBER(S): [id number]   ORDERING CLINICIAN: NETTIE GRIFFIN   TECHNIQUE: Axial CT images of the lumbar spine are obtained. Axial, coronal and sagittal reconstructions are provided for review.   FINDINGS: Alignment: Normal lumbar lordosis.   Vertebrae/Disc Spaces: Redemonstrated cortical irregularity along the L2-L3 endplates with mild vertebral body height loss, compatible with known osteomyelitis.   Soft tissues: Redemonstrated asymmetrically enlarged left greater than right ileo psoas musculature, better evaluated on prior MRI and compatible with iliopsoas abscesses.   T12-L1: Shallow posterior disc bulge, indenting the anterior thecal sac. Mild-to-moderate bilateral neural foraminal narrowing. Mild spinal canal stenosis.   L2-L3: 6-7 mm posterior disc osteophyte complex, in combination with moderate bilateral facet arthropathy, resulting in moderate bilateral neural foraminal narrowing and moderate  spinal canal stenosis.   L3-L4: Shallow posterior disc bulge with superimposed 6 mm bilateral foraminal disc protrusion. Mild-to-moderate bilateral facet arthropathy. Moderate right and mild-to-moderate left neural foraminal narrowing. Mild spinal canal stenosis.   L4-L5: 4 mm posterior disc bulge going combination with moderate bilateral facet arthropathy, resulting in moderate bilateral neural foramina narrowing and moderate narrowing of the lateral recesses. Mild spinal canal stenosis.   L5-S1: No posterior disc bulge. Mild bilateral facet arthropathy. No significant spinal canal stenosis.       1. Redemonstrated discitis osteomyelitis at L2-L3 level with left-greater-than-right iliopsoas abscesses as described above, suboptimally evaluated on this study due to lack of IV contrast. MR of the lumbar spine with and without contrast can be obtained for follow-up imaging.     2. L2-L3: Moderate bilateral neural foraminal narrowing and moderate spinal canal stenosis.   3. L3-L4: Moderate right and mild-to-moderate left neural foraminal narrowing. Mild spinal canal stenosis.   4. L4-L5: Moderate bilateral neural foramina narrowing and moderate narrowing of the lateral recesses. Mild spinal canal stenosis.   MACRO: None   Signed by: Varun Stallings 6/2/2025 3:16 PM Dictation workstation:   [id number]    CT thoracic spine wo IV contrast  Result Date: 6/2/2025  Interpreted By:  Varun Stallings, STUDY: CT THORACIC SPINE WO IV CONTRAST;  6/2/2025 2:54 pm   INDICATION: Signs/Symptoms:L2-3 disc osteo - OR planning.     COMPARISON: MR of the thoracic spine dated 05/19/2025. Concurrent CT lumbar spine.   ACCESSION NUMBER(S): [id number]   ORDERING CLINICIAN: NETTIE GRIFFIN   TECHNIQUE: Axial CT images of the thoracic spine are obtained. Axial, coronal and sagittal reconstructions are submitted for review.   FINDINGS:   Alignment: Within normal limits.   Vertebrae/Intervertebral Discs:  There is postsurgical changes of  instrumented posterior fusion of the thoracic spine from T3-T8 with laminectomy. No evidence of acute hardware failure. There is cortical irregularity of the T6-T7 endplates with moderate T5 and T6 vertebral body height loss as before, likely sequela of osteomyelitis. No additional vertebral body height loss in the thoracic spine   Mild multilevel degenerative changes of the thoracic spine, most pronounced at T7-T8, resulting in moderate bilateral neural foraminal narrowing. No significant spinal canal stenosis.   This study is not tailored to evaluate contents within the spinal canal.   Paraspinous Soft Tissues: Within normal limits.         1. Postsurgical changes of instrumented posterior fusion of the thoracic spine from T3-T8 with laminectomy. No evidence of acute hardware failure. There is cortical irregularity of the T6-T7 endplates with moderate T5 and T6 vertebral body height loss as before, likely sequela of osteomyelitis.   2. Mild multilevel degenerative changes of the thoracic spine, most pronounced at T7-T8, resulting in moderate bilateral neural foraminal narrowing. No significant spinal canal stenosis.   3. This study is not tailored to evaluate contents within the spinal canal. MR of the thoracic spine can be obtained for further assessment if clinically warranted.   Please refer to concurrent CT lumbar spine for additional findings.   MACRO: None   Signed by: Varun Stallings 6/2/2025 3:05 PM Dictation workstation:   [id number]    MR cervical spine w and wo IV contrast  Result Date: 5/30/2025  Interpreted By:  Miguel Aldridge, STUDY: MR CERVICAL SPINE W AND WO IV CONTRAST   INDICATION: Signs/Symptoms:osetomyelitis, s/p spine surgery on 5/22, MRI requested by neurosurg   COMPARISON: Correlated to thoracic spine MRI 05/19/2025   ACCESSION NUMBER(S): [id number]   ORDERING CLINICIAN: TIGIST HIGUERA   TECHNIQUE: Multiplanar multisequence MRI of the cervical spine was performed before and after the  intravenous administration of contrast, according to standard protocol. 15 ml of Dotarem was administered (the balance of single use vial(s) has/have been discarded).   FINDINGS: ALIGNMENT: Mild retrolisthesis of C4 on C5 and C5 on C6. Trace anterolisthesis of C6 on C7.   VERTEBRAE: No acute fracture or aggressive osseous lesion. Vertebral body heights are maintained. Findings likely representing mixed Modic type 1 and type 2 endplate degenerative change at C5-6 with subchondral cysts. No definite findings to suggest an infectious process within the cervical spine.   DISCS: Multilevel disc desiccation. Moderate disc height loss at C5-6.   CORD: Mild flattening of the ventral aspect of the cord at C4-5 and C5-6 without associated edema or myelomalacia.   ENHANCEMENT: No abnormal enhancement within the cervical spine. Partially visualized rim enhancing fluid collection within the posterior elements of the upper thoracic spine. No abnormal intrathecal enhancement.   PARAVERTEBRAL SOFT TISSUES: Partially visualized posterior thoracic paraspinal collection as above. Edema extends into the left-greater-than-right upper thoracic posterior paraspinal musculature (series 10, image 41). Remaining paraspinal soft tissues are unremarkable.   EVALUATION OF INDIVIDUAL LEVELS: C2-3: Uncovertebral and facet hypertrophy results in mild bilateral foraminal stenosis. Spinal canal is minimally narrowed secondary to small disc bulge.   C3-4: Uncovertebral and facet hypertrophy results in mild bilateral foraminal stenosis. Spinal canal remains patent.   C4-5: Retrolisthesis with disc osteophyte complex asymmetric to the right, facet hypertrophy, and infolding of ligamentum flavum results in severe right and mild left foraminal stenosis. There is mild narrowing of the spinal canal.   C5-6: Retrolisthesis with disc osteophyte complex and facet hypertrophy results in severe bilateral foraminal stenosis, right greater than left, and moderate  narrowing of the spinal canal.   C6-7: Uncovertebral and facet hypertrophy without foraminal stenosis. Spinal canal is patent.   C7-T1: No disc herniation  spinal canal or neuroforaminal stenosis.       No evidence of infectious process within the cervical spine.   Partially visualized rim enhancing fluid collection within the upper thoracic spine and edema within the left-greater-than-right upper thoracic posterior paraspinal musculature. Findings likely represent seroma/postsurgical change; infectious process can not be excluded.   Multilevel degenerative changes of the cervical spine most notable at C5-6 where there is moderate narrowing of the spinal canal as well as severe right foraminal stenosis at C4-5 and C5-6.   Signed by: Miguel Aldridge 5/30/2025 10:41 AM Dictation workstation:   [id number]    MR lumbar spine w and wo IV contrast  Result Date: 5/30/2025  Interpreted By:  Miguel Aldridge,  [place] STUDY: MR LUMBAR SPINE W AND WO IV CONTRAST   INDICATION: Signs/Symptoms:osetomyelitis, s/p spine surgery on 5/22, MRI requested by neurosurg   T5-6 laminectomy and T3 through T8 fusion for discitis osteomyelitis. Assess for additional sites of infection.   COMPARISON: Thoracic spine MRI 05/19/2025   ACCESSION NUMBER(S): [id number]   ORDERING CLINICIAN: AUGIE BONNER   TECHNIQUE: Multiplanar multisequence MRI of the lumbar spine was performed before and after the intravenous administration of contrast according to standard protocol. 15 ml of Dotarem was administered (the balance of single use vial(s) has/have been discarded).   FINDINGS: ALIGNMENT: Straightening of usual lumbar lordosis. Dextroconvex curvature centered at L2.   VERTEBRAE: Diffuse loss of normal marrow signal involving the L2 and L3 vertebral bodies without measurable loss of vertebral body height. Diffuse enhancement of the L2 and L3 vertebral bodies as well as involvement of the right pedicle of L3 and right L2-3 facet joint. Epidural  enhancement posterior to the L2 and L3 vertebral bodies measuring approximately 5 mm in thickness. Extension of infection into the left-greater-than-right iliopsoas muscles. Large abscess within the left iliopsoas muscle measuring approximately 9.6 x 4.1 x 4.8 cm (craniocaudal by AP by transverse). Phlegmon within the right iliopsoas muscle (series 18, image 24) without drainable fluid collection at this time. There is diffuse edema throughout the left-greater-than-right iliopsoas muscles. Remaining vertebral bodies are normal. Enhancement involving the inferior aspect of the T1 vertebral body is thought to be reactive rather than infectious.   DISCS: Edema with disc height loss of the L1-2 and L2-3 vertebral bodies. Mild disc height loss at L4-5.   CONUS MEDULLARIS AND CAUDA EQUINA: The conus medullaris terminates at L1. There is normal appearance of the conus medullaris and cauda equina.   ENHANCEMENT: Diffuse enhancement as above.   PARAVERTEBRAL SOFT TISSUES AND VISUALIZED RETROPERITONEUM: Left iliopsoas abscess as above. Edema of bilateral iliopsoas muscles, left-greater-than-right. Atrophy of the paraspinal musculature.   EVALUATION OF INDIVIDUAL LEVELS: L1-2: Disc osteophyte complex asymmetric to the left with facet hypertrophy results in mild left foraminal stenosis. Spinal canal and right neural foramina are patent.   L2-3: Combination of disc osteophyte complex, facet hypertrophy, and epidural phlegmon results in moderate narrowing of the spinal canal and crowding of the left-greater-than-right subarticular recesses. There is moderate bilateral foraminal stenosis.   L3-4: Trace retrolisthesis with disc bulge containing annular fissure and facet hypertrophy results in mild narrowing of bilateral foramina. Spinal canal is minimally narrowed.   L4-5: Disc osteophyte complex with facet hypertrophy results in moderate bilateral foraminal stenosis. There is mild crowding of the left-greater-than-right  subarticular recesses.   L5-S1: No disc herniation  spinal canal or neuroforaminal stenosis.   LIMITED EVALUATION OF UPPER SACRUM AND SACROILIAC JOINTS: Mild degenerative changes of the sacroiliac joints.       Discitis osteomyelitis at L2-3 with involvement of the right pedicle of L3 and right L2-3 facet joint. Epidural thickening ventral to the thecal sac at L2 and L3 may represent a combination of prominent venous plexus and epidural phlegmon.   Extension of infection into the left-greater-than-right iliopsoas muscles. Large abscess within the left iliopsoas muscle measuring up to 9.6 cm in craniocaudal dimension. No evidence of intrathecal extension of infection.   Enhancement of the inferior endplate of L1 is thought to be reactive rather than infectious. Attention on follow-up imaging recommended. I personally reviewed the images/study and agree with the findings as stated by Tutu Cuevas MD (Resident Physician). This study was interpreted at [facility], [place], [place].   Signed by: Miguel Aldridge 5/30/2025 10:35 AM Dictation workstation:   [id number]    Bedside PICC Imaging  Result Date: 5/24/2025  These images are not reportable by radiology and will not be interpreted by  Radiologists.    ECG 12 lead  Result Date: 5/23/2025  Normal sinus rhythm Normal ECG When compared with ECG of 17-AUG-2023 08:04, T wave amplitude has decreased in Anterior leads Confirmed by Eddi Martínez ([id number]) on 5/23/2025 4:27:41 PM    XR shoulder left 2+ views  Result Date: 5/22/2025  Interpreted By:  Iván Iverson, STUDY: Left shoulder dated  5/22/2025.   INDICATION: Signs/Symptoms:acute L shoulder pain r/o fx, s/p OR for lami 5/21   COMPARISON: None.   ACCESSION NUMBER(S): [id number]   ORDERING CLINICIAN: EMIL SALDIVAR   TECHNIQUE: Two views of the left shoulder.   FINDINGS: Radiographs are limited due to limited profiling of the osseous structures with obliquity seen to the assessment of the  glenohumeral and acromioclavicular joints. No obvious fracture of the shoulder is evident. Surgical changes seen of the spine.       Limited shoulder radiographs without osseous injury evident. If there remains concern for osseous injury, CT can be considered.   MACRO: None   Signed by: Iván Iverson 5/22/2025 2:18 PM Dictation workstation:   [id number]    XR abdomen 1 view  Result Date: 5/22/2025  Interpreted By:  Iván Iverson and Nakamoto Kent STUDY: XR ABDOMEN 1 VIEW;  5/22/2025 12:34 am   INDICATION: Signs/Symptoms:Abdominal discomfort/distention.   COMPARISON: TH ABDOMEN AP VIEW 11/17/2018   ACCESSION NUMBER(S): [id number]   ORDERING CLINICIAN: SHERIE JORDAN   FINDINGS: Partially visualized surgical drain overlying the thoracolumbar spine. Partially visualized thoracic spinal fusion hardware. Moderate-to-marked colonic stool burden. There is mild distention of the stomach in the loops of the small bowel. Limited evaluation of pneumoperitoneum on supine imaging, however no gross evidence of free air is noted.   Visualized lungs are clear.   Osseous structures demonstrate no acute bony changes.       Moderate-to-marked colonic stool burden. Nonobstructive bowel gas pattern with mild distention of the stomach and the loops of the small bowel. A degree of the gaseous distention could be related to postoperative ileus.   I personally reviewed the images/study and I agree with the findings as stated by Ha Demarco MD. This study was interpreted at [facility], [place], [place].   MACRO: None   Signed by: Iván Iverson 5/22/2025 11:17 AM Dictation workstation:   [id number]    XR thoracic spine 3 views  Result Date: 5/22/2025  Interpreted By:  Iván Iverson, STUDY: Thoracic spine dated 5/22/2025.   INDICATION: Signs/Symptoms:standing upright, s/p T3-8 fusion   COMPARISON: None.   ACCESSION NUMBER(S): [id number]   ORDERING CLINICIAN: NETTIE GRIFFIN   TECHNIQUE: AP,  lateral, and Swimmer's radiographs of the thoracic spine.   FINDINGS: There is T3-T8 multilevel pedicle screws bridging rods and laminectomy change. There is osseous irregularity of the T5-6 disc space which is not well assessed. Mild-to-moderate multilevel degenerative changes seen of the thoracic spine. Degenerative changes seen in the cervical spine severe at some levels.. Vertebral body alignment is maintained.  No fracture or dislocation is evident.  A drain is seen in the soft tissues of the back..       Surgical and degenerative change as above with findings of the T5-6 level which may represent sequelae of discitis/osteomyelitis. Correlation with any cross-sectional imaging may be helpful.   Signed by: Iván Iverson 5/22/2025 11:16 AM Dictation workstation:   [id number]    FL fluoro images no charge  Result Date: 5/21/2025  These images are not reportable by radiology and will not be interpreted by  Radiologists.    FL fluoro images no charge  Result Date: 5/21/2025  These images are not reportable by radiology and will not be interpreted by  Radiologists.    XR chest 1 view  Result Date: 5/20/2025  Interpreted By:  Jason Phan and Liu Scott STUDY: XR CHEST 1 VIEW;  5/20/2025 1:57 pm   INDICATION: Signs/Symptoms:r/o PNA.     COMPARISON: CT chest 02/05/2025   ACCESSION NUMBER(S): [id number]   ORDERING CLINICIAN: EMILIANO HINDS   FINDINGS: AP radiograph of the chest was provided.   Medical devices: None.   Cardiomediastinal silhouette is normal in size. Tortuosity of the descending thoracic aorta is noted.   Coarse interstitial lung markings are noted. Peribronchial thickening is demonstrated within right middle lobe. No consolidations or effusions are noted.   Visualized upper abdomen is unremarkable.   Osseous and articular anatomy is normal.       1. Coarse interstitial lung markings bilaterally and peribronchial thickening within right middle lobe and right lower lobe. 2. Subsegmental  atelectasis within right middle lobe noted on prior CT examination appears improved.   I personally reviewed the images/study and I agree with the findings as stated by resident physician Ayad Cadet MD. This study was interpreted at [facility], [place], [place].   MACRO: None   Signed by: Jason Phan 5/20/2025 2:50 PM Dictation workstation:   [id number]    MR thoracic spine w and wo IV contrast  Result Date: 5/19/2025  EXAM: MR Thoracic Spine without and with 05/19/2025 07:32:19 PM TECHNIQUE: Multiplanar multisequence MRI of the thoracic spine was performed with and without the administration of intravenous contrast. COMPARISON: CT chest 05/13/2025. CLINICAL HISTORY: T5 compression fracture, concern for osteomyelitis per radiology, chronic back pain. FINDINGS: BONES/ALIGNMENT: There is abnormal bone marrow edema and enhancement within the T5 and T6 vertebral bodies and intervening disc. There is deformity of the inferior endplate of T5 and superior endplate of T6 with loss of height of the vertebral bodies, compatible with acute discitis/osteomyelitis. SPINAL CORD: There is no intramedullary signal abnormality identified within the thoracic spinal cord. SOFT TISSUES: There is marked surrounding phlegmon at T5-6. Phlegmon is present circumferentially within the epidural space at T5-6, resulting in moderate to severe spinal canal stenosis. There is no epidural abscess identified. DEGENERATIVE CHANGES: A disc bulge is present at T7-8 without significant spinal canal stenosis or neural foraminal narrowing. Disc bulges are present at T9-10, T10-11, and T11-12 without significant spinal canal stenosis or neural foraminal narrowing.     1. Acute discitis/osteomyelitis at T5-6 with marked surrounding phlegmon and circumferential epidural phlegmon resulting in moderate to severe spinal canal stenosis. No epidural abscess.    CT chest wo IV contrast  Result Date: 5/14/2025  EXAMINATION: CT OF  THE CHEST WITHOUT CONTRAST 5/13/2025 12:59 pm TECHNIQUE: CT of the chest was performed without the administration of intravenous contrast. Multiplanar reformatted images are provided for review. Automated exposure control, iterative reconstruction, and/or weight based adjustment of the mA/kV was utilized to reduce the radiation dose to as low as reasonably achievable. COMPARISON: 09/16/2024 HISTORY: ORDERING SYSTEM PROVIDED HISTORY: Abnormal CT lung screening, Lung nodules TECHNOLOGIST PROVIDED HISTORY: Imaging Protocol:->Standard What reading provider will be dictating this exam?->CRC FINDINGS: Mediastinum: There is some plaque in the thoracic arch.  There are small anterior mediastinal lymph nodes none larger than 7 mm.  There is some pretracheal lymph nodes.  No hilar nodes are noted.  There is coronary arterial vascular plaque.  Heart size is normal. Lungs/pleura: There are small areas of scarring in both apices.  There is small areas of pleural thickening posteriorly in the right hemithorax. Upper Abdomen: Unremarkable Soft Tissues/Bones: There is considerable deformity of what is felt to be T5 which may be related to a compression fracture of the caudal endplate.  There is some compression of the superior endplate of T6.  There is associated spurring right and left laterally.  This was not present on the prior study. No obvious mass is identified.  There is some perivertebral soft tissue prominence. There are degenerative changes at L1-L2.    1. There is considerable deformity of what is felt to be T5 which may be related to a compression fracture of the caudal endplate. There is some compression of the superior endplate of T6. There is associated spurring right and left laterally. This was not present on the prior study. No obvious mass is identified. There is some perivertebral soft tissue prominence. Recommend MRI of the thoracic spine with and without contrast for further evaluation.  Osteomyelitis is not  excluded. 2. There are small areas of scarring in both apices. There is small areas of pleural thickening posteriorly in the right hemithorax. 3. There is coronary arterial vascular plaque.          Assessment & Plan  Osteomyelitis    GI cancer    Depression    MI (myocardial infarction) (Multi)    Lumbar disc disease    Spinal stenosis of thoracolumbar region    CHF (congestive heart failure)    Gastroesophageal reflux disease without esophagitis    Anemia      [facility] Integrative Medicine Symptom Management program offers multi-disciplinary supervised care of cancer patients using Integrative Modalities billed to insurance using NCCN and SIO/ASCO guideline-driven practices.        Back pain:   pain related to malignancy, vertebral osteomyelitis   Pain is well-controlled  Defer to supportive oncology team for adequate PO/IV pain regimen   Recommend integrative therapy modalities as pt allows:  -Acupuncture - pt declined   -Acupressure - pt declined   -Gentle bodywork and stretching as tolerated - pt declined      -Art therapy - pt declined   -Music therapy - pt declined   -Chaplaincy - pt declined   -Pet therapy - pt declined      Integrative hematology/oncology will sign off at this time, given pt's decline in participation of integrative medicine interventions.  Please don't hesitate to page us if any further questions arise.  Thank you for allowing us to participate in the care of this patient.       Yanira Hamilton, CHRISTINA-CNP (available by [employer])  [facility]  Inpatient Integrative Medicine      I spent 15 minutes in the care of this patient which included chart review, interviewing patient/family, discussion with primary team, coordination of care, and documentation.     Medical Decision Making was high level due to high complexity of problems, extensive data review, and high risk of management/treatment.                [1] aspirin, 81 mg, oral,  Daily  atorvastatin, 80 mg, oral, Daily  cefTRIAXone, 2 g, intravenous, q12h  cholecalciferol, 25 mcg, oral, Daily  cyclobenzaprine, 10 mg, oral, TID  enoxaparin, 40 mg, subcutaneous, q24h  gabapentin, 300 mg, oral, TID  levothyroxine, 75 mcg, oral, Daily  lidocaine, 2 patch, transdermal, Daily  metoprolol succinate XL, 25 mg, oral, Daily  oxyCODONE ER, 10 mg, oral, q12h ANA  pantoprazole, 40 mg, oral, Daily before breakfast  sennosides-docusate sodium, 2 tablet, oral, BID  tiotropium, 2 puff, inhalation, Daily  vancomycin, 1,250 mg, intravenous, q12h  [2]    [3] PRN medications: acetaminophen, alteplase, bisacodyl, guaiFENesin, HYDROmorphone, LORazepam, ondansetron, oxyCODONE, oxyCODONE, polyethylene glycol, simethicone, vancomycin

## 2025-06-05 NOTE — PROGRESS NOTES
"Miguel Lofton is a 69 y.o. male on day 16 of admission presenting with Osteomyelitis.    Subjective   Pt seen at bedside this AM. Reports doing okay, was able to obtain restful sleep overnight. We discussed plan for evaluation of drain by IR. Otherwise final IV atbx has been established with final date 7/30.     Denies nausea, vomiting, fever, chills, chest pain, SOB, abd pain, constipation, diarrhea, bleeding, edema, or headaches. Eating and drinking well.        Objective     Physical Exam  HENT:      Head: Normocephalic.      Right Ear: External ear normal.      Left Ear: External ear normal.      Nose: Nose normal.   Eyes:      Extraocular Movements: Extraocular movements intact.      Conjunctiva/sclera: Conjunctivae normal.   Cardiovascular:      Rate and Rhythm: Normal rate.   Pulmonary:      Effort: Pulmonary effort is normal.      Breath sounds: Wheezing present.   Abdominal:      General: Bowel sounds are normal.      Palpations: Abdomen is soft.   Musculoskeletal:      Cervical back: Normal range of motion.   Skin:     General: Skin is warm and dry.   Neurological:      Mental Status: He is alert and oriented to person, place, and time.      Motor: Weakness present.   Psychiatric:         Mood and Affect: Mood normal.         Behavior: Behavior normal.         Thought Content: Thought content normal.         Last Recorded Vitals  Blood pressure 107/67, pulse 80, temperature 36.6 °C (97.9 °F), temperature source Temporal, resp. rate 16, height 1.727 m (5' 8\"), weight 73.4 kg (161 lb 13.1 oz), SpO2 93%.  Intake/Output last 3 Shifts:  I/O last 3 completed shifts:  In: 300 (4.1 mL/kg) [IV Piggyback:300]  Out: 1350 (18.4 mL/kg) [Urine:1350 (0.5 mL/kg/hr)]  Weight: 73.4 kg     Relevant Results     This patient has a central line   Reason for the central line remaining today? Parenteral medication      .Scheduled medications  Scheduled Medications[1]  Continuous medications  Continuous Medications[2]  PRN " medications  PRN Medications[3]    .  Results for orders placed or performed during the hospital encounter of 05/20/25 (from the past 24 hours)   C-reactive protein   Result Value Ref Range    C-Reactive Protein 10.16 (H) <1.00 mg/dL   CBC and Auto Differential   Result Value Ref Range    WBC 6.5 4.4 - 11.3 x10*3/uL    nRBC 0.0 0.0 - 0.0 /100 WBCs    RBC 3.08 (L) 4.50 - 5.90 x10*6/uL    Hemoglobin 7.8 (L) 13.5 - 17.5 g/dL    Hematocrit 26.4 (L) 41.0 - 52.0 %    MCV 86 80 - 100 fL    MCH 25.3 (L) 26.0 - 34.0 pg    MCHC 29.5 (L) 32.0 - 36.0 g/dL    RDW 19.4 (H) 11.5 - 14.5 %    Platelets 357 150 - 450 x10*3/uL    Neutrophils % 62.2 40.0 - 80.0 %    Immature Granulocytes %, Automated 0.6 0.0 - 0.9 %    Lymphocytes % 13.6 13.0 - 44.0 %    Monocytes % 11.9 2.0 - 10.0 %    Eosinophils % 10.9 0.0 - 6.0 %    Basophils % 0.8 0.0 - 2.0 %    Neutrophils Absolute 4.04 1.20 - 7.70 x10*3/uL    Immature Granulocytes Absolute, Automated 0.04 0.00 - 0.70 x10*3/uL    Lymphocytes Absolute 0.88 (L) 1.20 - 4.80 x10*3/uL    Monocytes Absolute 0.77 0.10 - 1.00 x10*3/uL    Eosinophils Absolute 0.71 (H) 0.00 - 0.70 x10*3/uL    Basophils Absolute 0.05 0.00 - 0.10 x10*3/uL   Comprehensive Metabolic Panel   Result Value Ref Range    Glucose 81 74 - 99 mg/dL    Sodium 140 136 - 145 mmol/L    Potassium 3.4 (L) 3.5 - 5.3 mmol/L    Chloride 101 98 - 107 mmol/L    Bicarbonate 32 21 - 32 mmol/L    Anion Gap 10 10 - 20 mmol/L    Urea Nitrogen 6 6 - 23 mg/dL    Creatinine 0.44 (L) 0.50 - 1.30 mg/dL    eGFR >90 >60 mL/min/1.73m*2    Calcium 8.6 8.6 - 10.6 mg/dL    Albumin 2.5 (L) 3.4 - 5.0 g/dL    Alkaline Phosphatase 128 33 - 136 U/L    Total Protein 5.5 (L) 6.4 - 8.2 g/dL    AST 14 9 - 39 U/L    Bilirubin, Total 0.3 0.0 - 1.2 mg/dL    ALT 11 10 - 52 U/L   Sedimentation rate, automated   Result Value Ref Range    Sedimentation Rate 38 (H) 0 - 20 mm/h                Assessment & Plan  Osteomyelitis    GI cancer    Depression    MI (myocardial  infarction) (Multi)    Lumbar disc disease    Spinal stenosis of thoracolumbar region    CHF (congestive heart failure)    Gastroesophageal reflux disease without esophagitis    Anemia      Miguel Lofton is a 69 y.o. male patient PMHx of prostate cancer, base of the tongue cancer, HTN, HLD, hypothyroidism, COPD, and anxiety who had a CT chest for annual lung cancer screening on 5/13 that showed possible compression fx of T5 and T6. Imaging was reviewed at office visit 5/19 and was advised he present to ED for MRI. He presented to [facility] 5/19 where MRI T spine (5/19) showed acute discitis/osteomyelitis at T5-6 w/infectious appearing phlegmon & marked moderate to severe spinal canal stenosis. Transferred [facility] 5/20 for NSGY eval. NSGY consulted 5/20, s/p OR 5/21 for T5-6 laminectomy and T3-8 fusion. ID consulted (5/21) and rec Vanc and Ceftriaxone x8 weeks. OR surg path (5/22) + osteomyelitis.     MRI L/C spine (5/29) showed moderate stenosis at C4-5, C5-6, L2-3 disc/osteo w ventral phlegmon w moderate canal stenosis, large abscess L iliopsoas muscle 9.6cm. 5/30 Neurosurgery re-engaged, tentative plan to take to OR sometime next week. 5/30 ID was re-engaged who rec IR drainage of abscess, 6/2 IR s/p drainage abscess with ANGELICA drain placement; additionally new updated stop date Vanc and Cef (5/22-- current, stop 7/30/25) and weekly labs. 6/2 CT T/L Spine showed mild multilevel degenerative changes of thoracic spine especially at T7-T8, mod b/l neural foraminal narrowing, no significant spinal canal stenosis, discitis osteomyelitis L2-L3 level, previously seen iliopsoas abscesses (L>R). Neurosurgery originally planned to take to OR 6/3 for lumbar decompression, however following abscess drainage on 6/2, patient showing improvement of pain and movement, surgery was canceled. 6/5 CT A/P to eval abscess per IR recs to consider drain removal. Discharge to SNF pending CT and IR recs.     # Updates 6/5:  - IR assessed ANGELICA drain  d/t minimal output, flushed and clot expelled, rec CT A/P to eval and pending read +/- removal of drain   - CT A/P pending   - Cont Ceftriaxone 2g to q12hrs and cont Vanc, new tentative plan stop date 7/30 (confirmed with ID on 6/5)  - Neurosurgery originally planned for lumbar decompression 6/3, however surgery canceled for improvement of symptoms following iliopsoas abscess drainage; saw patient 6/4, will follow up outpatient for possible lumbar intervention in outpatient setting, signed off 6/4   - CRP worsening 10.16, ESR 38  - Pt on 2-3L NC, encouraged IS and attempt wean to RA  - accepted at facility, precert pending      # Acute on chronic back pain   # Compression fracture   # Vertebral osteomyelitis  # Phlegmonous tissue c/f infection  # Iliopsoas muscle abscess  - CT Chest ([facility]): considerable deformity of what is felt to be T5 which may be related to a compression fracture of the caudal endplate. There is some compression of the superior endplate of T6.  -  MRI T spine (5/19 at OSH): Acute discitis/osteomyelitis at T5-6 with marked surrounding phlegmon and circumferential epidural phlegmon resulting in moderate to severe spinal canal stenosis. No epidural abscess. Requested for image import from [facility]    - CRP 7.35, ESR 90 (5/20); CRP 5.94, ESR 74 (5/22)  - s/p OR 5/21/25 with neurosurgery for T5-6 laminectomy and T3-8 fusion. Per neurosurgery, phlegmon appeared infectious without purulence  - OR surg path (5/22) +osteomyelitis, neg for carcinoma  - OR tissue/wound culture/ fungal culture 5/22) neg, final  - 5/20 Blood cultures x2 NG, Urine culture negative  - +MRSA nares 5/24  - Prevena removed POD7 with dressing (5/28)  - ID consulted 5/21; rec Vanc and Ceftriaxone (5/22- originally was planned for stop 7/16) for total 8 week atb course)  - 5/30 ID re-engaged for new finding of Large abscess within the left iliopsoas muscle measuring up to 9.6 cm in craniocaudal dimension seen on MRI. Rec  Ceftriaxone 2g q12hrs and Vanco q12 hr, has PICC in place  - now planned for tentative stop date of IV atbx 7/30 -- confirmed with ID on 6/5  - Reached out to ID (5/28) about abx cost for patient (approx ~$1000/week), ID states they will look into the case but most likely the abx course cannot change due to osteomyelitis   - Weekly CBC, CMP, CRP ordered outpt with results to be faxed to [phone] ATTN: Dr. Dyer once discharged per ID  - Supportive onc consulted 5/25 for uncontrolled pain, recs start ER Oxy 10mg BID and increased doris 300mg to TID, changed Miralax from daily to PRN, and Senna from 3tabs to 2tabs, added Zofran PRN  - 5/30 MRI C/L Spine moderate stenosis at C4-5, C5-6, L2-3 disc/osteo w ventral phlegmon w moderate canal stenosis  - CT T/L 6/2:  mild multilevel degenerative changes of thoracic spine especially at T7-T8, moderate bilateral neural foraminal narrowing, no significant spinal canal stenosis. redemonstrated discitis osteomyelitis at L2-L3 level, previously seen iliopsoas abscesses (L>R), L2-L3 moderate bilateral neural foraminal narrowing and spinal canal stenosis; moderate right and mild to moderate left neural foraminal narrowing with mild spinal canal stenosis of L3-L4; L4-L5 Moderate bilateral neural foramina narrowing and moderate with narrowing of the lateral recesses and Mild spinal canal stenosis.   - Neuro/surg re-engaged for finding on MRI, originally planned for lumbar decompression 6/3, however patient with improved pain and movement following abscess drainage, surgery canceled, will follow up outpatient  - s/p IR abscess drainage on 6/2, culture pending, NGTD; ANGELICA drain in place, minimal output, 6/5 drain assessed by IR and flushed, rec CT A/P for eval of abscess, pending results +/- removal of drain   - 6/5 CT A/P pending   - PT/OT rec [facility] is able to accept and is submitting for precert 6/5      # Abdominal pain, resolved   - Reported abdominal pain above old  PEG site on 5/22  - Likely 2/2 prone positioning after OR +/- developing ileus  - KUB (5/22): moderate colonic stool burden, possible post-op ileus  - having loose Bms now LBM 5/26, stopped lactulose. 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 Gamez--last seen 12/18/2024: laryngeal structures are noted for grossly normal appearance; true vocal cords, false vocal cords, remaining structures, including epiglottis, piriform sinuses and base of tongue are all grossly normal; there is no evidence of gross mass nodule, polyp, tumor or other defect   - SLP eval done no aspiration, rec soft/ thin liquid diet   - Follows yearly with Dr. Lucero   - Dietitian saw pt 5/22, rec [facility] TID     # Prostate Ca  - s/p radical prostatectomy by Dr Lang Mcneil in 2018  - [id number], [place] 7, + right anterior margin  - PSMA-PET done on 12/6/2023 showed no PSMA uptake within the post-surgical bed; there is PSMA uptake of a left common iliac retroperitoneal lymph node SUV 5.6 and PSMA uptake of a right pelvic sidewall lymph node SUV 3.5  - Dr Rangel recommended radiation therapy. Patient instead went to [facility] and was treated by Dr Zackary Ely (4600 cGy in 23 fractions to the pelvis and LN followed by [place] to prostate bed and LN to 2500 cGy in 10 fractions)  - Since completing radiation, has been experiencing significant fatigue  - He is now following with urologist Dr. Boyd  - For pain management, follows with [facility] palliative care team   - PSA < 0.01 (2/19/25)--> PSA <0.10 (5/20)     # COPD  # Smoking Hx  # Lung Ca screening  - Follows with pulmonologist Dr Hutchison   - PFTs 03/2025: FEV1 72% FEV1/FVC 0.63  - Did not tolerate Breztri  - Continue home Spiriva 2  puffs daily  - Developed dry cough overnight 6/3, CXR negative for acute cardiopulmonary process, encouraged IS -- pt states he did not use 6/4, stated importance      # HTN/HLD  - Continue home Metop Succs 25mg daily and Lipitor 80mg nightly  - Hold ASA (5/30)  for possible OR, resumed 6/4     # Hypothyroidism  - Last TSH 4.49 (5/12)  - Continue home Synthroid 75mcg daily     # Anemia  - Likely anemia of chronic disease +/- post-op  - BL Hgb ~11 after cancer tx; Hgb 9.9 (5/20)--> 8.3 (5/22) post-op--> 8.1 (5/23)--> 7.6 (5/25)-->s/p 1 unit 5/25, hgb stable 8.7 (5/29)-> 8.1 (6/2)->8.2 (6/4)  - Ferritin 305, Folate 7.5, Iron 41, TIBC 269, %Sat 15, UIBC 228, Vit B12 1,342 (5/20)  - 5/25 Iron 30, TIBC 204, folate 3.9   - Stopped home Vit B12 sup 5/22 d/t elevated B12 level  - will hold off on folic acid/iron replacement at this time given acute phase with post-op period, plan for outpatient monitoring of hgb and anemia labs/ongoing workup     # DVT prophy:   - Lovenox, was held for neurosurgery and resumed 5/22 PM per ortho, now held on 6/2 for OR on 6/3; resumed 6/3  - SCDs      # DISPO:  - Full code, confirmed with patient on admission   - DC pending neuro/surg, ID, and PT/OT recs  - NOK: Eleni Lofton (wife) [phone]  - NSGY FUV 6/9, ID Dr. Dyre 7/9, GI ([facility]) 8/1, Dr. Lucero (heme onc) FUV 8/8, NSGY FUV 8/11, urology ([facility] 9/23), Cardiology ([facility] 11/18), Rad Onc ([facility]) 3/6    I spent >60 minutes in the professional and overall care of this patient.    Assessment and plan as above discussed with attending physician Dr. Kyler Tran, APRN-CNP         [1] aspirin, 81 mg, oral, Daily  atorvastatin, 80 mg, oral, Daily  cefTRIAXone, 2 g, intravenous, q12h  cholecalciferol, 25 mcg, oral, Daily  cyclobenzaprine, 10 mg, oral, TID  enoxaparin, 40 mg, subcutaneous, q24h  gabapentin, 300 mg, oral, TID  levothyroxine, 75 mcg, oral, Daily  lidocaine, 2 patch, transdermal,  Daily  metoprolol succinate XL, 25 mg, oral, Daily  oxyCODONE ER, 10 mg, oral, q12h ANA  pantoprazole, 40 mg, oral, Daily before breakfast  sennosides-docusate sodium, 2 tablet, oral, BID  tiotropium, 2 puff, inhalation, Daily  vancomycin, 1,250 mg, intravenous, q12h  [2]    [3] PRN medications: acetaminophen, alteplase, bisacodyl, guaiFENesin, HYDROmorphone, LORazepam, ondansetron, oxyCODONE, oxyCODONE, polyethylene glycol, simethicone, vancomycin

## 2025-06-05 NOTE — CARE PLAN
The patient's goals for the shift include Pain management    The clinical goals for the shift include Pt will remain HDS and VSS through end of shift 6/5/2025 0700      Problem: Skin  Goal: Decreased wound size/increased tissue granulation at next dressing change  Outcome: Progressing  Goal: Participates in plan/prevention/treatment measures  Outcome: Progressing  Goal: Prevent/manage excess moisture  Outcome: Progressing  Goal: Prevent/minimize sheer/friction injuries  Outcome: Progressing  Goal: Promote/optimize nutrition  Outcome: Progressing  Goal: Promote skin healing  Outcome: Progressing     Problem: Fall/Injury  Goal: Not fall by end of shift  Outcome: Progressing  Goal: Be free from injury by end of the shift  Outcome: Progressing  Goal: Verbalize understanding of personal risk factors for fall in the hospital  Outcome: Progressing  Goal: Verbalize understanding of risk factor reduction measures to prevent injury from fall in the home  Outcome: Progressing  Goal: Use assistive devices by end of the shift  Outcome: Progressing  Goal: Pace activities to prevent fatigue by end of the shift  Outcome: Progressing     Problem: Safety - Adult  Goal: Free from fall injury  Outcome: Progressing     Problem: Discharge Planning  Goal: Discharge to home or other facility with appropriate resources  Outcome: Progressing     Problem: Chronic Conditions and Co-morbidities  Goal: Patient's chronic conditions and co-morbidity symptoms are monitored and maintained or improved  Outcome: Progressing     Problem: Nutrition  Goal: Nutrient intake appropriate for maintaining nutritional needs  Outcome: Progressing     Problem: Pain  Goal: Takes deep breaths with improved pain control throughout the shift  Outcome: Progressing  Goal: Turns in bed with improved pain control throughout the shift  Outcome: Progressing  Goal: Walks with improved pain control throughout the shift  Outcome: Progressing  Goal: Performs ADL's with  improved pain control throughout shift  Outcome: Progressing  Goal: Participates in PT with improved pain control throughout the shift  Outcome: Progressing  Goal: Free from opioid side effects throughout the shift  Outcome: Progressing  Goal: Free from acute confusion related to pain meds throughout the shift  Outcome: Progressing     Problem: Heart Failure  Goal: Improved gas exchange this shift  Outcome: Progressing  Goal: Improved urinary output this shift  Outcome: Progressing  Goal: Reduction in peripheral edema within 24 hours  Outcome: Progressing  Goal: Report improvement of dyspnea/breathlessness this shift  Outcome: Progressing  Goal: Weight from fluid excess reduced over 2-3 days, then stabilize  Outcome: Progressing  Goal: Increase self care and/or family involvement in 24 hours  Outcome: Progressing

## 2025-06-06 LAB
ALBUMIN SERPL BCP-MCNC: 2.4 G/DL (ref 3.4–5)
ALP SERPL-CCNC: 131 U/L (ref 33–136)
ALT SERPL W P-5'-P-CCNC: 11 U/L (ref 10–52)
ANION GAP SERPL CALC-SCNC: 13 MMOL/L (ref 10–20)
AST SERPL W P-5'-P-CCNC: 17 U/L (ref 9–39)
BASOPHILS # BLD AUTO: 0.09 X10*3/UL (ref 0–0.1)
BASOPHILS NFR BLD AUTO: 1.4 %
BILIRUB SERPL-MCNC: 0.2 MG/DL (ref 0–1.2)
BUN SERPL-MCNC: 6 MG/DL (ref 6–23)
CALCIUM SERPL-MCNC: 7.7 MG/DL (ref 8.6–10.6)
CHLORIDE SERPL-SCNC: 102 MMOL/L (ref 98–107)
CO2 SERPL-SCNC: 28 MMOL/L (ref 21–32)
CREAT SERPL-MCNC: 0.44 MG/DL (ref 0.5–1.3)
EGFRCR SERPLBLD CKD-EPI 2021: >90 ML/MIN/1.73M*2
EOSINOPHIL # BLD AUTO: 0.76 X10*3/UL (ref 0–0.7)
EOSINOPHIL NFR BLD AUTO: 11.6 %
ERYTHROCYTE [DISTWIDTH] IN BLOOD BY AUTOMATED COUNT: 19.7 % (ref 11.5–14.5)
GLUCOSE SERPL-MCNC: 85 MG/DL (ref 74–99)
HCT VFR BLD AUTO: 27.7 % (ref 41–52)
HGB BLD-MCNC: 7.7 G/DL (ref 13.5–17.5)
IMM GRANULOCYTES # BLD AUTO: 0.05 X10*3/UL (ref 0–0.7)
IMM GRANULOCYTES NFR BLD AUTO: 0.8 % (ref 0–0.9)
LYMPHOCYTES # BLD AUTO: 0.85 X10*3/UL (ref 1.2–4.8)
LYMPHOCYTES NFR BLD AUTO: 13 %
MCH RBC QN AUTO: 24.7 PG (ref 26–34)
MCHC RBC AUTO-ENTMCNC: 27.8 G/DL (ref 32–36)
MCV RBC AUTO: 89 FL (ref 80–100)
MONOCYTES # BLD AUTO: 0.88 X10*3/UL (ref 0.1–1)
MONOCYTES NFR BLD AUTO: 13.5 %
NEUTROPHILS # BLD AUTO: 3.91 X10*3/UL (ref 1.2–7.7)
NEUTROPHILS NFR BLD AUTO: 59.7 %
NRBC BLD-RTO: 0 /100 WBCS (ref 0–0)
PLATELET # BLD AUTO: 343 X10*3/UL (ref 150–450)
POTASSIUM SERPL-SCNC: 3.7 MMOL/L (ref 3.5–5.3)
PROT SERPL-MCNC: 5.1 G/DL (ref 6.4–8.2)
RBC # BLD AUTO: 3.12 X10*6/UL (ref 4.5–5.9)
SODIUM SERPL-SCNC: 139 MMOL/L (ref 136–145)
VANCOMYCIN SERPL-MCNC: 18.6 UG/ML (ref 5–20)
WBC # BLD AUTO: 6.5 X10*3/UL (ref 4.4–11.3)

## 2025-06-06 PROCEDURE — 2500000001 HC RX 250 WO HCPCS SELF ADMINISTERED DRUGS (ALT 637 FOR MEDICARE OP)

## 2025-06-06 PROCEDURE — 2500000004 HC RX 250 GENERAL PHARMACY W/ HCPCS (ALT 636 FOR OP/ED)

## 2025-06-06 PROCEDURE — 85025 COMPLETE CBC W/AUTO DIFF WBC: CPT | Performed by: NURSE PRACTITIONER

## 2025-06-06 PROCEDURE — 94640 AIRWAY INHALATION TREATMENT: CPT

## 2025-06-06 PROCEDURE — 2500000002 HC RX 250 W HCPCS SELF ADMINISTERED DRUGS (ALT 637 FOR MEDICARE OP, ALT 636 FOR OP/ED)

## 2025-06-06 PROCEDURE — 80202 ASSAY OF VANCOMYCIN: CPT

## 2025-06-06 PROCEDURE — 99221 1ST HOSP IP/OBS SF/LOW 40: CPT | Performed by: NURSE PRACTITIONER

## 2025-06-06 PROCEDURE — 1170000001 HC PRIVATE ONCOLOGY ROOM DAILY

## 2025-06-06 PROCEDURE — 97535 SELF CARE MNGMENT TRAINING: CPT | Mod: GO

## 2025-06-06 PROCEDURE — 99233 SBSQ HOSP IP/OBS HIGH 50: CPT

## 2025-06-06 PROCEDURE — 80053 COMPREHEN METABOLIC PANEL: CPT | Performed by: NURSE PRACTITIONER

## 2025-06-06 RX ORDER — LORAZEPAM 1 MG/1
1 TABLET ORAL NIGHTLY PRN
Qty: 3 TABLET | Refills: 0 | Status: SHIPPED | OUTPATIENT
Start: 2025-06-06 | End: 2025-06-09

## 2025-06-06 RX ORDER — CEFTRIAXONE 2 G/50ML
2 INJECTION, SOLUTION INTRAVENOUS EVERY 12 HOURS
Start: 2025-06-06 | End: 2025-07-30

## 2025-06-06 RX ORDER — GABAPENTIN 300 MG/1
300 CAPSULE ORAL 3 TIMES DAILY
Qty: 60 CAPSULE | Refills: 0
Start: 2025-06-06 | End: 2025-07-06

## 2025-06-06 RX ORDER — OXYCODONE HYDROCHLORIDE 10 MG/1
10 TABLET ORAL EVERY 4 HOURS PRN
Qty: 18 TABLET | Refills: 0 | Status: SHIPPED | OUTPATIENT
Start: 2025-06-06 | End: 2025-06-09

## 2025-06-06 RX ORDER — OXYCODONE HCL 10 MG/1
10 TABLET, FILM COATED, EXTENDED RELEASE ORAL EVERY 12 HOURS SCHEDULED
Qty: 6 TABLET | Refills: 0 | Status: SHIPPED | OUTPATIENT
Start: 2025-06-06 | End: 2025-06-09

## 2025-06-06 RX ADMIN — GABAPENTIN 300 MG: 300 CAPSULE ORAL at 09:46

## 2025-06-06 RX ADMIN — GUAIFENESIN 200 MG: 200 SOLUTION ORAL at 09:49

## 2025-06-06 RX ADMIN — ENOXAPARIN SODIUM 40 MG: 100 INJECTION SUBCUTANEOUS at 14:56

## 2025-06-06 RX ADMIN — METOPROLOL SUCCINATE 25 MG: 25 TABLET, EXTENDED RELEASE ORAL at 09:46

## 2025-06-06 RX ADMIN — OXYCODONE HYDROCHLORIDE 10 MG: 10 TABLET, FILM COATED, EXTENDED RELEASE ORAL at 09:45

## 2025-06-06 RX ADMIN — OXYCODONE HYDROCHLORIDE 10 MG: 10 TABLET ORAL at 22:06

## 2025-06-06 RX ADMIN — Medication 25 MCG: at 09:45

## 2025-06-06 RX ADMIN — TIOTROPIUM BROMIDE INHALATION SPRAY 2 PUFF: 3.12 SPRAY, METERED RESPIRATORY (INHALATION) at 10:49

## 2025-06-06 RX ADMIN — PANTOPRAZOLE SODIUM 40 MG: 40 TABLET, DELAYED RELEASE ORAL at 09:45

## 2025-06-06 RX ADMIN — ATORVASTATIN CALCIUM 80 MG: 80 TABLET, FILM COATED ORAL at 09:46

## 2025-06-06 RX ADMIN — CEFTRIAXONE SODIUM 2 G: 2 INJECTION, SOLUTION INTRAVENOUS at 23:49

## 2025-06-06 RX ADMIN — OXYCODONE HYDROCHLORIDE 10 MG: 10 TABLET ORAL at 14:58

## 2025-06-06 RX ADMIN — OXYCODONE HYDROCHLORIDE 10 MG: 10 TABLET ORAL at 06:37

## 2025-06-06 RX ADMIN — GABAPENTIN 300 MG: 300 CAPSULE ORAL at 22:06

## 2025-06-06 RX ADMIN — OXYCODONE HYDROCHLORIDE 10 MG: 10 TABLET ORAL at 02:25

## 2025-06-06 RX ADMIN — OXYCODONE HYDROCHLORIDE 10 MG: 10 TABLET, FILM COATED, EXTENDED RELEASE ORAL at 22:06

## 2025-06-06 RX ADMIN — CEFTRIAXONE SODIUM 2 G: 2 INJECTION, SOLUTION INTRAVENOUS at 09:46

## 2025-06-06 RX ADMIN — ASPIRIN 81 MG: 81 TABLET, COATED ORAL at 09:46

## 2025-06-06 RX ADMIN — CYCLOBENZAPRINE 10 MG: 10 TABLET, FILM COATED ORAL at 22:06

## 2025-06-06 RX ADMIN — CYCLOBENZAPRINE 10 MG: 10 TABLET, FILM COATED ORAL at 09:46

## 2025-06-06 RX ADMIN — VANCOMYCIN HYDROCHLORIDE 1250 MG: 1.25 INJECTION, POWDER, LYOPHILIZED, FOR SOLUTION INTRAVENOUS at 22:09

## 2025-06-06 RX ADMIN — CYCLOBENZAPRINE 10 MG: 10 TABLET, FILM COATED ORAL at 14:56

## 2025-06-06 RX ADMIN — VANCOMYCIN HYDROCHLORIDE 1250 MG: 1.25 INJECTION, POWDER, LYOPHILIZED, FOR SOLUTION INTRAVENOUS at 09:46

## 2025-06-06 RX ADMIN — GABAPENTIN 300 MG: 300 CAPSULE ORAL at 14:56

## 2025-06-06 RX ADMIN — LORAZEPAM 1 MG: 1 TABLET ORAL at 22:06

## 2025-06-06 RX ADMIN — LEVOTHYROXINE SODIUM 75 MCG: 0.07 TABLET ORAL at 09:46

## 2025-06-06 ASSESSMENT — COGNITIVE AND FUNCTIONAL STATUS - GENERAL
TOILETING: A LITTLE
MOBILITY SCORE: 18
CLIMB 3 TO 5 STEPS WITH RAILING: A LOT
HELP NEEDED FOR BATHING: A LITTLE
TOILETING: A LOT
DRESSING REGULAR UPPER BODY CLOTHING: A LITTLE
DRESSING REGULAR LOWER BODY CLOTHING: A LOT
HELP NEEDED FOR BATHING: A LOT
TURNING FROM BACK TO SIDE WHILE IN FLAT BAD: A LITTLE
WALKING IN HOSPITAL ROOM: A LITTLE
PERSONAL GROOMING: A LITTLE
DAILY ACTIVITIY SCORE: 16
MOVING TO AND FROM BED TO CHAIR: A LITTLE
STANDING UP FROM CHAIR USING ARMS: A LITTLE
DAILY ACTIVITIY SCORE: 21
DRESSING REGULAR LOWER BODY CLOTHING: A LITTLE

## 2025-06-06 ASSESSMENT — PAIN SCALES - GENERAL
PAINLEVEL_OUTOF10: 8
PAINLEVEL_OUTOF10: 7
PAINLEVEL_OUTOF10: 5 - MODERATE PAIN
PAINLEVEL_OUTOF10: 3
PAINLEVEL_OUTOF10: 8
PAINLEVEL_OUTOF10: 5 - MODERATE PAIN
PAINLEVEL_OUTOF10: 5 - MODERATE PAIN
PAINLEVEL_OUTOF10: 8
PAINLEVEL_OUTOF10: 5 - MODERATE PAIN
PAINLEVEL_OUTOF10: 9
PAINLEVEL_OUTOF10: 8
PAINLEVEL_OUTOF10: 5 - MODERATE PAIN

## 2025-06-06 ASSESSMENT — PAIN - FUNCTIONAL ASSESSMENT
PAIN_FUNCTIONAL_ASSESSMENT: 0-10

## 2025-06-06 ASSESSMENT — ACTIVITIES OF DAILY LIVING (ADL): HOME_MANAGEMENT_TIME_ENTRY: 30

## 2025-06-06 ASSESSMENT — PAIN DESCRIPTION - LOCATION
LOCATION: BACK
LOCATION: BACK

## 2025-06-06 NOTE — CONSULTS
"Subjective   Interval History: has complaints decreased output from psoas drain.     Objective   Vital signs in last 24 hours:  /68   Pulse 79   Temp 36.7 °C (98.1 °F) (Temporal)   Resp 18   Wt 73.4 kg (161 lb 13.1 oz)   SpO2 96%     Intake/Output last 3 shifts:  I/O last 3 completed shifts:  In: 600 (8.2 mL/kg) [IV Piggyback:600]  Out: 1195 (16.3 mL/kg) [Urine:1175 (0.4 mL/kg/hr); Drains:20]  Weight: 73.4 kg   Intake/Output this shift:  I/O this shift:  In: 100 [P.O.:100]  Out: 220 [Urine:220]    Physical Exam  Skin:     General: Skin is warm and dry.      Comments: L lateral abscess drain site c/d/I with serosanguineous output in drain.       Neuro: oriented to person, place, time, and general circumstances  HEENT: normocephalic, atraumatic  Pulm: clear to auscultation bilaterally, no wheezes, good air entry  Cardiac: Regular rate and rhythm or without murmur or extra heart sounds  Abdomen: soft, nontender, normal bowel sounds  Pulses: 2+ and symmetric    Relevant Results  LABS:  Lab Results   Component Value Date    WBC 6.5 06/06/2025    HGB 7.7 (L) 06/06/2025    HCT 27.7 (L) 06/06/2025    MCV 89 06/06/2025     06/06/2025      Results from last 72 hours   Lab Units 06/06/25  0546   SODIUM mmol/L 139   POTASSIUM mmol/L 3.7   CHLORIDE mmol/L 102   CO2 mmol/L 28   BUN mg/dL 6   CREATININE mg/dL 0.44*   GLUCOSE mg/dL 85   CALCIUM mg/dL 7.7*   ANION GAP mmol/L 13   EGFR mL/min/1.73m*2 >90     Results from last 72 hours   Lab Units 06/06/25  0546   ALK PHOS U/L 131   BILIRUBIN TOTAL mg/dL 0.2   PROTEIN TOTAL g/dL 5.1*   ALT U/L 11   AST U/L 17   ALBUMIN g/dL 2.4*           No lab exists for component: \"PT\"      MICRO:  Susceptibility data from last 14 days.  Collected Specimen Info Organism   05/24/25 Swab from Anterior Nares Methicillin Resistant Staphylococcus aureus (MRSA)       IMAGING:  CT abdomen pelvis w IV contrast   Final Result   1. Interval placement of a pigtail catheter drain within the " left   psoas with interval decrease in size of the abscess collection, now   measuring 1.3 x 1.3, previously 5.2 x 4.5 cm. No new abscess   collection.   2. Stable changes of L2-L3 discitis osteomyelitis, better   characterized on the MRI from 05/29/2025.   3. Liquid stool within cecum and ascending colon, correlate with   concern for colitis/diarrhea.   4. Additional findings as above including trace right pleural   effusion and mild bibasilar atelectasis..             I personally reviewed the images/study and I agree with the findings   as stated by Resident Tano Nowak.             MACRO:   None.        Signed by: Earl Dennis 6/5/2025 8:03 PM   Dictation workstation:   [id number]      XR chest 1 view   Final Result   1.  No new airspace disease, pleural effusion, or pneumothorax.        I personally reviewed the images/study and I agree with the findings   as stated by resident Gino Jacobson. This study was interpreted   at [place], [place].        MACRO:   None        Signed by: Lamonte Saxena 6/4/2025 7:51 AM   Dictation workstation:   [id number]      CT guided percutaneous peritoneal or retroperitoneal fluid collection drainage   Final Result   1. Status post ultrasound guided drain placement into left psoas   fluid collection. Fluid was sent to pathology for analysis.        I was present for and/or performed the critical portions of the   procedure and immediately available throughout the entire procedure.        I personally reviewed the image(s)/study and interpretation. I agree   with the findings as stated.        Performed and dictated at [facility].        MACRO:   None.        Signed by: Tosin Palmer 6/3/2025 8:36 PM   Dictation workstation:   [id number]      CT thoracic spine wo IV contrast   Final Result   1. Postsurgical changes of instrumented posterior fusion of the   thoracic spine from T3-T8 with  laminectomy. No evidence of acute   hardware failure. There is cortical irregularity of the T6-T7   endplates with moderate T5 and T6 vertebral body height loss as   before, likely sequela of osteomyelitis.        2. Mild multilevel degenerative changes of the thoracic spine, most   pronounced at T7-T8, resulting in moderate bilateral neural foraminal   narrowing. No significant spinal canal stenosis.        3. This study is not tailored to evaluate contents within the spinal   canal. MR of the thoracic spine can be obtained for further   assessment if clinically warranted.        Please refer to concurrent CT lumbar spine for additional findings.        MACRO:   None        Signed by: Varun Stallings 6/2/2025 3:05 PM   Dictation workstation:   [id number]      CT lumbar spine wo IV contrast   Final Result   1. Redemonstrated discitis osteomyelitis at L2-L3 level with   left-greater-than-right iliopsoas abscesses as described above,   suboptimally evaluated on this study due to lack of IV contrast. MR   of the lumbar spine with and without contrast can be obtained for   follow-up imaging.             2. L2-L3: Moderate bilateral neural foraminal narrowing and moderate   spinal canal stenosis.        3. L3-L4: Moderate right and mild-to-moderate left neural foraminal   narrowing. Mild spinal canal stenosis.        4. L4-L5: Moderate bilateral neural foramina narrowing and moderate   narrowing of the lateral recesses. Mild spinal canal stenosis.        MACRO:   None        Signed by: Varun Stallings 6/2/2025 3:16 PM   Dictation workstation:   [id number]      MR lumbar spine w and wo IV contrast   Final Result   Discitis osteomyelitis at L2-3 with involvement of the right pedicle   of L3 and right L2-3 facet joint. Epidural thickening ventral to the   thecal sac at L2 and L3 may represent a combination of prominent   venous plexus and epidural phlegmon.        Extension of infection into the left-greater-than-right  iliopsoas   muscles. Large abscess within the left iliopsoas muscle measuring up   to 9.6 cm in craniocaudal dimension. No evidence of intrathecal   extension of infection.        Enhancement of the inferior endplate of L1 is thought to be reactive   rather than infectious. Attention on follow-up imaging recommended. I   personally reviewed the images/study and agree with the findings as   stated by Tutu Cuevas MD (Resident Physician). This study was   interpreted at [facility],   [place], [place].        Signed by: Miguel Aldridge 5/30/2025 10:35 AM   Dictation workstation:   [id number]      MR cervical spine w and wo IV contrast   Final Result   No evidence of infectious process within the cervical spine.        Partially visualized rim enhancing fluid collection within the upper   thoracic spine and edema within the left-greater-than-right upper   thoracic posterior paraspinal musculature. Findings likely represent   seroma/postsurgical change; infectious process can not be excluded.        Multilevel degenerative changes of the cervical spine most notable at   C5-6 where there is moderate narrowing of the spinal canal as well as   severe right foraminal stenosis at C4-5 and C5-6.        Signed by: Miguel Aldridge 5/30/2025 10:41 AM   Dictation workstation:   [id number]      Bedside PICC Imaging   Final Result      XR shoulder left 2+ views   Final Result   Limited shoulder radiographs without osseous injury evident. If there   remains concern for osseous injury, CT can be considered.        MACRO:   None        Signed by: Iván Iverson 5/22/2025 2:18 PM   Dictation workstation:   [id number]      XR thoracic spine 3 views   Final Result   Surgical and degenerative change as above with findings of the T5-6   level which may represent sequelae of discitis/osteomyelitis.   Correlation with any cross-sectional imaging may be helpful.        Signed by: Iván Iverson 5/22/2025 11:16  AM   Dictation workstation:   [id number]      XR abdomen 1 view   Final Result   Moderate-to-marked colonic stool burden. Nonobstructive bowel gas   pattern with mild distention of the stomach and the loops of the   small bowel. A degree of the gaseous distention could be related to   postoperative ileus.        I personally reviewed the images/study and I agree with the findings   as stated by Ha Demarco MD. This study was interpreted at   [place], [place].        MACRO:   None        Signed by: Iávn Iverson 5/22/2025 11:17 AM   Dictation workstation:   [id number]      FL fluoro images no charge   Final Result      FL fluoro images no charge   Final Result      XR chest 1 view   Final Result   1. Coarse interstitial lung markings bilaterally and peribronchial   thickening within right middle lobe and right lower lobe.   2. Subsegmental atelectasis within right middle lobe noted on prior   CT examination appears improved.        I personally reviewed the images/study and I agree with the findings   as stated by resident physician Ayad Cadet MD. This study was   interpreted at [facility],   [place], [place].        MACRO:   None        Signed by: Jason Phan 5/20/2025 2:50 PM   Dictation workstation:   [id number]          Assessment/Plan   At bedside the drain was flushed and small amount of clot and particulate aspirated. Follow up CT demonstrates interval improvement in pocket. The drain was removed without difficulty at bedside. Site covered with sterile gauze and tegaderm.     LOS: 17 days     CHRISTINA Renee-CNP      I personally spent over half of a total 35 minutes in counseling and discussion with the patient and coordination of care as described above.

## 2025-06-06 NOTE — PROGRESS NOTES
"Miguel Lofton is a 69 y.o. male on day 17 of admission presenting with Osteomyelitis.    Subjective   Pt seen at bedside this AM. Doing well, pain is controlled on oxycodone. Eating and drinking well. We reviewed CT results, awaiting response from IR about drain. Discharge likely in next day or so. Endorses x2 loose stools, we will continue to monitor but educated to inform us if worsens.     Denies nausea, vomiting, fever, chills, chest pain, SOB, abd pain, constipation, bleeding edema.        Objective     Physical Exam  HENT:      Head: Normocephalic.      Right Ear: External ear normal.      Left Ear: External ear normal.      Nose: Nose normal.   Eyes:      Extraocular Movements: Extraocular movements intact.      Conjunctiva/sclera: Conjunctivae normal.   Cardiovascular:      Rate and Rhythm: Normal rate.   Pulmonary:      Effort: Pulmonary effort is normal.   Abdominal:      General: Bowel sounds are normal.      Palpations: Abdomen is soft.   Musculoskeletal:      Cervical back: Normal range of motion.      Comments: Limited ROM   Skin:     General: Skin is warm and dry.   Neurological:      Mental Status: He is alert and oriented to person, place, and time.      Motor: Weakness present.   Psychiatric:         Mood and Affect: Mood normal.         Behavior: Behavior normal.         Thought Content: Thought content normal.         Judgment: Judgment normal.         Last Recorded Vitals  Blood pressure 105/64, pulse 84, temperature 36.4 °C (97.5 °F), temperature source Temporal, resp. rate 18, height 1.727 m (5' 8\"), weight 73.4 kg (161 lb 13.1 oz), SpO2 92%.  Intake/Output last 3 Shifts:  I/O last 3 completed shifts:  In: 600 (8.2 mL/kg) [IV Piggyback:600]  Out: 1195 (16.3 mL/kg) [Urine:1175 (0.4 mL/kg/hr); Drains:20]  Weight: 73.4 kg     Relevant Results  .Scheduled medications  Scheduled Medications[1]  Continuous medications  Continuous Medications[2]  PRN medications  PRN Medications[3]    .  Results for " orders placed or performed during the hospital encounter of 05/20/25 (from the past 24 hours)   CBC and Auto Differential   Result Value Ref Range    WBC 6.5 4.4 - 11.3 x10*3/uL    nRBC 0.0 0.0 - 0.0 /100 WBCs    RBC 3.12 (L) 4.50 - 5.90 x10*6/uL    Hemoglobin 7.7 (L) 13.5 - 17.5 g/dL    Hematocrit 27.7 (L) 41.0 - 52.0 %    MCV 89 80 - 100 fL    MCH 24.7 (L) 26.0 - 34.0 pg    MCHC 27.8 (L) 32.0 - 36.0 g/dL    RDW 19.7 (H) 11.5 - 14.5 %    Platelets 343 150 - 450 x10*3/uL    Neutrophils % 59.7 40.0 - 80.0 %    Immature Granulocytes %, Automated 0.8 0.0 - 0.9 %    Lymphocytes % 13.0 13.0 - 44.0 %    Monocytes % 13.5 2.0 - 10.0 %    Eosinophils % 11.6 0.0 - 6.0 %    Basophils % 1.4 0.0 - 2.0 %    Neutrophils Absolute 3.91 1.20 - 7.70 x10*3/uL    Immature Granulocytes Absolute, Automated 0.05 0.00 - 0.70 x10*3/uL    Lymphocytes Absolute 0.85 (L) 1.20 - 4.80 x10*3/uL    Monocytes Absolute 0.88 0.10 - 1.00 x10*3/uL    Eosinophils Absolute 0.76 (H) 0.00 - 0.70 x10*3/uL    Basophils Absolute 0.09 0.00 - 0.10 x10*3/uL   Comprehensive Metabolic Panel   Result Value Ref Range    Glucose 85 74 - 99 mg/dL    Sodium 139 136 - 145 mmol/L    Potassium 3.7 3.5 - 5.3 mmol/L    Chloride 102 98 - 107 mmol/L    Bicarbonate 28 21 - 32 mmol/L    Anion Gap 13 10 - 20 mmol/L    Urea Nitrogen 6 6 - 23 mg/dL    Creatinine 0.44 (L) 0.50 - 1.30 mg/dL    eGFR >90 >60 mL/min/1.73m*2    Calcium 7.7 (L) 8.6 - 10.6 mg/dL    Albumin 2.4 (L) 3.4 - 5.0 g/dL    Alkaline Phosphatase 131 33 - 136 U/L    Total Protein 5.1 (L) 6.4 - 8.2 g/dL    AST 17 9 - 39 U/L    Bilirubin, Total 0.2 0.0 - 1.2 mg/dL    ALT 11 10 - 52 U/L   Vancomycin   Result Value Ref Range    Vancomycin 18.6 5.0 - 20.0 ug/mL                    Assessment & Plan  Osteomyelitis   i  GI cancer    Depression    MI (myocardial infarction) (Multi)    Lumbar disc disease    Spinal stenosis of thoracolumbar region    CHF (congestive heart failure)    Gastroesophageal reflux disease without  esophagitis    Anemia    Miguel Lofton is a 69 y.o. male patient PMHx of prostate cancer, base of the tongue cancer, HTN, HLD, hypothyroidism, COPD, and anxiety who had a CT chest for annual lung cancer screening on 5/13 that showed possible compression fx of T5 and T6. Imaging was reviewed at office visit 5/19 and was advised he present to ED for MRI. He presented to [facility] 5/19 where MRI T spine (5/19) showed acute discitis/osteomyelitis at T5-6 w/infectious appearing phlegmon & marked moderate to severe spinal canal stenosis. Transferred [facility] 5/20 for NSGY eval. NSGY consulted 5/20, s/p OR 5/21 for T5-6 laminectomy and T3-8 fusion, prevena drain removed 5/28. ID consulted (5/21) and rec Vanc and Ceftriaxone x8 weeks (5/22-current). OR surg path (5/22) + osteomyelitis.      MRI L/C spine (5/29) showed moderate stenosis at C4-5, C5-6, L2-3 disc/osteo w ventral phlegmon w moderate canal stenosis, large abscess L iliopsoas muscle 9.6cm. 5/30 Neurosurgery re-engaged, tentative plan to take to OR sometime next week. 5/30 ID was re-engaged who rec IR drainage of abscess, 6/2 IR s/p drainage abscess with ANGELICA drain placement; additionally new updated stop date Vanc and Cef (5/22-- current, plan stop 7/30/25) and weekly labs. 6/2 CT T/L Spine showed mild multilevel degenerative changes of thoracic spine especially at T7-T8, mod b/l neural foraminal narrowing, no significant spinal canal stenosis, discitis osteomyelitis L2-L3 level, previously seen iliopsoas abscesses (L>R). Neurosurgery originally planned to take to OR 6/3 for lumbar decompression, however following abscess drainage on 6/2, patient showing improvement of pain and movement, surgery was canceled. 6/5 CT A/P to eval abscess per IR recs to consider drain removal showed decrease in abscess. Discharge to SNF pending CT and IR recs.     Updates 6/6:  - CT A/P showed decrease in abscess collection  - awaiting IR response if +/- removal of drain today  - Cont  Ceftriaxone 2g to q12hrs and cont Vanc, new tentative plan stop date 7/30 (confirmed with ID on 6/5)  - Pt on 1.5 L NC, encouraged IS and attempt wean to RA  - accepted at facility, precert approved 6/6 good through 6/9      # Acute on chronic back pain   # Compression fracture   # Vertebral osteomyelitis  # Phlegmonous tissue c/f infection  # Iliopsoas muscle abscess  - CT Chest ([facility]): considerable deformity of what is felt to be T5 which may be related to a compression fracture of the caudal endplate. There is some compression of the superior endplate of T6.  -  MRI T spine (5/19 at OSH): Acute discitis/osteomyelitis at T5-6 with marked surrounding phlegmon and circumferential epidural phlegmon resulting in moderate to severe spinal canal stenosis. No epidural abscess. Requested for image import from [facility]    - CRP 7.35, ESR 90 (5/20); CRP 5.94, ESR 74 (5/22)  - s/p OR 5/21/25 with neurosurgery for T5-6 laminectomy and T3-8 fusion. Per neurosurgery, phlegmon appeared infectious without purulence  - OR surg path (5/22) +osteomyelitis, neg for carcinoma  - OR tissue/wound culture/ fungal culture 5/22) neg, final  - 5/20 Blood cultures x2 NG, Urine culture negative  - +MRSA nares 5/24  - Prevena removed POD7 with dressing (5/28)  - ID consulted 5/21; rec Vanc and Ceftriaxone (5/22- originally was planned for stop 7/16) for total 8 week atb course)  - 5/30 ID re-engaged for new finding of Large abscess within the left iliopsoas muscle measuring up to 9.6 cm in craniocaudal dimension seen on MRI. Rec Ceftriaxone 2g q12hrs and Vanco q12 hr, has PICC in place  - now planned for tentative stop date of Vanc and Ceftriaxone for 7/30 -- confirmed with ID on 6/5  - Reached out to ID (5/28) about abx cost for patient (approx ~$1000/week), ID states they will look into the case but most likely the abx course cannot change due to osteomyelitis   - Weekly CBC, CMP, CRP ordered outpt with results to be faxed to  [phone] ATTN: Dr. Dyer once discharged per ID  - Supportive onc consulted 5/25 for uncontrolled pain, recs start ER Oxy 10mg BID and increased doris 300mg to TID, changed Miralax from daily to PRN, and Senna from 3tabs to 2tabs, added Zofran PRN  - 5/30 MRI C/L Spine moderate stenosis at C4-5, C5-6, L2-3 disc/osteo w ventral phlegmon w moderate canal stenosis  - CT T/L 6/2:  mild multilevel degenerative changes of thoracic spine especially at T7-T8, moderate bilateral neural foraminal narrowing, no significant spinal canal stenosis. redemonstrated discitis osteomyelitis at L2-L3 level, previously seen iliopsoas abscesses (L>R), L2-L3 moderate bilateral neural foraminal narrowing and spinal canal stenosis; moderate right and mild to moderate left neural foraminal narrowing with mild spinal canal stenosis of L3-L4; L4-L5 Moderate bilateral neural foramina narrowing and moderate with narrowing of the lateral recesses and Mild spinal canal stenosis.   - Neuro/surg re-engaged for finding on MRI, originally planned for lumbar decompression 6/3, however patient with improved pain and movement following abscess drainage, surgery canceled, will follow up outpatient  - s/p IR abscess drainage on 6/2, culture (6/2- NGTD); ANGELICA drain in place, minimal output, 6/5 drain assessed by IR and flushed, rec CT A/P for eval of abscess, pending results +/- removal of drain   - 6/5 CT A/P showed interval decrease in size of abscess collection, now 1.3 x 1.3, previously 5.2 x 4.5cm, no new abscess collection, stable changes of osteomyelitis  - [facility] and is submitting for precert 6/5      # Abdominal pain, resolved   - Reported abdominal pain above old PEG site on 5/22  - Likely 2/2 prone positioning after OR +/- developing ileus  - KUB (5/22): moderate colonic stool burden, possible post-op ileus  - having loose Bms now LBM 5/26, stopped lactulose. Bowel regimen with DocuSenna 2tabs BID, Miralax  PRN, Dulcolax sup daily PRN     # Base of the tongue Ca  # Dysphasia   # malnutrition  - Follows yearly with Dr. 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 Gamez--last seen 12/18/2024: laryngeal structures are noted for grossly normal appearance; true vocal cords, false vocal cords, remaining structures, including epiglottis, piriform sinuses and base of tongue are all grossly normal; there is no evidence of gross mass nodule, polyp, tumor or other defect   - SLP eval done no aspiration, rec soft/ thin liquid diet   - Dietitian saw pt 5/22, rec Boost VHC TID     # Prostate Ca  - He is now following with urologist Dr. Boyd  - s/p radical prostatectomy by Dr Lang Mcneil in 2018  - [id number], [place] 7, + right anterior margin  - PSMA-PET done on 12/6/2023 showed no PSMA uptake within the post-surgical bed; there is PSMA uptake of a left common iliac retroperitoneal lymph node SUV 5.6 and PSMA uptake of a right pelvic sidewall lymph node SUV 3.5  - Dr Rangel recommended radiation therapy. Patient instead went to [facility] and was treated by Dr Zackary Ely (4600 cGy in 23 fractions to the pelvis and LN followed by [place] to prostate bed and LN to 2500 cGy in 10 fractions)  - Since completing radiation, has been experiencing significant fatigue  - For pain management, follows with [facility] palliative care team   - PSA < 0.01 (2/19/25)--> PSA <0.10 (5/20)     # COPD  # Smoking Hx  # Lung Ca screening  - Follows with pulmonologist Dr Hutchison   - PFTs 03/2025: FEV1 72% FEV1/FVC 0.63  - Did not tolerate Breztri  - Continue home Spiriva 2 puffs daily  - Developed dry cough overnight 6/3, CXR negative for acute cardiopulmonary process, encouraged IS -- pt states he did not use 6/4, stated importance      # HTN/HLD  - Continue home Metop Succs 25mg daily and Lipitor 80mg nightly  -  Hold ASA (5/30)  for possible OR, resumed (6/4-current)     # Hypothyroidism  - Last TSH 4.49 (5/12)  - Continue home Synthroid 75mcg daily     # chronic anemia  - Likely anemia of chronic disease +/- post-op  - BL Hgb ~11 after cancer tx; Hgb 9.9 (5/20)--> 8.3 (5/22) post-op--> 8.1 (5/23)--> 7.6 (5/25)-->s/p 1 unit 5/25, hgb stable 8.7 (5/29)-> 8.1 (6/2)-> 7.7 (6/6)  - Ferritin 305, Folate 7.5, Iron 41, TIBC 269, %Sat 15, UIBC 228, Vit B12 1,342 (5/20)  - 5/25 Iron 30, TIBC 204, folate 3.9   - Stopped home Vit B12 sup 5/22 d/t elevated B12 level  - will hold off on folic acid/iron replacement at this time given acute phase with post-op period, plan for outpatient monitoring of hgb and anemia labs/ongoing workup     # DVT prophy  - Lovenox, was held for neurosurgery and resumed 5/22 PM per ortho, now held on 6/2 for OR on 6/3; resumed 6/3  - SCDs, ambulation with assistance     # DISPO:  - Full code, confirmed with patient on admission   - DC SNF w/ PICC for IV atbx pending +/- drain removal by IR (precert approved 6/6, valid through 6/9), potentially tomorrow 6/7   - NOK: Eleni Lofton (wife) [phone]  - NSGY FUV 6/9, ID Dr. Dyer 7/9, GI ([facility]) 8/1, Dr. Lucero (heme onc) FUV 8/8, NSGY FUV 8/11, urology ([facility] 9/23), Cardiology ([facility] 11/18), Rad Onc ([facility]) 3/6     I spent >60 minutes in the professional and overall care of this patient.     Assessment and plan as above discussed with attending physician Dr. Kyler Tran, APRN-CNP         [1] aspirin, 81 mg, oral, Daily  atorvastatin, 80 mg, oral, Daily  cefTRIAXone, 2 g, intravenous, q12h  cholecalciferol, 25 mcg, oral, Daily  cyclobenzaprine, 10 mg, oral, TID  enoxaparin, 40 mg, subcutaneous, q24h  gabapentin, 300 mg, oral, TID  levothyroxine, 75 mcg, oral, Daily  lidocaine, 2 patch, transdermal, Daily  metoprolol succinate XL, 25 mg, oral, Daily  oxyCODONE ER, 10 mg, oral, q12h ANA  pantoprazole, 40 mg, oral,  Daily before breakfast  sennosides-docusate sodium, 2 tablet, oral, BID  tiotropium, 2 puff, inhalation, Daily  vancomycin, 1,250 mg, intravenous, q12h  [2]    [3] PRN medications: acetaminophen, alteplase, bisacodyl, guaiFENesin, HYDROmorphone, LORazepam, ondansetron, oxyCODONE, oxyCODONE, polyethylene glycol, simethicone, vancomycin

## 2025-06-06 NOTE — CARE PLAN
The patient's goals for the shift include Pain management    The clinical goals for the shift include Pt will remain HDS and VSs through end of shift 6/6/2025 0700      Problem: Skin  Goal: Decreased wound size/increased tissue granulation at next dressing change  Outcome: Progressing  Goal: Participates in plan/prevention/treatment measures  Outcome: Progressing  Goal: Prevent/manage excess moisture  Outcome: Progressing  Goal: Prevent/minimize sheer/friction injuries  Outcome: Progressing  Goal: Promote/optimize nutrition  Outcome: Progressing  Goal: Promote skin healing  Outcome: Progressing     Problem: Fall/Injury  Goal: Not fall by end of shift  Outcome: Progressing  Goal: Be free from injury by end of the shift  Outcome: Progressing  Goal: Verbalize understanding of personal risk factors for fall in the hospital  Outcome: Progressing  Goal: Verbalize understanding of risk factor reduction measures to prevent injury from fall in the home  Outcome: Progressing  Goal: Use assistive devices by end of the shift  Outcome: Progressing  Goal: Pace activities to prevent fatigue by end of the shift  Outcome: Progressing     Problem: Safety - Adult  Goal: Free from fall injury  Outcome: Progressing     Problem: Discharge Planning  Goal: Discharge to home or other facility with appropriate resources  Outcome: Progressing     Problem: Chronic Conditions and Co-morbidities  Goal: Patient's chronic conditions and co-morbidity symptoms are monitored and maintained or improved  Outcome: Progressing     Problem: Nutrition  Goal: Nutrient intake appropriate for maintaining nutritional needs  Outcome: Progressing     Problem: Pain  Goal: Takes deep breaths with improved pain control throughout the shift  Outcome: Progressing  Goal: Turns in bed with improved pain control throughout the shift  Outcome: Progressing  Goal: Walks with improved pain control throughout the shift  Outcome: Progressing  Goal: Performs ADL's with  improved pain control throughout shift  Outcome: Progressing  Goal: Participates in PT with improved pain control throughout the shift  Outcome: Progressing  Goal: Free from opioid side effects throughout the shift  Outcome: Progressing  Goal: Free from acute confusion related to pain meds throughout the shift  Outcome: Progressing     Problem: Heart Failure  Goal: Improved gas exchange this shift  Outcome: Progressing  Goal: Improved urinary output this shift  Outcome: Progressing  Goal: Reduction in peripheral edema within 24 hours  Outcome: Progressing  Goal: Report improvement of dyspnea/breathlessness this shift  Outcome: Progressing  Goal: Weight from fluid excess reduced over 2-3 days, then stabilize  Outcome: Progressing  Goal: Increase self care and/or family involvement in 24 hours  Outcome: Progressing

## 2025-06-06 NOTE — PROGRESS NOTES
Occupational Therapy    Occupational Therapy Treatment    Name: Miguel Lofton  MRN: [id number]  Department: [facility]  Room: [facility]4021-A  Date: 06/06/25  Time Calculation  Start Time: 1331  Stop Time: 1401  Time Calculation (min): 30 min    Assessment:  OT Assessment: Pt presents with decreased carry over of adherence to spinal precautions, requires increased verbal and tactile cues in order to maintain. Pt demos decreased independence in ADLs and IADLs and is severly limited by pain.Pt would continue to benefit from skilled OT to address these deficits and maximize pt independence.  Prognosis: Good  Barriers to Discharge Home: Caregiver assistance, Cognition needs, Physical needs  Evaluation/Treatment Tolerance: Patient tolerated treatment well  Medical Staff Made Aware: Yes  End of Session Communication: Bedside nurse  End of Session Patient Position: Bed, 3 rail up, Alarm on  Plan:  Treatment Interventions: ADL retraining, Functional transfer training, Endurance training, Patient/family training  OT Frequency: 3 times per week  OT Discharge Recommendations: Moderate intensity level of continued care  Equipment Recommended upon Discharge:  (TBD by next level of care.)  OT Recommended Transfer Status: Assist of 1, Minimal assist  OT - OK to Discharge: Yes    Subjective     OT Visit Info:  OT Received On: 06/06/25  General:  General  Reason for Referral: Osteomyelitis, LBP with radiculopathy and weakness x3 weeks, 5/13 CT chest T5 compression fx, MRI TS T5-T6 compression fx w disc/osteo, epidural phlegmon w moderate canal stenosis, s/p T5-6 laminectomy, T6 transpedicular decompression, T3-8 fusion on 5/21/25  Past Medical History Relevant to Rehab: Per chart, PMH includes HTN,  HLD, CAD (on ASA), hypoT, COPD, anxiety, LBP (annual radioablations), prostate cancer (s/p prostatectomy 2018), base of tongue cancer (dx 2022, s/p radiation/chemo, in remission), diastolic CHF  Family/Caregiver Present: No  Prior to Session  Communication: Bedside nurse  Patient Position Received: Bed, 3 rail up, Alarm on  General Comment: Pt  supine in bed at start of session. Pt agreeable to OOB treatment reporting that he's expecting lunch shortly and would like to sit up.  Precautions:  Medical Precautions: Fall precautions  Post-Surgical Precautions: Spinal precautions  Precautions Comment: Pt requires max verbal cues to maintain spinal precautions     Date/Time Vitals Session Patient Position Pulse Resp SpO2 BP MAP (mmHg)    06/06/25 1331 --  --  79  18  96 %  102/68  79                Pain Assessment:  Pain Assessment  Pain Assessment: 0-10  0-10 (Numeric) Pain Score: 5 - Moderate pain  Pain Type: Surgical pain  Pain Location: Back  Pain Orientation: Lower  Pain Onset: Ongoing    Objective   Cognition:  Overall Cognitive Status: Within Functional Limits  Arousal/Alertness: Appropriate responses to stimuli  Orientation Level: Oriented X4  Safety/Judgement: Exceptions to WFL  Unable to Self-Monitor and Self-Correct Consistently: Minimal  Insight: Moderate  Impulsive: Severely  Planning: Reduced planning skills  Processing Speed: Within funtional limits  Activities of Daily Living:                          LE Dressing  LE Dressing: Yes (Max A)  LE Dressing Where Assessed: Bed level  LE Dressing Comments: Pt requires Max verbal cues in order to maintain spinal precautions as pt attempts to thread B LE's while seated. Pt returns demo and skilled instruction for alternating hand placement while completing clothing mgmt up over hips.    Toileting  Toileting Level of Assistance: Minimum assistance  Where Assessed: Bedside commode, Bed level  Toileting Comments: Pt doffs brief while supine in bed, requiires assist for bowel hygiene    Functional Standing Tolerance:     Bed Mobility/Transfers: Bed Mobility  Bed Mobility: Yes  Bed Mobility 1  Bed Mobility 1: Supine to sitting  Bed Mobility Comments 1: Instructed on log roll- Pt. required cues to avoid  twisting and utilized bed rail to roll - Sidelying to sit with min assist.    Transfers  Transfer: Yes  Transfer 1  Transfer From 1: Bed to  Transfer to 1: Stand  Technique 1: Sit to stand, Stand to sit  Transfer Device 1: Walker  Transfer Level of Assistance 1: Minimum assistance  Trials/Comments 1: Verbal cues to push up from bed rather than pull up on walker.  Transfers 2  Transfer From 2: Stand to  Transfer to 2: Commode-standard  Technique 2: Stand to sit, Sit to stand  Transfer Device 2: Walker  Trials/Comments 2: verbal cues for hand placement to push up from commode and to sit up and engage core rather then bending forward.  Transfers 3  Transfer From 3: Sit to  Transfer to 3: Chair with arms  Technique 3: Stand to sit  Transfer Device 3: Walker    Toilet Transfers  Toilet Transfer From: Bed  Toilet Transfer Type: To  Toilet Transfer to: Standard toilet (commode placed over toilet for use of HR)  Toilet Transfer Technique: Ambulating  Toilet Transfers: Contact guard         Functional Mobility:  Functional Mobility  Functional Mobility Performed: Yes  Functional Mobility 1  Surface 1: Level tile  Device 1: Rolling walker  Assistance 1: Minimum assistance  Comments 1: Pt requires assist for line management (ANGELICA drain, O2, IV) and demos a moderate level of impulsivity requiring frequent cues. Pt completes ~20 feet.  Sitting Balance:  Static Sitting Balance  Static Sitting-Balance Support: Bilateral upper extremity supported  Static Sitting-Level of Assistance: Close supervision (Pt continues to break spine precautions as pt frequently flexes trunk forward from suspected decreased core strength.)  Standing Balance:  Static Standing Balance  Static Standing-Balance Support: Bilateral upper extremity supported  Static Standing-Level of Assistance: Contact guard       Outcome Measures:  [facility] Daily Activity  Putting on and taking off regular lower body clothing: A lot  Bathing (including washing, rinsing, drying):  A lot  Putting on and taking off regular upper body clothing: A little  Toileting, which includes using toilet, bedpan or urinal: A lot  Taking care of personal grooming such as brushing teeth: A little  Eating Meals: None  Daily Activity - Total Score: 16      Education Documentation  No documentation found.  Education Comments  No comments found.      Goals:  Encounter Problems       Encounter Problems (Active)       ADLs       Pt will complete UB /LB bathing tasks with modified independence while seated and AE as needed.  (Progressing)       Start:  05/23/25    Expected End:  06/13/25            Pt will complete LB dressing with modified independence while seated and/or standing and AE as needed. (Progressing)       Start:  05/23/25    Expected End:  06/13/25            Pt will complete toilet hygiene while seated /standing with independent level of assistance.   (Progressing)       Start:  05/23/25    Expected End:  06/13/25               COGNITION/SAFETY       Patient will recall and adhere to spinal precautions during all functional mobility/ADL tasks in order to demonstrate improved understanding and promote healing post op (Progressing)       Start:  05/23/25    Expected End:  06/13/25               MOBILITY       Pt will complete functional ambulation household /community distance with modified independence and LRAD.  (Progressing)       Start:  05/23/25    Expected End:  06/13/25               TRANSFERS       Pt will complete functional transfers with modified independence and LRAD.  (Progressing)       Start:  05/23/25    Expected End:  06/13/25

## 2025-06-06 NOTE — PROGRESS NOTES
06/06/25 1200   Discharge Planning   Home or Post Acute Services Post acute facilities (Rehab/SNF/etc)   Type of Post Acute Facility Services Skilled nursing   Expected Discharge Disposition SNF   Does the patient need discharge transport arranged? Yes   RoundTrip coordination needed? Yes   Has discharge transport been arranged? No     Care Transitions Note  06/06/25  Patient's pre-cert was approved for [facility] and is valid through 6/9. Awaiting final ok from provider for discharge. Pt will need transport set up. 7000 complete in HENS.   Cayla Hoover, MSW, LSW

## 2025-06-07 LAB
ALBUMIN SERPL BCP-MCNC: 2.6 G/DL (ref 3.4–5)
ALP SERPL-CCNC: 127 U/L (ref 33–136)
ALT SERPL W P-5'-P-CCNC: 12 U/L (ref 10–52)
ANION GAP SERPL CALC-SCNC: 10 MMOL/L (ref 10–20)
AST SERPL W P-5'-P-CCNC: 15 U/L (ref 9–39)
BASOPHILS # BLD AUTO: 0.08 X10*3/UL (ref 0–0.1)
BASOPHILS NFR BLD AUTO: 1.2 %
BILIRUB SERPL-MCNC: 0.2 MG/DL (ref 0–1.2)
BUN SERPL-MCNC: 7 MG/DL (ref 6–23)
CALCIUM SERPL-MCNC: 8 MG/DL (ref 8.6–10.6)
CHLORIDE SERPL-SCNC: 101 MMOL/L (ref 98–107)
CO2 SERPL-SCNC: 31 MMOL/L (ref 21–32)
CREAT SERPL-MCNC: 0.47 MG/DL (ref 0.5–1.3)
EGFRCR SERPLBLD CKD-EPI 2021: >90 ML/MIN/1.73M*2
EOSINOPHIL # BLD AUTO: 0.78 X10*3/UL (ref 0–0.7)
EOSINOPHIL NFR BLD AUTO: 12 %
ERYTHROCYTE [DISTWIDTH] IN BLOOD BY AUTOMATED COUNT: 19.2 % (ref 11.5–14.5)
GLUCOSE SERPL-MCNC: 94 MG/DL (ref 74–99)
HCT VFR BLD AUTO: 27.3 % (ref 41–52)
HGB BLD-MCNC: 7.8 G/DL (ref 13.5–17.5)
IMM GRANULOCYTES # BLD AUTO: 0.05 X10*3/UL (ref 0–0.7)
IMM GRANULOCYTES NFR BLD AUTO: 0.8 % (ref 0–0.9)
LYMPHOCYTES # BLD AUTO: 0.94 X10*3/UL (ref 1.2–4.8)
LYMPHOCYTES NFR BLD AUTO: 14.5 %
MAGNESIUM SERPL-MCNC: 1.54 MG/DL (ref 1.6–2.4)
MCH RBC QN AUTO: 24.8 PG (ref 26–34)
MCHC RBC AUTO-ENTMCNC: 28.6 G/DL (ref 32–36)
MCV RBC AUTO: 87 FL (ref 80–100)
MONOCYTES # BLD AUTO: 0.85 X10*3/UL (ref 0.1–1)
MONOCYTES NFR BLD AUTO: 13.1 %
NEUTROPHILS # BLD AUTO: 3.8 X10*3/UL (ref 1.2–7.7)
NEUTROPHILS NFR BLD AUTO: 58.4 %
NRBC BLD-RTO: 0 /100 WBCS (ref 0–0)
PLATELET # BLD AUTO: 325 X10*3/UL (ref 150–450)
POTASSIUM SERPL-SCNC: 3.4 MMOL/L (ref 3.5–5.3)
PROT SERPL-MCNC: 5.5 G/DL (ref 6.4–8.2)
RBC # BLD AUTO: 3.14 X10*6/UL (ref 4.5–5.9)
SODIUM SERPL-SCNC: 139 MMOL/L (ref 136–145)
WBC # BLD AUTO: 6.5 X10*3/UL (ref 4.4–11.3)

## 2025-06-07 PROCEDURE — 83735 ASSAY OF MAGNESIUM: CPT

## 2025-06-07 PROCEDURE — 80053 COMPREHEN METABOLIC PANEL: CPT | Performed by: NURSE PRACTITIONER

## 2025-06-07 PROCEDURE — 2500000001 HC RX 250 WO HCPCS SELF ADMINISTERED DRUGS (ALT 637 FOR MEDICARE OP)

## 2025-06-07 PROCEDURE — 1170000001 HC PRIVATE ONCOLOGY ROOM DAILY

## 2025-06-07 PROCEDURE — 2500000004 HC RX 250 GENERAL PHARMACY W/ HCPCS (ALT 636 FOR OP/ED)

## 2025-06-07 PROCEDURE — 94640 AIRWAY INHALATION TREATMENT: CPT

## 2025-06-07 PROCEDURE — 85025 COMPLETE CBC W/AUTO DIFF WBC: CPT | Performed by: NURSE PRACTITIONER

## 2025-06-07 PROCEDURE — 99233 SBSQ HOSP IP/OBS HIGH 50: CPT

## 2025-06-07 PROCEDURE — 2500000002 HC RX 250 W HCPCS SELF ADMINISTERED DRUGS (ALT 637 FOR MEDICARE OP, ALT 636 FOR OP/ED)

## 2025-06-07 RX ORDER — MAGNESIUM SULFATE HEPTAHYDRATE 40 MG/ML
2 INJECTION, SOLUTION INTRAVENOUS ONCE
Status: COMPLETED | OUTPATIENT
Start: 2025-06-07 | End: 2025-06-07

## 2025-06-07 RX ORDER — POTASSIUM CHLORIDE 1.5 G/1.58G
40 POWDER, FOR SOLUTION ORAL ONCE
Status: COMPLETED | OUTPATIENT
Start: 2025-06-07 | End: 2025-06-07

## 2025-06-07 RX ADMIN — CEFTRIAXONE SODIUM 2 G: 2 INJECTION, SOLUTION INTRAVENOUS at 22:30

## 2025-06-07 RX ADMIN — LORAZEPAM 1 MG: 1 TABLET ORAL at 22:47

## 2025-06-07 RX ADMIN — OXYCODONE HYDROCHLORIDE 10 MG: 10 TABLET ORAL at 09:44

## 2025-06-07 RX ADMIN — Medication 25 MCG: at 09:46

## 2025-06-07 RX ADMIN — OXYCODONE HYDROCHLORIDE 10 MG: 10 TABLET, FILM COATED, EXTENDED RELEASE ORAL at 21:24

## 2025-06-07 RX ADMIN — PANTOPRAZOLE SODIUM 40 MG: 40 TABLET, DELAYED RELEASE ORAL at 10:01

## 2025-06-07 RX ADMIN — MAGNESIUM SULFATE HEPTAHYDRATE 2 G: 40 INJECTION, SOLUTION INTRAVENOUS at 09:45

## 2025-06-07 RX ADMIN — GABAPENTIN 300 MG: 300 CAPSULE ORAL at 21:24

## 2025-06-07 RX ADMIN — OXYCODONE HYDROCHLORIDE 10 MG: 10 TABLET, FILM COATED, EXTENDED RELEASE ORAL at 09:45

## 2025-06-07 RX ADMIN — METOPROLOL SUCCINATE 25 MG: 25 TABLET, EXTENDED RELEASE ORAL at 09:46

## 2025-06-07 RX ADMIN — POTASSIUM CHLORIDE 40 MEQ: 1.5 POWDER, FOR SOLUTION ORAL at 09:44

## 2025-06-07 RX ADMIN — GABAPENTIN 300 MG: 300 CAPSULE ORAL at 09:46

## 2025-06-07 RX ADMIN — CYCLOBENZAPRINE 10 MG: 10 TABLET, FILM COATED ORAL at 09:45

## 2025-06-07 RX ADMIN — OXYCODONE HYDROCHLORIDE 10 MG: 10 TABLET ORAL at 22:46

## 2025-06-07 RX ADMIN — CYCLOBENZAPRINE 10 MG: 10 TABLET, FILM COATED ORAL at 21:24

## 2025-06-07 RX ADMIN — LEVOTHYROXINE SODIUM 75 MCG: 0.07 TABLET ORAL at 09:46

## 2025-06-07 RX ADMIN — CYCLOBENZAPRINE 10 MG: 10 TABLET, FILM COATED ORAL at 15:56

## 2025-06-07 RX ADMIN — ATORVASTATIN CALCIUM 80 MG: 80 TABLET, FILM COATED ORAL at 09:45

## 2025-06-07 RX ADMIN — ASPIRIN 81 MG: 81 TABLET, COATED ORAL at 09:46

## 2025-06-07 RX ADMIN — GABAPENTIN 300 MG: 300 CAPSULE ORAL at 15:56

## 2025-06-07 RX ADMIN — CEFTRIAXONE SODIUM 2 G: 2 INJECTION, SOLUTION INTRAVENOUS at 09:45

## 2025-06-07 RX ADMIN — OXYCODONE HYDROCHLORIDE 10 MG: 10 TABLET ORAL at 02:05

## 2025-06-07 RX ADMIN — TIOTROPIUM BROMIDE INHALATION SPRAY 2 PUFF: 3.12 SPRAY, METERED RESPIRATORY (INHALATION) at 09:55

## 2025-06-07 RX ADMIN — VANCOMYCIN HYDROCHLORIDE 1250 MG: 1.25 INJECTION, POWDER, LYOPHILIZED, FOR SOLUTION INTRAVENOUS at 22:00

## 2025-06-07 RX ADMIN — VANCOMYCIN HYDROCHLORIDE 1250 MG: 1.25 INJECTION, POWDER, LYOPHILIZED, FOR SOLUTION INTRAVENOUS at 11:22

## 2025-06-07 ASSESSMENT — COGNITIVE AND FUNCTIONAL STATUS - GENERAL
WALKING IN HOSPITAL ROOM: A LITTLE
PERSONAL GROOMING: A LITTLE
CLIMB 3 TO 5 STEPS WITH RAILING: A LITTLE
MOVING TO AND FROM BED TO CHAIR: A LITTLE
DAILY ACTIVITIY SCORE: 18
EATING MEALS: A LITTLE
HELP NEEDED FOR BATHING: A LITTLE
WALKING IN HOSPITAL ROOM: A LITTLE
DRESSING REGULAR LOWER BODY CLOTHING: A LITTLE
STANDING UP FROM CHAIR USING ARMS: A LITTLE
MOBILITY SCORE: 20
DRESSING REGULAR LOWER BODY CLOTHING: A LITTLE
MOBILITY SCORE: 20
TOILETING: A LITTLE
CLIMB 3 TO 5 STEPS WITH RAILING: A LITTLE
MOVING TO AND FROM BED TO CHAIR: A LITTLE
DRESSING REGULAR UPPER BODY CLOTHING: A LITTLE
STANDING UP FROM CHAIR USING ARMS: A LITTLE
PERSONAL GROOMING: A LITTLE
EATING MEALS: A LITTLE
DRESSING REGULAR UPPER BODY CLOTHING: A LITTLE
DAILY ACTIVITIY SCORE: 18
HELP NEEDED FOR BATHING: A LITTLE
TOILETING: A LITTLE

## 2025-06-07 ASSESSMENT — PAIN SCALES - GENERAL
PAINLEVEL_OUTOF10: 10 - WORST POSSIBLE PAIN
PAINLEVEL_OUTOF10: 5 - MODERATE PAIN
PAINLEVEL_OUTOF10: 8
PAINLEVEL_OUTOF10: 8
PAINLEVEL_OUTOF10: 7
PAINLEVEL_OUTOF10: 0 - NO PAIN
PAINLEVEL_OUTOF10: 7

## 2025-06-07 ASSESSMENT — PAIN - FUNCTIONAL ASSESSMENT
PAIN_FUNCTIONAL_ASSESSMENT: UNABLE TO SELF-REPORT
PAIN_FUNCTIONAL_ASSESSMENT: 0-10

## 2025-06-07 ASSESSMENT — PAIN DESCRIPTION - ORIENTATION
ORIENTATION: LOWER
ORIENTATION: LOWER

## 2025-06-07 ASSESSMENT — PAIN DESCRIPTION - DESCRIPTORS
DESCRIPTORS: ACHING
DESCRIPTORS: ACHING

## 2025-06-07 ASSESSMENT — PAIN DESCRIPTION - LOCATION
LOCATION: BACK
LOCATION: BACK

## 2025-06-07 NOTE — PROGRESS NOTES
"Miguel Lofton is a 69 y.o. male on day 18 of admission presenting with Osteomyelitis.    Subjective   Pt seen at bedside this AM. Doing well, pain is controlled on oxycodone. Pt having minimal pain and no drainage from previous drain insertion site which was removed yesterday. Discussed plans to await for transport set up to new facility.  Denies nausea, vomiting, fever, chills, chest pain, SOB, abd pain, constipation, bleeding edema.        Objective     Physical Exam  HENT:      Head: Normocephalic.      Right Ear: External ear normal.      Left Ear: External ear normal.      Nose: Nose normal.   Eyes:      Extraocular Movements: Extraocular movements intact.      Conjunctiva/sclera: Conjunctivae normal.   Cardiovascular:      Rate and Rhythm: Normal rate.   Pulmonary:      Effort: Pulmonary effort is normal.   Abdominal:      General: Bowel sounds are normal.      Palpations: Abdomen is soft.   Musculoskeletal:      Cervical back: Normal range of motion.      Comments: Limited ROM   Skin:     General: Skin is warm and dry.   Neurological:      Mental Status: He is alert and oriented to person, place, and time.      Motor: Weakness present.   Psychiatric:         Mood and Affect: Mood normal.         Behavior: Behavior normal.         Thought Content: Thought content normal.         Judgment: Judgment normal.         Last Recorded Vitals  Blood pressure 102/62, pulse 78, temperature 37 °C (98.6 °F), temperature source Temporal, resp. rate 17, height 1.727 m (5' 8\"), weight 73.4 kg (161 lb 13.1 oz), SpO2 93%.  Intake/Output last 3 Shifts:  I/O last 3 completed shifts:  In: 400 (5.4 mL/kg) [P.O.:100; IV Piggyback:300]  Out: 1715 (23.4 mL/kg) [Urine:1695 (0.6 mL/kg/hr); Drains:20]  Weight: 73.4 kg     Relevant Results  .Scheduled medications  Scheduled Medications[1]  Continuous medications  Continuous Medications[2]  PRN medications  PRN Medications[3]    .  Results for orders placed or performed during the " hospital encounter of 05/20/25 (from the past 24 hours)   CBC and Auto Differential   Result Value Ref Range    WBC 6.5 4.4 - 11.3 x10*3/uL    nRBC 0.0 0.0 - 0.0 /100 WBCs    RBC 3.14 (L) 4.50 - 5.90 x10*6/uL    Hemoglobin 7.8 (L) 13.5 - 17.5 g/dL    Hematocrit 27.3 (L) 41.0 - 52.0 %    MCV 87 80 - 100 fL    MCH 24.8 (L) 26.0 - 34.0 pg    MCHC 28.6 (L) 32.0 - 36.0 g/dL    RDW 19.2 (H) 11.5 - 14.5 %    Platelets 325 150 - 450 x10*3/uL    Neutrophils % 58.4 40.0 - 80.0 %    Immature Granulocytes %, Automated 0.8 0.0 - 0.9 %    Lymphocytes % 14.5 13.0 - 44.0 %    Monocytes % 13.1 2.0 - 10.0 %    Eosinophils % 12.0 0.0 - 6.0 %    Basophils % 1.2 0.0 - 2.0 %    Neutrophils Absolute 3.80 1.20 - 7.70 x10*3/uL    Immature Granulocytes Absolute, Automated 0.05 0.00 - 0.70 x10*3/uL    Lymphocytes Absolute 0.94 (L) 1.20 - 4.80 x10*3/uL    Monocytes Absolute 0.85 0.10 - 1.00 x10*3/uL    Eosinophils Absolute 0.78 (H) 0.00 - 0.70 x10*3/uL    Basophils Absolute 0.08 0.00 - 0.10 x10*3/uL   Comprehensive Metabolic Panel   Result Value Ref Range    Glucose 94 74 - 99 mg/dL    Sodium 139 136 - 145 mmol/L    Potassium 3.4 (L) 3.5 - 5.3 mmol/L    Chloride 101 98 - 107 mmol/L    Bicarbonate 31 21 - 32 mmol/L    Anion Gap 10 10 - 20 mmol/L    Urea Nitrogen 7 6 - 23 mg/dL    Creatinine 0.47 (L) 0.50 - 1.30 mg/dL    eGFR >90 >60 mL/min/1.73m*2    Calcium 8.0 (L) 8.6 - 10.6 mg/dL    Albumin 2.6 (L) 3.4 - 5.0 g/dL    Alkaline Phosphatase 127 33 - 136 U/L    Total Protein 5.5 (L) 6.4 - 8.2 g/dL    AST 15 9 - 39 U/L    Bilirubin, Total 0.2 0.0 - 1.2 mg/dL    ALT 12 10 - 52 U/L   Magnesium   Result Value Ref Range    Magnesium 1.54 (L) 1.60 - 2.40 mg/dL                    Assessment & Plan  Osteomyelitis   i  GI cancer    Depression    MI (myocardial infarction) (Multi)    Lumbar disc disease    Spinal stenosis of thoracolumbar region    CHF (congestive heart failure)    Gastroesophageal reflux disease without esophagitis    Anemia    Miguel CHÁVEZ  Kirsty is a 69 y.o. male patient PMHx of prostate cancer, base of the tongue cancer, HTN, HLD, hypothyroidism, COPD, and anxiety who had a CT chest for annual lung cancer screening on 5/13 that showed possible compression fx of T5 and T6. Imaging was reviewed at office visit 5/19 and was advised he present to ED for MRI. He presented to [facility] 5/19 where MRI T spine (5/19) showed acute discitis/osteomyelitis at T5-6 w/infectious appearing phlegmon & marked moderate to severe spinal canal stenosis. Transferred [facility] 5/20 for NSGY eval. NSGY consulted 5/20, s/p OR 5/21 for T5-6 laminectomy and T3-8 fusion, prevena drain removed 5/28. ID consulted (5/21) and rec Vanc and Ceftriaxone x8 weeks (5/22-current). OR surg path (5/22) + osteomyelitis.      MRI L/C spine (5/29) showed moderate stenosis at C4-5, C5-6, L2-3 disc/osteo w ventral phlegmon w moderate canal stenosis, large abscess L iliopsoas muscle 9.6cm. 5/30 Neurosurgery re-engaged, tentative plan to take to OR sometime next week. 5/30 ID was re-engaged who rec IR drainage of abscess, 6/2 IR s/p drainage abscess with ANGELICA drain placement; additionally new updated stop date Vanc and Cef (5/22-- current, plan stop 7/30/25) and weekly labs. 6/2 CT T/L Spine showed mild multilevel degenerative changes of thoracic spine especially at T7-T8, mod b/l neural foraminal narrowing, no significant spinal canal stenosis, discitis osteomyelitis L2-L3 level, previously seen iliopsoas abscesses (L>R). Neurosurgery originally planned to take to OR 6/3 for lumbar decompression, however following abscess drainage on 6/2, patient showing improvement of pain and movement, surgery was canceled. 6/5 CT A/P to eval abscess per IR recs to consider drain removal showed decrease in abscess. Discharge to SNF pending transportation set up, likely tomorrow 6/8.     Updates 6/7:  - ANGELICA drain removed yesterday in IR  - Cont Ceftriaxone 2g to q12hrs and cont Vanc, new tentative plan stop date 7/30  (confirmed with ID on 6/5)  - Pt on 1.5 L NC, encouraged IS and attempt wean to RA  - accepted at facility, precert approved 6/6 good through 6/9- transportation to facility pending set up, likely tomorrow, 6/8     # Acute on chronic back pain   # Compression fracture   # Vertebral osteomyelitis  # Phlegmonous tissue c/f infection  # Iliopsoas muscle abscess  - CT Chest (OSH): considerable deformity of what is felt to be T5 which may be related to a compression fracture of the caudal endplate. There is some compression of the superior endplate of T6.  -  MRI T spine (5/19 at OSH): Acute discitis/osteomyelitis at T5-6 with marked surrounding phlegmon and circumferential epidural phlegmon resulting in moderate to severe spinal canal stenosis. No epidural abscess. Requested for image import from [facility]    - CRP 7.35, ESR 90 (5/20); CRP 5.94, ESR 74 (5/22)  - s/p OR 5/21/25 with neurosurgery for T5-6 laminectomy and T3-8 fusion. Per neurosurgery, phlegmon appeared infectious without purulence  - OR surg path (5/22) +osteomyelitis, neg for carcinoma  - OR tissue/wound culture/ fungal culture 5/22) neg, final  - 5/20 Blood cultures x2 NG, Urine culture negative  - +MRSA nares 5/24  - Prevena removed POD7 with dressing (5/28)  - ID consulted 5/21; rec Vanc and Ceftriaxone (5/22- originally was planned for stop 7/16) for total 8 week atb course)  - 5/30 ID re-engaged for new finding of Large abscess within the left iliopsoas muscle measuring up to 9.6 cm in craniocaudal dimension seen on MRI. Rec Ceftriaxone 2g q12hrs and Vanco q12 hr, has PICC in place  - now planned for tentative stop date of Vanc and Ceftriaxone for 7/30 -- confirmed with ID on 6/5  - Reached out to ID (5/28) about abx cost for patient (approx ~$1000/week), ID states they will look into the case but most likely the abx course cannot change due to osteomyelitis   - Weekly CBC, CMP, CRP ordered outpt with results to be faxed to [phone] ATTN:  Dr. Dyer once discharged per ID  - Supportive onc consulted 5/25 for uncontrolled pain, recs start ER Oxy 10mg BID and increased doris 300mg to TID, changed Miralax from daily to PRN, and Senna from 3tabs to 2tabs, added Zofran PRN  - 5/30 MRI C/L Spine moderate stenosis at C4-5, C5-6, L2-3 disc/osteo w ventral phlegmon w moderate canal stenosis  - CT T/L 6/2:  mild multilevel degenerative changes of thoracic spine especially at T7-T8, moderate bilateral neural foraminal narrowing, no significant spinal canal stenosis. redemonstrated discitis osteomyelitis at L2-L3 level, previously seen iliopsoas abscesses (L>R), L2-L3 moderate bilateral neural foraminal narrowing and spinal canal stenosis; moderate right and mild to moderate left neural foraminal narrowing with mild spinal canal stenosis of L3-L4; L4-L5 Moderate bilateral neural foramina narrowing and moderate with narrowing of the lateral recesses and Mild spinal canal stenosis.   - Neuro/surg re-engaged for finding on MRI, originally planned for lumbar decompression 6/3, however patient with improved pain and movement following abscess drainage, surgery canceled, will follow up outpatient  - s/p IR abscess drainage on 6/2, culture (6/2- NGTD); ANGELICA drain in place, minimal output, 6/5 drain assessed by IR and flushed, rec CT A/P for eval of abscess, pending results +/- removal of drain   - 6/5 CT A/P showed interval decrease in size of abscess collection, now 1.3 x 1.3, previously 5.2 x 4.5cm, no new abscess collection, stable changes of osteomyelitis  - PT/OT rec SNF, [facility] precert approved, discharge pending transportation set up     # Abdominal pain, resolved   - Reported abdominal pain above old PEG site on 5/22  - Likely 2/2 prone positioning after OR +/- developing ileus  - KUB (5/22): moderate colonic stool burden, possible post-op ileus  - having loose Bms now LBM 5/26, stopped lactulose. Bowel regimen with DocuSenna 2tabs BID, Miralax PRN, Dulcolax  sup daily PRN     # Base of the tongue Ca  # Dysphasia   # malnutrition  - Follows yearly with Dr. 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 Gamez--last seen 12/18/2024: laryngeal structures are noted for grossly normal appearance; true vocal cords, false vocal cords, remaining structures, including epiglottis, piriform sinuses and base of tongue are all grossly normal; there is no evidence of gross mass nodule, polyp, tumor or other defect   - SLP eval done no aspiration, rec soft/ thin liquid diet   - Dietitian saw pt 5/22, rec Boost VHC TID     # Prostate Ca  - He is now following with urologist Dr. Boyd  - s/p radical prostatectomy by Dr Lang Mcneil in 2018  - [id number], [place] 7, + right anterior margin  - PSMA-PET done on 12/6/2023 showed no PSMA uptake within the post-surgical bed; there is PSMA uptake of a left common iliac retroperitoneal lymph node SUV 5.6 and PSMA uptake of a right pelvic sidewall lymph node SUV 3.5  - Dr Rangel recommended radiation therapy. Patient instead went to [facility] and was treated by Dr Zackary Ely (4600 cGy in 23 fractions to the pelvis and LN followed by [place] to prostate bed and LN to 2500 cGy in 10 fractions)  - Since completing radiation, has been experiencing significant fatigue  - For pain management, follows with [facility] palliative care team   - PSA < 0.01 (2/19/25)--> PSA <0.10 (5/20)     # COPD  # Smoking Hx  # Lung Ca screening  - Follows with pulmonologist Dr Hutchison   - PFTs 03/2025: FEV1 72% FEV1/FVC 0.63  - Did not tolerate Breztri  - Continue home Spiriva 2 puffs daily  - Developed dry cough overnight 6/3, CXR negative for acute cardiopulmonary process, encouraged IS -- pt states he did not use 6/4, stated importance      # HTN/HLD  - Continue home Metop Succs 25mg daily and Lipitor 80mg nightly  - Hold ASA (5/30)   for possible OR, resumed (6/4-current)     # Hypothyroidism  - Last TSH 4.49 (5/12)  - Continue home Synthroid 75mcg daily     # chronic anemia  - Likely anemia of chronic disease +/- post-op  - BL Hgb ~11 after cancer tx; Hgb 9.9 (5/20)--> 8.3 (5/22) post-op--> 8.1 (5/23)--> 7.6 (5/25)-->s/p 1 unit 5/25, hgb stable 8.7 (5/29)-> 8.1 (6/2)-> 7.7 (6/6)--> 7.8 (6/7)  - Ferritin 305, Folate 7.5, Iron 41, TIBC 269, %Sat 15, UIBC 228, Vit B12 1,342 (5/20)  - 5/25 Iron 30, TIBC 204, folate 3.9   - Stopped home Vit B12 sup 5/22 d/t elevated B12 level  - will hold off on folic acid/iron replacement at this time given acute phase with post-op period, plan for outpatient monitoring of hgb and anemia labs/ongoing workup     # DVT prophy  - Lovenox, was held for neurosurgery and resumed 5/22 PM per ortho, now held on 6/2 for OR on 6/3; resumed 6/3  - SCDs, ambulation with assistance     # DISPO:  - Full code, confirmed with patient on admission   - DC SNF w/ PICC for IV atbx pending +/- drain removal by IR (precert approved 6/6, valid through 6/9), potentially tomorrow 6/8  - NOK: Eleni Lofton (wife) [phone]  - NSGY [facility]UV 6/9, ID Dr. Dyer 7/9, GI ([facility]) 8/1, Dr. Lucero (heme onc) FUV 8/8, NSGY [facility] 8/11, urology ([facility] 9/23), Cardiology ([facility] 11/18), Rad Onc ([facility]) 3/6     I spent 60 minutes in the professional and overall care of this patient.     Assessment and plan as above discussed with attending physician Dr. Chávez.     Wanda Lacey, APRN-CNP         [1] aspirin, 81 mg, oral, Daily  atorvastatin, 80 mg, oral, Daily  cefTRIAXone, 2 g, intravenous, q12h  cholecalciferol, 25 mcg, oral, Daily  cyclobenzaprine, 10 mg, oral, TID  enoxaparin, 40 mg, subcutaneous, q24h  gabapentin, 300 mg, oral, TID  levothyroxine, 75 mcg, oral, Daily  lidocaine, 2 patch, transdermal, Daily  magnesium sulfate, 2 g, intravenous, Once  metoprolol succinate XL, 25 mg, oral, Daily  oxyCODONE ER, 10 mg,  oral, q12h ANA  pantoprazole, 40 mg, oral, Daily before breakfast  sennosides-docusate sodium, 2 tablet, oral, BID  tiotropium, 2 puff, inhalation, Daily  vancomycin, 1,250 mg, intravenous, q12h     [2]    [3] PRN medications: acetaminophen, alteplase, bisacodyl, guaiFENesin, LORazepam, ondansetron, oxyCODONE, oxyCODONE, polyethylene glycol, simethicone, vancomycin

## 2025-06-07 NOTE — PROGRESS NOTES
Vancomycin Dosing by Pharmacy- FOLLOW UP    Miguel Lofton is a 69 y.o. year old male who Pharmacy has been consulted for vancomycin dosing for osteomyelitis/septic arthritis. Based on the patient's indication and renal status this patient is being dosed based on a goal AUC of 400-600.     Renal function is currently stable.    Current vancomycin dose: 1250 mg given every 24 hours    Estimated vancomycin AUC on current dose: 487 mg/L.hr     Visit Vitals  /62 (BP Location: Right arm, Patient Position: Lying)   Pulse 78   Temp 37 °C (98.6 °F) (Temporal)   Resp 17        Lab Results   Component Value Date    CREATININE 0.47 (L) 2025    CREATININE 0.44 (L) 2025    CREATININE 0.44 (L) 2025    CREATININE 0.43 (L) 2025        Patient weight is as follows:   Vitals:    25 0736   Weight: 73.4 kg (161 lb 13.1 oz)       Cultures:  Susceptibility data for the encounter in last 14 days.  Collected Specimen Info Organism   25 Swab from Anterior Nares Methicillin Resistant Staphylococcus aureus (MRSA)        I/O last 3 completed shifts:  In: 400 (5.4 mL/kg) [P.O.:100; IV Piggyback:300]  Out: 1715 (23.4 mL/kg) [Urine:1695 (0.6 mL/kg/hr); Drains:20]  Weight: 73.4 kg   I/O during current shift:  I/O this shift:  In: 150 [IV Piggyback:150]  Out: 800 [Urine:800]    Temp (24hrs), Av.8 °C (98.2 °F), Min:36.5 °C (97.7 °F), Max:37.1 °C (98.8 °F)      Assessment/Plan    Within goal AUC range. Continue current vancomycin regimen.    This dosing regimen is predicted by [employer] to result in the following pharmacokinetic parameters:  Loading dose: N/A  Regimen: 1250 mg IV every 12 hours.  Start time: 10:57 on 2025  Exposure target: AUC24 (range) 400-600 mg/L.hr   [id number]: 485 mg/L.hr  AUC24,ss: 487 mg/L.hr  Probability of AUC24 > 400: 97 %    The next level will be obtained on  at 0500. May be obtained sooner if clinically indicated.   Will continue to monitor renal function daily while  on vancomycin and order serum creatinine at least every 48 hours if not already ordered.  Follow for continued vancomycin needs, clinical response, and signs/symptoms of toxicity.       GUSTAVO BUTLER, PharmD

## 2025-06-07 NOTE — CARE PLAN
The patient's goals for the shift include Pain management    The clinical goals for the shift include pt will remain safe    Problem: Skin  Goal: Decreased wound size/increased tissue granulation at next dressing change  Outcome: Progressing  Flowsheets (Taken 6/7/2025 0741)  Decreased wound size/increased tissue granulation at next dressing change: Promote sleep for wound healing  Goal: Participates in plan/prevention/treatment measures  Outcome: Progressing  Goal: Prevent/manage excess moisture  Outcome: Progressing  Goal: Prevent/minimize sheer/friction injuries  Outcome: Progressing  Goal: Promote/optimize nutrition  Outcome: Progressing  Goal: Promote skin healing  Outcome: Progressing     Problem: Fall/Injury  Goal: Not fall by end of shift  Outcome: Progressing  Goal: Be free from injury by end of the shift  Outcome: Progressing  Goal: Verbalize understanding of personal risk factors for fall in the hospital  Outcome: Progressing  Goal: Verbalize understanding of risk factor reduction measures to prevent injury from fall in the home  Outcome: Progressing  Goal: Use assistive devices by end of the shift  Outcome: Progressing  Goal: Pace activities to prevent fatigue by end of the shift  Outcome: Progressing     Problem: Safety - Adult  Goal: Free from fall injury  Outcome: Progressing     Problem: Discharge Planning  Goal: Discharge to home or other facility with appropriate resources  Outcome: Progressing     Problem: Chronic Conditions and Co-morbidities  Goal: Patient's chronic conditions and co-morbidity symptoms are monitored and maintained or improved  Outcome: Progressing     Problem: Nutrition  Goal: Nutrient intake appropriate for maintaining nutritional needs  Outcome: Progressing     Problem: Pain  Goal: Takes deep breaths with improved pain control throughout the shift  Outcome: Progressing  Goal: Turns in bed with improved pain control throughout the shift  Outcome: Progressing  Goal: Walks with  improved pain control throughout the shift  Outcome: Progressing  Goal: Performs ADL's with improved pain control throughout shift  Outcome: Progressing  Goal: Participates in PT with improved pain control throughout the shift  Outcome: Progressing  Goal: Free from opioid side effects throughout the shift  Outcome: Progressing  Goal: Free from acute confusion related to pain meds throughout the shift  Outcome: Progressing     Problem: Heart Failure  Goal: Improved gas exchange this shift  Outcome: Progressing  Goal: Improved urinary output this shift  Outcome: Progressing  Goal: Reduction in peripheral edema within 24 hours  Outcome: Progressing  Goal: Report improvement of dyspnea/breathlessness this shift  Outcome: Progressing  Goal: Weight from fluid excess reduced over 2-3 days, then stabilize  Outcome: Progressing  Goal: Increase self care and/or family involvement in 24 hours  Outcome: Progressing

## 2025-06-07 NOTE — CARE PLAN
Problem: Skin  Goal: Decreased wound size/increased tissue granulation at next dressing change  Outcome: Progressing  Goal: Participates in plan/prevention/treatment measures  Outcome: Progressing  Goal: Prevent/manage excess moisture  Outcome: Progressing  Goal: Prevent/minimize sheer/friction injuries  Outcome: Progressing  Goal: Promote/optimize nutrition  Outcome: Progressing  Goal: Promote skin healing  Outcome: Progressing     Problem: Fall/Injury  Goal: Not fall by end of shift  Outcome: Progressing  Goal: Be free from injury by end of the shift  Outcome: Progressing  Goal: Verbalize understanding of personal risk factors for fall in the hospital  Outcome: Progressing  Goal: Verbalize understanding of risk factor reduction measures to prevent injury from fall in the home  Outcome: Progressing  Goal: Use assistive devices by end of the shift  Outcome: Progressing  Goal: Pace activities to prevent fatigue by end of the shift  Outcome: Progressing     Problem: Safety - Adult  Goal: Free from fall injury  Outcome: Progressing     Problem: Discharge Planning  Goal: Discharge to home or other facility with appropriate resources  Outcome: Progressing     Problem: Chronic Conditions and Co-morbidities  Goal: Patient's chronic conditions and co-morbidity symptoms are monitored and maintained or improved  Outcome: Progressing     Problem: Nutrition  Goal: Nutrient intake appropriate for maintaining nutritional needs  Outcome: Progressing     Problem: Pain  Goal: Takes deep breaths with improved pain control throughout the shift  Outcome: Progressing  Goal: Turns in bed with improved pain control throughout the shift  Outcome: Progressing  Goal: Walks with improved pain control throughout the shift  Outcome: Progressing  Goal: Performs ADL's with improved pain control throughout shift  Outcome: Progressing  Goal: Participates in PT with improved pain control throughout the shift  Outcome: Progressing  Goal: Free from  opioid side effects throughout the shift  Outcome: Progressing  Goal: Free from acute confusion related to pain meds throughout the shift  Outcome: Progressing     Problem: Heart Failure  Goal: Improved gas exchange this shift  Outcome: Progressing  Goal: Improved urinary output this shift  Outcome: Progressing  Goal: Reduction in peripheral edema within 24 hours  Outcome: Progressing  Goal: Report improvement of dyspnea/breathlessness this shift  Outcome: Progressing  Goal: Weight from fluid excess reduced over 2-3 days, then stabilize  Outcome: Progressing  Goal: Increase self care and/or family involvement in 24 hours  Outcome: Progressing   The patient's goals for the shift include Pain management    Patient remained HDS and VSS throughout shift. Patient given pain meds throughout night to keep comfortable along with course of antibiotics. Plan for pt is to discharge today 6/7

## 2025-06-07 NOTE — PROGRESS NOTES
06/07/25 1200   Discharge Planning   Living Arrangements Spouse/significant other   Support Systems Spouse/significant other   Assistance Needed ADLs   Type of Residence Private residence   Who is requesting discharge planning? Patient   Home or Post Acute Services Post acute facilities (Rehab/SNF/etc)   Type of Post Acute Facility Services Skilled nursing   Expected Discharge Disposition SNF  ([facility]- report [phone])   Does the patient need discharge transport arranged? Yes   RoundTrip coordination needed? Yes   Has discharge transport been arranged? Yes   What day is the transport expected? 06/08/25   What time is the transport expected? 1860  [phone])   Patient Choice   Provider Choice list and CMS website (https://medicare.gov/care-compare#search) for post-acute Quality and Resource Measure Data were provided and reviewed with: Patient;Family     LSW notified by provier that pt is ready for discharge tomorrow 6/8, precert good through 6/9. Amasa and 7000 complete. Pt made aware and reports he will update his family, IMM signed. Blue sheet given to .

## 2025-06-08 VITALS
HEART RATE: 82 BPM | OXYGEN SATURATION: 95 % | TEMPERATURE: 97.9 F | BODY MASS INDEX: 24.52 KG/M2 | WEIGHT: 161.82 LBS | SYSTOLIC BLOOD PRESSURE: 104 MMHG | RESPIRATION RATE: 18 BRPM | DIASTOLIC BLOOD PRESSURE: 61 MMHG | HEIGHT: 68 IN

## 2025-06-08 LAB
ALBUMIN SERPL BCP-MCNC: 2.5 G/DL (ref 3.4–5)
ALP SERPL-CCNC: 129 U/L (ref 33–136)
ALT SERPL W P-5'-P-CCNC: 11 U/L (ref 10–52)
ANION GAP SERPL CALC-SCNC: 11 MMOL/L (ref 10–20)
AST SERPL W P-5'-P-CCNC: 14 U/L (ref 9–39)
BACTERIA SPEC CULT: NORMAL
BASOPHILS # BLD AUTO: 0.08 X10*3/UL (ref 0–0.1)
BASOPHILS NFR BLD AUTO: 1.3 %
BILIRUB SERPL-MCNC: 0.3 MG/DL (ref 0–1.2)
BUN SERPL-MCNC: 6 MG/DL (ref 6–23)
CALCIUM SERPL-MCNC: 7.4 MG/DL (ref 8.6–10.6)
CHLORIDE SERPL-SCNC: 103 MMOL/L (ref 98–107)
CO2 SERPL-SCNC: 30 MMOL/L (ref 21–32)
CREAT SERPL-MCNC: 0.4 MG/DL (ref 0.5–1.3)
EGFRCR SERPLBLD CKD-EPI 2021: >90 ML/MIN/1.73M*2
EOSINOPHIL # BLD AUTO: 0.64 X10*3/UL (ref 0–0.7)
EOSINOPHIL NFR BLD AUTO: 10.4 %
ERYTHROCYTE [DISTWIDTH] IN BLOOD BY AUTOMATED COUNT: 19.4 % (ref 11.5–14.5)
FUNGUS SPEC CULT: NORMAL
FUNGUS SPEC CULT: NORMAL
FUNGUS SPEC FUNGUS STN: NORMAL
FUNGUS SPEC FUNGUS STN: NORMAL
GLUCOSE SERPL-MCNC: 84 MG/DL (ref 74–99)
GRAM STN SPEC: NORMAL
GRAM STN SPEC: NORMAL
HCT VFR BLD AUTO: 26.4 % (ref 41–52)
HGB BLD-MCNC: 7.6 G/DL (ref 13.5–17.5)
IMM GRANULOCYTES # BLD AUTO: 0.05 X10*3/UL (ref 0–0.7)
IMM GRANULOCYTES NFR BLD AUTO: 0.8 % (ref 0–0.9)
LYMPHOCYTES # BLD AUTO: 0.9 X10*3/UL (ref 1.2–4.8)
LYMPHOCYTES NFR BLD AUTO: 14.6 %
MAGNESIUM SERPL-MCNC: 1.75 MG/DL (ref 1.6–2.4)
MCH RBC QN AUTO: 25.6 PG (ref 26–34)
MCHC RBC AUTO-ENTMCNC: 28.8 G/DL (ref 32–36)
MCV RBC AUTO: 89 FL (ref 80–100)
MONOCYTES # BLD AUTO: 0.88 X10*3/UL (ref 0.1–1)
MONOCYTES NFR BLD AUTO: 14.3 %
NEUTROPHILS # BLD AUTO: 3.61 X10*3/UL (ref 1.2–7.7)
NEUTROPHILS NFR BLD AUTO: 58.6 %
NRBC BLD-RTO: 0 /100 WBCS (ref 0–0)
PLATELET # BLD AUTO: 310 X10*3/UL (ref 150–450)
POTASSIUM SERPL-SCNC: 3.6 MMOL/L (ref 3.5–5.3)
PROT SERPL-MCNC: 5.1 G/DL (ref 6.4–8.2)
RBC # BLD AUTO: 2.97 X10*6/UL (ref 4.5–5.9)
SODIUM SERPL-SCNC: 140 MMOL/L (ref 136–145)
WBC # BLD AUTO: 6.2 X10*3/UL (ref 4.4–11.3)

## 2025-06-08 PROCEDURE — 99239 HOSP IP/OBS DSCHRG MGMT >30: CPT

## 2025-06-08 PROCEDURE — 2500000004 HC RX 250 GENERAL PHARMACY W/ HCPCS (ALT 636 FOR OP/ED)

## 2025-06-08 PROCEDURE — 2500000002 HC RX 250 W HCPCS SELF ADMINISTERED DRUGS (ALT 637 FOR MEDICARE OP, ALT 636 FOR OP/ED)

## 2025-06-08 PROCEDURE — 2500000001 HC RX 250 WO HCPCS SELF ADMINISTERED DRUGS (ALT 637 FOR MEDICARE OP)

## 2025-06-08 PROCEDURE — 80053 COMPREHEN METABOLIC PANEL: CPT | Performed by: NURSE PRACTITIONER

## 2025-06-08 PROCEDURE — 83735 ASSAY OF MAGNESIUM: CPT

## 2025-06-08 PROCEDURE — 85025 COMPLETE CBC W/AUTO DIFF WBC: CPT | Performed by: NURSE PRACTITIONER

## 2025-06-08 PROCEDURE — 94640 AIRWAY INHALATION TREATMENT: CPT

## 2025-06-08 RX ORDER — POTASSIUM CHLORIDE 1.5 G/1.58G
20 POWDER, FOR SOLUTION ORAL ONCE
Status: COMPLETED | OUTPATIENT
Start: 2025-06-08 | End: 2025-06-08

## 2025-06-08 RX ADMIN — LEVOTHYROXINE SODIUM 75 MCG: 0.07 TABLET ORAL at 08:21

## 2025-06-08 RX ADMIN — OXYCODONE HYDROCHLORIDE 10 MG: 10 TABLET ORAL at 14:43

## 2025-06-08 RX ADMIN — Medication 25 MCG: at 08:21

## 2025-06-08 RX ADMIN — GABAPENTIN 300 MG: 300 CAPSULE ORAL at 08:21

## 2025-06-08 RX ADMIN — CYCLOBENZAPRINE 10 MG: 10 TABLET, FILM COATED ORAL at 08:22

## 2025-06-08 RX ADMIN — OXYCODONE HYDROCHLORIDE 5 MG: 5 TABLET ORAL at 08:38

## 2025-06-08 RX ADMIN — OXYCODONE HYDROCHLORIDE 10 MG: 10 TABLET, FILM COATED, EXTENDED RELEASE ORAL at 08:21

## 2025-06-08 RX ADMIN — CEFTRIAXONE SODIUM 2 G: 2 INJECTION, SOLUTION INTRAVENOUS at 09:53

## 2025-06-08 RX ADMIN — ASPIRIN 81 MG: 81 TABLET, COATED ORAL at 08:21

## 2025-06-08 RX ADMIN — METOPROLOL SUCCINATE 25 MG: 25 TABLET, EXTENDED RELEASE ORAL at 08:21

## 2025-06-08 RX ADMIN — GUAIFENESIN 200 MG: 200 SOLUTION ORAL at 08:22

## 2025-06-08 RX ADMIN — POTASSIUM CHLORIDE 20 MEQ: 1.5 POWDER, FOR SOLUTION ORAL at 08:22

## 2025-06-08 RX ADMIN — ATORVASTATIN CALCIUM 80 MG: 80 TABLET, FILM COATED ORAL at 08:21

## 2025-06-08 RX ADMIN — VANCOMYCIN HYDROCHLORIDE 1250 MG: 1.25 INJECTION, POWDER, LYOPHILIZED, FOR SOLUTION INTRAVENOUS at 09:53

## 2025-06-08 RX ADMIN — OXYCODONE HYDROCHLORIDE 10 MG: 10 TABLET ORAL at 04:00

## 2025-06-08 RX ADMIN — TIOTROPIUM BROMIDE INHALATION SPRAY 2 PUFF: 3.12 SPRAY, METERED RESPIRATORY (INHALATION) at 09:51

## 2025-06-08 RX ADMIN — PANTOPRAZOLE SODIUM 40 MG: 40 TABLET, DELAYED RELEASE ORAL at 06:27

## 2025-06-08 ASSESSMENT — PAIN DESCRIPTION - DESCRIPTORS: DESCRIPTORS: ACHING

## 2025-06-08 ASSESSMENT — PAIN - FUNCTIONAL ASSESSMENT
PAIN_FUNCTIONAL_ASSESSMENT: 0-10
PAIN_FUNCTIONAL_ASSESSMENT: 0-10
PAIN_FUNCTIONAL_ASSESSMENT: UNABLE TO SELF-REPORT
PAIN_FUNCTIONAL_ASSESSMENT: 0-10
PAIN_FUNCTIONAL_ASSESSMENT: 0-10

## 2025-06-08 ASSESSMENT — PAIN SCALES - GENERAL
PAINLEVEL_OUTOF10: 7
PAINLEVEL_OUTOF10: 5 - MODERATE PAIN
PAINLEVEL_OUTOF10: 7
PAINLEVEL_OUTOF10: 7
PAINLEVEL_OUTOF10: 5 - MODERATE PAIN
PAINLEVEL_OUTOF10: 7

## 2025-06-08 ASSESSMENT — PAIN DESCRIPTION - LOCATION
LOCATION: BACK

## 2025-06-08 ASSESSMENT — PAIN DESCRIPTION - ORIENTATION: ORIENTATION: LOWER

## 2025-06-08 NOTE — DISCHARGE SUMMARY
Discharge Diagnosis  Osteomyelitis    Issues Requiring Follow-Up  Osteomyelitis    Hospital Course  Miguel Lofton is a 69 y.o. male patient PMHx of prostate cancer, base of the tongue cancer, HTN, HLD, hypothyroidism, COPD, and anxiety who had a CT chest for annual lung cancer screening on 5/13 that showed possible compression fx of T5 and T6. His CT was reviewed at his office visit 5/19 and he was advised to go to ED for further MRI imaging. He presented to [facility] 5/19 where MRI T spine (5/19) showed acute discitis/osteomyelitis at T5-6 w/infectious appearing phlegmon & marked moderate to severe spinal canal stenosis. Transferred to [facility] 5/20 for NSGY eval. NSGY consulted 5/20, s/p OR 5/21 for T5-6 laminectomy and T3-8 fusion, prevena drain placed (removed 5/28). ID consulted (5/21) and rec Vanc and Ceftriaxone x8 weeks (5/22-current). OR surg path (5/22) + osteomyelitis.      MRI L/C spine (5/29) showed moderate stenosis at C4-5, C5-6, L2-3 disc/osteo with ventral phlegmon with moderate canal stenosis, large abscess L iliopsoas muscle 9.6cm. 5/30 Neurosurgery re-engaged, schedule tentative plan for OR sometime next week. 5/30 ID was re-engaged who rec IR drainage of abscess, 6/2 IR s/p drainage abscess with ANGELICA drain placement; additionally new updated stop date Vanc and Cef (5/22-plan stop 7/30/25) and weekly labs. 6/2 CT T/L Spine showed mild multilevel degenerative changes of thoracic spine especially at T7-T8, mod b/l neural foraminal narrowing, no significant spinal canal stenosis, discitis osteomyelitis L2-L3 level, previously seen iliopsoas abscesses (L>R). Neurosurgery originally planned to take to OR 6/3 for lumbar decompression, however following abscess drainage on 6/2, patient showing improvement of pain and movement, surgery was canceled. 6/5 CT A/P to eval abscess per IR recs to consider drain removal showed decrease in abscess; drain removed 6/6.     On day of discharge, pt condition and vitals  stable and pain controlled. Prescriptions for Oxycodone, Oxycontin and Ativan written for facility transfer. Vancomycin and Ceftriaxone also included in discharge orders (for planned stop of 7/30) with weekly lab ordered (CBC, CMP, CRP, vanco trough) and directions to be faxed to [phone] ATTN: Dr. Dyer. Wound care orders also entered for old drain incision site along with PICC line care orders. FUV scheduled NSGY FUV 6/9, 7/9 ID, 8/1 GI, 8/8 Dr. Lucero, 8/11 NSGY, 9/23 Urology, 11/18 Cardiology. DC to SNF ([facility]) via [facility] care ambulance.     Pertinent Physical Exam At Time of Discharge  Physical Exam  Vitals reviewed.   Constitutional:       Appearance: Normal appearance.   HENT:      Head: Normocephalic and atraumatic.      Nose: Nose normal.      Mouth/Throat:      Mouth: Mucous membranes are moist.      Pharynx: Oropharynx is clear.   Eyes:      Extraocular Movements: Extraocular movements intact.      Pupils: Pupils are equal, round, and reactive to light.   Cardiovascular:      Rate and Rhythm: Normal rate and regular rhythm.      Pulses: Normal pulses.      Heart sounds: Normal heart sounds.   Pulmonary:      Effort: Pulmonary effort is normal.      Breath sounds: Normal breath sounds.   Abdominal:      General: Bowel sounds are normal.      Palpations: Abdomen is soft.   Musculoskeletal:         General: Normal range of motion.      Comments: Limited ROM.   Skin:     General: Skin is warm.   Neurological:      General: No focal deficit present.      Mental Status: He is alert and oriented to person, place, and time. Mental status is at baseline.      Motor: Weakness present.   Psychiatric:         Mood and Affect: Mood normal.         Behavior: Behavior normal.     Home Medications     Medication List      START taking these medications     cefTRIAXone 2 gram/50 mL IV; Commonly known as: Rocephin; Infuse 50 mL   (2 g) at 100 mL/hr over 30 minutes into a venous catheter every 12 hours.    gabapentin 300 mg capsule; Commonly known as: Neurontin; Take 1 capsule   (300 mg) by mouth 3 times a day.   * oxyCODONE ER 10 mg 12 hr tablet; Commonly known as: OxyCONTIN; Take 1   tablet (10 mg) by mouth every 12 hours for 3 days. Do not crush, chew, or   split.   * oxyCODONE 10 mg immediate release tablet; Commonly known as:   Roxicodone; Take 1 tablet (10 mg) by mouth every 4 hours if needed for   severe pain (7 - 10) or moderate pain (4 - 6) for up to 3 days.   simethicone 80 mg chewable tablet; Commonly known as: Mylicon; Chew 1   tablet (80 mg) 4 times a day as needed for flatulence for up to 10 days.   sodium chloride 0.9% parenteral solution 250 mL with vancomycin 1,000 mg   recon soln 1.25 g; Infuse 1.25 g at 166.7 mL/hr over 90 minutes into a   venous catheter every 12 hours.  * This list has 2 medication(s) that are the same as other medications   prescribed for you. Read the directions carefully, and ask your doctor or   other care provider to review them with you.     CHANGE how you take these medications     levothyroxine 75 mcg tablet; Commonly known as: Synthroid, Levoxyl; Take   1 tablet (75 mcg) by mouth once daily.; What changed: Another medication   with the same name was removed. Continue taking this medication, and   follow the directions you see here.   LORazepam 1 mg tablet; Commonly known as: Ativan; Take 1 tablet (1 mg)   by mouth as needed at bedtime for anxiety for up to 3 days.; What changed:   See the new instructions.     CONTINUE taking these medications     acetaminophen 500 mg tablet; Commonly known as: Tylenol   ascorbic acid 500 mg tablet; Commonly known as: Vitamin C   aspirin 81 mg EC tablet   atorvastatin 80 mg tablet; Commonly known as: Lipitor   cholecalciferol 25 mcg (1,000 units) tablet; Commonly known as: Vitamin   D-3   diclofenac sodium 1 % gel; Commonly known as: Voltaren   HYDROcodone-acetaminophen 5-325 mg tablet; Commonly known as: Norco   metoprolol succinate XL  25 mg 24 hr tablet; Commonly known as: Toprol-XL   omeprazole 20 mg DR capsule; Commonly known as: PriLOSEC   sildenafil 100 mg tablet; Commonly known as: Viagra     STOP taking these medications     cyanocobalamin 1,000 mcg tablet; Commonly known as: Vitamin B-12     ASK your doctor about these medications     alteplase 2 mg injection; Commonly known as: Cathflo Activase; 2 mL (2   mg) by intra-catheter route 1 time for 1 dose.; Ask about: Should I take   this medication?   cyclobenzaprine 10 mg tablet; Commonly known as: Flexeril; Ask about:   Should I take this medication?   lidocaine 4 % patch; Place 2 patches over 12 hours on the skin once   daily for 7 days. Remove & discard patch within 12 hours or as directed by   MD.; Ask about: Should I take this medication?   Spiriva with HandiHaler 18 mcg inhalation capsule; Generic drug:   tiotropium       Outpatient Follow-Up  Future Appointments   Date Time Provider Department Center   6/9/2025 10:30 AM CHRISTINA Calixto-CNP [id number] [place]   7/9/2025  9:00 AM Tamar Dyer DO [id number] [place]   8/8/2025 12:40 PM Iván Lucero MD [id number] [place]   8/11/2025  1:00 PM Timoteo Quintanilla MD [id number] [place]     I spent 30 minutes on discharge planning.     CHRISTINA Mckeon-CNP

## 2025-06-08 NOTE — CARE PLAN
The patient's goals for the shift include Pain management    The clinical goals for the shift include pt will remain safe    Pt remain HDS throughout the shift.

## 2025-06-09 ENCOUNTER — TELEPHONE (OUTPATIENT)
Age: 70
End: 2025-06-09

## 2025-06-09 ENCOUNTER — APPOINTMENT (OUTPATIENT)
Dept: NEUROSURGERY | Facility: CLINIC | Age: 70
End: 2025-06-09
Payer: COMMERCIAL

## 2025-06-09 ENCOUNTER — OFFICE VISIT (OUTPATIENT)
Dept: GERIATRIC MEDICINE | Age: 70
End: 2025-06-09

## 2025-06-09 VITALS
TEMPERATURE: 97.2 F | SYSTOLIC BLOOD PRESSURE: 118 MMHG | RESPIRATION RATE: 14 BRPM | HEART RATE: 76 BPM | WEIGHT: 161 LBS | DIASTOLIC BLOOD PRESSURE: 74 MMHG | BODY MASS INDEX: 24.4 KG/M2 | HEIGHT: 68 IN

## 2025-06-09 DIAGNOSIS — J42 CHRONIC BRONCHITIS, UNSPECIFIED CHRONIC BRONCHITIS TYPE (HCC): ICD-10-CM

## 2025-06-09 DIAGNOSIS — I10 ESSENTIAL HYPERTENSION: ICD-10-CM

## 2025-06-09 DIAGNOSIS — I50.33 ACUTE ON CHRONIC DIASTOLIC HEART FAILURE (HCC): Primary | ICD-10-CM

## 2025-06-09 DIAGNOSIS — I73.9 PERIPHERAL VASCULAR DISEASE: ICD-10-CM

## 2025-06-09 DIAGNOSIS — Z98.1 S/P FUSION OF THORACIC SPINE: Primary | ICD-10-CM

## 2025-06-09 DIAGNOSIS — M86.9 BACTERIAL OSTEOMYELITIS (HCC): ICD-10-CM

## 2025-06-09 LAB
BACTERIA SPEC CULT: NORMAL
GRAM STN SPEC: NORMAL
GRAM STN SPEC: NORMAL

## 2025-06-09 PROCEDURE — 3008F BODY MASS INDEX DOCD: CPT | Performed by: NURSE PRACTITIONER

## 2025-06-09 PROCEDURE — 99024 POSTOP FOLLOW-UP VISIT: CPT | Performed by: NURSE PRACTITIONER

## 2025-06-09 PROCEDURE — 1036F TOBACCO NON-USER: CPT | Performed by: NURSE PRACTITIONER

## 2025-06-09 PROCEDURE — 1111F DSCHRG MED/CURRENT MED MERGE: CPT | Performed by: NURSE PRACTITIONER

## 2025-06-09 PROCEDURE — 1159F MED LIST DOCD IN RCRD: CPT | Performed by: NURSE PRACTITIONER

## 2025-06-09 PROCEDURE — 1125F AMNT PAIN NOTED PAIN PRSNT: CPT | Performed by: NURSE PRACTITIONER

## 2025-06-09 RX ORDER — GABAPENTIN 300 MG/1
300 CAPSULE ORAL 3 TIMES DAILY
COMMUNITY
Start: 2025-06-06 | End: 2025-07-06

## 2025-06-09 RX ORDER — CEFTRIAXONE 2 G/50ML
2 INJECTION, SOLUTION INTRAVENOUS EVERY 12 HOURS
COMMUNITY
Start: 2025-06-06 | End: 2025-07-30

## 2025-06-09 RX ORDER — OXYCODONE HYDROCHLORIDE 10 MG/1
10 TABLET ORAL EVERY 4 HOURS PRN
COMMUNITY
Start: 2025-06-06 | End: 2025-06-10 | Stop reason: SDUPTHER

## 2025-06-09 RX ORDER — OXYCODONE HCL 10 MG/1
10 TABLET, FILM COATED, EXTENDED RELEASE ORAL EVERY 12 HOURS SCHEDULED
COMMUNITY
Start: 2025-06-06 | End: 2025-06-10 | Stop reason: SDUPTHER

## 2025-06-09 RX ORDER — ACETAMINOPHEN 650 MG/1
SUPPOSITORY RECTAL
COMMUNITY

## 2025-06-09 RX ORDER — ACETAMINOPHEN 325 MG/1
TABLET ORAL EVERY 6 HOURS PRN
COMMUNITY

## 2025-06-09 RX ORDER — SIMETHICONE 80 MG
80 TABLET,CHEWABLE ORAL EVERY 6 HOURS PRN
COMMUNITY

## 2025-06-09 ASSESSMENT — PATIENT HEALTH QUESTIONNAIRE - PHQ9
1. LITTLE INTEREST OR PLEASURE IN DOING THINGS: NOT AT ALL
2. FEELING DOWN, DEPRESSED OR HOPELESS: NOT AT ALL
SUM OF ALL RESPONSES TO PHQ9 QUESTIONS 1 AND 2: 0

## 2025-06-09 ASSESSMENT — PAIN SCALES - GENERAL: PAINLEVEL_OUTOF10: 8

## 2025-06-09 NOTE — TELEPHONE ENCOUNTER
Called patient to schedule visit as his last one had been cancelled. Patient states he is currently at [facility] in rehabilitation and I did let him know we can see him there. He states he will get his physical therapy schedule tomorrow morning 6/11 and asked if I could call in the afternoon to get him scheduled. Will call patient Tue. afternoon 6/11/25

## 2025-06-09 NOTE — PROGRESS NOTES
[facility]  Department of Neurological Surgery  Post Operative Patient Visit      History of Present Illness:  Miguel Lofton is a 69 y.o.  male who presents to the spine clinic in a post operative visit.  He underwent T5-6 laminectomy, right T6 transpedicular decompression, T3-T8 instrumented fusion at [facility] by Dr. Timoteo Quintanilla on 5/21/25 after imaging studies demonstrated T5-6 compression fracture with discitis/osteomyelitis, epidural phlegmon with moderate canal stenosis. He was discharged to [facility] 6/8/25.    Since surgery, he reports his pain is overall improved. He has no pain at the surgical site. Pain is in the low back and where his IR drain was at. He continues to use Oxycontin, Oxycodone, Tylenol, and gabapentin for pain. He remains on IV Vancomycin/Ceftriaxone and is tolerating. Denies any fevers or chills. He is ambulating with a walker. He is to meet with the therapy team at his SNF later today to discuss a plan for his therapy.    On exam:  A&O x 3, speech clear / fluent  Respirations even / unlabored  BUE: 5/5 throughout  BLE: PF/DF/KE 5/5, HF 4+/5  Soft touch sensation reduced left lateral thigh, otherwise intact  SURGICAL INCISION is: well approximated and healing well, no erythema / swelling / drainage  He is in a wheelchair for today's visit.     Miguel Lofton is progressing well post operatively. Agree he should continue with therapy at this time. Antibiotics to continue through 7/30/25, F/U with ID in place. Ok from a surgical standpoint for him to continue with his home nightly Ativan. He reports he has been on this for years. He agrees to follow up with Dr. Quintanilla as previously scheduled, 8/11/25, with thoracic spine x-rays prior to visit. Orders placed for follow up imaging today. Instructed to call with any further needs.     The above clinical summary has been dictated with voice recognition software. It has not been proofread for grammatical errors,  typographical mistakes, or other semantic inconsistencies.     Thank you for visiting our office today. It was our pleasure to take part in your healthcare.      Do not hesitate to call with any questions regarding your plan of care after leaving at [phone] M-F 8am-4pm.      To clinicians, thank you very much for this kind referral. It is a privilege to partner with you in the care of your patients. My office would be delighted to assist you with any further consultations or with questions regarding the plan of care outlined. Do not hesitate to call the office or contact me directly.         Sincerely,          Suzanne VASQUEZ-[address]  [address] [place] [id number]    [address] [place] [address]  [address]  [place] [address]     Phone: [phone]  Fax: [phone]

## 2025-06-10 ENCOUNTER — TELEPHONE (OUTPATIENT)
Age: 70
End: 2025-06-10

## 2025-06-10 DIAGNOSIS — F41.9 ANXIETY: ICD-10-CM

## 2025-06-10 DIAGNOSIS — R52 ACUTE PAIN: Primary | ICD-10-CM

## 2025-06-10 RX ORDER — LORAZEPAM 1 MG/1
1 TABLET ORAL DAILY PRN
Status: CANCELLED | OUTPATIENT
Start: 2025-06-10

## 2025-06-10 NOTE — TELEPHONE ENCOUNTER
Patient called in stating that he needs his Lorazepam 1mg sent over to the [facility] in [place] as he is now going through rehabilitation there.    LV 04/15/2025  NV Currently not scheduled

## 2025-06-11 ENCOUNTER — OFFICE VISIT (OUTPATIENT)
Dept: GERIATRIC MEDICINE | Age: 70
End: 2025-06-11
Payer: COMMERCIAL

## 2025-06-11 DIAGNOSIS — I10 ESSENTIAL HYPERTENSION: ICD-10-CM

## 2025-06-11 DIAGNOSIS — E03.9 HYPOTHYROIDISM, UNSPECIFIED TYPE: ICD-10-CM

## 2025-06-11 DIAGNOSIS — F41.9 ANXIETY: ICD-10-CM

## 2025-06-11 DIAGNOSIS — J44.9 CHRONIC OBSTRUCTIVE PULMONARY DISEASE, UNSPECIFIED COPD TYPE (HCC): Primary | ICD-10-CM

## 2025-06-11 DIAGNOSIS — I73.9 PERIPHERAL VASCULAR DISEASE: ICD-10-CM

## 2025-06-11 DIAGNOSIS — R52 ACUTE PAIN: ICD-10-CM

## 2025-06-11 PROCEDURE — 1123F ACP DISCUSS/DSCN MKR DOCD: CPT | Performed by: INTERNAL MEDICINE

## 2025-06-11 PROCEDURE — 99309 SBSQ NF CARE MODERATE MDM 30: CPT | Performed by: INTERNAL MEDICINE

## 2025-06-11 RX ORDER — OXYCODONE HYDROCHLORIDE 10 MG/1
10 TABLET ORAL EVERY 4 HOURS PRN
Qty: 40 TABLET | Refills: 0 | Status: SHIPPED | OUTPATIENT
Start: 2025-06-11 | End: 2025-07-11

## 2025-06-11 RX ORDER — LORAZEPAM 1 MG/1
1 TABLET ORAL DAILY PRN
Qty: 28 TABLET | Refills: 0 | Status: SHIPPED | OUTPATIENT
Start: 2025-06-11 | End: 2025-07-09

## 2025-06-11 RX ORDER — OXYCODONE HYDROCHLORIDE 10 MG/1
10 TABLET ORAL EVERY 4 HOURS PRN
Qty: 40 TABLET | Refills: 0 | Status: SHIPPED | OUTPATIENT
Start: 2025-06-11 | End: 2025-06-11 | Stop reason: SDUPTHER

## 2025-06-11 RX ORDER — OXYCODONE HCL 10 MG/1
10 TABLET, FILM COATED, EXTENDED RELEASE ORAL EVERY 12 HOURS SCHEDULED
Qty: 60 TABLET | Refills: 0 | Status: SHIPPED | OUTPATIENT
Start: 2025-06-11 | End: 2025-07-11

## 2025-06-11 RX ORDER — OXYCODONE HCL 10 MG/1
10 TABLET, FILM COATED, EXTENDED RELEASE ORAL EVERY 12 HOURS SCHEDULED
Qty: 60 TABLET | Refills: 0 | Status: SHIPPED | OUTPATIENT
Start: 2025-06-11 | End: 2025-06-11 | Stop reason: SDUPTHER

## 2025-06-17 ENCOUNTER — OFFICE VISIT (OUTPATIENT)
Dept: GERIATRIC MEDICINE | Age: 70
End: 2025-06-17

## 2025-06-17 DIAGNOSIS — M86.9 BACTERIAL OSTEOMYELITIS (HCC): Primary | ICD-10-CM

## 2025-06-17 DIAGNOSIS — I10 ESSENTIAL HYPERTENSION: ICD-10-CM

## 2025-06-17 DIAGNOSIS — I50.33 ACUTE ON CHRONIC DIASTOLIC HEART FAILURE (HCC): ICD-10-CM

## 2025-06-17 DIAGNOSIS — F41.9 ANXIETY: ICD-10-CM

## 2025-06-17 DIAGNOSIS — R52 ACUTE PAIN: ICD-10-CM

## 2025-06-23 ENCOUNTER — OFFICE VISIT (OUTPATIENT)
Dept: GERIATRIC MEDICINE | Age: 70
End: 2025-06-23
Payer: COMMERCIAL

## 2025-06-23 DIAGNOSIS — J44.9 CHRONIC OBSTRUCTIVE PULMONARY DISEASE, UNSPECIFIED COPD TYPE (HCC): Primary | ICD-10-CM

## 2025-06-23 DIAGNOSIS — I73.9 PERIPHERAL VASCULAR DISEASE: ICD-10-CM

## 2025-06-23 DIAGNOSIS — I10 ESSENTIAL HYPERTENSION: ICD-10-CM

## 2025-06-23 PROCEDURE — 1123F ACP DISCUSS/DSCN MKR DOCD: CPT | Performed by: INTERNAL MEDICINE

## 2025-06-23 PROCEDURE — 99309 SBSQ NF CARE MODERATE MDM 30: CPT | Performed by: INTERNAL MEDICINE

## 2025-06-25 DIAGNOSIS — R52 ACUTE PAIN: ICD-10-CM

## 2025-06-25 RX ORDER — OXYCODONE HCL 10 MG/1
10 TABLET, FILM COATED, EXTENDED RELEASE ORAL EVERY 12 HOURS SCHEDULED
Qty: 60 TABLET | Refills: 0 | Status: SHIPPED | OUTPATIENT
Start: 2025-06-25 | End: 2025-07-25

## 2025-06-25 NOTE — PROGRESS NOTES
Patient Name: Kelvin Washington     YOB: 1955  Medical Record Number: [id number]       History of Present Illness:  This is a 69-year-old gent with a history of prostate cancer cancer at the base of the tongue weakness patient has COPD.  Patient has compression fracture t T5 and T6.  Patient has had globally weak.  Patient was hospitalized patient has been seen neurosurgery did undergo surgical mention T5-T6 and laminectomy T3-8 fusion patient did have a Prevena drain placed with removal successfully patient have a course of antibiotic therapy had evidence of surgical pathology positive for osteomyelitis.  Patient had evidence of global functional decline will stable on course of IV anti-B therapy evidence of large abscess in the left iliopsoas region.  Patient was stabilized to undergo drainage by interventional radiology with a CARA placed patient did make gains was required IV antibiotic therapy patient is following up with neurosurgeon infectious ease as well as neurosurgery transferred for ongoing course of care received local wound care at this time.  Pain-free but functionally below his baseline    Review of Systems   Constitutional:  Positive for activity change, appetite change and fatigue.   HENT:  Negative for congestion.    Respiratory:  Positive for cough.    Cardiovascular:  Positive for leg swelling. Negative for chest pain.   Gastrointestinal:  Positive for diarrhea.   Musculoskeletal:  Positive for arthralgias, back pain and joint swelling.   Skin:  Positive for pallor.   Neurological:  Positive for weakness.   All other systems reviewed and are negative.      Review of Systems: All 14 review of systems negative other than as stated above    Social History     Tobacco Use    Smoking status: Former     Current packs/day: 0.00     Average packs/day: 1.5 packs/day for 40.0 years (60.0 ttl pk-yrs)     Types: Cigarettes     Start date: 11/16/1978     Quit date: 11/16/2018     Years since

## 2025-07-01 ENCOUNTER — OFFICE VISIT (OUTPATIENT)
Dept: GERIATRIC MEDICINE | Age: 70
End: 2025-07-01

## 2025-07-01 DIAGNOSIS — F41.9 ANXIETY: ICD-10-CM

## 2025-07-01 DIAGNOSIS — R52 ACUTE PAIN: Primary | ICD-10-CM

## 2025-07-01 DIAGNOSIS — R52 ACUTE PAIN: ICD-10-CM

## 2025-07-01 DIAGNOSIS — R26.2 DIFFICULTY IN WALKING: ICD-10-CM

## 2025-07-01 RX ORDER — LORAZEPAM 1 MG/1
1 TABLET ORAL DAILY PRN
Qty: 28 TABLET | Refills: 0 | Status: SHIPPED | OUTPATIENT
Start: 2025-07-01 | End: 2025-07-01 | Stop reason: SDUPTHER

## 2025-07-01 RX ORDER — OXYCODONE HYDROCHLORIDE 10 MG/1
10 TABLET ORAL EVERY 6 HOURS PRN
Qty: 28 TABLET | Refills: 0 | Status: SHIPPED | OUTPATIENT
Start: 2025-07-01 | End: 2025-07-08

## 2025-07-01 RX ORDER — LORAZEPAM 1 MG/1
1 TABLET ORAL DAILY PRN
Qty: 14 TABLET | Refills: 0 | Status: SHIPPED | OUTPATIENT
Start: 2025-07-01 | End: 2025-07-15

## 2025-07-01 NOTE — PROGRESS NOTES
chart reviewed and analyzed.  Current external consultant notes reviewed in on site chart. Ordered laboratory testing and imaging will be reviewed when available.

## 2025-07-02 ENCOUNTER — OFFICE VISIT (OUTPATIENT)
Dept: GERIATRIC MEDICINE | Age: 70
End: 2025-07-02

## 2025-07-02 DIAGNOSIS — I10 ESSENTIAL HYPERTENSION: ICD-10-CM

## 2025-07-02 DIAGNOSIS — J44.9 CHRONIC OBSTRUCTIVE PULMONARY DISEASE, UNSPECIFIED COPD TYPE (HCC): Primary | ICD-10-CM

## 2025-07-02 DIAGNOSIS — E03.9 HYPOTHYROIDISM, UNSPECIFIED TYPE: ICD-10-CM

## 2025-07-02 LAB
ALBUMIN: 3.2 G/DL
ALP BLD-CCNC: 118 U/L
ALT SERPL-CCNC: 6 U/L
ANION GAP SERPL CALCULATED.3IONS-SCNC: ABNORMAL MMOL/L
AST SERPL-CCNC: 11 U/L
BASOPHILS ABSOLUTE: ABNORMAL
BASOPHILS RELATIVE PERCENT: 0.5 %
BILIRUB SERPL-MCNC: 0.4 MG/DL (ref 0.1–1.4)
BUN BLDV-MCNC: 8 MG/DL
C-REACTIVE PROTEIN: 12
CALCIUM SERPL-MCNC: 9 MG/DL
CHLORIDE BLD-SCNC: 107 MMOL/L
CO2: 25 MMOL/L
CREAT SERPL-MCNC: 0.5 MG/DL
EOSINOPHILS ABSOLUTE: 0.7 /ΜL
EOSINOPHILS RELATIVE PERCENT: 14.3 %
GFR, ESTIMATED: 165
GLUCOSE BLD-MCNC: 91 MG/DL
HCT VFR BLD CALC: 31.7 % (ref 41–53)
HEMOGLOBIN: 10 G/DL (ref 13.5–17.5)
LYMPHOCYTES ABSOLUTE: 0.6 /ΜL
LYMPHOCYTES RELATIVE PERCENT: 15.5 %
MCH RBC QN AUTO: 27.4 PG
MCHC RBC AUTO-ENTMCNC: 31.6 G/DL
MCV RBC AUTO: 86.7 FL
MONOCYTES ABSOLUTE: 0.7 /ΜL
MONOCYTES RELATIVE PERCENT: 14.6 %
NEUTROPHILS ABSOLUTE: 2.8 /ΜL
NEUTROPHILS RELATIVE PERCENT: 54.9 %
PLATELET # BLD: 206 K/ΜL
PMV BLD AUTO: 8.7 FL
POTASSIUM SERPL-SCNC: 4 MMOL/L
RBC # BLD: 3.65 10^6/ΜL
SODIUM BLD-SCNC: 144 MMOL/L
TOTAL PROTEIN: 5.8 G/DL (ref 6.4–8.2)
VANCOMYCIN TROUGH SERPL-MCNC: 23.1 UG/ML
WBC # BLD: 5.1 10^3/ML

## 2025-07-02 RX ORDER — OXYCODONE HYDROCHLORIDE 10 MG/1
10 TABLET ORAL EVERY 4 HOURS PRN
Qty: 40 TABLET | Refills: 0 | Status: SHIPPED | OUTPATIENT
Start: 2025-07-02 | End: 2025-08-01

## 2025-07-07 ENCOUNTER — OFFICE VISIT (OUTPATIENT)
Dept: GERIATRIC MEDICINE | Age: 70
End: 2025-07-07

## 2025-07-07 DIAGNOSIS — I73.9 PERIPHERAL VASCULAR DISEASE: ICD-10-CM

## 2025-07-07 DIAGNOSIS — J44.9 CHRONIC OBSTRUCTIVE PULMONARY DISEASE, UNSPECIFIED COPD TYPE (HCC): Primary | ICD-10-CM

## 2025-07-07 DIAGNOSIS — I10 ESSENTIAL HYPERTENSION: ICD-10-CM

## 2025-07-07 NOTE — PROGRESS NOTES
SUBJECTIVE:  This 69-year-old gentleman seen for visit for COPD hypertension  Studies functional weak oral intake has been good no acute pain crisis cough intermittently globally weak  Oral intake has been good    ROS: Denies chest pain  The rest of the 14 point ROS negative    PHYSICAL EXAM: VSS per facility record  Pupils reactive oral mucosa is moist chest no crackles cardiovascular showed regular rate abdomen soft nontender    ASSESSMENT & PLAN:   Diagnosis Orders   1. Chronic obstructive pulmonary disease, unspecified COPD type (HCC)        2. Essential hypertension        3. Peripheral vascular disease          Has been on beta-blocker tolerating well.  Has been on vitamin D supplementation monitor for signs of endorgan disease has been cough intermittently            Past Medical History:   Diagnosis Date    CAD (coronary artery disease)     Cancer (HCC)     prostate cancer Nov 16 2018, [facility] right BOT 2022    CHF (congestive heart failure) (HCC)     Chicken pox     Chronic back pain     Emphysema of lung (HCC)     Hyperlipidemia     Hypertension     Hypothyroidism     Measles     Mumps     Osteoarthritis     Pneumonia          Past Surgical History:   Procedure Laterality Date    CARDIAC CATHETERIZATION      2019    COLONOSCOPY      2016    CYST REMOVAL      back of neck    CYST REMOVAL Left 03/04/2016    Dr Woo    CYSTOSCOPY  07/2018    DENTAL SURGERY      HERNIA REPAIR      2016, 2019    LARYNGOSCOPY N/A 8/16/2022    DIRECT LARYNGOSCOPY WITH BIOPSY. 30 MIN  TO BE LATEX FREE performed by Raheel Rios MD at [facility] [place]    PROSTATE SURGERY  07/2018    Biopsy    PROSTATECTOMY  11/16/2018    TONSILLECTOMY N/A 08/12/2021    TONSILLECTOMY performed by Jordin Ramos MD at [facility] [place]    UMBILICAL HERNIA REPAIR  03/04/2016    Dr Woo    UPPER GASTROINTESTINAL ENDOSCOPY N/A 04/05/2021    EGD ESOPHAGOGASTRODUODENOSCOPY WITH BIOPSY AND DILATION performed by Teri Jolly MD at [facility] [place] [facility]    VASECTOMY

## 2025-07-08 ENCOUNTER — OFFICE VISIT (OUTPATIENT)
Dept: GERIATRIC MEDICINE | Age: 70
End: 2025-07-08

## 2025-07-08 DIAGNOSIS — R52 ACUTE PAIN: Primary | ICD-10-CM

## 2025-07-08 DIAGNOSIS — F41.9 ANXIETY: ICD-10-CM

## 2025-07-08 DIAGNOSIS — R26.2 DIFFICULTY IN WALKING: ICD-10-CM

## 2025-07-09 ENCOUNTER — APPOINTMENT (OUTPATIENT)
Facility: CLINIC | Age: 70
End: 2025-07-09
Payer: COMMERCIAL

## 2025-07-09 DIAGNOSIS — M86.08 ACUTE HEMATOGENOUS OSTEOMYELITIS OF OTHER SITE: Primary | ICD-10-CM

## 2025-07-09 PROCEDURE — 99215 OFFICE O/P EST HI 40 MIN: CPT | Performed by: STUDENT IN AN ORGANIZED HEALTH CARE EDUCATION/TRAINING PROGRAM

## 2025-07-09 NOTE — PROGRESS NOTES
"Infectious Diseases Clinic Follow-up:    Reason for Visit: spinal OM    Virtual or Telephone Consent    An interactive audio and video telecommunication system which permits real time communications between the patient (at the originating site) and provider (at the distant site) was utilized to provide this telehealth service.   Verbal consent was requested and obtained from Miguel Lofton on this date, 7/9/25 for a telehealth visit and the patient's location was confirmed at the time of the visit.      History of Current Issue  Miguel Lofton is a 69 y.o. man pmhx sig for prostate Ca s/p radical prostatectomy in 2018, hx tongue Ca 2022 s/p chemoradiation, admitted to [facility] [place] 5/20-6/8/25 for progressive back pain and radiculopathy and LE weakness, admitted w/ leukocytosis to 13k, ESR 93, CRP 7.35, with MRI showing acute discitis/OM at T5-6 with surrounding phlegmon, epidural phlegmon w/ mod to severe canal stenosis. 5/21 underwent T5-6 laminectomy, T3-8 instrumented fusion , intra-op findings of phlegmonous appearing tissue in T5-T6 disc space. OR cultures were negative as well as broad range PCR testing. He was treated empirically with IV vancomycin and CTX 2gm q24H. He had persistent pain and had repeat MRI noting b/l iliopsoas abscesses and extension of infection with lumbar OM at L2-3. He underwent IR drain to the L iliopsoas fluid collection and had resolution of pain and improvement in his ambulation. NSGY ultimately decided not to take the pt back to the OR in light of his improvement. His CTX was increased to 2gm q12H and he was discharged on this with IV vancomycin through 7/30 (about 10 weeks from first surgery, about 7 weeks from drain placement). Drain removed prior to discharge. Had post op visit 6/9, next visit with NSGY 8/11/25.     Pt seen on video visit today, reports he is feels \"pretty good\", lost a lot of weight/muscle mass inpatient. Has been working w/ PT, walks w/ a cane, walker, or sometimes " nothing; has walked around his building. Has some back pain w/ activity, none at rest. PICC line working ok, no pain, mild redness at catheter there. Abx going well, no rash, +soft stools but no diarrhea. No fever, chills, night sweats. No incontinence. Has chronic dysphagia from hx HEENT Ca. Chronic neuropathy of his b/l toes, no other numbness/tingling. Of note he also reports hx of chronic shoulder abscess vs cyst vs lipoma, possible sebaceous cyst? He will bring this up to his provider at the facility.     Spoke w/ his nurse after the call, he gets vanc at 7am/7pm, labs drawn between [id number]. Trough on most recent labs 7/8 was 20.7, therefore with it being drawn a few hrs early this is likely within adequate range.     PAST MEDICAL HISTORY:  Medical History[1]    PAST SURGICAL HISTORY:  Surgical History[2]    ALLERGIES:    Allergies[3]    MEDICATIONS:    Current Medications[4]    REVIEW OF SYSTEMS:  Review of Systems - as above     PHYSICAL EXAMINATION:       Visit Vitals  Smoking Status Former        EXAM:  Over video visit pt appears well, sitting up, speaking in full sentences, no conversational dyspnea, follows conversation appropriately    DATA:  Laboratory Values and Test Results: Reviewed from inpatient stay and from facility. Last labs 7/8, vanc given at 7am/7pm, labs drawn between [id number]. Trough on most recent labs 7/8 was 20.7, therefore with it being drawn a few hrs early this is likely within adequate range. CRP now wnl at 3.5 mg/L, Cr stable 0.5, Hgb stable 9.8, plt 286, WBC 5k.       Microbiology:     Susceptibility data from last 90 days.  Collected Specimen Info Organism   05/24/25 Swab from Anterior Nares Methicillin Resistant Staphylococcus aureus (MRSA)     5/20 blood cx 2 sets NG  5/20 urine cx neg  5/21 OR culture 2 samples NG, fungal cx neg   5/21 broad range PCR testing of OR cx neg   6/2 IR drainage culture NG      CRPs:    5/20 7.35 mg/dL  5/22 5.94 mg/dL  6/4 10.16 mg/dL      6/10  15.8 mg/L  6/17 4.9  6/24 21.8  7/1 12.0  7/8 3.5 mg/L      Other Relevant Studies:        Surgical Pathology Exam: [id number]  Order: [id number]   Collected 5/21/2025 11:35       Status: Final result       Dx: Osteomyelitis    Test Result Released: Yes (not seen)    0 Result Notes      Component    FINAL DIAGNOSIS   Bone and Soft Tissue, T5-6, Laminectomy:     Granulation tissue with focal acute osteomyelitis.   Negative for carcinoma.          Imaging Studies:      6/5 CT abd/pelvis    IMPRESSION:  1. Interval placement of a pigtail catheter drain within the left  psoas with interval decrease in size of the abscess collection, now  measuring 1.3 x 1.3, previously 5.2 x 4.5 cm. No new abscess  collection.  2. Stable changes of L2-L3 discitis osteomyelitis, better  characterized on the MRI from 05/29/2025.  3. Liquid stool within cecum and ascending colon, correlate with  concern for colitis/diarrhea.  4. Additional findings as above including trace right pleural  effusion and mild bibasilar atelectasis..    6/2 CT T and L spine:    IMPRESSION:  1. Postsurgical changes of instrumented posterior fusion of the  thoracic spine from T3-T8 with laminectomy. No evidence of acute  hardware failure. There is cortical irregularity of the T6-T7  endplates with moderate T5 and T6 vertebral body height loss as  before, likely sequela of osteomyelitis.      2. Mild multilevel degenerative changes of the thoracic spine, most  pronounced at T7-T8, resulting in moderate bilateral neural foraminal  narrowing. No significant spinal canal stenosis.      3. This study is not tailored to evaluate contents within the spinal  canal. MR of the thoracic spine can be obtained for further  assessment if clinically warranted.      Please refer to concurrent CT lumbar spine for additional findings.    IMPRESSION:  1. Redemonstrated discitis osteomyelitis at L2-L3 level with  left-greater-than-right iliopsoas abscesses as described  above,  suboptimally evaluated on this study due to lack of IV contrast. MR  of the lumbar spine with and without contrast can be obtained for  follow-up imaging.          2. L2-L3: Moderate bilateral neural foraminal narrowing and moderate  spinal canal stenosis.      3. L3-L4: Moderate right and mild-to-moderate left neural foraminal  narrowing. Mild spinal canal stenosis.      4. L4-L5: Moderate bilateral neural foramina narrowing and moderate  narrowing of the lateral recesses. Mild spinal canal stenosis.      5/25 MR C and L spine:    IMPRESSION:  No evidence of infectious process within the cervical spine.      Partially visualized rim enhancing fluid collection within the upper  thoracic spine and edema within the left-greater-than-right upper  thoracic posterior paraspinal musculature. Findings likely represent  seroma/postsurgical change; infectious process can not be excluded.      Multilevel degenerative changes of the cervical spine most notable at  C5-6 where there is moderate narrowing of the spinal canal as well as  severe right foraminal stenosis at C4-5 and C5-6.    IMPRESSION:  Discitis osteomyelitis at L2-3 with involvement of the right pedicle  of L3 and right L2-3 facet joint. Epidural thickening ventral to the  thecal sac at L2 and L3 may represent a combination of prominent  venous plexus and epidural phlegmon.      Extension of infection into the left-greater-than-right iliopsoas  muscles. Large abscess within the left iliopsoas muscle measuring up  to 9.6 cm in craniocaudal dimension. No evidence of intrathecal  extension of infection.      Enhancement of the inferior endplate of L1 is thought to be reactive  rather than infectious. Attention on follow-up imaging recommended.      MR thoracic spine w and wo IV contrast  Order: [id number]  Impression    1. Acute discitis/osteomyelitis at T5-6 with marked surrounding phlegmon and circumferential epidural phlegmon resulting in moderate to severe  spinal canal stenosis. No epidural abscess.  Narrative    EXAM:  MR Thoracic Spine without and with  05/19/2025 07:32:19 PM    TECHNIQUE:  Multiplanar multisequence MRI of the thoracic spine was performed with and without the administration of intravenous contrast.    COMPARISON:  CT chest 05/13/2025.    CLINICAL HISTORY:  T5 compression fracture, concern for osteomyelitis per radiology, chronic back pain.    FINDINGS:    BONES/ALIGNMENT:  There is abnormal bone marrow edema and enhancement within the T5 and T6 vertebral bodies and intervening disc. There is deformity of the inferior endplate of T5 and superior endplate of T6 with loss of height of the vertebral bodies, compatible with  acute discitis/osteomyelitis.    SPINAL CORD:  There is no intramedullary signal abnormality identified within the thoracic spinal cord.    SOFT TISSUES:  There is marked surrounding phlegmon at T5-6. Phlegmon is present circumferentially within the epidural space at T5-6, resulting in moderate to severe spinal canal stenosis. There is no epidural abscess identified.    DEGENERATIVE CHANGES:  A disc bulge is present at T7-8 without significant spinal canal stenosis or neural foraminal narrowing. Disc bulges are present at T9-10, T10-11, and T11-12 without significant spinal canal stenosis or neural foraminal narrowing.  Exam End: 05/19/25 19:32    Specimen Collected: 05/19/25 19:54 Last Resulted: 05/19/25 19:59   Received From: [facility]  Result Received: 05/20/25 02:03         ASSESSMENT / RECOMMENDATIONS:    Miguel Lofton is a 70 y/o man pmhx sig for prostate Ca s/p radical prostatectomy in 2018, hx tongue Ca 2022 s/p chemoradiation, [place] [facility] [place] for progressive back pain and radiculopathy and LE weakness with MRI showing acute discitis/OM at T5-6 with surrounding phlegmon, epidural phlegmon w/ mod to severe canal stenosis. 5/21 underwent T5-6 laminectomy, T3-8 instrumented fusion , intra-op  findings of phlegmonous appearing tissue in T5-T6 disc space. OR cultures were negative as well as broad range PCR testing. Treated empirically w/ vanc and CTX 2gm q24H, on repeat MRI imaging he had developed b/l iliopsoas abscesses and MR lumbar spine imaging showing L2-3 OM. He underwent drainage of L iliopsoas abscess on 6/2, cx also neg. He had improvement in his ambulation and back pain and therefore has been managed medically w/ IV vancomycin and IV CTX 2gm q12H (dose increased ~5/30). Tentative plan was until 7/30 which would be about 10 weeks from surgery.     On his most recent labs his CRP is improved and vanc appears appropriate, other pertinent labs appear stable. He clinically appears to be doing well, however he did have new hardware placed in the setting of extensive phlegmonous spinal infection, with significant iliopsoas abscesses while on abx, although all cultures negative. At this time will continue his IV abx through prior end date of 7/30, while monitoring his labs and consider transition to po for prolonged po course vs repeat imaging (mainly to assess psoas abscesses) at the end of his course.       #Hx prostate Ca, tongue Ca, not on active therapy  #Thoracic and lumbar OM/discitis s/p thoracic laminectomy and fusion, b/l iliopsoas abscesses       Recommendations:  -Cont IV vancomycin 1.25gm q12H and IV CTX 2gm q12H, facility sending labs weekly (requested from nursing day of visit)  -Follow up and repeat imaging pending clinical course, current tentative end date 7/30/25      Tamar Dyer DO                         [1]   Past Medical History:  Diagnosis Date    COPD (chronic obstructive pulmonary disease) (Multi)     Hypothyroidism, unspecified     Hypothyroidism, adult    Myocardial infarction (Multi)     Personal history of other endocrine, nutritional and metabolic disease     History of high cholesterol    Personal history of other mental and behavioral disorders     History of anxiety    [2]   Past Surgical History:  Procedure Laterality Date    CARDIAC CATHETERIZATION      HERNIA REPAIR  08/29/2018    Hernia Repair    PROSTATE SURGERY  11-16-18    SPINE SURGERY  5-16-25    VASECTOMY  08/29/2018    Surgery Vas Deferens Vasectomy   [3]   Allergies  Allergen Reactions    Budesonide-Glycopyr-Formoterol Other     Urine retention , constipation and chest tightness, can tolerate SPIRIVA    Diazepam Other     MIGRAINES    Meperidine Nausea Only     migraines   [4]   Current Outpatient Medications:     acetaminophen (Tylenol) 325 mg tablet, Take by mouth every 6 hours if needed for mild pain (1 - 3)., Disp: , Rfl:     acetaminophen (Tylenol) 500 mg tablet, Take 2 tablets (1,000 mg) by mouth. (Patient not taking: Reported on 6/9/2025), Disp: , Rfl:     acetaminophen (Tylenol) 650 mg suppository, Insert into the rectum., Disp: , Rfl:     ascorbic acid (Vitamin C) 500 mg tablet, Take 1 tablet (500 mg) by mouth once daily., Disp: , Rfl:     aspirin 81 mg EC tablet, Take 1 tablet (81 mg) by mouth once daily., Disp: , Rfl:     atorvastatin (Lipitor) 80 mg tablet, Take 1 tablet (80 mg) by mouth once daily., Disp: , Rfl:     cefTRIAXone (Rocephin) 2 gram/50 mL IV, Infuse 50 mL (2 g) at 100 mL/hr over 30 minutes into a venous catheter every 12 hours., Disp: , Rfl:     cholecalciferol (Vitamin D-3) 25 MCG (1000 UT) tablet, Take 1 tablet (25 mcg) by mouth once daily., Disp: , Rfl:     diclofenac sodium (Voltaren) 1 % gel gel, Apply topically., Disp: , Rfl:     gabapentin (Neurontin) 300 mg capsule, Take 1 capsule (300 mg) by mouth 3 times a day., Disp: 60 capsule, Rfl: 0    HYDROcodone-acetaminophen (Norco) 5-325 mg tablet, Take 1 tablet by mouth every 6 hours if needed., Disp: , Rfl:     levothyroxine (Synthroid, Levoxyl) 75 mcg tablet, Take 1 tablet (75 mcg) by mouth once daily., Disp: , Rfl:     LORazepam (Ativan) 1 mg tablet, Take 1 tablet (1 mg) by mouth as needed at bedtime for anxiety for up to 3 days., Disp:  3 tablet, Rfl: 0    metoprolol succinate XL (Toprol-XL) 25 mg 24 hr tablet, Take 1 tablet (25 mg) by mouth once daily. Do not crush or chew., Disp: , Rfl:     omeprazole (PriLOSEC) 20 mg DR capsule, Take 1 capsule (20 mg) by mouth., Disp: , Rfl:     sildenafil (Viagra) 100 mg tablet, Take 1 tablet (100 mg) by mouth once daily as needed., Disp: , Rfl:     sodium chloride 0.9% parenteral solution 250 mL with vancomycin 1,000 mg recon soln 1.25 g, Infuse 1.25 g at 166.7 mL/hr over 90 minutes into a venous catheter every 12 hours., Disp: , Rfl:     tiotropium (Spiriva with HandiHaler) 18 mcg inhalation capsule, Place into inhaler and inhale., Disp: , Rfl:

## 2025-07-10 NOTE — PROGRESS NOTES
Abused: No   Housing Stability: Low Risk  (5/24/2025)    Received from [facility]    Housing Stability Vital Sign     Unable to Pay for Housing in the Last Year: No     Number of Times Moved in the Last Year: 1     Homeless in the Last Year: No         Lab Results   Component Value Date    LABA1C 6.4 (H) 05/12/2025     Lab Results   Component Value Date     05/12/2025       Lab Results   Component Value Date/Time     07/02/2025 05:22 PM    K 4.0 07/02/2025 05:22 PM     07/02/2025 05:22 PM    CO2 25 07/02/2025 05:22 PM    BUN 8 07/02/2025 05:22 PM    CREATININE 0.5 07/02/2025 05:22 PM    GLUCOSE 91 07/02/2025 05:22 PM    GLUCOSE 111 08/17/2023 07:39 AM    CALCIUM 9.0 07/02/2025 05:22 PM        Lab Results   Component Value Date    CHOL 92 05/12/2025    CHOL 147 07/29/2024    CHOL 133 01/29/2024     Lab Results   Component Value Date    TRIG 112 05/12/2025    TRIG 83 07/29/2024    TRIG 77 01/29/2024     Lab Results   Component Value Date    HDL 30 (L) 05/12/2025    HDL 70 (H) 07/29/2024    HDL 53 01/29/2024     No components found for: \"LDLCHOLESTEROL\", \"LDLCALC\"  No results found for: \"VLDL\"  No results found for: \"CHOLHDLRATIO\"    Lab Results   Component Value Date    TSH 4.490 (H) 05/12/2025       Lab Results   Component Value Date    WBC 5.1 07/02/2025    HGB 10.0 (A) 07/02/2025    HCT 31.7 (A) 07/02/2025    MCV 86.7 07/02/2025     07/02/2025       Please note orders entered on site at facility after discussion with appropriate facility nursing/therapy/ / nutritional staff. Current longstanding medical problems and acute medical issues addressed with staff. Available data and data elements in on site paper chart reviewed and analyzed.  Current external consultant notes reviewed in on site chart. Ordered laboratory testing and imaging will be reviewed when available.

## 2025-07-11 ENCOUNTER — OFFICE VISIT (OUTPATIENT)
Dept: GERIATRIC MEDICINE | Age: 70
End: 2025-07-11
Payer: COMMERCIAL

## 2025-07-11 DIAGNOSIS — R26.2 DIFFICULTY IN WALKING: ICD-10-CM

## 2025-07-11 DIAGNOSIS — F41.9 ANXIETY: ICD-10-CM

## 2025-07-11 DIAGNOSIS — R52 ACUTE PAIN: ICD-10-CM

## 2025-07-11 DIAGNOSIS — R52 ACUTE PAIN: Primary | ICD-10-CM

## 2025-07-11 PROCEDURE — 1123F ACP DISCUSS/DSCN MKR DOCD: CPT | Performed by: NURSE PRACTITIONER

## 2025-07-11 PROCEDURE — 99308 SBSQ NF CARE LOW MDM 20: CPT | Performed by: NURSE PRACTITIONER

## 2025-07-11 RX ORDER — OXYCODONE HYDROCHLORIDE 10 MG/1
10 TABLET ORAL EVERY 4 HOURS PRN
Qty: 40 TABLET | Refills: 0 | Status: SHIPPED | OUTPATIENT
Start: 2025-07-11 | End: 2025-07-18

## 2025-07-14 ENCOUNTER — OFFICE VISIT (OUTPATIENT)
Dept: GERIATRIC MEDICINE | Age: 70
End: 2025-07-14
Payer: COMMERCIAL

## 2025-07-14 DIAGNOSIS — I73.9 PERIPHERAL VASCULAR DISEASE: ICD-10-CM

## 2025-07-14 DIAGNOSIS — J44.9 CHRONIC OBSTRUCTIVE PULMONARY DISEASE, UNSPECIFIED COPD TYPE (HCC): Primary | ICD-10-CM

## 2025-07-14 DIAGNOSIS — E03.9 HYPOTHYROIDISM, UNSPECIFIED TYPE: ICD-10-CM

## 2025-07-14 DIAGNOSIS — I10 ESSENTIAL HYPERTENSION: ICD-10-CM

## 2025-07-14 PROCEDURE — 99309 SBSQ NF CARE MODERATE MDM 30: CPT | Performed by: INTERNAL MEDICINE

## 2025-07-14 PROCEDURE — 1123F ACP DISCUSS/DSCN MKR DOCD: CPT | Performed by: INTERNAL MEDICINE

## 2025-07-15 ENCOUNTER — OFFICE VISIT (OUTPATIENT)
Age: 70
End: 2025-07-15

## 2025-07-15 ENCOUNTER — OFFICE VISIT (OUTPATIENT)
Dept: GERIATRIC MEDICINE | Age: 70
End: 2025-07-15

## 2025-07-15 VITALS
SYSTOLIC BLOOD PRESSURE: 100 MMHG | OXYGEN SATURATION: 96 % | HEART RATE: 82 BPM | DIASTOLIC BLOOD PRESSURE: 62 MMHG | RESPIRATION RATE: 16 BRPM

## 2025-07-15 DIAGNOSIS — C61 PROSTATE CANCER (HCC): Primary | ICD-10-CM

## 2025-07-15 DIAGNOSIS — F41.9 ANXIETY: Primary | ICD-10-CM

## 2025-07-15 DIAGNOSIS — Z51.5 PALLIATIVE CARE ENCOUNTER: ICD-10-CM

## 2025-07-15 DIAGNOSIS — M86.8X8 OTHER OSTEOMYELITIS, OTHER SITE (HCC): ICD-10-CM

## 2025-07-15 DIAGNOSIS — F41.9 ANXIETY: ICD-10-CM

## 2025-07-15 DIAGNOSIS — M25.50 MULTIPLE JOINT PAIN: ICD-10-CM

## 2025-07-15 DIAGNOSIS — R52 ACUTE PAIN: Primary | ICD-10-CM

## 2025-07-15 DIAGNOSIS — R53.1 WEAKNESS: ICD-10-CM

## 2025-07-15 LAB
ALBUMIN: 3.5 G/DL
ALP BLD-CCNC: 109 U/L
ALT SERPL-CCNC: 9 U/L
ANION GAP SERPL CALCULATED.3IONS-SCNC: ABNORMAL MMOL/L
AST SERPL-CCNC: 15 U/L
BASOPHILS ABSOLUTE: 0.1 /ΜL
BASOPHILS RELATIVE PERCENT: 1.1 %
BILIRUB SERPL-MCNC: 0.3 MG/DL (ref 0.1–1.4)
BUN BLDV-MCNC: 12 MG/DL
C-REACTIVE PROTEIN: 2.3
CALCIUM SERPL-MCNC: 8.9 MG/DL
CHLORIDE BLD-SCNC: 103 MMOL/L
CO2: 29 MMOL/L
CREAT SERPL-MCNC: 0.5 MG/DL
EOSINOPHILS ABSOLUTE: 0.6 /ΜL
EOSINOPHILS RELATIVE PERCENT: 10.8 %
GFR, ESTIMATED: 165
GLUCOSE BLD-MCNC: 83 MG/DL
HCT VFR BLD CALC: 34 % (ref 41–53)
HEMOGLOBIN: 10.9 G/DL (ref 13.5–17.5)
LYMPHOCYTES ABSOLUTE: 1 /ΜL
LYMPHOCYTES RELATIVE PERCENT: 16.9 %
MCH RBC QN AUTO: 27.8 PG
MCHC RBC AUTO-ENTMCNC: 31.9 G/DL
MCV RBC AUTO: 87 FL
MONOCYTES ABSOLUTE: 0.8 /ΜL
MONOCYTES RELATIVE PERCENT: 13.7 %
NEUTROPHILS ABSOLUTE: 3.4 /ΜL
NEUTROPHILS RELATIVE PERCENT: 57.5 %
PLATELET # BLD: 214 K/ΜL
PMV BLD AUTO: 8.7 FL
POTASSIUM SERPL-SCNC: 4.1 MMOL/L
RBC # BLD: 3.91 10^6/ΜL
SODIUM BLD-SCNC: 140 MMOL/L
TOTAL PROTEIN: 5.9 G/DL (ref 6.4–8.2)
WBC # BLD: 6 10^3/ML

## 2025-07-15 RX ORDER — LORAZEPAM 1 MG/1
1 TABLET ORAL NIGHTLY
Qty: 30 TABLET | Refills: 0 | Status: SHIPPED | OUTPATIENT
Start: 2025-07-15 | End: 2025-08-14

## 2025-07-15 ASSESSMENT — ENCOUNTER SYMPTOMS
BACK PAIN: 1
RESPIRATORY NEGATIVE: 1

## 2025-07-15 NOTE — PROGRESS NOTES
Subjective:      Patient Id: Seen Kelvin at [facility] in [place], for follow-up Palliative Care visit. He was accompanied to the appointment by: Self.   Chief Complaint   Patient presents with    Cancer     Prostate Cancer With osteomyelitis    Follow-up       HPI       Kelvin Washington is a 69 y.o. male was seen and evaluated by palliative care for symptom management in the setting of prostate cancer, sedative, hypnotic or anxiolytic use, Sedative, hypnotic or anxiolytic dependence, uncomplicated Lumbosacral spondylosis without myelopathy.  Kelvin has complex medical history that includes CAD, prostate cancer, prostatectomy in 2018, oropharyngeal cancer in remission, diastolic CHF, chronic back pain, emphysema, HLD, HTN, hypothyroidism, OA, anxiety. Nursing facility visit is necessary in lieu of office due to significant frailty and high symptom burden from comorbid illnesses.   Patient follows with PCP, ENT, pulmonology, oncologist, urology, [facility], pain management.    General: Patient is alert and oriented x 4, calm, cooperative and in NAD.    Functional status: Up independent.  Patient currently at [facility] for rehab therapy.    Pain: Patient with increased back pain with current infection.  Patient reports pain is in his low back and Pain Score:   7 out of 10. Current pain medications include oxycodone 10 mg tablet every 4 hours as needed (prescribed by another provider but was prescribed Norco 5/325 mg tablet 3 times daily as needed with palliative care), Flexeril 10 mg tablet by mouth 3 times daily, arctic relief and Tylenol. No sedation, constipation, or adverse effects on current regimen.     Osteomyelitis: Patient with newly discovered osteomyelitis in his spine on current long term IV antibiotic via RUE PICC that is managed with ID provider.  Patient states that he was progressively getting worse and experienced a decreased walking which was then further investigated with findings of spinal

## 2025-07-16 ENCOUNTER — OFFICE VISIT (OUTPATIENT)
Dept: GERIATRIC MEDICINE | Age: 70
End: 2025-07-16

## 2025-07-16 DIAGNOSIS — I10 ESSENTIAL HYPERTENSION: ICD-10-CM

## 2025-07-16 DIAGNOSIS — I73.9 PERIPHERAL VASCULAR DISEASE: ICD-10-CM

## 2025-07-16 DIAGNOSIS — J44.9 CHRONIC OBSTRUCTIVE PULMONARY DISEASE, UNSPECIFIED COPD TYPE (HCC): Primary | ICD-10-CM

## 2025-07-17 VITALS
SYSTOLIC BLOOD PRESSURE: 108 MMHG | RESPIRATION RATE: 18 BRPM | WEIGHT: 164.6 LBS | OXYGEN SATURATION: 98 % | DIASTOLIC BLOOD PRESSURE: 58 MMHG | TEMPERATURE: 97.3 F | BODY MASS INDEX: 24.66 KG/M2 | HEART RATE: 64 BPM

## 2025-07-17 PROBLEM — F41.9 ANXIETY: Status: ACTIVE | Noted: 2025-07-17

## 2025-07-17 PROBLEM — R52 ACUTE PAIN: Status: ACTIVE | Noted: 2025-07-17

## 2025-07-17 PROBLEM — M86.9: Status: ACTIVE | Noted: 2025-07-17

## 2025-07-18 NOTE — PROGRESS NOTES
Name: Kelvin Washington   Facility: [address]  [address]    Date: 6/17/2025     Subjective:     Chief Complaint   Patient presents with    New Patient       HPI  Kelvin Washington is a 69 y.o. male being seen today for admitted for [facility] status post osteomyelitis, laminectomy T5-6 5/21/25 with fusion, T3-T8 5/21/2025.  He has history of prostate cancer tongue cancer hypertension hyperlipidemia hypothyroidism COPD anxiety.  He was admitted today for rehab secondary to debility and deconditioning and IV antibiotics for osteomyelitis.  He is on pain medication every 4 hours, pain is 8/10, on Ativan for anxiety every 24 hours.  Vital signs stable no fever.  He is participating in PT and OT and making some progress.  Still needs help with ADLs mobility and transfers.  Back incision is healed, no signs of infection.  Up with a walker does well with that.  Continue current plan of care.    Past Medical History:   Diagnosis Date    CAD (coronary artery disease)     Cancer (HCC)     prostate cancer Nov 16 2018, [facility] right BOT 2022    CHF (congestive heart failure) (HCC)     Chicken pox     Chronic back pain     Emphysema of lung (HCC)     Hyperlipidemia     Hypertension     Hypothyroidism     Measles     Mumps     Osteoarthritis     Pneumonia          Allergies:  Reviewed in nursing facility records  Medications:  Reviewed and reconciled in nursing facility records    Review of Systems  A 10 Point review of systems was performed and is negative unless previously stated      Objective:   See nursing facility record for vital signs.  BP (!) 108/58   Pulse 64   Temp 97.3 °F (36.3 °C)   Resp 18   Wt 74.7 kg (164 lb 9.6 oz)   SpO2 98%   BMI 24.66 kg/m²     Physical Exam  Constitutional: Up with walker, elderly, well-developed, in no acute distess  Pulmonary/Chest:  breathing unlabored, no use of accessory muscles, lung sounds clear and diminished in lower lobes, no shortness of

## 2025-07-22 ENCOUNTER — TELEPHONE (OUTPATIENT)
Dept: PALLATIVE CARE | Age: 70
End: 2025-07-22

## 2025-07-22 ENCOUNTER — OFFICE VISIT (OUTPATIENT)
Dept: GERIATRIC MEDICINE | Age: 70
End: 2025-07-22

## 2025-07-22 DIAGNOSIS — R52 ACUTE PAIN: Primary | ICD-10-CM

## 2025-07-22 DIAGNOSIS — M86.9 BACTERIAL OSTEOMYELITIS (HCC): ICD-10-CM

## 2025-07-22 DIAGNOSIS — I10 ESSENTIAL HYPERTENSION: ICD-10-CM

## 2025-07-22 DIAGNOSIS — F41.9 ANXIETY: ICD-10-CM

## 2025-07-22 NOTE — TELEPHONE ENCOUNTER
Pt called in to request to switch his care over to [facility] he would prefer to be seen at the cancer center. Please advise

## 2025-07-25 ENCOUNTER — OFFICE VISIT (OUTPATIENT)
Dept: GERIATRIC MEDICINE | Age: 70
End: 2025-07-25
Payer: COMMERCIAL

## 2025-07-25 ENCOUNTER — DOCUMENTATION (OUTPATIENT)
Dept: INFECTIOUS DISEASES | Facility: HOSPITAL | Age: 70
End: 2025-07-25
Payer: COMMERCIAL

## 2025-07-25 DIAGNOSIS — M86.9 BACTERIAL OSTEOMYELITIS (HCC): ICD-10-CM

## 2025-07-25 DIAGNOSIS — M86.08 ACUTE HEMATOGENOUS OSTEOMYELITIS OF OTHER SITE: Primary | ICD-10-CM

## 2025-07-25 DIAGNOSIS — R52 ACUTE PAIN: Primary | ICD-10-CM

## 2025-07-25 DIAGNOSIS — G89.29 OTHER CHRONIC PAIN: Primary | ICD-10-CM

## 2025-07-25 PROCEDURE — 99308 SBSQ NF CARE LOW MDM 20: CPT | Performed by: NURSE PRACTITIONER

## 2025-07-25 PROCEDURE — 1123F ACP DISCUSS/DSCN MKR DOCD: CPT | Performed by: NURSE PRACTITIONER

## 2025-07-25 RX ORDER — OXYCODONE HYDROCHLORIDE 10 MG/1
10 TABLET ORAL EVERY 6 HOURS PRN
Qty: 28 TABLET | Refills: 0 | Status: SHIPPED | OUTPATIENT
Start: 2025-07-25 | End: 2025-08-01

## 2025-07-25 NOTE — PROGRESS NOTES
Called facility, last labs from 7/8, requested updated labs.  Patient is due to complete IV vancomycin and ceftriaxone 7/30.  Okay to remove PICC line after. Called and discussed with patient, will plan to repeat with CT with contrast to reevaluate his prior iliopsoas abscesses within the next 4 weeks, and continue p.o. antibiotics with doxycycline and amox-clav until then.  Counseling given about possible antibiotic side effects.  Verbal orders were given to his facility for the p.o. antibiotics, and I placed the referral for CT abdomen pelvis with contrast, radiology should call to schedule.  Of note, he is planning for discharge from this facility on 7/31.  I will request virtual follow-up in about 4 weeks.  Patient was given our office number to call with any issues in the meantime.    Tamar Rosales DO

## 2025-07-28 ENCOUNTER — TELEPHONE (OUTPATIENT)
Age: 70
End: 2025-07-28

## 2025-07-28 NOTE — TELEPHONE ENCOUNTER
Renetta from [facility] called to see if PCP will follow for Skilled Nursing PT and OT.    Renetta [phone]

## 2025-07-29 ENCOUNTER — OFFICE VISIT (OUTPATIENT)
Dept: GERIATRIC MEDICINE | Age: 70
End: 2025-07-29

## 2025-07-29 DIAGNOSIS — M62.81 MUSCLE WEAKNESS (GENERALIZED): ICD-10-CM

## 2025-07-29 DIAGNOSIS — R52 ACUTE PAIN: Primary | ICD-10-CM

## 2025-07-29 DIAGNOSIS — R26.2 DIFFICULTY IN WALKING: ICD-10-CM

## 2025-07-29 DIAGNOSIS — F41.9 ANXIETY: ICD-10-CM

## 2025-07-31 ENCOUNTER — TELEPHONE (OUTPATIENT)
Dept: HEMATOLOGY/ONCOLOGY | Facility: CLINIC | Age: 70
End: 2025-07-31
Payer: COMMERCIAL

## 2025-07-31 ENCOUNTER — DOCUMENTATION (OUTPATIENT)
Dept: INFECTIOUS DISEASES | Facility: HOSPITAL | Age: 70
End: 2025-07-31
Payer: COMMERCIAL

## 2025-07-31 ENCOUNTER — TELEPHONE (OUTPATIENT)
Age: 70
End: 2025-07-31

## 2025-07-31 DIAGNOSIS — M86.08 ACUTE HEMATOGENOUS OSTEOMYELITIS OF OTHER SITE: ICD-10-CM

## 2025-07-31 DIAGNOSIS — E03.9 HYPOTHYROIDISM, UNSPECIFIED TYPE: Primary | ICD-10-CM

## 2025-07-31 DIAGNOSIS — Z71.84 COUNSELING FOR TRAVEL: ICD-10-CM

## 2025-07-31 DIAGNOSIS — K68.12 PSOAS ABSCESS (MULTI): ICD-10-CM

## 2025-07-31 DIAGNOSIS — M86.08 ACUTE HEMATOGENOUS OSTEOMYELITIS OF OTHER SITE: Primary | ICD-10-CM

## 2025-07-31 PROBLEM — R26.2 DIFFICULTY IN WALKING: Status: ACTIVE | Noted: 2025-07-31

## 2025-07-31 PROBLEM — M62.81 MUSCLE WEAKNESS (GENERALIZED): Status: ACTIVE | Noted: 2025-07-31

## 2025-07-31 RX ORDER — DOXYCYCLINE 100 MG/1
CAPSULE ORAL
Qty: 60 CAPSULE | Refills: 0 | Status: SHIPPED
Start: 2025-07-31 | End: 2025-07-31 | Stop reason: SDUPTHER

## 2025-07-31 RX ORDER — DOXYCYCLINE HYCLATE 100 MG
100 TABLET ORAL EVERY 12 HOURS
Qty: 60 TABLET | Refills: 0 | Status: SHIPPED | OUTPATIENT
Start: 2025-07-31 | End: 2025-08-30

## 2025-07-31 RX ORDER — AMOXICILLIN AND CLAVULANATE POTASSIUM 875; 125 MG/1; MG/1
1 TABLET, FILM COATED ORAL 2 TIMES DAILY
Qty: 60 TABLET | Refills: 0 | Status: SHIPPED | OUTPATIENT
Start: 2025-07-31 | End: 2025-08-30

## 2025-07-31 RX ORDER — DOXYCYCLINE 100 MG/1
CAPSULE ORAL
Qty: 60 CAPSULE | Refills: 0 | Status: SHIPPED
Start: 2025-07-31 | End: 2025-07-31

## 2025-07-31 NOTE — PROGRESS NOTES
"7/31/2025 infectious disease   Covering Dr. Rosales    Patient was to be discharged 7/31/2025 from [facility].  Only received prescription for Doxy and not Augmentin.  Not certain if this was by paper or electronically.  Regardless based on last note from 7/25/2025, I sent an electronic prescription for both doxycycline and Augmentin  7/22/25 labs:  Creat 0.56 and WBC 4.4    ###  07/25/2025  Dr. Rosales's note  ###       \"Patient is due to complete IV vancomycin and ceftriaxone 7/30.  Okay to remove PICC line after. Called and discussed with patient, will plan to repeat with CT with contrast to reevaluate his prior iliopsoas abscesses within the next 4 weeks, and continue p.o. antibiotics with doxycycline and amox-clav until then.  Counseling given about possible antibiotic side effects.  Verbal orders were given to his facility for the p.o. antibiotics, and I placed the referral for CT abdomen pelvis with contrast, radiology should call to schedule.  Of note, he is planning for discharge from this facility on 7/31.  I will request virtual follow-up in about 4 weeks.  Patient was given our office number to call with any issues in the meantime.\"     "

## 2025-07-31 NOTE — TELEPHONE ENCOUNTER
Spoke with the patient. Reviewed upcoming appointment. Pt will go to the [facility] lab for his lab draw a few days before his FUV. Pt verbalized understanding and had no further questions or concerns at this time.

## 2025-07-31 NOTE — TELEPHONE ENCOUNTER
Reason for Conversation  Appointment    Background   Calling for instructions on upcoming appointment and also to see if he is to have lab work - please call    Disposition

## 2025-07-31 NOTE — TELEPHONE ENCOUNTER
Patient called in stating he has a hospital follow up on 08/06/2025. He wants to know if he has to do blood work before that appointment.    Last blood work by Dr. Akhtar looks like was done in May.    Please advise. Patient requesting a call back. If patient does not answer okay to leave a message on machine.

## 2025-08-01 ENCOUNTER — TELEPHONE (OUTPATIENT)
Dept: HEMATOLOGY/ONCOLOGY | Facility: CLINIC | Age: 70
End: 2025-08-01
Payer: COMMERCIAL

## 2025-08-01 ENCOUNTER — OFFICE VISIT (OUTPATIENT)
Dept: GASTROENTEROLOGY | Age: 70
End: 2025-08-01

## 2025-08-01 VITALS
WEIGHT: 167 LBS | HEART RATE: 70 BPM | SYSTOLIC BLOOD PRESSURE: 120 MMHG | OXYGEN SATURATION: 99 % | DIASTOLIC BLOOD PRESSURE: 72 MMHG | BODY MASS INDEX: 24.73 KG/M2 | HEIGHT: 69 IN

## 2025-08-01 DIAGNOSIS — R13.11 ORAL PHASE DYSPHAGIA: Primary | ICD-10-CM

## 2025-08-01 DIAGNOSIS — Z87.19 HISTORY OF GASTROESOPHAGEAL REFLUX (GERD): ICD-10-CM

## 2025-08-01 RX ORDER — DOXYCYCLINE HYCLATE 100 MG
TABLET ORAL
COMMUNITY
Start: 2025-07-31

## 2025-08-01 RX ORDER — MENTHOL 40 MG/ML
GEL TOPICAL
COMMUNITY
Start: 2025-06-10

## 2025-08-01 RX ORDER — GABAPENTIN 300 MG/1
300 CAPSULE ORAL EVERY 8 HOURS
COMMUNITY
Start: 2025-06-10

## 2025-08-01 ASSESSMENT — ENCOUNTER SYMPTOMS
ANAL BLEEDING: 0
ABDOMINAL DISTENTION: 0
RECTAL PAIN: 0
ABDOMINAL PAIN: 0
BLOOD IN STOOL: 0
RESPIRATORY NEGATIVE: 1
VOMITING: 0
CONSTIPATION: 0
GASTROINTESTINAL NEGATIVE: 1
DIARRHEA: 0
NAUSEA: 0
EYES NEGATIVE: 1

## 2025-08-01 NOTE — PROGRESS NOTES
(60.0 ttl pk-yrs)     Types: Cigarettes     Start date: 1978     Quit date: 2018     Years since quittin.7    Smokeless tobacco: Never   Vaping Use    Vaping status: Never Used   Substance and Sexual Activity    Alcohol use: Not Currently     Comment: very rarely (1 beer a year)    Drug use: No    Sexual activity: Yes     Partners: Female   Other Topics Concern    Not on file   Social History Narrative    Not on file     Social Drivers of Health     Financial Resource Strain: Medium Risk (2025)    Received from [facility] [place]    Overall Financial Resource Strain (CARDIA)     Difficulty of Paying Living Expenses: Somewhat hard   Food Insecurity: No Food Insecurity (2025)    Received from [facility] [place]    Hunger Vital Sign     Worried About Running Out of Food in the Last Year: Never true     Ran Out of Food in the Last Year: Never true   Transportation Needs: No Transportation Needs (2025)    Received from [facility] [place]    PRAPARE - Transportation     Lack of Transportation (Medical): No     Lack of Transportation (Non-Medical): No   Physical Activity: Inactive (2025)    Exercise Vital Sign     Days of Exercise per Week: 0 days     Minutes of Exercise per Session: 0 min   Stress: Not on file   Social Connections: Not on file   Intimate Partner Violence: Not At Risk (2025)    Received from [facility] [place]    Humiliation, Afraid, Rape, and Kick questionnaire     Fear of Current or Ex-Partner: No     Emotionally Abused: No     Physically Abused: No     Sexually Abused: No   Housing Stability: Low Risk  (2025)    Received from [facility] [place]    Housing Stability Vital Sign     Unable to Pay for Housing in the Last Year: No     Number of Times Moved in the Last Year: 1     Homeless in the Last Year: No         Lab Results   Component Value Date    LABA1C 6.4 (H) 2025     Lab

## 2025-08-01 NOTE — TELEPHONE ENCOUNTER
Reason for Conversation  Lab Orders    Background   Pt has an appt with Dr. Lucero next week. Could you please change his needed labs to [facility] for pt to get done.    Disposition   No disposition on file.

## 2025-08-01 NOTE — PROGRESS NOTES
Name: Kelvin Washington   Facility: [address]  [address]    Date: 7/1/2025     Subjective:     Chief Complaint   Patient presents with    Follow-up       HPI  Kelvin Washington is a 69 y.o. male being seen today for evaluation of anxiety acute pain and difficulty walking.  Resident has been seeing PT and OT and making progress.  He is up without the walker today, ambulating well.  He is complaining of pain 8/10, pain meds decreased to every 6 hours as needed.  He is encouraged to take Tylenol between doses of pain medication.  Vital signs stable no fever.  Prescription for Ativan every 24 hours written today.    Past Medical History:   Diagnosis Date    CAD (coronary artery disease)     Cancer (HCC)     prostate cancer Nov 16 2018, [facility] right BOT 2022    CHF (congestive heart failure) (HCC)     Chicken pox     Chronic back pain     Emphysema of lung (HCC)     Hyperlipidemia     Hypertension     Hypothyroidism     Measles     Mumps     Osteoarthritis     Pneumonia          Allergies:  Reviewed in nursing facility records  Medications:  Reviewed and reconciled in nursing facility records    Review of Systems  A 10 Point review of systems was performed and is negative unless previously stated      Objective:   See nursing facility record for vital signs.      Physical Exam  Constitutional:  up ad jesus., well-developed, in no acute distess  Pulmonary/Chest:  lung sounds clear and diminished in lower lobes, no shortness of breath  Cardiovascular:  S1-S2 regular, no edema  Abd/GI:  abdomen soft, round, active bowel sounds x 4 quadrants  MS/Extremities:  LOTT, ROM limited, abnormal gait  Psych:  A/O x 3, cooperative with care      Diagnosis Orders   1. Acute pain        2. Anxiety        3. Difficulty in walking              Assessment and Plan:      1. Acute pain  Continue oxycodone as ordered.    2. Anxiety  Continue lorazepam as ordered.    3. Difficulty in walking  Continue PT and OT with focus

## 2025-08-01 NOTE — PROGRESS NOTES
[facility]ology Clinic Follow up Visit    Kelvin Washington  [id number]  Chief Complaint   Patient presents with    Follow-up     1 yr f/u       HPI and A/P at last visit summarized below:  Patient is here for follow-up, has a history of throat cancer and was treated with chemo and radiation and has been experiencing symptoms of oropharyngeal dysphagia, symptoms has not progressed, feels food gets hung up in the throat area and once the food bolus passes the throat goes down into the stomach, patient also has history of erosive esophagitis with stricture diagnosed by EGD in 2021 and had esophageal dilation at that time, has been on omeprazole 20 mg once a day, patient had recent back surgery and was hospitalized for for about 2 months and had lost about 50 pounds, patient has regained about 17 pounds    Review of Systems   Constitutional: Negative.    HENT: Negative.     Eyes: Negative.    Respiratory: Negative.     Gastrointestinal: Negative.  Negative for abdominal distention, abdominal pain, anal bleeding, blood in stool, constipation, diarrhea, nausea, rectal pain and vomiting.        Dysphagia, oropharyngeal   Genitourinary: Negative.    Musculoskeletal: Negative.    Neurological: Negative.    Psychiatric/Behavioral: Negative.          Past medical history, past surgical history, medication list, social and familyhistory reviewed    Blood pressure 120/72, pulse 70, height 1.753 m (5' 9\"), weight 75.8 kg (167 lb), SpO2 99%.    Physical Exam  Constitutional:       Appearance: He is well-developed.   HENT:      Head: Normocephalic.   Eyes:      Pupils: Pupils are equal, round, and reactive to light.   Cardiovascular:      Rate and Rhythm: Normal rate and regular rhythm.      Heart sounds: Normal heart sounds.   Pulmonary:      Effort: Pulmonary effort is normal.      Breath sounds: Normal breath sounds.   Abdominal:      General: Bowel sounds are normal.      Palpations: Abdomen is soft.      Comments: Soft nontender

## 2025-08-01 NOTE — PROGRESS NOTES
Name: Kelvin Washington   Facility: [address]  [address]    Date: 7/29/2025     Subjective:     Chief Complaint   Patient presents with    Follow-up       HPI  Kelvin Washington is a 69 y.o. male being seen today for evaluation of pain anxiety difficulty walking and weakness.  Resident has participated in physical occupational therapy and made progress and has met therapy goals.  He will be discharging home within the next few days.  Resident is on oxycodone for pain and Xanax for anxiety.  Vital signs stable no fever.  Appetite is fair urine output good based on intake.  Up ambulatory ad jesus.  Ready for discharge.    Past Medical History:   Diagnosis Date    CAD (coronary artery disease)     Cancer (HCC)     prostate cancer Nov 16 2018, [facility] right BOT 2022    CHF (congestive heart failure) (HCC)     Chicken pox     Chronic back pain     Emphysema of lung (HCC)     Hyperlipidemia     Hypertension     Hypothyroidism     Measles     Mumps     Osteoarthritis     Pneumonia          Allergies:  Reviewed in nursing facility records  Medications:  Reviewed and reconciled in nursing facility records    Review of Systems  A 10 Point review of systems was performed and is negative unless previously stated      Objective:   See nursing facility record for vital signs.      Physical Exam  Constitutional: Up ad jesus. well-developed, in no acute distess  Pulmonary/Chest:  breathing unlabored, no use of accessory muscles, lung sounds clear and diminished in lower lobes, no shortness of breath at rest, no dyspnea with activity  Cardiovascular:  S1-S2 regular, 2+ DP x 2, no edema  Abd/GI:  abdomen soft, round, non-distended, non-tender, active bowel sounds x 4 quadrants  : no SP tenderness,no CVA tenderness, no hematuria  MS/Extremities:  LOTT, ROM limited, abnormal gait  Psych:  A/O x 3, cooperative with care, moderate anxiety      Diagnosis Orders   1. Acute pain        2. Anxiety        3. Difficulty in

## 2025-08-04 DIAGNOSIS — E03.9 HYPOTHYROIDISM, UNSPECIFIED TYPE: ICD-10-CM

## 2025-08-04 LAB — TSH SERPL-MCNC: 4.22 UIU/ML (ref 0.44–3.86)

## 2025-08-05 ENCOUNTER — APPOINTMENT (OUTPATIENT)
Dept: RADIOLOGY | Facility: HOSPITAL | Age: 70
End: 2025-08-05
Payer: COMMERCIAL

## 2025-08-05 ENCOUNTER — LAB (OUTPATIENT)
Facility: HOSPITAL | Age: 70
End: 2025-08-05
Payer: COMMERCIAL

## 2025-08-05 DIAGNOSIS — M86.08 ACUTE HEMATOGENOUS OSTEOMYELITIS OF OTHER SITE: ICD-10-CM

## 2025-08-05 LAB
ALBUMIN SERPL BCP-MCNC: 3.7 G/DL (ref 3.4–5)
ALP SERPL-CCNC: 104 U/L (ref 33–136)
ALT SERPL W P-5'-P-CCNC: 10 U/L (ref 10–52)
ANION GAP SERPL CALC-SCNC: 10 MMOL/L (ref 10–20)
AST SERPL W P-5'-P-CCNC: 17 U/L (ref 9–39)
BASOPHILS # BLD AUTO: 0.05 X10*3/UL (ref 0–0.1)
BASOPHILS NFR BLD AUTO: 1 %
BILIRUB SERPL-MCNC: 0.5 MG/DL (ref 0–1.2)
BUN SERPL-MCNC: 14 MG/DL (ref 6–23)
CALCIUM SERPL-MCNC: 9.2 MG/DL (ref 8.6–10.3)
CEA SERPL-MCNC: 0.9 UG/L
CHLORIDE SERPL-SCNC: 103 MMOL/L (ref 98–107)
CO2 SERPL-SCNC: 28 MMOL/L (ref 21–32)
CREAT SERPL-MCNC: 0.75 MG/DL (ref 0.5–1.3)
EGFRCR SERPLBLD CKD-EPI 2021: >90 ML/MIN/1.73M*2
EOSINOPHIL # BLD AUTO: 0.31 X10*3/UL (ref 0–0.7)
EOSINOPHIL NFR BLD AUTO: 6.1 %
ERYTHROCYTE [DISTWIDTH] IN BLOOD BY AUTOMATED COUNT: 15.8 % (ref 11.5–14.5)
GLUCOSE SERPL-MCNC: 94 MG/DL (ref 74–99)
HCT VFR BLD AUTO: 34.9 % (ref 41–52)
HGB BLD-MCNC: 10.5 G/DL (ref 13.5–17.5)
IMM GRANULOCYTES # BLD AUTO: 0.01 X10*3/UL (ref 0–0.7)
IMM GRANULOCYTES NFR BLD AUTO: 0.2 % (ref 0–0.9)
LYMPHOCYTES # BLD AUTO: 0.93 X10*3/UL (ref 1.2–4.8)
LYMPHOCYTES NFR BLD AUTO: 18.4 %
MCH RBC QN AUTO: 27.3 PG (ref 26–34)
MCHC RBC AUTO-ENTMCNC: 30.1 G/DL (ref 32–36)
MCV RBC AUTO: 91 FL (ref 80–100)
MONOCYTES # BLD AUTO: 0.85 X10*3/UL (ref 0.1–1)
MONOCYTES NFR BLD AUTO: 16.8 %
NEUTROPHILS # BLD AUTO: 2.91 X10*3/UL (ref 1.2–7.7)
NEUTROPHILS NFR BLD AUTO: 57.5 %
NRBC BLD-RTO: 0 /100 WBCS (ref 0–0)
PLATELET # BLD AUTO: 257 X10*3/UL (ref 150–450)
POTASSIUM SERPL-SCNC: 4.4 MMOL/L (ref 3.5–5.3)
PROT SERPL-MCNC: 6 G/DL (ref 6.4–8.2)
RBC # BLD AUTO: 3.84 X10*6/UL (ref 4.5–5.9)
SODIUM SERPL-SCNC: 137 MMOL/L (ref 136–145)
WBC # BLD AUTO: 5.1 X10*3/UL (ref 4.4–11.3)

## 2025-08-05 PROCEDURE — 2550000001 HC RX 255 CONTRASTS: Performed by: STUDENT IN AN ORGANIZED HEALTH CARE EDUCATION/TRAINING PROGRAM

## 2025-08-05 PROCEDURE — 74177 CT ABD & PELVIS W/CONTRAST: CPT | Performed by: RADIOLOGY

## 2025-08-05 PROCEDURE — 80053 COMPREHEN METABOLIC PANEL: CPT | Performed by: INTERNAL MEDICINE

## 2025-08-05 PROCEDURE — 36415 COLL VENOUS BLD VENIPUNCTURE: CPT | Performed by: INTERNAL MEDICINE

## 2025-08-05 PROCEDURE — 74177 CT ABD & PELVIS W/CONTRAST: CPT

## 2025-08-05 PROCEDURE — 82378 CARCINOEMBRYONIC ANTIGEN: CPT | Mod: ELYLAB | Performed by: INTERNAL MEDICINE

## 2025-08-05 PROCEDURE — 85025 COMPLETE CBC W/AUTO DIFF WBC: CPT | Performed by: INTERNAL MEDICINE

## 2025-08-05 RX ADMIN — IOHEXOL 75 ML: 350 INJECTION, SOLUTION INTRAVENOUS at 08:39

## 2025-08-06 ENCOUNTER — OFFICE VISIT (OUTPATIENT)
Age: 70
End: 2025-08-06
Payer: COMMERCIAL

## 2025-08-06 VITALS
OXYGEN SATURATION: 97 % | TEMPERATURE: 97.7 F | SYSTOLIC BLOOD PRESSURE: 110 MMHG | HEIGHT: 69 IN | BODY MASS INDEX: 24.66 KG/M2 | HEART RATE: 63 BPM | DIASTOLIC BLOOD PRESSURE: 60 MMHG

## 2025-08-06 DIAGNOSIS — E03.9 HYPOTHYROIDISM, UNSPECIFIED TYPE: Primary | ICD-10-CM

## 2025-08-06 DIAGNOSIS — F41.9 ANXIETY: ICD-10-CM

## 2025-08-06 DIAGNOSIS — M86.9 BACTERIAL OSTEOMYELITIS (HCC): ICD-10-CM

## 2025-08-06 DIAGNOSIS — I10 ESSENTIAL HYPERTENSION: ICD-10-CM

## 2025-08-06 PROCEDURE — 1159F MED LIST DOCD IN RCRD: CPT | Performed by: FAMILY MEDICINE

## 2025-08-06 PROCEDURE — 1123F ACP DISCUSS/DSCN MKR DOCD: CPT | Performed by: FAMILY MEDICINE

## 2025-08-06 PROCEDURE — 3078F DIAST BP <80 MM HG: CPT | Performed by: FAMILY MEDICINE

## 2025-08-06 PROCEDURE — 99214 OFFICE O/P EST MOD 30 MIN: CPT | Performed by: FAMILY MEDICINE

## 2025-08-06 PROCEDURE — 3074F SYST BP LT 130 MM HG: CPT | Performed by: FAMILY MEDICINE

## 2025-08-06 PROCEDURE — 1160F RVW MEDS BY RX/DR IN RCRD: CPT | Performed by: FAMILY MEDICINE

## 2025-08-06 RX ORDER — LEVOTHYROXINE SODIUM 100 UG/1
100 TABLET ORAL DAILY
Qty: 90 TABLET | Refills: 0 | Status: SHIPPED | OUTPATIENT
Start: 2025-08-06

## 2025-08-06 RX ORDER — OXYCODONE HYDROCHLORIDE 10 MG/1
TABLET ORAL
COMMUNITY
Start: 2025-07-31

## 2025-08-06 ASSESSMENT — ENCOUNTER SYMPTOMS: VOMITING: 0

## 2025-08-08 ENCOUNTER — TELEMEDICINE (OUTPATIENT)
Dept: HEMATOLOGY/ONCOLOGY | Facility: CLINIC | Age: 70
End: 2025-08-08
Payer: COMMERCIAL

## 2025-08-08 DIAGNOSIS — C61 PROSTATE CANCER (MULTI): ICD-10-CM

## 2025-08-08 DIAGNOSIS — F41.9 ANXIETY: ICD-10-CM

## 2025-08-08 DIAGNOSIS — E78.2 MIXED HYPERLIPIDEMIA: ICD-10-CM

## 2025-08-08 DIAGNOSIS — M86.08 ACUTE HEMATOGENOUS OSTEOMYELITIS OF OTHER SITE: ICD-10-CM

## 2025-08-08 DIAGNOSIS — E03.9 PRIMARY HYPOTHYROIDISM: ICD-10-CM

## 2025-08-08 DIAGNOSIS — C01 CANCER OF BASE OF TONGUE (MULTI): Primary | ICD-10-CM

## 2025-08-08 DIAGNOSIS — C01 CANCER OF BASE OF TONGUE (MULTI): ICD-10-CM

## 2025-08-08 DIAGNOSIS — R97.0 ELEVATED CARCINOEMBRYONIC ANTIGEN (CEA): ICD-10-CM

## 2025-08-08 DIAGNOSIS — J44.9 CHRONIC OBSTRUCTIVE PULMONARY DISEASE, UNSPECIFIED COPD TYPE (MULTI): ICD-10-CM

## 2025-08-08 DIAGNOSIS — G89.3 CANCER ASSOCIATED PAIN: ICD-10-CM

## 2025-08-08 PROCEDURE — 1159F MED LIST DOCD IN RCRD: CPT | Performed by: INTERNAL MEDICINE

## 2025-08-08 PROCEDURE — 1160F RVW MEDS BY RX/DR IN RCRD: CPT | Performed by: INTERNAL MEDICINE

## 2025-08-08 PROCEDURE — 99213 OFFICE O/P EST LOW 20 MIN: CPT | Performed by: INTERNAL MEDICINE

## 2025-08-11 ENCOUNTER — OFFICE VISIT (OUTPATIENT)
Facility: CLINIC | Age: 70
End: 2025-08-11
Payer: COMMERCIAL

## 2025-08-11 VITALS
SYSTOLIC BLOOD PRESSURE: 120 MMHG | RESPIRATION RATE: 16 BRPM | TEMPERATURE: 97.7 F | WEIGHT: 165.6 LBS | BODY MASS INDEX: 25.1 KG/M2 | HEIGHT: 68 IN | HEART RATE: 75 BPM | DIASTOLIC BLOOD PRESSURE: 74 MMHG

## 2025-08-11 DIAGNOSIS — M54.14 THORACIC RADICULOPATHY: ICD-10-CM

## 2025-08-11 DIAGNOSIS — M54.16 LUMBAR RADICULOPATHY: Primary | ICD-10-CM

## 2025-08-11 PROCEDURE — 99024 POSTOP FOLLOW-UP VISIT: CPT | Performed by: STUDENT IN AN ORGANIZED HEALTH CARE EDUCATION/TRAINING PROGRAM

## 2025-08-11 PROCEDURE — 1159F MED LIST DOCD IN RCRD: CPT | Performed by: STUDENT IN AN ORGANIZED HEALTH CARE EDUCATION/TRAINING PROGRAM

## 2025-08-11 PROCEDURE — 3008F BODY MASS INDEX DOCD: CPT | Performed by: STUDENT IN AN ORGANIZED HEALTH CARE EDUCATION/TRAINING PROGRAM

## 2025-08-11 PROCEDURE — 1125F AMNT PAIN NOTED PAIN PRSNT: CPT | Performed by: STUDENT IN AN ORGANIZED HEALTH CARE EDUCATION/TRAINING PROGRAM

## 2025-08-11 PROCEDURE — 99213 OFFICE O/P EST LOW 20 MIN: CPT

## 2025-08-11 ASSESSMENT — PATIENT HEALTH QUESTIONNAIRE - PHQ9
SUM OF ALL RESPONSES TO PHQ9 QUESTIONS 1 AND 2: 0
2. FEELING DOWN, DEPRESSED OR HOPELESS: NOT AT ALL
1. LITTLE INTEREST OR PLEASURE IN DOING THINGS: NOT AT ALL

## 2025-08-11 ASSESSMENT — PAIN SCALES - GENERAL: PAINLEVEL_OUTOF10: 9

## 2025-08-12 ENCOUNTER — HOSPITAL ENCOUNTER (OUTPATIENT)
Dept: RADIATION ONCOLOGY | Age: 70
Discharge: HOME | End: 2025-08-12

## 2025-08-12 VITALS
HEART RATE: 57 BPM | WEIGHT: 168 LBS | TEMPERATURE: 96.9 F | DIASTOLIC BLOOD PRESSURE: 66 MMHG | SYSTOLIC BLOOD PRESSURE: 109 MMHG | OXYGEN SATURATION: 97 % | BODY MASS INDEX: 24.81 KG/M2

## 2025-08-12 DIAGNOSIS — G89.29 OTHER CHRONIC PAIN: ICD-10-CM

## 2025-08-12 DIAGNOSIS — C61 PROSTATE CANCER (HCC): ICD-10-CM

## 2025-08-12 DIAGNOSIS — M25.50 MULTIPLE JOINT PAIN: ICD-10-CM

## 2025-08-12 DIAGNOSIS — J44.9 CHRONIC OBSTRUCTIVE PULMONARY DISEASE, UNSPECIFIED COPD TYPE (HCC): ICD-10-CM

## 2025-08-12 DIAGNOSIS — I50.9 CHRONIC CONGESTIVE HEART FAILURE, UNSPECIFIED HEART FAILURE TYPE (HCC): ICD-10-CM

## 2025-08-12 DIAGNOSIS — M47.817 LUMBOSACRAL SPONDYLOSIS WITHOUT MYELOPATHY: ICD-10-CM

## 2025-08-12 DIAGNOSIS — Z51.5 PALLIATIVE CARE ENCOUNTER: ICD-10-CM

## 2025-08-12 DIAGNOSIS — M54.50 LUMBAR SPINE PAIN: Primary | ICD-10-CM

## 2025-08-12 DIAGNOSIS — Z87.39 HX OF OSTEOMYELITIS: ICD-10-CM

## 2025-08-12 DIAGNOSIS — R60.0 BILATERAL LOWER EXTREMITY EDEMA: ICD-10-CM

## 2025-08-12 DIAGNOSIS — H93.8X3 SENSATION OF FULLNESS IN BOTH EARS: ICD-10-CM

## 2025-08-12 DIAGNOSIS — R68.89 THROAT CONGESTION: ICD-10-CM

## 2025-08-12 PROBLEM — M46.49 DISCITIS OF MULTIPLE SITES OF SPINE: Status: ACTIVE | Noted: 2025-08-12

## 2025-08-12 RX ORDER — GABAPENTIN 300 MG/1
300 CAPSULE ORAL 3 TIMES DAILY
Qty: 90 CAPSULE | Refills: 0 | Status: SHIPPED | OUTPATIENT
Start: 2025-08-12 | End: 2025-09-11

## 2025-08-12 RX ORDER — OXYCODONE HYDROCHLORIDE 10 MG/1
10 TABLET ORAL EVERY 4 HOURS PRN
Qty: 180 TABLET | Refills: 0 | Status: SHIPPED | OUTPATIENT
Start: 2025-08-12 | End: 2025-09-11

## 2025-08-15 ASSESSMENT — ENCOUNTER SYMPTOMS
TROUBLE SWALLOWING: 0
RESPIRATORY NEGATIVE: 1
GASTROINTESTINAL NEGATIVE: 1
BACK PAIN: 1

## 2025-08-16 DIAGNOSIS — F41.9 ANXIETY: ICD-10-CM

## 2025-08-16 PROBLEM — Z87.39 HX OF OSTEOMYELITIS: Status: ACTIVE | Noted: 2025-08-16

## 2025-08-16 PROBLEM — R60.0 BILATERAL LOWER EXTREMITY EDEMA: Status: ACTIVE | Noted: 2025-08-16

## 2025-08-16 PROBLEM — R68.89 THROAT CONGESTION: Status: ACTIVE | Noted: 2025-08-16

## 2025-08-16 PROBLEM — M25.50 MULTIPLE JOINT PAIN: Status: ACTIVE | Noted: 2025-08-16

## 2025-08-16 PROBLEM — G89.29 OTHER CHRONIC PAIN: Status: ACTIVE | Noted: 2025-08-16

## 2025-08-16 PROBLEM — I50.9 CHRONIC CONGESTIVE HEART FAILURE (HCC): Status: ACTIVE | Noted: 2025-08-16

## 2025-08-16 PROBLEM — H93.8X3 SENSATION OF FULLNESS IN BOTH EARS: Status: ACTIVE | Noted: 2025-08-16

## 2025-08-16 PROBLEM — M54.50 LUMBAR SPINE PAIN: Status: ACTIVE | Noted: 2025-08-16

## 2025-08-16 PROBLEM — Z51.5 PALLIATIVE CARE ENCOUNTER: Status: ACTIVE | Noted: 2025-08-16

## 2025-08-17 RX ORDER — LORAZEPAM 1 MG/1
1 TABLET ORAL 2 TIMES DAILY PRN
Qty: 60 TABLET | Refills: 0 | Status: SHIPPED | OUTPATIENT
Start: 2025-08-17 | End: 2025-09-16

## 2025-08-18 ENCOUNTER — APPOINTMENT (OUTPATIENT)
Dept: OTOLARYNGOLOGY | Facility: CLINIC | Age: 70
End: 2025-08-18
Payer: COMMERCIAL

## 2025-08-18 VITALS — BODY MASS INDEX: 25.36 KG/M2 | WEIGHT: 166.8 LBS

## 2025-08-18 DIAGNOSIS — C01 CANCER OF BASE OF TONGUE (MULTI): Primary | ICD-10-CM

## 2025-08-18 PROCEDURE — 1159F MED LIST DOCD IN RCRD: CPT | Performed by: OTOLARYNGOLOGY

## 2025-08-18 PROCEDURE — 1160F RVW MEDS BY RX/DR IN RCRD: CPT | Performed by: OTOLARYNGOLOGY

## 2025-08-18 PROCEDURE — 99213 OFFICE O/P EST LOW 20 MIN: CPT | Performed by: OTOLARYNGOLOGY

## 2025-08-18 RX ORDER — METOPROLOL SUCCINATE 25 MG/1
25 TABLET, EXTENDED RELEASE ORAL DAILY
Qty: 90 TABLET | Refills: 2 | Status: SHIPPED | OUTPATIENT
Start: 2025-08-18

## 2025-08-20 ENCOUNTER — APPOINTMENT (OUTPATIENT)
Facility: CLINIC | Age: 70
End: 2025-08-20
Payer: COMMERCIAL

## 2025-08-21 ASSESSMENT — ENCOUNTER SYMPTOMS
PSYCHIATRIC NEGATIVE: 1
CONSTITUTIONAL NEGATIVE: 1
HEMATOLOGIC/LYMPHATIC NEGATIVE: 1
EYES NEGATIVE: 1
RESPIRATORY NEGATIVE: 1
NEUROLOGICAL NEGATIVE: 1
MUSCULOSKELETAL NEGATIVE: 1
CARDIOVASCULAR NEGATIVE: 1
ENDOCRINE NEGATIVE: 1
GASTROINTESTINAL NEGATIVE: 1

## 2025-08-23 DIAGNOSIS — K68.12 PSOAS ABSCESS (MULTI): ICD-10-CM

## 2025-08-23 DIAGNOSIS — M86.08 ACUTE HEMATOGENOUS OSTEOMYELITIS OF OTHER SITE: ICD-10-CM

## 2025-08-25 RX ORDER — DOXYCYCLINE HYCLATE 100 MG
100 TABLET ORAL
Qty: 60 TABLET | Refills: 0 | OUTPATIENT
Start: 2025-08-25

## 2025-08-25 RX ORDER — AMOXICILLIN AND CLAVULANATE POTASSIUM 875; 125 MG/1; MG/1
1 TABLET, FILM COATED ORAL 2 TIMES DAILY
Qty: 60 TABLET | Refills: 0 | OUTPATIENT
Start: 2025-08-25

## 2025-08-27 ENCOUNTER — APPOINTMENT (OUTPATIENT)
Facility: CLINIC | Age: 70
End: 2025-08-27
Payer: COMMERCIAL

## 2025-08-27 DIAGNOSIS — M86.08 ACUTE HEMATOGENOUS OSTEOMYELITIS OF OTHER SITE: Primary | ICD-10-CM

## 2025-08-27 PROCEDURE — 99214 OFFICE O/P EST MOD 30 MIN: CPT | Performed by: STUDENT IN AN ORGANIZED HEALTH CARE EDUCATION/TRAINING PROGRAM

## 2025-09-03 ENCOUNTER — OFFICE VISIT (OUTPATIENT)
Age: 70
End: 2025-09-03
Payer: COMMERCIAL

## 2025-09-03 VITALS
SYSTOLIC BLOOD PRESSURE: 104 MMHG | TEMPERATURE: 98.6 F | WEIGHT: 169.8 LBS | RESPIRATION RATE: 18 BRPM | HEART RATE: 65 BPM | DIASTOLIC BLOOD PRESSURE: 64 MMHG | OXYGEN SATURATION: 97 % | BODY MASS INDEX: 25.15 KG/M2 | HEIGHT: 69 IN

## 2025-09-03 DIAGNOSIS — Z87.891 PERSONAL HISTORY OF TOBACCO USE: Primary | ICD-10-CM

## 2025-09-03 DIAGNOSIS — J44.9 CHRONIC OBSTRUCTIVE PULMONARY DISEASE, UNSPECIFIED COPD TYPE (HCC): ICD-10-CM

## 2025-09-03 PROCEDURE — 3074F SYST BP LT 130 MM HG: CPT | Performed by: INTERNAL MEDICINE

## 2025-09-03 PROCEDURE — 1123F ACP DISCUSS/DSCN MKR DOCD: CPT | Performed by: INTERNAL MEDICINE

## 2025-09-03 PROCEDURE — 99214 OFFICE O/P EST MOD 30 MIN: CPT | Performed by: INTERNAL MEDICINE

## 2025-09-03 PROCEDURE — G0296 VISIT TO DETERM LDCT ELIG: HCPCS | Performed by: INTERNAL MEDICINE

## 2025-09-03 PROCEDURE — 1159F MED LIST DOCD IN RCRD: CPT | Performed by: INTERNAL MEDICINE

## 2025-09-03 PROCEDURE — 3078F DIAST BP <80 MM HG: CPT | Performed by: INTERNAL MEDICINE

## 2025-09-05 ENCOUNTER — HOSPITAL ENCOUNTER (OUTPATIENT)
Dept: RADIATION ONCOLOGY | Age: 70
Discharge: HOME | End: 2025-09-05

## 2025-09-05 VITALS
SYSTOLIC BLOOD PRESSURE: 115 MMHG | TEMPERATURE: 98.4 F | DIASTOLIC BLOOD PRESSURE: 71 MMHG | OXYGEN SATURATION: 95 % | HEART RATE: 67 BPM | BODY MASS INDEX: 25.1 KG/M2 | WEIGHT: 170 LBS

## 2025-09-05 DIAGNOSIS — M54.50 LUMBAR SPINE PAIN: ICD-10-CM

## 2025-09-05 DIAGNOSIS — M25.50 MULTIPLE JOINT PAIN: ICD-10-CM

## 2025-09-05 DIAGNOSIS — G89.29 OTHER CHRONIC PAIN: ICD-10-CM

## 2025-09-05 DIAGNOSIS — R60.0 BILATERAL LOWER EXTREMITY EDEMA: Primary | ICD-10-CM

## 2025-09-05 DIAGNOSIS — Z87.39 HX OF OSTEOMYELITIS: ICD-10-CM

## 2025-09-05 DIAGNOSIS — Z51.5 PALLIATIVE CARE ENCOUNTER: ICD-10-CM

## 2025-09-05 RX ORDER — GABAPENTIN 300 MG/1
300 CAPSULE ORAL 2 TIMES DAILY
Qty: 60 CAPSULE | Refills: 0 | Status: SHIPPED | OUTPATIENT
Start: 2025-09-05 | End: 2025-10-05

## 2025-09-05 RX ORDER — OXYCODONE HYDROCHLORIDE 10 MG/1
10 TABLET ORAL EVERY 4 HOURS PRN
Qty: 180 TABLET | Refills: 0 | Status: SHIPPED | OUTPATIENT
Start: 2025-09-11 | End: 2025-10-11

## 2025-09-05 RX ORDER — FUROSEMIDE 20 MG/1
20 TABLET ORAL DAILY
Qty: 3 TABLET | Refills: 0 | Status: SHIPPED | OUTPATIENT
Start: 2025-09-05 | End: 2025-09-08

## 2025-09-05 ASSESSMENT — ENCOUNTER SYMPTOMS
GASTROINTESTINAL NEGATIVE: 1
TROUBLE SWALLOWING: 0
BACK PAIN: 1
RESPIRATORY NEGATIVE: 1

## 2025-12-19 ENCOUNTER — APPOINTMENT (OUTPATIENT)
Dept: OTOLARYNGOLOGY | Facility: CLINIC | Age: 70
End: 2025-12-19
Payer: COMMERCIAL

## (undated) DEVICE — MANIFOLD, 4 PORT NEPTUNE STANDARD

## (undated) DEVICE — SUCTION COAGULATOR, HANDSWITCHING, 10 FR, 6 INCH (15.24CM): Brand: MEGADYNE

## (undated) DEVICE — GOWN,AURORA,NONREINFORCED,LARGE: Brand: MEDLINE

## (undated) DEVICE — Device: Brand: ENDO SMARTCAP

## (undated) DEVICE — CONMED SCOPE SAVER BITE BLOCK, 20X27 MM: Brand: SCOPE SAVER

## (undated) DEVICE — LABEL MED MINI W/ MARKER

## (undated) DEVICE — Device

## (undated) DEVICE — BRUSH ENDO CLN L90.5IN SHTH DIA1.7MM BRIST DIA5-7MM 2-6MM

## (undated) DEVICE — SYSTEM KIT, PREVENA PLUS

## (undated) DEVICE — SINGLE PORT MANIFOLD: Brand: NEPTUNE 2

## (undated) DEVICE — KIT,ANTI FOG,W/SPONGE & FLUID,SOFT PACK: Brand: MEDLINE

## (undated) DEVICE — GAUZE,SPONGE,4"X4",16PLY,XRAY,STRL,LF: Brand: MEDLINE

## (undated) DEVICE — PAD,NON-ADHERENT,3X8,STERILE,LF,1/PK: Brand: MEDLINE

## (undated) DEVICE — TOWEL,OR,DSP,ST,BLUE,STD,4/PK,20PK/CS: Brand: MEDLINE

## (undated) DEVICE — SUTURE, VICRYL, 4-0, 18 IN, UNDYED BR PS-2

## (undated) DEVICE — GLOVE ORANGE PI 8   MSG9080

## (undated) DEVICE — SYRINGE,EAR/ULCER, 2 OZ, STERILE: Brand: MEDLINE

## (undated) DEVICE — DRESSING, MEPILEX, POST OP, 8 X 4

## (undated) DEVICE — CLEANING BRUSH - SINGLE USE: Brand: SAFEGUIDE®

## (undated) DEVICE — PACK,BASIC: Brand: MEDLINE

## (undated) DEVICE — PENCIL SMOKE EVAC PUSH BUTTON COATED

## (undated) DEVICE — HYPODERMIC SAFETY NEEDLE: Brand: MAGELLAN

## (undated) DEVICE — COUNTER NDL 40 COUNT HLD 70 FOAM BLK ADH W/ MAG

## (undated) DEVICE — E-Z CLEAN, NON-STICK, PTFE COATED, ELECTROSURGICAL BLADE ELECTRODE, MODIFIED EXTENDED INSULATION, 2.5 INCH (6.35 CM): Brand: MEGADYNE

## (undated) DEVICE — BRONCHOSCOPE EXAM L23.6IN OD0.15IN ID0.047IN DISP 4 BRONCH 065476001000] TRI ANIM HEALTH SERVICES]

## (undated) DEVICE — BINDER ABD 2XL H12XL60 75IN UNISX STD E 4 PNL DISPOSABLE

## (undated) DEVICE — BONE, MILL, MIDAS REX, DUAL BLADE, ELECTRIC

## (undated) DEVICE — MARKER, SKIN, RULER AND LABEL PACK, CUSTOM

## (undated) DEVICE — WOUND SYSTEM, DEBRIDEMENT & CLEANING, O.R DUOPAK

## (undated) DEVICE — COVER,TABLE,44X90,STERILE: Brand: MEDLINE

## (undated) DEVICE — APPLICATOR, CHLORAPREP, W/ORANGE TINT, 26ML

## (undated) DEVICE — TUBING, SUCTION, 1/4" X 10', STRAIGHT: Brand: MEDLINE

## (undated) DEVICE — DRAPE, C-ARM IMAGE

## (undated) DEVICE — SUTURE MCRYL SZ 4-0 L27IN ABSRB UD L19MM PS-2 1/2 CIR PRIM Y426H

## (undated) DEVICE — SPHERE, STEALTHSTATION, 5-PK

## (undated) DEVICE — GUIDEWIRE WITH SPRING TIP - SINGLE USE: Brand: SAFEGUIDE®

## (undated) DEVICE — SUTURE VCRL + SZ 2-0 L36IN ABSRB UD L36MM CT-1 1/2 CIR VCP945H

## (undated) DEVICE — GUARD TEETH AD NYL FOR LARYNSCP POS PROTCT

## (undated) DEVICE — FORCEPS BX L240CM JAW DIA2.8MM L CAP W/ NDL MIC MESH TOOTH

## (undated) DEVICE — COVER, CART, 45 X 27 X 48 IN, CLEAR

## (undated) DEVICE — CAUTERY, PENCIL, PUSH BUTTON, SMOKE EVAC, 70MM

## (undated) DEVICE — DRESSING, PREVENA, PEEL AND PLACE, 20CM

## (undated) DEVICE — GOWN,AURORA,NONRNF,XL,30/CS: Brand: MEDLINE

## (undated) DEVICE — DRESSING, MEPILEX, BORDER FLEX, 3 X 3

## (undated) DEVICE — PACK,LAPAROTOMY,NO GOWNS: Brand: MEDLINE

## (undated) DEVICE — GLOVE ORANGE PI 7 1/2   MSG9075

## (undated) DEVICE — YANKAUER,BULB TIP,W/O VENT,RIGID,STERILE: Brand: MEDLINE

## (undated) DEVICE — DRESSING, MEPILEX, BORDER FLEX, 6 X 8

## (undated) DEVICE — ADAPTER FLSH PMP FLD MGMT GI IRRIG OFP 2 DISPOSABLE

## (undated) DEVICE — ELECTRODE PT RET AD L9FT HI MOIST COND ADH HYDRGEL CORDED

## (undated) DEVICE — ELECTRODE, ELECTROSURGICAL, BLADE EXT 4 INCH, INSULATED

## (undated) DEVICE — TUBING, SMOKE EVAC, 3/8 X 10 FT

## (undated) DEVICE — SHEET,DRAPE,53X77,STERILE: Brand: MEDLINE

## (undated) DEVICE — CHLORAPREP 26ML ORANGE

## (undated) DEVICE — GLOVE ORANGE PI 8 1/2   MSG9085

## (undated) DEVICE — TAPE, SILK, DURAPORE, 3 IN X 10 YD, LF

## (undated) DEVICE — SUTURE, VICRYL, 0, 18 IN, UNDYED

## (undated) DEVICE — JELLY,LUBE,STERILE,FLIP TOP,TUBE,2-OZ: Brand: MEDLINE

## (undated) DEVICE — MARKER SURG SKIN GENTIAN VLT REG TIP W/ 6IN RUL

## (undated) DEVICE — COVER LT HNDL BLU PLAS

## (undated) DEVICE — PAD N ADH W3XL4IN POLY COT SFT PERF FLM EASILY CUT ABSRB

## (undated) DEVICE — PAD, GROUNDING, ELECTROSURGICAL, W/9 FT CABLE, POLYHESIVE II, ADULT, LF

## (undated) DEVICE — INTENDED FOR TISSUE SEPARATION, AND OTHER PROCEDURES THAT REQUIRE A SHARP SURGICAL BLADE TO PUNCTURE OR CUT.: Brand: BARD-PARKER ® CARBON RIB-BACK BLADES

## (undated) DEVICE — 3M™ IOBAN™ 2 ANTIMICROBIAL INCISE DRAPE 6650EZ: Brand: IOBAN™ 2

## (undated) DEVICE — ENDO CARRY-ON PROCEDURE KIT: Brand: ENDO CARRY-ON PROCEDURE KIT

## (undated) DEVICE — SPONGE,LAP,18"X18",DLX,XR,ST,5/PK,40/PK: Brand: MEDLINE

## (undated) DEVICE — DRAPE, SHEET, FAN FOLDED, HALF, 44 X 58 IN, DISPOSABLE, LF, STERILE

## (undated) DEVICE — ELECTRODE, ELECTROSURGICAL, BLADE, INSULATED, ENT/IMA, STERILE

## (undated) DEVICE — BUR, 3MM X 3.8MM, PRECISION, NEURO DRILL

## (undated) DEVICE — PACK,SET UP,NO DRAPES: Brand: MEDLINE

## (undated) DEVICE — TUBE SET 96 MM 64 MM H2O PERISTALTIC STD AUX CHANNEL

## (undated) DEVICE — SEALANT, HEMOSTATIC, FLOSEAL, 10 ML

## (undated) DEVICE — TIP,  ELECTRODE COATED INSULATED, EXTENDED, LF

## (undated) DEVICE — KIT, PATIENT CARE, JACKSON TABLE W/PRONE-SAFE HEADREST